# Patient Record
Sex: MALE | Race: WHITE | NOT HISPANIC OR LATINO | Employment: OTHER | URBAN - NONMETROPOLITAN AREA
[De-identification: names, ages, dates, MRNs, and addresses within clinical notes are randomized per-mention and may not be internally consistent; named-entity substitution may affect disease eponyms.]

---

## 2021-01-19 ENCOUNTER — TELEPHONE (OUTPATIENT)
Dept: CARDIOLOGY CLINIC | Facility: CLINIC | Age: 81
End: 2021-01-19

## 2021-01-20 ENCOUNTER — IMMUNIZATIONS (OUTPATIENT)
Dept: FAMILY MEDICINE CLINIC | Facility: HOSPITAL | Age: 81
End: 2021-01-20

## 2021-01-20 DIAGNOSIS — Z23 ENCOUNTER FOR IMMUNIZATION: Primary | ICD-10-CM

## 2021-01-20 DIAGNOSIS — I49.9 CARDIAC ARRHYTHMIA, UNSPECIFIED CARDIAC ARRHYTHMIA TYPE: Primary | ICD-10-CM

## 2021-01-20 PROCEDURE — 91301 SARS-COV-2 / COVID-19 MRNA VACCINE (MODERNA) 100 MCG: CPT | Performed by: EMERGENCY MEDICINE

## 2021-01-20 PROCEDURE — 0011A SARS-COV-2 / COVID-19 MRNA VACCINE (MODERNA) 100 MCG: CPT | Performed by: EMERGENCY MEDICINE

## 2021-01-20 NOTE — TELEPHONE ENCOUNTER
Scout Carlson from Dr Isabell Cheadle office calling  States that pt needs to be seen  He has an irregular heartbeat and abnormal EKG  Notes that were faxed to us are on your desk  Pt is schedule for march 24th, please advise

## 2021-01-22 ENCOUNTER — TRANSCRIBE ORDERS (OUTPATIENT)
Dept: ADMINISTRATIVE | Facility: HOSPITAL | Age: 81
End: 2021-01-22

## 2021-01-22 DIAGNOSIS — I48.91 UNSPECIFIED ATRIAL FIBRILLATION (HCC): ICD-10-CM

## 2021-01-22 DIAGNOSIS — I49.9 CARDIAC ARRHYTHMIA, UNSPECIFIED: Primary | ICD-10-CM

## 2021-01-28 ENCOUNTER — HOSPITAL ENCOUNTER (OUTPATIENT)
Dept: NON INVASIVE DIAGNOSTICS | Facility: HOSPITAL | Age: 81
Discharge: HOME | End: 2021-01-28
Payer: MEDICARE

## 2021-01-28 DIAGNOSIS — I48.91 UNSPECIFIED ATRIAL FIBRILLATION (HCC): ICD-10-CM

## 2021-01-28 DIAGNOSIS — I49.9 CARDIAC ARRHYTHMIA, UNSPECIFIED: ICD-10-CM

## 2021-01-28 PROCEDURE — 93226 XTRNL ECG REC<48 HR SCAN A/R: CPT

## 2021-01-28 PROCEDURE — 93225 XTRNL ECG REC<48 HRS REC: CPT

## 2021-02-05 PROCEDURE — 93227 XTRNL ECG REC<48 HR R&I: CPT | Performed by: INTERNAL MEDICINE

## 2021-02-08 ENCOUNTER — TELEPHONE (OUTPATIENT)
Dept: CARDIOLOGY CLINIC | Facility: CLINIC | Age: 81
End: 2021-02-08

## 2021-02-08 NOTE — TELEPHONE ENCOUNTER
As per Dr Tania French I called the pt to move his appointment up  The pt is available and the visit has been re scheduled to 02/18/21  I asked the pt about cardiac symptoms  At this tome he denies CP, palps, racing heart  He states that he is not experiencing any symptoms

## 2021-02-10 NOTE — TELEPHONE ENCOUNTER
Iggy Crocker  Is there any chance that you can call him and go over his medications  I am curious if he is on a beta blocker or calcium channel blocker as we may be able to start a low dose and repeat 24 hour Holter prior to office visit  Thank you

## 2021-02-11 RX ORDER — HYDROCHLOROTHIAZIDE 25 MG/1
25 TABLET ORAL DAILY
COMMUNITY
End: 2021-03-18 | Stop reason: CLARIF

## 2021-02-11 RX ORDER — ASPIRIN 81 MG/1
81 TABLET ORAL DAILY
COMMUNITY
End: 2021-02-25 | Stop reason: ALTCHOICE

## 2021-02-11 NOTE — TELEPHONE ENCOUNTER
I called the pt and he will call us back when he has his medications in front of him  Cellulitis of chest wall

## 2021-02-17 ENCOUNTER — IMMUNIZATIONS (OUTPATIENT)
Dept: FAMILY MEDICINE CLINIC | Facility: HOSPITAL | Age: 81
End: 2021-02-17

## 2021-02-17 ENCOUNTER — TELEPHONE (OUTPATIENT)
Dept: CARDIOLOGY CLINIC | Facility: CLINIC | Age: 81
End: 2021-02-17

## 2021-02-17 DIAGNOSIS — Z23 ENCOUNTER FOR IMMUNIZATION: Primary | ICD-10-CM

## 2021-02-17 PROCEDURE — 0012A SARS-COV-2 / COVID-19 MRNA VACCINE (MODERNA) 100 MCG: CPT

## 2021-02-17 PROCEDURE — 91301 SARS-COV-2 / COVID-19 MRNA VACCINE (MODERNA) 100 MCG: CPT

## 2021-02-17 NOTE — TELEPHONE ENCOUNTER
Spoke with pt about trying to get him into the office Friday AM  Pt states that he wont be able to come out in the weather we are going to be having  Spoke with Dr Madie Devries, she states that she will give him a call in the next couple days to discuss some suggestions on what he can do in the mean time  She also states that she will let us know when she can see him  Pt is aware

## 2021-02-19 NOTE — TELEPHONE ENCOUNTER
If NP appt opens up please add him  Also let PCP office know that he was scheduled and that he was not seen due to the snow

## 2021-02-25 ENCOUNTER — HOSPITAL ENCOUNTER (OUTPATIENT)
Dept: NON INVASIVE DIAGNOSTICS | Facility: HOSPITAL | Age: 81
Discharge: HOME | End: 2021-02-25
Payer: MEDICARE

## 2021-02-25 ENCOUNTER — CONSULT (OUTPATIENT)
Dept: CARDIOLOGY CLINIC | Facility: CLINIC | Age: 81
End: 2021-02-25
Payer: MEDICARE

## 2021-02-25 ENCOUNTER — LAB (OUTPATIENT)
Dept: LAB | Facility: HOSPITAL | Age: 81
End: 2021-02-25
Attending: INTERNAL MEDICINE
Payer: MEDICARE

## 2021-02-25 VITALS
BODY MASS INDEX: 27.93 KG/M2 | OXYGEN SATURATION: 95 % | DIASTOLIC BLOOD PRESSURE: 72 MMHG | HEIGHT: 69 IN | SYSTOLIC BLOOD PRESSURE: 138 MMHG | HEART RATE: 44 BPM | TEMPERATURE: 97.9 F | WEIGHT: 188.6 LBS

## 2021-02-25 DIAGNOSIS — I48.91 ATRIAL FIBRILLATION, UNSPECIFIED TYPE (HCC): Primary | ICD-10-CM

## 2021-02-25 DIAGNOSIS — R60.0 LOCALIZED EDEMA: ICD-10-CM

## 2021-02-25 DIAGNOSIS — I45.10 RBBB: ICD-10-CM

## 2021-02-25 DIAGNOSIS — I10 ESSENTIAL HYPERTENSION: ICD-10-CM

## 2021-02-25 DIAGNOSIS — I49.9 CARDIAC ARRHYTHMIA, UNSPECIFIED CARDIAC ARRHYTHMIA TYPE: ICD-10-CM

## 2021-02-25 DIAGNOSIS — Z78.9 MODERATE ALCOHOL CONSUMPTION: ICD-10-CM

## 2021-02-25 DIAGNOSIS — I48.91 ATRIAL FIBRILLATION, UNSPECIFIED TYPE (HCC): ICD-10-CM

## 2021-02-25 LAB — NT-PROBNP SERPL-MCNC: 1852 PG/ML

## 2021-02-25 PROCEDURE — 99205 OFFICE O/P NEW HI 60 MIN: CPT | Performed by: INTERNAL MEDICINE

## 2021-02-25 PROCEDURE — 36415 COLL VENOUS BLD VENIPUNCTURE: CPT

## 2021-02-25 PROCEDURE — 83880 ASSAY OF NATRIURETIC PEPTIDE: CPT

## 2021-02-25 PROCEDURE — 93306 TTE W/DOPPLER COMPLETE: CPT

## 2021-02-25 RX ORDER — FINASTERIDE 5 MG/1
5 TABLET, FILM COATED ORAL DAILY
COMMUNITY
Start: 2021-02-16

## 2021-02-25 RX ORDER — BISACODYL 5 MG
5 TABLET, DELAYED RELEASE (ENTERIC COATED) ORAL DAILY
COMMUNITY
Start: 2021-02-04 | End: 2021-08-09 | Stop reason: ALTCHOICE

## 2021-02-25 RX ORDER — DOCUSATE SODIUM 100 MG/1
100 CAPSULE, LIQUID FILLED ORAL
COMMUNITY
Start: 2021-02-04

## 2021-02-25 RX ORDER — MONTELUKAST SODIUM 10 MG/1
10 TABLET ORAL
COMMUNITY

## 2021-02-25 RX ORDER — IBUPROFEN 800 MG/1
TABLET ORAL
COMMUNITY
Start: 2021-02-04 | End: 2021-03-18

## 2021-02-25 RX ORDER — LORATADINE 10 MG/1
10 TABLET ORAL DAILY
COMMUNITY
End: 2021-08-09 | Stop reason: ALTCHOICE

## 2021-02-25 RX ORDER — TIOTROPIUM BROMIDE INHALATION SPRAY 3.12 UG/1
2 SPRAY, METERED RESPIRATORY (INHALATION) DAILY
COMMUNITY
Start: 2021-01-21

## 2021-02-25 NOTE — PROGRESS NOTES
Cardiology Office Visit    Misty Mcdonald  [id number]  1940    [facility]  [address]  [facility] [id number] [phone]      Dear Kong Diaz MD,    I had the pleasure of seeing your patient at our [address] [facility] [place] office today 2/25/2021  As you know he is a pleasant [de-identified]y o  year old male with a medical history as described below  Reason for office visit: Evaluation of abnormal heart rhythm  1  Atrial fibrillation, unspecified type [facility])  Assessment & Plan:  Atrial fibrillation noted on EKG at PCP office 01/19/2021  Holter monitor 01/28/2021 showed predominantly atrial fibrillation with an average heart rate of 102 beats per minute with frequent PVCs /aberrant beats representing 34 9% ectopic burden  I have asked EP to review Holter monitor  Patient denies any palpitations and is unaware of rapid heart rates  Will add magnesium 250 mg daily  Echocardiogram later today to evaluate LVEF given onset of lower extremity edema and shortness of breath / dyspnea on exertion in the setting of atrial fibrillation as well as moderate EtOH use  If ejection fraction is preserved would recommend the addition of Cardizem 120 mg daily and repeat 3 day ZIO monitor  If ejection fraction is significantly decreased we would need to try low-dose beta-blocker however this may be difficult/not well tolerated in the setting of underlying COPD/asthma  I discussed the risk of stroke with the patient given underlying atrial fibrillation  I have recommended the addition of Eliquis 5 mg twice daily  Will need eventual sleep study as well as stress test     Orders:  -     apixaban (ELIQUIS) 5 mg; Take 1 tablet (5 mg total) by mouth 2 (two) times a day  -     Echo complete with contrast if indicated; Future; Expected date: 02/25/2021    2   Essential hypertension  Assessment & Plan:  Blood pressure is well controlled on exam however  He will need rate control in the form of calcium channel blocker versus beta-blocker  Beta-blockers likely will be contraindicated in setting of asthma /COPD  See discussion above  3  RBBB  Assessment & Plan:  Chronic right bundle-branch block  4  Moderate alcohol consumption  Assessment & Plan: Moderate  EtOH use  I discussed the fact that this likely is the cause of his atrial fibrillation  I have asked him to trying cut back  5  Localized edema  Assessment & Plan:  Patient was significant bilateral lower extremity edema  Echocardiogram and NT proBNP today  May need stronger diuretic in place of HCTZ  Orders:  -     NT-BNP PRO; Future  -     Echo complete with contrast if indicated; Future; Expected date: 02/25/2021    6  Cardiac arrhythmia, unspecified cardiac arrhythmia type  -     Ambulatory referral to Cardiology  -     apixaban (ELIQUIS) 5 mg; Take 1 tablet (5 mg total) by mouth 2 (two) times a day           HPI     Patient has a history hypertension, asthma/COPD and BPH  Patient was seen by his primary physician (Dr Osei Ludwig) for routine Medicare physical exam 1/19/2021 at which time he was noted to have an abnormal heart rhythm  EKG obtained showed possible atrial fibrillation with frequent PVCs in a bigeminal pattern with underlying right bundle-branch block  24 hour Holter monitor was ordered and cardiology evaluation was recommended  Holter monitor showed predominantly atrial fibrillation with an average heart rate of 102 beats per minute and a total of 51,075 premature ventricular contractions/aberrant beats totaling of 34 9% ectopic burden  The patient denied any symptoms in attached diary  2/25/2021: Patient confirms that he had presented for routine office visit at which time he was noted to have an abnormal heart rhythm and sent for additional testing  [facility]  He adamantly denies any palpitations or chest pain    He does have shortness of breath / dyspnea on exertion which he attributes to his underlying lung disease which consists of COPD and asthma  Patient does admit to drinking 7 days a week at a minimum to alcoholic drinks a day  Alcohol intake consists of beer, wine and occasional whiskey  His wife has noted increased lower extremity edema  Patient denies any prior cardiac issues  No prior cardiac testing that he can recall           Patient Active Problem List   Diagnosis    Essential hypertension    Atrial fibrillation (HCC)    RBBB    Moderate alcohol consumption    Localized edema     Past Medical History:   Diagnosis Date    BPH (benign prostatic hyperplasia)     [facility] obstructive pulmonary disease (COPD) ([facility] [address] )     Essential hypertension     Hypertension      Social History     Socioeconomic History    Marital status: /Civil Union     Spouse name: Not on file    Number of children: Not on file    Years of education: Not on file    Highest education level: Not on file   Occupational History    Occupation: Retired   Social Needs    Financial resource strain: Not on file    Food insecurity     Worry: Not on file     Inability: Not on file   [employer] needs     Medical: Not on file     Non-medical: Not on file   Tobacco Use    Smoking status: Light Tobacco Smoker     Types: Cigars    Smokeless tobacco: Never Used   Substance and Sexual Activity    Alcohol use: Yes     Frequency: 4 or more times a week     Drinks per session: 1 or 2     Binge frequency: Never    Drug use: Not Currently    Sexual activity: Yes   Lifestyle    Physical activity     Days per week: Not on file     Minutes per session: Not on file    Stress: Not on file   Relationships    Social connections     Talks on phone: Not on file     Gets together: Not on file     Attends Rastafarian service: Not on file     Active member of club or organization: Not on file     Attends meetings of clubs or organizations: Not on file     Relationship status: Not on file    Intimate partner violence     Fear of current or ex partner: Not on file     Emotionally abused: Not on file     Physically abused: Not on file     Forced sexual activity: Not on file   Other Topics Concern    Not on file   Social History Narrative    Not on file      History reviewed  No pertinent family history  Past Surgical History:   Procedure Laterality Date    SHOULDER OPEN ROTATOR CUFF REPAIR      VEIN SURGERY         Current Outpatient Medications:     Ascorbic Acid (VITAMIN C PO), Take by mouth, Disp: , Rfl:     bisacodyl 5 MG EC tablet, Take 5 mg by mouth daily , Disp: , Rfl:     docusate sodium (COLACE) 100 mg capsule, Take 100 mg by mouth , Disp: , Rfl:     finasteride (PROSCAR) 5 mg tablet, Take 5 mg by mouth daily , Disp: , Rfl:     fluticasone-salmeterol (ADVAIR, WIXELA) 250-50 mcg/dose inhaler, Inhale 1 puff 2 (two) times a day Rinse mouth after use , Disp: , Rfl:     hydrochlorothiazide (HYDRODIURIL) 25 mg tablet, Take 25 mg by mouth daily, Disp: , Rfl:     ibuprofen (MOTRIN) 800 mg tablet, , Disp: , Rfl:     loratadine (CLARITIN) 10 mg tablet, Take 10 mg by mouth daily, Disp: , Rfl:     montelukast (SINGULAIR) 10 mg tablet, Take 10 mg by mouth daily at bedtime, Disp: , Rfl:     Spiriva Respimat 2 5 MCG/ACT AERS inhaler, , Disp: , Rfl:     apixaban (ELIQUIS) 5 mg, Take 1 tablet (5 mg total) by mouth 2 (two) times a day, Disp: 180 tablet, Rfl: 3  Allergies   Allergen Reactions    Penicillin G Other (See Comments)     PCN Shots Only!!       Cardiac Test Results:     Holter Monitor 1/28/2021:   1  The patient had predominantly atrial fibrillation  2  Heart rate varied from 78 bpm to 130 bpm     3  The patients average heart rate was 102 bpm     4  The patient had a holter monitor tracing done for 23 hours and 59 minutes    5  The patient had frequent ventricular ectopic activity (PVC's vs Aberrant beats) with a total of 51,075 ectopic beats representing a 34 9% burden  6  Episodes of ventricular bigeminy (30,849) and trigeminy (6156)  7  Ventricular runs with the longest lasting 5 beats  8  The patient had no supraventricular ectopy  9  The longest R/R interval was 1 7 seconds  10  Right bundle branch block  11  Diary attached  Patient denied any symptoms  Lipid panel 11/17/2020: C 170  T 66  H 74  L 85  ECG 1/19/2021:  Likely underlying atrial fibrillation with frequent PVCs/ aberrant beats in a bigeminal pattern  Right bundle-branch block  Review of Systems:    Review of Systems   Constitutional: Negative for activity change, appetite change and fatigue  HENT: Negative for congestion, hearing loss, tinnitus and trouble swallowing  Eyes: Negative for visual disturbance  Respiratory: Positive for shortness of breath  Negative for cough, chest tightness and wheezing  Dyspnea on exertion   Cardiovascular: Positive for leg swelling  Negative for chest pain and palpitations  Gastrointestinal: Negative for abdominal distention, abdominal pain, nausea and vomiting  Genitourinary: Negative for difficulty urinating  Musculoskeletal: Negative for arthralgias  Skin: Negative for rash  Neurological: Negative for dizziness, syncope and light-headedness  Hematological: Does not bruise/bleed easily  Psychiatric/Behavioral: Negative for confusion  The patient is not nervous/anxious  All other systems reviewed and are negative  Vitals:    02/25/21 1106   BP: 138/72   Pulse: (!) 44   Temp: 97 9 °F (36 6 °C)   SpO2: 95%   Weight: 85 5 kg (188 lb 9 6 oz)   Height: 5' 9" (1 753 m)     Vitals:    02/25/21 1106   Weight: 85 5 kg (188 lb 9 6 oz)     Height: 5' 9" (175 3 cm)     Physical Exam   Constitutional: He is oriented to person, place, and time  He appears well-developed and well-nourished  HENT:   Head: Normocephalic and atraumatic  Eyes: Pupils are equal, round, and reactive to light   Conjunctivae are normal  Neck: Normal range of motion  No JVD present  Cardiovascular: Normal heart sounds  An irregular rhythm present  Tachycardia present  Exam reveals no gallop and no friction rub  No murmur heard  Pulmonary/Chest: Effort normal  He has decreased breath sounds  Abdominal: Soft  Bowel sounds are normal    Musculoskeletal:         General: Edema present  Neurological: He is alert and oriented to person, place, and time  Skin: Skin is warm and dry  Psychiatric: He has a normal mood and affect  His behavior is normal    Vitals reviewed

## 2021-02-25 NOTE — PATIENT INSTRUCTIONS
EKG at PCP office suggested atrial fibrillation which was confirmed on Holter monitor  As discussed this does increase the risk of stroke  Would recommend Eliquis 5 mg twice daily to reduce stroke risk  This is a blood thinner so obviously there is a chance of bleeding  I would recommend echocardiogram to assess heart structure and function  Echocardiogram will be done today at 3:00 p m  Heart function will determine whether we start metoprolol or diltiazem  The use of metoprolol may be difficult in the setting of asthma/COPD  I do suspect that he may need a stronger diuretic/water pill as you have fairly significant lower extremity swelling  Add magnesium 250-500 mg daily  ZIO monitor eventually once rate controlling medication

## 2021-02-26 DIAGNOSIS — I48.91 ATRIAL FIBRILLATION, UNSPECIFIED TYPE (HCC): Primary | ICD-10-CM

## 2021-02-26 PROBLEM — R60.0 LOCALIZED EDEMA: Status: ACTIVE | Noted: 2021-02-26

## 2021-02-26 PROBLEM — I45.10 RBBB: Status: ACTIVE | Noted: 2021-02-26

## 2021-02-26 PROBLEM — Z78.9 MODERATE ALCOHOL CONSUMPTION: Status: ACTIVE | Noted: 2021-02-26

## 2021-02-26 NOTE — ASSESSMENT & PLAN NOTE
Atrial fibrillation noted on EKG at PCP office 01/19/2021  Holter monitor 01/28/2021 showed predominantly atrial fibrillation with an average heart rate of 102 beats per minute with frequent PVCs /aberrant beats representing 34 9% ectopic burden  I have asked EP to review Holter monitor  Patient denies any palpitations and is unaware of rapid heart rates  Will add magnesium 250 mg daily  Echocardiogram later today to evaluate LVEF given onset of lower extremity edema and shortness of breath / dyspnea on exertion in the setting of atrial fibrillation as well as moderate EtOH use  If ejection fraction is preserved would recommend the addition of Cardizem 120 mg daily and repeat 3 day ZIO monitor  If ejection fraction is significantly decreased we would need to try low-dose beta-blocker however this may be difficult/not well tolerated in the setting of underlying COPD/asthma  I discussed the risk of stroke with the patient given underlying atrial fibrillation  I have recommended the addition of Eliquis 5 mg twice daily    Will need eventual sleep study as well as stress test

## 2021-02-26 NOTE — ASSESSMENT & PLAN NOTE
Moderate  EtOH use  I discussed the fact that this likely is the cause of his atrial fibrillation  I have asked him to trying cut back

## 2021-02-26 NOTE — TELEPHONE ENCOUNTER
Pt is aware and agreeable to all information below  States that Dr Vidya Light agrees with the plan  Everything pended

## 2021-02-26 NOTE — ASSESSMENT & PLAN NOTE
Patient was significant bilateral lower extremity edema  Echocardiogram and NT proBNP today  May need stronger diuretic in place of HCTZ

## 2021-02-26 NOTE — ASSESSMENT & PLAN NOTE
Blood pressure is well controlled on exam however  He will need rate control in the form of calcium channel blocker versus beta-blocker  Beta-blockers likely will be contraindicated in setting of asthma /COPD  See discussion above

## 2021-02-26 NOTE — TELEPHONE ENCOUNTER
----- Message from [facility], DO sent at 2/26/2021  8:24 AM EST -----  Can you please call the patient let him know that his echocardiogram suggests very mildly decreased heart function which could be due to underlying atrial fibrillation  Ejection fraction is 45-50% with normal being in the range of 55-65%  Please let him know that there is also a trace amount of fluid around his heart  NT proBNP which looks for stretch in the heart was also elevated suggesting fluid overload  I would recommend adding Cardizem 120 mg daily  I would recommend discontinuing hydrochlorothiazide and starting furosemide 20 mg daily  Patient will need updated blood work late next week including NT proBNP/BMP /magnesium  Can you also see if Dr Godwin Rabago was on board with plan  He will need ZIO monitor (3 days) starting next week  Confirm that he got magnesium 250 mg daily

## 2021-03-01 ENCOUNTER — TELEPHONE (OUTPATIENT)
Dept: CARDIOLOGY CLINIC | Facility: CLINIC | Age: 81
End: 2021-03-01

## 2021-03-01 DIAGNOSIS — R60.0 LOCALIZED EDEMA: Primary | ICD-10-CM

## 2021-03-01 DIAGNOSIS — I48.91 ATRIAL FIBRILLATION, UNSPECIFIED TYPE (HCC): Primary | ICD-10-CM

## 2021-03-01 RX ORDER — DILTIAZEM HYDROCHLORIDE 120 MG/1
120 TABLET, FILM COATED ORAL DAILY
Qty: 90 TABLET | Refills: 3 | OUTPATIENT
Start: 2021-03-01

## 2021-03-01 RX ORDER — DILTIAZEM HYDROCHLORIDE 120 MG/1
120 CAPSULE, COATED, EXTENDED RELEASE ORAL DAILY
Qty: 30 CAPSULE | Refills: 1 | Status: SHIPPED | OUTPATIENT
Start: 2021-03-01 | End: 2021-03-18 | Stop reason: DRUGHIGH

## 2021-03-01 RX ORDER — FUROSEMIDE 20 MG/1
20 TABLET ORAL DAILY
Qty: 90 TABLET | Refills: 3 | OUTPATIENT
Start: 2021-03-01

## 2021-03-01 RX ORDER — FUROSEMIDE 20 MG/1
20 TABLET ORAL DAILY
Qty: 30 TABLET | Refills: 1 | Status: SHIPPED | OUTPATIENT
Start: 2021-03-01 | End: 2021-03-09 | Stop reason: SDUPTHER

## 2021-03-01 NOTE — TELEPHONE ENCOUNTER
----- Message from [facility], DO sent at 3/1/2021  7:34 AM EST -----  Regarding: medication  Patient called over weekend and I was unable to access Epic until this am  Please call him and let him know it was sent to [address]  He needs ZIO (3 days) which can be put on at follow up visit  Thank you

## 2021-03-04 ENCOUNTER — LAB (OUTPATIENT)
Dept: LAB | Facility: HOSPITAL | Age: 81
End: 2021-03-04
Attending: INTERNAL MEDICINE
Payer: MEDICARE

## 2021-03-04 DIAGNOSIS — I48.91 ATRIAL FIBRILLATION, UNSPECIFIED TYPE (HCC): ICD-10-CM

## 2021-03-04 DIAGNOSIS — I48.91 ATRIAL FIBRILLATION, UNSPECIFIED TYPE (HCC): Primary | ICD-10-CM

## 2021-03-04 DIAGNOSIS — I49.9 CARDIAC ARRHYTHMIA, UNSPECIFIED CARDIAC ARRHYTHMIA TYPE: ICD-10-CM

## 2021-03-04 LAB
ANION GAP SERPL CALCULATED.3IONS-SCNC: 3 MMOL/L (ref 4–13)
BUN SERPL-MCNC: 24 MG/DL (ref 5–25)
CALCIUM SERPL-MCNC: 9.1 MG/DL (ref 8.3–10.1)
CHLORIDE SERPL-SCNC: 103 MMOL/L (ref 100–108)
CO2 SERPL-SCNC: 34 MMOL/L (ref 21–32)
CREAT SERPL-MCNC: 0.94 MG/DL (ref 0.6–1.3)
GFR SERPL CREATININE-BSD FRML MDRD: 76 ML/MIN/1.73SQ M
GLUCOSE P FAST SERPL-MCNC: 115 MG/DL (ref 65–99)
MAGNESIUM SERPL-MCNC: 2 MG/DL (ref 1.6–2.6)
NT-PROBNP SERPL-MCNC: 1529 PG/ML
POTASSIUM SERPL-SCNC: 4.4 MMOL/L (ref 3.5–5.3)
SODIUM SERPL-SCNC: 140 MMOL/L (ref 136–145)

## 2021-03-04 PROCEDURE — 36415 COLL VENOUS BLD VENIPUNCTURE: CPT

## 2021-03-04 PROCEDURE — 83735 ASSAY OF MAGNESIUM: CPT

## 2021-03-04 PROCEDURE — 83880 ASSAY OF NATRIURETIC PEPTIDE: CPT

## 2021-03-04 PROCEDURE — 80048 BASIC METABOLIC PNL TOTAL CA: CPT

## 2021-03-08 ENCOUNTER — TELEPHONE (OUTPATIENT)
Dept: CARDIOLOGY CLINIC | Facility: CLINIC | Age: 81
End: 2021-03-08

## 2021-03-08 NOTE — TELEPHONE ENCOUNTER
----- Message from [facility], DO sent at 3/8/2021  6:54 AM EST -----  Please call the patient let him know that the blood test that looks for stretch in the heart is improved but remains elevated  I would recommend increasing furosemide to 40 mg daily if he is agreeable  Will need repeat labs in 2 weeks (BMP/BNP/Mg)

## 2021-03-09 DIAGNOSIS — R60.0 LOCALIZED EDEMA: Primary | ICD-10-CM

## 2021-03-09 DIAGNOSIS — I49.9 CARDIAC ARRHYTHMIA, UNSPECIFIED CARDIAC ARRHYTHMIA TYPE: ICD-10-CM

## 2021-03-09 DIAGNOSIS — R60.0 LOCALIZED EDEMA: ICD-10-CM

## 2021-03-09 RX ORDER — FUROSEMIDE 20 MG/1
40 TABLET ORAL DAILY
Qty: 90 TABLET | Refills: 3 | Status: SHIPPED | OUTPATIENT
Start: 2021-03-09 | End: 2021-06-30 | Stop reason: SDUPTHER

## 2021-03-18 ENCOUNTER — OFFICE VISIT (OUTPATIENT)
Dept: CARDIOLOGY CLINIC | Facility: CLINIC | Age: 81
End: 2021-03-18
Payer: MEDICARE

## 2021-03-18 VITALS
WEIGHT: 182.6 LBS | DIASTOLIC BLOOD PRESSURE: 78 MMHG | HEART RATE: 89 BPM | OXYGEN SATURATION: 99 % | BODY MASS INDEX: 27.05 KG/M2 | HEIGHT: 69 IN | SYSTOLIC BLOOD PRESSURE: 136 MMHG | TEMPERATURE: 97.3 F

## 2021-03-18 DIAGNOSIS — Z78.9 MODERATE ALCOHOL CONSUMPTION: ICD-10-CM

## 2021-03-18 DIAGNOSIS — I48.91 ATRIAL FIBRILLATION, UNSPECIFIED TYPE (HCC): Primary | ICD-10-CM

## 2021-03-18 DIAGNOSIS — I45.10 RBBB: ICD-10-CM

## 2021-03-18 DIAGNOSIS — I10 ESSENTIAL HYPERTENSION: ICD-10-CM

## 2021-03-18 DIAGNOSIS — R60.0 LOCALIZED EDEMA: ICD-10-CM

## 2021-03-18 PROCEDURE — 99215 OFFICE O/P EST HI 40 MIN: CPT | Performed by: INTERNAL MEDICINE

## 2021-03-18 RX ORDER — DILTIAZEM HYDROCHLORIDE 180 MG/1
180 CAPSULE, COATED, EXTENDED RELEASE ORAL DAILY
Qty: 30 CAPSULE | Refills: 11 | Status: SHIPPED | OUTPATIENT
Start: 2021-03-18 | End: 2021-05-13

## 2021-03-18 RX ORDER — FOLIC ACID 0.8 MG
500 TABLET ORAL DAILY
COMMUNITY

## 2021-03-18 NOTE — PROGRESS NOTES
Cardiology Office Visit    Cherie Rajan  [id number]  1940    [facility]  [address]  [facility] [address] [phone]      Dear Maddy Jeter MD,    I had the pleasure of seeing your patient at our [facility] Cardiology [facility] office today 3/18/2021  As you know he is a pleasant [de-identified]y o  year old male with a medical history as described below  Reason for office visit: Follow up atrial fibrillation, hypertension and RBBB  1  Atrial fibrillation, unspecified type [facility])  Assessment & Plan:  Atrial fibrillation noted on EKG at PCP office 01/19/2021  Holter monitor 01/28/2021 showed predominantly atrial fibrillation with an average heart rate of 102 beats per minute with frequent PVCs /aberrant beats representing 34 9% ectopic burden  I had asked EP to review Holter monitor who agreed with my initial plan and discussed possible ablation if rates not controlled as he has no good options for antiarrhythmics  Patient denied any palpitations and was unaware of rapid heart rates  Magnesium 250 mg daily added and will be continued  Echocardiogram 2/25/2021 showed EF 45-50%  I had recommended the addition of Cardizem 120 mg (trying to avoid beta blockers due to COPD/asthma) daily and planned repeat 3 day ZIO monitor which will be placed today  Heart rates remain elevated at 105 bpm on ECG today  Increase diltiazem to 180 mg daily  Will await the results of ZIO as he may need further increase in diltiazem dose  Eliquis 5 mg twice daily for CVA prophylaxis  Will need eventual sleep study as well as stress test once heart rates are better controlled  Orders:  -     diltiazem (CARDIZEM CD) 180 mg 24 hr capsule; Take 1 capsule (180 mg total) by mouth daily    2   Essential hypertension  Assessment & Plan:  Blood pressure is well controlled on exam   He will need up titration of diltiazem for more optimal blood pressure control  Beta-blockers likely will be contraindicated in setting of asthma /COPD  See discussion above  3  RBBB  Assessment & Plan:  Chronic right bundle-branch block  4  Moderate alcohol consumption  Assessment & Plan: Moderate  EtOH use  I discussed the fact that this is likely contributing to his atrial fibrillation  I have asked him to continue to try and cut back on intake  5  Localized edema  Assessment & Plan:  Patient had significant bilateral lower extremity edema on exam    Echocardiogram as outlined showed mildly decreased LV systolic function at 38-91%  NT proBNP elevated at 1852 initially  Patient was started on furosemide 20 mg daily  HCTZ was discontinued  NT proBNP 1529  Furosemide increased to 40 mg daily  NT proBNP ordered  Furosemide may need further up titration  HPI     Patient has a history hypertension, asthma/COPD and BPH  Patient was seen by his primary physician (Dr John Ludwig) for routine Medicare physical exam 1/19/2021 at which time he was noted to have an abnormal heart rhythm  EKG obtained showed possible atrial fibrillation with frequent PVCs in a bigeminal pattern with underlying right bundle-branch block  24 hour Holter monitor was ordered and cardiology evaluation was recommended  Holter monitor showed predominantly atrial fibrillation with an average heart rate of 102 beats per minute and a total of 51,075 premature ventricular contractions/aberrant beats totaling of 34 9% ectopic burden  The patient denied any symptoms in attached diary  2/25/2021: Patient confirms that he had presented for routine office visit at which time he was noted to have an abnormal heart rhythm and sent for additional testing  [facility]  He adamantly denies any palpitations or chest pain    He does have shortness of breath / dyspnea on exertion which he attributes to his underlying lung disease which consists of COPD and asthma  Patient does admit to drinking 7 days a week at a minimum to alcoholic drinks a day  Alcohol intake consists of beer, wine and occasional whiskey  His wife has noted increased lower extremity edema  Patient denies any prior cardiac issues  No prior cardiac testing that he can recall  3/18/2021: Patient presents to the office today for follow up  I had started diltiazem, eliquis, and furosemide at/after last office visit  Patient denies any palpitations  He has tolerated the new medications without difficulty  Furosemide was increased from 20 mg to 40 mg  The patient feels his breathing is somewhat better  Still has edema  No chest pain  No lightheadedness or dizziness  ECG today shows atrial fibrillation with RVR at 105 bpm with aberrantly conducted complexes  Plan for repeat ZIO (3 days) to start today           Patient Active Problem List   Diagnosis    Essential hypertension    Atrial fibrillation (HCC)    RBBB    Moderate alcohol consumption    Localized edema    Chronic combined systolic and diastolic CHF (congestive heart failure) ([facility])     Past Medical History:   Diagnosis Date    BPH (benign prostatic hyperplasia)     [facility]     Essential hypertension     Hypertension      Social History     Socioeconomic History    Marital status: /Civil Union     Spouse name: Not on file    Number of children: Not on file    Years of education: Not on file    Highest education level: Not on file   Occupational History    Occupation: Retired   Social Needs    Financial resource strain: Not on file    Food insecurity     Worry: Not on file     Inability: Not on file   [employer] needs     Medical: Not on file     Non-medical: Not on file   Tobacco Use    Smoking status: Light Tobacco Smoker     Types: Cigars    Smokeless tobacco: Never Used   Substance and Sexual Activity    Alcohol use: Yes     Frequency: 4 or more times a week Drinks per session: 1 or 2     Binge frequency: Never    Drug use: Not Currently    Sexual activity: Yes   Lifestyle    Physical activity     Days per week: Not on file     Minutes per session: Not on file    Stress: Not on file   Relationships    Social connections     Talks on phone: Not on file     Gets together: Not on file     Attends Oriental orthodox service: Not on file     Active member of club or organization: Not on file     Attends meetings of clubs or organizations: Not on file     Relationship status: Not on file    Intimate partner violence     Fear of current or ex partner: Not on file     Emotionally abused: Not on file     Physically abused: Not on file     Forced sexual activity: Not on file   Other Topics Concern    Not on file   Social History Narrative    Not on file      History reviewed  No pertinent family history    Past Surgical History:   Procedure Laterality Date    SHOULDER OPEN ROTATOR CUFF REPAIR      VEIN SURGERY         Current Outpatient Medications:     apixaban (ELIQUIS) 5 mg, Take 1 tablet (5 mg total) by mouth 2 (two) times a day, Disp: 180 tablet, Rfl: 3    Ascorbic Acid (VITAMIN C PO), Take by mouth, Disp: , Rfl:     bisacodyl 5 MG EC tablet, Take 5 mg by mouth daily , Disp: , Rfl:     docusate sodium (COLACE) 100 mg capsule, Take 100 mg by mouth , Disp: , Rfl:     finasteride (PROSCAR) 5 mg tablet, Take 5 mg by mouth daily , Disp: , Rfl:     fluticasone-salmeterol (ADVAIR, WIXELA) 250-50 mcg/dose inhaler, Inhale 1 puff 2 (two) times a day Rinse mouth after use , Disp: , Rfl:     furosemide (LASIX) 20 mg tablet, Take 2 tablets (40 mg total) by mouth daily, Disp: 90 tablet, Rfl: 3    loratadine (CLARITIN) 10 mg tablet, Take 10 mg by mouth daily, Disp: , Rfl:     Magnesium 500 MG CAPS, Take 500 mg by mouth daily, Disp: , Rfl:     montelukast (SINGULAIR) 10 mg tablet, Take 10 mg by mouth daily at bedtime, Disp: , Rfl:     Spiriva Respimat 2 5 MCG/ACT AERS inhaler, , Disp: , Rfl:     diltiazem (CARDIZEM CD) 180 mg 24 hr capsule, Take 1 capsule (180 mg total) by mouth daily, Disp: 30 capsule, Rfl: 11  Allergies   Allergen Reactions    Penicillin G Other (See Comments)     PCN Shots Only!!       Cardiac Test Results:     ECG 3/18/2021: Atrial fibrillation with rapid ventricular response   bpm   PVC's vs aberrantly conducted beats  RBBB  Echocardiogram 2/25/2021:   EF 45-50%  Mildly dilated left and right atrium  Mild mitral regurgitation  Trace tricuspid regurgitation  Trace pericardial effusion  Holter Monitor 1/28/2021:   1  The patient had predominantly atrial fibrillation  2  Heart rate varied from 78 bpm to 130 bpm     3  The patients average heart rate was 102 bpm     4  The patient had a holter monitor tracing done for 23 hours and 59 minutes  5  The patient had frequent ventricular ectopic activity (PVC's vs Aberrant beats) with a total of 51,075 ectopic beats representing a 34 9% burden  6  Episodes of ventricular bigeminy (30,849) and trigeminy (6156)  7  Ventricular runs with the longest lasting 5 beats  8  The patient had no supraventricular ectopy  9  The longest R/R interval was 1 7 seconds  10  Right bundle branch block  11  Diary attached  Patient denied any symptoms  Lipid panel 11/17/2020: C 170  T 66  H 74  L 85  ECG 1/19/2021:  Likely underlying atrial fibrillation with frequent PVCs/ aberrant beats in a bigeminal pattern  Right bundle-branch block  Review of Systems:    Review of Systems   Constitutional: Negative for activity change, appetite change and fatigue  HENT: Negative for congestion, hearing loss, tinnitus and trouble swallowing  Eyes: Negative for visual disturbance  Respiratory: Positive for shortness of breath  Negative for cough, chest tightness and wheezing  Dyspnea on exertion   Cardiovascular: Positive for leg swelling  Negative for chest pain and palpitations  Gastrointestinal: Negative for abdominal distention, abdominal pain, nausea and vomiting  Genitourinary: Negative for difficulty urinating  Musculoskeletal: Negative for arthralgias  Skin: Negative for rash  Neurological: Negative for dizziness, syncope and light-headedness  Hematological: Does not bruise/bleed easily  Psychiatric/Behavioral: Negative for confusion  The patient is not nervous/anxious  All other systems reviewed and are negative  Vitals:    03/18/21 1447 03/18/21 1523   BP: 128/80 136/78   Pulse: 89    Temp: (!) 97 3 °F (36 3 °C)    SpO2: 99%    Weight: 82 8 kg (182 lb 9 6 oz)    Height: 5' 9" (1 753 m)      Vitals:    03/18/21 1447   Weight: 82 8 kg (182 lb 9 6 oz)     Height: 5' 9" (175 3 cm)     Physical Exam   Constitutional: He is oriented to person, place, and time  He appears well-developed and well-nourished  HENT:   Head: Normocephalic and atraumatic  Eyes: Pupils are equal, round, and reactive to light  Conjunctivae are normal    Neck: Normal range of motion  No JVD present  Cardiovascular: Normal heart sounds  An irregular rhythm present  Tachycardia present  Exam reveals no gallop and no friction rub  No murmur heard  Pulmonary/Chest: Effort normal  He has decreased breath sounds  Abdominal: Soft  Bowel sounds are normal    Musculoskeletal:         General: Edema present  Neurological: He is alert and oriented to person, place, and time  Skin: Skin is warm and dry  Psychiatric: He has a normal mood and affect  His behavior is normal    Vitals reviewed

## 2021-03-18 NOTE — PATIENT INSTRUCTIONS
3 day ZIO in place  Increase diltiazem to 180 mg daily  Blood work next Tuesday  Pending the results of blood work we may need to make additional changes to her medication  Pending the results of the ZIO monitor we also may need to make some changes to your medications

## 2021-03-21 PROBLEM — I50.42 CHRONIC COMBINED SYSTOLIC AND DIASTOLIC CHF (CONGESTIVE HEART FAILURE) (HCC): Status: ACTIVE | Noted: 2021-03-21

## 2021-03-21 PROCEDURE — 93000 ELECTROCARDIOGRAM COMPLETE: CPT | Performed by: INTERNAL MEDICINE

## 2021-03-22 NOTE — ASSESSMENT & PLAN NOTE
Wt Readings from Last 3 Encounters:   03/18/21 82 8 kg (182 lb 9 6 oz)   02/25/21 85 5 kg (188 lb 9 6 oz)     Echocardiogram 2/25/2021 showed an EF of 45-50%  Unclear etiology of reduced EF but this could be tachycardia induced vs EtOH induced  Will need eventual stress test once heart rates are controlled to rule out ischemia as the source of his HFrEF  Continue furosemide 40 mg daily for now and uptitrate pending results of repeat labs  Ideally should be on long acting beta blocker but has COPD/Asthma  No ACE-I/ARB for now as we need room to uptitrate AV efra blockers  Weight improved from last office visit

## 2021-03-22 NOTE — ASSESSMENT & PLAN NOTE
Atrial fibrillation noted on EKG at PCP office 01/19/2021  Holter monitor 01/28/2021 showed predominantly atrial fibrillation with an average heart rate of 102 beats per minute with frequent PVCs /aberrant beats representing 34 9% ectopic burden  I had asked EP to review Holter monitor who agreed with my initial plan and discussed possible ablation if rates not controlled as he has no good options for antiarrhythmics  Patient denied any palpitations and was unaware of rapid heart rates  Magnesium 250 mg daily added and will be continued  Echocardiogram 2/25/2021 showed EF 45-50%  I had recommended the addition of Cardizem 120 mg (trying to avoid beta blockers due to COPD/asthma) daily and planned repeat 3 day ZIO monitor which will be placed today  Heart rates remain elevated at 105 bpm on ECG today  Increase diltiazem to 180 mg daily  Will await the results of ZIO as he may need further increase in diltiazem dose  Eliquis 5 mg twice daily for CVA prophylaxis  Will need eventual sleep study as well as stress test once heart rates are better controlled

## 2021-03-22 NOTE — ASSESSMENT & PLAN NOTE
Blood pressure is well controlled on exam   He will need up titration of diltiazem for more optimal blood pressure control  Beta-blockers likely will be contraindicated in setting of asthma /COPD  See discussion above

## 2021-03-22 NOTE — ASSESSMENT & PLAN NOTE
Moderate  EtOH use  I discussed the fact that this is likely contributing to his atrial fibrillation  I have asked him to continue to try and cut back on intake

## 2021-03-22 NOTE — ASSESSMENT & PLAN NOTE
Patient had significant bilateral lower extremity edema on exam    Echocardiogram as outlined showed mildly decreased LV systolic function at 70-58%  NT proBNP elevated at 1852 initially  Patient was started on furosemide 20 mg daily  HCTZ was discontinued  NT proBNP 1529  Furosemide increased to 40 mg daily  NT proBNP ordered  Furosemide may need further up titration

## 2021-03-23 ENCOUNTER — TELEPHONE (OUTPATIENT)
Dept: CARDIOLOGY CLINIC | Facility: CLINIC | Age: 81
End: 2021-03-23

## 2021-03-23 ENCOUNTER — APPOINTMENT (OUTPATIENT)
Dept: LAB | Facility: HOSPITAL | Age: 81
End: 2021-03-23
Attending: INTERNAL MEDICINE
Payer: MEDICARE

## 2021-03-23 DIAGNOSIS — I49.9 CARDIAC ARRHYTHMIA, UNSPECIFIED CARDIAC ARRHYTHMIA TYPE: ICD-10-CM

## 2021-03-23 DIAGNOSIS — I48.91 ATRIAL FIBRILLATION, UNSPECIFIED TYPE (HCC): ICD-10-CM

## 2021-03-23 DIAGNOSIS — R60.0 LOCALIZED EDEMA: ICD-10-CM

## 2021-03-23 DIAGNOSIS — I48.91 ATRIAL FIBRILLATION, UNSPECIFIED TYPE (HCC): Primary | ICD-10-CM

## 2021-03-23 LAB
ANION GAP SERPL CALCULATED.3IONS-SCNC: 1 MMOL/L (ref 4–13)
BUN SERPL-MCNC: 19 MG/DL (ref 5–25)
CALCIUM SERPL-MCNC: 9.1 MG/DL (ref 8.3–10.1)
CHLORIDE SERPL-SCNC: 103 MMOL/L (ref 100–108)
CO2 SERPL-SCNC: 35 MMOL/L (ref 21–32)
CREAT SERPL-MCNC: 1.07 MG/DL (ref 0.6–1.3)
GFR SERPL CREATININE-BSD FRML MDRD: 65 ML/MIN/1.73SQ M
GLUCOSE P FAST SERPL-MCNC: 109 MG/DL (ref 65–99)
MAGNESIUM SERPL-MCNC: 1.9 MG/DL (ref 1.6–2.6)
NT-PROBNP SERPL-MCNC: 1337 PG/ML
POTASSIUM SERPL-SCNC: 4.1 MMOL/L (ref 3.5–5.3)
SODIUM SERPL-SCNC: 139 MMOL/L (ref 136–145)

## 2021-03-23 PROCEDURE — 83880 ASSAY OF NATRIURETIC PEPTIDE: CPT

## 2021-03-23 PROCEDURE — 83735 ASSAY OF MAGNESIUM: CPT

## 2021-03-23 PROCEDURE — 36415 COLL VENOUS BLD VENIPUNCTURE: CPT

## 2021-03-23 PROCEDURE — 80048 BASIC METABOLIC PNL TOTAL CA: CPT

## 2021-03-23 NOTE — TELEPHONE ENCOUNTER
----- Message from [facility], [place], DO sent at 3/23/2021 12:20 PM EDT -----  Please call the patient let him know that his kidney function is ok  His NT proBNP remains elevated but slightly improved from his previous 2 weeks ago  I would recommend increasing his furosemide to 40 mg TWICE daily (early morning and around 12-1 pm)  Increase magnesium to 500 mg daily  Repeat labs in 1 week (BMP/NT-proBNP)  Thank you

## 2021-03-31 ENCOUNTER — APPOINTMENT (OUTPATIENT)
Dept: LAB | Facility: HOSPITAL | Age: 81
End: 2021-03-31
Attending: INTERNAL MEDICINE
Payer: MEDICARE

## 2021-03-31 DIAGNOSIS — I48.91 ATRIAL FIBRILLATION, UNSPECIFIED TYPE (HCC): ICD-10-CM

## 2021-03-31 LAB
ANION GAP SERPL CALCULATED.3IONS-SCNC: 5 MMOL/L (ref 4–13)
BUN SERPL-MCNC: 25 MG/DL (ref 5–25)
CALCIUM SERPL-MCNC: 9.4 MG/DL (ref 8.3–10.1)
CHLORIDE SERPL-SCNC: 103 MMOL/L (ref 100–108)
CO2 SERPL-SCNC: 36 MMOL/L (ref 21–32)
CREAT SERPL-MCNC: 1.22 MG/DL (ref 0.6–1.3)
GFR SERPL CREATININE-BSD FRML MDRD: 56 ML/MIN/1.73SQ M
GLUCOSE P FAST SERPL-MCNC: 112 MG/DL (ref 65–99)
NT-PROBNP SERPL-MCNC: 787 PG/ML
POTASSIUM SERPL-SCNC: 4.2 MMOL/L (ref 3.5–5.3)
SODIUM SERPL-SCNC: 144 MMOL/L (ref 136–145)

## 2021-03-31 PROCEDURE — 80048 BASIC METABOLIC PNL TOTAL CA: CPT

## 2021-03-31 PROCEDURE — 83880 ASSAY OF NATRIURETIC PEPTIDE: CPT

## 2021-03-31 PROCEDURE — 36415 COLL VENOUS BLD VENIPUNCTURE: CPT

## 2021-04-05 ENCOUNTER — CLINICAL SUPPORT (OUTPATIENT)
Dept: CARDIOLOGY CLINIC | Facility: CLINIC | Age: 81
End: 2021-04-05
Payer: MEDICARE

## 2021-04-05 DIAGNOSIS — I48.91 ATRIAL FIBRILLATION, UNSPECIFIED TYPE (HCC): ICD-10-CM

## 2021-04-05 PROCEDURE — 93244 EXT ECG>48HR<7D REV&INTERPJ: CPT | Performed by: INTERNAL MEDICINE

## 2021-04-07 DIAGNOSIS — I48.91 ATRIAL FIBRILLATION, UNSPECIFIED TYPE (HCC): Primary | ICD-10-CM

## 2021-04-07 RX ORDER — DILTIAZEM HYDROCHLORIDE 240 MG/1
240 CAPSULE, EXTENDED RELEASE ORAL DAILY
Qty: 90 CAPSULE | Refills: 3 | Status: SHIPPED | OUTPATIENT
Start: 2021-04-07 | End: 2021-06-30 | Stop reason: SDUPTHER

## 2021-04-07 NOTE — TELEPHONE ENCOUNTER
The pt called back  He is aware and is agreeable to try the med increase , he is asking that it be called into 90 days to [facility]   He is also agreeable to see EP as long as the consult can be virtual as he doesn't want to travel to [place] for the visit  He does not have a BP cuff at home to monitor his BP and states that he feels fine

## 2021-04-07 NOTE — TELEPHONE ENCOUNTER
----- Message from [facility], DO sent at 4/6/2021 10:07 PM EDT -----  Atrial Fibrillation occurred continuously (100% burden), ranging from 61-  144 bpm (avg of 94 bpm)  Bundle Branch Block/IVCD was present  Isolated  VEs were frequent (16 4%, 04591), VE Couplets were rare (<1 0%, 1253),  and VE Triplets were rare (<1 0%, 33)  Ventricular Bigeminy and Trigeminy  were present  ZIO 3/18-3/2//2021:  Strips above reviewed  Agree with findings as documented  100% atrial fibrillation burden with HR range () with an Avg HR of 94 bpm    Frequent PVC's (16 4%)  Rare ventricular couplets and triplets  Ventricular Bigeminy and Trigeminy noted  ##  Can you please call the patient and let him know that his monitor showed 100% atrial fibrillation with an average heart rate of 94 beats per minute as well as frequent PVC's  We should increase diltiazem dose if his blood pressure allows  Can you see if he is checking his blood pressure at home, and how it is running, as we likely will need to increase diltiazem to 240 mg daily for better heart rate control  Can you please see how he is feeling in general and if he would be willing to see one of our electrophysiologists /EP doctors who specialize in the electrical system of the heart for further treatment options  If he would like to talk to me please let me know  Thank you

## 2021-05-13 DIAGNOSIS — I48.91 ATRIAL FIBRILLATION, UNSPECIFIED TYPE (HCC): ICD-10-CM

## 2021-05-13 RX ORDER — DILTIAZEM HYDROCHLORIDE 180 MG/1
180 CAPSULE, COATED, EXTENDED RELEASE ORAL DAILY
Qty: 90 CAPSULE | Refills: 3 | Status: CANCELLED | OUTPATIENT
Start: 2021-05-13

## 2021-05-13 NOTE — TELEPHONE ENCOUNTER
Pt states that he was never told that he was to up his diltiazem  Agreed to go to the pharm and get the upped dose

## 2021-05-13 NOTE — TELEPHONE ENCOUNTER
Please verify Diltiazem dose   I believe he is to be taking 240mg per the most recent telephone encounter (was increased on 4/7/21)

## 2021-06-17 ENCOUNTER — OFFICE VISIT (OUTPATIENT)
Dept: CARDIOLOGY CLINIC | Facility: CLINIC | Age: 81
End: 2021-06-17
Payer: MEDICARE

## 2021-06-17 VITALS
SYSTOLIC BLOOD PRESSURE: 118 MMHG | BODY MASS INDEX: 26.81 KG/M2 | TEMPERATURE: 96.7 F | HEIGHT: 69 IN | WEIGHT: 181 LBS | DIASTOLIC BLOOD PRESSURE: 72 MMHG | HEART RATE: 53 BPM

## 2021-06-17 DIAGNOSIS — I50.42 CHRONIC COMBINED SYSTOLIC AND DIASTOLIC CHF (CONGESTIVE HEART FAILURE) (HCC): ICD-10-CM

## 2021-06-17 DIAGNOSIS — R60.0 LOCALIZED EDEMA: ICD-10-CM

## 2021-06-17 DIAGNOSIS — I10 ESSENTIAL HYPERTENSION: ICD-10-CM

## 2021-06-17 DIAGNOSIS — I45.10 RBBB: ICD-10-CM

## 2021-06-17 DIAGNOSIS — Z78.9 MODERATE ALCOHOL CONSUMPTION: ICD-10-CM

## 2021-06-17 DIAGNOSIS — I48.91 ATRIAL FIBRILLATION, UNSPECIFIED TYPE (HCC): Primary | ICD-10-CM

## 2021-06-17 PROCEDURE — 99214 OFFICE O/P EST MOD 30 MIN: CPT | Performed by: INTERNAL MEDICINE

## 2021-06-17 NOTE — PROGRESS NOTES
Cardiology Office Visit    Jaime Barnes  [id number]  1940    [facility]  [address] [facility] [address]  [address]      Dear Daysi Ortiz MD,    I had the pleasure of seeing your patient at our [address] [facility]  office today 6/17/2021  As you know he is a pleasant [de-identified]y o  year old male with a medical history as described below  Reason for office visit: Follow up atrial fibrillation, hypertension and RBBB  1  Atrial fibrillation, unspecified type [facility])  Assessment & Plan:  Atrial fibrillation noted on EKG at PCP office 01/19/2021  Holter monitor 01/28/2021 showed predominantly atrial fibrillation with an average heart rate of 102 beats per minute with frequent PVCs /aberrant beats representing 34 9% ectopic burden  I had asked EP to review Holter monitor who agreed with my initial plan and discussed possible ablation if rates not controlled as he has no good options for antiarrhythmics  Patient denied any palpitations and was unaware of rapid heart rates  Magnesium 250 mg daily added and will be continued  Echocardiogram 2/25/2021 showed EF 45-50%  I had recommended the addition of Cardizem 120 mg (trying to avoid beta blockers due to COPD/asthma) daily and planned repeat 3 day ZIO monitor which will be placed today  Heart rates remain elevated but improved with an average heart rate of 94 bpm on ZIO 3/18-3/21/2021  Eliquis 5 mg twice daily for CVA prophylaxis  Will need eventual sleep study as well as stress test once heart rates are better controlled  Repeat 24 hour Holter monitor  Orders:  -     Holter monitor - 24 hour; Future; Expected date: 06/17/2021    2  Chronic combined systolic and diastolic CHF (congestive heart failure) [facility])  Assessment & Plan:  Echocardiogram 2/25/2021 showed an EF of 45-50%     Unclear etiology of reduced EF but this could be tachycardia induced vs EtOH induced  Will need eventual stress test once heart rates are controlled to rule out ischemia as the source of his HFrEF  Continue furosemide 40 mg daily for now and uptitrate pending results of repeat labs  Ideally should be on long acting beta blocker but has COPD/Asthma  No ACE-I/ARB for now as we need room to uptitrate AV efra blockers  Weight stable  Orders:  -     Basic metabolic panel; Future  -     NT-BNP PRO; Future    3  Essential hypertension  Assessment & Plan:  Blood pressure is well controlled on exam   He will need up titration of diltiazem for more optimal blood pressure control  Beta-blockers likely will be contraindicated in setting of asthma /COPD  See discussion above  Orders:  -     Basic metabolic panel; Future  -     NT-BNP PRO; Future    4  RBBB  Assessment & Plan:  Chronic right bundle-branch block  5  Moderate alcohol consumption  Assessment & Plan: Moderate  EtOH use  I discussed the fact that this is likely contributing to his atrial fibrillation  I have asked him to continue to try and cut back on intake  6  Localized edema  Assessment & Plan:  Patient had significant bilateral lower extremity edema on exam    Echocardiogram as outlined showed mildly decreased LV systolic function at 76-29%  NT proBNP elevated at 1852 initially  Patient was started on furosemide 20 mg daily  HCTZ was discontinued  NT proBNP ordered  Furosemide may need further up titration  HPI     Patient has a history hypertension, asthma/COPD and BPH  Patient was seen by his primary physician (Dr Akshat Cintron) for routine Medicare physical exam 1/19/2021 at which time he was noted to have an abnormal heart rhythm  EKG obtained showed possible atrial fibrillation with frequent PVCs in a bigeminal pattern with underlying right bundle-branch block  24 hour Holter monitor was ordered and cardiology evaluation was recommended      Holter monitor showed predominantly atrial fibrillation with an average heart rate of 102 beats per minute and a total of 51,075 premature ventricular contractions/aberrant beats totaling of 34 9% ectopic burden  The patient denied any symptoms in attached diary  2/25/2021: Patient confirms that he had presented for routine office visit at which time he was noted to have an abnormal heart rhythm and sent for additional testing  [facility]  He adamantly denies any palpitations or chest pain  He does have shortness of breath / dyspnea on exertion which he attributes to his underlying lung disease which consists of COPD and asthma  Patient does admit to drinking 7 days a week at a minimum to alcoholic drinks a day  Alcohol intake consists of beer, wine and occasional whiskey  His wife has noted increased lower extremity edema  Patient denies any prior cardiac issues  No prior cardiac testing that he can recall  3/18/2021: Patient presents to the office today for follow up  I had started diltiazem, eliquis, and furosemide at/after last office visit  Patient denies any palpitations  He has tolerated the new medications without difficulty  Furosemide was increased from 20 mg to 40 mg  The patient feels his breathing is somewhat better  Still has edema  No chest pain  No lightheadedness or dizziness  ECG today shows atrial fibrillation with RVR at 105 bpm with aberrantly conducted complexes  Plan for repeat ZIO (3 days) to start today  6/17/2021: Patient presents to the office today for follow up  Patient tells me that he feels well overall  He does continue to have edema in his legs  Shortness of breath and dyspnea on exertion improved per his report  Decreased cough  No bleeding issues  He does see [address] next week in the [facility] [place]  He did have repeat ZIO 3/18 done which showed 100% atrial with an average heart rate of 94 bpm  Frequent PVC's (16 4%)           Patient Active Problem List Diagnosis    Essential hypertension    Atrial fibrillation (HCC)    RBBB    Moderate alcohol consumption    Localized edema    Acute on chronic combined systolic and diastolic congestive heart failure (HCC)    Chronic obstructive pulmonary disease with acute exacerbation (HCC)    Acute respiratory failure with hypoxia (HCC)    Cellulitis of left lower extremity     Past Medical History:   Diagnosis Date    BPH (benign prostatic hyperplasia)     Chronic obstructive pulmonary disease (COPD) ([facility] )     Essential hypertension     Hard of hearing     Pt does wear hearing aids    Hypertension     Shortness of breath     Pt states not changes and it occurs with moderate exertion    Sleep disturbance     Pt states he is only sleeping about 3 hours a night  Pt is seeing his PCP on 8/5/21 to discuss    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    Tinnitus      Social History     Socioeconomic History    Marital status: /Civil Union     Spouse name: Not on file    Number of children: Not on file    Years of education: Not on file    Highest education level: Not on file   Occupational History    Occupation: Retired   Tobacco Use    Smoking status: Light Tobacco Smoker     Types: Cigars    Smokeless tobacco: Never Used   Vaping Use    Vaping Use: Never used   Substance and Sexual Activity    Alcohol use: Yes     Comment: moderate consumption    Drug use: Not Currently    Sexual activity: Yes   Other Topics Concern    Not on file   Social History Narrative    Not on file     Social Determinants of Health     Financial Resource Strain:     Difficulty of Paying Living Expenses:    Food Insecurity:     Worried About Running Out of Food in the Last Year:     [address] in the Last Year:    Transportation Needs:     Lack of Transportation (Medical):      Lack of Transportation (Non-Medical):    Physical Activity:     Days of Exercise per Week:     Minutes of Exercise per Session:    Stress:     Feeling of Stress :    Social Connections:     Frequency of Communication with Friends and Family:     Frequency of Social Gatherings with Friends and Family:     Attends Zoroast[employer] Services:     Active Member of Clubs or Organizations:     Attends Club or Organization Meetings:     Marital Status:    Intimate Partner Violence:     Fear of Current or Ex-Partner:     Emotionally Abused:     Physically Abused:     Sexually Abused:       History reviewed  No pertinent family history  Past Surgical History:   Procedure Laterality Date    SHOULDER OPEN ROTATOR CUFF REPAIR      VEIN SURGERY       No current facility-administered medications for this visit  No current outpatient medications on file      Facility-Administered Medications Ordered in Other Visits:     acetaminophen (TYLENOL) tablet 650 mg, 650 mg, Oral, Q4H PRN, Niki Mckeon MD    apixaban (ELIQUIS) tablet 5 mg, 5 mg, Oral, BID, Niki Mckeon MD, 5 mg at 08/09/21 1748    [START ON 8/10/2021] diltiazem (CARDIZEM CD) 24 hr capsule 240 mg, 240 mg, Oral, Daily, Malcolm Martinez MD    docusate sodium (COLACE) capsule 100 mg, 100 mg, Oral, BID, Niki Mckeon MD, 100 mg at 08/09/21 1748    [START ON 8/10/2021] finasteride (PROSCAR) tablet 5 mg, 5 mg, Oral, Daily, Niki Mckeon MD    [START ON 8/10/2021] fluticasone-vilanterol (BREO ELLIPTA) 200-25 MCG/INH inhaler 1 puff, 1 puff, Inhalation, Daily, Niki Mckeon MD    [START ON 8/10/2021] furosemide (LASIX) injection 40 mg, 40 mg, Intravenous, BID (diuretic), Niki Mckeon MD    levalbuterol (XOPENEX) inhalation solution 1 25 mg, 1 25 mg, Nebulization, TID **AND** sodium chloride 0 9 % inhalation solution 3 mL, 3 mL, Nebulization, TID, Malcolm Martinez MD    magnesium oxide (MAG-OX) tablet 400 mg, 400 mg, Oral, BID, Malcolm Martinez MD, 400 mg at 08/09/21 1748    methylPREDNISolone sodium succinate (Solu-MEDROL) injection 40 mg, 40 mg, Intravenous, Q12H [address], Aleksandra Diamond MD    metoprolol (LOPRESSOR) injection 5 mg, 5 mg, Intravenous, Q6H PRN, Cheko Martinez MD    montelukast (SINGULAIR) tablet 10 mg, 10 mg, Oral, HS, Aleksandra Diamond MD    [START ON 8/10/2021] nicotine (NICODERM CQ) 14 mg/24hr TD 24 hr patch 1 patch, 1 patch, Transdermal, Daily, Aleksandra Diamond MD    [START ON 8/10/2021] tiotropium (SPIRIVA) capsule for inhaler 18 mcg, 18 mcg, Inhalation, Daily, Malcolm Martinez MD    vancomycin (VANCOCIN) IVPB (premix in dextrose) 750 mg 150 mL, 10 mg/kg, Intravenous, Q12H, Aleksandra Diamond MD, Last Rate: 150 mL/hr at 08/09/21 1749, 750 mg at 08/09/21 1749  Allergies   Allergen Reactions    Penicillin G Other (See Comments)     PCN Shots Only!!       Cardiac Test Results:     ZIO 3/18-3/21/2021:  Strips above reviewed  Agree with findings as documented  100% atrial fibrillation burden with HR range () with an Avg HR of 94 bpm    Frequent PVC's (16 4%)  Rare ventricular couplets and triplets  Ventricular Bigeminy and Trigeminy noted  ECG 3/18/2021: Atrial fibrillation with rapid ventricular response   bpm   PVC's vs aberrantly conducted beats  RBBB  Echocardiogram 2/25/2021:   EF 45-50%  Mildly dilated left and right atrium  Mild mitral regurgitation  Trace tricuspid regurgitation  Trace pericardial effusion  Holter Monitor 1/28/2021:   1  The patient had predominantly atrial fibrillation  2  Heart rate varied from 78 bpm to 130 bpm     3  The patients average heart rate was 102 bpm     4  The patient had a holter monitor tracing done for 23 hours and 59 minutes  5  The patient had frequent ventricular ectopic activity (PVC's vs Aberrant beats) with a total of 51,075 ectopic beats representing a 34 9% burden  6  Episodes of ventricular bigeminy (30,849) and trigeminy (6156)  7  Ventricular runs with the longest lasting 5 beats  8  The patient had no supraventricular ectopy  9  The longest R/R interval was 1 7 seconds  10  Right bundle branch block  11  Diary attached  Patient denied any symptoms  Lipid panel 11/17/2020: C 170  T 66  H 74  L 85  ECG 1/19/2021:  Likely underlying atrial fibrillation with frequent PVCs/ aberrant beats in a bigeminal pattern  Right bundle-branch block  Review of Systems:    Review of Systems   Constitutional: Negative for activity change, appetite change and fatigue  HENT: Negative for congestion, hearing loss, tinnitus and trouble swallowing  Eyes: Negative for visual disturbance  Respiratory: Positive for shortness of breath  Negative for cough, chest tightness and wheezing  Dyspnea on exertion   Cardiovascular: Positive for leg swelling  Negative for chest pain and palpitations  Gastrointestinal: Negative for abdominal distention, abdominal pain, nausea and vomiting  Genitourinary: Negative for difficulty urinating  Musculoskeletal: Negative for arthralgias  Skin: Negative for rash  Neurological: Negative for dizziness, syncope and light-headedness  Hematological: Does not bruise/bleed easily  Psychiatric/Behavioral: Negative for confusion  The patient is not nervous/anxious  All other systems reviewed and are negative  Vitals:    06/17/21 0917 06/17/21 1004   BP: 118/68 118/72   BP Location: Left arm Left arm   Patient Position: Sitting Sitting   Pulse: (!) 53    Temp: (!) 96 7 °F (35 9 °C)    Weight: 82 1 kg (181 lb)    Height: 5' 9" (1 753 m)      Vitals:    06/17/21 0917   Weight: 82 1 kg (181 lb)     Height: 5' 9" (175 3 cm)     Physical Exam  Vitals reviewed  Constitutional:       Appearance: He is well-developed  HENT:      Head: Normocephalic and atraumatic  Eyes:      Conjunctiva/sclera: Conjunctivae normal       Pupils: Pupils are equal, round, and reactive to light  Neck:      Vascular: No JVD  Cardiovascular:      Rate and Rhythm: Tachycardia present  Rhythm irregular  Heart sounds: Normal heart sounds  No murmur heard  No friction rub  No gallop  Pulmonary:      Effort: Pulmonary effort is normal       Breath sounds: Decreased breath sounds present  Abdominal:      General: Bowel sounds are normal       Palpations: Abdomen is soft  Musculoskeletal:      Cervical back: Normal range of motion  Right lower leg: Edema present  Left lower leg: Edema present  Skin:     General: Skin is warm and dry  Neurological:      Mental Status: He is alert and oriented to person, place, and time     Psychiatric:         Behavior: Behavior normal

## 2021-06-17 NOTE — PATIENT INSTRUCTIONS
Recommend continuing current medications for now  Repeat 24 hour Holter monitor  Pending results of 24 hour Holter monitor I would recommend evaluation with Dr Amina Law who is one of our electrophysiologists

## 2021-06-21 ENCOUNTER — HOSPITAL ENCOUNTER (OUTPATIENT)
Dept: NON INVASIVE DIAGNOSTICS | Facility: HOSPITAL | Age: 81
Discharge: HOME | End: 2021-06-21
Attending: INTERNAL MEDICINE
Payer: MEDICARE

## 2021-06-21 ENCOUNTER — APPOINTMENT (OUTPATIENT)
Dept: LAB | Facility: HOSPITAL | Age: 81
End: 2021-06-21
Attending: INTERNAL MEDICINE
Payer: MEDICARE

## 2021-06-21 ENCOUNTER — TELEPHONE (OUTPATIENT)
Dept: NON INVASIVE DIAGNOSTICS | Facility: HOSPITAL | Age: 81
End: 2021-06-21

## 2021-06-21 DIAGNOSIS — I48.91 ATRIAL FIBRILLATION, UNSPECIFIED TYPE (HCC): ICD-10-CM

## 2021-06-21 DIAGNOSIS — I50.42 CHRONIC COMBINED SYSTOLIC AND DIASTOLIC CHF (CONGESTIVE HEART FAILURE) (HCC): ICD-10-CM

## 2021-06-21 DIAGNOSIS — I50.42 CHRONIC COMBINED SYSTOLIC AND DIASTOLIC CHF (CONGESTIVE HEART FAILURE) (HCC): Primary | ICD-10-CM

## 2021-06-21 DIAGNOSIS — I10 ESSENTIAL HYPERTENSION: ICD-10-CM

## 2021-06-21 LAB
ANION GAP SERPL CALCULATED.3IONS-SCNC: 4 MMOL/L (ref 4–13)
BUN SERPL-MCNC: 22 MG/DL (ref 5–25)
CALCIUM SERPL-MCNC: 9.6 MG/DL (ref 8.3–10.1)
CHLORIDE SERPL-SCNC: 102 MMOL/L (ref 100–108)
CO2 SERPL-SCNC: 36 MMOL/L (ref 21–32)
CREAT SERPL-MCNC: 1.19 MG/DL (ref 0.6–1.3)
GFR SERPL CREATININE-BSD FRML MDRD: 57 ML/MIN/1.73SQ M
GLUCOSE P FAST SERPL-MCNC: 105 MG/DL (ref 65–99)
NT-PROBNP SERPL-MCNC: 1524 PG/ML
POTASSIUM SERPL-SCNC: 4.1 MMOL/L (ref 3.5–5.3)
SODIUM SERPL-SCNC: 142 MMOL/L (ref 136–145)

## 2021-06-21 PROCEDURE — 80048 BASIC METABOLIC PNL TOTAL CA: CPT

## 2021-06-21 PROCEDURE — 36415 COLL VENOUS BLD VENIPUNCTURE: CPT

## 2021-06-21 PROCEDURE — 93226 XTRNL ECG REC<48 HR SCAN A/R: CPT

## 2021-06-21 PROCEDURE — 93225 XTRNL ECG REC<48 HRS REC: CPT

## 2021-06-21 PROCEDURE — 83880 ASSAY OF NATRIURETIC PEPTIDE: CPT

## 2021-06-21 NOTE — TELEPHONE ENCOUNTER
Spoke with Ling Flores regarding his lab results  NT proBNP elevated from 787 to 1524  Kidney function and potassium are in acceptable range  He states he gets short of breath every once in a while but otherwise feeling well  Will trial increasing Furosemide to alternating dose of 60mg and 40mg every other day  Recheck BMP and NT proBNP in 1 week    He verbalized understanding

## 2021-06-30 ENCOUNTER — TELEPHONE (OUTPATIENT)
Dept: CARDIOLOGY CLINIC | Facility: CLINIC | Age: 81
End: 2021-06-30

## 2021-06-30 DIAGNOSIS — R60.0 LOCALIZED EDEMA: ICD-10-CM

## 2021-06-30 DIAGNOSIS — I49.9 CARDIAC ARRHYTHMIA, UNSPECIFIED CARDIAC ARRHYTHMIA TYPE: ICD-10-CM

## 2021-06-30 DIAGNOSIS — I48.91 ATRIAL FIBRILLATION, UNSPECIFIED TYPE (HCC): ICD-10-CM

## 2021-06-30 RX ORDER — DILTIAZEM HYDROCHLORIDE 240 MG/1
240 CAPSULE, EXTENDED RELEASE ORAL DAILY
Qty: 90 CAPSULE | Refills: 3 | Status: SHIPPED | OUTPATIENT
Start: 2021-06-30 | End: 2021-07-20 | Stop reason: SDUPTHER

## 2021-06-30 RX ORDER — FUROSEMIDE 20 MG/1
40 TABLET ORAL DAILY
Qty: 90 TABLET | Refills: 3 | Status: SHIPPED | OUTPATIENT
Start: 2021-06-30 | End: 2021-08-18 | Stop reason: HOSPADM

## 2021-06-30 NOTE — TELEPHONE ENCOUNTER
I called the pt and he requested printed scripts for eliquis, lasix, and diltiazem to be faxed to the [facility]

## 2021-07-08 ENCOUNTER — TELEPHONE (OUTPATIENT)
Dept: CARDIOLOGY CLINIC | Facility: CLINIC | Age: 81
End: 2021-07-08

## 2021-07-08 NOTE — TELEPHONE ENCOUNTER
Pt is due for updated blood work since increasing Furosemide  Can you please remind him to complete labs ordered in chart? Thank you!

## 2021-07-08 NOTE — TELEPHONE ENCOUNTER
----- Message from [facility], DO sent at 7/7/2021 11:08 PM EDT -----  Please call the patient and let him know that his monitor continues to show significant amount of PVCs/premature ventricular complexes which account for approximately 33% of his total heartbeats  This is problematic as over time this can weaken his heart muscle  We previously discussed EP/electrophysiology evaluation but he did not want to drive to [facility]  I would strongly encourage him to see electrophysiology/EP  He could likely due a virtual visit if he has a smart phone although Dr Vicente Martinez typically does prefer to meet patient is in person  I feel that this would be beneficial for the patient however I cannot make him go he does not want to  He would likely need consultation with electrophysiology at which time they could discuss options for treatment and then if invasive treatment was recommended such as ablation he would need to have that done in [facility] as well but all other follow-up would be through our office  Please let me know what he thinks  Thank you  ## I am holding off on increasing his diltiazem due to relatively low blood pressure at last office visit

## 2021-07-08 NOTE — TELEPHONE ENCOUNTER
Shanita Isaac  He kills me  I will send message to Dr Abad Perez to see if he is ok seeing him  Thank you

## 2021-07-09 NOTE — TELEPHONE ENCOUNTER
Pt is aware and will be away and unable to complete the lab until the week of the 18th   We also wanted to verify that he should be taking the 40 MG of Lasix

## 2021-07-20 ENCOUNTER — TELEPHONE (OUTPATIENT)
Dept: CARDIOLOGY CLINIC | Facility: CLINIC | Age: 81
End: 2021-07-20

## 2021-07-20 DIAGNOSIS — I48.91 ATRIAL FIBRILLATION, UNSPECIFIED TYPE (HCC): ICD-10-CM

## 2021-07-20 RX ORDER — DILTIAZEM HYDROCHLORIDE 240 MG/1
240 CAPSULE, EXTENDED RELEASE ORAL DAILY
Qty: 90 CAPSULE | Refills: 3 | Status: SHIPPED | OUTPATIENT
Start: 2021-07-20 | End: 2021-08-18 | Stop reason: HOSPADM

## 2021-07-20 NOTE — TELEPHONE ENCOUNTER
Pt RACHEAL stating he would like to talk to someone about a script that was called in then canceled  Please call [phone]

## 2021-07-22 ENCOUNTER — APPOINTMENT (OUTPATIENT)
Dept: LAB | Facility: HOSPITAL | Age: 81
End: 2021-07-22
Payer: MEDICARE

## 2021-07-22 ENCOUNTER — TELEPHONE (OUTPATIENT)
Dept: CARDIOLOGY CLINIC | Facility: CLINIC | Age: 81
End: 2021-07-22

## 2021-07-22 DIAGNOSIS — I50.42 CHRONIC COMBINED SYSTOLIC AND DIASTOLIC CHF (CONGESTIVE HEART FAILURE) (HCC): ICD-10-CM

## 2021-07-22 DIAGNOSIS — I45.10 RBBB: Primary | ICD-10-CM

## 2021-07-22 LAB
ANION GAP SERPL CALCULATED.3IONS-SCNC: 2 MMOL/L (ref 4–13)
BUN SERPL-MCNC: 19 MG/DL (ref 5–25)
CALCIUM SERPL-MCNC: 9.3 MG/DL (ref 8.3–10.1)
CHLORIDE SERPL-SCNC: 102 MMOL/L (ref 100–108)
CO2 SERPL-SCNC: 39 MMOL/L (ref 21–32)
CREAT SERPL-MCNC: 1.05 MG/DL (ref 0.6–1.3)
GFR SERPL CREATININE-BSD FRML MDRD: 67 ML/MIN/1.73SQ M
GLUCOSE P FAST SERPL-MCNC: 132 MG/DL (ref 65–99)
POTASSIUM SERPL-SCNC: 4.5 MMOL/L (ref 3.5–5.3)
SODIUM SERPL-SCNC: 143 MMOL/L (ref 136–145)

## 2021-07-22 PROCEDURE — 36415 COLL VENOUS BLD VENIPUNCTURE: CPT

## 2021-07-22 PROCEDURE — 80048 BASIC METABOLIC PNL TOTAL CA: CPT

## 2021-07-22 NOTE — TELEPHONE ENCOUNTER
Please verify the amount of Furosemide he is taking - I had instructed him to alternate 40mg and 60mg, however a message from [facility] earlier this month states he has only been taking 40mg of Furosemide daily  Also, BMP shows stable kidney function and electrolytes, unfortunately it looks like his BNP was not drawn  I would like him to increase furosemide to alternating 40mg and 60mg every other day  Check BMP and BNP in 1 week  Schedule appt with me to follow up

## 2021-07-22 NOTE — TELEPHONE ENCOUNTER
Pt states that over the last week he has been having labored breathing in the morning and at night  States that he is not having any chest pain  States that he is also still having the swelling in his legs, and they ache  He is having issues sleeping at night  States that he is very tired, and stops himself from sleeping during the day  But he still cannot get much sleep at night

## 2021-07-27 ENCOUNTER — TELEPHONE (OUTPATIENT)
Dept: CARDIOLOGY CLINIC | Facility: CLINIC | Age: 81
End: 2021-07-27

## 2021-07-27 NOTE — TELEPHONE ENCOUNTER
Pt RACHEAL stating he would like to speak to someone about his medications  He is not sleeping and is wondering if certain meds he should take at night not during the day  [phone]

## 2021-07-28 NOTE — TELEPHONE ENCOUNTER
Pt called back looking for answer regarding meds  Pt stated he was taking most of them at night and then unable to sleep  Advised pt to take meds in the morning, especially furosemide  Explained to pt that med is to help with his legs retaining water during the day  Pt will start taking all meds in the morning starting tomorrow

## 2021-07-30 ENCOUNTER — TELEPHONE (OUTPATIENT)
Dept: CARDIOLOGY CLINIC | Facility: CLINIC | Age: 81
End: 2021-07-30

## 2021-07-30 ENCOUNTER — APPOINTMENT (OUTPATIENT)
Dept: LAB | Facility: HOSPITAL | Age: 81
End: 2021-07-30
Payer: MEDICARE

## 2021-07-30 DIAGNOSIS — I50.42 CHRONIC COMBINED SYSTOLIC AND DIASTOLIC CHF (CONGESTIVE HEART FAILURE) (HCC): ICD-10-CM

## 2021-07-30 LAB
ANION GAP SERPL CALCULATED.3IONS-SCNC: 3 MMOL/L (ref 4–13)
BUN SERPL-MCNC: 21 MG/DL (ref 5–25)
CALCIUM SERPL-MCNC: 8.8 MG/DL (ref 8.3–10.1)
CHLORIDE SERPL-SCNC: 101 MMOL/L (ref 100–108)
CO2 SERPL-SCNC: 38 MMOL/L (ref 21–32)
CREAT SERPL-MCNC: 1.1 MG/DL (ref 0.6–1.3)
GFR SERPL CREATININE-BSD FRML MDRD: 63 ML/MIN/1.73SQ M
GLUCOSE SERPL-MCNC: 155 MG/DL (ref 65–140)
NT-PROBNP SERPL-MCNC: 2432 PG/ML
POTASSIUM SERPL-SCNC: 4.1 MMOL/L (ref 3.5–5.3)
SODIUM SERPL-SCNC: 142 MMOL/L (ref 136–145)

## 2021-07-30 PROCEDURE — 36415 COLL VENOUS BLD VENIPUNCTURE: CPT

## 2021-07-30 PROCEDURE — 80048 BASIC METABOLIC PNL TOTAL CA: CPT

## 2021-07-30 PROCEDURE — 83880 ASSAY OF NATRIURETIC PEPTIDE: CPT

## 2021-07-30 NOTE — TELEPHONE ENCOUNTER
----- Message from Carly Reinoso 83 sent at 7/30/2021 12:50 PM EDT -----  Please notify pt that renal function is stable  His NT proBNP is higher, which indicates he is holding onto fluid  Please have him increase Furosemide to 60mg daily  Keep appt with me on 8/10 for follow up but reach out sooner if increasing swelling/shortness of breath with higher dose of Furosemide

## 2021-08-03 ENCOUNTER — TELEPHONE (OUTPATIENT)
Dept: CARDIOLOGY CLINIC | Facility: CLINIC | Age: 81
End: 2021-08-03

## 2021-08-03 NOTE — TELEPHONE ENCOUNTER
We can discuss at his visit with me Monday, but also he can reach out to his primary care provider in the mean time for management

## 2021-08-09 ENCOUNTER — APPOINTMENT (EMERGENCY)
Dept: RADIOLOGY | Facility: HOSPITAL | Age: 81
DRG: 291 | End: 2021-08-09
Payer: MEDICARE

## 2021-08-09 ENCOUNTER — HOSPITAL ENCOUNTER (INPATIENT)
Facility: HOSPITAL | Age: 81
LOS: 9 days | Discharge: HOME HEALTH | DRG: 291 | End: 2021-08-18
Attending: EMERGENCY MEDICINE | Admitting: FAMILY MEDICINE
Payer: MEDICARE

## 2021-08-09 ENCOUNTER — OFFICE VISIT (OUTPATIENT)
Dept: CARDIOLOGY CLINIC | Facility: CLINIC | Age: 81
End: 2021-08-09
Payer: MEDICARE

## 2021-08-09 VITALS
DIASTOLIC BLOOD PRESSURE: 80 MMHG | HEART RATE: 100 BPM | OXYGEN SATURATION: 76 % | BODY MASS INDEX: 26.81 KG/M2 | HEIGHT: 69 IN | SYSTOLIC BLOOD PRESSURE: 140 MMHG | WEIGHT: 181 LBS

## 2021-08-09 DIAGNOSIS — I48.91 ATRIAL FIBRILLATION (HCC): ICD-10-CM

## 2021-08-09 DIAGNOSIS — I49.3 FREQUENT PVCS: ICD-10-CM

## 2021-08-09 DIAGNOSIS — J96.01 ACUTE RESPIRATORY FAILURE WITH HYPOXIA (HCC): ICD-10-CM

## 2021-08-09 DIAGNOSIS — I50.43 ACUTE ON CHRONIC COMBINED SYSTOLIC AND DIASTOLIC CONGESTIVE HEART FAILURE (HCC): Primary | ICD-10-CM

## 2021-08-09 DIAGNOSIS — I10 ESSENTIAL HYPERTENSION: ICD-10-CM

## 2021-08-09 DIAGNOSIS — Z78.9 MODERATE ALCOHOL CONSUMPTION: ICD-10-CM

## 2021-08-09 DIAGNOSIS — I50.42 CHRONIC COMBINED SYSTOLIC AND DIASTOLIC CHF (CONGESTIVE HEART FAILURE) (HCC): ICD-10-CM

## 2021-08-09 DIAGNOSIS — Z99.89 BIPAP (BIPHASIC POSITIVE AIRWAY PRESSURE) DEPENDENCE: ICD-10-CM

## 2021-08-09 DIAGNOSIS — I48.91 ATRIAL FIBRILLATION, UNSPECIFIED TYPE (HCC): Primary | ICD-10-CM

## 2021-08-09 DIAGNOSIS — R60.0 LOCALIZED EDEMA: ICD-10-CM

## 2021-08-09 DIAGNOSIS — I45.10 RBBB: ICD-10-CM

## 2021-08-09 DIAGNOSIS — L03.116 CELLULITIS OF LEFT LOWER EXTREMITY: ICD-10-CM

## 2021-08-09 DIAGNOSIS — I49.3 PVC (PREMATURE VENTRICULAR CONTRACTION): ICD-10-CM

## 2021-08-09 DIAGNOSIS — J96.22 ACUTE ON CHRONIC RESPIRATORY FAILURE WITH HYPERCAPNIA (HCC): ICD-10-CM

## 2021-08-09 PROBLEM — J44.1 CHRONIC OBSTRUCTIVE PULMONARY DISEASE WITH ACUTE EXACERBATION (HCC): Status: ACTIVE | Noted: 2021-08-09

## 2021-08-09 LAB
ALBUMIN SERPL BCP-MCNC: 3.7 G/DL (ref 3.5–5)
ALP SERPL-CCNC: 102 U/L (ref 46–116)
ALT SERPL W P-5'-P-CCNC: 46 U/L (ref 12–78)
ANION GAP SERPL CALCULATED.3IONS-SCNC: 4 MMOL/L (ref 4–13)
APTT PPP: 36 SECONDS (ref 23–37)
AST SERPL W P-5'-P-CCNC: 33 U/L (ref 5–45)
ATRIAL RATE: 150 BPM
BASOPHILS # BLD AUTO: 0.01 THOUSANDS/ΜL (ref 0–0.1)
BASOPHILS NFR BLD AUTO: 0 % (ref 0–1)
BILIRUB SERPL-MCNC: 0.96 MG/DL (ref 0.2–1)
BUN SERPL-MCNC: 43 MG/DL (ref 5–25)
CALCIUM SERPL-MCNC: 9.3 MG/DL (ref 8.3–10.1)
CHLORIDE SERPL-SCNC: 100 MMOL/L (ref 100–108)
CO2 SERPL-SCNC: 41 MMOL/L (ref 21–32)
CREAT SERPL-MCNC: 1.16 MG/DL (ref 0.6–1.3)
EOSINOPHIL # BLD AUTO: 0 THOUSAND/ΜL (ref 0–0.61)
EOSINOPHIL NFR BLD AUTO: 0 % (ref 0–6)
ERYTHROCYTE [DISTWIDTH] IN BLOOD BY AUTOMATED COUNT: 15.8 % (ref 11.6–15.1)
GFR SERPL CREATININE-BSD FRML MDRD: 59 ML/MIN/1.73SQ M
GLUCOSE SERPL-MCNC: 169 MG/DL (ref 65–140)
HCT VFR BLD AUTO: 48.8 % (ref 36.5–49.3)
HGB BLD-MCNC: 15.4 G/DL (ref 12–17)
IMM GRANULOCYTES # BLD AUTO: 0.03 THOUSAND/UL (ref 0–0.2)
IMM GRANULOCYTES NFR BLD AUTO: 0 % (ref 0–2)
INR PPP: 1.57 (ref 0.84–1.19)
LACTATE SERPL-SCNC: 2 MMOL/L (ref 0.5–2)
LIPASE SERPL-CCNC: 155 U/L (ref 73–393)
LYMPHOCYTES # BLD AUTO: 0.16 THOUSANDS/ΜL (ref 0.6–4.47)
LYMPHOCYTES NFR BLD AUTO: 2 % (ref 14–44)
MAGNESIUM SERPL-MCNC: 2.3 MG/DL (ref 1.6–2.6)
MCH RBC QN AUTO: 34.2 PG (ref 26.8–34.3)
MCHC RBC AUTO-ENTMCNC: 31.6 G/DL (ref 31.4–37.4)
MCV RBC AUTO: 108 FL (ref 82–98)
MONOCYTES # BLD AUTO: 0.25 THOUSAND/ΜL (ref 0.17–1.22)
MONOCYTES NFR BLD AUTO: 3 % (ref 4–12)
NEUTROPHILS # BLD AUTO: 6.82 THOUSANDS/ΜL (ref 1.85–7.62)
NEUTS SEG NFR BLD AUTO: 95 % (ref 43–75)
NRBC BLD AUTO-RTO: 0 /100 WBCS
NT-PROBNP SERPL-MCNC: 6713 PG/ML
PLATELET # BLD AUTO: 258 THOUSANDS/UL (ref 149–390)
PMV BLD AUTO: 9.3 FL (ref 8.9–12.7)
POTASSIUM SERPL-SCNC: 4.6 MMOL/L (ref 3.5–5.3)
PROT SERPL-MCNC: 7.8 G/DL (ref 6.4–8.2)
PROTHROMBIN TIME: 18.4 SECONDS (ref 11.6–14.5)
QRS AXIS: 165 DEGREES
QRSD INTERVAL: 146 MS
QT INTERVAL: 372 MS
QTC INTERVAL: 496 MS
RBC # BLD AUTO: 4.5 MILLION/UL (ref 3.88–5.62)
SODIUM SERPL-SCNC: 145 MMOL/L (ref 136–145)
T WAVE AXIS: 2 DEGREES
TROPONIN I SERPL-MCNC: 0.02 NG/ML
TSH SERPL DL<=0.05 MIU/L-ACNC: 0.77 UIU/ML (ref 0.36–3.74)
VENTRICULAR RATE: 107 BPM
WBC # BLD AUTO: 7.27 THOUSAND/UL (ref 4.31–10.16)

## 2021-08-09 PROCEDURE — 93005 ELECTROCARDIOGRAM TRACING: CPT

## 2021-08-09 PROCEDURE — 99214 OFFICE O/P EST MOD 30 MIN: CPT | Performed by: NURSE PRACTITIONER

## 2021-08-09 PROCEDURE — 83690 ASSAY OF LIPASE: CPT | Performed by: EMERGENCY MEDICINE

## 2021-08-09 PROCEDURE — 1123F ACP DISCUSS/DSCN MKR DOCD: CPT | Performed by: EMERGENCY MEDICINE

## 2021-08-09 PROCEDURE — 94760 N-INVAS EAR/PLS OXIMETRY 1: CPT

## 2021-08-09 PROCEDURE — 83605 ASSAY OF LACTIC ACID: CPT | Performed by: EMERGENCY MEDICINE

## 2021-08-09 PROCEDURE — 85610 PROTHROMBIN TIME: CPT | Performed by: EMERGENCY MEDICINE

## 2021-08-09 PROCEDURE — 80053 COMPREHEN METABOLIC PANEL: CPT | Performed by: EMERGENCY MEDICINE

## 2021-08-09 PROCEDURE — 85025 COMPLETE CBC W/AUTO DIFF WBC: CPT | Performed by: EMERGENCY MEDICINE

## 2021-08-09 PROCEDURE — 71045 X-RAY EXAM CHEST 1 VIEW: CPT

## 2021-08-09 PROCEDURE — 99223 1ST HOSP IP/OBS HIGH 75: CPT | Performed by: FAMILY MEDICINE

## 2021-08-09 PROCEDURE — 83880 ASSAY OF NATRIURETIC PEPTIDE: CPT | Performed by: EMERGENCY MEDICINE

## 2021-08-09 PROCEDURE — 87205 SMEAR GRAM STAIN: CPT | Performed by: FAMILY MEDICINE

## 2021-08-09 PROCEDURE — 94664 DEMO&/EVAL PT USE INHALER: CPT

## 2021-08-09 PROCEDURE — 99285 EMERGENCY DEPT VISIT HI MDM: CPT | Performed by: EMERGENCY MEDICINE

## 2021-08-09 PROCEDURE — 85730 THROMBOPLASTIN TIME PARTIAL: CPT | Performed by: EMERGENCY MEDICINE

## 2021-08-09 PROCEDURE — 84443 ASSAY THYROID STIM HORMONE: CPT | Performed by: FAMILY MEDICINE

## 2021-08-09 PROCEDURE — 87070 CULTURE OTHR SPECIMN AEROBIC: CPT | Performed by: FAMILY MEDICINE

## 2021-08-09 PROCEDURE — 83735 ASSAY OF MAGNESIUM: CPT | Performed by: FAMILY MEDICINE

## 2021-08-09 PROCEDURE — 84484 ASSAY OF TROPONIN QUANT: CPT | Performed by: EMERGENCY MEDICINE

## 2021-08-09 PROCEDURE — 99285 EMERGENCY DEPT VISIT HI MDM: CPT

## 2021-08-09 PROCEDURE — 87040 BLOOD CULTURE FOR BACTERIA: CPT | Performed by: FAMILY MEDICINE

## 2021-08-09 PROCEDURE — 36415 COLL VENOUS BLD VENIPUNCTURE: CPT | Performed by: EMERGENCY MEDICINE

## 2021-08-09 PROCEDURE — 94640 AIRWAY INHALATION TREATMENT: CPT

## 2021-08-09 RX ORDER — CETIRIZINE HYDROCHLORIDE 10 MG/1
10 TABLET ORAL DAILY
COMMUNITY

## 2021-08-09 RX ORDER — METOPROLOL TARTRATE 5 MG/5ML
5 INJECTION INTRAVENOUS EVERY 6 HOURS PRN
Status: DISCONTINUED | OUTPATIENT
Start: 2021-08-09 | End: 2021-08-15

## 2021-08-09 RX ORDER — METHYLPREDNISOLONE SODIUM SUCCINATE 40 MG/ML
40 INJECTION, POWDER, LYOPHILIZED, FOR SOLUTION INTRAMUSCULAR; INTRAVENOUS EVERY 12 HOURS SCHEDULED
Status: DISCONTINUED | OUTPATIENT
Start: 2021-08-09 | End: 2021-08-10

## 2021-08-09 RX ORDER — FUROSEMIDE 10 MG/ML
40 INJECTION INTRAMUSCULAR; INTRAVENOUS ONCE
Status: COMPLETED | OUTPATIENT
Start: 2021-08-09 | End: 2021-08-09

## 2021-08-09 RX ORDER — DOCUSATE SODIUM 100 MG/1
100 CAPSULE, LIQUID FILLED ORAL 2 TIMES DAILY
Status: DISCONTINUED | OUTPATIENT
Start: 2021-08-09 | End: 2021-08-18 | Stop reason: HOSPADM

## 2021-08-09 RX ORDER — NICOTINE 21 MG/24HR
1 PATCH, TRANSDERMAL 24 HOURS TRANSDERMAL DAILY
Status: DISCONTINUED | OUTPATIENT
Start: 2021-08-10 | End: 2021-08-18 | Stop reason: HOSPADM

## 2021-08-09 RX ORDER — SODIUM CHLORIDE FOR INHALATION 0.9 %
3 VIAL, NEBULIZER (ML) INHALATION
Status: DISCONTINUED | OUTPATIENT
Start: 2021-08-09 | End: 2021-08-11

## 2021-08-09 RX ORDER — ACETAMINOPHEN 325 MG/1
650 TABLET ORAL EVERY 4 HOURS PRN
Status: DISCONTINUED | OUTPATIENT
Start: 2021-08-09 | End: 2021-08-18 | Stop reason: HOSPADM

## 2021-08-09 RX ORDER — MONTELUKAST SODIUM 10 MG/1
10 TABLET ORAL
Status: DISCONTINUED | OUTPATIENT
Start: 2021-08-09 | End: 2021-08-14

## 2021-08-09 RX ORDER — FINASTERIDE 5 MG/1
5 TABLET, FILM COATED ORAL DAILY
Status: DISCONTINUED | OUTPATIENT
Start: 2021-08-10 | End: 2021-08-18 | Stop reason: HOSPADM

## 2021-08-09 RX ORDER — LEVALBUTEROL 1.25 MG/.5ML
1.25 SOLUTION, CONCENTRATE RESPIRATORY (INHALATION)
Status: DISCONTINUED | OUTPATIENT
Start: 2021-08-09 | End: 2021-08-11

## 2021-08-09 RX ORDER — DILTIAZEM HYDROCHLORIDE 240 MG/1
240 CAPSULE, COATED, EXTENDED RELEASE ORAL DAILY
Status: DISCONTINUED | OUTPATIENT
Start: 2021-08-10 | End: 2021-08-17

## 2021-08-09 RX ORDER — FUROSEMIDE 10 MG/ML
40 INJECTION INTRAMUSCULAR; INTRAVENOUS
Status: DISCONTINUED | OUTPATIENT
Start: 2021-08-10 | End: 2021-08-10

## 2021-08-09 RX ORDER — FLUTICASONE FUROATE AND VILANTEROL 200; 25 UG/1; UG/1
1 POWDER RESPIRATORY (INHALATION)
Status: DISCONTINUED | OUTPATIENT
Start: 2021-08-10 | End: 2021-08-14

## 2021-08-09 RX ADMIN — APIXABAN 5 MG: 5 TABLET, FILM COATED ORAL at 17:48

## 2021-08-09 RX ADMIN — Medication 400 MG: at 17:48

## 2021-08-09 RX ADMIN — FUROSEMIDE 40 MG: 10 INJECTION, SOLUTION INTRAMUSCULAR; INTRAVENOUS at 15:31

## 2021-08-09 RX ADMIN — LEVALBUTEROL HYDROCHLORIDE 1.25 MG: 1.25 SOLUTION, CONCENTRATE RESPIRATORY (INHALATION) at 20:39

## 2021-08-09 RX ADMIN — MONTELUKAST 10 MG: 10 TABLET, FILM COATED ORAL at 21:26

## 2021-08-09 RX ADMIN — ISODIUM CHLORIDE 3 ML: 0.03 SOLUTION RESPIRATORY (INHALATION) at 20:40

## 2021-08-09 RX ADMIN — VANCOMYCIN HYDROCHLORIDE 750 MG: 750 INJECTION, SOLUTION INTRAVENOUS at 17:49

## 2021-08-09 RX ADMIN — METHYLPREDNISOLONE SODIUM SUCCINATE 40 MG: 40 INJECTION, POWDER, FOR SOLUTION INTRAMUSCULAR; INTRAVENOUS at 21:26

## 2021-08-09 RX ADMIN — DOCUSATE SODIUM 100 MG: 100 CAPSULE ORAL at 17:48

## 2021-08-09 NOTE — ASSESSMENT & PLAN NOTE
Patient had significant bilateral lower extremity edema on exam    Echocardiogram as outlined showed mildly decreased LV systolic function at 80-35%  NT proBNP elevated at 1852 initially  Patient was started on furosemide 20 mg daily  HCTZ was discontinued  NT proBNP ordered  Furosemide may need further up titration

## 2021-08-09 NOTE — PROGRESS NOTES
Vancomycin Assessment    Natalio Alan is a [de-identified] y o  male who will begin receiving vancomycin for skin-soft tissue infection  Relevant clinical data and objective history reviewed:  Creatinine   Date Value Ref Range Status   08/09/2021 1 16 0 60 - 1 30 mg/dL Final     Comment:     Standardized to IDMS reference method   07/30/2021 1 10 0 60 - 1 30 mg/dL Final     Comment:     Standardized to IDMS reference method   07/22/2021 1 05 0 60 - 1 30 mg/dL Final     Comment:     Standardized to IDMS reference method     /68   Pulse (!) 49   Temp 97 8 °F (36 6 °C)   Resp 19   Ht 5' 9" (1 753 m)   Wt 82 1 kg (181 lb)   SpO2 98%   BMI 26 73 kg/m²   No intake/output data recorded  Lab Results   Component Value Date/Time    BUN 43 (H) 08/09/2021 02:45 PM    WBC 7 27 08/09/2021 02:45 PM    HGB 15 4 08/09/2021 02:45 PM    HCT 48 8 08/09/2021 02:45 PM     (H) 08/09/2021 02:45 PM     08/09/2021 02:45 PM     Temp Readings from Last 3 Encounters:   08/09/21 97 8 °F (36 6 °C)   06/17/21 (!) 96 7 °F (35 9 °C)   03/18/21 (!) 97 3 °F (36 3 °C)     Vancomycin Days of Therapy: 1    Assessment/Plan  The patient is currently on vancomycin utilizing scheduled dosing based on actual body weight  Baseline risks associated with therapy include: pre-existing renal impairment, concomitant nephrotoxic medications, advanced age, and dehydration  After clinical evaluation the patient will begin vancomycin 750 mg IV q 12 hrs  Pharmacy will also follow closely for s/sx of nephrotoxicity, infusion reactions, and appropriateness of therapy  BMP and CBC will be ordered per protocol  Plan for trough as patient approaches steady state, prior to the 5th  dose at approximately 1630 on 8/11/21  Due to infection severity, will target a trough of 15-20 (appropriate for most indications)   Pharmacy will continue to follow the patients culture results and clinical progress daily      Beatris Lujan, Pharmacist

## 2021-08-09 NOTE — ASSESSMENT & PLAN NOTE
Atrial fibrillation noted on EKG at PCP office 01/19/2021  Holter monitor 01/28/2021 showed predominantly atrial fibrillation with an average heart rate of 102 beats per minute with frequent PVCs /aberrant beats representing 34 9% ectopic burden  Dr Hayes Gagnon asked EP to review Holter monitor who agreed with initial plan and discussed possible ablation if rates not controlled as he has no good options for antiarrhythmics  Patient denied any palpitations and was unaware of rapid heart rates  Magnesium 250 mg daily added and will be continued  Echocardiogram 2/25/2021 showed EF 45-50%  Cardizem 120 mg daily was added (trying to avoid beta blockers due to COPD/asthma) and ultimately titrated to dose of 240mg based on results of ZIO/holter monitor in attempt to control rates, PVC burden  Persistent a fib with elevated ventricular rates and high PVC burden of 33% noted on most recent holter monitor study in June 2021  Referral placed to EP, Dr Lashanda Horn and appointment pending  Eliquis 5 mg twice daily for CVA prophylaxis  Will need eventual sleep study as well as stress test once heart rates are better controlled

## 2021-08-09 NOTE — ED PROVIDER NOTES
History  Chief Complaint   Patient presents with    Shortness of Breath     Cardiology office called stating pt has been in fluid overload with a 10lb weight gain since last visit pt also is in afib with frequent PVCs HR in the 100's  Pt also SOB with spO2 77% on room air upon arrival       Patient with history of atrial fibrillation and CHF  Had an echocardiogram in February which showed an EF of approximately 45-50%  Now complains of increasing leg swelling for the past 2-3 weeks  Has dyspnea on exertion  Has orthopnea PND  Was seen at the cardiologist office today and noted to be in AFib  Room air pulse ox was 74%  No history of home oxygen  No recent cough or cold symptoms  No fevers or chills  No nausea vomiting or diarrhea  States he has gained weight but unsure how much  Pulse ox on room air was 77% upon arrival here in the ED  History provided by:  Patient   used: No    Shortness of Breath  Severity:  Mild  Onset quality:  Gradual  Duration: 2-3 weeks  Timing:  Constant  Progression:  Worsening  Chronicity:  New  Context: not animal exposure and not URI    Relieved by:  Nothing  Worsened by:  Exertion  Ineffective treatments:  None tried  Associated symptoms: no abdominal pain, no chest pain, no claudication, no cough, no ear pain, no fever, no headaches, no neck pain, no rash, no sore throat, no sputum production, no vomiting and no wheezing        Prior to Admission Medications   Prescriptions Last Dose Informant Patient Reported? Taking?    Ascorbic Acid (VITAMIN C PO)   Yes Yes   Sig: Take 1 tablet by mouth daily    Magnesium 500 MG CAPS  Self Yes Yes   Sig: Take 500 mg by mouth daily   Spiriva Respimat 2 5 MCG/ACT AERS inhaler   Yes Yes   Sig: Inhale 2 puffs daily    apixaban (ELIQUIS) 5 mg   No Yes   Sig: Take 1 tablet (5 mg total) by mouth 2 (two) times a day   Patient taking differently: Take 5 mg by mouth 2 (two) times a day Pt was instructed not to stop by surgeon   cetirizine (ZyrTEC) 10 mg tablet   Yes Yes   Sig: Take 10 mg by mouth daily   diltiazem (DILACOR XR) 240 MG 24 hr capsule   No Yes   Sig: Take 1 capsule (240 mg total) by mouth daily   docusate sodium (COLACE) 100 mg capsule   Yes Yes   Sig: Take 100 mg by mouth    finasteride (PROSCAR) 5 mg tablet   Yes Yes   Sig: Take 5 mg by mouth daily    fluticasone-salmeterol (ADVAIR, WIXELA) 250-50 mcg/dose inhaler   Yes Yes   Sig: Inhale 1 puff 2 (two) times a day Rinse mouth after use  furosemide (LASIX) 20 mg tablet   No Yes   Sig: Take 2 tablets (40 mg total) by mouth daily   Patient taking differently: Take 60 mg by mouth daily    montelukast (SINGULAIR) 10 mg tablet   Yes Yes   Sig: Take 10 mg by mouth daily at bedtime      Facility-Administered Medications: None       Past Medical History:   Diagnosis Date    BPH (benign prostatic hyperplasia)     Chronic obstructive pulmonary disease (COPD) ([address] )     Essential hypertension     Hard of hearing     Pt does wear hearing aids    Hypertension     Shortness of breath     Pt states not changes and it occurs with moderate exertion    Sleep disturbance     Pt states he is only sleeping about 3 hours a night  Pt is seeing his PCP on 8/5/21 to discuss    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    Tinnitus        Past Surgical History:   Procedure Laterality Date    SHOULDER OPEN ROTATOR CUFF REPAIR      VEIN SURGERY         History reviewed  No pertinent family history  I have reviewed and agree with the history as documented      E-Cigarette/Vaping    E-Cigarette Use Never User      E-Cigarette/Vaping Substances     Social History     Tobacco Use    Smoking status: Light Tobacco Smoker     Types: Cigars    Smokeless tobacco: Never Used   Vaping Use    Vaping Use: Never used   Substance Use Topics    Alcohol use: Yes     Comment: moderate consumption    Drug use: Not Currently       Review of Systems   Constitutional: Negative for chills and fever  HENT: Negative for ear pain, hearing loss, sore throat, trouble swallowing and voice change  Eyes: Negative for pain and discharge  Respiratory: Positive for shortness of breath  Negative for cough, sputum production and wheezing  Cardiovascular: Positive for leg swelling  Negative for chest pain, palpitations and claudication  Gastrointestinal: Negative for abdominal pain, blood in stool, constipation, diarrhea, nausea and vomiting  Genitourinary: Negative for dysuria, flank pain, frequency and hematuria  Musculoskeletal: Negative for joint swelling, neck pain and neck stiffness  Skin: Negative for rash and wound  Neurological: Negative for dizziness, seizures, syncope, facial asymmetry and headaches  Psychiatric/Behavioral: Negative for hallucinations, self-injury and suicidal ideas  All other systems reviewed and are negative  Physical Exam  Physical Exam  Vitals and nursing note reviewed  Constitutional:       General: He is not in acute distress  Appearance: He is well-developed  HENT:      Head: Normocephalic and atraumatic  Right Ear: External ear normal       Left Ear: External ear normal    Eyes:      General: No scleral icterus  Right eye: No discharge  Left eye: No discharge  Extraocular Movements: Extraocular movements intact  Conjunctiva/sclera: Conjunctivae normal    Cardiovascular:      Rate and Rhythm: Normal rate  Rhythm irregular  Heart sounds: Normal heart sounds  No murmur heard  Pulmonary:      Effort: Respiratory distress present  Breath sounds: Wheezing present  No rales  Comments: Tachypneic  Decreased bilaterally  Wheezes noted  Abdominal:      General: Bowel sounds are normal  There is no distension  Palpations: Abdomen is soft  Tenderness: There is no abdominal tenderness  There is no guarding or rebound  Musculoskeletal:         General: No deformity   Normal range of motion  Cervical back: Normal range of motion and neck supple  Right lower leg: Edema present  Left lower leg: Edema present  Comments: 3+ bilateral lower extremity edema   Skin:     General: Skin is warm and dry  Findings: No rash  Neurological:      General: No focal deficit present  Mental Status: He is alert and oriented to person, place, and time  Cranial Nerves: No cranial nerve deficit  Psychiatric:         Mood and Affect: Mood normal          Behavior: Behavior normal          Thought Content:  Thought content normal          Judgment: Judgment normal          Vital Signs  ED Triage Vitals [08/09/21 1440]   Temperature Pulse Respirations Blood Pressure SpO2   97 7 °F (36 5 °C) 100 19 147/94 99 %      Temp Source Heart Rate Source Patient Position - Orthostatic VS BP Location FiO2 (%)   Temporal Monitor Sitting Right arm --      Pain Score       No Pain           Vitals:    08/09/21 1440   BP: 147/94   Pulse: 100   Patient Position - Orthostatic VS: Sitting         Visual Acuity      ED Medications  Medications   furosemide (LASIX) injection 40 mg (has no administration in time range)       Diagnostic Studies  Results Reviewed     Procedure Component Value Units Date/Time    CBC and differential [[id number]]  (Abnormal) Collected: 08/09/21 1445    Lab Status: Final result Specimen: Blood from Arm, Left Updated: 08/09/21 1528     WBC 7 27 Thousand/uL      RBC 4 50 Million/uL      Hemoglobin 15 4 g/dL      Hematocrit 48 8 %       fL      MCH 34 2 pg      MCHC 31 6 g/dL      RDW 15 8 %      MPV 9 3 fL      Platelets 461 Thousands/uL      nRBC 0 /100 WBCs      Neutrophils Relative 95 %      Immat GRANS % 0 %      Lymphocytes Relative 2 %      Monocytes Relative 3 %      Eosinophils Relative 0 %      Basophils Relative 0 %      Neutrophils Absolute 6 82 Thousands/µL      Immature Grans Absolute 0 03 Thousand/uL      Lymphocytes Absolute 0 16 Thousands/µL      Monocytes Absolute 0 25 Thousand/µL      Eosinophils Absolute 0 00 Thousand/µL      Basophils Absolute 0 01 Thousands/µL     Narrative: This is an appended report  These results have been appended to a previously verified report  Comprehensive metabolic panel [[id number]]  (Abnormal) Collected: 08/09/21 1445    Lab Status: Final result Specimen: Blood from Arm, Left Updated: 08/09/21 1514     Sodium 145 mmol/L      Potassium 4 6 mmol/L      Chloride 100 mmol/L      CO2 41 mmol/L      ANION GAP 4 mmol/L      BUN 43 mg/dL      Creatinine 1 16 mg/dL      Glucose 169 mg/dL      Calcium 9 3 mg/dL      AST 33 U/L      ALT 46 U/L      Alkaline Phosphatase 102 U/L      Total Protein 7 8 g/dL      Albumin 3 7 g/dL      Total Bilirubin 0 96 mg/dL      eGFR 59 ml/min/1 73sq m     Narrative:      [place] guidelines for Chronic Kidney Disease (CKD):     Stage 1 with normal or high GFR (GFR > 90 mL/min/1 73 square meters)    Stage 2 Mild CKD (GFR = 60-89 mL/min/1 73 square meters)    Stage 3A Moderate CKD (GFR = 45-59 mL/min/1 73 square meters)    Stage 3B Moderate CKD (GFR = 30-44 mL/min/1 73 square meters)    Stage 4 Severe CKD (GFR = 15-29 mL/min/1 73 square meters)    Stage 5 End Stage CKD (GFR <15 mL/min/1 73 square meters)  Note: GFR calculation is accurate only with a steady state creatinine    Lipase [[id number]]  (Normal) Collected: 08/09/21 1445    Lab Status: Final result Specimen: Blood from Arm, Left Updated: 08/09/21 1514     Lipase 155 u/L     NT-BNP PRO [[id number]]  (Abnormal) Collected: 08/09/21 1445    Lab Status: Final result Specimen: Blood from Arm, Left Updated: 08/09/21 1514     NT-proBNP 6,713 pg/mL     Lactic acid [[id number]]  (Normal) Collected: 08/09/21 1445    Lab Status: Final result Specimen: Blood from Arm, Left Updated: 08/09/21 1512     LACTIC ACID 2 0 mmol/L     Narrative:      Result may be elevated if tourniquet was used during collection      Troponin I [[id number]] (Normal) Collected: 08/09/21 1445    Lab Status: Final result Specimen: Blood from Arm, Left Updated: 08/09/21 1512     Troponin I 0 02 ng/mL     Protime-INR [[id number]] Updated: 08/09/21 1512    Lab Status: No result Specimen: Blood from Arm, Left     APTT [[id number]] Updated: 08/09/21 1512    Lab Status: No result Specimen: Blood from Arm, Left                  XR chest 1 view portable   ED Interpretation by Kath Mcdonald MD (08/09 1455)   CHF                 Procedures  ECG 12 Lead Documentation Only    Date/Time: 8/9/2021 2:39 PM  Performed by: Kath Mcdonald MD  Authorized by: Kath Mcdonald MD     ECG reviewed by me, the ED Provider: yes    Patient location:  ED  Previous ECG:     Previous ECG:  Unavailable  Rate:     ECG rate:  100  Rhythm:     Rhythm: atrial fibrillation    Ectopy:     Ectopy: PVCs               ED Course  ED Course as of Aug 09 1529   [facility] Aug 09, 2021   1454 Chest x-ray with CHF                                              MDM  Number of Diagnoses or Management Options     Amount and/or Complexity of Data Reviewed  Clinical lab tests: reviewed  Obtain history from someone other than the patient: yes  Review and summarize past medical records: yes        Disposition  Final diagnoses:   Acute on chronic combined systolic and diastolic congestive heart failure (HCC)   Atrial fibrillation ([facility] )   PVC (premature ventricular contraction)     Time reflects when diagnosis was documented in both MDM as applicable and the Disposition within this note     Time User Action Codes Description Comment    8/9/2021  2:55 PM Visperas, Skippy Lull Add [I50 43] Acute on chronic combined systolic and diastolic congestive heart failure ([facility] )     8/9/2021  2:55 PM Visperas, Skippy Lull Add [I48 91] Atrial fibrillation ([facility] )     8/9/2021  2:55 PM Visperas, Leata Chandan P Add [I49 3] PVC (premature ventricular contraction)       ED Disposition     ED Disposition Condition Date/Time Comment    Admit Stable Mon Aug 9, 2021  3:28 PM Case was discussed with Dr Loreen Seip and the patient's admission status was agreed to be to the service of Dr Loreen Seip Velta Stall    None         Patient's Medications   Discharge Prescriptions    No medications on file     No discharge procedures on file      PDMP Review     None          ED Provider  Electronically Signed by           Felisa Marquez MD  08/09/21 1506

## 2021-08-09 NOTE — RESPIRATORY THERAPY NOTE
RT Protocol Note  Jojo Amaya [de-identified] y o  male MRN: [id number]  Unit/Bed#: -01 Encounter: [id number]    Assessment    Principal Problem:    Acute respiratory failure with hypoxia ([facility] )  Active Problems:    Atrial fibrillation (HCC)    Acute on chronic combined systolic and diastolic congestive heart failure (HCC)    Chronic obstructive pulmonary disease with acute exacerbation (HCC)    Cellulitis of left lower extremity      Home Pulmonary Medications:         Past Medical History:   Diagnosis Date    BPH (benign prostatic hyperplasia)     Chronic obstructive pulmonary disease (COPD) ([facility] )     Essential hypertension     Hard of hearing     Pt does wear hearing aids    Hypertension     Shortness of breath     Pt states not changes and it occurs with moderate exertion    Sleep disturbance     Pt states he is only sleeping about 3 hours a night  Pt is seeing his PCP on 8/5/21 to discuss    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     Pt has on the scalp    Tinnitus      Social History     Socioeconomic History    Marital status: /Civil Union     Spouse name: None    Number of children: None    Years of education: None    Highest education level: None   Occupational History    Occupation: Retired   Tobacco Use    Smoking status: Light Tobacco Smoker     Types: Cigars    Smokeless tobacco: Never Used   Vaping Use    Vaping Use: Never used   Substance and Sexual Activity    Alcohol use: Yes     Comment: moderate consumption    Drug use: Not Currently    Sexual activity: Yes   Other Topics Concern    None   Social History Narrative    None     Social Determinants of Health     Financial Resource Strain:     Difficulty of Paying Living Expenses:    Food Insecurity:     Worried About Running Out of Food in the Last Year:     Ran Out of Food in the Last Year:    Transportation Needs:     Lack of Transportation (Medical):      Lack of Transportation (Non-Medical):    Physical Activity:     Days of Exercise per Week:     Minutes of Exercise per Session:    Stress:     Feeling of Stress :    Social Connections:     Frequency of Communication with Friends and Family:     Frequency of Social Gatherings with Friends and Family:     Attends [employer] Services:     Active Member of Clubs or Organizations:     Attends Club or Organization Meetings:     Marital Status:    Intimate Partner Violence:     Fear of Current or Ex-Partner:     Emotionally Abused:     Physically Abused:     Sexually Abused:        Subjective         Objective    Physical Exam:   Assessment Type: (P) Assess only  General Appearance: (P) Awake, Alert  Respiratory Pattern: (P) Dyspnea with exertion  Chest Assessment: (P) Chest expansion symmetrical  Bilateral Breath Sounds: (P) Expiratory wheezes, Diminished  O2 Device: (P) 4L    Vitals:  Blood pressure 133/68, pulse (!) 49, temperature 97 8 °F (36 6 °C), resp  rate 19, height 5' 9" (1 753 m), weight 82 1 kg (181 lb), SpO2 96 %  Imaging and other studies: I have personally reviewed pertinent reports  O2 Device: (P) 4L     Plan             Resp Comments: (P) Pt admitted w/ hypoxic resp failure  65% on room air  Is currently stable on 4l with a saturation of 96%  BS are dim  w/ exp wheezes  HO of COPD asthma and Afib Home reg is advair spriva and albuterol   Current orders breo spriva and xopenex due to Afib  Stephanie Heaton

## 2021-08-09 NOTE — H&P
[address]  H&P- Manjit Cho 1940, [de-identified] y o  male MRN: [id number]  Unit/Bed#: -[facility] Encounter: [id number]  Primary Care Provider: Isabel Savage MD   Date and time admitted to hospital: 8/9/2021  2:34 PM    Acute on chronic combined systolic and diastolic congestive heart failure ([address] )  Assessment & Plan  Wt Readings from Last 3 Encounters:   08/09/21 82 1 kg (181 lb)   08/09/21 82 1 kg (181 lb)   06/17/21 82 1 kg (181 lb)     As evidence by dyspnea on exertion, respiratory failure, weight gain, edema  As per last echocardiogram ejection fraction was 45-50%  Patient was taking 60 mg of Lasix p o  Daily outpatient basis  Evaluated by Cardiology today and send the patient to emergency room for IV diuretics  Continue IV diuretics, maintain intake and output, maintain weight  Follow of salt diet        Atrial fibrillation (HCC)  Assessment & Plan  Unspecified rhythm  Continue Cardizem, Eliquis  Use metoprolol IV based on parameter    * Acute respiratory failure with hypoxia [facility])  Assessment & Plan  Patient was saturating 74-77% on room air, requiring 4 L of oxygen  Most likely secondary to CHF as well as COPD exacerbation  Continue treatment for CHF and COPD  Follow respiratory protocol  Maintain saturation more than 90%    Chronic obstructive pulmonary disease with acute exacerbation (HCC)  Assessment & Plan  Most likely multifactorial  Continue IV steroid  Follow respiratory protocol  Continue nebulized treatment-as per respiratory protocol      Cellulitis of left lower extremities  As documented in the image section  Wound care consultation  Follow wound culture  Will start IV antibiotic with pharmacy consult      VTE Pharmacologic Prophylaxis: VTE Score: 10 High Risk (Score >/= 5) - Pharmacological DVT Prophylaxis Ordered: apixaban (Eliquis)  Sequential Compression Devices Ordered    Code Status: Level 1 - Full Code as per patient  Discussion with family: Updated contact person (wife) at bedside  Anticipated Length of Stay: Patient will be admitted on an inpatient basis with an anticipated length of stay of greater than 2 midnights secondary to To monitor above condition  Total Time for Visit, including Counseling / Coordination of Care: 45 minutes Greater than 50% of this total time spent on direct patient counseling and coordination of care  Chief Complaint:  Dyspnea on exertion    History of Present Illness:  Ailyn Villafuerte is a [de-identified] y o  male with a PMH of CHF, AFib, COPD who presents with shortness of breath  As per patient, symptoms is going on for last few days, and progressing  Patient also reports is having trouble to complete his sentence and feels very short and tired with short distance walking  Patient reports he went to see his cardiologist office today, with than send him to emergency room for further management  Denies any fever, diarrhea, headache  Review of Systems:  Review of Systems   Constitutional: Positive for activity change and unexpected weight change  Negative for appetite change, chills, diaphoresis, fatigue and fever  HENT: Negative for congestion and sore throat  Respiratory: Positive for apnea, cough and shortness of breath  Negative for wheezing  Cardiovascular: Positive for leg swelling  Negative for chest pain and palpitations  Gastrointestinal: Negative for abdominal distention, abdominal pain, constipation, diarrhea and vomiting  Genitourinary: Negative for difficulty urinating and dysuria  Musculoskeletal: Negative for arthralgias  Skin: Negative for color change, pallor and wound  Neurological: Negative for dizziness and facial asymmetry  Hematological: Negative for adenopathy  Psychiatric/Behavioral: Negative for agitation  All other systems reviewed and are negative        Past Medical and Surgical History:   Past Medical History:   Diagnosis Date    BPH (benign prostatic hyperplasia)     Chronic obstructive pulmonary disease (COPD) [facility]     Essential hypertension     Hard of hearing     Pt does wear hearing aids    Hypertension     Shortness of breath     Pt states not changes and it occurs with moderate exertion    Sleep disturbance     Pt states he is only sleeping about 3 hours a night  Pt is seeing his PCP on 8/5/21 to discuss    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 Authorizing Provider   apixaban (ELIQUIS) 5 mg Take 1 tablet (5 mg total) by mouth 2 (two) times a day  Patient taking differently: Take 5 mg by mouth 2 (two) times a day Pt was instructed not to stop by surgeon 6/30/21  Yes KAN Andrea   Ascorbic Acid (VITAMIN C PO) Take 1 tablet by mouth daily    Yes Historical Provider, MD   bisacodyl (bisacodyl) 5 mg EC tablet Take 5 mg by mouth 2 (two) times a day   Yes Historical Provider, MD   diltiazem (DILACOR XR) 240 MG 24 hr capsule Take 1 capsule (240 mg total) by mouth daily 7/20/21  Yes KAN Andrea   docusate sodium (COLACE) 100 mg capsule Take 100 mg by mouth  2/4/21  Yes Historical Provider, MD   finasteride (PROSCAR) 5 mg tablet Take 5 mg by mouth daily  2/16/21  Yes Historical Provider, MD   fluticasone-salmeterol (ADVAIR, [address]) 250-50 mcg/dose inhaler Inhale 1 puff 2 (two) times a day Rinse mouth after use     Yes Historical Provider, MD   furosemide (LASIX) 20 mg tablet Take 2 tablets (40 mg total) by mouth daily  Patient taking differently: Take 60 mg by mouth daily  6/30/21  Yes KAN Andrea   Magnesium 500 MG CAPS Take 500 mg by mouth daily   Yes Historical Provider, MD   montelukast (SINGULAIR) 10 mg tablet Take 10 mg by mouth daily at bedtime   Yes Historical Provider, MD   Spiriva Respimat 2 5 MCG/ACT AERS inhaler Inhale 2 puffs daily  1/21/21  Yes Historical Provider, MD   cetirizine (ZyrTEC) 10 mg tablet Take 10 mg by mouth daily    Historical Provider, MD   bisacodyl 5 MG EC tablet Take 5 mg by mouth daily  2/4/21 8/9/21  Historical Provider, MD   Cyanocobalamin (TH VITAMIN B-12 PO) Take 1,000 mcg by mouth daily   Patient not taking: Reported on 8/9/2021 8/9/21  Historical Provider, MD   loratadine (CLARITIN) 10 mg tablet Take 10 mg by mouth daily  Patient not taking: Reported on 8/9/2021 8/9/21  Historical Provider, MD   Magnesium 300 MG CAPS Take 1 capsule by mouth daily  Patient not taking: Reported on 8/9/2021 8/9/21  Historical Provider, MD     I have reviewed home medications with patient personally  Allergies: Allergies   Allergen Reactions    Penicillin G Other (See Comments)     PCN Shots Only!!       Social History:  Marital Status: /Civil Union   Occupation:  Retired  Patient Pre-hospital Living Situation: Home  Patient Pre-hospital Level of Mobility: walks  Patient Pre-hospital Diet Restrictions:  Cardiac diet  Substance Use History:   Social History     Substance and Sexual Activity   Alcohol Use Yes    Comment: moderate consumption     Social History     Tobacco Use   Smoking Status Light Tobacco Smoker    Types: Cigars   Smokeless Tobacco Never Used     Social History     Substance and Sexual Activity   Drug Use Not Currently       Family History:  Noncontributory    Physical Exam:     Vitals:   Blood Pressure: 133/68 (08/09/21 1551)  Pulse: (!) 49 (08/09/21 1551)  Temperature: 97 8 °F (36 6 °C) (08/09/21 1551)  Temp Source: Temporal (08/09/21 1440)  Respirations: 19 (08/09/21 1551)  Height: 5' 9" (175 3 cm) (08/09/21 1440)  Weight - Scale: 82 1 kg (181 lb) (08/09/21 1440)  SpO2: 98 % (08/09/21 1609)    Physical Exam  Vitals and nursing note reviewed  Exam conducted with a chaperone present  Constitutional:       General: He is in acute distress        Appearance: He is not diaphoretic  HENT:      Head:        Nose: No congestion  Mouth/Throat:      Mouth: Mucous membranes are moist       Pharynx: No oropharyngeal exudate  Eyes:      General: No scleral icterus  Conjunctiva/sclera: Conjunctivae normal       Pupils: Pupils are equal, round, and reactive to light  Neck:      Comments: Elevated JVD  Cardiovascular:      Rate and Rhythm: Normal rate  Heart sounds: No gallop  Pulmonary:      Effort: Respiratory distress present  Breath sounds: Wheezing and rales present  Abdominal:      General: Abdomen is flat  Bowel sounds are normal  There is no distension  Palpations: There is no mass  Tenderness: There is no abdominal tenderness  Hernia: No hernia is present  Musculoskeletal:      Cervical back: Normal range of motion  Right lower leg: Edema (Three to 4+) present  Left lower leg: Edema ( 3 to 4+) present  Skin:     General: Skin is warm  Neurological:      General: No focal deficit present  Mental Status: He is alert and oriented to person, place, and time  Cranial Nerves: No cranial nerve deficit  Sensory: No sensory deficit  Motor: No weakness        Coordination: Coordination normal    Psychiatric:         Mood and Affect: Mood normal          Additional Data:     Lab Results:  Results from last 7 days   Lab Units 08/09/21  1445   WBC Thousand/uL 7 27   HEMOGLOBIN g/dL 15 4   HEMATOCRIT % 48 8   PLATELETS Thousands/uL 258   NEUTROS PCT % 95*   LYMPHS PCT % 2*   MONOS PCT % 3*   EOS PCT % 0     Results from last 7 days   Lab Units 08/09/21  1445   SODIUM mmol/L 145   POTASSIUM mmol/L 4 6   CHLORIDE mmol/L 100   CO2 mmol/L 41*   BUN mg/dL 43*   CREATININE mg/dL 1 16   ANION GAP mmol/L 4   CALCIUM mg/dL 9 3   ALBUMIN g/dL 3 7   TOTAL BILIRUBIN mg/dL 0 96   ALK PHOS U/L 102   ALT U/L 46   AST U/L 33   GLUCOSE RANDOM mg/dL 169*     Results from last 7 days   Lab Units 08/09/21  1530   INR  1 57* Results from last 7 days   Lab Units 08/09/21  1445   LACTIC ACID mmol/L 2 0       Imaging: Reviewed radiology reports from this admission including: chest xray  XR chest 1 view portable   ED Interpretation by Jaswinder Odlel MD (08/09 4838)   CHF          EKG and Other Studies Reviewed on Admission:   · EKG: Unspecified rhythm, most likely AFib, bundle branch block  ** Please Note: This note has been constructed using a voice recognition system   **

## 2021-08-09 NOTE — ASSESSMENT & PLAN NOTE
Wt Readings from Last 3 Encounters:   08/09/21 82 1 kg (181 lb)   06/17/21 82 1 kg (181 lb)   03/18/21 82 8 kg (182 lb 9 6 oz)       Echocardiogram 2/25/2021 showed an EF of 45-50%  Unclear etiology of reduced EF but this could be tachycardia induced vs EtOH induced  Will need eventual stress test once heart rates are controlled to rule out ischemia as the source of his HFrEF  Ideally should be on long acting beta blocker but has COPD/Asthma  No ACE-I/ARB for now as we need room to uptitrate AV efra blockers  Furosemide was increased to 60mg daily on 7/30/2021 due to elevated NT proBNP of 2, 432  Today he presents with O2 on RA of 76% with evidence of volume overload on exam based on breath sounds, weight gain, and leg edema    Directed to ER for IV diuresis

## 2021-08-09 NOTE — PROGRESS NOTES
Cardiology Follow Up    Sami Cerrato  1940  [id number]  [facility]  [address]  [address] [facility] [id number] [phone]    Delroy Obrien presents today for follow up of his atrial fibrillation, chronic systolic/diastolic heart failure, RBBB, HTN  1  Atrial fibrillation, unspecified type [facility])  Assessment & Plan:  Atrial fibrillation noted on EKG at PCP office 01/19/2021  Holter monitor 01/28/2021 showed predominantly atrial fibrillation with an average heart rate of 102 beats per minute with frequent PVCs /aberrant beats representing 34 9% ectopic burden  Dr Anais Meneses asked EP to review Holter monitor who agreed with initial plan and discussed possible ablation if rates not controlled as he has no good options for antiarrhythmics  Patient denied any palpitations and was unaware of rapid heart rates  Magnesium 250 mg daily added and will be continued  Echocardiogram 2/25/2021 showed EF 45-50%  Cardizem 120 mg daily was added (trying to avoid beta blockers due to COPD/asthma) and ultimately titrated to dose of 240mg based on results of ZIO/holter monitor in attempt to control rates, PVC burden  Persistent a fib with elevated ventricular rates and high PVC burden of 33% noted on most recent holter monitor study in June 2021  Referral placed to EP, Dr Kaley Magallon and appointment pending  Eliquis 5 mg twice daily for CVA prophylaxis  Will need eventual sleep study as well as stress test once heart rates are better controlled  2  Chronic combined systolic and diastolic CHF (congestive heart failure) ([facility])  Assessment & Plan:  Wt Readings from Last 3 Encounters:   08/09/21 82 1 kg (181 lb)   06/17/21 82 1 kg (181 lb)   03/18/21 82 8 kg (182 lb 9 6 oz)       Echocardiogram 2/25/2021 showed an EF of 45-50%  Unclear etiology of reduced EF but this could be tachycardia induced vs EtOH induced    Will need eventual stress test once heart rates are controlled to rule out ischemia as the source of his HFrEF  Ideally should be on long acting beta blocker but has COPD/Asthma  No ACE-I/ARB for now as we need room to uptitrate AV efra blockers  Furosemide was increased to 60mg daily on 7/30/2021 due to elevated NT proBNP of 2, 432  Today he presents with O2 on RA of 76% with evidence of volume overload on exam based on breath sounds, weight gain, and leg edema  Directed to ER for IV diuresis                3  RBBB  Assessment & Plan:  Chronic right bundle-branch block  4  Essential hypertension  Assessment & Plan:  Blood pressure has been well controlled  Currently on Diltiazem 240mg daily  Beta-blockers likely will be contraindicated in setting of asthma /COPD  See discussion above  5  Localized edema  Assessment & Plan:  Patient has significant bilateral lower extremity edema on exam    Echocardiogram as outlined showed mildly decreased LV systolic function at 74-07%  NT proBNP elevated at 2,432 on 7/30/2021 with increase in Furosemide to 60mg daily  Directed to ER for IV diuresis today      6  Moderate alcohol consumption  Assessment & Plan: Moderate  ETOH use  I discussed the fact that this is likely contributing to his atrial fibrillation  I have asked him to continue to try and cut back on intake  HPI   From previous OV's with Dr Ish Wolfe:  Patient has a history hypertension, asthma/COPD and BPH  Patient was seen by his primary physician (Dr Thanh Kat) for routine Medicare physical exam 1/19/2021 at which time he was noted to have an abnormal heart rhythm  EKG obtained showed possible atrial fibrillation with frequent PVCs in a bigeminal pattern with underlying right bundle-branch block    24 hour Holter monitor was ordered and cardiology evaluation was recommended      Holter monitor showed predominantly atrial fibrillation with an average heart rate of 102 beats per minute and a total of 51,075 premature ventricular contractions/aberrant beats totaling of 34 9% ectopic burden  The patient denied any symptoms in attached diary       2/25/2021: Patient confirms that he had presented for routine office visit at which time he was noted to have an abnormal heart rhythm and sent for additional testing  [facility]  He adamantly denies any palpitations or chest pain  He does have shortness of breath / dyspnea on exertion which he attributes to his underlying lung disease which consists of COPD and asthma  Patient does admit to drinking 7 days a week at a minimum to alcoholic drinks a day  Alcohol intake consists of beer, wine and occasional whiskey  His wife has noted increased lower extremity edema  Patient denies any prior cardiac issues  No prior cardiac testing that he can recall       3/18/2021: Patient presents to the office today for follow up  I had started diltiazem, eliquis, and furosemide at/after last office visit  Patient denies any palpitations  He has tolerated the new medications without difficulty  Furosemide was increased from 20 mg to 40 mg  The patient feels his breathing is somewhat better  Still has edema  No chest pain  No lightheadedness or dizziness  ECG today shows atrial fibrillation with RVR at 105 bpm with aberrantly conducted complexes  Plan for repeat ZIO (3 days) to start today  8/9/2021: Naina Lopez presents today for follow up  He had completed a 3 day ZIO monitor in April 2021 which revealed 100% a fib with average HR 94 BPM and frequent PVC's at 16 4%  his Diltiazem was increased to 240mg and repeat holter monitor study ordered to assess response  The Holter monitor study revealed 33% PVC's  EP consultation was recommended and he has been referred to Dr Lashanda Horn with [facility]  Today he is accompanied by his wife  He is short of breath upon exam  He and his wife tell me he has been short of breath for quite some time   His wife notes that he seems somewhat confused recently  He has a dry frequent cough, he naps a lot during the day  He tells me he has been cold and keeping blankets on him at home  They have not noticed a fever when checking his temp  He has been taking his medications as prescribed  He went to his PCP office Thursday of last week due to trouble sleeping at night and was prescribed prednisone  He does feel that has helped his breathing slightly  He states he had a chest xray at the office but has not yet heard about the results  He is scheduled tomorrow for 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  Medical Problems     Problem List     Moderate alcohol consumption    Localized edema    Atrial fibrillation (HCC)    Chronic combined systolic and diastolic CHF (congestive heart failure) ([address] )    Wt Readings from Last 3 Encounters:   08/09/21 82 1 kg (181 lb)   06/17/21 82 1 kg (181 lb)   03/18/21 82 8 kg (182 lb 9 6 oz)                 RBBB    Essential hypertension              Past Medical History:   Diagnosis Date    BPH (benign prostatic hyperplasia)     Chronic obstructive pulmonary disease (COPD) ([address] )     Essential hypertension     Hard of hearing     Pt does wear hearing aids    Hypertension     Shortness of breath     Pt states not changes and it occurs with moderate exertion    Sleep disturbance     Pt states he is only sleeping about 3 hours a night  Pt is seeing his PCP on 8/5/21 to discuss    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[facility] [address] )     Pt has on the scalp    Tinnitus      Social History     Socioeconomic History    Marital status: /Civil Union     Spouse name: Not on file    Number of children: Not on file    Years of education: Not on file    Highest education level: Not on file   Occupational History    Occupation: Retired   Tobacco Use    Smoking status: Light Tobacco Smoker     Types: Cigars    Smokeless tobacco: Never Used   Vaping Use    Vaping Use: Never used   Substance and Sexual Activity    Alcohol use:  Yes Comment: moderate consumption    Drug use: Not Currently    Sexual activity: Yes   Other Topics Concern    Not on file   Social History Narrative    Not on file     Social Determinants of Health     Financial Resource Strain:     Difficulty of Paying Living Expenses:    Food Insecurity:     Worried About Running Out of Food in the Last Year:     [address] in the Last Year:    Transportation Needs:     Lack of Transportation (Medical):  Lack of Transportation (Non-Medical):    Physical Activity:     Days of Exercise per Week:     Minutes of Exercise per Session:    Stress:     Feeling of Stress :    Social Connections:     Frequency of Communication with Friends and Family:     Frequency of Social Gatherings with Friends and Family:     Attends [employer] Services:     Active Member of Clubs or Organizations:     Attends Club or Organization Meetings:     Marital Status:    Intimate Partner Violence:     Fear of Current or Ex-Partner:     Emotionally Abused:     Physically Abused:     Sexually Abused:       History reviewed  No pertinent family history    Past Surgical History:   Procedure Laterality Date    SHOULDER OPEN ROTATOR CUFF REPAIR      VEIN SURGERY         Current Outpatient Medications:     apixaban (ELIQUIS) 5 mg, Take 1 tablet (5 mg total) by mouth 2 (two) times a day (Patient taking differently: Take 5 mg by mouth 2 (two) times a day Pt was instructed not to stop by surgeon), Disp: 180 tablet, Rfl: 3    Ascorbic Acid (VITAMIN C PO), Take 1 tablet by mouth daily , Disp: , Rfl:     cetirizine (ZyrTEC) 10 mg tablet, Take 10 mg by mouth daily, Disp: , Rfl:     diltiazem (DILACOR XR) 240 MG 24 hr capsule, Take 1 capsule (240 mg total) by mouth daily, Disp: 90 capsule, Rfl: 3    docusate sodium (COLACE) 100 mg capsule, Take 100 mg by mouth , Disp: , Rfl:     finasteride (PROSCAR) 5 mg tablet, Take 5 mg by mouth daily , Disp: , Rfl:     fluticasone-salmeterol (ADVAIR, WIXELA) 250-50 mcg/dose inhaler, Inhale 1 puff 2 (two) times a day Rinse mouth after use , Disp: , Rfl:     furosemide (LASIX) 20 mg tablet, Take 2 tablets (40 mg total) by mouth daily (Patient taking differently: Take 60 mg by mouth daily ), Disp: 90 tablet, Rfl: 3    Magnesium 500 MG CAPS, Take 500 mg by mouth daily, Disp: , Rfl:     montelukast (SINGULAIR) 10 mg tablet, Take 10 mg by mouth daily at bedtime, Disp: , Rfl:     Spiriva Respimat 2 5 MCG/ACT AERS inhaler, Inhale 2 puffs daily , Disp: , Rfl:   Allergies   Allergen Reactions    Penicillin G Other (See Comments)     PCN Shots Only!!       Labs:     Chemistry        Component Value Date/Time    K 4 1 07/30/2021 0857     07/30/2021 0857    CO2 38 (H) 07/30/2021 0857    BUN 21 07/30/2021 0857    CREATININE 1 10 07/30/2021 0857        Component Value Date/Time    CALCIUM 8 8 07/30/2021 0857            Review of Systems   Constitutional: Positive for malaise/fatigue and weight gain  HENT: Negative  Cardiovascular: Positive for dyspnea on exertion and leg swelling  Negative for chest pain, irregular heartbeat, near-syncope, orthopnea and palpitations  Respiratory: Positive for shortness of breath and wheezing  Negative for cough and snoring  Endocrine: Positive for cold intolerance  Skin: Positive for skin cancer  Musculoskeletal: Negative  Gastrointestinal: Negative  Genitourinary: Negative  Neurological: Positive for light-headedness  Psychiatric/Behavioral: Negative  Vitals:    08/09/21 1335   BP: 140/80   Pulse: 100   SpO2: (!) 76%     Vitals:    08/09/21 1335   Weight: 82 1 kg (181 lb)     Height: 5' 9" (175 3 cm)   Body mass index is 26 73 kg/m²  Physical Exam  Vitals and nursing note reviewed  Constitutional:       General: He is not in acute distress  Appearance: He is well-developed  He is ill-appearing  He is not diaphoretic  HENT:      Head: Normocephalic and atraumatic     Neck:      Vascular: No carotid bruit or JVD  Cardiovascular:      Rate and Rhythm: Tachycardia present  Rhythm irregular  Pulses: Intact distal pulses  Heart sounds: Normal heart sounds, S1 normal and S2 normal  No murmur heard  No friction rub  No gallop  Comments: 3+ pitting edema to bilateral lower legs  Pulmonary:      Effort: Tachypnea and respiratory distress present  Breath sounds: Examination of the right-upper field reveals wheezing  Examination of the left-upper field reveals wheezing  Decreased breath sounds, wheezing and rales present  Abdominal:      General: There is distension  Palpations: Abdomen is soft  Tenderness: There is no abdominal tenderness  Skin:     General: Skin is warm and dry  Findings: No rash  Neurological:      Mental Status: He is alert and oriented to person, place, and time  Psychiatric:         Attention and Perception: Attention normal          Mood and Affect: Mood normal          Speech: Speech normal          Behavior: Behavior normal  Behavior is cooperative           Cognition and Memory: Cognition normal

## 2021-08-09 NOTE — ASSESSMENT & PLAN NOTE
· As documented in the image section  · Wound care consultation  · Wound culture pending   · Initially received IV Vancomycin;  Will transition to IV Ancef and monitor closely

## 2021-08-09 NOTE — ASSESSMENT & PLAN NOTE
Patient was saturating 74-77% on room air, requiring 4 L of oxygen  Most likely secondary to CHF as well as COPD exacerbation  Continue treatment for CHF and COPD  Follow respiratory protocol  Maintain saturation more than 90%

## 2021-08-09 NOTE — ASSESSMENT & PLAN NOTE
Patient has significant bilateral lower extremity edema on exam    Echocardiogram as outlined showed mildly decreased LV systolic function at 88-01%  NT proBNP elevated at 2,432 on 7/30/2021 with increase in Furosemide to 60mg daily    Directed to ER for IV diuresis today

## 2021-08-09 NOTE — ASSESSMENT & PLAN NOTE
Wt Readings from Last 3 Encounters:   08/09/21 82 1 kg (181 lb)   08/09/21 82 1 kg (181 lb)   06/17/21 82 1 kg (181 lb)     As evidence by dyspnea on exertion, respiratory failure, weight gain, edema  As per last echocardiogram ejection fraction was 45-50%  Patient was taking 60 mg of Lasix p o   Daily outpatient basis  Evaluated by Cardiology today and send the patient to emergency room for IV diuretics  Continue IV diuretics, maintain intake and output, maintain weight  Follow of salt diet

## 2021-08-09 NOTE — ASSESSMENT & PLAN NOTE
Echocardiogram 2/25/2021 showed an EF of 45-50%  Unclear etiology of reduced EF but this could be tachycardia induced vs EtOH induced  Will need eventual stress test once heart rates are controlled to rule out ischemia as the source of his HFrEF  Continue furosemide 40 mg daily for now and uptitrate pending results of repeat labs  Ideally should be on long acting beta blocker but has COPD/Asthma  No ACE-I/ARB for now as we need room to uptitrate AV efra blockers  Weight stable

## 2021-08-09 NOTE — ASSESSMENT & PLAN NOTE
Atrial fibrillation noted on EKG at PCP office 01/19/2021  Holter monitor 01/28/2021 showed predominantly atrial fibrillation with an average heart rate of 102 beats per minute with frequent PVCs /aberrant beats representing 34 9% ectopic burden  I had asked EP to review Holter monitor who agreed with my initial plan and discussed possible ablation if rates not controlled as he has no good options for antiarrhythmics  Patient denied any palpitations and was unaware of rapid heart rates  Magnesium 250 mg daily added and will be continued  Echocardiogram 2/25/2021 showed EF 45-50%  I had recommended the addition of Cardizem 120 mg (trying to avoid beta blockers due to COPD/asthma) daily and planned repeat 3 day ZIO monitor which will be placed today  Heart rates remain elevated but improved with an average heart rate of 94 bpm on ZIO 3/18-3/21/2021  Eliquis 5 mg twice daily for CVA prophylaxis  Will need eventual sleep study as well as stress test once heart rates are better controlled  Repeat 24 hour Holter monitor

## 2021-08-09 NOTE — PLAN OF CARE
Problem: CARDIOVASCULAR - ADULT  Goal: Maintains optimal cardiac output and hemodynamic stability  Description: INTERVENTIONS:  - Monitor I/O, vital signs and rhythm  - Monitor for S/S and trends of decreased cardiac output  - Administer and titrate ordered vasoactive medications to optimize hemodynamic stability  - Assess quality of pulses, skin color and temperature  - Assess for signs of decreased coronary artery perfusion  - Instruct patient to report change in severity of symptoms  Outcome: Progressing     Problem: CARDIOVASCULAR - ADULT  Goal: Absence of cardiac dysrhythmias or at baseline rhythm  Description: INTERVENTIONS:  - Continuous cardiac monitoring, vital signs, obtain 12 lead EKG if ordered  - Administer antiarrhythmic and heart rate control medications as ordered  - Monitor electrolytes and administer replacement therapy as ordered  Outcome: Progressing     Problem: RESPIRATORY - ADULT  Goal: Achieves optimal ventilation and oxygenation  Description: INTERVENTIONS:  - Assess for changes in respiratory status  - Assess for changes in mentation and behavior  - Position to facilitate oxygenation and minimize respiratory effort  - Oxygen administered by appropriate delivery if ordered  - Initiate smoking cessation education as indicated  - Encourage broncho-pulmonary hygiene including cough, deep breathe, Incentive Spirometry  - Assess the need for suctioning and aspirate as needed  - Assess and instruct to report SOB or any respiratory difficulty  - Respiratory Therapy support as indicated  Outcome: Progressing     Problem: METABOLIC, FLUID AND ELECTROLYTES - ADULT  Goal: Electrolytes maintained within normal limits  Description: INTERVENTIONS:  - Monitor labs and assess patient for signs and symptoms of electrolyte imbalances  - Administer electrolyte replacement as ordered  - Monitor response to electrolyte replacements, including repeat lab results as appropriate  - Instruct patient on fluid and nutrition as appropriate  Outcome: Progressing

## 2021-08-09 NOTE — PATIENT INSTRUCTIONS
Discussed with pt his need for higher level of care given current fluid volume/respiratory status  Directed to the [facility] ER  Report called to charge nurse Charlotte Reno at [facility] ER

## 2021-08-09 NOTE — ASSESSMENT & PLAN NOTE
Most likely multifactorial  Continue IV steroid  Follow respiratory protocol  Continue nebulized treatment-as per respiratory protocol

## 2021-08-09 NOTE — ASSESSMENT & PLAN NOTE
Blood pressure has been well controlled  Currently on Diltiazem 240mg daily  Beta-blockers likely will be contraindicated in setting of asthma /COPD  See discussion above

## 2021-08-10 PROBLEM — I49.3 FREQUENT PVCS: Status: ACTIVE | Noted: 2021-08-10

## 2021-08-10 LAB
ATRIAL RATE: 117 BPM
BASOPHILS # BLD AUTO: 0 THOUSANDS/ΜL (ref 0–0.1)
BASOPHILS NFR BLD AUTO: 0 % (ref 0–1)
EOSINOPHIL # BLD AUTO: 0 THOUSAND/ΜL (ref 0–0.61)
EOSINOPHIL NFR BLD AUTO: 0 % (ref 0–6)
ERYTHROCYTE [DISTWIDTH] IN BLOOD BY AUTOMATED COUNT: 15.2 % (ref 11.6–15.1)
HCT VFR BLD AUTO: 48.4 % (ref 36.5–49.3)
HGB BLD-MCNC: 14.9 G/DL (ref 12–17)
IMM GRANULOCYTES # BLD AUTO: 0.03 THOUSAND/UL (ref 0–0.2)
IMM GRANULOCYTES NFR BLD AUTO: 1 % (ref 0–2)
LYMPHOCYTES # BLD AUTO: 0.21 THOUSANDS/ΜL (ref 0.6–4.47)
LYMPHOCYTES NFR BLD AUTO: 3 % (ref 14–44)
MCH RBC QN AUTO: 33.3 PG (ref 26.8–34.3)
MCHC RBC AUTO-ENTMCNC: 30.8 G/DL (ref 31.4–37.4)
MCV RBC AUTO: 108 FL (ref 82–98)
MONOCYTES # BLD AUTO: 0.32 THOUSAND/ΜL (ref 0.17–1.22)
MONOCYTES NFR BLD AUTO: 5 % (ref 4–12)
NEUTROPHILS # BLD AUTO: 5.85 THOUSANDS/ΜL (ref 1.85–7.62)
NEUTS SEG NFR BLD AUTO: 91 % (ref 43–75)
NRBC BLD AUTO-RTO: 0 /100 WBCS
P AXIS: 5 DEGREES
PLATELET # BLD AUTO: 233 THOUSANDS/UL (ref 149–390)
PMV BLD AUTO: 9.4 FL (ref 8.9–12.7)
PR INTERVAL: 146 MS
QRS AXIS: 88 DEGREES
QRSD INTERVAL: 130 MS
QT INTERVAL: 308 MS
QTC INTERVAL: 429 MS
RBC # BLD AUTO: 4.47 MILLION/UL (ref 3.88–5.62)
T WAVE AXIS: 266 DEGREES
VENTRICULAR RATE: 117 BPM
WBC # BLD AUTO: 6.41 THOUSAND/UL (ref 4.31–10.16)

## 2021-08-10 PROCEDURE — 94640 AIRWAY INHALATION TREATMENT: CPT

## 2021-08-10 PROCEDURE — 94760 N-INVAS EAR/PLS OXIMETRY 1: CPT

## 2021-08-10 PROCEDURE — 99232 SBSQ HOSP IP/OBS MODERATE 35: CPT | Performed by: NURSE PRACTITIONER

## 2021-08-10 PROCEDURE — 85025 COMPLETE CBC W/AUTO DIFF WBC: CPT | Performed by: FAMILY MEDICINE

## 2021-08-10 PROCEDURE — 93005 ELECTROCARDIOGRAM TRACING: CPT

## 2021-08-10 RX ORDER — PREDNISONE 20 MG/1
40 TABLET ORAL DAILY
Status: DISCONTINUED | OUTPATIENT
Start: 2021-08-12 | End: 2021-08-14

## 2021-08-10 RX ORDER — PREDNISONE 20 MG/1
20 TABLET ORAL DAILY
Status: DISCONTINUED | OUTPATIENT
Start: 2021-08-18 | End: 2021-08-14

## 2021-08-10 RX ORDER — METOPROLOL SUCCINATE 50 MG/1
50 TABLET, EXTENDED RELEASE ORAL DAILY
Status: DISCONTINUED | OUTPATIENT
Start: 2021-08-10 | End: 2021-08-14

## 2021-08-10 RX ORDER — FUROSEMIDE 10 MG/ML
60 INJECTION INTRAMUSCULAR; INTRAVENOUS
Status: DISCONTINUED | OUTPATIENT
Start: 2021-08-10 | End: 2021-08-12

## 2021-08-10 RX ORDER — POTASSIUM CHLORIDE 20 MEQ/1
20 TABLET, EXTENDED RELEASE ORAL 2 TIMES DAILY
Status: DISCONTINUED | OUTPATIENT
Start: 2021-08-10 | End: 2021-08-14

## 2021-08-10 RX ORDER — PREDNISONE 10 MG/1
10 TABLET ORAL DAILY
Status: DISCONTINUED | OUTPATIENT
Start: 2021-08-21 | End: 2021-08-14

## 2021-08-10 RX ORDER — METHYLPREDNISOLONE SODIUM SUCCINATE 40 MG/ML
40 INJECTION, POWDER, LYOPHILIZED, FOR SOLUTION INTRAMUSCULAR; INTRAVENOUS EVERY 12 HOURS SCHEDULED
Status: COMPLETED | OUTPATIENT
Start: 2021-08-10 | End: 2021-08-11

## 2021-08-10 RX ADMIN — DILTIAZEM HYDROCHLORIDE 240 MG: 240 CAPSULE, COATED, EXTENDED RELEASE ORAL at 08:10

## 2021-08-10 RX ADMIN — LEVALBUTEROL HYDROCHLORIDE 1.25 MG: 1.25 SOLUTION, CONCENTRATE RESPIRATORY (INHALATION) at 07:23

## 2021-08-10 RX ADMIN — LEVALBUTEROL HYDROCHLORIDE 1.25 MG: 1.25 SOLUTION, CONCENTRATE RESPIRATORY (INHALATION) at 19:21

## 2021-08-10 RX ADMIN — DOCUSATE SODIUM 100 MG: 100 CAPSULE ORAL at 08:10

## 2021-08-10 RX ADMIN — DOCUSATE SODIUM 100 MG: 100 CAPSULE ORAL at 17:17

## 2021-08-10 RX ADMIN — Medication 400 MG: at 17:17

## 2021-08-10 RX ADMIN — LEVALBUTEROL HYDROCHLORIDE 1.25 MG: 1.25 SOLUTION, CONCENTRATE RESPIRATORY (INHALATION) at 13:48

## 2021-08-10 RX ADMIN — VANCOMYCIN HYDROCHLORIDE 750 MG: 750 INJECTION, SOLUTION INTRAVENOUS at 06:21

## 2021-08-10 RX ADMIN — ISODIUM CHLORIDE 3 ML: 0.03 SOLUTION RESPIRATORY (INHALATION) at 13:48

## 2021-08-10 RX ADMIN — APIXABAN 5 MG: 5 TABLET, FILM COATED ORAL at 17:17

## 2021-08-10 RX ADMIN — Medication 400 MG: at 08:10

## 2021-08-10 RX ADMIN — FINASTERIDE 5 MG: 5 TABLET, FILM COATED ORAL at 08:10

## 2021-08-10 RX ADMIN — FLUTICASONE FUROATE AND VILANTEROL TRIFENATATE 1 PUFF: 200; 25 POWDER RESPIRATORY (INHALATION) at 08:13

## 2021-08-10 RX ADMIN — METHYLPREDNISOLONE SODIUM SUCCINATE 40 MG: 40 INJECTION, POWDER, FOR SOLUTION INTRAMUSCULAR; INTRAVENOUS at 21:19

## 2021-08-10 RX ADMIN — FUROSEMIDE 60 MG: 10 INJECTION, SOLUTION INTRAMUSCULAR; INTRAVENOUS at 17:17

## 2021-08-10 RX ADMIN — METHYLPREDNISOLONE SODIUM SUCCINATE 40 MG: 40 INJECTION, POWDER, FOR SOLUTION INTRAMUSCULAR; INTRAVENOUS at 08:09

## 2021-08-10 RX ADMIN — ISODIUM CHLORIDE 3 ML: 0.03 SOLUTION RESPIRATORY (INHALATION) at 19:21

## 2021-08-10 RX ADMIN — POTASSIUM CHLORIDE 20 MEQ: 1500 TABLET, EXTENDED RELEASE ORAL at 12:13

## 2021-08-10 RX ADMIN — TIOTROPIUM BROMIDE 18 MCG: 18 CAPSULE ORAL; RESPIRATORY (INHALATION) at 08:13

## 2021-08-10 RX ADMIN — ISODIUM CHLORIDE 3 ML: 0.03 SOLUTION RESPIRATORY (INHALATION) at 07:23

## 2021-08-10 RX ADMIN — FUROSEMIDE 40 MG: 10 INJECTION, SOLUTION INTRAMUSCULAR; INTRAVENOUS at 08:10

## 2021-08-10 RX ADMIN — MONTELUKAST 10 MG: 10 TABLET, FILM COATED ORAL at 21:19

## 2021-08-10 RX ADMIN — METOPROLOL SUCCINATE 50 MG: 50 TABLET, EXTENDED RELEASE ORAL at 12:14

## 2021-08-10 RX ADMIN — APIXABAN 5 MG: 5 TABLET, FILM COATED ORAL at 08:10

## 2021-08-10 RX ADMIN — POTASSIUM CHLORIDE 20 MEQ: 1500 TABLET, EXTENDED RELEASE ORAL at 17:24

## 2021-08-10 RX ADMIN — VANCOMYCIN HYDROCHLORIDE 750 MG: 750 INJECTION, SOLUTION INTRAVENOUS at 17:17

## 2021-08-10 RX ADMIN — FUROSEMIDE 60 MG: 10 INJECTION, SOLUTION INTRAMUSCULAR; INTRAVENOUS at 12:14

## 2021-08-10 NOTE — ASSESSMENT & PLAN NOTE
· Most likely multifactorial  · Continue IV steroid; taper ordered on 8/10  · Respiratory protocol  · Wean oxygen as tolerated

## 2021-08-10 NOTE — PROGRESS NOTES
[address]  Progress Note - Martha Sandoval 1940, [de-identified] y o  male MRN: [id number]  Unit/Bed#: -01 Encounter: [id number]  Primary Care Provider: Anand George MD   Date and time admitted to hospital: 8/9/2021  2:34 PM    * Acute respiratory failure with hypoxia [facility])  Assessment & Plan  · Noted to be saturating 74-77% on room air, requiring 4 L of oxygen  · Most likely secondary to CHF as well as COPD exacerbation  · Continue treatment for CHF and COPD  · Respiratory protocol  · Maintain saturation more than 90%  · See further treatment as outlined below   · Pending progress may require home O2 evaluation     Acute on chronic combined systolic and diastolic congestive heart failure (HCC)  Assessment & Plan  Wt Readings from Last 3 Encounters:   08/10/21 82 1 kg (181 lb)   08/09/21 82 1 kg (181 lb)   06/17/21 82 1 kg (181 lb)     · As evidence by HOFFMANN, respiratory failure, weight gain, edema  · As per last echocardiogram EF was 45-50%  · Managed as an OP on 60 mg of Lasix PO Daily   · Follows OP with Dr Alma Koo   · IV diuresis per Cardiology team; increased on 8/10 to 60mg IV TID   · Monitor intake and output and daily weights   · Cardiac 2g na/fluid restriction   · Monitor volume status closely         Frequent PVCs  Assessment & Plan  · Reviewed telemetry and patient with significant PVC burden  · Status post Holter monitor as an OP and per review was recommended to go to [facility] for EP evaluation as an OP however patient did not want to travel all that way  · Continue telemetry  · Cardiology consulted; input as noted below  · Added toprol XL 50mg daily; ideally will attempt to titrate off of cardizem if able   · Increased IV lasix to 60mg TID   · Initiated K 20mEq PO BID  · Pending response to therapy as above may need EP evaluation IP vs  OP     Atrial fibrillation ([address] )  Assessment & Plan  · Continue Eliquis for [facility]   · Transitioning off of cardizem and onto BB per cardiology team     Chronic obstructive pulmonary disease with acute exacerbation ([facility] )  Assessment & Plan  · Most likely multifactorial  · Continue IV steroid; taper ordered on 8/10  · Respiratory protocol  · Wean oxygen as tolerated     Cellulitis of left lower extremity  Assessment & Plan  · As documented in the image section  · Wound care consultation  · Wound culture pending   · Initially received IV Vancomycin; Will transition to IV Ancef and monitor closely       VTE Pharmacologic Prophylaxis: VTE Score: 10 High Risk (Score >/= 5) - Pharmacological DVT Prophylaxis Ordered: apixaban (Eliquis)  Sequential Compression Devices Ordered  Patient Centered Rounds: I performed bedside rounds with nursing staff today  Discussions with Specialists or Other Care Team Provider: nursing staff, CM, cardiology     Education and Discussions with Family / Patient: Updated  (wife and daughter) at bedside  Time Spent for Care: 30 minutes  More than 50% of total time spent on counseling and coordination of care as described above  Current Length of Stay: 1 day(s)  Current Patient Status: Inpatient   Certification Statement: The patient will continue to require additional inpatient hospital stay due to IV diuresis, IV abx, and IV steroids  Discharge Plan: Anticipate discharge in 48-72 hrs to home  Code Status: Level 1 - Full Code    Subjective:   Patient reports that he feels good enough to go home  Denies CP and SOB    Objective:     Vitals:   Temp (24hrs), Av 7 °F (36 5 °C), Min:97 3 °F (36 3 °C), Max:98 1 °F (36 7 °C)    Temp:  [97 3 °F (36 3 °C)-98 1 °F (36 7 °C)] 97 3 °F (36 3 °C)  HR:  [] 57  Resp:  [17-27] 22  BP: (132-176)/(68-96) 132/72  SpO2:  [76 %-99 %] 92 %  Body mass index is 26 73 kg/m²  Input and Output Summary (last 24 hours):      Intake/Output Summary (Last 24 hours) at 8/10/2021 1229  Last data filed at 8/10/2021 1026  Gross per 24 hour   Intake 510 ml   Output 675 ml   Net -165 ml Physical Exam:   Physical Exam  Vitals and nursing note reviewed  Constitutional:       Appearance: He is well-developed  He is not ill-appearing or diaphoretic  HENT:      Head: Normocephalic and atraumatic  Eyes:      Conjunctiva/sclera: Conjunctivae normal    Cardiovascular:      Rate and Rhythm: Normal rate and regular rhythm  Heart sounds: No murmur heard  Pulmonary:      Effort: Pulmonary effort is normal  No respiratory distress  Breath sounds: Wheezing and rales present  Abdominal:      General: Abdomen is flat  Bowel sounds are normal  There is no distension  Palpations: Abdomen is soft  Tenderness: There is no abdominal tenderness  Musculoskeletal:         General: Swelling present  Cervical back: Neck supple  Right lower leg: Edema present  Left lower leg: Edema present  Skin:     General: Skin is warm and dry  Capillary Refill: Capillary refill takes less than 2 seconds  Neurological:      Mental Status: He is alert and oriented to person, place, and time  Mental status is at baseline     Psychiatric:         Mood and Affect: Mood normal          Behavior: Behavior normal          Judgment: Judgment normal           Additional Data:     Labs:  Results from last 7 days   Lab Units 08/10/21  0600   WBC Thousand/uL 6 41   HEMOGLOBIN g/dL 14 9   HEMATOCRIT % 48 4   PLATELETS Thousands/uL 233   NEUTROS PCT % 91*   LYMPHS PCT % 3*   MONOS PCT % 5   EOS PCT % 0     Results from last 7 days   Lab Units 08/09/21  1445   SODIUM mmol/L 145   POTASSIUM mmol/L 4 6   CHLORIDE mmol/L 100   CO2 mmol/L 41*   BUN mg/dL 43*   CREATININE mg/dL 1 16   ANION GAP mmol/L 4   CALCIUM mg/dL 9 3   ALBUMIN g/dL 3 7   TOTAL BILIRUBIN mg/dL 0 96   ALK PHOS U/L 102   ALT U/L 46   AST U/L 33   GLUCOSE RANDOM mg/dL 169*     Results from last 7 days   Lab Units 08/09/21  1530   INR  1 57*             Results from last 7 days   Lab Units 08/09/21  1445   LACTIC ACID mmol/L 2 0 Lines/Drains:  Invasive Devices     Peripheral Intravenous Line            Peripheral IV 08/09/21 Left Antecubital <1 day                Telemetry:  Telemetry Orders (From admission, onward)             48 Hour Telemetry Monitoring  Continuous x 48 hours     Question:  Reason for 48 Hour Telemetry  Answer:  Acute Decompensated CHF (continuous diuretic infusion or total diuretic dose > 200 mg daily, associated electrolyte derangement, ionotropic drip, history of ventricular arrhythmia, or new EF <35%)                 Telemetry Reviewed: PVCs  Indication for Continued Telemetry Use: Arrthymias requiring medical therapy       Imaging: Reviewed radiology reports from this admission including: chest xray    Recent Cultures (last 7 days):   Results from last 7 days   Lab Units 08/09/21  1720   BLOOD CULTURE  Received in Microbiology Lab  Culture in Progress  Received in Microbiology Lab  Culture in Progress         Last 24 Hours Medication List:   Current Facility-Administered Medications   Medication Dose Route Frequency Provider Last Rate    acetaminophen  650 mg Oral Q4H PRN Bean Ackerman MD      apixaban  5 mg Oral BID Bean Ackerman MD      diltiazem  240 mg Oral Daily Bean Ackerman MD      docusate sodium  100 mg Oral BID Bean Ackerman MD      finasteride  5 mg Oral Daily Bean Ackerman MD      fluticasone-vilanterol  1 puff Inhalation Daily Bean Ackerman MD      furosemide  60 mg Intravenous TID (diuretic) [place] JULIA Ansari      levalbuterol  1 25 mg Nebulization TID Bean Ackerman MD      And    sodium chloride  3 mL Nebulization TID Bean Ackerman MD      magnesium oxide  400 mg Oral BID Bean Ackerman MD      methylPREDNISolone sodium succinate  40 mg Intravenous Q12H [address] Bean Ackerman MD      metoprolol  5 mg Intravenous Q6H PRN Bean Ackerman MD      metoprolol succinate  50 mg Oral Daily [facility] JULIA Ansari      montelukast  10 mg Oral HS Gomez Garcia MD      nicotine  1 patch Transdermal Daily Gomez Garcia MD      potassium chloride  20 mEq Oral BID [place] JULIA Ansari      tiotropium  18 mcg Inhalation Daily Gomez Garcia MD      vancomycin  10 mg/kg Intravenous Q12H Gomez Garcia  mg (08/10/21 8478)        Today, Patient Was Seen By: KAN Neff    **Please Note: This note may have been constructed using a voice recognition system  **

## 2021-08-10 NOTE — RESPIRATORY THERAPY NOTE
RT Protocol Note  Rufus Zhao [de-identified] y o  male MRN: [id number]  Unit/Bed#: -01 Encounter: [id number]    Assessment    Principal Problem:    Acute respiratory failure with hypoxia ([facility] )  Active Problems:    Atrial fibrillation (HCC)    Acute on chronic combined systolic and diastolic congestive heart failure (HCC)    Chronic obstructive pulmonary disease with acute exacerbation (HCC)    Cellulitis of left lower extremity      Home Pulmonary Medications:  Albuterol as needed for shortness of breath       Past Medical History:   Diagnosis Date    BPH (benign prostatic hyperplasia)     Chronic obstructive pulmonary disease (COPD) ([facility] )     Essential hypertension     Hard of hearing     Pt does wear hearing aids    Hypertension     Shortness of breath     Pt states not changes and it occurs with moderate exertion    Sleep disturbance     Pt states he is only sleeping about 3 hours a night  Pt is seeing his PCP on 8/5/21 to discuss    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    Tinnitus      Social History     Socioeconomic History    Marital status: /Civil Union     Spouse name: None    Number of children: None    Years of education: None    Highest education level: None   Occupational History    Occupation: Retired   Tobacco Use    Smoking status: Light Tobacco Smoker     Types: Cigars    Smokeless tobacco: Never Used   Vaping Use    Vaping Use: Never used   Substance and Sexual Activity    Alcohol use: Yes     Comment: moderate consumption    Drug use: Not Currently    Sexual activity: Yes   Other Topics Concern    None   Social History Narrative    None     Social Determinants of Health     Financial Resource Strain:     Difficulty of Paying Living Expenses:    Food Insecurity:     Worried About Running Out of Food in the Last Year:     Ran Out of Food in the Last Year:    Transportation Needs:     Lack of Transportation (Medical):      Lack of Transportation (Non-Medical):    Physical Activity:     Days of Exercise per Week:     Minutes of Exercise per Session:    Stress:     Feeling of Stress :    Social Connections:     Frequency of Communication with Friends and Family:     Frequency of Social Gatherings with Friends and Family:     Attends Confucianism Services:     Active Member of Clubs or Organizations:     Attends Club or Organization Meetings:     Marital Status:    Intimate Partner Violence:     Fear of Current or Ex-Partner:     Emotionally Abused:     Physically Abused:     Sexually Abused:        Subjective     Patient is occasional cigar smoker  He uses albuterol MDI at home to manage shortness of breath as needed  He denies use of CPAP/BiPAP for hours of sleep or home O2 use    Objective    Physical Exam:   Assessment Type: Assess only  General Appearance: Alert, Awake  Respiratory Pattern: Dyspnea with exertion  Chest Assessment: Chest expansion symmetrical  Bilateral Breath Sounds: Diminished  O2 Device: 4L    Vitals:  Blood pressure 148/89, pulse 100, temperature (!) 97 4 °F (36 3 °C), resp  rate 20, height 5' 9" (1 753 m), weight 82 1 kg (181 lb), SpO2 90 %  Imaging and other studies: The lungs are clear  No pneumothorax or pleural effusion      O2 Device: 4L     Plan    Respiratory Plan: Mild Distress pathway        Resp Comments: no dyspnea on 4L nasal cannula

## 2021-08-10 NOTE — CONSULTS
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 (per primary cardiologist avoiding BB for COPD)  COPD  ETOH use  HTN    Plan:  1  Add toprol xl 50 mg daily  Would like to titrate off cardizem if able  2  Increase lasix to 60 mg IV TID   3  Add potassium 20 meq PO BID  4  Continue to monitor on telemetry  5  Monitor I and O, daily standing weights, monitor electrolytes  6  Will see if PVC burden improves with addition of BB  Will need eventual EP evaluation for significant PVC burden    History of Present Illness   Physician Requesting Consult: Nikki Tse MD  Reason for Consult / Principal Problem: acute on chronic HFmrEF  HPI: Kirit Abdul is a [de-identified]y o  year old male with history of HFmrEF, atrial fibrillation, frequent PVC (holter showing 33% refusing EP evaluation), ETOH use, HTN who presented to the ED after being seen in OP cardiology office  Pt went for routine visit and was found to have a pulse ox of 76% and have significant Edema/SOB concerning for acute decompensated heart failure  On admission he was found to have a bnp of 6713 and a normal troponin  He had a cxr that appears to have pulmonary vascular congestion  He was started lasix 40 IV BID for diuresis on admission  He was lasix 60 mg PO daily as an OP  He has produced minimal urine on his current dose of IV lasix  He reports otherwise no significant complaints  No abnormal bleeding  No chest pain  Inpatient consult to Cardiology  Consult performed by: Alexis Rees PA-C  Consult ordered by: KAN Carrillo          Review of Systems   Constitutional: Negative for chills, fatigue and unexpected weight change  Respiratory: Positive for cough and shortness of breath  Negative for chest tightness and wheezing      Cardiovascular: Positive for leg swelling  Negative for chest pain and palpitations  Gastrointestinal: Negative for nausea  Musculoskeletal: Negative for arthralgias  Skin: Negative for color change, pallor and rash  Neurological: Negative for dizziness, syncope and light-headedness  Psychiatric/Behavioral: Negative for agitation, behavioral problems and confusion  Historical Information   Past Medical History:   Diagnosis Date    BPH (benign prostatic hyperplasia)     [facility]     Essential hypertension     Hard of hearing     Pt does wear hearing aids    Hypertension     Shortness of breath     Pt states not changes and it occurs with moderate exertion    Sleep disturbance     Pt states he is only sleeping about 3 hours a night  Pt is seeing his PCP on 8/5/21 to discuss    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    Tinnitus      Past Surgical History:   Procedure Laterality Date    SHOULDER OPEN ROTATOR CUFF REPAIR      VEIN SURGERY       Social History     Substance and Sexual Activity   Alcohol Use Yes    Comment: moderate consumption     Social History     Substance and Sexual Activity   Drug Use Not Currently     E-Cigarette/Vaping    E-Cigarette Use Never User      E-Cigarette/Vaping Substances     Social History     Tobacco Use   Smoking Status Light Tobacco Smoker    Types: Cigars   Smokeless Tobacco Never Used     Family History: non-contributory    Meds/Allergies   all current active meds have been reviewed  Allergies   Allergen Reactions    Penicillin G Other (See Comments)     PCN Shots Only!!       Objective   Vitals: Blood pressure 135/74, pulse 58, temperature (!) 97 3 °F (36 3 °C), resp  rate 21, height 5' 9" (1 753 m), weight 82 1 kg (181 lb), SpO2 93 %    Orthostatic Blood Pressures      Most Recent Value   Blood Pressure  135/74 filed at 08/10/2021 4637   Patient Position - Orthostatic VS  Sitting filed at 08/09/2021 7976 Intake/Output Summary (Last 24 hours) at 8/10/2021 1100  Last data filed at 8/10/2021 1026  Gross per 24 hour   Intake 510 ml   Output 675 ml   Net -165 ml       Invasive Devices     Peripheral Intravenous Line            Peripheral IV 08/09/21 Left Antecubital <1 day                Physical Exam  Vitals reviewed  Constitutional:       Appearance: Normal appearance  HENT:      Head: Normocephalic and atraumatic  Neck:      Comments: - JVD  Cardiovascular:      Rate and Rhythm: Rhythm irregular  Heart sounds: No murmur heard  No gallop  Pulmonary:      Effort: Pulmonary effort is normal       Comments: + decreased breath sounds in bases  Abdominal:      Palpations: Abdomen is soft  Musculoskeletal:         General: Swelling present  Cervical back: Neck supple  Skin:     General: Skin is warm and dry  Capillary Refill: Capillary refill takes less than 2 seconds  Neurological:      General: No focal deficit present  Mental Status: He is alert and oriented to person, place, and time  Psychiatric:         Mood and Affect: Mood normal          Thought Content: Thought content normal          Lab Results:   I have personally reviewed pertinent lab results      CBC with diff:   Results from last 7 days   Lab Units 08/10/21  0600   WBC Thousand/uL 6 41   RBC Million/uL 4 47   HEMOGLOBIN g/dL 14 9   HEMATOCRIT % 48 4   MCV fL 108*   MCH pg 33 3   MCHC g/dL 30 8*   RDW % 15 2*   MPV fL 9 4   PLATELETS Thousands/uL 233     CMP:   Results from last 7 days   Lab Units 08/09/21  1445   SODIUM mmol/L 145   POTASSIUM mmol/L 4 6   CHLORIDE mmol/L 100   CO2 mmol/L 41*   BUN mg/dL 43*   CREATININE mg/dL 1 16   CALCIUM mg/dL 9 3   AST U/L 33   ALT U/L 46   ALK PHOS U/L 102   EGFR ml/min/1 73sq m 59     Troponin:   0   Lab Value Date/Time    TROPONINI 0 02 08/09/2021 1445     BNP:   Results from last 7 days   Lab Units 08/09/21  1445   POTASSIUM mmol/L 4 6   CHLORIDE mmol/L 100   CO2 mmol/L 41*   BUN mg/dL 43*   CREATININE mg/dL 1 16   CALCIUM mg/dL 9 3   EGFR ml/min/1 73sq m 59     Coags:   Results from last 7 days   Lab Units 08/09/21  1530   PTT seconds 36   INR  1 57*     TSH:   Results from last 7 days   Lab Units 08/09/21  1445   TSH 3RD GENERATON uIU/mL 0 767     Magnesium:   Results from last 7 days   Lab Units 08/09/21  1445   MAGNESIUM mg/dL 2 3     Imaging: I have personally reviewed pertinent reports  Echo 2/25/21:  LEFT VENTRICLE:  LV function is difficult to assess due to patients rhythm  Systolic function was by visual assessment  Ejection fraction was estimated in the range of 45 % to 50 %      RIGHT VENTRICLE:  The size was normal   Systolic function was normal      LEFT ATRIUM:  The atrium was mildly dilated      RIGHT ATRIUM:  The atrium was mildly dilated      MITRAL VALVE:  There was mild regurgitation      TRICUSPID VALVE:  There was trace regurgitation      PULMONIC VALVE:  There was trace regurgitation      IVC, HEPATIC VEINS:  The inferior vena cava was normal in size      PERICARDIUM:  A trace pericardial effusion was identified      LEFT VENTRICLE: LV function is difficult to assess due to patients rhythm  Size was normal  Systolic function was by visual assessment  Ejection fraction was estimated in the range of 45 % to 50 %      RIGHT VENTRICLE: The size was normal  Systolic function was normal  Wall thickness was normal      LEFT ATRIUM: The atrium was mildly dilated      RIGHT ATRIUM: The atrium was mildly dilated      MITRAL VALVE: Valve structure was normal  There was normal leaflet separation  DOPPLER: The transmitral velocity was within the normal range  There was no evidence for stenosis  There was mild regurgitation      AORTIC VALVE: The valve was trileaflet  Leaflets exhibited normal thickness and normal cuspal separation  DOPPLER: Transaortic velocity was within the normal range  There was no evidence for stenosis   There was no regurgitation      TRICUSPID VALVE: The valve structure was normal  There was normal leaflet separation  DOPPLER: The transtricuspid velocity was within the normal range  There was no evidence for stenosis  There was trace regurgitation      PULMONIC VALVE: Leaflets exhibited normal thickness, no calcification, and normal cuspal separation  DOPPLER: The transpulmonic velocity was within the normal range  There was trace regurgitation      PERICARDIUM: A trace pericardial effusion was identified      AORTA: The root exhibited normal size      SYSTEMIC VEINS: IVC: The inferior vena cava was normal in size                Holter 6/21/21:  INDICATIONS:   A Holter monitor - 24 hour revealed the predominant rhythm to be atrial fibrillation occurring 66% of monitoring period  No high-grade AV block or prolonged pauses noted         IMPRESSION:  1  The patient had predominantly atrial fibrillation  2  Heart rate varied from 63 bpm to 136 bpm     3  Longest episode of atrial fibrillation lasted for 10 hours and 27 minutes  Total time in atrial fibrillation is 15 hours and 55 minutes  4  The patient had a holter monitor tracing done for 23 hours and 59 minutes  5  The patient had 41,710 ventricular ectopic beats which accounts for 33% of total beats  Some of these beats could be aberrant conducted beats  6  The patient had 0 supraventricular ectopy  7  The longest R/R interval was 2 seconds        EKG: sinus Rhythm with  Premature ventricular complexes in a pattern of bigeminy  Right bundle branch block  When compared with ECG of 09-AUG-2021 14:34,  No significant change was found  Confirmed by Bean Sams ([id number]) on 8/10/2021 10:33:58 AM

## 2021-08-10 NOTE — PLAN OF CARE
Problem: Potential for Falls  Goal: Patient will remain free of falls  Description: INTERVENTIONS:  - Educate patient/family on patient safety including physical limitations  - Instruct patient to call for assistance with activity   - Consult OT/PT to assist with strengthening/mobility   - Keep Call bell within reach  - Keep bed low and locked with side rails adjusted as appropriate  - Keep care items and personal belongings within reach  - Initiate and maintain comfort rounds  - Make Fall Risk Sign visible to staff  - Offer Toileting every 2 Hours, in advance of need  - Initiate/Maintain bed alarm  - Obtain necessary fall risk management equipment: yellow socks/bracelet  - Apply yellow socks and bracelet for high fall risk patients  - Consider moving patient to room near nurses station  Outcome: Progressing     Problem: MOBILITY - ADULT  Goal: Maintain or return to baseline ADL function  Description: INTERVENTIONS:  -  Assess patient's ability to carry out ADLs; assess patient's baseline for ADL function and identify physical deficits which impact ability to perform ADLs (bathing, care of mouth/teeth, toileting, grooming, dressing, etc )  - Assess/evaluate cause of self-care deficits   - Assess range of motion  - Assess patient's mobility; develop plan if impaired  - Assess patient's need for assistive devices and provide as appropriate  - Encourage maximum independence but intervene and supervise when necessary  - Involve family in performance of ADLs  - Assess for home care needs following discharge   - Consider OT consult to assist with ADL evaluation and planning for discharge  - Provide patient education as appropriate  Outcome: Progressing  Goal: Maintains/Returns to pre admission functional level  Description: INTERVENTIONS:  - Perform BMAT or MOVE assessment daily    - Set and communicate daily mobility goal to care team and patient/family/caregiver     - Collaborate with rehabilitation services on mobility goals if consulted  - Perform Range of Motion 4 times a day  - Reposition patient every 2 hours    - Dangle patient 4 times a day  - Stand patient 4 times a day  - Ambulate patient 4 times a day  - Out of bed to chair 3 times a day   - Out of bed for meals 3 times a day  - Out of bed for toileting  - Record patient progress and toleration of activity level   Outcome: Progressing     Problem: CARDIOVASCULAR - ADULT  Goal: Maintains optimal cardiac output and hemodynamic stability  Description: INTERVENTIONS:  - Monitor I/O, vital signs and rhythm  - Monitor for S/S and trends of decreased cardiac output  - Administer and titrate ordered vasoactive medications to optimize hemodynamic stability  - Assess quality of pulses, skin color and temperature  - Assess for signs of decreased coronary artery perfusion  - Instruct patient to report change in severity of symptoms  Outcome: Progressing  Goal: Absence of cardiac dysrhythmias or at baseline rhythm  Description: INTERVENTIONS:  - Continuous cardiac monitoring, vital signs, obtain 12 lead EKG if ordered  - Administer antiarrhythmic and heart rate control medications as ordered  - Monitor electrolytes and administer replacement therapy as ordered  Outcome: Progressing     Problem: RESPIRATORY - ADULT  Goal: Achieves optimal ventilation and oxygenation  Description: INTERVENTIONS:  - Assess for changes in respiratory status  - Assess for changes in mentation and behavior  - Position to facilitate oxygenation and minimize respiratory effort  - Oxygen administered by appropriate delivery if ordered  - Initiate smoking cessation education as indicated  - Encourage broncho-pulmonary hygiene including cough, deep breathe, Incentive Spirometry  - Assess the need for suctioning and aspirate as needed  - Assess and instruct to report SOB or any respiratory difficulty  - Respiratory Therapy support as indicated  Outcome: Progressing     Problem: METABOLIC, FLUID AND ELECTROLYTES - ADULT  Goal: Electrolytes maintained within normal limits  Description: INTERVENTIONS:  - Monitor labs and assess patient for signs and symptoms of electrolyte imbalances  - Administer electrolyte replacement as ordered  - Monitor response to electrolyte replacements, including repeat lab results as appropriate  - Instruct patient on fluid and nutrition as appropriate  Outcome: Progressing  Goal: Fluid balance maintained  Description: INTERVENTIONS:  - Monitor labs   - Monitor I/O and WT  - Instruct patient on fluid and nutrition as appropriate  - Assess for signs & symptoms of volume excess or deficit  Outcome: Progressing  Goal: Glucose maintained within target range  Description: INTERVENTIONS:  - Monitor Blood Glucose as ordered  - Assess for signs and symptoms of hyperglycemia and hypoglycemia  - Administer ordered medications to maintain glucose within target range  - Assess nutritional intake and initiate nutrition service referral as needed  Outcome: Progressing

## 2021-08-10 NOTE — ASSESSMENT & PLAN NOTE
· Noted to be saturating 74-77% on room air, requiring 4 L of oxygen  · Most likely secondary to CHF as well as COPD exacerbation  · Continue treatment for CHF and COPD  · Respiratory protocol  · Maintain saturation more than 90%  · See further treatment as outlined below   · Pending progress may require home O2 evaluation

## 2021-08-10 NOTE — ASSESSMENT & PLAN NOTE
Wt Readings from Last 3 Encounters:   08/10/21 82 1 kg (181 lb)   08/09/21 82 1 kg (181 lb)   06/17/21 82 1 kg (181 lb)     · As evidence by HOFFMANN, respiratory failure, weight gain, edema  · As per last echocardiogram EF was 45-50%  · Managed as an OP on 60 mg of Lasix PO Daily   · Follows OP with Dr Mateusz Arnold   · IV diuresis per Cardiology team; increased on 8/10 to 60mg IV TID   · Monitor intake and output and daily weights   · Cardiac 2g na/fluid restriction   · Monitor volume status closely

## 2021-08-10 NOTE — CONSULTS
Consult received for CHF ed  Wife reports she makes all homemade foods for pt while following low sodium diet  Selects low sodium options at store  Reviewed high sodium foods with wife and pt, wife reports pt does not consume any of these high sodium foods "maybe twice per year if we're nadine"  Says has a scale at home though is inaccurate by 9-10lb, reportedly cannot be recalibrated, encouraged to purchase a new scale and complete daily weights, report significant changes to MD      Maintain cardiac diet as ordered, fluid restriction per MD     Thank you for the consult

## 2021-08-10 NOTE — CASE MANAGEMENT
Case Management Assessment & Discharge Planning Note    Patient name Marleni Rolonney  Location /-40 MRN [id number]  : 1940 Date 8/10/2021       Current Admission Date: 2021  Current Admission Diagnosis:  Acute respiratory failure with hypoxia [facility])   Patient Active Problem List   Diagnosis    Essential hypertension    Atrial fibrillation (HCC)    RBBB    Moderate alcohol consumption    Localized edema    Acute on chronic combined systolic and diastolic congestive heart failure (HCC)    Chronic obstructive pulmonary disease with acute exacerbation (HCC)    Acute respiratory failure with hypoxia (HCC)    Cellulitis of left lower extremity    Frequent PVCs    Previous Admission - Discharge Date:    LOS (days): 1  Geometric Mean LOS (GMLOS) (days): 4 00  Days to GMLOS:2 9 Previous Discharge Diagnosis:  No discharge information exists for this patient  Risk of Unplanned Readmission Score  Predictive Model Details          14 (Low)  Factor Value    Calculated 8/10/2021 16:04 28% Number of active Rx orders 34    Risk of Unplanned Readmission Model 11% ECG/EKG order present in last 6 months     10% Latest BUN high (43 mg/dL)     9% Latest INR high (1 57)     8% Imaging order present in last 6 months     8% Age [de-identified]     7% Number of ED visits in last six months 1     6% Charlson Comorbidity Index 4     5% Active anticoagulant Rx order present     5% Active corticosteroid Rx order present     2% Future appointment scheduled     1% Current length of stay 1 025 days         BUNDLE:      Reason for Referral:    OBJECTIVE:  Pt is a [de-identified]y o  year old /[employer], white or  [1], male with Sabianism preference of None admitted on 2021  2:34 PM  Pt is admitted to [facility] at [address] with complaints of Acute respiratory failure with hypoxia ([facility] )     Current admission status: Inpatient       Preferred Pharmacy:   [facility], [address] [facility] [address]  Phone: [phone] Fax: [phone] [address] [id number] [facility],[address], [address]  [address] [address]  Phone: [phone] Fax: [phone]    Primary Care Provider: Darlene Chairez MD    Primary Insurance: MEDICARE  Secondary Insurance: MEGAN SOMMERS    ASSESSMENT:  Active Health Care Agents    There are no active Health Care Agents on file         Advance Directives  Does patient have a [address]?: Yes (pts wife is POA)  Does patient have Advance Directives?: Yes  Advance Directives: Living will, Power of  for health care, Power of  for finance                   Patient Information  Mental Status: Alert  During Assessment patient was accompanied by: Spouse  Assessment information provided by[de-identified] Patient  Primary Caregiver: Self  Support Systems: Spouse/significant other  Type of Current Residence: [facility]  Living Arrangements: Lives w/ Spouse/significant other  Is patient a ?: No    Activities of Daily Living Prior to Admission  Functional Status: Independent  Completes ADLs independently?: Yes  Ambulates independently?: Yes  Does patient use assisted devices?: No  Does patient currently own DME?: No  Does patient have a history of Outpatient Therapy (PT/OT)?: No  Does the patient have a history of Short-Term Rehab?: No  Does patient currently have [address]?: No         Patient Information Continued  Income Source: Pension/longterm  Does patient have prescription coverage?: Yes  Does patient receive dialysis treatments?: No  Does patient have a history of substance abuse?: No  Does patient have a history of Mental Health Diagnosis?: No         Means of Transportation  Means of Transport to Appts[de-identified] Drives Self  In the past 12 months, has lack of transportation kept you from medical appointments or from getting medications?: No  In the past 12 months, has lack of transportation kept you from meetings, work, or from getting things needed for daily living?: No  Was application for public transport provided?: No    DISCHARGE DETAILS:    Discharge planning discussed with[de-identified] pt and pts wife  Freedom of Choice: Yes    Contacts  Patient Contacts: wife, Leslee Weeks to Patient[de-identified] Family  Contact Method: In Person  Reason/Outcome: Discharge [address]         Is the patient interested in [facility] at discharge?: No    DME Referral Provided  Referral made for DME?: No         We would like to be able to fill any required prescriptions on discharge at our [address] [place] [address] and have them delivered to you at discharge in your room    Would you like to participate in this program? : No - Declined    Discharge Destination Plan[de-identified] Home

## 2021-08-10 NOTE — PROGRESS NOTES
Vancomycin IV Pharmacy-to-Dose Consultation    Joann Singh is a [de-identified] y o  male who is currently receiving Vancomycin IV with management by the Pharmacy Consult service  Assessment/Plan:  The patient was reviewed  Renal function is stable and no signs or symptoms of nephrotoxicity and/or infusion reactions were documented in the chart  Based on todays assessment, continue current vancomycin (day # 2) dosing of 750 mg IV q 12 hrs, with a plan for trough to be drawn at 1630 on 08/11/2021  We will continue to follow the patients culture results and clinical progress daily      Rebecca Geiger, Pharmacist

## 2021-08-10 NOTE — ASSESSMENT & PLAN NOTE
· Reviewed telemetry and patient with significant PVC burden  · Status post Holter monitor as an OP and per review was recommended to go to B for EP evaluation as an OP however patient did not want to travel all that way  · Continue telemetry  · Cardiology consulted; input as noted below  · Added toprol XL 50mg daily; ideally will attempt to titrate off of cardizem if able   · Increased IV lasix to 60mg TID   · Initiated K 20mEq PO BID  · Pending response to therapy as above may need EP evaluation IP vs  OP

## 2021-08-10 NOTE — ASSESSMENT & PLAN NOTE
· Continue Eliquis for [facility]   · Transitioning off of cardizem and onto BB per cardiology team

## 2021-08-11 LAB
BACTERIA WND AEROBE CULT: ABNORMAL
BUN SERPL-MCNC: 52 MG/DL (ref 5–25)
CALCIUM SERPL-MCNC: 9.1 MG/DL (ref 8.3–10.1)
CHLORIDE SERPL-SCNC: 101 MMOL/L (ref 100–108)
CO2 SERPL-SCNC: >45 MMOL/L (ref 21–32)
CREAT SERPL-MCNC: 1.2 MG/DL (ref 0.6–1.3)
GFR SERPL CREATININE-BSD FRML MDRD: 57 ML/MIN/1.73SQ M
GLUCOSE SERPL-MCNC: 215 MG/DL (ref 65–140)
GRAM STN SPEC: ABNORMAL
NT-PROBNP SERPL-MCNC: 7384 PG/ML
POTASSIUM SERPL-SCNC: 4.9 MMOL/L (ref 3.5–5.3)
SODIUM SERPL-SCNC: 147 MMOL/L (ref 136–145)
VANCOMYCIN TROUGH SERPL-MCNC: 11.6 UG/ML (ref 10–20)

## 2021-08-11 PROCEDURE — 99232 SBSQ HOSP IP/OBS MODERATE 35: CPT | Performed by: NURSE PRACTITIONER

## 2021-08-11 PROCEDURE — 94640 AIRWAY INHALATION TREATMENT: CPT

## 2021-08-11 PROCEDURE — 94760 N-INVAS EAR/PLS OXIMETRY 1: CPT

## 2021-08-11 PROCEDURE — 83880 ASSAY OF NATRIURETIC PEPTIDE: CPT | Performed by: PHYSICIAN ASSISTANT

## 2021-08-11 PROCEDURE — 80202 ASSAY OF VANCOMYCIN: CPT | Performed by: FAMILY MEDICINE

## 2021-08-11 PROCEDURE — 80048 BASIC METABOLIC PNL TOTAL CA: CPT | Performed by: PHYSICIAN ASSISTANT

## 2021-08-11 RX ORDER — LEVALBUTEROL 1.25 MG/.5ML
1.25 SOLUTION, CONCENTRATE RESPIRATORY (INHALATION)
Status: DISCONTINUED | OUTPATIENT
Start: 2021-08-11 | End: 2021-08-12

## 2021-08-11 RX ORDER — VANCOMYCIN HYDROCHLORIDE 1 G/200ML
1000 INJECTION, SOLUTION INTRAVENOUS EVERY 12 HOURS
Status: DISCONTINUED | OUTPATIENT
Start: 2021-08-12 | End: 2021-08-14

## 2021-08-11 RX ORDER — SODIUM CHLORIDE FOR INHALATION 0.9 %
3 VIAL, NEBULIZER (ML) INHALATION
Status: DISCONTINUED | OUTPATIENT
Start: 2021-08-11 | End: 2021-08-12

## 2021-08-11 RX ADMIN — POTASSIUM CHLORIDE 20 MEQ: 1500 TABLET, EXTENDED RELEASE ORAL at 17:34

## 2021-08-11 RX ADMIN — APIXABAN 5 MG: 5 TABLET, FILM COATED ORAL at 17:34

## 2021-08-11 RX ADMIN — METHYLPREDNISOLONE SODIUM SUCCINATE 40 MG: 40 INJECTION, POWDER, FOR SOLUTION INTRAMUSCULAR; INTRAVENOUS at 08:49

## 2021-08-11 RX ADMIN — TIOTROPIUM BROMIDE 18 MCG: 18 CAPSULE ORAL; RESPIRATORY (INHALATION) at 08:56

## 2021-08-11 RX ADMIN — VANCOMYCIN HYDROCHLORIDE 750 MG: 750 INJECTION, SOLUTION INTRAVENOUS at 17:36

## 2021-08-11 RX ADMIN — ISODIUM CHLORIDE 3 ML: 0.03 SOLUTION RESPIRATORY (INHALATION) at 19:52

## 2021-08-11 RX ADMIN — VANCOMYCIN HYDROCHLORIDE 750 MG: 750 INJECTION, SOLUTION INTRAVENOUS at 04:42

## 2021-08-11 RX ADMIN — LEVALBUTEROL HYDROCHLORIDE 1.25 MG: 1.25 SOLUTION, CONCENTRATE RESPIRATORY (INHALATION) at 13:10

## 2021-08-11 RX ADMIN — MONTELUKAST 10 MG: 10 TABLET, FILM COATED ORAL at 21:15

## 2021-08-11 RX ADMIN — DOCUSATE SODIUM 100 MG: 100 CAPSULE ORAL at 08:47

## 2021-08-11 RX ADMIN — FUROSEMIDE 60 MG: 10 INJECTION, SOLUTION INTRAMUSCULAR; INTRAVENOUS at 05:21

## 2021-08-11 RX ADMIN — FLUTICASONE FUROATE AND VILANTEROL TRIFENATATE 1 PUFF: 200; 25 POWDER RESPIRATORY (INHALATION) at 08:56

## 2021-08-11 RX ADMIN — FUROSEMIDE 60 MG: 10 INJECTION, SOLUTION INTRAMUSCULAR; INTRAVENOUS at 12:50

## 2021-08-11 RX ADMIN — Medication 400 MG: at 17:34

## 2021-08-11 RX ADMIN — DOCUSATE SODIUM 100 MG: 100 CAPSULE ORAL at 17:34

## 2021-08-11 RX ADMIN — FUROSEMIDE 60 MG: 10 INJECTION, SOLUTION INTRAMUSCULAR; INTRAVENOUS at 18:20

## 2021-08-11 RX ADMIN — ISODIUM CHLORIDE 3 ML: 0.03 SOLUTION RESPIRATORY (INHALATION) at 13:10

## 2021-08-11 RX ADMIN — LEVALBUTEROL HYDROCHLORIDE 1.25 MG: 1.25 SOLUTION, CONCENTRATE RESPIRATORY (INHALATION) at 19:52

## 2021-08-11 RX ADMIN — Medication 400 MG: at 08:48

## 2021-08-11 RX ADMIN — DILTIAZEM HYDROCHLORIDE 240 MG: 240 CAPSULE, COATED, EXTENDED RELEASE ORAL at 08:48

## 2021-08-11 RX ADMIN — ISODIUM CHLORIDE 3 ML: 0.03 SOLUTION RESPIRATORY (INHALATION) at 07:20

## 2021-08-11 RX ADMIN — APIXABAN 5 MG: 5 TABLET, FILM COATED ORAL at 08:48

## 2021-08-11 RX ADMIN — FINASTERIDE 5 MG: 5 TABLET, FILM COATED ORAL at 08:48

## 2021-08-11 RX ADMIN — POTASSIUM CHLORIDE 20 MEQ: 1500 TABLET, EXTENDED RELEASE ORAL at 08:47

## 2021-08-11 RX ADMIN — LEVALBUTEROL HYDROCHLORIDE 1.25 MG: 1.25 SOLUTION, CONCENTRATE RESPIRATORY (INHALATION) at 07:20

## 2021-08-11 RX ADMIN — METOPROLOL SUCCINATE 50 MG: 50 TABLET, EXTENDED RELEASE ORAL at 08:48

## 2021-08-11 NOTE — WOUND OSTOMY CARE
Consult Note - Wound   Elliot Cross [de-identified] y o  male MRN: [id number]  Unit/Bed#: -01 Encounter: [id number]      History and Present Illness:[de-identified]year old male admitted with CHF, COPD, frequent PVC's  Seen for initial wound consult  Hx of cancer posterior left tibia and scalp  Pt seen for initial wound consult  Lying supine in bed, alert oriented x4  Continent of bowel and bladder  Family present during assessment  Assessment Findings:   1) Left posterior tibia wound bed dark brown black dry  Periwound is pink edematous macerated  Wound is draining padmini blood on dressing that was placed yesterday  No active drainage while assessed  +3 pitting calf edema  2)Bilateral heels , sacrum intact        Skin care plans:  1-Hydraguard to bilateral sacrum, buttock and heels BID and PRN  2-Elevate heels to offload pressure  3-Ehob cushion in chair when out of bed  4-Moisturize skin daily with skin nourishing cream   5-Turn/reposition q2h or when medically stable for pressure re-distribution on skin  6-Cleanse posterior left tibia with NSS  Cover wound with Maxorb AG then Allevyn foam  Change daily and prn drainage  Wound 08/09/21 Head Left;Superior (Active)   Wound Image   08/11/21 0925   Wound Description Clean;Dry; Intact; Black; Brown;Eschar;Fragile 08/11/21 0925   Hali-wound Assessment Clean;Dry; Intact 08/11/21 0846   Wound Length (cm) 1 cm 08/11/21 0925   Wound Width (cm) 1 cm 08/11/21 0925   Wound Surface Area (cm^2) 1 cm^2 08/11/21 0925   Drainage Amount None 08/11/21 0925   Drainage Description Bloody 08/11/21 0846   Dressing Open to air 08/11/21 0925   Dressing Status Old drainage 08/11/21 0846       Wound 08/09/21 Tibial Left;Posterior (Active)   Wound Image   08/11/21 0921   Wound Description Black; [facility] 08/11/21 0921   Hali-wound Assessment Dry;Edema; Erythema; Excoriated; Maceration;Pink;Swelling 08/11/21 0921   Wound Length (cm) 4 cm 08/11/21 0921   Wound Width (cm) 1 5 cm 08/11/21 0921   Wound Surface Area (cm^2) 6 cm^2 08/11/21 0921   Drainage Amount Moderate 08/11/21 0921   Drainage Description Bloody 08/11/21 0921   Non-staged Wound Description Not applicable 48/51/20 7622   Treatments Cleansed;Irrigation with NSS;Elevated 08/11/21 9357   Dressing Calcium Alginate with Silver; Foam, Silicon (eg  Allevyn, etc) 08/11/21 0921   Patient Tolerance Tolerated well 08/11/21 0921   Dressing Status Clean;Dry; Intact; Old drainage 08/11/21 6214           Call or tigertext with any questions  Wound Care will continue to follow    Papa Conner RN

## 2021-08-11 NOTE — PROGRESS NOTES
Vancomycin Assessment    Servando Nageotte is a [de-identified] y o  male who is currently receiving vancomycin 750 mg iv q 12 hrs for skin-soft tissue infection     Relevant clinical data and objective history reviewed:  Creatinine   Date Value Ref Range Status   08/11/2021 1 20 0 60 - 1 30 mg/dL Final     Comment:     Standardized to IDMS reference method   08/09/2021 1 16 0 60 - 1 30 mg/dL Final     Comment:     Standardized to IDMS reference method   07/30/2021 1 10 0 60 - 1 30 mg/dL Final     Comment:     Standardized to IDMS reference method     /71   Pulse (!) 49   Temp 97 7 °F (36 5 °C)   Resp 18   Ht 5' 9" (1 753 m)   Wt 80 6 kg (177 lb 9 6 oz)   SpO2 (!) 88%   BMI 26 23 kg/m²   I/O last 3 completed shifts: In: 900 [P O :600; IV Piggyback:300]  Out: 5364 [Urine:2475]  Lab Results   Component Value Date/Time    BUN 52 (H) 08/11/2021 01:43 PM    WBC 6 41 08/10/2021 06:00 AM    HGB 14 9 08/10/2021 06:00 AM    HCT 48 4 08/10/2021 06:00 AM     (H) 08/10/2021 06:00 AM     08/10/2021 06:00 AM     Temp Readings from Last 3 Encounters:   08/11/21 97 7 °F (36 5 °C)   06/17/21 (!) 96 7 °F (35 9 °C)   03/18/21 (!) 97 3 °F (36 3 °C)     Vancomycin Days of Therapy: 3    Assessment/Plan  The patient is currently on vancomycin utilizing scheduled dosing  Baseline risks associated with therapy include: concomitant nephrotoxic medications and advanced age  The patient is receiving 750 mg iv q 12 hrs with the most recent vancomycin level being at steady-state and sub-therapeutic based on a goal of 15-20 (appropriate for most indications) ; therefore, after clinical evaluation will be changed to 1000 mg iv q 12 hrs   Pharmacy will continue to follow closely for s/sx of nephrotoxicity, infusion reactions, and appropriateness of therapy  BMP and CBC will be ordered per protocol  Plan for trough as patient approaches steady state, prior to the 4th  dose at approximately 1430 on 8/13/21   Pharmacy will continue to follow the patients culture results and clinical progress daily      Ruth Ann Estevez, Pharmacist

## 2021-08-11 NOTE — DISCHARGE INSTR - OTHER ORDERS
Skin care plans:  1-Hydraguard to bilateral sacrum, buttock and heels BID and PRN  2-Elevate heels to offload pressure  3-Ehob cushion in chair when out of bed  4-Moisturize skin daily with skin nourishing cream   5-Turn/reposition q2h or when medically stable for pressure re-distribution on skin  6-Cleanse posterior right tibia with NSS  Cover wound with Maxorb AG then Allevyn foam  Change daily and prn drainage

## 2021-08-11 NOTE — ASSESSMENT & PLAN NOTE
Wt Readings from Last 3 Encounters:   08/11/21 80 6 kg (177 lb 9 6 oz)   08/09/21 82 1 kg (181 lb)   06/17/21 82 1 kg (181 lb)     · As evidence by HOFFMANN, respiratory failure, weight gain, edema  · As per last echocardiogram EF was 45-50%  · Managed as an OP on 60 mg of Lasix PO Daily   · Follows OP with Dr Teagan Pires   · IV diuresis per Cardiology team; increased on 8/10 to 60mg IV TID; continue  · Monitor intake and output and daily weights   · Cardiac 2g na/fluid restriction   · Monitor volume status closely

## 2021-08-11 NOTE — ASSESSMENT & PLAN NOTE
· Continue Eliquis for [facility]   · Transitioning off of cardizem and onto BB per cardiology team

## 2021-08-11 NOTE — RESPIRATORY THERAPY NOTE
RT Protocol Note   Loveless [de-identified] y o  male MRN: [id number]  Unit/Bed#: -01 Encounter: [id number]    Assessment    Principal Problem:    Acute respiratory failure with hypoxia ([facility] )  Active Problems:    Atrial fibrillation (HCC)    Acute on chronic combined systolic and diastolic congestive heart failure (HCC)    Chronic obstructive pulmonary disease with acute exacerbation (HCC)    Cellulitis of left lower extremity    Frequent PVCs      Home Pulmonary Medications:  Albuterol as needed for shortness of breath       Past Medical History:   Diagnosis Date    BPH (benign prostatic hyperplasia)     Chronic obstructive pulmonary disease (COPD) ([facility] )     Essential hypertension     Hard of hearing     Pt does wear hearing aids    Hypertension     Shortness of breath     Pt states not changes and it occurs with moderate exertion    Sleep disturbance     Pt states he is only sleeping about 3 hours a night    Pt is seeing his PCP on 8/5/21 to discuss    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    Tinnitus      Social History     Socioeconomic History    Marital status: /Civil Union     Spouse name: None    Number of children: None    Years of education: None    Highest education level: None   Occupational History    Occupation: Retired   Tobacco Use    Smoking status: Light Tobacco Smoker     Types: Cigars    Smokeless tobacco: Never Used   Vaping Use    Vaping Use: Never used   Substance and Sexual Activity    Alcohol use: Yes     Comment: moderate consumption    Drug use: Not Currently    Sexual activity: Yes   Other Topics Concern    None   Social History Narrative    None     Social Determinants of Health     Financial Resource Strain:     Difficulty of Paying Living Expenses:    Food Insecurity:     Worried About Running Out of Food in the Last Year:     Ran Out of Food in the Last Year:    Transportation Needs: No Transportation Needs    Lack of Transportation (Medical): No    Lack of Transportation (Non-Medical): No   Physical Activity:     Days of Exercise per Week:     Minutes of Exercise per Session:    Stress:     Feeling of Stress :    Social Connections:     Frequency of Communication with Friends and Family:     Frequency of Social Gatherings with Friends and Family:     Attends [employer] Services:     Active Member of Clubs or Organizations:     Attends Club or Organization Meetings:     Marital Status:    Intimate Partner Violence:     Fear of Current or Ex-Partner:     Emotionally Abused:     Physically Abused:     Sexually Abused:        Subjective         Objective    Physical Exam:   Assessment Type: During-treatment  General Appearance: Alert, Awake  Respiratory Pattern: Dyspnea with exertion  Chest Assessment: Chest expansion symmetrical  Bilateral Breath Sounds: Diminished, Expiratory wheezes, Inspiratory wheezes  Cough: Non-productive  O2 Device: 3L nasal cannula    Vitals:  Blood pressure 130/71, pulse (!) 51, temperature (!) 97 4 °F (36 3 °C), resp  rate 18, height 5' 9" (1 753 m), weight 80 6 kg (177 lb 9 6 oz), SpO2 92 %  Imaging and other studies: The lungs are clear    No pneumothorax or pleural effusion    O2 Device: 3L nasal cannula     Plan    Respiratory Plan: Mild Distress pathway        Resp Comments: no dyspnea on 3L

## 2021-08-11 NOTE — PROGRESS NOTES
[address]  Progress Note - Martha Sandoval 1940, [de-identified] y o  male MRN: [id number]  Unit/Bed#: -01 Encounter: [id number]  Primary Care Provider: Anand George MD   Date and time admitted to hospital: 8/9/2021  2:34 PM    * Acute respiratory failure with hypoxia [facility])  Assessment & Plan  · Noted to be saturating 74-77% on room air, requiring 4 L of oxygen  · Most likely secondary to CHF as well as COPD exacerbation  · Continue treatment for CHF and COPD  · Respiratory protocol  · Maintain saturation more than 90%  · See further treatment as outlined below   · Pending progress may require home O2 evaluation     Acute on chronic combined systolic and diastolic congestive heart failure (HCC)  Assessment & Plan  Wt Readings from Last 3 Encounters:   08/11/21 80 6 kg (177 lb 9 6 oz)   08/09/21 82 1 kg (181 lb)   06/17/21 82 1 kg (181 lb)     · As evidence by HOFFMANN, respiratory failure, weight gain, edema  · As per last echocardiogram EF was 45-50%  · Managed as an OP on 60 mg of Lasix PO Daily   · Follows OP with Dr Alma Koo   · IV diuresis per Cardiology team; increased on 8/10 to 60mg IV TID; continue  · Monitor intake and output and daily weights   · Cardiac 2g na/fluid restriction   · Monitor volume status closely         Frequent PVCs  Assessment & Plan  · Reviewed telemetry and patient with significant PVC burden  · Status post Holter monitor as an OP and per review was recommended to go to [facility] for EP evaluation as an OP however patient did not want to travel all that way  · Continue telemetry  · Cardiology consulted; input appreciated; continue regimen on 8/11  · Added toprol XL 50mg daily on 8/10; ideally will attempt to titrate off of cardizem if able   · Increased IV lasix to 60mg TID on 8/10  · Initiated K 20mEq PO BID 8/10  · Recommend ischemic evaluation at some point to evaluate cause for PVCs  · Pending response to therapy as above may need EP evaluation IP vs  OP Atrial fibrillation ([facility] )  Assessment & Plan  · Continue Eliquis for [facility]   · Transitioning off of cardizem and onto BB per cardiology team     Chronic obstructive pulmonary disease with acute exacerbation ([address] )  Assessment & Plan  · Most likely multifactorial  · Continue steroid; taper ordered on 8/10  · Respiratory protocol   · Wean oxygen as tolerated     Cellulitis of left lower extremity  Assessment & Plan  · As documented in the image section  · Wound care consultation  · Wound culture reviewed  · Initially received IV Vancomycin; Continue IV Ancef and monitor closely         VTE Pharmacologic Prophylaxis: VTE Score: 10 High Risk (Score >/= 5) - Pharmacological DVT Prophylaxis Ordered: apixaban (Eliquis)  Sequential Compression Devices Ordered  Patient Centered Rounds: I performed bedside rounds with nursing staff today  Discussions with Specialists or Other Care Team Provider: nursing staff and case management     Education and Discussions with Family / Patient: Updated  (wife and daughter) at bedside  Time Spent for Care: 30 minutes  More than 50% of total time spent on counseling and coordination of care as described above  Current Length of Stay: 2 day(s)  Current Patient Status: Inpatient   Certification Statement: The patient will continue to require additional inpatient hospital stay due to pending response to diuresis  Discharge Plan: Anticipate discharge in 48-72 hrs to pending response to diuresis and cards clearance     Code Status: Level 1 - Full Code    Subjective:   Patient denies any SOB, CP, arm pain, or neck pain  Objective:     Vitals:   Temp (24hrs), Av 7 °F (36 5 °C), Min:97 4 °F (36 3 °C), Max:98 2 °F (36 8 °C)    Temp:  [97 4 °F (36 3 °C)-98 2 °F (36 8 °C)] 97 5 °F (36 4 °C)  HR:  [] 110  Resp:  [17-22] 20  BP: (113-135)/(65-85) 113/85  SpO2:  [82 %-96 %] 95 %  Body mass index is 26 23 kg/m²       Input and Output Summary (last 24 hours): Intake/Output Summary (Last 24 hours) at 8/11/2021 1155  Last data filed at 8/11/2021 0959  Gross per 24 hour   Intake 930 ml   Output 2550 ml   Net -1620 ml       Physical Exam:   Physical Exam  Vitals and nursing note reviewed  Constitutional:       Appearance: He is well-developed  He is not ill-appearing or diaphoretic  HENT:      Head: Normocephalic and atraumatic  Eyes:      Conjunctiva/sclera: Conjunctivae normal    Cardiovascular:      Rate and Rhythm: Normal rate and regular rhythm  Heart sounds: No murmur heard  Pulmonary:      Effort: Pulmonary effort is normal  No respiratory distress  Breath sounds: Wheezing and rales present  Abdominal:      General: Abdomen is flat  Bowel sounds are normal  There is no distension  Palpations: Abdomen is soft  Tenderness: There is no abdominal tenderness  Musculoskeletal:         General: Swelling present  Cervical back: Neck supple  Right lower leg: Edema present  Left lower leg: Edema present  Skin:     General: Skin is warm and dry  Capillary Refill: Capillary refill takes less than 2 seconds  Neurological:      Mental Status: He is alert and oriented to person, place, and time  Mental status is at baseline     Psychiatric:         Mood and Affect: Mood normal          Behavior: Behavior normal          Judgment: Judgment normal          Additional Data:     Labs:  Results from last 7 days   Lab Units 08/10/21  0600   WBC Thousand/uL 6 41   HEMOGLOBIN g/dL 14 9   HEMATOCRIT % 48 4   PLATELETS Thousands/uL 233   NEUTROS PCT % 91*   LYMPHS PCT % 3*   MONOS PCT % 5   EOS PCT % 0     Results from last 7 days   Lab Units 08/09/21  1445   SODIUM mmol/L 145   POTASSIUM mmol/L 4 6   CHLORIDE mmol/L 100   CO2 mmol/L 41*   BUN mg/dL 43*   CREATININE mg/dL 1 16   ANION GAP mmol/L 4   CALCIUM mg/dL 9 3   ALBUMIN g/dL 3 7   TOTAL BILIRUBIN mg/dL 0 96   ALK PHOS U/L 102   ALT U/L 46   AST U/L 33   GLUCOSE RANDOM mg/dL 169*     Results from last 7 days   Lab Units 08/09/21  1530   INR  1 57*             Results from last 7 days   Lab Units 08/09/21  1445   LACTIC ACID mmol/L 2 0       Lines/Drains:  Invasive Devices     Peripheral Intravenous Line            Peripheral IV 08/10/21 Left Forearm <1 day                  Telemetry:  Telemetry Orders (From admission, onward)             48 Hour Telemetry Monitoring  Continuous x 48 hours     Expiring   Question:  Reason for 48 Hour Telemetry  Answer:  Acute Decompensated CHF (continuous diuretic infusion or total diuretic dose > 200 mg daily, associated electrolyte derangement, ionotropic drip, history of ventricular arrhythmia, or new EF <35%)                 Telemetry Reviewed: PVCs  Indication for Continued Telemetry Use: Arrthymias requiring medical therapy           Imaging: No pertinent imaging reviewed  Recent Cultures (last 7 days):   Results from last 7 days   Lab Units 08/09/21  1753 08/09/21  1720   BLOOD CULTURE   --  No Growth at 24 hrs  No Growth at 24 hrs     GRAM STAIN RESULT  Rare Polys*  4+ Gram positive rods*  1+ Gram positive cocci in pairs, chains and clusters*  --    WOUND CULTURE  4+ Growth of   --        Last 24 Hours Medication List:   Current Facility-Administered Medications   Medication Dose Route Frequency Provider Last Rate    acetaminophen  650 mg Oral Q4H PRN Arturo Cristina MD      apixaban  5 mg Oral BID Arturo Cristina MD      diltiazem  240 mg Oral Daily Malcolm Martinez MD      docusate sodium  100 mg Oral BID Arturo Cristina MD      finasteride  5 mg Oral Daily Arturo Cristina MD      fluticasone-vilanterol  1 puff Inhalation Daily Arturo Cristina MD      furosemide  60 mg Intravenous TID (diuretic) Marianne Ansari PA-C      levalbuterol  1 25 mg Nebulization TID Arturo Cristina MD      And    sodium chloride  3 mL Nebulization TID Arturo Cristina MD      magnesium oxide  400 mg Oral BID Arturo Cristina MD  metoprolol  5 mg Intravenous Q6H PRN Rbeekah Doll MD      metoprolol succinate  50 mg Oral Daily [place] JULIA Ansari      montelukast  10 mg Oral HS Rebekah Doll MD      nicotine  1 patch Transdermal Daily Rebekah Doll MD      potassium chloride  20 mEq Oral BID [place], [place]      [START ON 8/12/2021] predniSONE  40 mg Oral Daily KAN Uriostegui      Followed by   Natasha Lowe ON 8/15/2021] predniSONE  30 mg Oral Daily KAN Uriostegui      Followed by   Natasha Lowe ON 8/18/2021] predniSONE  20 mg Oral Daily KAN Uriostegui      Followed by   Natasha Lowe ON 8/21/2021] predniSONE  10 mg Oral Daily KAN Uriostegui      tiotropium  18 mcg Inhalation Daily Rebekah Doll MD      vancomycin  10 mg/kg Intravenous Q12H Rebekah Doll  mg (08/11/21 0442)        Today, Patient Was Seen By: KAN Yee    **Please Note: This note may have been constructed using a voice recognition system  **

## 2021-08-11 NOTE — PLAN OF CARE
Problem: Potential for Falls  Goal: Patient will remain free of falls  Description: INTERVENTIONS:  - Educate patient/family on patient safety including physical limitations  - Instruct patient to call for assistance with activity   - Consult OT/PT to assist with strengthening/mobility   - Keep Call bell within reach  - Keep bed low and locked with side rails adjusted as appropriate  - Keep care items and personal belongings within reach  - Initiate and maintain comfort rounds  - Make Fall Risk Sign visible to staff  - Offer Toileting every 2 Hours, in advance of need  - Initiate/Maintain bed alarm  - Obtain necessary fall risk management equipment: yellow bracelet/socks  - Apply yellow socks and bracelet for high fall risk patients  - Consider moving patient to room near nurses station  Outcome: Progressing     Problem: MOBILITY - ADULT  Goal: Maintain or return to baseline ADL function  Description: INTERVENTIONS:  -  Assess patient's ability to carry out ADLs; assess patient's baseline for ADL function and identify physical deficits which impact ability to perform ADLs (bathing, care of mouth/teeth, toileting, grooming, dressing, etc )  - Assess/evaluate cause of self-care deficits   - Assess range of motion  - Assess patient's mobility; develop plan if impaired  - Assess patient's need for assistive devices and provide as appropriate  - Encourage maximum independence but intervene and supervise when necessary  - Involve family in performance of ADLs  - Assess for home care needs following discharge   - Consider OT consult to assist with ADL evaluation and planning for discharge  - Provide patient education as appropriate  Outcome: Progressing  Goal: Maintains/Returns to pre admission functional level  Description: INTERVENTIONS:  - Perform BMAT or MOVE assessment daily    - Set and communicate daily mobility goal to care team and patient/family/caregiver     - Collaborate with rehabilitation services on mobility goals if consulted  - Perform Range of Motion 4 times a day  - Reposition patient every 2 hours    - Dangle patient 4 times a day  - Stand patient 4 times a day  - Ambulate patient 4 times a day  - Out of bed to chair 3 times a day   - Out of bed for meals 3 times a day  - Out of bed for toileting  - Record patient progress and toleration of activity level   Outcome: Progressing     Problem: CARDIOVASCULAR - ADULT  Goal: Maintains optimal cardiac output and hemodynamic stability  Description: INTERVENTIONS:  - Monitor I/O, vital signs and rhythm  - Monitor for S/S and trends of decreased cardiac output  - Administer and titrate ordered vasoactive medications to optimize hemodynamic stability  - Assess quality of pulses, skin color and temperature  - Assess for signs of decreased coronary artery perfusion  - Instruct patient to report change in severity of symptoms  Outcome: Progressing  Goal: Absence of cardiac dysrhythmias or at baseline rhythm  Description: INTERVENTIONS:  - Continuous cardiac monitoring, vital signs, obtain 12 lead EKG if ordered  - Administer antiarrhythmic and heart rate control medications as ordered  - Monitor electrolytes and administer replacement therapy as ordered  Outcome: Progressing     Problem: RESPIRATORY - ADULT  Goal: Achieves optimal ventilation and oxygenation  Description: INTERVENTIONS:  - Assess for changes in respiratory status  - Assess for changes in mentation and behavior  - Position to facilitate oxygenation and minimize respiratory effort  - Oxygen administered by appropriate delivery if ordered  - Initiate smoking cessation education as indicated  - Encourage broncho-pulmonary hygiene including cough, deep breathe, Incentive Spirometry  - Assess the need for suctioning and aspirate as needed  - Assess and instruct to report SOB or any respiratory difficulty  - Respiratory Therapy support as indicated  Outcome: Progressing     Problem: METABOLIC, FLUID AND ELECTROLYTES - ADULT  Goal: Electrolytes maintained within normal limits  Description: INTERVENTIONS:  - Monitor labs and assess patient for signs and symptoms of electrolyte imbalances  - Administer electrolyte replacement as ordered  - Monitor response to electrolyte replacements, including repeat lab results as appropriate  - Instruct patient on fluid and nutrition as appropriate  Outcome: Progressing  Goal: Fluid balance maintained  Description: INTERVENTIONS:  - Monitor labs   - Monitor I/O and WT  - Instruct patient on fluid and nutrition as appropriate  - Assess for signs & symptoms of volume excess or deficit  Outcome: Progressing  Goal: Glucose maintained within target range  Description: INTERVENTIONS:  - Monitor Blood Glucose as ordered  - Assess for signs and symptoms of hyperglycemia and hypoglycemia  - Administer ordered medications to maintain glucose within target range  - Assess nutritional intake and initiate nutrition service referral as needed  Outcome: Progressing

## 2021-08-11 NOTE — PROGRESS NOTES
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 (per primary cardiologist avoiding BB for COPD)  COPD  ETOH use  HTN  Hx of smoking    Plan:  1  Recommend no change in regimen at this time  --> monitor I and O, daily standing weights, monitor renal function and electrolytes  2  Fu with EP regarding PVC burden  3  Recommend ischemic evaluation at some point to evaluate cause for PVCs  4  Repeat BMP/BNP now to determine if able to continue diuretic     Subjective/Objective     Subjective: pt reports urinating a lot  He has no cp or sob  His edema is improved  He has no palpitations currently  He would like to leave  Objective: started IV lasix yesterday  He was negative 1 3 L yesterday  BB started for high PVC burden  On tele continues to be in bigeminy     Vitals: /85 (BP Location: Right arm)   Pulse (!) 110   Temp 97 5 °F (36 4 °C)   Resp 20   Ht 5' 9" (1 753 m)   Wt 80 6 kg (177 lb 9 6 oz)   SpO2 95%   BMI 26 23 kg/m²   Vitals:    08/10/21 0600 08/11/21 0559   Weight: 82 1 kg (181 lb) 80 6 kg (177 lb 9 6 oz)     Orthostatic Blood Pressures      Most Recent Value   Blood Pressure  113/85 filed at 08/11/2021 0835   Patient Position - Orthostatic VS  Sitting filed at 08/11/2021 0835            Intake/Output Summary (Last 24 hours) at 8/11/2021 1101  Last data filed at 8/11/2021 0959  Gross per 24 hour   Intake 930 ml   Output 2550 ml   Net -1620 ml       Invasive Devices     Peripheral Intravenous Line            Peripheral IV 08/10/21 Left Forearm <1 day                    Physical Exam  Vitals reviewed  Constitutional:       Appearance: Normal appearance  HENT:      Head: Normocephalic and atraumatic     Neck:      Comments: - JVD  Cardiovascular:      Rate and Rhythm: Rhythm irregular  Heart sounds: No murmur heard  No gallop  Pulmonary:      Effort: Pulmonary effort is normal       Comments: + wheezing  Abdominal:      Palpations: Abdomen is soft  Musculoskeletal:         General: Swelling present  Cervical back: Neck supple  Skin:     General: Skin is warm and dry  Capillary Refill: Capillary refill takes less than 2 seconds  Neurological:      General: No focal deficit present  Mental Status: He is alert and oriented to person, place, and time  Psychiatric:         Mood and Affect: Mood normal          Thought Content: Thought content normal      Lab Results:   I have personally reviewed pertinent lab results  CBC with diff:   Results from last 7 days   Lab Units 08/10/21  0600   WBC Thousand/uL 6 41   RBC Million/uL 4 47   HEMOGLOBIN g/dL 14 9   HEMATOCRIT % 48 4   MCV fL 108*   MCH pg 33 3   MCHC g/dL 30 8*   RDW % 15 2*   MPV fL 9 4   PLATELETS Thousands/uL 233     CMP:   Results from last 7 days   Lab Units 08/09/21  1445   SODIUM mmol/L 145   POTASSIUM mmol/L 4 6   CHLORIDE mmol/L 100   CO2 mmol/L 41*   BUN mg/dL 43*   CREATININE mg/dL 1 16   CALCIUM mg/dL 9 3   AST U/L 33   ALT U/L 46   ALK PHOS U/L 102   EGFR ml/min/1 73sq m 59     Troponin:   0   Lab Value Date/Time    TROPONINI 0 02 08/09/2021 1445     BNP:   Results from last 7 days   Lab Units 08/09/21  1445   POTASSIUM mmol/L 4 6   CHLORIDE mmol/L 100   CO2 mmol/L 41*   BUN mg/dL 43*   CREATININE mg/dL 1 16   CALCIUM mg/dL 9 3   EGFR ml/min/1 73sq m 59     Coags:   Results from last 7 days   Lab Units 08/09/21  1530   PTT seconds 36   INR  1 57*     TSH:   Results from last 7 days   Lab Units 08/09/21  1445   TSH 3RD GENERATON uIU/mL 0 767     Magnesium:   Results from last 7 days   Lab Units 08/09/21  1445   MAGNESIUM mg/dL 2 3     Imaging: I have personally reviewed pertinent reports      EKG:   sinus Rhythm with  Premature ventricular complexes in a pattern of bigeminy  Right bundle branch block  When compared with ECG of 09-AUG-2021 14:34,  No significant change was found  Confirmed by Bean Sams ([id number]) on 8/10/2021 10:33:58 AM    Telemetry shows sinus with bigeminy

## 2021-08-11 NOTE — ASSESSMENT & PLAN NOTE
· Reviewed telemetry and patient with significant PVC burden  · Status post Holter monitor as an OP and per review was recommended to go to [facility] for EP evaluation as an OP however patient did not want to travel all that way  · Continue telemetry  · Cardiology consulted; input appreciated; continue regimen on 8/11  · Added toprol XL 50mg daily on 8/10; ideally will attempt to titrate off of cardizem if able   · Increased IV lasix to 60mg TID on 8/10  · Initiated K 20mEq PO BID 8/10  · Recommend ischemic evaluation at some point to evaluate cause for PVCs  · Pending response to therapy as above may need EP evaluation IP vs  OP

## 2021-08-11 NOTE — ASSESSMENT & PLAN NOTE
· Most likely multifactorial  · Continue steroid; taper ordered on 8/10  · Respiratory protocol   · Wean oxygen as tolerated

## 2021-08-11 NOTE — ASSESSMENT & PLAN NOTE
· As documented in the image section  · Wound care consultation  · Wound culture reviewed  · Initially received IV Vancomycin; Continue IV Ancef and monitor closely

## 2021-08-12 PROBLEM — J96.21 ACUTE ON CHRONIC RESPIRATORY FAILURE WITH HYPOXIA AND HYPERCAPNIA (HCC): Status: ACTIVE | Noted: 2021-08-09

## 2021-08-12 PROBLEM — J96.22 ACUTE ON CHRONIC RESPIRATORY FAILURE WITH HYPOXIA AND HYPERCAPNIA (HCC): Status: ACTIVE | Noted: 2021-08-09

## 2021-08-12 LAB
ARTERIAL PATENCY WRIST A: YES
BASE EXCESS BLDA CALC-SCNC: 15.6 MMOL/L
BASOPHILS # BLD AUTO: 0 THOUSANDS/ΜL (ref 0–0.1)
BASOPHILS NFR BLD AUTO: 0 % (ref 0–1)
BUN SERPL-MCNC: 53 MG/DL (ref 5–25)
CALCIUM SERPL-MCNC: 9.5 MG/DL (ref 8.3–10.1)
CHLORIDE SERPL-SCNC: 101 MMOL/L (ref 100–108)
CO2 SERPL-SCNC: >45 MMOL/L (ref 21–32)
CREAT SERPL-MCNC: 1.2 MG/DL (ref 0.6–1.3)
EOSINOPHIL # BLD AUTO: 0 THOUSAND/ΜL (ref 0–0.61)
EOSINOPHIL NFR BLD AUTO: 0 % (ref 0–6)
ERYTHROCYTE [DISTWIDTH] IN BLOOD BY AUTOMATED COUNT: 15 % (ref 11.6–15.1)
GFR SERPL CREATININE-BSD FRML MDRD: 57 ML/MIN/1.73SQ M
GLUCOSE SERPL-MCNC: 130 MG/DL (ref 65–140)
HCO3 BLDA-SCNC: 46.8 MMOL/L (ref 22–28)
HCT VFR BLD AUTO: 52.9 % (ref 36.5–49.3)
HGB BLD-MCNC: 16.4 G/DL (ref 12–17)
IMM GRANULOCYTES # BLD AUTO: 0.04 THOUSAND/UL (ref 0–0.2)
IMM GRANULOCYTES NFR BLD AUTO: 0 % (ref 0–2)
LYMPHOCYTES # BLD AUTO: 0.71 THOUSANDS/ΜL (ref 0.6–4.47)
LYMPHOCYTES NFR BLD AUTO: 8 % (ref 14–44)
MCH RBC QN AUTO: 34.7 PG (ref 26.8–34.3)
MCHC RBC AUTO-ENTMCNC: 31 G/DL (ref 31.4–37.4)
MCV RBC AUTO: 112 FL (ref 82–98)
MONOCYTES # BLD AUTO: 1.08 THOUSAND/ΜL (ref 0.17–1.22)
MONOCYTES NFR BLD AUTO: 12 % (ref 4–12)
NASAL CANNULA: 4
NEUTROPHILS # BLD AUTO: 7.26 THOUSANDS/ΜL (ref 1.85–7.62)
NEUTS SEG NFR BLD AUTO: 80 % (ref 43–75)
NRBC BLD AUTO-RTO: 0 /100 WBCS
O2 CT BLDA-SCNC: 19.5 ML/DL (ref 16–23)
OXYHGB MFR BLDA: 92.9 % (ref 94–97)
PCO2 BLDA: 93 MM HG (ref 36–44)
PH BLDA: 7.32 [PH] (ref 7.35–7.45)
PLATELET # BLD AUTO: 224 THOUSANDS/UL (ref 149–390)
PMV BLD AUTO: 9.8 FL (ref 8.9–12.7)
PO2 BLDA: 77.6 MM HG (ref 75–129)
POTASSIUM SERPL-SCNC: 5 MMOL/L (ref 3.5–5.3)
RBC # BLD AUTO: 4.72 MILLION/UL (ref 3.88–5.62)
SODIUM SERPL-SCNC: 147 MMOL/L (ref 136–145)
SPECIMEN SOURCE: ABNORMAL
WBC # BLD AUTO: 9.09 THOUSAND/UL (ref 4.31–10.16)

## 2021-08-12 PROCEDURE — 85025 COMPLETE CBC W/AUTO DIFF WBC: CPT | Performed by: NURSE PRACTITIONER

## 2021-08-12 PROCEDURE — 80048 BASIC METABOLIC PNL TOTAL CA: CPT | Performed by: NURSE PRACTITIONER

## 2021-08-12 PROCEDURE — 94640 AIRWAY INHALATION TREATMENT: CPT

## 2021-08-12 PROCEDURE — 82805 BLOOD GASES W/O2 SATURATION: CPT | Performed by: PHYSICIAN ASSISTANT

## 2021-08-12 PROCEDURE — 36600 WITHDRAWAL OF ARTERIAL BLOOD: CPT

## 2021-08-12 PROCEDURE — 94002 VENT MGMT INPAT INIT DAY: CPT

## 2021-08-12 PROCEDURE — 97163 PT EVAL HIGH COMPLEX 45 MIN: CPT

## 2021-08-12 PROCEDURE — 94760 N-INVAS EAR/PLS OXIMETRY 1: CPT

## 2021-08-12 PROCEDURE — 99232 SBSQ HOSP IP/OBS MODERATE 35: CPT | Performed by: PHYSICIAN ASSISTANT

## 2021-08-12 RX ORDER — SODIUM CHLORIDE FOR INHALATION 0.9 %
3 VIAL, NEBULIZER (ML) INHALATION
Status: DISCONTINUED | OUTPATIENT
Start: 2021-08-12 | End: 2021-08-16

## 2021-08-12 RX ORDER — LORAZEPAM 2 MG/ML
0.5 INJECTION INTRAMUSCULAR ONCE
Status: COMPLETED | OUTPATIENT
Start: 2021-08-12 | End: 2021-08-12

## 2021-08-12 RX ORDER — LEVALBUTEROL 1.25 MG/.5ML
1.25 SOLUTION, CONCENTRATE RESPIRATORY (INHALATION)
Status: DISCONTINUED | OUTPATIENT
Start: 2021-08-12 | End: 2021-08-18 | Stop reason: HOSPADM

## 2021-08-12 RX ADMIN — APIXABAN 5 MG: 5 TABLET, FILM COATED ORAL at 17:18

## 2021-08-12 RX ADMIN — FUROSEMIDE 60 MG: 10 INJECTION, SOLUTION INTRAMUSCULAR; INTRAVENOUS at 05:53

## 2021-08-12 RX ADMIN — POTASSIUM CHLORIDE 20 MEQ: 1500 TABLET, EXTENDED RELEASE ORAL at 10:18

## 2021-08-12 RX ADMIN — DOCUSATE SODIUM 100 MG: 100 CAPSULE ORAL at 17:18

## 2021-08-12 RX ADMIN — LEVALBUTEROL HYDROCHLORIDE 1.25 MG: 1.25 SOLUTION, CONCENTRATE RESPIRATORY (INHALATION) at 20:04

## 2021-08-12 RX ADMIN — FINASTERIDE 5 MG: 5 TABLET, FILM COATED ORAL at 10:18

## 2021-08-12 RX ADMIN — APIXABAN 5 MG: 5 TABLET, FILM COATED ORAL at 10:18

## 2021-08-12 RX ADMIN — LORAZEPAM 0.5 MG: 2 INJECTION INTRAMUSCULAR; INTRAVENOUS at 19:12

## 2021-08-12 RX ADMIN — POTASSIUM CHLORIDE 20 MEQ: 1500 TABLET, EXTENDED RELEASE ORAL at 17:18

## 2021-08-12 RX ADMIN — DOCUSATE SODIUM 100 MG: 100 CAPSULE ORAL at 10:18

## 2021-08-12 RX ADMIN — PREDNISONE 40 MG: 20 TABLET ORAL at 10:18

## 2021-08-12 RX ADMIN — ISODIUM CHLORIDE 3 ML: 0.03 SOLUTION RESPIRATORY (INHALATION) at 13:22

## 2021-08-12 RX ADMIN — METOPROLOL SUCCINATE 50 MG: 50 TABLET, EXTENDED RELEASE ORAL at 10:18

## 2021-08-12 RX ADMIN — LEVALBUTEROL HYDROCHLORIDE 1.25 MG: 1.25 SOLUTION, CONCENTRATE RESPIRATORY (INHALATION) at 07:14

## 2021-08-12 RX ADMIN — Medication 400 MG: at 10:18

## 2021-08-12 RX ADMIN — ISODIUM CHLORIDE 3 ML: 0.03 SOLUTION RESPIRATORY (INHALATION) at 07:14

## 2021-08-12 RX ADMIN — FLUTICASONE FUROATE AND VILANTEROL TRIFENATATE 1 PUFF: 200; 25 POWDER RESPIRATORY (INHALATION) at 10:23

## 2021-08-12 RX ADMIN — LEVALBUTEROL HYDROCHLORIDE 1.25 MG: 1.25 SOLUTION, CONCENTRATE RESPIRATORY (INHALATION) at 13:22

## 2021-08-12 RX ADMIN — DILTIAZEM HYDROCHLORIDE 240 MG: 240 CAPSULE, COATED, EXTENDED RELEASE ORAL at 10:18

## 2021-08-12 RX ADMIN — MONTELUKAST 10 MG: 10 TABLET, FILM COATED ORAL at 22:06

## 2021-08-12 RX ADMIN — VANCOMYCIN HYDROCHLORIDE 1000 MG: 1 INJECTION, SOLUTION INTRAVENOUS at 14:47

## 2021-08-12 RX ADMIN — ISODIUM CHLORIDE 3 ML: 0.03 SOLUTION RESPIRATORY (INHALATION) at 20:04

## 2021-08-12 RX ADMIN — Medication 400 MG: at 17:18

## 2021-08-12 RX ADMIN — TIOTROPIUM BROMIDE 18 MCG: 18 CAPSULE ORAL; RESPIRATORY (INHALATION) at 10:24

## 2021-08-12 RX ADMIN — VANCOMYCIN HYDROCHLORIDE 1000 MG: 1 INJECTION, SOLUTION INTRAVENOUS at 02:21

## 2021-08-12 NOTE — PROGRESS NOTES
Vancomycin IV Pharmacy-to-Dose Consultation    David Paulson is a [de-identified] y o  male who is currently receiving Vancomycin IV with management by the Pharmacy Consult service  Assessment/Plan:  The patient was reviewed  Renal function is stable and no signs or symptoms of nephrotoxicity and/or infusion reactions were documented in the chart  Based on todays assessment, continue current vancomycin (day # 4) dosing of 1000 mg IV q 12 hrs, with a plan for trough to be drawn at 1430 on 08/13/2021  We will continue to follow the patients culture results and clinical progress daily      Adelso Fish, Pharmacist

## 2021-08-12 NOTE — RESPIRATORY THERAPY NOTE
RT Ventilator Management Note  Natalio Alan [de-identified] y o  male MRN: [id number]  Unit/Bed#: -01 Encounter: [id number]      Daily Screen    No data found in the last 10 encounters  Physical Exam:   Assessment Type: During-treatment  General Appearance: Alert, Awake  Respiratory Pattern: Dyspnea with exertion  Chest Assessment: Chest expansion symmetrical  Bilateral Breath Sounds: Diminished, Inspiratory wheezes, Expiratory wheezes  O2 Device: bipap      Resp Comments: After abg results and pts confusion pt ordered on bipap  Pt appears to be CO2 retainer at baseline per prior abgs however now CO2 93 with pH 7 32

## 2021-08-12 NOTE — PHYSICAL THERAPY NOTE
PHYSICAL THERAPY EVALUATION  NAME:  Medina Gonzales  DATE: 08/12/21    AGE:   [de-identified] y o  Mrn:   [id number]  ADMIT DX:  Atrial fibrillation (HCC) [I48 91]  PVC (premature ventricular contraction) [I49 3]  SOB (shortness of breath) [R06 02]  Acute on chronic combined systolic and diastolic congestive heart failure (HCC) [I50 43]    Past Medical History:   Diagnosis Date    BPH (benign prostatic hyperplasia)     [facility] obstructive pulmonary disease (COPD) ([facility] [address] [facility]     Essential hypertension     Hard of hearing     Pt does wear hearing aids    Hypertension     Shortness of breath     Pt states not changes and it occurs with moderate exertion    Sleep disturbance     Pt states he is only sleeping about 3 hours a night  Pt is seeing his PCP on 8/5/21 to discuss    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    Tinnitus      Length Of Stay: 3  Performed at least 2 patient identifiers during session: Name and Birthday  PHYSICAL THERAPY EVALUATION :        08/12/21 1000   Note Type   Note type Evaluation   Pain Assessment   Pain Assessment Tool FLACC   Pain Rating: FLACC (Rest) - Face 0   Pain Rating: FLACC (Rest) - Legs 0   Pain Rating: FLACC (Rest) - Activity 0   Pain Rating: FLACC (Rest) - Cry 0   Pain Rating: FLACC (Rest) - Consolability 0   Score: FLACC (Rest) 0   Pain Rating: FLACC (Activity) - Face 0   Pain Rating: FLACC (Activity) - Legs 0   Pain Rating: FLACC (Activity) - Activity 0   Pain Rating: FLACC (Activity) - Cry 0   Pain Rating: FLACC (Activity) - Consolability 0   Score: FLACC (Activity) 0   Home Living   Type of Home House  (0 DALILA)   Home Layout One level;Performs ADLs on one level; Able to live on main level with bedroom/bathroom   Bathroom Shower/Tub Tub/shower unit   Bathroom Toilet Standard   Bathroom Equipment Grab bars in [facility]   (Denies DME at baseline)   Prior Function   Level of [place] Independent with ADLs and functional mobility   Lives With Spouse Receives Help From Family   ADL Assistance Independent   IADLs Independent   Falls in the last 6 months 0   Comments History provided by pt's spouse present at bedside; reports pt had been driving up until 1 mo ago when heart issues started; states PTA pt was ambulating without AD, completing ADLs, and IADLs at I   Restrictions/Precautions   Other Precautions Cognitive; Chair Alarm; Bed Alarm;O2;Fall Risk;Hard of hearing   General   Family/Caregiver Present Yes   Cognition   Overall Cognitive Status Impaired   Arousal/Participation Arousable   Orientation Level Oriented to person;Disoriented to place; Disoriented to time;Disoriented to situation   Following Commands Follows one step commands with increased time or repetition   Comments Pt with fluctuating arousal, participation and command following during session with increased lethargy at times   RLE Assessment   RLE Assessment WFL  (4/5 strength)   LLE Assessment   LLE Assessment WFL  (4/5 strength)   Bed Mobility   Additional Comments Pt received seated in recliner; bed mobility not assessed  At end of session, pt returned to recliner with chair alarm engaged, all needs in reach and pt's spouse at bedside   Transfers   Sit to Stand   (steadying justyn)   Additional items Increased time required;Verbal cues   Stand to Sit   (steadying assist)   Additional items Increased time required;Verbal cues   Stand pivot   (steadying assist)   Additional items Increased time required;Verbal cues   Additional Comments Pt used RW for all transfers; min VC for hand placement and safety   Ambulation/Elevation   Gait pattern Improper Weight shift;Decreased foot clearance; Foward flexed; Short stride; Excessively slow   Gait Assistance   (steadying assist)   Additional items Verbal cues   Assistive Device Rolling walker   Distance 30' Pt ambulated with RW with steadying assist; pt with noted collision of RW into furniture without incident   Min VC for Leila Sitting Good   Dynamic Sitting Fair +   Static Standing Fair   Dynamic Standing Fair -   Ambulatory Fair -   Endurance Deficit   Endurance Deficit Yes   Endurance Deficit Description fatigue, cognitive deficits   Activity Tolerance   Activity Tolerance Patient limited by fatigue;Treatment limited secondary to medical complications (Comment)  (cognitive deficits)   Medical Staff Made Aware Demarco BALDWIN   Nurse Made Aware RNRadha   Assessment   Prognosis Good   Problem List Decreased strength;Decreased endurance; Impaired balance;Decreased mobility; Decreased cognition; Impaired judgement;Decreased safety awareness   Barriers to Discharge Inaccessible home environment;Decreased caregiver support   Barriers to Discharge Comments Pt requires increased assistance for mobility   Goals   Patient Goals None stated   STG Expiration Date 08/22/21   Plan   Treatment/Interventions Functional transfer training;LE strengthening/ROM; Therapeutic exercise;Elevations; Endurance training;Patient/family training;Equipment eval/education; Bed mobility;Gait training; Compensatory technique education;Spoke to nursing;OT;Spoke to case management   PT Frequency Other (Comment)  (3-5x/wk)   Recommendation   PT Discharge Recommendation Post acute rehabilitation services   Equipment Recommended   (TBD by rehab)   PT - OK to Discharge   (when medically clear to rehab)   Milo 8 in Bed Without Bedrails 4   Lying on Back to Sitting on Edge of Flat Bed 4   Moving Bed to Chair 3   Standing Up From Chair 3   Walk in Room 3   Climb 3-5 Stairs 2   Basic Mobility Inpatient Raw Score 19   Basic Mobility Standardized Score 42 48     The patient's AM-PAC Basic Mobility Inpatient Short Form Raw Score is 19  , Standardized Score is 42 48    A standardized score less than 42 9 suggests the patient may benefit from discharge to post-acute rehabilitation services   Please also refer to the recommendation of the Physical Therapist for safe discharge planning  (Please find full objective findings from PT assessment regarding body systems outlined above)  Assessment: Pt is a [de-identified] y o  male seen for PT evaluation s/p admission to [address] [place] [address] on 8/9/2021 with Acute respiratory failure with hypoxia ([facility] [address] )  Order placed for PT services  Upon evaluation: Pt is presenting with impaired functional mobility due to decreased strength, decreased endurance, impaired balance, gait deviations, impaired cognition, decreased safety awareness, impaired judgment and fall risk requiring steadying assistance assistance for transfers and ambulation with RW  Pt's clinical presentation is currently unstable/unpredictable given the functional mobility deficits above coupled with fall risks as indicated by AM-PAC 6-Clicks: 03/15 as well as impaired balance, polypharmacy, impaired judgement, decreased safety awareness and decreased cognition and combined with medical complications of hypertension , abnormal renal lab values, abnormal H&H, abnormal CO2 values and need for input for mobility technique/safety  Pt's PMHx and comorbidities that may affect physical performance and progress include: CHF, COPD, A fib, HTN and cancer history and/or treatment  Personal factors affecting pt at time of IE include: behavioral pattern, inability to perform IADLs, inability to perform ADLs, inability to navigate level surfaces without external assistance, inability to navigate community distances, limited insight into impairments and difficulty communicating  Pt will benefit from continued skilled PT services to address deficits as defined above and to maximize level of functional mobility to facilitate return toward PLOF and improved QOL   From PT/mobility standpoint, recommendation at time of d/c would be Short term rehab pending progress in order to reduce fall risk and maximize pt's functional independence and consistency with mobility in order to facilitate return to PLOF  Recommend trial with walker next 1-2 sessions and ther ex next 1-2 sessions  Goals: Pt will: Perform bed mobility tasks with modified I to reposition in bed and prepare for transfers  Pt will perform transfers with modified I to increase Indep in home environment and prepare for ambulation  Pt will ambulate with LRAD for >/= 150' with  modified I  to improve gait quality and promote proper use of assistive device and to access home environment  Increase bilateral LE strength 1/2 grade to facilitate independent mobility and Increase all balance 1/2 grade to decrease risk for falls          Chirag Bill, PT,DPT

## 2021-08-12 NOTE — ASSESSMENT & PLAN NOTE
· Noted to be saturating 74-77% on room air, requiring 4 L of oxygen, patient is not on oxygen at home   · Most likely secondary to CHF as well as COPD exacerbation  · Patient was noted to have bicarb >45, ordered ABG which demonstrates pCO2 93, will place patient on continuous BiPAP and pulmonology consult placed- appreciate further input   · Will discontinue lasix for now   · Respiratory protocol  · Maintain saturation between 88-92%  · See further treatment as outlined below   · Pending progress may require home O2 evaluation

## 2021-08-12 NOTE — ASSESSMENT & PLAN NOTE
· Reviewed telemetry and patient with significant PVC burden  · Status post Holter monitor as an OP and per review was recommended to go to [facility] for EP evaluation as an OP however patient did not want to travel all that way  · Continue telemetry  · Cardiology consulted; input appreciated; continue regimen on 8/11  · Added toprol XL 50mg daily on 8/10; ideally will attempt to titrate off of cardizem if able   · Initiated K 20mEq PO BID 8/10  · IV lasix 60mg TID was discontinued and patient was transitioned to 60mg PO daily   · Recommend ischemic evaluation at some point to evaluate cause for PVCs  · Recommended outpatient EP evaluation for PVCs and states no need for transfer to [facility]   · Hold lasix 60mg PO daily due to hypercapnia

## 2021-08-12 NOTE — PROGRESS NOTES
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 (per primary cardiologist avoiding BB for COPD)  COPD  ETOH use  HTN  Hx of smoking    Plan:  1  Recommend dc IV diuresis and resuming lasix 60 mg PO daily   2  Fu with EP regarding PVC burden  3  Recommend ischemic evaluation at some point to evaluate cause for PVCs  4  Will need fu with primary cardiologist at discharge     Subjective/Objective     Subjective: pt confused this morning per family member  Continues asking repetitive questions  No specific cardiac complaints today  Objective: Co2 >45, renal function stable, hypernatremic  Still with PVCs on telemetry  Vitals: /75   Pulse 57   Temp (!) 97 4 °F (36 3 °C)   Resp 17   Ht 5' 9" (1 753 m)   Wt 79 4 kg (175 lb)   SpO2 (!) 82%   BMI 25 84 kg/m²   Vitals:    08/11/21 0559 08/12/21 0600   Weight: 80 6 kg (177 lb 9 6 oz) 79 4 kg (175 lb)     Orthostatic Blood Pressures      Most Recent Value   Blood Pressure  123/75 filed at 08/12/2021 1109   Patient Position - Orthostatic VS  Sitting filed at 08/11/2021 0835            Intake/Output Summary (Last 24 hours) at 8/12/2021 1139  Last data filed at 8/12/2021 0841  Gross per 24 hour   Intake 1100 ml   Output 700 ml   Net 400 ml       Invasive Devices     Peripheral Intravenous Line            Peripheral IV 08/10/21 Left Forearm 1 day                    Physical Exam  Vitals reviewed  Constitutional:       Appearance: Normal appearance  HENT:      Head: Normocephalic and atraumatic  Neck:      Comments: - JVD  Cardiovascular:      Rate and Rhythm: Rhythm irregular  Heart sounds: No murmur heard  No gallop      Pulmonary:      Effort: Pulmonary effort is normal       Comments: + wheezing  Abdominal:      Palpations: Abdomen is soft  Musculoskeletal:         General: Swelling present  Cervical back: Neck supple  Skin:     General: Skin is warm and dry  Capillary Refill: Capillary refill takes less than 2 seconds  Neurological:      General: No focal deficit present  Mental Status: He is alert and oriented to person, place, and time  Psychiatric:         Mood and Affect: Mood normal          Thought Content: Thought content normal      Lab Results:   I have personally reviewed pertinent lab results  CBC with diff:   Results from last 7 days   Lab Units 08/12/21  0447   WBC Thousand/uL 9 09   RBC Million/uL 4 72   HEMOGLOBIN g/dL 16 4   HEMATOCRIT % 52 9*   MCV fL 112*   MCH pg 34 7*   MCHC g/dL 31 0*   RDW % 15 0   MPV fL 9 8   PLATELETS Thousands/uL 224     CMP:   Results from last 7 days   Lab Units 08/12/21  0447 08/09/21  1445   SODIUM mmol/L 147* 145   POTASSIUM mmol/L 5 0 4 6   CHLORIDE mmol/L 101 100   CO2 mmol/L >45* 41*   BUN mg/dL 53* 43*   CREATININE mg/dL 1 20 1 16   CALCIUM mg/dL 9 5 9 3   AST U/L  --  33   ALT U/L  --  46   ALK PHOS U/L  --  102   EGFR ml/min/1 73sq m 57 59     Troponin:   0   Lab Value Date/Time    TROPONINI 0 02 08/09/2021 1445     BNP:   Results from last 7 days   Lab Units 08/12/21  0447   POTASSIUM mmol/L 5 0   CHLORIDE mmol/L 101   CO2 mmol/L >45*   BUN mg/dL 53*   CREATININE mg/dL 1 20   CALCIUM mg/dL 9 5   EGFR ml/min/1 73sq m 57     Coags:   Results from last 7 days   Lab Units 08/09/21  1530   PTT seconds 36   INR  1 57*     TSH:   Results from last 7 days   Lab Units 08/09/21  1445   TSH 3RD GENERATON uIU/mL 0 767     Magnesium:   Results from last 7 days   Lab Units 08/09/21  1445   MAGNESIUM mg/dL 2 3     Imaging: I have personally reviewed pertinent reports      EKG:   sinus Rhythm with  Premature ventricular complexes in a pattern of bigeminy  Right bundle branch block  When compared with ECG of 09-AUG-2021 14:34,  No significant change was found  Confirmed by [address]ina [address] ([id number]) on 8/10/2021 10:33:58 AM    Telemetry shows sinus with bigeminy

## 2021-08-12 NOTE — RESPIRATORY THERAPY NOTE
RT Protocol Note  Johnson Cruz [de-identified] y o  male MRN: [id number]  Unit/Bed#: -01 Encounter: [id number]    Assessment    Principal Problem:    Acute on chronic respiratory failure with hypoxia and hypercapnia (HCC)  Active Problems:    Atrial fibrillation (HCC)    Acute on chronic combined systolic and diastolic congestive heart failure (HCC)    Chronic obstructive pulmonary disease with acute exacerbation (HCC)    Cellulitis of left lower extremity    Frequent PVCs      Home Pulmonary Medications:         Past Medical History:   Diagnosis Date    BPH (benign prostatic hyperplasia)     Chronic obstructive pulmonary disease (COPD) ([address] )     Essential hypertension     Hard of hearing     Pt does wear hearing aids    Hypertension     Shortness of breath     Pt states not changes and it occurs with moderate exertion    Sleep disturbance     Pt states he is only sleeping about 3 hours a night  Pt is seeing his PCP on 8/5/21 to discuss    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    Tinnitus      Social History     Socioeconomic History    Marital status: /Civil Union     Spouse name: None    Number of children: None    Years of education: None    Highest education level: None   Occupational History    Occupation: Retired   Tobacco Use    Smoking status: Light Tobacco Smoker     Types: Cigars    Smokeless tobacco: Never Used   Vaping Use    Vaping Use: Never used   Substance and Sexual Activity    Alcohol use: Yes     Comment: moderate consumption    Drug use: Not Currently    Sexual activity: Yes   Other Topics Concern    None   Social History Narrative    None     Social Determinants of Health     Financial Resource Strain:     Difficulty of Paying Living Expenses:    Food Insecurity:     Worried About Running Out of Food in the Last Year:     Ran Out of Food in the Last Year:    Transportation Needs: No Transportation Needs    Lack of Transportation (Medical):  No    Lack of Transportation (Non-Medical): No   Physical Activity:     Days of Exercise per Week:     Minutes of Exercise per Session:    Stress:     Feeling of Stress :    Social Connections:     Frequency of Communication with Friends and Family:     Frequency of Social Gatherings with Friends and Family:     Attends [employer] Services:     Active Member of Clubs or Organizations:     Attends Club or Organization Meetings:     Marital Status:    Intimate Partner Violence:     Fear of Current or Ex-Partner:     Emotionally Abused:     Physically Abused:     Sexually Abused:        Subjective         Objective    Physical Exam:   Assessment Type: During-treatment  General Appearance: Alert, Awake  Respiratory Pattern: Dyspnea with exertion  Chest Assessment: Chest expansion symmetrical  Bilateral Breath Sounds: Diminished, Inspiratory wheezes, Expiratory wheezes  O2 Device: 2LPM NC    Vitals:  Blood pressure 131/89, pulse 61, temperature (!) 97 4 °F (36 3 °C), resp  rate 20, height 5' 9" (1 753 m), weight 79 4 kg (175 lb), SpO2 93 %  Results from last 7 days   Lab Units 08/12/21  1334   PH ART  7 320*   PCO2 ART mm Hg 93 0*   PO2 ART mm Hg 77 6   HCO3 ART mmol/L 46 8*   BASE EXC ART mmol/L 15 6   O2 CONTENT ART mL/dL 19 5   O2 HGB, ARTERIAL % 92 9*   ABG SOURCE  Radial, Right   CHRISTIAN TEST  Yes       Imaging and other studies: I have personally reviewed pertinent reports  O2 Device: 2LPM NC     Plan    Respiratory Plan: Mild Distress pathway        Resp Comments: Called to room for pt pulling bipap off  Pt refusing to wear bipap at this time  Stating "this is all a trick " Wife at bedside and MD aware

## 2021-08-12 NOTE — PLAN OF CARE
Problem: PHYSICAL THERAPY ADULT  Goal: Performs mobility at highest level of function for planned discharge setting  See evaluation for individualized goals  Description: Treatment/Interventions: Functional transfer training, LE strengthening/ROM, Therapeutic exercise, Elevations, Endurance training, Patient/family training, Equipment eval/education, Bed mobility, Gait training, Compensatory technique education, Spoke to nursing, OT, Spoke to case management  Equipment Recommended:  (TBD by rehab)       See flowsheet documentation for full assessment, interventions and recommendations  Note: Prognosis: Good  Problem List: Decreased strength, Decreased endurance, Impaired balance, Decreased mobility, Decreased cognition, Impaired judgement, Decreased safety awareness  Assessment: Pt is a [de-identified] y o  male seen for PT evaluation s/p admission to [address] [place] [address] on 8/9/2021 with Acute respiratory failure with hypoxia ([facility] [address] )  Order placed for PT services  Upon evaluation: Pt is presenting with impaired functional mobility due to decreased strength, decreased endurance, impaired balance, gait deviations, impaired cognition, decreased safety awareness, impaired judgment and fall risk requiring steadying assistance assistance for transfers and ambulation with RW  Pt's clinical presentation is currently unstable/unpredictable given the functional mobility deficits above coupled with fall risks as indicated by AM-PAC 6-Clicks: 24/71 as well as impaired balance, polypharmacy, impaired judgement, decreased safety awareness and decreased cognition and combined with medical complications of hypertension , abnormal renal lab values, abnormal H&H, abnormal CO2 values and need for input for mobility technique/safety  Pt's PMHx and comorbidities that may affect physical performance and progress include: CHF, COPD, A fib, HTN and cancer history and/or treatment   Personal factors affecting pt at time of IE include: behavioral pattern, inability to perform IADLs, inability to perform ADLs, inability to navigate level surfaces without external assistance, inability to navigate community distances, limited insight into impairments and difficulty communicating  Pt will benefit from continued skilled PT services to address deficits as defined above and to maximize level of functional mobility to facilitate return toward PLOF and improved QOL  From PT/mobility standpoint, recommendation at time of d/c would be Short term rehab pending progress in order to reduce fall risk and maximize pt's functional independence and consistency with mobility in order to facilitate return to PLOF  Recommend trial with walker next 1-2 sessions and ther ex next 1-2 sessions  Barriers to Discharge: Inaccessible home environment, Decreased caregiver support  Barriers to Discharge Comments: Pt requires increased assistance for mobility     PT Discharge Recommendation: Post acute rehabilitation services     PT - OK to Discharge:  (when medically clear to rehab)    See flowsheet documentation for full assessment

## 2021-08-12 NOTE — QUICK NOTE
QUICK NOTE - Deterioration Index  Kirit Abdul [de-identified] y o  male MRN: [id number]  Unit/Bed#: -01 Encounter: [id number]    Date Paged: [phone]  Time Paged: [phone]  Room #: 740  [id number] Time: 1135  Deterioration index score at time of page: 63 7 upon evaluation  45 8  %  Spoke with Primary RN  Need to escalate level of care: no     PROBLEMS resulting in high DI score: Factors Contributing to Score   Factor Value   22% Age 81   21% Pulse oximetry 78 %   19% Supplemental oxygen Nasal cannula   15% Sodium 147 mmol/L   7% Cardiac rhythm Atrial fibrillation   7% Potassium 5 mmol/L   2% Pulse 48    Factor Value   2% WBC count 9 09 Thousand/uL   2% BUN 53 mg/dL   2% Systolic 957   1% Respiratory rate 17   1% Hematocrit 52 9 %   <1% Temperature 97          PLAN:     Discussed with primary RN as per chart / vitals review noted SpO2 82%   At time of evaluation, score decreased to 45 8 from 63 7   Reported Masimo not registering correctly, including SpO2 and HR   No acute changes requiring further critical care interventions    Please contact critical care via [employer] with any questions or concerns       Vitals:   Vitals:    21 0711 21 0715 21 1026 21 1109   BP: 118/77   123/75   BP Location:       Pulse:  70  57   Resp: 17 18  17   Temp: (!) 97 4 °F (36 3 °C)      TempSrc:       SpO2:  95% 92% (!) 82%   Weight:       Height:           Respiratory:  SpO2: SpO2: (!) 82 %, SpO2 Activity: SpO2 Activity: At Rest, SpO2 Device: O2 Device: Nasal cannula  Nasal Cannula O2 Flow Rate (L/min): 4 L/min    Temperature: Temp (24hrs), Av 6 °F (36 4 °C), Min:97 3 °F (36 3 °C), Max:98 2 °F (36 8 °C)  Current: Temperature: (!) 97 4 °F (36 3 °C)    Labs:   Results from last 7 days   Lab Units 21  0447 08/10/21  0600 21  1445   WBC Thousand/uL 9 09 6 41 7 27   HEMOGLOBIN g/dL 16 4 14 9 15 4   HEMATOCRIT % 52 9* 48 4 48 8   PLATELETS Thousands/uL 224 233 258   NEUTROS PCT % 80* 91* 95*   MONOS PCT % 12 5 3*     Results from last 7 days   Lab Units 08/12/21  0447 08/11/21  1343 08/09/21  1445   SODIUM mmol/L 147* 147* 145   POTASSIUM mmol/L 5 0 4 9 4 6   CHLORIDE mmol/L 101 101 100   CO2 mmol/L >45* >45* 41*   BUN mg/dL 53* 52* 43*   CREATININE mg/dL 1 20 1 20 1 16   CALCIUM mg/dL 9 5 9 1 9 3   ALK PHOS U/L  --   --  102   ALT U/L  --   --  46   AST U/L  --   --  33     Results from last 7 days   Lab Units 08/09/21  1445   MAGNESIUM mg/dL 2 3     Results from last 7 days   Lab Units 08/09/21  1445   LACTIC ACID mmol/L 2 0     Results from last 7 days   Lab Units 08/09/21  1445   TROPONIN I ng/mL 0 02           Code Status: Level 1 - Full Code

## 2021-08-12 NOTE — ASSESSMENT & PLAN NOTE
· As documented in the image section  · Blood cultures negative  · Wound care consultation  · Wound culture reviewed  · Initially received IV Vancomycin; Continue IV Vancomycin and monitor closely

## 2021-08-12 NOTE — RESPIRATORY THERAPY NOTE
RT Protocol Note  Sami Cerrato [de-identified] y o  male MRN: [id number]  Unit/Bed#: -01 Encounter: [id number]    Assessment    Principal Problem:    Acute respiratory failure with hypoxia ([facility] )  Active Problems:    Atrial fibrillation (HCC)    Acute on chronic combined systolic and diastolic congestive heart failure (HCC)    Chronic obstructive pulmonary disease with acute exacerbation (HCC)    Cellulitis of left lower extremity    Frequent PVCs      Home Pulmonary Medications:  Albuterol as needed for shortness of breath       Past Medical History:   Diagnosis Date    BPH (benign prostatic hyperplasia)     [facility] disease [facility]D) ([facility] )     Essential hypertension     Hard of hearing     Pt does wear hearing aids    Hypertension     Shortness of breath     Pt states not changes and it occurs with moderate exertion    Sleep disturbance     Pt states he is only sleeping about 3 hours a night    Pt is seeing his PCP on 8/5/21 to discuss    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    Tinnitus      Social History     Socioeconomic History    Marital status: /Civil Union     Spouse name: None    Number of children: None    Years of education: None    Highest education level: None   Occupational History    Occupation: Retired   Tobacco Use    Smoking status: Light Tobacco Smoker     Types: Cigars    Smokeless tobacco: Never Used   Vaping Use    Vaping Use: Never used   Substance and Sexual Activity    Alcohol use: Yes     Comment: moderate consumption    Drug use: Not Currently    Sexual activity: Yes   Other Topics Concern    None   Social History Narrative    None     Social Determinants of Health     Financial Resource Strain:     Difficulty of Paying Living Expenses:    Food Insecurity:     Worried About Running Out of Food in the Last Year:     Ran Out of Food in the Last Year:    Transportation Needs: No Transportation Needs    Lack of Transportation (Medical): No    Lack of Transportation (Non-Medical): No   Physical Activity:     Days of Exercise per Week:     Minutes of Exercise per Session:    Stress:     Feeling of Stress :    Social Connections:     Frequency of Communication with Friends and Family:     Frequency of Social Gatherings with Friends and Family:     Attends Taoism Services:     Active Member of Clubs or Organizations:     Attends Club or Organization Meetings:     Marital Status:    Intimate Partner Violence:     Fear of Current or Ex-Partner:     Emotionally Abused:     Physically Abused:     Sexually Abused:        Subjective         Objective    Physical Exam:   Assessment Type: During-treatment  General Appearance: Alert, Awake  Respiratory Pattern: Dyspnea with exertion  Chest Assessment: Chest expansion symmetrical  Bilateral Breath Sounds: Diminished  Cough: Strong, Non-productive  O2 Device: 4K [employer]    Vitals:  Blood pressure 133/98, pulse 65, temperature (!) 97 3 °F (36 3 °C), resp  rate 20, height 5' 9" (1 753 m), weight 80 6 kg (177 lb 9 6 oz), SpO2 95 %  Imaging and other studies: The lungs are clear  No pneumothorax or pleural effusion      O2 Device: 4K [employer]     Plan    Respiratory Plan: Mild Distress pathway        Resp Comments: no dyspnea on 4L

## 2021-08-12 NOTE — ASSESSMENT & PLAN NOTE
· Continue Eliquis for [facility]   · Transitioning off of cardizem and onto BB per cardiology team

## 2021-08-12 NOTE — ASSESSMENT & PLAN NOTE
Wt Readings from Last 3 Encounters:   08/12/21 79 4 kg (175 lb)   08/09/21 82 1 kg (181 lb)   06/17/21 82 1 kg (181 lb)     · As evidence by HOFFMANN, respiratory failure, weight gain, edema  · As per last echocardiogram EF was 45-50%  · Managed as an OP on 60 mg of Lasix PO Daily   · Follows OP with Dr Trinity Munoz   · IV diuresis per Cardiology team; transitioned to PO lasix 60mg daily 08/12- discontinued lasix for now due to hypercapnia   · Monitor intake and output and daily weights   · Cardiac 2g na/fluid restriction   · Monitor volume status closely

## 2021-08-12 NOTE — PROGRESS NOTES
[address]  Progress Note - Servando N[place]otte 1940, [de-identified] y o  male MRN: [id number]  Unit/Bed#: -01 Encounter: [id number]  Primary Care Provider: Ayesha Mac MD   Date and time admitted to hospital: 8/9/2021  2:34 PM    * Acute on chronic respiratory failure with hypoxia and hypercapnia ([facility] [address] )  Assessment & Plan  · Noted to be saturating 74-77% on room air, requiring 4 L of oxygen, patient is not on oxygen at home   · Most likely secondary to CHF as well as COPD exacerbation  · Patient was noted to have bicarb >45, ordered ABG which demonstrates pCO2 93, will place patient on continuous BiPAP and pulmonology consult placed- appreciate further input   · Will discontinue lasix for now   · Respiratory protocol  · Maintain saturation between 88-92%  · See further treatment as outlined below   · Pending progress may require home O2 evaluation     Chronic obstructive pulmonary disease with acute exacerbation ([facility] [address] [facility] )  Assessment & Plan  · Most likely multifactorial  · Continue steroid; taper ordered on 8/10  · Respiratory protocol   · Wean oxygen as tolerated     Frequent PVCs  Assessment & Plan  · Reviewed telemetry and patient with significant PVC burden  · Status post Holter monitor as an OP and per review was recommended to go to [facility] for EP evaluation as an OP however patient did not want to travel all that way  · Continue telemetry  · Cardiology consulted; input appreciated; continue regimen on 8/11  · Added toprol XL 50mg daily on 8/10; ideally will attempt to titrate off of cardizem if able   · Initiated K 20mEq PO BID 8/10  · IV lasix 60mg TID was discontinued and patient was transitioned to 60mg PO daily   · Recommend ischemic evaluation at some point to evaluate cause for PVCs  · Recommended outpatient EP evaluation for PVCs and states no need for transfer to [place]   · Hold lasix 60mg PO daily due to hypercapnia     Cellulitis of left lower extremity  Assessment & Plan  · As documented in the image section  · Blood cultures negative  · Wound care consultation  · Wound culture reviewed  · Initially received IV Vancomycin; Continue IV Vancomycin and monitor closely     Acute on chronic combined systolic and diastolic congestive heart failure (HCC)  Assessment & Plan  Wt Readings from Last 3 Encounters:   08/12/21 79 4 kg (175 lb)   08/09/21 82 1 kg (181 lb)   06/17/21 82 1 kg (181 lb)     · As evidence by HOFFMANN, respiratory failure, weight gain, edema  · As per last echocardiogram EF was 45-50%  · Managed as an OP on 60 mg of Lasix PO Daily   · Follows OP with Dr Alaina Rosales   · IV diuresis per Cardiology team; transitioned to PO lasix 60mg daily 08/12- discontinued lasix for now due to hypercapnia   · Monitor intake and output and daily weights   · Cardiac 2g na/fluid restriction   · Monitor volume status closely         Atrial fibrillation ([address] )  Assessment & Plan  · Continue Eliquis for [facility]   · Transitioning off of cardizem and onto BB per cardiology team         VTE Pharmacologic Prophylaxis: VTE Score: 10 High Risk (Score >/= 5) - Pharmacological DVT Prophylaxis Ordered: apixaban (Eliquis)  Sequential Compression Devices Ordered  Patient Centered Rounds: I performed bedside rounds with nursing staff today  Discussions with Specialists or Other Care Team Provider: cardiology, pulmonology     Education and Discussions with Family / Patient: Updated  (daughter) at bedside  Time Spent for Care: 30 minutes  More than 50% of total time spent on counseling and coordination of care as described above  Current Length of Stay: 3 day(s)  Current Patient Status: Inpatient   Certification Statement: The patient will continue to require additional inpatient hospital stay due to acute respiratory failure with hypercapnia requiring continuous bipap   Discharge Plan: Anticipate discharge in 48-72 hrs to rehab facility      Code Status: Level 1 - Full Code    Subjective:   Daughter and wife at bedside stating patient appears more tired and confused than normal  Patient denies any shortness of breath or any other complaints at this time and states that he would like to go home  Objective:     Vitals:   Temp (24hrs), Av 6 °F (36 4 °C), Min:97 3 °F (36 3 °C), Max:98 2 °F (36 8 °C)    Temp:  [97 3 °F (36 3 °C)-98 2 °F (36 8 °C)] 97 4 °F (36 3 °C)  HR:  [47-71] 57  Resp:  [16-20] 17  BP: (106-133)/(68-98) 123/75  SpO2:  [82 %-95 %] 94 %  Body mass index is 25 84 kg/m²  Input and Output Summary (last 24 hours): Intake/Output Summary (Last 24 hours) at 2021 1418  Last data filed at 2021 0841  Gross per 24 hour   Intake 860 ml   Output 700 ml   Net 160 ml       Physical Exam:   Physical Exam  Constitutional:       General: He is not in acute distress  HENT:      Mouth/Throat:      Mouth: Mucous membranes are dry  Eyes:      General: No scleral icterus  Cardiovascular:      Rate and Rhythm: Normal rate and regular rhythm  Pulses: Normal pulses  Heart sounds: Normal heart sounds  Pulmonary:      Breath sounds: Wheezing and rhonchi present  Comments: Prolonged expiration     Abdominal:      General: Abdomen is flat  There is no distension  Tenderness: There is no abdominal tenderness  Musculoskeletal:      Right lower leg: Edema present  Left lower leg: Edema present  Comments: +1 pitting edema      Skin:     General: Skin is warm  Capillary Refill: Capillary refill takes less than 2 seconds  Neurological:      General: No focal deficit present  Mental Status: He is alert and oriented to person, place, and time            Additional Data:     Labs:  Results from last 7 days   Lab Units 21   WBC Thousand/uL 9 09   HEMOGLOBIN g/dL 16 4   HEMATOCRIT % 52 9*   PLATELETS Thousands/uL 224   NEUTROS PCT % 80*   LYMPHS PCT % 8*   MONOS PCT % 12   EOS PCT % 0     Results from last 7 days   Lab Units 21 08/09/21  1445   SODIUM mmol/L 147* 145   POTASSIUM mmol/L 5 0 4 6   CHLORIDE mmol/L 101 100   CO2 mmol/L >45* 41*   BUN mg/dL 53* 43*   CREATININE mg/dL 1 20 1 16   ANION GAP mmol/L  --  4   CALCIUM mg/dL 9 5 9 3   ALBUMIN g/dL  --  3 7   TOTAL BILIRUBIN mg/dL  --  0 96   ALK PHOS U/L  --  102   ALT U/L  --  46   AST U/L  --  33   GLUCOSE RANDOM mg/dL 130 169*     Results from last 7 days   Lab Units 08/09/21  1530   INR  1 57*             Results from last 7 days   Lab Units 08/09/21  1445   LACTIC ACID mmol/L 2 0       Lines/Drains:  Invasive Devices     Peripheral Intravenous Line            Peripheral IV 08/10/21 Left Forearm 1 day                  Telemetry:  Telemetry Orders (From admission, onward)             48 Hour Telemetry Monitoring  Continuous x 48 hours     Question:  Reason for 48 Hour Telemetry  Answer:  Arrhythmias Requiring Medical Therapy (eg  SVT, Vtach/fib, Bradycardia, Uncontrolled A-fib)                 Telemetry Reviewed: sinus rhythm with bigeminy  Indication for Continued Telemetry Use: Arrthymias requiring medical therapy           Imaging: No pertinent imaging reviewed  Recent Cultures (last 7 days):   Results from last 7 days   Lab Units 08/09/21  1753 08/09/21  1720   BLOOD CULTURE   --  No Growth at 48 hrs  No Growth at 48 hrs     GRAM STAIN RESULT  Rare Polys*  4+ Gram positive rods*  1+ Gram positive cocci in pairs, chains and clusters*  --    WOUND CULTURE  4+ Growth of   --        Last 24 Hours Medication List:   Current Facility-Administered Medications   Medication Dose Route Frequency Provider Last Rate    acetaminophen  650 mg Oral Q4H PRN Shannan Borjas MD      apixaban  5 mg Oral BID Shannan Borjas MD      diltiazem  240 mg Oral Daily Shannan Borjas MD      docusate sodium  100 mg Oral BID Shannan Borjas MD      finasteride  5 mg Oral Daily Shannan Borjas MD      fluticasone-vilanterol  1 puff Inhalation Daily Shannan Borjas MD     [facility] levalbuterol  1 25 mg Nebulization Q6H Kong Li MD      And    sodium chloride  3 mL Nebulization Q6H Kong Li MD      magnesium oxide  400 mg Oral BID Shanti Gonzalez MD      metoprolol  5 mg Intravenous Q6H PRN Shanti Gonzalez MD      metoprolol succinate  50 mg Oral Daily [facility] JULIA Ansari      montelukast  10 mg Oral HS Shanti Gonzalez MD      nicotine  1 patch Transdermal Daily Shanti Gonzalez MD      potassium chloride  20 mEq Oral BID [facility] JULIA Ansari      predniSONE  40 mg Oral Daily KAN Uriostegui      Followed by   Fred Hatch ON 8/15/2021] predniSONE  30 mg Oral Daily KAN Uriostegui      Followed by   Fred Hatch ON 8/18/2021] predniSONE  20 mg Oral Daily KAN Uriostegui      Followed by   Fred Hatch ON 8/21/2021] predniSONE  10 mg Oral Daily KAN Uriostegui      tiotropium  18 mcg Inhalation Daily Shanti Gonzalez MD      vancomycin  1,000 mg Intravenous Q12H Shanti Gonzalez MD 1,000 mg (08/12/21 0221)        Today, [facility]    **Please Note: This note may have been constructed using a voice recognition system  **

## 2021-08-12 NOTE — RESPIRATORY THERAPY NOTE
RT Ventilator Management Note  Adeline Erm [de-identified] y o  male MRN: [id number]  Unit/Bed#: -01 Encounter: [id number]      Daily Screen    No data found in the last 10 encounters  Physical Exam:   Assessment Type: During-treatment  General Appearance: Alert, Awake  Respiratory Pattern: Dyspnea with exertion  Chest Assessment: Chest expansion symmetrical  Bilateral Breath Sounds: Diminished, Inspiratory wheezes, Expiratory wheezes  O2 Device: bipap      Resp Comments: Pt is willing to place bipap on with break to eat later

## 2021-08-13 ENCOUNTER — APPOINTMENT (INPATIENT)
Dept: RADIOLOGY | Facility: HOSPITAL | Age: 81
DRG: 291 | End: 2021-08-13
Payer: MEDICARE

## 2021-08-13 LAB
ARTERIAL PATENCY WRIST A: YES
BASE EXCESS BLDA CALC-SCNC: 18 MMOL/L
BASE EXCESS BLDA CALC-SCNC: 19.6 MMOL/L
BASE EXCESS BLDA CALC-SCNC: 23.7 MMOL/L
BASOPHILS # BLD AUTO: 0 THOUSANDS/ΜL (ref 0–0.1)
BASOPHILS NFR BLD AUTO: 0 % (ref 0–1)
BODY TEMPERATURE: 96.6 DEGREES FEHRENHEIT
BUN SERPL-MCNC: 44 MG/DL (ref 5–25)
CALCIUM SERPL-MCNC: 9 MG/DL (ref 8.3–10.1)
CHLORIDE SERPL-SCNC: 101 MMOL/L (ref 100–108)
CO2 SERPL-SCNC: >45 MMOL/L (ref 21–32)
CREAT SERPL-MCNC: 0.9 MG/DL (ref 0.6–1.3)
EOSINOPHIL # BLD AUTO: 0 THOUSAND/ΜL (ref 0–0.61)
EOSINOPHIL NFR BLD AUTO: 0 % (ref 0–6)
ERYTHROCYTE [DISTWIDTH] IN BLOOD BY AUTOMATED COUNT: 14.6 % (ref 11.6–15.1)
GFR SERPL CREATININE-BSD FRML MDRD: 80 ML/MIN/1.73SQ M
GLUCOSE SERPL-MCNC: 112 MG/DL (ref 65–140)
HCO3 BLDA-SCNC: 48.4 MMOL/L (ref 22–28)
HCO3 BLDA-SCNC: 50.5 MMOL/L (ref 22–28)
HCO3 BLDA-SCNC: 55.8 MMOL/L (ref 22–28)
HCT VFR BLD AUTO: 49.1 % (ref 36.5–49.3)
HGB BLD-MCNC: 14.8 G/DL (ref 12–17)
IMM GRANULOCYTES # BLD AUTO: 0.04 THOUSAND/UL (ref 0–0.2)
IMM GRANULOCYTES NFR BLD AUTO: 0 % (ref 0–2)
IPAP: 14
IPAP: 14
LYMPHOCYTES # BLD AUTO: 0.79 THOUSANDS/ΜL (ref 0.6–4.47)
LYMPHOCYTES NFR BLD AUTO: 9 % (ref 14–44)
MCH RBC QN AUTO: 34.1 PG (ref 26.8–34.3)
MCHC RBC AUTO-ENTMCNC: 30.1 G/DL (ref 31.4–37.4)
MCV RBC AUTO: 113 FL (ref 82–98)
MONOCYTES # BLD AUTO: 1.13 THOUSAND/ΜL (ref 0.17–1.22)
MONOCYTES NFR BLD AUTO: 12 % (ref 4–12)
NEUTROPHILS # BLD AUTO: 7.28 THOUSANDS/ΜL (ref 1.85–7.62)
NEUTS SEG NFR BLD AUTO: 79 % (ref 43–75)
NON VENT ROOM AIR: 30 %
NON VENT- BIPAP: ABNORMAL
NON VENT- BIPAP: ABNORMAL
NRBC BLD AUTO-RTO: 0 /100 WBCS
O2 CT BLDA-SCNC: 19.1 ML/DL (ref 16–23)
O2 CT BLDA-SCNC: 19.2 ML/DL (ref 16–23)
O2 CT BLDA-SCNC: 20.2 ML/DL (ref 16–23)
OXYHGB MFR BLDA: 91.5 % (ref 94–97)
OXYHGB MFR BLDA: 92.4 % (ref 94–97)
OXYHGB MFR BLDA: 96.5 % (ref 94–97)
PCO2 BLDA: 101.2 MM HG (ref 36–44)
PCO2 BLDA: 85 MM HG (ref 36–44)
PCO2 BLDA: 90.2 MM HG (ref 36–44)
PCO2 TEMP ADJ BLDA: 96.4 MM HG (ref 36–44)
PEEP MAX SETTING VENT: 6 CM[H2O]
PEEP MAX SETTING VENT: 6 CM[H2O]
PH BLD: 7.38 [PH] (ref 7.35–7.45)
PH BLDA: 7.36 [PH] (ref 7.35–7.45)
PH BLDA: 7.37 [PH] (ref 7.35–7.45)
PH BLDA: 7.37 [PH] (ref 7.35–7.45)
PLATELET # BLD AUTO: 184 THOUSANDS/UL (ref 149–390)
PMV BLD AUTO: 9.8 FL (ref 8.9–12.7)
PO2 BLD: 66.8 MM HG (ref 75–129)
PO2 BLDA: 107.8 MM HG (ref 75–129)
PO2 BLDA: 68 MM HG (ref 75–129)
PO2 BLDA: 71.9 MM HG (ref 75–129)
POTASSIUM SERPL-SCNC: 4.8 MMOL/L (ref 3.5–5.3)
RBC # BLD AUTO: 4.34 MILLION/UL (ref 3.88–5.62)
SODIUM SERPL-SCNC: 144 MMOL/L (ref 136–145)
SPECIMEN SOURCE: ABNORMAL
VANCOMYCIN TROUGH SERPL-MCNC: 15.9 UG/ML (ref 10–20)
VENT BIPAP FIO2: 30 %
VENT BIPAP FIO2: 40 %
WBC # BLD AUTO: 9.24 THOUSAND/UL (ref 4.31–10.16)

## 2021-08-13 PROCEDURE — 94760 N-INVAS EAR/PLS OXIMETRY 1: CPT

## 2021-08-13 PROCEDURE — 82805 BLOOD GASES W/O2 SATURATION: CPT | Performed by: NURSE PRACTITIONER

## 2021-08-13 PROCEDURE — 85025 COMPLETE CBC W/AUTO DIFF WBC: CPT | Performed by: PHYSICIAN ASSISTANT

## 2021-08-13 PROCEDURE — 99233 SBSQ HOSP IP/OBS HIGH 50: CPT | Performed by: FAMILY MEDICINE

## 2021-08-13 PROCEDURE — 94640 AIRWAY INHALATION TREATMENT: CPT

## 2021-08-13 PROCEDURE — 99222 1ST HOSP IP/OBS MODERATE 55: CPT | Performed by: INTERNAL MEDICINE

## 2021-08-13 PROCEDURE — 80048 BASIC METABOLIC PNL TOTAL CA: CPT | Performed by: PHYSICIAN ASSISTANT

## 2021-08-13 PROCEDURE — 99291 CRITICAL CARE FIRST HOUR: CPT | Performed by: NURSE PRACTITIONER

## 2021-08-13 PROCEDURE — 36600 WITHDRAWAL OF ARTERIAL BLOOD: CPT

## 2021-08-13 PROCEDURE — 94003 VENT MGMT INPAT SUBQ DAY: CPT

## 2021-08-13 PROCEDURE — 82805 BLOOD GASES W/O2 SATURATION: CPT | Performed by: PHYSICIAN ASSISTANT

## 2021-08-13 PROCEDURE — 80202 ASSAY OF VANCOMYCIN: CPT | Performed by: FAMILY MEDICINE

## 2021-08-13 PROCEDURE — 71045 X-RAY EXAM CHEST 1 VIEW: CPT

## 2021-08-13 RX ORDER — ACETAZOLAMIDE 500 MG/5ML
500 INJECTION, POWDER, LYOPHILIZED, FOR SOLUTION INTRAVENOUS ONCE
Status: COMPLETED | OUTPATIENT
Start: 2021-08-13 | End: 2021-08-13

## 2021-08-13 RX ADMIN — LEVALBUTEROL HYDROCHLORIDE 1.25 MG: 1.25 SOLUTION, CONCENTRATE RESPIRATORY (INHALATION) at 13:15

## 2021-08-13 RX ADMIN — METOPROLOL SUCCINATE 50 MG: 50 TABLET, EXTENDED RELEASE ORAL at 09:20

## 2021-08-13 RX ADMIN — DOCUSATE SODIUM 100 MG: 100 CAPSULE ORAL at 20:15

## 2021-08-13 RX ADMIN — ACETAZOLAMIDE SODIUM 500 MG: 500 INJECTION, POWDER, LYOPHILIZED, FOR SOLUTION INTRAVENOUS at 21:30

## 2021-08-13 RX ADMIN — FINASTERIDE 5 MG: 5 TABLET, FILM COATED ORAL at 09:20

## 2021-08-13 RX ADMIN — VANCOMYCIN HYDROCHLORIDE 1000 MG: 1 INJECTION, SOLUTION INTRAVENOUS at 15:42

## 2021-08-13 RX ADMIN — PREDNISONE 40 MG: 20 TABLET ORAL at 09:20

## 2021-08-13 RX ADMIN — APIXABAN 5 MG: 5 TABLET, FILM COATED ORAL at 09:21

## 2021-08-13 RX ADMIN — ISODIUM CHLORIDE 3 ML: 0.03 SOLUTION RESPIRATORY (INHALATION) at 02:20

## 2021-08-13 RX ADMIN — WATER: 1 INJECTION INTRAMUSCULAR; INTRAVENOUS; SUBCUTANEOUS at 21:49

## 2021-08-13 RX ADMIN — APIXABAN 5 MG: 5 TABLET, FILM COATED ORAL at 20:14

## 2021-08-13 RX ADMIN — NICOTINE 1 PATCH: 14 PATCH, EXTENDED RELEASE TRANSDERMAL at 09:19

## 2021-08-13 RX ADMIN — TIOTROPIUM BROMIDE 18 MCG: 18 CAPSULE ORAL; RESPIRATORY (INHALATION) at 09:22

## 2021-08-13 RX ADMIN — POTASSIUM CHLORIDE 20 MEQ: 1500 TABLET, EXTENDED RELEASE ORAL at 09:20

## 2021-08-13 RX ADMIN — ISODIUM CHLORIDE 3 ML: 0.03 SOLUTION RESPIRATORY (INHALATION) at 19:26

## 2021-08-13 RX ADMIN — Medication 400 MG: at 09:20

## 2021-08-13 RX ADMIN — ISODIUM CHLORIDE 3 ML: 0.03 SOLUTION RESPIRATORY (INHALATION) at 13:15

## 2021-08-13 RX ADMIN — MONTELUKAST 10 MG: 10 TABLET, FILM COATED ORAL at 21:41

## 2021-08-13 RX ADMIN — VANCOMYCIN HYDROCHLORIDE 1000 MG: 1 INJECTION, SOLUTION INTRAVENOUS at 02:30

## 2021-08-13 RX ADMIN — DOCUSATE SODIUM 100 MG: 100 CAPSULE ORAL at 09:21

## 2021-08-13 RX ADMIN — ISODIUM CHLORIDE 3 ML: 0.03 SOLUTION RESPIRATORY (INHALATION) at 08:34

## 2021-08-13 RX ADMIN — Medication 400 MG: at 20:14

## 2021-08-13 RX ADMIN — POTASSIUM CHLORIDE 20 MEQ: 1500 TABLET, EXTENDED RELEASE ORAL at 20:15

## 2021-08-13 RX ADMIN — LEVALBUTEROL HYDROCHLORIDE 1.25 MG: 1.25 SOLUTION, CONCENTRATE RESPIRATORY (INHALATION) at 19:26

## 2021-08-13 RX ADMIN — LEVALBUTEROL HYDROCHLORIDE 1.25 MG: 1.25 SOLUTION, CONCENTRATE RESPIRATORY (INHALATION) at 02:20

## 2021-08-13 RX ADMIN — DILTIAZEM HYDROCHLORIDE 240 MG: 240 CAPSULE, COATED, EXTENDED RELEASE ORAL at 09:20

## 2021-08-13 RX ADMIN — FLUTICASONE FUROATE AND VILANTEROL TRIFENATATE 1 PUFF: 200; 25 POWDER RESPIRATORY (INHALATION) at 09:19

## 2021-08-13 RX ADMIN — LEVALBUTEROL HYDROCHLORIDE 1.25 MG: 1.25 SOLUTION, CONCENTRATE RESPIRATORY (INHALATION) at 08:34

## 2021-08-13 NOTE — ASSESSMENT & PLAN NOTE
· Initially presented on admission to be saturating 74-77% on room air, requiring 4 L of oxygen  · Patient is on home oxygen  · Most likely secondary to CHF and COPD exacerbation  · Was noted to have a high bicarb greater than 45, ABG 8/12 with pCO2 93, patient was placed on BiPAP-patient not compliant with BiPAP  · Repeat ABG this am on bipap 14/6 FiO2 30%-7 359/101 2/71 9/55 3   · Patient ripped BIPAP off multiple times  · Trialed on 4LNC this am but had increased confusion  · Repeat CXR pending  · Repeat ABG at 1600  · Continue BIPAP 14/6 FIo2 30%  · Had goals of care discussion with patient and he adamantly refuses to be intubated or CPR-his code status was changed to level 3 DNR/DNI  · Pulmonary consulted appreciate recs  · Maintain O2 saturation between 88-92%

## 2021-08-13 NOTE — PLAN OF CARE
Problem: Potential for Falls  Goal: Patient will remain free of falls  Description: INTERVENTIONS:  - Educate patient/family on patient safety including physical limitations  - Instruct patient to call for assistance with activity   - Consult OT/PT to assist with strengthening/mobility   - Keep Call bell within reach  - Keep bed low and locked with side rails adjusted as appropriate  - Keep care items and personal belongings within reach  - Initiate and maintain comfort rounds  - Make Fall Risk Sign visible to staff  - Offer Toileting every   Hours, in advance of need  - Initiate/Maintain   alarm  - Obtain necessary fall risk management equipment:   - Apply yellow socks and bracelet for high fall risk patients  - Consider moving patient to room near nurses station  Outcome: Progressing     Problem: MOBILITY - ADULT  Goal: Maintain or return to baseline ADL function  Description: INTERVENTIONS:  -  Assess patient's ability to carry out ADLs; assess patient's baseline for ADL function and identify physical deficits which impact ability to perform ADLs (bathing, care of mouth/teeth, toileting, grooming, dressing, etc )  - Assess/evaluate cause of self-care deficits   - Assess range of motion  - Assess patient's mobility; develop plan if impaired  - Assess patient's need for assistive devices and provide as appropriate  - Encourage maximum independence but intervene and supervise when necessary  - Involve family in performance of ADLs  - Assess for home care needs following discharge   - Consider OT consult to assist with ADL evaluation and planning for discharge  - Provide patient education as appropriate  Outcome: Progressing  Goal: Maintains/Returns to pre admission functional level  Description: INTERVENTIONS:  - Perform BMAT or MOVE assessment daily    - Set and communicate daily mobility goal to care team and patient/family/caregiver     - Collaborate with rehabilitation services on mobility goals if consulted  - Perform Range of Motion   times a day  - Reposition patient every   hours  - Dangle patient   times a day  - Stand patient   times a day  - Ambulate patient   times a day  - Out of bed to chair     times a day   - Out of bed for meals    times a day  - Out of bed for toileting  - Record patient progress and toleration of activity level   Outcome: Progressing     Problem: CARDIOVASCULAR - ADULT  Goal: Maintains optimal cardiac output and hemodynamic stability  Description: INTERVENTIONS:  - Monitor I/O, vital signs and rhythm  - Monitor for S/S and trends of decreased cardiac output  - Administer and titrate ordered vasoactive medications to optimize hemodynamic stability  - Assess quality of pulses, skin color and temperature  - Assess for signs of decreased coronary artery perfusion  - Instruct patient to report change in severity of symptoms  Outcome: Progressing  Goal: Absence of cardiac dysrhythmias or at baseline rhythm  Description: INTERVENTIONS:  - Continuous cardiac monitoring, vital signs, obtain 12 lead EKG if ordered  - Administer antiarrhythmic and heart rate control medications as ordered  - Monitor electrolytes and administer replacement therapy as ordered  Outcome: Progressing     Problem: RESPIRATORY - ADULT  Goal: Achieves optimal ventilation and oxygenation  Description: INTERVENTIONS:  - Assess for changes in respiratory status  - Assess for changes in mentation and behavior  - Position to facilitate oxygenation and minimize respiratory effort  - Oxygen administered by appropriate delivery if ordered  - Initiate smoking cessation education as indicated  - Encourage broncho-pulmonary hygiene including cough, deep breathe, Incentive Spirometry  - Assess the need for suctioning and aspirate as needed  - Assess and instruct to report SOB or any respiratory difficulty  - Respiratory Therapy support as indicated  Outcome: Progressing     Problem: METABOLIC, FLUID AND ELECTROLYTES - ADULT  Goal: Electrolytes maintained within normal limits  Description: INTERVENTIONS:  - Monitor labs and assess patient for signs and symptoms of electrolyte imbalances  - Administer electrolyte replacement as ordered  - Monitor response to electrolyte replacements, including repeat lab results as appropriate  - Instruct patient on fluid and nutrition as appropriate  Outcome: Progressing  Goal: Fluid balance maintained  Description: INTERVENTIONS:  - Monitor labs   - Monitor I/O and WT  - Instruct patient on fluid and nutrition as appropriate  - Assess for signs & symptoms of volume excess or deficit  Outcome: Progressing  Goal: Glucose maintained within target range  Description: INTERVENTIONS:  - Monitor Blood Glucose as ordered  - Assess for signs and symptoms of hyperglycemia and hypoglycemia  - Administer ordered medications to maintain glucose within target range  - Assess nutritional intake and initiate nutrition service referral as needed  Outcome: Progressing

## 2021-08-13 NOTE — PROGRESS NOTES
[address]  Progress Note - Marleni Avila 1940, [de-identified] y o  male MRN: [id number]  Unit/Bed#: -01 Encounter: [id number]  Primary Care Provider: Emile Worthy MD   Date and time admitted to hospital: 8/9/2021  2:34 PM    Acute on chronic combined systolic and diastolic congestive heart failure ([address] )  Assessment & Plan  Wt Readings from Last 3 Encounters:   08/13/21 79 9 kg (176 lb 2 4 oz)   08/09/21 82 1 kg (181 lb)   06/17/21 82 1 kg (181 lb)     · As evidence by HOFFMANN, respiratory failure, weight gain, edema  · As per last echocardiogram EF was 45-50%  · Managed as an OP on 60 mg of Lasix PO Daily   · Follows OP with Dr Maryana Hubbard   · IV diuresis per Cardiology team; transitioned to PO lasix 60mg daily 08/12- discontinued lasix for now due to hypercapnia   · Patient  still remained significantly volume overloaded as clinically documented with bilateral edema-diuresing the patient is getting complicated due to CO2 retention-patient is on continue BiPAP right now  Will hold diuretics at this time, continue BiPAP, recheck ABG,-discussed with patient and his wife on the bedside in details regarding the complicated case  Consider to resume diuretics once appropriate  · Monitor intake and output and daily weights   · Cardiac 2g na/fluid restriction   · Monitor volume status closely         Atrial fibrillation (HCC)  Assessment & Plan  · Continue Eliquis for [facility]   · Transitioning off of cardizem and onto BB per cardiology team     * Acute on chronic respiratory failure with hypoxia and hypercapnia (HCC)  Assessment & Plan  · Noted to be saturating 74-77% on room air, requiring 4 L of oxygen, patient is not on oxygen at home   · Most likely secondary to CHF as well as COPD exacerbation  · Patient remained continuous BiPAP, overnight patient was unable to keep the BiPAP  Repeat ABG shows pCO2>100-Pulmonary consulted  Recommends to continue BiPAP, recheck ABG    If does not improve, consider Diamox  · Will discontinue lasix for now   · Respiratory protocol  · Maintain saturation between 88-92%  · See further treatment as outlined below   · Pending progress may require home O2 evaluation     Frequent PVCs  Assessment & Plan  · Reviewed telemetry and patient with significant PVC burden  · Status post Holter monitor as an OP and per review was recommended to go to [facility] for EP evaluation as an OP however patient did not want to travel all that way  · Continue telemetry  · Cardiology consulted; input appreciated; continue regimen on 8/11  · Added toprol XL 50mg daily on 8/10; ideally will attempt to titrate off of cardizem if able   · Initiated K 20mEq PO BID 8/10  · IV lasix 60mg TID was discontinued and patient was transitioned to 60mg PO daily   · Recommend ischemic evaluation at some point to evaluate cause for PVCs  · Recommended outpatient EP evaluation for PVCs and states no need for transfer to [place]   · Hold lasix 60mg PO daily due to hypercapnia     Cellulitis of left lower extremity  Assessment & Plan  · As documented in the image section  · Blood cultures negative  · Wound care consultation  · Wound culture reviewed  · Initially received IV Vancomycin; Continue IV Vancomycin and monitor closely     Chronic obstructive pulmonary disease with acute exacerbation ([facility] [address] )  Assessment & Plan  · Most likely multifactorial  · Continue steroid; taper ordered on 8/10  · Respiratory protocol   · Wean oxygen as tolerated   · Patient remained on BiPAP, Pulmonary consulted          VTE Pharmacologic Prophylaxis: VTE Score: 10 High Risk (Score >/= 5) - Pharmacological DVT Prophylaxis Ordered: apixaban (Eliquis)  Sequential Compression Devices Ordered  Patient Centered Rounds: I performed bedside rounds with nursing staff today    Discussions with Specialists or Other Care Team Provider:  Cardiology, pulmonology    Education and Discussions with Family / Patient: Updated  (wife) at bedside  Time Spent for Care: 30 minutes  More than 50% of total time spent on counseling and coordination of care as described above  Current Length of Stay: 4 day(s)  Current Patient Status: Inpatient   Certification Statement: The patient will continue to require additional inpatient hospital stay due to Acute respiratory failure, CHF exacerbation  Discharge Plan: Anticipate discharge in 48-72 hrs to home  Code Status: Level 3 - DNAR and DNI    Subjective:   Seen and evaluated during the round  Patient remained on BiPAP  Complaining short of breath  Denies any other complaint    Objective:     Vitals:   Temp (24hrs), Av 2 °F (36 2 °C), Min:96 7 °F (35 9 °C), Max:97 6 °F (36 4 °C)    Temp:  [96 7 °F (35 9 °C)-97 6 °F (36 4 °C)] 97 2 °F (36 2 °C)  HR:  [49-83] 49  Resp:  [17-22] 22  BP: (123-131)/(79-89) 125/86  SpO2:  [93 %-99 %] 96 %  Body mass index is 26 01 kg/m²  Input and Output Summary (last 24 hours): Intake/Output Summary (Last 24 hours) at 2021 1339  Last data filed at 2021 7145  Gross per 24 hour   Intake 580 ml   Output 200 ml   Net 380 ml       Physical Exam:   Physical Exam  Vitals and nursing note reviewed  Exam conducted with a chaperone present  Constitutional:       Appearance: He is obese  He is not diaphoretic  Eyes:      General: No scleral icterus  Conjunctiva/sclera: Conjunctivae normal       Pupils: Pupils are equal, round, and reactive to light  Cardiovascular:      Rate and Rhythm: Bradycardia present  Pulses: Normal pulses  Heart sounds: No murmur heard  No friction rub  No gallop  Pulmonary:      Effort: Pulmonary effort is normal  No respiratory distress  Breath sounds: No stridor  Wheezing and rales present  Abdominal:      General: Abdomen is flat  Bowel sounds are normal  There is no distension  Palpations: There is no mass  Tenderness: There is no abdominal tenderness  Hernia: No hernia is present  Musculoskeletal:      Cervical back: Normal range of motion  Right lower leg: Edema (3+) present  Left lower leg: Edema (3+) present  Skin:     General: Skin is warm  Capillary Refill: Capillary refill takes less than 2 seconds  Findings: No lesion  Neurological:      General: No focal deficit present  Mental Status: He is alert and oriented to person, place, and time  Cranial Nerves: No cranial nerve deficit  Sensory: No sensory deficit  Motor: No weakness  Coordination: Coordination normal          Additional Data:     Labs:  Results from last 7 days   Lab Units 08/13/21  0644   WBC Thousand/uL 9 24   HEMOGLOBIN g/dL 14 8   HEMATOCRIT % 49 1   PLATELETS Thousands/uL 184   NEUTROS PCT % 79*   LYMPHS PCT % 9*   MONOS PCT % 12   EOS PCT % 0     Results from last 7 days   Lab Units 08/13/21  0644 08/09/21  1445   SODIUM mmol/L 144 145   POTASSIUM mmol/L 4 8 4 6   CHLORIDE mmol/L 101 100   CO2 mmol/L >45* 41*   BUN mg/dL 44* 43*   CREATININE mg/dL 0 90 1 16   ANION GAP mmol/L  --  4   CALCIUM mg/dL 9 0 9 3   ALBUMIN g/dL  --  3 7   TOTAL BILIRUBIN mg/dL  --  0 96   ALK PHOS U/L  --  102   ALT U/L  --  46   AST U/L  --  33   GLUCOSE RANDOM mg/dL 112 169*     Results from last 7 days   Lab Units 08/09/21  1530   INR  1 57*             Results from last 7 days   Lab Units 08/09/21  1445   LACTIC ACID mmol/L 2 0       Lines/Drains:  Invasive Devices     Peripheral Intravenous Line            Peripheral IV 08/13/21 Dorsal (posterior); Right Forearm <1 day                  Telemetry:  Telemetry Orders (From admission, onward)             48 Hour Telemetry Monitoring  Continuous x 48 hours     Question:  Reason for 48 Hour Telemetry  Answer:  Acute Respiratory Failure on Bipap                 Telemetry Reviewed: PVCs  Indication for Continued Telemetry Use: Acute respiratory failure on Bipap           Imaging: Reviewed radiology reports from this admission including: chest xray    Recent Cultures (last 7 days):   Results from last 7 days   Lab Units 08/09/21  1753 08/09/21  1720   BLOOD CULTURE   --  No Growth at 72 hrs  No Growth at 72 hrs     GRAM STAIN RESULT  Rare Polys*  4+ Gram positive rods*  1+ Gram positive cocci in pairs, chains and clusters*  --    WOUND CULTURE  4+ Growth of   --        Last 24 Hours Medication List:   Current Facility-Administered Medications   Medication Dose Route Frequency Provider Last Rate    acetaminophen  650 mg Oral Q4H PRN Brendan Luis MD      apixaban  5 mg Oral BID Brendan Luis MD      diltiazem  240 mg Oral Daily Malcolm Martinez MD      docusate sodium  100 mg Oral BID Brendan Luis MD      finasteride  5 mg Oral Daily Brendan Luis MD      fluticasone-vilanterol  1 puff Inhalation Daily Brendan Luis MD      levalbuterol  1 25 mg Nebulization Q6H Diane Sinclair MD      And    sodium chloride  3 mL Nebulization Q6H Diane Sinclair MD      magnesium oxide  400 mg Oral BID Brendan Luis MD      metoprolol  5 mg Intravenous Q6H PRN Brendan Luis MD      metoprolol succinate  50 mg Oral Daily [place] JULIA Ansari      montelukast  10 mg Oral HS Brendan Luis MD      nicotine  1 patch Transdermal Daily Malcolm Martinez MD      potassium chloride  20 mEq Oral BID [place], PA-C      predniSONE  40 mg Oral Daily KAN Uriostegui      Followed by   Cecil Horowitz ON 8/15/2021] predniSONE  30 mg Oral Daily KAN Uriostegui      Followed by   Cecil Horowitz ON 8/18/2021] predniSONE  20 mg Oral Daily KAN Uriostegui      Followed by   Cecil Horowitz ON 8/21/2021] predniSONE  10 mg Oral Daily KAN Uriostegui      tiotropium  18 mcg Inhalation Daily Brendan Luis MD      vancomycin  1,000 mg Intravenous Q12H Brendan Luis MD 1,000 mg (08/13/21 0230)        Today, Patient Was Seen By: Brendan Luis MD    **Please Note: This note may have been constructed using a voice recognition system  **

## 2021-08-13 NOTE — ASSESSMENT & PLAN NOTE
Wt Readings from Last 3 Encounters:   08/13/21 79 9 kg (176 lb 2 4 oz)   08/09/21 82 1 kg (181 lb)   06/17/21 82 1 kg (181 lb)     · As evidence by HOFFMANN, respiratory failure, weight gain, edema  · As per last echocardiogram EF was 45-50%  · Managed as an OP on 60 mg of Lasix PO Daily   · Follows OP with Dr Ish Wolfe   · IV diuresis per Cardiology team; transitioned to PO lasix 60mg daily 08/12- discontinued lasix for now due to hypercapnia   · Patient  still remained significantly volume overloaded as clinically documented with bilateral edema-diuresing the patient is getting complicated due to CO2 retention-patient is on continue BiPAP right now  Will hold diuretics at this time, continue BiPAP, recheck ABG,-discussed with patient and his wife on the bedside in details regarding the complicated case    Consider to resume diuretics once appropriate  · Monitor intake and output and daily weights   · Cardiac 2g na/fluid restriction   · Monitor volume status closely

## 2021-08-13 NOTE — ASSESSMENT & PLAN NOTE
· Continue Eliquis for [facility]  · Transitioning off of Cardizem and onto beta-blocker per Cardiology team

## 2021-08-13 NOTE — ASSESSMENT & PLAN NOTE
· Review telemetry and significant PVC burden  · Status post Holter monitor as an outpatient was recommended to go [facility] for an EP evaluation-patient stated he did not want to travel all the way out to [facility]  · Cardiology consulted and appreciate input  · Recommend ischemic evaluation at some 0 4 primary cause of PVCs  · Recommend outpatient EP eval for PVCs and state no need to transfer the [facility]  · Continue to monitor telemetry  · Continue beta-blocker and replete electrolytes p r n   For goal K>4, Phos>3, Mg>2

## 2021-08-13 NOTE — RESPIRATORY THERAPY NOTE
RT Protocol Note  Martha Sandoval [de-identified] y o  male MRN: [id number]  Unit/Bed#: -01 Encounter: [id number]    Assessment    Principal Problem:    Acute on chronic respiratory failure with hypoxia and hypercapnia (HCC)  Active Problems:    Atrial fibrillation (HCC)    Acute on chronic combined systolic and diastolic congestive heart failure (HCC)    Chronic obstructive pulmonary disease with acute exacerbation (HCC)    Cellulitis of left lower extremity    Frequent PVCs      Home Pulmonary Medications:         Past Medical History:   Diagnosis Date    BPH (benign prostatic hyperplasia)     Chronic obstructive pulmonary disease (COPD) ([address] )     Essential hypertension     Hard of hearing     Pt does wear hearing aids    Hypertension     Shortness of breath     Pt states not changes and it occurs with moderate exertion    Sleep disturbance     Pt states he is only sleeping about 3 hours a night  Pt is seeing his PCP on 8/5/21 to discuss    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    Tinnitus      Social History     Socioeconomic History    Marital status: /Civil Union     Spouse name: None    Number of children: None    Years of education: None    Highest education level: None   Occupational History    Occupation: Retired   Tobacco Use    Smoking status: Light Tobacco Smoker     Types: Cigars    Smokeless tobacco: Never Used   Vaping Use    Vaping Use: Never used   Substance and Sexual Activity    Alcohol use: Yes     Comment: moderate consumption    Drug use: Not Currently    Sexual activity: Yes   Other Topics Concern    None   Social History Narrative    None     Social Determinants of Health     Financial Resource Strain:     Difficulty of Paying Living Expenses:    Food Insecurity:     Worried About Running Out of Food in the Last Year:     Ran Out of Food in the Last Year:    Transportation Needs: No Transportation Needs    Lack of Transportation (Medical):  No    Lack of Transportation (Non-Medical): No   Physical Activity:     Days of Exercise per Week:     Minutes of Exercise per Session:    Stress:     Feeling of Stress :    Social Connections:     Frequency of Communication with Friends and Family:     Frequency of Social Gatherings with Friends and Family:     Attends Gnosticism Services:     Active Member of Clubs or Organizations:     Attends Club or Organization Meetings:     Marital Status:    Intimate Partner Violence:     Fear of Current or Ex-Partner:     Emotionally Abused:     Physically Abused:     Sexually Abused:        Subjective         Objective    Physical Exam:   O2 Device: Bipap 30%    Vitals:  Blood pressure 125/86, pulse 57, temperature (!) 97 1 °F (36 2 °C), resp  rate 17, height 5' 9" (1 753 m), weight 79 9 kg (176 lb 2 4 oz), SpO2 97 %  Results from last 7 days   Lab Units 08/13/21  1553 08/13/21  0552   PH ART  7 373 7 359   PCO2 ART mm Hg 85 0* 101 2*   PO2 ART mm Hg 68 0* 71 9*   HCO3 ART mmol/L 48 4* 55 8*   BASE EXC ART mmol/L 18 0 23 7   O2 CONTENT ART mL/dL 19 2 19 1   O2 HGB, ARTERIAL % 91 5* 92 4*   ABG SOURCE  Radial, Left Radial, Left   CHRISTIAN TEST  Yes Yes   NON VENT ROOM AIR %  --  30       Imaging and other studies: I have personally reviewed pertinent reports  O2 Device: Bipap 30%     Plan    Respiratory Plan: Mild Distress pathway        Resp Comments: Pt back on Bipap since 0930   Appears stable repeat ABG done @1600

## 2021-08-13 NOTE — PROGRESS NOTES
Vancomycin Assessment    Lex Gibbs is a [de-identified] y o  male who is currently receiving vancomycin 1000 mg iv q 12 hrs for skin-soft tissue infection     Relevant clinical data and objective history reviewed:  Creatinine   Date Value Ref Range Status   08/13/2021 0 90 0 60 - 1 30 mg/dL Final     Comment:     Standardized to IDMS reference method   08/12/2021 1 20 0 60 - 1 30 mg/dL Final     Comment:     Standardized to IDMS reference method   08/11/2021 1 20 0 60 - 1 30 mg/dL Final     Comment:     Standardized to IDMS reference method     /86   Pulse 57   Temp (!) 97 1 °F (36 2 °C)   Resp 17   Ht 5' 9" (1 753 m)   Wt 79 9 kg (176 lb 2 4 oz)   SpO2 97%   BMI 26 01 kg/m²   I/O last 3 completed shifts: In: 900 [P O :700; IV Piggyback:200]  Out: 700 [Urine:700]  Lab Results   Component Value Date/Time    BUN 44 (H) 08/13/2021 06:44 AM    WBC 9 24 08/13/2021 06:44 AM    HGB 14 8 08/13/2021 06:44 AM    HCT 49 1 08/13/2021 06:44 AM     (H) 08/13/2021 06:44 AM     08/13/2021 06:44 AM     Temp Readings from Last 3 Encounters:   08/13/21 (!) 97 1 °F (36 2 °C)   06/17/21 (!) 96 7 °F (35 9 °C)   03/18/21 (!) 97 3 °F (36 3 °C)     Vancomycin Days of Therapy: 5    Assessment/Plan  The patient is currently on vancomycin utilizing scheduled dosing  Baseline risks associated with therapy include: concomitant nephrotoxic medications and advanced age  The patient is receiving 1000 mg iv q 12 hrs with the most recent vancomycin level being at steady-state and therapeutic based on a goal of 15-20 (appropriate for most indications) ; therefore, is clinically appropriate and dose will be continued   Pharmacy will continue to follow closely for s/sx of nephrotoxicity, infusion reactions, and appropriateness of therapy  BMP and CBC will be ordered per protocol  Plan is for a trough to be drawn on 8/19 @ 1430  Pharmacy will continue to follow the patients culture results and clinical progress daily      Sherrie Reyes Shanti Dixon, Pharmacist

## 2021-08-13 NOTE — ASSESSMENT & PLAN NOTE
· Noted to be saturating 74-77% on room air, requiring 4 L of oxygen, patient is not on oxygen at home   · Most likely secondary to CHF as well as COPD exacerbation  · Patient remained continuous BiPAP, overnight patient was unable to keep the BiPAP  Repeat ABG shows pCO2>100-Pulmonary consulted  Recommends to continue BiPAP, recheck ABG    If does not improve, consider Diamox  · Will discontinue lasix for now   · Respiratory protocol  · Maintain saturation between 88-92%  · See further treatment as outlined below   · Pending progress may require home O2 evaluation

## 2021-08-13 NOTE — ASSESSMENT & PLAN NOTE
· Most likely multifactorial  · Continue steroids, taper ordered on 08/10  · Wean oxygen wean off BiPAP as tolerated to maintain SpO2 greater than 88%

## 2021-08-13 NOTE — ASSESSMENT & PLAN NOTE
Wt Readings from Last 3 Encounters:   08/13/21 79 9 kg (176 lb 2 4 oz)   08/09/21 82 1 kg (181 lb)   06/17/21 82 1 kg (181 lb)     · BNP 6,713 on admission and increased to 6,3929  · Last echo February 25, 2021 EF is 45-50% with mild dilatation of atria  · Admitted to the hospital on 08/09 for acute on chronic combined systolic and diastolic heart failure, has been aggressively diuresed with Lasix IV  · Initial chest x-ray with no consolidation, pulmonary edema, pleural effusions  · Repeat chest x-ray pending 8/13  · Cardiology consulted and following  · Has some lower extremity edema 1+  · Hold diuresis with lasix, consider giving diamox 250 mgIV BID in the setting of contraction alkalosis if breathing does not improve with biPAP  · 2 g sodium diet with fluid restriction  · Strict I&Os

## 2021-08-13 NOTE — PLAN OF CARE
Problem: Potential for Falls  Goal: Patient will remain free of falls  Description: INTERVENTIONS:  - Educate patient/family on patient safety including physical limitations  - Instruct patient to call for assistance with activity   - Consult OT/PT to assist with strengthening/mobility   - Keep Call bell within reach  - Keep bed low and locked with side rails adjusted as appropriate  - Keep care items and personal belongings within reach  - Initiate and maintain comfort rounds  - Make Fall Risk Sign visible to staff  - Apply yellow socks and bracelet for high fall risk patients  - Consider moving patient to room near nurses station  Outcome: Progressing     Problem: MOBILITY - ADULT  Goal: Maintain or return to baseline ADL function  Description: INTERVENTIONS:  -  Assess patient's ability to carry out ADLs; assess patient's baseline for ADL function and identify physical deficits which impact ability to perform ADLs (bathing, care of mouth/teeth, toileting, grooming, dressing, etc )  - Assess/evaluate cause of self-care deficits   - Assess range of motion  - Assess patient's mobility; develop plan if impaired  - Assess patient's need for assistive devices and provide as appropriate  - Encourage maximum independence but intervene and supervise when necessary  - Involve family in performance of ADLs  - Assess for home care needs following discharge   - Consider OT consult to assist with ADL evaluation and planning for discharge  - Provide patient education as appropriate  Outcome: Progressing  Goal: Maintains/Returns to pre admission functional level  Description: INTERVENTIONS:  - Perform BMAT or MOVE assessment daily    - Set and communicate daily mobility goal to care team and patient/family/caregiver     - Collaborate with rehabilitation services on mobility goals if consulted  - Out of bed for toileting  - Record patient progress and toleration of activity level   Outcome: Progressing     Problem: CARDIOVASCULAR - ADULT  Goal: Maintains optimal cardiac output and hemodynamic stability  Description: INTERVENTIONS:  - Monitor I/O, vital signs and rhythm  - Monitor for S/S and trends of decreased cardiac output  - Administer and titrate ordered vasoactive medications to optimize hemodynamic stability  - Assess quality of pulses, skin color and temperature  - Assess for signs of decreased coronary artery perfusion  - Instruct patient to report change in severity of symptoms  Outcome: Progressing  Goal: Absence of cardiac dysrhythmias or at baseline rhythm  Description: INTERVENTIONS:  - Continuous cardiac monitoring, vital signs, obtain 12 lead EKG if ordered  - Administer antiarrhythmic and heart rate control medications as ordered  - Monitor electrolytes and administer replacement therapy as ordered  Outcome: Progressing     Problem: RESPIRATORY - ADULT  Goal: Achieves optimal ventilation and oxygenation  Description: INTERVENTIONS:  - Assess for changes in respiratory status  - Assess for changes in mentation and behavior  - Position to facilitate oxygenation and minimize respiratory effort  - Oxygen administered by appropriate delivery if ordered  - Initiate smoking cessation education as indicated  - Encourage broncho-pulmonary hygiene including cough, deep breathe, Incentive Spirometry  - Assess the need for suctioning and aspirate as needed  - Assess and instruct to report SOB or any respiratory difficulty  - Respiratory Therapy support as indicated  Outcome: Progressing     Problem: METABOLIC, FLUID AND ELECTROLYTES - ADULT  Goal: Electrolytes maintained within normal limits  Description: INTERVENTIONS:  - Monitor labs and assess patient for signs and symptoms of electrolyte imbalances  - Administer electrolyte replacement as ordered  - Monitor response to electrolyte replacements, including repeat lab results as appropriate  - Instruct patient on fluid and nutrition as appropriate  Outcome: Progressing  Goal: Fluid balance maintained  Description: INTERVENTIONS:  - Monitor labs   - Monitor I/O and WT  - Instruct patient on fluid and nutrition as appropriate  - Assess for signs & symptoms of volume excess or deficit  Outcome: Progressing  Goal: Glucose maintained within target range  Description: INTERVENTIONS:  - Monitor Blood Glucose as ordered  - Assess for signs and symptoms of hyperglycemia and hypoglycemia  - Administer ordered medications to maintain glucose within target range  - Assess nutritional intake and initiate nutrition service referral as needed  Outcome: Progressing

## 2021-08-13 NOTE — ASSESSMENT & PLAN NOTE
· Reviewed telemetry and patient with significant PVC burden  · Status post Holter monitor as an OP and per review was recommended to go to \[facility]\"" for EP evaluation as an OP however patient did not want to travel all that way  · Continue telemetry  · Cardiology consulted; input appreciated; continue regimen on 8/11  · Added toprol XL 50mg daily on 8/10; ideally will attempt to titrate off of cardizem if able   · Initiated K 20mEq PO BID 8/10  · IV lasix 60mg TID was discontinued and patient was transitioned to 60mg PO daily   · Recommend ischemic evaluation at some point to evaluate cause for PVCs  · Recommended outpatient EP evaluation for PVCs and states no need for transfer to [place]   · Hold lasix 60mg PO daily due to hypercapnia

## 2021-08-13 NOTE — CONSULTS
[address] [facility] 1940, [de-identified] y o  male MRN: [id number]  Unit/Bed#: -01 Encounter: [id number]  Primary Care Provider: Niecy Cruz MD   Date and time admitted to hospital: 8/9/2021  2:34 PM    Consults    * Acute on chronic respiratory failure with hypoxia and hypercapnia ([address] )  Assessment & Plan  · Initially presented on admission to be saturating 74-77% on room air, requiring 4 L of oxygen  · Patient is on home oxygen  · Most likely secondary to CHF and COPD exacerbation  · Was noted to have a high bicarb greater than 45, ABG 8/12 with pCO2 93, patient was placed on BiPAP-patient not compliant with BiPAP  · Repeat ABG this am on bipap 14/6 FiO2 30%-7 359/101 2/71 9/55 3   · Patient ripped BIPAP off multiple times  · Trialed on 4LNC this am but had increased confusion  · Repeat CXR pending  · Repeat ABG at 1600  · Continue BIPAP 14/6 FIo2 30%  · Had goals of care discussion with patient and he adamantly refuses to be intubated or CPR-his code status was changed to level 3 DNR/DNI  · Pulmonary consulted appreciate recs  · Maintain O2 saturation between 88-92%    Acute on chronic combined systolic and diastolic congestive heart failure (HCC)  Assessment & Plan  Wt Readings from Last 3 Encounters:   08/13/21 79 9 kg (176 lb 2 4 oz)   08/09/21 82 1 kg (181 lb)   06/17/21 82 1 kg (181 lb)     · BNP 6,713 on admission and increased to 6,5799  · Last echo February 25, 2021 EF is 45-50% with mild dilatation of atria  · Admitted to the hospital on 08/09 for acute on chronic combined systolic and diastolic heart failure, has been aggressively diuresed with Lasix IV  · Initial chest x-ray with no consolidation, pulmonary edema, pleural effusions  · Repeat chest x-ray pending 8/13  · Cardiology consulted and following  · Has some lower extremity edema 1+  · Hold diuresis with lasix, consider giving diamox 250 mgIV BID in the setting of contraction alkalosis if breathing does not improve with biPAP  · 2 g sodium diet with fluid restriction  · Strict I&Os          Frequent PVCs  Assessment & Plan  · Review telemetry and significant PVC burden  · Status post Holter monitor as an outpatient was recommended to go [facility] for an EP evaluation-patient stated he did not want to [place] [facility]  · Cardiology consulted and appreciate input  · Recommend ischemic evaluation at some 0 4 primary cause of PVCs  · Recommend outpatient EP eval for PVCs and state no need to transfer [place] [facility]  · Continue to monitor telemetry  · Continue beta-blocker and replete electrolytes p r n  For goal K>4, Phos>3, Mg>2    Chronic obstructive pulmonary disease with acute exacerbation (HCC)  Assessment & Plan  · Most likely multifactorial  · Continue steroids, taper ordered on 08/10  · Wean oxygen wean off BiPAP as tolerated to maintain SpO2 greater than 88%    Atrial fibrillation (HCC)  Assessment & Plan  · Continue Eliquis for [facility]  · Transitioning off of Cardizem and onto beta-blocker per Cardiology team    -------------------------------------------------------------------------------------------------------------  Chief Complaint: "Acute on chronic hypercapnic and acute hypoxic respiratory failure in the setting of underlying CHF and COPD    History of Present Illness   HX and PE limited by: BiPAP   Mere Campuzano is a [de-identified] y o  male past medical history of acute on chronic reduced heart failure, atrial fibrillation on Eliquis, frequent PVCs, who presented on 8/9/2021  His admitted to the hospital for acute on chronic combined diastolic heart failure, COPD exacerbation, AFib, acute hypoxic respiratory failure  Cardiology was consulted  He was getting aggressively diuresed with IV Lasix  Yesterday was noted that he had a decrease in mentation and an  ABG was drawn which showed a pCO2 of 93 and a bicarb greater than 45  He was placed on BiPAP    However overnight patient was not compliant with BiPAP  He was trialed on 4 L nasal cannula this morning for which he had increased confusion  His ABG this morning showed a pH of 7 359, pCO2 1 1, PO2 of 71 9, and bicarb of 55 8  Pulmonary was consulted  Critical Care was also consulted for recommendations on treatment of his acute on chronic hypercapnic hypoxic respiratory failure and heart failure  History obtained from chart review and the patient   -------------------------------------------------------------------------------------------------------------  Dispo: Transfer to Stepdown Level 2    Code Status: Level 3 - DNAR and DNI  --------------------------------------------------------------------------------------------------------------  Review of Systems   Constitutional: Negative for activity change and appetite change  HENT: Negative for sore throat  Respiratory: Positive for shortness of breath  Negative for cough  Cardiovascular: Positive for leg swelling  Gastrointestinal: Negative for abdominal distention, diarrhea, nausea and vomiting  Musculoskeletal: Negative for arthralgias  Neurological: Negative for dizziness, speech difficulty, weakness and light-headedness  All other systems reviewed and are negative  A 12-point, complete review of systems was reviewed and negative except as stated above     Physical Exam  Constitutional:       General: He is not in acute distress  HENT:      Nose: Nose normal       Mouth/Throat:      Mouth: Mucous membranes are dry  Eyes:      Extraocular Movements: Extraocular movements intact  Conjunctiva/sclera: Conjunctivae normal       Pupils: Pupils are equal, round, and reactive to light  Cardiovascular:      Rate and Rhythm: Rhythm irregularly irregular  Pulses: Normal pulses  Heart sounds: No murmur heard  Pulmonary:      Effort: Pulmonary effort is normal  No respiratory distress  Breath sounds: No wheezing        Comments: diminished in bilateral bases  Abdominal:      General: Abdomen is flat  Bowel sounds are normal  There is no distension  Palpations: Abdomen is soft  Tenderness: There is no abdominal tenderness  Musculoskeletal:         General: Normal range of motion  Cervical back: Normal range of motion  Right lower le+ Pitting Edema present  Left lower le+ Pitting Edema present  Skin:     General: Skin is warm and dry  Capillary Refill: Capillary refill takes 2 to 3 seconds  Neurological:      GCS: GCS eye subscore is 4  GCS verbal subscore is 4  GCS motor subscore is 6  Cranial Nerves: Cranial nerves are intact  Sensory: Sensation is intact  Comments: Lethargic at times but easily arousable, Oriented to person, place and situation but not time  CN II-XII intact  Psychiatric:         Mood and Affect: Mood normal        --------------------------------------------------------------------------------------------------------------  Vitals:   Vitals:    21 0307 21 0648 21 0742 21 0836   BP: 124/85  125/86    Pulse: (!) 49  (!) 49    Resp: 20  22    Temp: (!) 96 7 °F (35 9 °C)  (!) 97 2 °F (36 2 °C)    TempSrc:       SpO2: 98%  97% 98%   Weight:  79 9 kg (176 lb 2 4 oz)     Height:         Temp  Min: 96 7 °F (35 9 °C)  Max: 98 2 °F (36 8 °C)  IBW (Ideal Body Weight): 70 7 kg  Height: 5' 9" (175 3 cm)  Body mass index is 26 01 kg/m²      Laboratory and Diagnostics:  Results from last 7 days   Lab Units 21  0644 21  0447 08/10/21  0600 21  1445   WBC Thousand/uL 9 24 9 09 6 41 7 27   HEMOGLOBIN g/dL 14 8 16 4 14 9 15 4   HEMATOCRIT % 49 1 52 9* 48 4 48 8   PLATELETS Thousands/uL 184 224 233 258   NEUTROS PCT % 79* 80* 91* 95*   MONOS PCT % 12 12 5 3*     Results from last 7 days   Lab Units 21  0644 21  0447 21  1343 21  1445   SODIUM mmol/L 144 147* 147* 145   POTASSIUM mmol/L 4 8 5 0 4 9 4 6   CHLORIDE mmol/L 101 101 101 100   CO2 mmol/L >45* >45* >45* 41*   ANION GAP mmol/L  --   --   --  4   BUN mg/dL 44* 53* 52* 43*   CREATININE mg/dL 0 90 1 20 1 20 1 16   CALCIUM mg/dL 9 0 9 5 9 1 9 3   GLUCOSE RANDOM mg/dL 112 130 215* 169*   ALT U/L  --   --   --  46   AST U/L  --   --   --  33   ALK PHOS U/L  --   --   --  102   ALBUMIN g/dL  --   --   --  3 7   TOTAL BILIRUBIN mg/dL  --   --   --  0 96     Results from last 7 days   Lab Units 08/09/21  1445   MAGNESIUM mg/dL 2 3      Results from last 7 days   Lab Units 08/09/21  1530   INR  1 57*   PTT seconds 36      Results from last 7 days   Lab Units 08/09/21  1445   TROPONIN I ng/mL 0 02     Results from last 7 days   Lab Units 08/09/21  1445   LACTIC ACID mmol/L 2 0     ABG:  Results from last 7 days   Lab Units 08/13/21  0552   PH ART  7 359   PCO2 ART mm Hg 101 2*   PO2 ART mm Hg 71 9*   HCO3 ART mmol/L 55 8*   BASE EXC ART mmol/L 23 7   ABG SOURCE  Radial, Left     VBG:  Results from last 7 days   Lab Units 08/13/21  0552   ABG SOURCE  Radial, Left           Micro:  Results from last 7 days   Lab Units 08/09/21  1753 08/09/21  1720   BLOOD CULTURE   --  No Growth at 72 hrs  No Growth at 72 hrs  GRAM STAIN RESULT  Rare Polys*  4+ Gram positive rods*  1+ Gram positive cocci in pairs, chains and clusters*  --    WOUND CULTURE  4+ Growth of   --        EKG: Atrial fibrillation  Imaging:  XR chest 1 view portable   ED Interpretation by Isabella Martell MD (08/09 3076)   CHF      Final Result by Donovan Avila MD (08/10 4809)      No focal consolidation, pleural effusion, or pneumothorax  Workstation performed: [id number]         XR chest portable    (Results Pending)      I have personally reviewed pertinent reports     and I have personally reviewed pertinent films in PACS      Historical Information   Past Medical History:   Diagnosis Date    BPH (benign prostatic hyperplasia)     [facility] pulmonary [facility] ([facility]D) [facility] [address]     Essential hypertension  Hard of hearing     Pt does wear hearing aids    Hypertension     Shortness of breath     Pt states not changes and it occurs with moderate exertion    Sleep disturbance     Pt states he is only sleeping about 3 hours a night  Pt is seeing his PCP on 8/5/21 to discuss    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    Tinnitus      Past Surgical History:   Procedure Laterality Date    SHOULDER OPEN ROTATOR CUFF REPAIR      VEIN SURGERY       Social History   Social History     Substance and Sexual Activity   Alcohol Use Yes    Comment: moderate consumption     Social History     Substance and Sexual Activity   Drug Use Not Currently     Social History     Tobacco Use   Smoking Status Light Tobacco Smoker    Types: Cigars   Smokeless Tobacco Never Used     Exercise History: AD Christine  Family History:   History reviewed  No pertinent family history    I have reviewed this patient's family history and commented on sigificant items within the HPI      Medications:  Current Facility-Administered Medications   Medication Dose Route Frequency    acetaminophen (TYLENOL) tablet 650 mg  650 mg Oral Q4H PRN    apixaban (ELIQUIS) tablet 5 mg  5 mg Oral BID    diltiazem (CARDIZEM CD) 24 hr capsule 240 mg  240 mg Oral Daily    docusate sodium (COLACE) capsule 100 mg  100 mg Oral BID    finasteride (PROSCAR) tablet 5 mg  5 mg Oral Daily    fluticasone-vilanterol (BREO ELLIPTA) 200-25 MCG/INH inhaler 1 puff  1 puff Inhalation Daily    levalbuterol (XOPENEX) inhalation solution 1 25 mg  1 25 mg Nebulization Q6H    And    sodium chloride 0 9 % inhalation solution 3 mL  3 mL Nebulization Q6H    magnesium oxide (MAG-OX) tablet 400 mg  400 mg Oral BID    metoprolol (LOPRESSOR) injection 5 mg  5 mg Intravenous Q6H PRN    metoprolol succinate (TOPROL-XL) 24 hr tablet 50 mg  50 mg Oral Daily    montelukast (SINGULAIR) tablet 10 mg  10 mg Oral HS    nicotine (NICODERM CQ) 14 mg/24hr TD 24 hr patch 1 patch  1 patch Transdermal Daily    potassium chloride (K-DUR,KLOR-CON) CR tablet 20 mEq  20 mEq Oral BID    predniSONE tablet 40 mg  40 mg Oral Daily    Followed by   Cecil Horowitz ON 8/15/2021] predniSONE tablet 30 mg  30 mg Oral Daily    Followed by   Marzette Lion ON 8/18/2021] predniSONE tablet 20 mg  20 mg Oral Daily    Followed by   Marzette Lion ON 8/21/2021] predniSONE tablet 10 mg  10 mg Oral Daily    tiotropium (SPIRIVA) capsule for inhaler 18 mcg  18 mcg Inhalation Daily    vancomycin (VANCOCIN) IVPB (premix in dextrose) 1,000 mg 200 mL  1,000 mg Intravenous Q12H     Home medications:  Prior to Admission Medications   Prescriptions Last Dose Informant Patient Reported? Taking? Ascorbic Acid (VITAMIN C PO) 8/8/2021 at Unknown time  Yes Yes   Sig: Take 1 tablet by mouth daily    Magnesium 500 MG CAPS 8/9/2021 at Unknown time Self Yes Yes   Sig: Take 500 mg by mouth daily   Spiriva Respimat 2 5 MCG/ACT AERS inhaler 8/9/2021 at Unknown time  Yes Yes   Sig: Inhale 2 puffs daily    apixaban (ELIQUIS) 5 mg 8/9/2021 at Unknown time  No Yes   Sig: Take 1 tablet (5 mg total) by mouth 2 (two) times a day   Patient taking differently: Take 5 mg by mouth 2 (two) times a day Pt was instructed not to stop by surgeon   bisacodyl (bisacodyl) 5 mg EC tablet   Yes Yes   Sig: Take 5 mg by mouth 2 (two) times a day   cetirizine (ZyrTEC) 10 mg tablet Unknown at Unknown time  Yes No   Sig: Take 10 mg by mouth daily   diltiazem (DILACOR XR) 240 MG 24 hr capsule 8/9/2021 at Unknown time  No Yes   Sig: Take 1 capsule (240 mg total) by mouth daily   docusate sodium (COLACE) 100 mg capsule 8/8/2021 at Unknown time  Yes Yes   Sig: Take 100 mg by mouth    finasteride (PROSCAR) 5 mg tablet 8/9/2021 at Unknown time  Yes Yes   Sig: Take 5 mg by mouth daily    fluticasone-salmeterol (ADVAIR, WIXELA) 250-50 mcg/dose inhaler 8/9/2021 at Unknown time  Yes Yes   Sig: Inhale 1 puff 2 (two) times a day Rinse mouth after use     furosemide (LASIX) 20 mg tablet 8/9/2021 at Unknown time  No Yes   Sig: Take 2 tablets (40 mg total) by mouth daily   Patient taking differently: Take 60 mg by mouth daily    montelukast (SINGULAIR) 10 mg tablet 8/9/2021 at Unknown time  Yes Yes   Sig: Take 10 mg by mouth daily at bedtime      Facility-Administered Medications: None     Allergies: Allergies   Allergen Reactions    Penicillin G Other (See Comments)     PCN Shots Only! !     ------------------------------------------------------------------------------------------------------------  Advance Directive and Living Will:      Power of :    POLST:    ------------------------------------------------------------------------------------------------------------  Care Time Delivered:   Upon my evaluation, this patient had a high probability of imminent or life-threatening deterioration due to acute on chronic hypercapnic respiratory failure, which required my direct attention, intervention, and personal management  I have personally provided 45 minutes (1115 to 1200) of critical care time, exclusive of procedures, teaching, family meetings, and any prior time recorded by providers other than myself  KAN Manning        Portions of the record may have been created with voice recognition software  Occasional wrong word or "sound a like" substitutions may have occurred due to the inherent limitations of voice recognition software    Read the chart carefully and recognize, using context, where substitutions have occurred

## 2021-08-13 NOTE — CONSULTS
Consultation - Pulmonary Medicine   Laly Rosenbaum Stanley [de-identified] y o  male MRN: [id number]  Unit/Bed#: -01 Encounter: [id number]      Physician Requesting Consult: FER Flores  Reason for Consult:  Hypoxic and hypercapnic respiratory failure    Assessment/Plan:  81-year-old male with acute on chronic hypercapnic and acute hypoxic respiratory failure in the setting of underlying congestive heart failure, COPD and atrial fibrillation  Acute hypoxic respiratory failure, chronic hypercapnic respiratory failure-occurring in the setting of underlying congestive heart failure and COPD  COPD severity unknown  Chronic metabolic alkalosis suggest chronic hypercapnia with compensatory response  COPD with exacerbation    Acute on chronic systolic and diastolic congestive Heart failure      Recommendations  -repeat chest x-ray  - a continue BiPAP therapy with current settings  Would repeat ABG in approximately 2 hours to see if there is any improvement CO2  Alternatively could also obtain baseline VBG  -would continue current bronchodilators:  Xopenex nebs  Could consider addition of ipratropium and Pulmicort nebs  Would stop Breo Ellipta and Spiriva if doing so   -continue supplemental oxygen maintain saturations greater than 88%  -would continue diuresis with close monitoring of bicarbonate and renal function  -if not responding to BiPAP therapy consider giving Diamox to see if that promotes ventilation  Would give 250 mg b i d   Closely monitor renal function and blood gas  -certainly at risk for intubation if he fails the above treatments  -will need outpatient sleep study once recovered  -will need outpatient pulmonary follow-up with PFTs  -patient has been transition to level 3 code status    ____________________________________________________________    HPI:    Foster Nazario is a [de-identified] y o  male history of congestive heart failure, atrial fibrillation and COPD who presented to the hospital on August 9th due to shortness of breath  He has been treated for acute on chronic heart failure, atrial fibrillation, acute hypoxic respiratory failure and for a COPD exacerbation  Patient has had continued hypoxia with saturation 74% -75% on room air  He is requiring 4 L supplemental oxygen  ABG yesterday showed a pCO2 93 bicarb greater than 45  He was placed on BiPAP  Pulmonary service is consulted for further recommendations  Currently being treated for acute on chronic heart failure with reduced EF of 45-50%  ABG from this morning showed pH 7 359, pCO2 101 PO2 71 9, bicarb 55 8  When I assessed the patient this morning he was intermittently somnolent  His wife is at his bedside and able to give me more of his medical history  He served in [employer] and was deployed to [facility]   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  He quit smoking a few months ago  He has been short of breath  He does not have a significant cough  His lower extremity edema is improving  He does not have a outpatient pulmonologist       PFT results:  None on file    Review of Systems:    Full 12 point review of systems was performed  Aside from what was mentioned in the HPI, it is otherwise negative  Historical Information   Past Medical History:   Diagnosis Date    BPH (benign prostatic hyperplasia)     Chronic obstructive pulmonary disease (COPD) ([address] )     Essential hypertension     Hard of hearing     Pt does wear hearing aids    Hypertension     Shortness of breath     Pt states not changes and it occurs with moderate exertion    Sleep disturbance     Pt states he is only sleeping about 3 hours a night    Pt is seeing his PCP on 8/5/21 to discuss    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    Tinnitus      Past Surgical History:   Procedure Laterality Date    SHOULDER OPEN ROTATOR CUFF REPAIR      VEIN SURGERY       Social History   Social History     Substance and Sexual Activity   Alcohol Use Yes    Comment: moderate consumption     Social History     Tobacco Use   Smoking Status Light Tobacco Smoker    Types: Cigars   Smokeless Tobacco Never Used       Occupational history:  Served in the [employer] during [place] [employer]  Various jobs mostly involving office work      Family History:   History reviewed  No pertinent family history  Medications: The patient's active and prehospital medications were reviewed    Current Facility-Administered Medications   Medication Dose Route Frequency Provider Last Rate    acetaminophen  650 mg Oral Q4H PRN Aleksandra Diamond MD      apixaban  5 mg Oral BID Aleksandra Diamond MD      diltiazem  240 mg Oral Daily Aleksandra Diamond MD      docusate sodium  100 mg Oral BID Aleksandra Diamond MD      finasteride  5 mg Oral Daily Aleksandra Diamond MD      fluticasone-vilanterol  1 puff Inhalation Daily Aleksandra Diamond MD      levalbuterol  1 25 mg Nebulization Q6H Zaire Joseph MD      And    sodium chloride  3 mL Nebulization Q6H Zaire Joseph MD      magnesium oxide  400 mg Oral BID Aleksandra Diamond MD      metoprolol  5 mg Intravenous Q6H PRN Aleksandra Diamond MD      metoprolol succinate  50 mg Oral Daily Tino Ansari PA-C      montelukast  10 mg Oral HS Aleksandra Diamond MD      nicotine  1 patch Transdermal Daily Aleksandra Diamond MD      potassium chloride  20 mEq Oral BID Tino Ansari PA-C      predniSONE  40 mg Oral Daily KAN Uriostegui      Followed by   Javier Aguilar ON 8/15/2021] predniSONE  30 mg Oral Daily KAN Uriostegui      Followed by   Javier Aguilar ON 8/18/2021] predniSONE  20 mg Oral Daily KAN Uriostegui      Followed by   Javier Aguilar ON 8/21/2021] predniSONE  10 mg Oral Daily KAN Uriostegui      tiotropium  18 mcg Inhalation Daily Aleksandra Diamond MD      vancomycin  1,000 mg Intravenous Q12H Aleksandra Diamond MD 1,000 mg (08/13/21 0230) PhysicalExamination:  Vitals:   Vitals:    08/13/21 0307 08/13/21 0648 08/13/21 0742 08/13/21 0836   BP: 124/85  125/86    Pulse: (!) 49  (!) 49    Resp: 20  22    Temp: (!) 96 7 °F (35 9 °C)  (!) 97 2 °F (36 2 °C)    TempSrc:       SpO2: 98%  97% 98%   Weight:  79 9 kg (176 lb 2 4 oz)     Height:         Body mass index is 26 01 kg/m²  Temp  Min: 96 7 °F (35 9 °C)  Max: 98 2 °F (36 8 °C)  IBW (Ideal Body Weight): 70 7 kg    SpO2: 98 %,   SpO2 Activity: At Rest,   O2 Device: Nasal cannula    /86   Pulse (!) 49   Temp (!) 97 2 °F (36 2 °C)   Resp 22   Ht 5' 9" (1 753 m)   Wt 79 9 kg (176 lb 2 4 oz)   SpO2 98%   BMI 26 01 kg/m²     General Appearance:    Somnolent, inattentive at times, not able to supply medical history, confused   Head:    Normocephalic, without obvious abnormality, atraumatic   Eyes:    PERRL, conjunctiva/corneas clear, EOM's intact, fundi     benign, both eyes        Ears:    Normal TM's and external ear canals, both ears   Nose:   Nares normal, septum midline, mucosa normal, no drainage    or sinus tenderness   Throat:   Lips, mucosa, and tongue normal; teeth and gums normal   Neck:   Supple, symmetrical, trachea midline, no adenopathy;        thyroid:  No enlargement/tenderness/nodules; no carotid    bruit or JVD   Back:     Symmetric, no curvature, ROM normal, no CVA tenderness   Lungs:     Decreased BS bilaterally, no ronchi or significant wheezing        Heart:    Regular rate and rhythm, S1 and S2 normal, no murmur, rub    or gallop   Abdomen:     Soft, non-tender, bowel sounds active all four quadrants,     no masses, no organomegaly           Extremities:   Extremities normal, atraumatic, no cyanosis or edema   Pulses:   2+ and symmetric all extremities   Skin:   Skin color, texture, turgor normal, no rashes or lesions   Lymph nodes:   Cervical, supraclavicular, and axillary nodes normal   Neurologic:   CNII-XII intact   No focal deficits       Diagnostic Data:  CBC: Results from last 7 days   Lab Units 08/13/21  0644 08/12/21  0447 08/10/21  0600   WBC Thousand/uL 9 24 9 09 6 41   HEMOGLOBIN g/dL 14 8 16 4 14 9   HEMATOCRIT % 49 1 52 9* 48 4   PLATELETS Thousands/uL 184 224 233       CMP:   Results from last 7 days   Lab Units 08/13/21  0644 08/12/21  0447 08/11/21  1343 08/09/21  1445   SODIUM mmol/L 144 147* 147* 145   POTASSIUM mmol/L 4 8 5 0 4 9 4 6   CHLORIDE mmol/L 101 101 101 100   CO2 mmol/L >45* >45* >45* 41*   BUN mg/dL 44* 53* 52* 43*   CREATININE mg/dL 0 90 1 20 1 20 1 16   CALCIUM mg/dL 9 0 9 5 9 1 9 3   ALK PHOS U/L  --   --   --  102   ALT U/L  --   --   --  46   AST U/L  --   --   --  33         Microbiology:  Results from last 7 days   Lab Units 08/09/21  1753 08/09/21  1720   BLOOD CULTURE   --  No Growth at 72 hrs  No Growth at 72 hrs  GRAM STAIN RESULT  Rare Polys*  4+ Gram positive rods*  1+ Gram positive cocci in pairs, chains and clusters*  --    WOUND CULTURE  4+ Growth of   --        ABG:   Lab Results   Component Value Date    PHART 7 359 08/13/2021    MWL0UBV 101 2 (HH) 08/13/2021    PO2ART 71 9 (L) 08/13/2021    OJI4COJ 55 8 (H) 08/13/2021    BEART 23 7 08/13/2021    SOURCE Radial, Left 08/13/2021       Imaging:  Images and reports were reviewed      Chest x-ray August 9, 2021  IMPRESSION:     No focal consolidation, pleural effusion, or pneumothorax        Cardiac lab/EKG/telemetry/ECHO:   Results from last 7 days   Lab Units 08/11/21  1343 08/09/21  1445   TROPONIN I ng/mL  --  0 02   NT-PRO BNP pg/mL 7,384* 6,713*     Zenon Oseguera MD

## 2021-08-13 NOTE — ASSESSMENT & PLAN NOTE
· Continue Eliquis for [facility]   · Transitioning off of cardizem and onto BB per cardiology team

## 2021-08-13 NOTE — ASSESSMENT & PLAN NOTE
· Most likely multifactorial  · Continue steroid; taper ordered on 8/10  · Respiratory protocol   · Wean oxygen as tolerated   · Patient remained on BiPAP, Pulmonary consulted

## 2021-08-14 ENCOUNTER — APPOINTMENT (INPATIENT)
Dept: CT IMAGING | Facility: HOSPITAL | Age: 81
DRG: 291 | End: 2021-08-14
Payer: MEDICARE

## 2021-08-14 PROBLEM — G93.41 ACUTE METABOLIC ENCEPHALOPATHY: Status: ACTIVE | Noted: 2021-08-14

## 2021-08-14 LAB
ARTERIAL PATENCY WRIST A: YES
BASE EXCESS BLDA CALC-SCNC: 14.4 MMOL/L
BUN SERPL-MCNC: 39 MG/DL (ref 5–25)
CALCIUM SERPL-MCNC: 8.9 MG/DL (ref 8.3–10.1)
CHLORIDE SERPL-SCNC: 102 MMOL/L (ref 100–108)
CO2 SERPL-SCNC: >45 MMOL/L (ref 21–32)
CREAT SERPL-MCNC: 0.99 MG/DL (ref 0.6–1.3)
GFR SERPL CREATININE-BSD FRML MDRD: 72 ML/MIN/1.73SQ M
GLUCOSE SERPL-MCNC: 149 MG/DL (ref 65–140)
HCO3 BLDA-SCNC: 45.7 MMOL/L (ref 22–28)
MAGNESIUM SERPL-MCNC: 3.1 MG/DL (ref 1.6–2.6)
O2 CT BLDA-SCNC: 21.3 ML/DL (ref 16–23)
OXYHGB MFR BLDA: 97.9 % (ref 94–97)
PCO2 BLDA: 93.3 MM HG (ref 36–44)
PH BLDA: 7.31 [PH] (ref 7.35–7.45)
PO2 BLDA: 240.5 MM HG (ref 75–129)
POTASSIUM SERPL-SCNC: 4.8 MMOL/L (ref 3.5–5.3)
SODIUM SERPL-SCNC: 144 MMOL/L (ref 136–145)
SPECIMEN SOURCE: ABNORMAL

## 2021-08-14 PROCEDURE — 94760 N-INVAS EAR/PLS OXIMETRY 1: CPT

## 2021-08-14 PROCEDURE — 94640 AIRWAY INHALATION TREATMENT: CPT

## 2021-08-14 PROCEDURE — G1004 CDSM NDSC: HCPCS

## 2021-08-14 PROCEDURE — 94003 VENT MGMT INPAT SUBQ DAY: CPT

## 2021-08-14 PROCEDURE — 82805 BLOOD GASES W/O2 SATURATION: CPT | Performed by: FAMILY MEDICINE

## 2021-08-14 PROCEDURE — 70450 CT HEAD/BRAIN W/O DYE: CPT

## 2021-08-14 PROCEDURE — 93005 ELECTROCARDIOGRAM TRACING: CPT

## 2021-08-14 PROCEDURE — 80048 BASIC METABOLIC PNL TOTAL CA: CPT | Performed by: PHYSICIAN ASSISTANT

## 2021-08-14 PROCEDURE — 99233 SBSQ HOSP IP/OBS HIGH 50: CPT | Performed by: FAMILY MEDICINE

## 2021-08-14 PROCEDURE — 94660 CPAP INITIATION&MGMT: CPT

## 2021-08-14 PROCEDURE — 36600 WITHDRAWAL OF ARTERIAL BLOOD: CPT

## 2021-08-14 PROCEDURE — 71275 CT ANGIOGRAPHY CHEST: CPT

## 2021-08-14 PROCEDURE — 83735 ASSAY OF MAGNESIUM: CPT | Performed by: PHYSICIAN ASSISTANT

## 2021-08-14 RX ORDER — SODIUM CHLORIDE, SODIUM LACTATE, POTASSIUM CHLORIDE, CALCIUM CHLORIDE 600; 310; 30; 20 MG/100ML; MG/100ML; MG/100ML; MG/100ML
50 INJECTION, SOLUTION INTRAVENOUS CONTINUOUS
Status: DISCONTINUED | OUTPATIENT
Start: 2021-08-14 | End: 2021-08-15

## 2021-08-14 RX ORDER — BUDESONIDE 0.5 MG/2ML
0.5 INHALANT ORAL
Status: DISCONTINUED | OUTPATIENT
Start: 2021-08-14 | End: 2021-08-18 | Stop reason: HOSPADM

## 2021-08-14 RX ORDER — LIDOCAINE HYDROCHLORIDE 10 MG/ML
INJECTION, SOLUTION EPIDURAL; INFILTRATION; INTRACAUDAL; PERINEURAL
Status: DISCONTINUED
Start: 2021-08-14 | End: 2021-08-14 | Stop reason: WASHOUT

## 2021-08-14 RX ORDER — METHYLPREDNISOLONE SODIUM SUCCINATE 40 MG/ML
40 INJECTION, POWDER, LYOPHILIZED, FOR SOLUTION INTRAMUSCULAR; INTRAVENOUS EVERY 12 HOURS SCHEDULED
Status: DISCONTINUED | OUTPATIENT
Start: 2021-08-14 | End: 2021-08-16

## 2021-08-14 RX ORDER — LIDOCAINE HYDROCHLORIDE 10 MG/ML
INJECTION, SOLUTION EPIDURAL; INFILTRATION; INTRACAUDAL; PERINEURAL
Status: DISPENSED
Start: 2021-08-14 | End: 2021-08-15

## 2021-08-14 RX ORDER — LORAZEPAM 2 MG/ML
1 INJECTION INTRAMUSCULAR ONCE
Status: COMPLETED | OUTPATIENT
Start: 2021-08-14 | End: 2021-08-14

## 2021-08-14 RX ADMIN — IPRATROPIUM BROMIDE 0.5 MG: 0.5 SOLUTION RESPIRATORY (INHALATION) at 15:42

## 2021-08-14 RX ADMIN — VANCOMYCIN HYDROCHLORIDE 1000 MG: 1 INJECTION, SOLUTION INTRAVENOUS at 03:28

## 2021-08-14 RX ADMIN — IPRATROPIUM BROMIDE 0.5 MG: 0.5 SOLUTION RESPIRATORY (INHALATION) at 19:33

## 2021-08-14 RX ADMIN — METHYLPREDNISOLONE SODIUM SUCCINATE 40 MG: 40 INJECTION, POWDER, FOR SOLUTION INTRAMUSCULAR; INTRAVENOUS at 20:53

## 2021-08-14 RX ADMIN — LEVALBUTEROL HYDROCHLORIDE 1.25 MG: 1.25 SOLUTION, CONCENTRATE RESPIRATORY (INHALATION) at 14:44

## 2021-08-14 RX ADMIN — LEVALBUTEROL HYDROCHLORIDE 1.25 MG: 1.25 SOLUTION, CONCENTRATE RESPIRATORY (INHALATION) at 02:15

## 2021-08-14 RX ADMIN — ISODIUM CHLORIDE 3 ML: 0.03 SOLUTION RESPIRATORY (INHALATION) at 07:18

## 2021-08-14 RX ADMIN — DEXMEDETOMIDINE HYDROCHLORIDE 0.1 MCG/KG/HR: 100 INJECTION, SOLUTION INTRAVENOUS at 14:06

## 2021-08-14 RX ADMIN — IPRATROPIUM BROMIDE 0.5 MG: 0.5 SOLUTION RESPIRATORY (INHALATION) at 11:24

## 2021-08-14 RX ADMIN — LEVALBUTEROL HYDROCHLORIDE 1.25 MG: 1.25 SOLUTION, CONCENTRATE RESPIRATORY (INHALATION) at 19:33

## 2021-08-14 RX ADMIN — METHYLPREDNISOLONE SODIUM SUCCINATE 40 MG: 40 INJECTION, POWDER, FOR SOLUTION INTRAMUSCULAR; INTRAVENOUS at 11:18

## 2021-08-14 RX ADMIN — BUDESONIDE 0.5 MG: 0.5 INHALANT ORAL at 11:23

## 2021-08-14 RX ADMIN — IOHEXOL 85 ML: 350 INJECTION, SOLUTION INTRAVENOUS at 12:43

## 2021-08-14 RX ADMIN — ISODIUM CHLORIDE 3 ML: 0.03 SOLUTION RESPIRATORY (INHALATION) at 19:33

## 2021-08-14 RX ADMIN — SODIUM CHLORIDE, SODIUM LACTATE, POTASSIUM CHLORIDE, AND CALCIUM CHLORIDE 50 ML/HR: .6; .31; .03; .02 INJECTION, SOLUTION INTRAVENOUS at 20:56

## 2021-08-14 RX ADMIN — ISODIUM CHLORIDE 3 ML: 0.03 SOLUTION RESPIRATORY (INHALATION) at 02:15

## 2021-08-14 RX ADMIN — LORAZEPAM 1 MG: 2 INJECTION INTRAMUSCULAR; INTRAVENOUS at 02:00

## 2021-08-14 RX ADMIN — LEVALBUTEROL HYDROCHLORIDE 1.25 MG: 1.25 SOLUTION, CONCENTRATE RESPIRATORY (INHALATION) at 07:18

## 2021-08-14 RX ADMIN — BUDESONIDE 0.5 MG: 0.5 INHALANT ORAL at 23:20

## 2021-08-14 RX ADMIN — ISODIUM CHLORIDE 3 ML: 0.03 SOLUTION RESPIRATORY (INHALATION) at 14:44

## 2021-08-14 NOTE — ASSESSMENT & PLAN NOTE
· Reviewed telemetry and patient with significant PVC burden  · Status post Holter monitor as an OP and per review was recommended to go to [facility] for EP evaluation as an OP however patient did not want to travel all that way  · Continue telemetry  · Cardiology consulted; input appreciated; continue regimen on 8/11  · Added toprol XL 50mg daily on 8/10; ideally will attempt to titrate off of cardizem if able   · Recommend ischemic evaluation at some point to evaluate cause for PVCs  · Recommended outpatient EP evaluation for PVCs   · Will do limited 2D echo on Monday to evaluate LV function

## 2021-08-14 NOTE — ASSESSMENT & PLAN NOTE
Patient is very confused agitated and restless and somnolent at times  It is probably secondary to acute respiratory failure with hypoxia and hypercapnia  Will do CT brain to rule out any bleed as patient is also anticoagulation    Check ammonia level in the morning

## 2021-08-14 NOTE — PLAN OF CARE
Problem: Potential for Falls  Goal: Patient will remain free of falls  Description: INTERVENTIONS:  - Educate patient/family on patient safety including physical limitations  - Instruct patient to call for assistance with activity   - Consult OT/PT to assist with strengthening/mobility   - Keep Call bell within reach  - Keep bed low and locked with side rails adjusted as appropriate  - Keep care items and personal belongings within reach  - Initiate and maintain comfort rounds  - Make Fall Risk Sign visible to staff  - Apply yellow socks and bracelet for high fall risk patients  - Consider moving patient to room near nurses station  Outcome: Progressing     Problem: MOBILITY - ADULT  Goal: Maintain or return to baseline ADL function  Description: INTERVENTIONS:  -  Assess patient's ability to carry out ADLs; assess patient's baseline for ADL function and identify physical deficits which impact ability to perform ADLs (bathing, care of mouth/teeth, toileting, grooming, dressing, etc )  - Assess/evaluate cause of self-care deficits   - Assess range of motion  - Assess patient's mobility; develop plan if impaired  - Assess patient's need for assistive devices and provide as appropriate  - Encourage maximum independence but intervene and supervise when necessary  - Involve family in performance of ADLs  - Assess for home care needs following discharge   - Consider OT consult to assist with ADL evaluation and planning for discharge  - Provide patient education as appropriate  Outcome: Progressing  Goal: Maintains/Returns to pre admission functional level  Description: INTERVENTIONS:  - Perform BMAT or MOVE assessment daily    - Set and communicate daily mobility goal to care team and patient/family/caregiver     - Collaborate with rehabilitation services on mobility goals if consulted  - Out of bed for toileting  - Record patient progress and toleration of activity level   Outcome: Progressing     Problem: CARDIOVASCULAR - ADULT  Goal: Maintains optimal cardiac output and hemodynamic stability  Description: INTERVENTIONS:  - Monitor I/O, vital signs and rhythm  - Monitor for S/S and trends of decreased cardiac output  - Administer and titrate ordered vasoactive medications to optimize hemodynamic stability  - Assess quality of pulses, skin color and temperature  - Assess for signs of decreased coronary artery perfusion  - Instruct patient to report change in severity of symptoms  Outcome: Progressing  Goal: Absence of cardiac dysrhythmias or at baseline rhythm  Description: INTERVENTIONS:  - Continuous cardiac monitoring, vital signs, obtain 12 lead EKG if ordered  - Administer antiarrhythmic and heart rate control medications as ordered  - Monitor electrolytes and administer replacement therapy as ordered  Outcome: Progressing     Problem: RESPIRATORY - ADULT  Goal: Achieves optimal ventilation and oxygenation  Description: INTERVENTIONS:  - Assess for changes in respiratory status  - Assess for changes in mentation and behavior  - Position to facilitate oxygenation and minimize respiratory effort  - Oxygen administered by appropriate delivery if ordered  - Initiate smoking cessation education as indicated  - Encourage broncho-pulmonary hygiene including cough, deep breathe, Incentive Spirometry  - Assess the need for suctioning and aspirate as needed  - Assess and instruct to report SOB or any respiratory difficulty  - Respiratory Therapy support as indicated  Outcome: Progressing     Problem: METABOLIC, FLUID AND ELECTROLYTES - ADULT  Goal: Electrolytes maintained within normal limits  Description: INTERVENTIONS:  - Monitor labs and assess patient for signs and symptoms of electrolyte imbalances  - Administer electrolyte replacement as ordered  - Monitor response to electrolyte replacements, including repeat lab results as appropriate  - Instruct patient on fluid and nutrition as appropriate  Outcome: Progressing  Goal: Fluid balance maintained  Description: INTERVENTIONS:  - Monitor labs   - Monitor I/O and WT  - Instruct patient on fluid and nutrition as appropriate  - Assess for signs & symptoms of volume excess or deficit  Outcome: Progressing  Goal: Glucose maintained within target range  Description: INTERVENTIONS:  - Monitor Blood Glucose as ordered  - Assess for signs and symptoms of hyperglycemia and hypoglycemia  - Administer ordered medications to maintain glucose within target range  - Assess nutritional intake and initiate nutrition service referral as needed  Outcome: Progressing     Problem: Prexisting or High Potential for Compromised Skin Integrity  Goal: Skin integrity is maintained or improved  Description: INTERVENTIONS:  - Identify patients at risk for skin breakdown  - Assess and monitor skin integrity  - Assess and monitor nutrition and hydration status  - Monitor labs   - Assess for incontinence   - Turn and reposition patient  - Assist with mobility/ambulation  - Relieve pressure over bony prominences  - Avoid friction and shearing  - Provide appropriate hygiene as needed including keeping skin clean and dry  - Evaluate need for skin moisturizer/barrier cream  - Collaborate with interdisciplinary team   - Patient/family teaching  - Consider wound care consult   Outcome: Progressing

## 2021-08-14 NOTE — ASSESSMENT & PLAN NOTE
· Continue Eliquis for AC   If unable to take pills will convert him to Lovenox shots  · Currently on Cardizem and metoprolol however unable to take oral pills due to somnolence    Will monitor on telemetry for now and if needed will place on p r n  IV Cardizem doses for heart rate control

## 2021-08-14 NOTE — RESPIRATORY THERAPY NOTE
RT Protocol Note  Mere Campuzano [de-identified] y o  male MRN: [id number]  Unit/Bed#: -01 Encounter: [id number]    Assessment    Principal Problem:    Acute on chronic respiratory failure with hypoxia and hypercapnia (HCC)  Active Problems:    Atrial fibrillation (HCC)    Acute on chronic combined systolic and diastolic congestive heart failure (HCC)    Chronic obstructive pulmonary disease with acute exacerbation (HCC)    Cellulitis of left lower extremity    Frequent PVCs    Acute metabolic encephalopathy      Home Pulmonary Medications:         Past Medical History:   Diagnosis Date    BPH (benign prostatic hyperplasia)     Chronic obstructive pulmonary disease (COPD) ([address] )     Essential hypertension     Hard of hearing     Pt does wear hearing aids    Hypertension     Shortness of breath     Pt states not changes and it occurs with moderate exertion    Sleep disturbance     Pt states he is only sleeping about 3 hours a night    Pt is seeing his PCP on 8/5/21 to discuss    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 [address]     Pt has on the scalp    Tinnitus      Social History     Socioeconomic History    Marital status: /Civil Union     Spouse name: None    Number of children: None    Years of education: None    Highest education level: None   Occupational History    Occupation: Retired   Tobacco Use    Smoking status: Light Tobacco Smoker     Types: Cigars    Smokeless tobacco: Never Used   Vaping Use    Vaping Use: Never used   Substance and Sexual Activity    Alcohol use: Yes     Comment: moderate consumption    Drug use: Not Currently    Sexual activity: Yes   Other Topics Concern    None   Social History Narrative    None     Social Determinants of Health     Financial Resource Strain:     Difficulty of Paying Living Expenses:    Food Insecurity:     Worried About Running Out of Food in the Last Year:     Jefry of Food in the Last Year:    Transportation Needs: No Transportation Needs    Lack of Transportation (Medical): No    Lack of Transportation (Non-Medical): No   Physical Activity:     Days of Exercise per Week:     Minutes of Exercise per Session:    Stress:     Feeling of Stress :    Social Connections:     Frequency of Communication with Friends and Family:     Frequency of Social Gatherings with Friends and Family:     Attends Congregational Services:     Active Member of Clubs or Organizations:     Attends Club or Organization Meetings:     Marital Status:    Intimate Partner Violence:     Fear of Current or Ex-Partner:     Emotionally Abused:     Physically Abused:     Sexually Abused:        Subjective         Objective    Physical Exam:   O2 Device: (P) Bipap 40%    Vitals:  Blood pressure 105/57, pulse 88, temperature 97 6 °F (36 4 °C), temperature source Temporal, resp  rate 16, height 5' 9" (1 753 m), weight 81 5 kg (179 lb 10 8 oz), SpO2 97 %  Results from last 7 days   Lab Units 08/14/21  0956 08/13/21  0552   PH ART  7 308* 7 359   PCO2 ART mm Hg 93 3* 101 2*   PO2 ART mm Hg 240 5* 71 9*   HCO3 ART mmol/L 45 7* 55 8*   BASE EXC ART mmol/L 14 4 23 7   O2 CONTENT ART mL/dL 21 3 19 1   O2 HGB, ARTERIAL % 97 9* 92 4*   ABG SOURCE  Radial, Left Radial, Left   CHRISTIAN TEST  Yes Yes   NON VENT ROOM AIR %  --  30       Imaging and other studies: I have personally reviewed pertinent reports  O2 Device: (P) Bipap 40%     Plan    Respiratory Plan: Mild Distress pathway        Resp Comments: Pt placed back on Bipap at 0645 then briefly taken off for oral meds  ABG drawn at 0950 , CT order  Upon return from CT pt placed back on bipap, Setting ordered per Dr Marie Pittman ar as follows  20/8 40% and rate of 14   Pt to remain on Cont bipap   Additional nebs  ordered   Pt continues to become agitated at time

## 2021-08-14 NOTE — PROGRESS NOTES
Vancomycin IV Pharmacy-to-Dose Consultation    Mónica Harrison is a [de-identified] y o  male who is currently receiving Vancomycin IV with management by the Pharmacy Consult service  Assessment/Plan:  The patient was reviewed  Renal function is stable and no signs or symptoms of nephrotoxicity and/or infusion reactions were documented in the chart  Based on todays assessment, continue current vancomycin (day # 6) dosing of 1000 mg IV q 12 hrs, with a plan for trough to be drawn at 1430 on 08/19/2021  We will continue to follow the patients culture results and clinical progress daily      Valentina Wolf, Pharmacist

## 2021-08-14 NOTE — ASSESSMENT & PLAN NOTE
Wt Readings from Last 3 Encounters:   08/13/21 81 5 kg (179 lb 10 8 oz)   08/09/21 82 1 kg (181 lb)   06/17/21 82 1 kg (181 lb)     · As evidence by HOFFMANN, respiratory failure, weight gain, edema  · As per last echocardiogram EF was 45-50%  · Managed as an OP on 60 mg of Lasix PO Daily   · Follows OP with Dr Anais Meneses   · IV diuresis per Cardiology team; transitioned to PO lasix 60mg daily 08/12- discontinued lasix for now due to hypercapnia   · Received a dose of Diamox 8/13  · Monitor intake and output and daily weights   · Cardiac 2g na/fluid restriction   · Monitor volume status closely

## 2021-08-14 NOTE — ASSESSMENT & PLAN NOTE
· As documented in the image section  · Blood cultures negative  · Wound care consultation  · Wound culture reviewed  · Initially received IV Vancomycin; will discontinue now and observe    Does not look acutely infected at this time

## 2021-08-14 NOTE — PROGRESS NOTES
Continue on BiPAP continuously and placed on Precedex drip low-dose if needed for agitation to help compliance with BiPAP use  Will repeat ABG in the morning    Discussed with patient's wife at length that if he has improvement in his CO2 levels will discuss further otherwise he is headed towards hospice

## 2021-08-14 NOTE — ASSESSMENT & PLAN NOTE
· Most likely multifactorial  · Place on Solu-Medrol 40 mg IV b i d   And discontinue oral steroids  · Placed on levalbuterol, Pulmicort and Atrovent nebulizer treatments scheduled  · Respiratory protocol   · Wean oxygen as tolerated   · Will do CTA chest for further evaluation

## 2021-08-14 NOTE — ASSESSMENT & PLAN NOTE
· Noted to be saturating 74-77% on room air, requiring 4 L of oxygen, patient is not on oxygen at home   · Multifactorial due to hypercarbia, COPD exacerbation, CHF exacerbation and probably underlying obstructive sleep apnea  · Patient not compliant with keeping the BiPAP on  Discussed with wife that will have to be sedated to some degree if he gets agitated to keep the BiPAP on as he does not want to be intubated  If there is no improvement in the next 48 hours will have evaluation for hospice care  · Will place on Precedex drip low-dose if needed for compliance of BiPAP  · BiPAP settings adjusted and will continue to adjust further  · Placed on IV steroids and regimen of levalbuterol, Pulmicort and Atrovent    · Hold diuretics for now  · Will do CT brain and CTA chest due to worsening confusion and respiratory status  · Currently to somnolent to take his medications and may need to convert him to Lovenox shots or p r n   Cardizem to help with AFib rate control and anticoagulation

## 2021-08-15 PROBLEM — I50.33 ACUTE ON CHRONIC DIASTOLIC (CONGESTIVE) HEART FAILURE (HCC): Status: ACTIVE | Noted: 2021-03-21

## 2021-08-15 LAB
ALBUMIN SERPL BCP-MCNC: 2.5 G/DL (ref 3.5–5)
ALP SERPL-CCNC: 63 U/L (ref 46–116)
ALT SERPL W P-5'-P-CCNC: 40 U/L (ref 12–78)
AMMONIA PLAS-SCNC: <10 UMOL/L (ref 11–35)
ANION GAP SERPL CALCULATED.3IONS-SCNC: 0 MMOL/L (ref 4–13)
ARTERIAL PATENCY WRIST A: YES
AST SERPL W P-5'-P-CCNC: 20 U/L (ref 5–45)
BACTERIA BLD CULT: NORMAL
BACTERIA BLD CULT: NORMAL
BASE EXCESS BLDA CALC-SCNC: 13.1 MMOL/L
BILIRUB SERPL-MCNC: 0.72 MG/DL (ref 0.2–1)
BODY TEMPERATURE: 98 DEGREES FEHRENHEIT
BUN SERPL-MCNC: 34 MG/DL (ref 5–25)
CALCIUM ALBUM COR SERPL-MCNC: 10 MG/DL (ref 8.3–10.1)
CALCIUM SERPL-MCNC: 8.8 MG/DL (ref 8.3–10.1)
CHLORIDE SERPL-SCNC: 102 MMOL/L (ref 100–108)
CO2 SERPL-SCNC: 38 MMOL/L (ref 21–32)
CREAT SERPL-MCNC: 0.96 MG/DL (ref 0.6–1.3)
ERYTHROCYTE [DISTWIDTH] IN BLOOD BY AUTOMATED COUNT: 14.6 % (ref 11.6–15.1)
GFR SERPL CREATININE-BSD FRML MDRD: 74 ML/MIN/1.73SQ M
GLUCOSE SERPL-MCNC: 179 MG/DL (ref 65–140)
GLUCOSE SERPL-MCNC: 184 MG/DL (ref 65–140)
GLUCOSE SERPL-MCNC: 206 MG/DL (ref 65–140)
HCO3 BLDA-SCNC: 41.5 MMOL/L (ref 22–28)
HCT VFR BLD AUTO: 45.4 % (ref 36.5–49.3)
HGB BLD-MCNC: 14 G/DL (ref 12–17)
IPAP: 20
MCH RBC QN AUTO: 33.9 PG (ref 26.8–34.3)
MCHC RBC AUTO-ENTMCNC: 30.8 G/DL (ref 31.4–37.4)
MCV RBC AUTO: 110 FL (ref 82–98)
NON VENT- BIPAP: ABNORMAL
O2 CT BLDA-SCNC: 19.5 ML/DL (ref 16–23)
OXYHGB MFR BLDA: 92 % (ref 94–97)
PCO2 BLDA: 69.2 MM HG (ref 36–44)
PCO2 TEMP ADJ BLDA: 68.3 MM HG (ref 36–44)
PEEP MAX SETTING VENT: 8 CM[H2O]
PH BLD: 7.4 [PH] (ref 7.35–7.45)
PH BLDA: 7.4 [PH] (ref 7.35–7.45)
PLATELET # BLD AUTO: 144 THOUSANDS/UL (ref 149–390)
PMV BLD AUTO: 10 FL (ref 8.9–12.7)
PO2 BLD: 67.5 MM HG (ref 75–129)
PO2 BLDA: 68.9 MM HG (ref 75–129)
POTASSIUM SERPL-SCNC: 4.4 MMOL/L (ref 3.5–5.3)
PROT SERPL-MCNC: 5.9 G/DL (ref 6.4–8.2)
RBC # BLD AUTO: 4.13 MILLION/UL (ref 3.88–5.62)
SODIUM SERPL-SCNC: 140 MMOL/L (ref 136–145)
SPECIMEN SOURCE: ABNORMAL
VENT BIPAP FIO2: 30 %
WBC # BLD AUTO: 7.06 THOUSAND/UL (ref 4.31–10.16)

## 2021-08-15 PROCEDURE — 94760 N-INVAS EAR/PLS OXIMETRY 1: CPT

## 2021-08-15 PROCEDURE — 82948 REAGENT STRIP/BLOOD GLUCOSE: CPT

## 2021-08-15 PROCEDURE — 80053 COMPREHEN METABOLIC PANEL: CPT | Performed by: FAMILY MEDICINE

## 2021-08-15 PROCEDURE — 82805 BLOOD GASES W/O2 SATURATION: CPT | Performed by: FAMILY MEDICINE

## 2021-08-15 PROCEDURE — 94640 AIRWAY INHALATION TREATMENT: CPT

## 2021-08-15 PROCEDURE — 99233 SBSQ HOSP IP/OBS HIGH 50: CPT | Performed by: FAMILY MEDICINE

## 2021-08-15 PROCEDURE — 82140 ASSAY OF AMMONIA: CPT | Performed by: FAMILY MEDICINE

## 2021-08-15 PROCEDURE — 36600 WITHDRAWAL OF ARTERIAL BLOOD: CPT

## 2021-08-15 PROCEDURE — 94660 CPAP INITIATION&MGMT: CPT

## 2021-08-15 PROCEDURE — 85027 COMPLETE CBC AUTOMATED: CPT | Performed by: FAMILY MEDICINE

## 2021-08-15 RX ORDER — METOPROLOL SUCCINATE 25 MG/1
25 TABLET, EXTENDED RELEASE ORAL DAILY
Status: DISCONTINUED | OUTPATIENT
Start: 2021-08-15 | End: 2021-08-16

## 2021-08-15 RX ORDER — LANOLIN ALCOHOL/MO/W.PET/CERES
3 CREAM (GRAM) TOPICAL ONCE
Status: COMPLETED | OUTPATIENT
Start: 2021-08-15 | End: 2021-08-15

## 2021-08-15 RX ADMIN — METOPROLOL SUCCINATE 25 MG: 25 TABLET, EXTENDED RELEASE ORAL at 10:36

## 2021-08-15 RX ADMIN — DOCUSATE SODIUM 100 MG: 100 CAPSULE ORAL at 17:28

## 2021-08-15 RX ADMIN — LEVALBUTEROL HYDROCHLORIDE 1.25 MG: 1.25 SOLUTION, CONCENTRATE RESPIRATORY (INHALATION) at 02:33

## 2021-08-15 RX ADMIN — ISODIUM CHLORIDE 3 ML: 0.03 SOLUTION RESPIRATORY (INHALATION) at 13:34

## 2021-08-15 RX ADMIN — ISODIUM CHLORIDE 3 ML: 0.03 SOLUTION RESPIRATORY (INHALATION) at 19:56

## 2021-08-15 RX ADMIN — LEVALBUTEROL HYDROCHLORIDE 1.25 MG: 1.25 SOLUTION, CONCENTRATE RESPIRATORY (INHALATION) at 19:56

## 2021-08-15 RX ADMIN — IPRATROPIUM BROMIDE 0.5 MG: 0.5 SOLUTION RESPIRATORY (INHALATION) at 13:34

## 2021-08-15 RX ADMIN — MELATONIN TAB 3 MG 3 MG: 3 TAB at 23:56

## 2021-08-15 RX ADMIN — LEVALBUTEROL HYDROCHLORIDE 1.25 MG: 1.25 SOLUTION, CONCENTRATE RESPIRATORY (INHALATION) at 07:13

## 2021-08-15 RX ADMIN — APIXABAN 5 MG: 5 TABLET, FILM COATED ORAL at 09:02

## 2021-08-15 RX ADMIN — LEVALBUTEROL HYDROCHLORIDE 1.25 MG: 1.25 SOLUTION, CONCENTRATE RESPIRATORY (INHALATION) at 13:34

## 2021-08-15 RX ADMIN — Medication 400 MG: at 09:02

## 2021-08-15 RX ADMIN — METHYLPREDNISOLONE SODIUM SUCCINATE 40 MG: 40 INJECTION, POWDER, FOR SOLUTION INTRAMUSCULAR; INTRAVENOUS at 09:01

## 2021-08-15 RX ADMIN — BUDESONIDE 0.5 MG: 0.5 INHALANT ORAL at 07:13

## 2021-08-15 RX ADMIN — BUDESONIDE 0.5 MG: 0.5 INHALANT ORAL at 19:56

## 2021-08-15 RX ADMIN — INSULIN LISPRO 1 UNITS: 100 INJECTION, SOLUTION INTRAVENOUS; SUBCUTANEOUS at 16:31

## 2021-08-15 RX ADMIN — INSULIN LISPRO 1 UNITS: 100 INJECTION, SOLUTION INTRAVENOUS; SUBCUTANEOUS at 12:00

## 2021-08-15 RX ADMIN — DILTIAZEM HYDROCHLORIDE 240 MG: 240 CAPSULE, COATED, EXTENDED RELEASE ORAL at 09:02

## 2021-08-15 RX ADMIN — IPRATROPIUM BROMIDE 0.5 MG: 0.5 SOLUTION RESPIRATORY (INHALATION) at 07:13

## 2021-08-15 RX ADMIN — IPRATROPIUM BROMIDE 0.5 MG: 0.5 SOLUTION RESPIRATORY (INHALATION) at 19:56

## 2021-08-15 RX ADMIN — METHYLPREDNISOLONE SODIUM SUCCINATE 40 MG: 40 INJECTION, POWDER, FOR SOLUTION INTRAMUSCULAR; INTRAVENOUS at 20:35

## 2021-08-15 RX ADMIN — APIXABAN 5 MG: 5 TABLET, FILM COATED ORAL at 17:28

## 2021-08-15 RX ADMIN — DOCUSATE SODIUM 100 MG: 100 CAPSULE ORAL at 09:02

## 2021-08-15 RX ADMIN — FINASTERIDE 5 MG: 5 TABLET, FILM COATED ORAL at 09:02

## 2021-08-15 RX ADMIN — ISODIUM CHLORIDE 3 ML: 0.03 SOLUTION RESPIRATORY (INHALATION) at 02:33

## 2021-08-15 RX ADMIN — ISODIUM CHLORIDE 3 ML: 0.03 SOLUTION RESPIRATORY (INHALATION) at 07:13

## 2021-08-15 NOTE — RESPIRATORY THERAPY NOTE
RT Protocol Note  Jojo Amaya [de-identified] y o  male MRN: [id number]  Unit/Bed#: -01 Encounter: [id number]    Assessment    Principal Problem:    Acute on chronic respiratory failure with hypoxia and hypercapnia (HCC)  Active Problems:    Atrial fibrillation (HCC)    Acute on chronic combined systolic and diastolic congestive heart failure (HCC)    Chronic obstructive pulmonary disease with acute exacerbation (HCC)    Cellulitis of left lower extremity    Frequent PVCs    Acute metabolic encephalopathy      Home Pulmonary Medications:         Past Medical History:   Diagnosis Date    BPH (benign prostatic hyperplasia)     Chronic obstructive pulmonary disease (COPD) ([address] )     Essential hypertension     Hard of hearing     Pt does wear hearing aids    Hypertension     Shortness of breath     Pt states not changes and it occurs with moderate exertion    Sleep disturbance     Pt states he is only sleeping about 3 hours a night    Pt is seeing his PCP on 8/5/21 to discuss    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    Tinnitus      Social History     Socioeconomic History    Marital status: /Civil Union     Spouse name: None    Number of children: None    Years of education: None    Highest education level: None   Occupational History    Occupation: Retired   Tobacco Use    Smoking status: Light Tobacco Smoker     Types: Cigars    Smokeless tobacco: Never Used   Vaping Use    Vaping Use: Never used   Substance and Sexual Activity    Alcohol use: Yes     Comment: moderate consumption    Drug use: Not Currently    Sexual activity: Yes   Other Topics Concern    None   Social History Narrative    None     Social Determinants of Health     Financial Resource Strain:     Difficulty of Paying Living Expenses:    Food Insecurity:     Worried About Running Out of Food in the Last Year:     Jefry of Food in the Last Year:    Transportation Needs: No Transportation Needs    Lack of Transportation (Medical): No    Lack of Transportation (Non-Medical): No   Physical Activity:     Days of Exercise per Week:     Minutes of Exercise per Session:    Stress:     Feeling of Stress :    Social Connections:     Frequency of Communication with Friends and Family:     Frequency of Social Gatherings with Friends and Family:     Attends Taoist Services:     Active Member of Clubs or Organizations:     Attends Club or Organization Meetings:     Marital Status:    Intimate Partner Violence:     Fear of Current or Ex-Partner:     Emotionally Abused:     Physically Abused:     Sexually Abused:        Subjective         Objective    Physical Exam:   Assessment Type: During-treatment  General Appearance: Awake  Respiratory Pattern: Normal  Chest Assessment: Chest expansion symmetrical  Bilateral Breath Sounds: Diminished  O2 Device: BP  @ 40%    Vitals:  Blood pressure 113/84, pulse 97, temperature 98 °F (36 7 °C), temperature source Temporal, resp  rate 17, height 5' 9" (1 753 m), weight 81 5 kg (179 lb 10 8 oz), SpO2 94 %  Results from last 7 days   Lab Units 08/15/21  0523 08/13/21  0552   PH ART  7 396 7 359   PCO2 ART mm Hg 69 2* 101 2*   PO2 ART mm Hg 68 9* 71 9*   HCO3 ART mmol/L 41 5* 55 8*   BASE EXC ART mmol/L 13 1 23 7   O2 CONTENT ART mL/dL 19 5 19 1   O2 HGB, ARTERIAL % 92 0* 92 4*   ABG SOURCE  Artery Radial, Left   CHRISTIAN TEST  Yes Yes   NON VENT ROOM AIR %  --  30       Imaging and other studies: I have personally reviewed pertinent reports  O2 Device: BP  @ 40%     Plan    Respiratory Plan: Mild Distress pathway        Resp Comments: fio2 titrated to 30% at this time with Sp02 ~ 100%  Pt supported on BP through the night, morning  Abg results obtained on BP settings of 20/8 rate of 14 and 30%

## 2021-08-15 NOTE — PROGRESS NOTES
[address]  Progress Note - Sixto Alvarez 1940, [de-identified] y o  male MRN: [id number]  Unit/Bed#: -01 Encounter: [id number]  Primary Care Provider: Miriam Stinson MD   Date and time admitted to hospital: 8/9/2021  2:34 PM    * Acute on chronic respiratory failure with hypoxia and hypercapnia ([address] )  Assessment & Plan    · Multifactorial due to worsening hypercarbia, COPD exacerbation, CHF exacerbation with some concern for over-diuresis and probably underlying obstructive sleep apnea  Patient is not on any oxygen or BiPAP or CPAP at home  · Patient not compliant with keeping the BiPAP on  Discussed with wife that will have to be sedated to some degree if he gets agitated to keep the BiPAP on as he does not want to be intubated  If there is no improvement in the next 48 hours will have evaluation for hospice care  · Fortunately was compliant with BiPAP over the last 18 hours and his CO2 has improved from 93-69  · Placed on IV steroids and regimen of levalbuterol, Pulmicort and Atrovent    · Hold diuretics for now  CO2 and bicarb is decreasing  Appears to be euvolemic  · CT brain negative and CTA chest shows emphysema and pulmonary hypertension  · Patient is awake, alert and oriented today and is down to 1 L of oxygen this morning  Giving him a break from the BiPAP  Discussed with patient and wife at bedside need to be very compliant with BiPAP use  Will use again during the day for 4-5 hours and then again overnight for good 12 hours and recheck BMP and ABG in the morning  · Will probably go home with BiPAP use and try to qualify him for the BiPAP machine  · Keep on oxygen via nasal cannula to titrate to keep pulse ox between 88-92%  Avoid higher oxygen saturations as he is a known CO2 retainer    Acute metabolic encephalopathy  Assessment & Plan  It is probably secondary to acute respiratory failure with hypoxia and hypercapnia     CT brain negative for any acute pathology or bleed  Neg ammonia level   Patient was very somnolent confused 8/14  Placed on a prolonged period of time on BiPAP following which he is awake alert and oriented today    Acute on chronic diastolic (congestive) heart failure (HCC)  Assessment & Plan  Wt Readings from Last 3 Encounters:   08/13/21 81 5 kg (179 lb 10 8 oz)   08/09/21 82 1 kg (181 lb)   06/17/21 82 1 kg (181 lb)     · As per last echocardiogram EF was 45-50%  · Managed as an OP on 60 mg of Lasix PO Daily   · Follows OP with Dr Clarissa Lawrence   · IV diuresis per Cardiology team; transitioned to PO lasix 60mg daily 08/12- discontinued lasix for now due to hypercapnia   · Received a dose of Diamox 8/13  · Monitor intake and output and daily weights   · Cardiac 2g na/fluid restriction   · Monitor volume status closely   Currently appears to be euvolemic        Frequent PVCs  Assessment & Plan  · Reviewed telemetry and patient with significant PVC burden  · Status post Holter monitor as an OP and per review was recommended to go to [facility] for EP evaluation as an OP however patient did not want to travel all that way  · Continue telemetry  · Cardiology consulted; input appreciated; continue regimen on 8/11  · Added toprol XL 50mg daily on 8/10; ideally will attempt to titrate off of cardizem if able   · Recommend ischemic evaluation at some point to evaluate cause for PVCs  · Recommended outpatient EP evaluation for PVCs   · Will do limited 2D echo on Monday to evaluate LV function    Cellulitis of left lower extremity  Assessment & Plan  · As documented in the image section  · Blood cultures negative  · Wound care consultation  · Wound culture reviewed  · Initially received IV Vancomycin; will discontinue now and observe    Does not look acutely infected at this time    Chronic obstructive pulmonary disease with acute exacerbation (HCC)  Assessment & Plan  · Most likely multifactorial with COPD, underlying emphysema and pulmonary hypertension  · Place on Solu-Medrol 40 mg IV b i d  And discontinue oral steroids  · Placed on levalbuterol, Pulmicort and Atrovent nebulizer treatments scheduled  · Respiratory protocol   · Wean oxygen down to keep pulse ox between 88-92%  · Probably need home BiPAP machine upon discharge and outpatient sleep study to be done    Atrial fibrillation ([facility] [address] )  Assessment & Plan  · Continue Eliquis for [facility]   · Currently on Cardizem and metoprolol  Noted to have PVCs on the monitor  · Discontinue telemetry      VTE Pharmacologic Prophylaxis:   Pharmacologic: Apixaban (Eliquis)  Mechanical VTE Prophylaxis in Place: Yes    Patient Centered Rounds: I have performed bedside rounds with nursing staff today  Discussions with Specialists or Other Care Team Provider:  None    Education and Discussions with Family / Patient:  Discussed with patient and wife at bedside    Time Spent for Care: 45 minutes  More than 50% of total time spent on counseling and coordination of care as described above  Current Length of Stay: 6 day(s)    Current Patient Status: Inpatient   Certification Statement: The patient will continue to require additional inpatient hospital stay due to Acute metabolic encephalopathy    Discharge Plan:  Downgrade to med surg today  Discontinue telemetry  Will continue BiPAP for a few hours during the day and then again overnight  Use incentive spirometry during periods of time of the BiPAP  Will need studies to qualify him for home BiPAP use    Code Status: Level 3 - DNAR and DNI      Subjective:   Patient denies any chest pain  Feels better today more awake alert and oriented and having a good conversation  Denies any nausea vomiting or abdominal pain or leg pain    Denies any leg swelling    Objective:     Vitals:   Temp (24hrs), Av °F (36 7 °C), Min:97 6 °F (36 4 °C), Max:98 4 °F (36 9 °C)    Temp:  [97 6 °F (36 4 °C)-98 4 °F (36 9 °C)] 97 8 °F (36 6 °C)  HR:  [] 106  Resp:  [15-21] 21  BP: ()/(57-84) 128/64  SpO2:  [92 %-100 %] 95 %  Body mass index is 26 53 kg/m²  Input and Output Summary (last 24 hours): Intake/Output Summary (Last 24 hours) at 8/15/2021 1011  Last data filed at 8/15/2021 0953  Gross per 24 hour   Intake 719 47 ml   Output 650 ml   Net 69 47 ml       Physical Exam:     Physical Exam  Vitals and nursing note reviewed  Constitutional:       Appearance: Normal appearance  HENT:      Head: Normocephalic and atraumatic  Right Ear: External ear normal       Left Ear: External ear normal       Nose: Nose normal       Mouth/Throat:      Pharynx: Oropharynx is clear  Eyes:      Pupils: Pupils are equal, round, and reactive to light  Cardiovascular:      Rate and Rhythm: Regular rhythm  Tachycardia present  Heart sounds: Normal heart sounds  Pulmonary:      Effort: Pulmonary effort is normal       Comments: Poor air entry bilaterally with scattered expiratory wheezing noted with diminished breath sounds bilaterally  Abdominal:      General: Bowel sounds are normal       Palpations: Abdomen is soft  Tenderness: There is no abdominal tenderness  Musculoskeletal:         General: Normal range of motion  Cervical back: Normal range of motion and neck supple  Comments: Trace edema bilateral extremity with right leg worse than the left   Skin:     General: Skin is warm and dry  Capillary Refill: Capillary refill takes less than 2 seconds  Neurological:      General: No focal deficit present  Mental Status: He is alert and oriented to person, place, and time     Psychiatric:         Mood and Affect: Mood normal            Additional Data:     Labs:    Results from last 7 days   Lab Units 08/15/21  0523 08/13/21  0644   WBC Thousand/uL 7 06 9 24   HEMOGLOBIN g/dL 14 0 14 8   HEMATOCRIT % 45 4 49 1   PLATELETS Thousands/uL 144* 184   NEUTROS PCT %  --  79*   LYMPHS PCT %  --  9*   MONOS PCT %  --  12   EOS PCT %  --  0     Results from last 7 days   Lab Units 08/15/21  0523   SODIUM mmol/L 140   POTASSIUM mmol/L 4 4   CHLORIDE mmol/L 102   CO2 mmol/L 38*   BUN mg/dL 34*   CREATININE mg/dL 0 96   ANION GAP mmol/L 0*   CALCIUM mg/dL 8 8   ALBUMIN g/dL 2 5*   TOTAL BILIRUBIN mg/dL 0 72   ALK PHOS U/L 63   ALT U/L 40   AST U/L 20   GLUCOSE RANDOM mg/dL 206*     Results from last 7 days   Lab Units 08/09/21  1530   INR  1 57*             Results from last 7 days   Lab Units 08/09/21  1445   LACTIC ACID mmol/L 2 0           * I Have Reviewed All Lab Data Listed Above  * Additional Pertinent Lab Tests Reviewed: [address] Admission Reviewed    Imaging:    Imaging Reports Reviewed Today Include:  CT brain and CTA chest  Imaging Personally Reviewed by Myself Includes:  CT brain and CTA chest    Recent Cultures (last 7 days):     Results from last 7 days   Lab Units 08/09/21  1753 08/09/21  1720   BLOOD CULTURE   --  No Growth After 5 Days  No Growth After 5 Days     GRAM STAIN RESULT  Rare Polys*  4+ Gram positive rods*  1+ Gram positive cocci in pairs, chains and clusters*  --    WOUND CULTURE  4+ Growth of   --        Last 24 Hours Medication List:   Current Facility-Administered Medications   Medication Dose Route Frequency Provider Last Rate    acetaminophen  650 mg Oral Q4H PRN Nai Castaneda MD      apixaban  5 mg Oral BID Nai Castaneda MD      budesonide  0 5 mg Nebulization Q12H Michelene Kawasaki, MD      diltiazem  240 mg Oral Daily Nai Castaneda MD      docusate sodium  100 mg Oral BID Nai Castaneda MD      finasteride  5 mg Oral Daily Nai Castaneda MD      ipratropium  0 5 mg Nebulization 4x Daily Michelene Kawasaki, MD      levalbuterol  1 25 mg Nebulization Q6H Nai Castaneda MD      And    sodium chloride  3 mL Nebulization Q6H Nai Castaneda MD      methylPREDNISolone sodium succinate  40 mg Intravenous Q12H [address] Michelene Kawasaki, MD      metoprolol  5 mg Intravenous Q6H PRN Nai Castaneda MD      metoprolol succinate  25 mg Oral Daily Yahaira Beltran MD      nicotine  1 patch Transdermal Daily Joaquín Izaguirre MD          Today, Patient Was Seen By: Yahaira Beltran MD    ** Please Note: Dictation voice to text software may have been used in the creation of this document   **

## 2021-08-15 NOTE — ASSESSMENT & PLAN NOTE
Wt Readings from Last 3 Encounters:   08/13/21 81 5 kg (179 lb 10 8 oz)   08/09/21 82 1 kg (181 lb)   06/17/21 82 1 kg (181 lb)     · As per last echocardiogram EF was 45-50%  · Managed as an OP on 60 mg of Lasix PO Daily   · Follows OP with Dr Flores Johnson   · IV diuresis per Cardiology team; transitioned to PO lasix 60mg daily 08/12- discontinued lasix for now due to hypercapnia   · Received a dose of Diamox 8/13  · Monitor intake and output and daily weights   · Cardiac 2g na/fluid restriction   · Monitor volume status closely     Currently appears to be euvolemic

## 2021-08-15 NOTE — ASSESSMENT & PLAN NOTE
· Most likely multifactorial with COPD, underlying emphysema and pulmonary hypertension  · Place on Solu-Medrol 40 mg IV b i d   And discontinue oral steroids  · Placed on levalbuterol, Pulmicort and Atrovent nebulizer treatments scheduled  · Respiratory protocol   · Wean oxygen down to keep pulse ox between 88-92%  · Probably need home BiPAP machine upon discharge and outpatient sleep study to be done

## 2021-08-15 NOTE — ASSESSMENT & PLAN NOTE
· Reviewed telemetry and patient with significant PVC burden  · Status post Holter monitor as an OP and per review was recommended to go to [facility] for EP evaluation as an OP however patient did not want to travel all that way  · Continue telemetry  · Cardiology consulted; input appreciated; continue regimen on 8/11  · Added toprol XL 50mg daily on 8/10; ideally will attempt to titrate off of cardizem if able   · Recommend ischemic evaluation at some point to evaluate cause for PVCs  · Recommended outpatient EP evaluation for PVCs   · Will do limited 2D echo on Monday to evaluate LV function

## 2021-08-15 NOTE — ASSESSMENT & PLAN NOTE
· Continue Eliquis for AC   · Currently on Cardizem and metoprolol    Noted to have PVCs on the monitor  · Discontinue telemetry

## 2021-08-15 NOTE — RESPIRATORY THERAPY NOTE
RT Protocol Note  David Paulson [de-identified] y o  male MRN: [id number]  Unit/Bed#: -01 Encounter: [id number]    Assessment    Principal Problem:    Acute on chronic respiratory failure with hypoxia and hypercapnia (HCC)  Active Problems:    Atrial fibrillation (HCC)    Acute on chronic diastolic (congestive) heart failure (HCC)    Chronic obstructive pulmonary disease with acute exacerbation (HCC)    Cellulitis of left lower extremity    Frequent PVCs    Acute metabolic encephalopathy      Home Pulmonary Medications:         Past Medical History:   Diagnosis Date    BPH (benign prostatic hyperplasia)     Chronic obstructive pulmonary disease (COPD) ([address] )     Essential hypertension     Hard of hearing     Pt does wear hearing aids    Hypertension     Shortness of breath     Pt states not changes and it occurs with moderate exertion    Sleep disturbance     Pt states he is only sleeping about 3 hours a night    Pt is seeing his PCP on 8/5/21 to discuss    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    Tinnitus      Social History     Socioeconomic History    Marital status: /Civil Union     Spouse name: None    Number of children: None    Years of education: None    Highest education level: None   Occupational History    Occupation: Retired   Tobacco Use    Smoking status: Light Tobacco Smoker     Types: Cigars    Smokeless tobacco: Never Used   Vaping Use    Vaping Use: Never used   Substance and Sexual Activity    Alcohol use: Yes     Comment: moderate consumption    Drug use: Not Currently    Sexual activity: Yes   Other Topics Concern    None   Social History Narrative    None     Social Determinants of Health     Financial Resource Strain:     Difficulty of Paying Living Expenses:    Food Insecurity:     Worried About Running Out of Food in the Last Year:     Ran Out of Food in the Last Year:    Transportation Needs: No Transportation Needs    Lack of Transportation (Medical): No    Lack of Transportation (Non-Medical): No   Physical Activity:     Days of Exercise per Week:     Minutes of Exercise per Session:    Stress:     Feeling of Stress :    Social Connections:     Frequency of Communication with Friends and Family:     Frequency of Social Gatherings with Friends and Family:     Attends Buddhism Services:     Active Member of Clubs or Organizations:     Attends Club or Organization Meetings:     Marital Status:    Intimate Partner Violence:     Fear of Current or Ex-Partner:     Emotionally Abused:     Physically Abused:     Sexually Abused:        Subjective         Objective    Physical Exam:   Assessment Type: (P) During-treatment  General Appearance: (P) Awake, Drowsy  Respiratory Pattern: (P) Normal  Chest Assessment: (P) Chest expansion symmetrical  Bilateral Breath Sounds: (P) Diminished  O2 Device: (P) 6L    Vitals:  Blood pressure 128/64, pulse (!) 106, temperature 97 8 °F (36 6 °C), temperature source Temporal, resp  rate 21, height 5' 9" (1 753 m), weight 81 5 kg (179 lb 10 8 oz), SpO2 95 %  Results from last 7 days   Lab Units 08/15/21  0523 08/13/21  0552   PH ART  7 396 7 359   PCO2 ART mm Hg 69 2* 101 2*   PO2 ART mm Hg 68 9* 71 9*   HCO3 ART mmol/L 41 5* 55 8*   BASE EXC ART mmol/L 13 1 23 7   O2 CONTENT ART mL/dL 19 5 19 1   O2 HGB, ARTERIAL % 92 0* 92 4*   ABG SOURCE  Artery Radial, Left   CHRISTIAN TEST  Yes Yes   NON VENT ROOM AIR %  --  30       Imaging and other studies: I have personally reviewed pertinent reports  O2 Device: (P) 6L     Plan    Respiratory Plan: Mild Distress pathway        Resp Comments: (P) Morning abg showed some imporvement   Pt off bipap and on 6L NC    Atrovent change to Q6 to follow schedule with other treatments

## 2021-08-15 NOTE — ASSESSMENT & PLAN NOTE
· Multifactorial due to worsening hypercarbia, COPD exacerbation, CHF exacerbation with some concern for over-diuresis and probably underlying obstructive sleep apnea  Patient is not on any oxygen or BiPAP or CPAP at home  · Patient not compliant with keeping the BiPAP on  Discussed with wife that will have to be sedated to some degree if he gets agitated to keep the BiPAP on as he does not want to be intubated  If there is no improvement in the next 48 hours will have evaluation for hospice care  · Fortunately was compliant with BiPAP over the last 18 hours and his CO2 has improved from 93-69  · Placed on IV steroids and regimen of levalbuterol, Pulmicort and Atrovent    · Hold diuretics for now  CO2 and bicarb is decreasing  Appears to be euvolemic  · CT brain negative and CTA chest shows emphysema and pulmonary hypertension  · Patient is awake, alert and oriented today and is down to 1 L of oxygen this morning  Giving him a break from the BiPAP  Discussed with patient and wife at bedside need to be very compliant with BiPAP use  Will use again during the day for 4-5 hours and then again overnight for good 12 hours and recheck BMP and ABG in the morning  · Will probably go home with BiPAP use and try to qualify him for the BiPAP machine  · Keep on oxygen via nasal cannula to titrate to keep pulse ox between 88-92%    Avoid higher oxygen saturations as he is a known CO2 retainer

## 2021-08-15 NOTE — ASSESSMENT & PLAN NOTE
It is probably secondary to acute respiratory failure with hypoxia and hypercapnia  CT brain negative for any acute pathology or bleed  Neg ammonia level   Patient was very somnolent confused 8/14    Placed on a prolonged period of time on BiPAP following which he is awake alert and oriented today

## 2021-08-16 ENCOUNTER — APPOINTMENT (INPATIENT)
Dept: NON INVASIVE DIAGNOSTICS | Facility: HOSPITAL | Age: 81
DRG: 291 | End: 2021-08-16
Payer: MEDICARE

## 2021-08-16 LAB
ANION GAP SERPL CALCULATED.3IONS-SCNC: -1 MMOL/L (ref 4–13)
ARTERIAL PATENCY WRIST A: YES
BASE EXCESS BLDA CALC-SCNC: 12.9 MMOL/L
BUN SERPL-MCNC: 38 MG/DL (ref 5–25)
CALCIUM SERPL-MCNC: 8.8 MG/DL (ref 8.3–10.1)
CHLORIDE SERPL-SCNC: 101 MMOL/L (ref 100–108)
CO2 SERPL-SCNC: 42 MMOL/L (ref 21–32)
CREAT SERPL-MCNC: 1.1 MG/DL (ref 0.6–1.3)
GFR SERPL CREATININE-BSD FRML MDRD: 63 ML/MIN/1.73SQ M
GLUCOSE SERPL-MCNC: 131 MG/DL (ref 65–140)
GLUCOSE SERPL-MCNC: 142 MG/DL (ref 65–140)
HCO3 BLDA-SCNC: 41.3 MMOL/L (ref 22–28)
IPAP: 20
NON VENT- BIPAP: ABNORMAL
O2 CT BLDA-SCNC: 18.8 ML/DL (ref 16–23)
OXYHGB MFR BLDA: 94.6 % (ref 94–97)
PCO2 BLDA: 70.6 MM HG (ref 36–44)
PEEP MAX SETTING VENT: 8 CM[H2O]
PH BLDA: 7.38 [PH] (ref 7.35–7.45)
PO2 BLDA: 83.3 MM HG (ref 75–129)
POTASSIUM SERPL-SCNC: 4.6 MMOL/L (ref 3.5–5.3)
SODIUM SERPL-SCNC: 142 MMOL/L (ref 136–145)
SPECIMEN SOURCE: ABNORMAL
VENT BIPAP FIO2: 30 %

## 2021-08-16 PROCEDURE — 93308 TTE F-UP OR LMTD: CPT

## 2021-08-16 PROCEDURE — 94760 N-INVAS EAR/PLS OXIMETRY 1: CPT

## 2021-08-16 PROCEDURE — 99232 SBSQ HOSP IP/OBS MODERATE 35: CPT | Performed by: INTERNAL MEDICINE

## 2021-08-16 PROCEDURE — 93321 DOPPLER ECHO F-UP/LMTD STD: CPT | Performed by: INTERNAL MEDICINE

## 2021-08-16 PROCEDURE — 94640 AIRWAY INHALATION TREATMENT: CPT

## 2021-08-16 PROCEDURE — 93308 TTE F-UP OR LMTD: CPT | Performed by: INTERNAL MEDICINE

## 2021-08-16 PROCEDURE — 94660 CPAP INITIATION&MGMT: CPT

## 2021-08-16 PROCEDURE — 82805 BLOOD GASES W/O2 SATURATION: CPT | Performed by: FAMILY MEDICINE

## 2021-08-16 PROCEDURE — 36600 WITHDRAWAL OF ARTERIAL BLOOD: CPT

## 2021-08-16 PROCEDURE — 93325 DOPPLER ECHO COLOR FLOW MAPG: CPT | Performed by: INTERNAL MEDICINE

## 2021-08-16 PROCEDURE — 82948 REAGENT STRIP/BLOOD GLUCOSE: CPT

## 2021-08-16 PROCEDURE — 0HCLXZZ EXTIRPATION OF MATTER FROM LEFT LOWER LEG SKIN, EXTERNAL APPROACH: ICD-10-PCS | Performed by: SURGERY

## 2021-08-16 PROCEDURE — 80048 BASIC METABOLIC PNL TOTAL CA: CPT | Performed by: FAMILY MEDICINE

## 2021-08-16 PROCEDURE — 99214 OFFICE O/P EST MOD 30 MIN: CPT

## 2021-08-16 PROCEDURE — 99232 SBSQ HOSP IP/OBS MODERATE 35: CPT | Performed by: FAMILY MEDICINE

## 2021-08-16 RX ORDER — CEPHALEXIN 500 MG/1
500 CAPSULE ORAL EVERY 6 HOURS SCHEDULED
Status: DISCONTINUED | OUTPATIENT
Start: 2021-08-16 | End: 2021-08-18 | Stop reason: HOSPADM

## 2021-08-16 RX ORDER — PREDNISONE 20 MG/1
40 TABLET ORAL DAILY
Status: DISCONTINUED | OUTPATIENT
Start: 2021-08-17 | End: 2021-08-18 | Stop reason: HOSPADM

## 2021-08-16 RX ADMIN — DILTIAZEM HYDROCHLORIDE 240 MG: 240 CAPSULE, COATED, EXTENDED RELEASE ORAL at 09:54

## 2021-08-16 RX ADMIN — BUDESONIDE 0.5 MG: 0.5 INHALANT ORAL at 20:22

## 2021-08-16 RX ADMIN — DOCUSATE SODIUM 100 MG: 100 CAPSULE ORAL at 09:53

## 2021-08-16 RX ADMIN — METHYLPREDNISOLONE SODIUM SUCCINATE 40 MG: 40 INJECTION, POWDER, FOR SOLUTION INTRAMUSCULAR; INTRAVENOUS at 09:56

## 2021-08-16 RX ADMIN — FINASTERIDE 5 MG: 5 TABLET, FILM COATED ORAL at 09:54

## 2021-08-16 RX ADMIN — CEPHALEXIN 500 MG: 500 CAPSULE ORAL at 18:20

## 2021-08-16 RX ADMIN — APIXABAN 5 MG: 5 TABLET, FILM COATED ORAL at 09:53

## 2021-08-16 RX ADMIN — LEVALBUTEROL HYDROCHLORIDE 1.25 MG: 1.25 SOLUTION, CONCENTRATE RESPIRATORY (INHALATION) at 07:35

## 2021-08-16 RX ADMIN — LEVALBUTEROL HYDROCHLORIDE 1.25 MG: 1.25 SOLUTION, CONCENTRATE RESPIRATORY (INHALATION) at 20:22

## 2021-08-16 RX ADMIN — IPRATROPIUM BROMIDE 0.5 MG: 0.5 SOLUTION RESPIRATORY (INHALATION) at 07:35

## 2021-08-16 RX ADMIN — IPRATROPIUM BROMIDE 0.5 MG: 0.5 SOLUTION RESPIRATORY (INHALATION) at 14:24

## 2021-08-16 RX ADMIN — DOCUSATE SODIUM 100 MG: 100 CAPSULE ORAL at 18:20

## 2021-08-16 RX ADMIN — ISODIUM CHLORIDE 3 ML: 0.03 SOLUTION RESPIRATORY (INHALATION) at 02:26

## 2021-08-16 RX ADMIN — LEVALBUTEROL HYDROCHLORIDE 1.25 MG: 1.25 SOLUTION, CONCENTRATE RESPIRATORY (INHALATION) at 14:23

## 2021-08-16 RX ADMIN — LEVALBUTEROL HYDROCHLORIDE 1.25 MG: 1.25 SOLUTION, CONCENTRATE RESPIRATORY (INHALATION) at 02:26

## 2021-08-16 RX ADMIN — IPRATROPIUM BROMIDE 0.5 MG: 0.5 SOLUTION RESPIRATORY (INHALATION) at 20:22

## 2021-08-16 RX ADMIN — BUDESONIDE 0.5 MG: 0.5 INHALANT ORAL at 07:35

## 2021-08-16 RX ADMIN — IPRATROPIUM BROMIDE 0.5 MG: 0.5 SOLUTION RESPIRATORY (INHALATION) at 02:26

## 2021-08-16 NOTE — WOUND OSTOMY CARE
Progress Note - Wound   [place] [de-identified] y o  male MRN: [id number]  Unit/Bed#: -01 Encounter: [id number]        Assessment & Findings   Seen for wound asessment and follow up  1) Left tibia wound black, bloody drainage on maxorb and Allevyn foam, was changes twice today since Kneny Rodriguez MD, surgery is consulted  Skin care plans:  1-Hydraguard to bilateral sacrum, buttock and heels BID and PRN  2-Elevate heels to offload pressure  3-Ehob cushion in chair when out of bed  4-Moisturize skin daily with skin nourishing cream   5-Turn/reposition q2h or when medically stable for pressure re-distribution on skin  6-Cleanse posterior right tibia with NSS  Cover wound with Maxorb AG then Allevyn foam  Change daily and prn drainage  Wound 08/09/21 Head Left;Superior (Active)   Wound Image   08/16/21 1007   Wound Description Beefy red;Bleeding;Fragile 08/16/21 1007   Hali-wound Assessment Clean;Dry; Intact 08/16/21 1007   Wound Length (cm) 2 cm 08/16/21 1007   Wound Width (cm) 1 cm 08/16/21 1007   Wound Surface Area (cm^2) 2 cm^2 08/16/21 1007   Drainage Amount Small 08/16/21 1007   Drainage Description Bloody 08/13/21 1800   Dressing Dry dressing 08/16/21 1007   Dressing Status Clean;Dry; Intact 08/16/21 1007       Wound 08/09/21 Tibial Left;Posterior (Active)   Wound Image   08/16/21 1002   Wound Description Bleeding;Black; Brown;Fragile 08/16/21 1002   Hali-wound Assessment Intact 08/16/21 1002   Wound Length (cm) 5 cm 08/16/21 1002   Wound Width (cm) 3 cm 08/16/21 1002   Wound Surface Area (cm^2) 15 cm^2 08/16/21 1002   Drainage Amount Moderate 08/16/21 1002   Drainage Description Bloody 08/16/21 1002   Non-staged Wound Description Not applicable 68/93/05 7114   Treatments Site care;Elevated 08/16/21 1002   Dressing Foam, Silicon (eg  Allevyn, etc) 08/16/21 1002   Dressing Changed Changed 08/14/21 0800   Patient Tolerance Tolerated well 08/16/21 1002   Dressing Status Clean;Dry; Intact 08/16/21 1002               Call or tigertext with any questions  Wound Care will continue to follow    Glenn Lowry RN

## 2021-08-16 NOTE — ASSESSMENT & PLAN NOTE
· Multifactorial due to worsening hypercarbia, COPD exacerbation, CHF exacerbation with some concern for over-diuresis and probably underlying obstructive sleep apnea  Patient is not on any oxygen or BiPAP or CPAP at home  · Patient not compliant with keeping the BiPAP on  Discussed with wife that will have to be sedated to some degree if he gets agitated to keep the BiPAP on as he does not want to be intubated  If there is no improvement in the next 48 hours will have evaluation for hospice care  · Fortunately was compliant with BiPAP over the last 18 hours and his CO2 has improved from 93-69  · Placed on IV steroids and regimen of levalbuterol, Pulmicort and Atrovent    Now transition back to oral steroid taper  · Hold diuretics for now  CO2 and bicarb is fluctuating  Appears to be euvolemic  · CT brain negative and CTA chest shows emphysema and pulmonary hypertension  · Patient is awake, alert and oriented today and is down to 1 L of oxygen this morning at rest Discussed with patient and wife at bedside need to be very compliant with BiPAP use  Will use again during the day for 4-5 hours and then again overnight for good 12 hours and recheck BMP and ABG in the morning  · Will probably go home with BiPAP use and try to qualify him for the BiPAP machine  Also do ambulatory oxygen requirement study today  · Keep on oxygen via nasal cannula to titrate to keep pulse ox between 88-92%    Avoid higher oxygen saturations as he is a known CO2 retainer  · Agree to establish care outpatient with [facility] pulmonology

## 2021-08-16 NOTE — CONSULTS
Consultation - General Surgery   Jaime Barnes [de-identified] y o  male MRN: [id number]  Unit/Bed#: -01 Encounter: [id number]    Assessment/Plan     Assessment:  Wound hematoma left posterior calf  Patient was on Eliquis for chronic atrial fibrillation  Status post wide excision basal cell carcinoma 3 weeks ago by outpatient dermatology in [place]  Patient has a lesion left forehead, biopsy for spindle cell carcinoma  He was scheduled for outpatient wide excision next week by ENT Dr Jeanna Chatterjee:  Hold Eliquis overnight  Discussed with Dr Paloma Nicole, attending hospitalist  Left posterior calf hematoma was easily evacuated at bedside, wound bed appears clean  Wound was irrigated with 30 mL normal saline  No active bleeding  The wound undermines approximately 2 cm proximally  Wound measures 4 x 2 5 x 1 cm  Packed with half-inch iodoform, covered with sterile 4x4s ABD and six-inch Ace wrap  Will need daily packing changes, dressing change  Check CBC in a m  Apply 1 ice pack to left posterior calf  Elevate left lower extremity on 1 pillow  Hold local wound care to left lower calf, discontinue Maxorb Ag  Surgical PA will change iodoform packing tomorrow at bedside left calf  Discussed with Dr Cheko Kirk, she will evaluate the patient later, she does recommend starting the patient on Keflex q i d     Wound pictures were reviewed with her in discussion  History of Present Illness     HPI:  Jaime Barnes is a [de-identified] y o  male who presents with bleeding from the left posterior calf  Patient underwent out patient wide excision of a basal cell carcinoma 3 weeks ago by [facility] in [place]  The wound was left open to heal   Patient has been on Eliquis for chronic atrial fibrillation  He is seen in critical care unit, he was admitted with acute respiratory failure with hypoxia  His family is present with him    Initially he had acute metabolic encephalopathy believed to be secondary to hypoxia and hypercapnia PI   CT scan was negative for acute intracranial pathology or bleed  General surgical consultation has been requested at this time by the attending hospitalist physician because of suspected cellulitis of left lower extremity and bleeding  The patient did receive Eliquis earlier this morning  Also the patient has a suspicious skin lesion which was biopsied at the same time 3 weeks ago by Dermatology and 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  The patient was to have outpatient wide excision of the left forehead lesion next week with Dr Yessenia Giordano  Inpatient consult to Acute Care Surgery  Consult performed by: Maria Elena Abel PA-C  Consult ordered by: Yg Mercado MD          Review of Systems as described above  Intermittent bleeding from left posterior calf wound  Patient has been on Eliquis  Denies any chest pain or shortness of breath  No cough or recent cold symptoms  Entire review of systems pertaining to the surgical evaluation of the patient at this time is noncontributory  Patient is nonambulatory  Seen lying supine in bed  Multiple family members are present with him  Historical Information   Past Medical History:   Diagnosis Date    BPH (benign prostatic hyperplasia)     Chronic obstructive pulmonary disease (COPD) ([address] )     Essential hypertension     Hard of hearing     Pt does wear hearing aids    Hypertension     Shortness of breath     Pt states not changes and it occurs with moderate exertion    Sleep disturbance     Pt states he is only sleeping about 3 hours a night    Pt is seeing his PCP on 8/5/21 to discuss    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    Tinnitus      Past Surgical History:   Procedure Laterality Date    SHOULDER OPEN ROTATOR CUFF REPAIR      VEIN SURGERY       Social History   Social History     Substance and Sexual Activity   Alcohol Use Yes    Comment: moderate consumption     Social History Substance and Sexual Activity   Drug Use Not Currently     E-Cigarette/Vaping    E-Cigarette Use Never User      E-Cigarette/Vaping Substances     Social History     Tobacco Use   Smoking Status Light Tobacco Smoker    Types: Cigars   Smokeless Tobacco Never Used     Family History: non-contributory    Meds/Allergies   current meds:   Current Facility-Administered Medications   Medication Dose Route Frequency    acetaminophen (TYLENOL) tablet 650 mg  650 mg Oral Q4H PRN    apixaban (ELIQUIS) tablet 5 mg  5 mg Oral BID    budesonide (PULMICORT) inhalation solution 0 5 mg  0 5 mg Nebulization Q12H    diltiazem (CARDIZEM CD) 24 hr capsule 240 mg  240 mg Oral Daily    docusate sodium (COLACE) capsule 100 mg  100 mg Oral BID    finasteride (PROSCAR) tablet 5 mg  5 mg Oral Daily    ipratropium (ATROVENT) 0 02 % inhalation solution 0 5 mg  0 5 mg Nebulization Q6H    levalbuterol (XOPENEX) inhalation solution 1 25 mg  1 25 mg Nebulization Q6H    nicotine (NICODERM CQ) 14 mg/24hr TD 24 hr patch 1 patch  1 patch Transdermal Daily    [START ON 8/17/2021] predniSONE tablet 40 mg  40 mg Oral Daily     Allergies   Allergen Reactions    Penicillin G Other (See Comments)     PCN Shots Only!!       Objective   First Vitals:   Blood Pressure: 147/94 (08/09/21 1440)  Pulse: 100 (08/09/21 1440)  Temperature: 97 7 °F (36 5 °C) (08/09/21 1440)  Temp Source: Temporal (08/09/21 1440)  Respirations: 19 (08/09/21 1440)  Height: 5' 9" (175 3 cm) (08/09/21 1440)  Weight - Scale: 82 1 kg (181 lb) (08/09/21 1440)  SpO2: 99 % (08/09/21 1440)    Current Vitals:   Blood Pressure: 114/58 (08/16/21 1218)  Pulse: 98 (08/16/21 1218)  Temperature: 98 6 °F (37 °C) (08/16/21 1218)  Temp Source: Tympanic (08/16/21 1218)  Respirations: 22 (08/16/21 1218)  Height: 5' 9" (175 3 cm) (08/09/21 1440)  Weight - Scale: 81 5 kg (179 lb 10 8 oz) (08/13/21 1700)  SpO2: 93 % (08/16/21 1218)      Intake/Output Summary (Last 24 hours) at 8/16/2021 3699 [facility] filed at 8/16/2021 1130  Gross per 24 hour   Intake 920 ml   Output 850 ml   Net 70 ml       Invasive Devices     Peripheral Intravenous Line            Peripheral IV 08/13/21 Dorsal (posterior); Right Forearm 3 days    Peripheral IV 08/14/21 Left Antecubital 2 days                Physical Exam     Awaked, he is oriented  Speech is clear  Mental status seems appropriate  ENT clear  Head the patient has a 2 cm oval raised purplish- red suspicious lesion of his left forehead without active bleeding  Neck supple  No increased JVP  Chest symmetric  Heart regular rate and rhythm  No murmur or gallop is heard  Lungs diminished breath sounds  Some wheezing  Abdomen positive bowel sounds heard, soft nondistended  Nontender  Extremities the left lower leg dressing was saturated with old brown blood  The patient was rotated to his right side for wound inspection  The dressing was removed  The cavity was filled with old formed blood clot  The hematoma formation was soft and easily expressed  The patient had approximately a half cup of old blood clot which was evacuated at bedside, the wound undermined proximally approximately 2 cm  There is some surrounding wound edge erythema, not particularly tender to touch  The wound measured 4 x 2 5 x 1 cm  The area was irrigated with 30 mL of normal saline  No active bleeding was identified  The left calf muscles were soft and supple  No evidence of any wound ischemia or suggestion of any compartment syndrome  DP and PT pulses were palpable  The left posterior calf wound was packed with half-inch iodoform, covered with sterile 4x4s, ABD and 6 in Ace wrap  Lab Results:   I have personally reviewed pertinent lab results    , CBC: No results found for: WBC, HGB, HCT, MCV, PLT, ADJUSTEDWBC, MCH, MCHC, RDW, MPV, NRBC, CMP:   Lab Results   Component Value Date    SODIUM 142 08/16/2021    K 4 6 08/16/2021     08/16/2021    CO2 42 (H) 08/16/2021    BUN 38 (H) 08/16/2021    CREATININE 1 10 08/16/2021    CALCIUM 8 8 08/16/2021    EGFR 63 08/16/2021   , Coagulation: No results found for: PT, INR, APTT  Imaging: I have personally reviewed pertinent reports  EKG, Pathology, and Other Studies: I have personally reviewed pertinent reports  Counseling / Coordination of Care  Total floor / unit time spent today 30 minutes  Greater than 50% of total time was spent with the patient and / or family counseling and / or coordination of care  A description of the counseling / coordination of care:  Review of medical history, examination patient, wound care, evacuation of hematoma at bedside, placement of wound packing and dressing, discussion with the attending medical physician, also personal discussion with Dr Bernardo Berger from General surgery who also see the patient in general surgical consultation and evaluation at this time        JULIA Flanagan PA-C

## 2021-08-16 NOTE — RESPIRATORY THERAPY NOTE
RT Protocol Note  Servando Nageotte [de-identified] y o  male MRN: [id number]  Unit/Bed#: -01 Encounter: [id number]    Assessment    Principal Problem:    Acute on chronic respiratory failure with hypoxia and hypercapnia (HCC)  Active Problems:    Atrial fibrillation (HCC)    Acute on chronic diastolic (congestive) heart failure (HCC)    Chronic obstructive pulmonary disease with acute exacerbation (HCC)    Cellulitis of left lower extremity    Frequent PVCs    Acute metabolic encephalopathy      Home Pulmonary Medications:       Past Medical History:   Diagnosis Date    BPH (benign prostatic hyperplasia)     Chronic obstructive pulmonary disease (COPD) ([address] )     Essential hypertension     Hard of hearing     Pt does wear hearing aids    Hypertension     Shortness of breath     Pt states not changes and it occurs with moderate exertion    Sleep disturbance     Pt states he is only sleeping about 3 hours a night    Pt is seeing his PCP on 8/5/21 to discuss    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 [address]     Pt has on the scalp    Tinnitus      Social History     Socioeconomic History    Marital status: /Civil Union     Spouse name: None    Number of children: None    Years of education: None    Highest education level: None   Occupational History    Occupation: Retired   Tobacco Use    Smoking status: Light Tobacco Smoker     Types: Cigars    Smokeless tobacco: Never Used   Vaping Use    Vaping Use: Never used   Substance and Sexual Activity    Alcohol use: Yes     Comment: moderate consumption    Drug use: Not Currently    Sexual activity: Yes   Other Topics Concern    None   Social History Narrative    None     Social Determinants of Health     Financial Resource Strain:     Difficulty of Paying Living Expenses:    Food Insecurity:     Worried About Running Out of Food in the Last Year:     Ran Out of Food in the Last Year:    Transportation Needs: No Transportation Needs    Lack of Transportation (Medical): No    Lack of Transportation (Non-Medical): No   Physical Activity:     Days of Exercise per Week:     Minutes of Exercise per Session:    Stress:     Feeling of Stress :    Social Connections:     Frequency of Communication with Friends and Family:     Frequency of Social Gatherings with Friends and Family:     Attends [employer] Services:     Active Member of Clubs or Organizations:     Attends Club or Organization Meetings:     Marital Status:    Intimate Partner Violence:     Fear of Current or Ex-Partner:     Emotionally Abused:     Physically Abused:     Sexually Abused:        Subjective         Objective    Physical Exam:   Assessment Type: Assess only  General Appearance: Sleeping  Respiratory Pattern: Normal  Chest Assessment: Chest expansion symmetrical  Bilateral Breath Sounds: Diminished  O2 Device: Bipap 20/8 and 30%    Vitals:  Blood pressure 122/58, pulse 55, temperature 97 8 °F (36 6 °C), temperature source Temporal, resp  rate 18, height 5' 9" (1 753 m), weight 81 5 kg (179 lb 10 8 oz), SpO2 99 %  Results from last 7 days   Lab Units 08/16/21  0507 08/13/21  0552   PH ART  7 385 7 359   PCO2 ART mm Hg 70 6* 101 2*   PO2 ART mm Hg 83 3 71 9*   HCO3 ART mmol/L 41 3* 55 8*   BASE EXC ART mmol/L 12 9 23 7   O2 CONTENT ART mL/dL 18 8 19 1   O2 HGB, ARTERIAL % 94 6 92 4*   ABG SOURCE  Radial, Left Radial, Left   CHRISITAN TEST  Yes Yes   NON VENT ROOM AIR %  --  30       Imaging and other studies: I have personally reviewed pertinent reports  O2 Device: Bipap 20/8 and 30%     Plan    Respiratory Plan: Mild Distress pathway        Resp Comments: Pt stable on bipap support of 20/8 and 30% during hs, no decline in saturations, Sp02 averaging 97%, Morning abg completed on noted BP settings

## 2021-08-16 NOTE — PROGRESS NOTES
Progress Note:Cardiology  Babar Yi 1940, [de-identified] y o  male MRN: [id number]    Unit/Bed#: -01 Encounter: [id number]  Attending Physician: Mariela Flowers MD   Primary Care Provider: Fausto Interiano MD   Date admitted to hospital: 8/9/2021  Length of stay: 7         No new Assessment & Plan notes have been filed under this hospital service since the last note was generated  Service: Cardiology      Subjective:   Patient seen and examined  No significant events overnight  Patient feels well this am  Still on 2 liters of oxygen  No chest pain  He feels his breathing is significantly improved from admission  Still has leg edema but improved  Review of Systems   Constitutional: Positive for malaise/fatigue  Negative for diaphoresis and weight gain  HENT: Negative for hearing loss and tinnitus  Cardiovascular: Positive for dyspnea on exertion and leg swelling  Negative for chest pain, claudication, orthopnea and palpitations  Respiratory: Negative for cough, shortness of breath and snoring  Gastrointestinal: Negative for abdominal pain, nausea and vomiting  Neurological: Negative for dizziness and light-headedness  All other systems reviewed and are negative  Objective:     Vitals: Blood pressure 114/58, pulse 98, temperature 98 6 °F (37 °C), temperature source Tympanic, resp  rate 22, height 5' 9" (1 753 m), weight 81 5 kg (179 lb 10 8 oz), SpO2 93 %  , Body mass index is 26 53 kg/m² ,     Orthostatic Blood Pressures      Most Recent Value   Blood Pressure  114/58 filed at 08/16/2021 1218   Patient Position - Orthostatic VS  Lying filed at 08/16/2021 0654          Physical Exam  Vitals reviewed  Constitutional:       Appearance: He is well-developed  HENT:      Head: Normocephalic and atraumatic  Eyes:      Pupils: Pupils are equal, round, and reactive to light  Neck:      Vascular: No JVD  Cardiovascular:      Rate and Rhythm: Normal rate  Rhythm irregular        Heart sounds: Normal heart sounds  No murmur heard  No friction rub  No gallop  Pulmonary:      Effort: Pulmonary effort is normal       Breath sounds: Decreased breath sounds and wheezing (diffuse) present  No rales  Abdominal:      Palpations: Abdomen is soft  Musculoskeletal:      Cervical back: Normal range of motion  Right lower leg: Edema present  Left lower leg: Edema present  Skin:     General: Skin is warm and dry  Neurological:      Mental Status: He is alert and oriented to person, place, and time     Psychiatric:         Behavior: Behavior normal             Intake/Output Summary (Last 24 hours) at 8/16/2021 1353  Last data filed at 8/16/2021 1130  Gross per 24 hour   Intake 920 ml   Output 850 ml   Net 70 ml       Weight (last 2 days)     None             Medications:      Current Facility-Administered Medications:     acetaminophen (TYLENOL) tablet 650 mg, 650 mg, Oral, Q4H PRN, Jennifer Mckeon MD    apixaban (ELIQUIS) tablet 5 mg, 5 mg, Oral, BID, Jennifer Mckeon MD, 5 mg at 08/16/21 0953    budesonide (PULMICORT) inhalation solution 0 5 mg, 0 5 mg, Nebulization, Q12H, Blossom Mccullough MD, 0 5 mg at 08/16/21 0735    diltiazem (CARDIZEM CD) 24 hr capsule 240 mg, 240 mg, Oral, Daily, Jennifer Mckeon MD, 240 mg at 08/16/21 0954    docusate sodium (COLACE) capsule 100 mg, 100 mg, Oral, BID, Jennifer Mckeon MD, 100 mg at 08/16/21 4446    finasteride (PROSCAR) tablet 5 mg, 5 mg, Oral, Daily, Jennifer Mckeon MD, 5 mg at 08/16/21 0954    ipratropium (ATROVENT) 0 02 % inhalation solution 0 5 mg, 0 5 mg, Nebulization, Q6H, Blossom Mccullough MD, 0 5 mg at 08/16/21 0735    levalbuterol (XOPENEX) inhalation solution 1 25 mg, 1 25 mg, Nebulization, Q6H, 1 25 mg at 08/16/21 0735 **AND** [DISCONTINUED] sodium chloride 0 9 % inhalation solution 3 mL, 3 mL, Nebulization, Q6H, Jennifer Mckeon MD, 3 mL at 08/16/21 0226    nicotine (NICODERM CQ) 14 mg/24hr TD 24 hr patch 1 patch, 1 patch, Transdermal, Daily, Jennifer Mckeon MD, 1 patch at 08/13/21 0919    [START ON 8/17/2021] predniSONE tablet 40 mg, 40 mg, Oral, Daily, Blossom Mccullough MD     Labs & Results:    Results from last 7 days   Lab Units 08/09/21  1445   TROPONIN I ng/mL 0 02     Results from last 7 days   Lab Units 08/15/21  0523 08/13/21  0644 08/12/21  0447   WBC Thousand/uL 7 06 9 24 9 09   HEMOGLOBIN g/dL 14 0 14 8 16 4   HEMATOCRIT % 45 4 49 1 52 9*   PLATELETS Thousands/uL 144* 184 224         Results from last 7 days   Lab Units 08/16/21  0545 08/15/21  0523 08/14/21  0456 08/09/21  1445   POTASSIUM mmol/L 4 6 4 4 4 8 4 6   CHLORIDE mmol/L 101 102 102 100   CO2 mmol/L 42* 38* >45* 41*   BUN mg/dL 38* 34* 39* 43*   CREATININE mg/dL 1 10 0 96 0 99 1 16   CALCIUM mg/dL 8 8 8 8 8 9 9 3   ALK PHOS U/L  --  63  --  102   ALT U/L  --  40  --  46   AST U/L  --  20  --  33     Results from last 7 days   Lab Units 08/09/21  1530   INR  1 57*   PTT seconds 36     Results from last 7 days   Lab Units 08/14/21  0456 08/09/21  1445   MAGNESIUM mg/dL 3 1* 2 3     Results from last 7 days   Lab Units 08/11/21  1343 08/09/21  1445   NT-PRO BNP pg/mL 7,384* 6,713*                Counseling / Coordination of Care  Total floor / unit time spent today 30 minutes  Greater than 50% of total time was spent with the patient and / or family counseling and / or coordination of care  A description of the counseling / coordination of care: Discussed options moving forward with wife at bedside as well as patient  Discussed with Dr Brenda Barclay EP evaluation for definitive rhythm/ectopy management

## 2021-08-16 NOTE — WOUND OSTOMY CARE
Progress Note - Wound   Ailyn Herd [de-identified] y o  male MRN: [id number]  Unit/Bed#: -01 Encounter: [id number]        Assessment:   Pt seen for weekly wound assessment this am  Left posterior calf hematoma larger in size since 8/13 with increased bloody drainage  Pt had excision of basal cell carcinoma 3 weeks ago by dermatology as outpatient  On eliqu  Pt seen by surgery this am for hematoma left posterior tibia  Evacuated by surgery at bedside today  Plan:   Surgical PA to change packing tomorrow at bedside 8/17  Wound 08/09/21 Head Left;Superior (Active)   Wound Image   08/16/21 1007   Wound Description Beefy red;Bleeding;Fragile 08/16/21 1007   Hali-wound Assessment Clean;Dry; Intact 08/16/21 1007   Wound Length (cm) 2 cm 08/16/21 1007   Wound Width (cm) 1 cm 08/16/21 1007   Wound Surface Area (cm^2) 2 cm^2 08/16/21 1007   Drainage Amount Small 08/16/21 1007   Drainage Description Bloody 08/13/21 1800   Dressing Dry dressing 08/16/21 1007   Dressing Status Clean;Dry; Intact 08/16/21 1007       Wound 08/09/21 Tibial Left;Posterior (Active)   Wound Image   08/16/21 1002   Wound Description Bleeding;Black; Brown;Fragile 08/16/21 1002   Hali-wound Assessment Intact 08/16/21 1002   Wound Length (cm) 5 cm 08/16/21 1002   Wound Width (cm) 3 cm 08/16/21 1002   Wound Surface Area (cm^2) 15 cm^2 08/16/21 1002   Drainage Amount Moderate 08/16/21 1002   Drainage Description Bloody 08/16/21 1002   Non-staged Wound Description Not applicable 98/64/80 3323   Treatments Site care;Elevated 08/16/21 1002   Dressing Foam, Silicon (eg  Allevyn, etc) 08/16/21 1002   Dressing Changed Changed 08/14/21 0800   Patient Tolerance Tolerated well 08/16/21 1002   Dressing Status Clean;Dry; Intact 08/16/21 1002         Wound Care will sign off  Call or Tigertext with any questions    David Case RN

## 2021-08-16 NOTE — ASSESSMENT & PLAN NOTE
· Most likely multifactorial with COPD, underlying emphysema and pulmonary hypertension  · Place on Solu-Medrol 40 mg IV b i d  and now transition to steroid oral taper  · Placed on levalbuterol, Pulmicort and Atrovent nebulizer treatments scheduled  · Respiratory protocol   · Wean oxygen down to keep pulse ox between 88-92%  · Probably need home BiPAP machine upon discharge and outpatient sleep study to be done

## 2021-08-16 NOTE — ASSESSMENT & PLAN NOTE
Wt Readings from Last 3 Encounters:   08/13/21 81 5 kg (179 lb 10 8 oz)   08/09/21 82 1 kg (181 lb)   06/17/21 82 1 kg (181 lb)     · As per last echocardiogram EF was 45-50%  · Managed as an OP on 60 mg of Lasix PO Daily   · Follows OP with Dr Alma Koo   · IV diuresis per Cardiology team; transitioned to PO lasix 60mg daily 08/12- discontinued lasix for now due to hypercapnia   · Received a dose of Diamox 8/13  · Monitor intake and output and daily weights   · Cardiac 2g na/fluid restriction   · Monitor volume status closely   Currently appears to be euvolemic    Continue to hold diuretics for now and may restart in the next 1-2 days

## 2021-08-16 NOTE — ASSESSMENT & PLAN NOTE
· As documented in the image section  · Blood cultures negative  · Wound care consultation  · Wound culture reviewed  · Initially received IV Vancomycin; will discontinue now and observe  Does not look acutely infected at this time  · Noted to have some bleeding from the wound    Recommended outpatient follow-up with dermatology as he recently had surgery done for skin cancer removal and also ask surgery to evaluate due to intermittent bleeding

## 2021-08-16 NOTE — PROGRESS NOTES
[address]  Progress Note - Servando Nageotte 1940, [de-identified] y o  male MRN: [id number]  Unit/Bed#: -01 Encounter: [id number]  Primary Care Provider: Ayesha Mac MD   Date and time admitted to hospital: 8/9/2021  2:34 PM    * Acute on chronic respiratory failure with hypoxia and hypercapnia ([facility] )  Assessment & Plan    · Multifactorial due to worsening hypercarbia, COPD exacerbation, CHF exacerbation with some concern for over-diuresis and probably underlying obstructive sleep apnea  Patient is not on any oxygen or BiPAP or CPAP at home  · Patient not compliant with keeping the BiPAP on  Discussed with wife that will have to be sedated to some degree if he gets agitated to keep the BiPAP on as he does not want to be intubated  If there is no improvement in the next 48 hours will have evaluation for hospice care  · Fortunately was compliant with BiPAP over the last 18 hours and his CO2 has improved from 93-69  · Placed on IV steroids and regimen of levalbuterol, Pulmicort and Atrovent    Now transition back to oral steroid taper  · Hold diuretics for now  CO2 and bicarb is fluctuating  Appears to be euvolemic  · CT brain negative and CTA chest shows emphysema and pulmonary hypertension  · Patient is awake, alert and oriented today and is down to 1 L of oxygen this morning at rest Discussed with patient and wife at bedside need to be very compliant with BiPAP use  Will use again during the day for 4-5 hours and then again overnight for good 12 hours and recheck BMP and ABG in the morning  · Will probably go home with BiPAP use and try to qualify him for the BiPAP machine  Also do ambulatory oxygen requirement study today  · Keep on oxygen via nasal cannula to titrate to keep pulse ox between 88-92%    Avoid higher oxygen saturations as he is a known CO2 retainer  · Agree to establish care outpatient with [facility] pulmonology    Acute metabolic encephalopathy  Assessment & Plan  It is probably secondary to acute respiratory failure with hypoxia and hypercapnia  CT brain negative for any acute pathology or bleed  Neg ammonia level   Patient was very somnolent confused 8/14  Placed on a prolonged period of time on BiPAP following which he is awake alert and oriented today    Acute on chronic diastolic (congestive) heart failure (HCC)  Assessment & Plan  Wt Readings from Last 3 Encounters:   08/13/21 81 5 kg (179 lb 10 8 oz)   08/09/21 82 1 kg (181 lb)   06/17/21 82 1 kg (181 lb)     · As per last echocardiogram EF was 45-50%  · Managed as an OP on 60 mg of Lasix PO Daily   · Follows OP with Dr Antony Miguel   · IV diuresis per Cardiology team; transitioned to PO lasix 60mg daily 08/12- discontinued lasix for now due to hypercapnia   · Received a dose of Diamox 8/13  · Monitor intake and output and daily weights   · Cardiac 2g na/fluid restriction   · Monitor volume status closely   Currently appears to be euvolemic  Continue to hold diuretics for now and may restart in the next 1-2 days        Frequent PVCs  Assessment & Plan  · Reviewed telemetry and patient with significant PVC burden  · Status post Holter monitor as an OP and per review was recommended to go to [facility] for EP evaluation as an OP however patient did not want to travel all that way  · Continue telemetry  · Cardiology consulted; input appreciated; continue regimen on 8/11  · Continue Cardizem and discontinue Toprol-XL  Placed on telemetry as per cardiology today  · Recommend ischemic evaluation at some point to evaluate cause for PVCs  · Recommended outpatient EP evaluation for PVCs   · Will do limited 2D echo on Monday to evaluate LV function    Cellulitis of left lower extremity  Assessment & Plan  · As documented in the image section  · Blood cultures negative  · Wound care consultation  · Wound culture reviewed  · Initially received IV Vancomycin; will discontinue now and observe    Does not look acutely infected at this time  · Noted to have some bleeding from the wound  Recommended outpatient follow-up with dermatology as he recently had surgery done for skin cancer removal and also ask surgery to evaluate due to intermittent bleeding    Chronic obstructive pulmonary disease with acute exacerbation ([facility] )  Assessment & Plan  · Most likely multifactorial with COPD, underlying emphysema and pulmonary hypertension  · Place on Solu-Medrol 40 mg IV b i d  and now transition to steroid oral taper  · Placed on levalbuterol, Pulmicort and Atrovent nebulizer treatments scheduled  · Respiratory protocol   · Wean oxygen down to keep pulse ox between 88-92%  · Probably need home BiPAP machine upon discharge and outpatient sleep study to be done    Atrial fibrillation ([facility] )  Assessment & Plan  · Continue Eliquis for [facility]   · Currently on Cardizem and metoprolol  Noted to have PVCs on the monitor  · Discontinue telemetry      VTE Pharmacologic Prophylaxis:   Pharmacologic: Apixaban (Eliquis)  Mechanical VTE Prophylaxis in Place: Yes    Patient Centered Rounds: I have performed bedside rounds with nursing staff today  Discussions with Specialists or Other Care Team Provider:  Discussed with Cardiology and pulmonology    Education and Discussions with Family / Patient:  Discussed with patient and his wife at bedside    Time Spent for Care: 30 minutes  More than 50% of total time spent on counseling and coordination of care as described above  Current Length of Stay: 7 day(s)    Current Patient Status: Inpatient   Certification Statement: The patient will continue to require additional inpatient hospital stay due to Acute metabolic encephalopathy    Discharge Plan:  Plan to discharge patient home in the next 24-48 hours and try to qualify him for a BiPAP machine  To be seen by pulmonology tomorrow    Code Status: Level 3 - DNAR and DNI      Subjective:   Patient denies any chest pain or shortness of breath or abdominal pain    Awake alert and oriented today and states that he feels well  Used BiPAP very compliantly yesterday    Objective:     Vitals:   Temp (24hrs), Av 9 °F (36 6 °C), Min:97 8 °F (36 6 °C), Max:98 °F (36 7 °C)    Temp:  [97 8 °F (36 6 °C)-98 °F (36 7 °C)] 97 8 °F (36 6 °C)  HR:  [54-98] 98  Resp:  [18-27] 22  BP: (103-126)/(57-72) 114/58  SpO2:  [90 %-99 %] 93 %  Body mass index is 26 53 kg/m²  Input and Output Summary (last 24 hours): Intake/Output Summary (Last 24 hours) at 2021 1257  Last data filed at 2021 1130  Gross per 24 hour   Intake 920 ml   Output 850 ml   Net 70 ml       Physical Exam:     Physical Exam  Vitals and nursing note reviewed  Constitutional:       Appearance: Normal appearance  HENT:      Head: Normocephalic and atraumatic  Right Ear: External ear normal       Left Ear: External ear normal       Nose: Nose normal       Mouth/Throat:      Pharynx: Oropharynx is clear  Eyes:      Pupils: Pupils are equal, round, and reactive to light  Cardiovascular:      Rate and Rhythm: Normal rate and regular rhythm  Heart sounds: Normal heart sounds  Pulmonary:      Effort: Pulmonary effort is normal       Comments: Poor air entry bilaterally with diminished breath sounds with scattered wheezing noted  Abdominal:      General: Bowel sounds are normal       Palpations: Abdomen is soft  Tenderness: There is no abdominal tenderness  Musculoskeletal:         General: Normal range of motion  Cervical back: Normal range of motion and neck supple  Comments: Trace edema bilateral lower extremity which is near his baseline  Wound noted on the left lower extremity with some mild bleeding noted  No surrounding redness or erythema noted   Skin:     General: Skin is warm and dry  Capillary Refill: Capillary refill takes less than 2 seconds  Neurological:      General: No focal deficit present        Mental Status: He is alert and oriented to person, place, and time    Psychiatric:         Mood and Affect: Mood normal            Additional Data:     Labs:    Results from last 7 days   Lab Units 08/15/21  0523 08/13/21  0644   WBC Thousand/uL 7 06 9 24   HEMOGLOBIN g/dL 14 0 14 8   HEMATOCRIT % 45 4 49 1   PLATELETS Thousands/uL 144* 184   NEUTROS PCT %  --  79*   LYMPHS PCT %  --  9*   MONOS PCT %  --  12   EOS PCT %  --  0     Results from last 7 days   Lab Units 08/16/21  0545 08/15/21  0523   SODIUM mmol/L 142 140   POTASSIUM mmol/L 4 6 4 4   CHLORIDE mmol/L 101 102   CO2 mmol/L 42* 38*   BUN mg/dL 38* 34*   CREATININE mg/dL 1 10 0 96   ANION GAP mmol/L -1* 0*   CALCIUM mg/dL 8 8 8 8   ALBUMIN g/dL  --  2 5*   TOTAL BILIRUBIN mg/dL  --  0 72   ALK PHOS U/L  --  63   ALT U/L  --  40   AST U/L  --  20   GLUCOSE RANDOM mg/dL 142* 206*     Results from last 7 days   Lab Units 08/09/21  1530   INR  1 57*     Results from last 7 days   Lab Units 08/16/21  0652 08/15/21  1627 08/15/21  1058   POC GLUCOSE mg/dl 131 179* 184*         Results from last 7 days   Lab Units 08/09/21  1445   LACTIC ACID mmol/L 2 0           * I Have Reviewed All Lab Data Listed Above  * Additional Pertinent Lab Tests Reviewed: Tom [address] Admission Reviewed    Imaging:    Imaging Reports Reviewed Today Include:  None  Imaging Personally Reviewed by Myself Includes:  None    Recent Cultures (last 7 days):     Results from last 7 days   Lab Units 08/09/21  1753 08/09/21  1720   BLOOD CULTURE   --  No Growth After 5 Days  No Growth After 5 Days     GRAM STAIN RESULT  Rare Polys*  4+ Gram positive rods*  1+ Gram positive cocci in pairs, chains and clusters*  --    WOUND CULTURE  4+ Growth of   --        Last 24 Hours Medication List:   Current Facility-Administered Medications   Medication Dose Route Frequency Provider Last Rate    acetaminophen  650 mg Oral Q4H PRN Adryan Lopez MD      apixaban  5 mg Oral BID Adryan Lopez MD      budesonide  0 5 mg Nebulization Q12H Saira Stevenson MD      diltiazem  240 mg Oral Daily Julieth MD Dusty      docusate sodium  100 mg Oral BID Juliethjalen Montano MD      finasteride  5 mg Oral Daily Julieth Dusty, MD      ipratropium  0 5 mg Nebulization Q6H Saira Stevenson MD      levalbuterol  1 25 mg Nebulization Q6H Juliethjalen Montano MD      And    sodium chloride  3 mL Nebulization Q6H Julieth MD Dusty      nicotine  1 patch Transdermal Daily Julieth MD Dusty     [facility] Smoker ON 8/17/2021] predniSONE  40 mg Oral Daily Saira Stevenson MD          Today, Patient Was Seen By: Saira Stevenson MD    ** Please Note: Dictation voice to text software may have been used in the creation of this document   **

## 2021-08-16 NOTE — ASSESSMENT & PLAN NOTE
· Reviewed telemetry and patient with significant PVC burden  · Status post Holter monitor as an OP and per review was recommended to go to [facility] for EP evaluation as an OP however patient did not want to travel all that way  · Continue telemetry  · Cardiology consulted; input appreciated; continue regimen on 8/11  · Continue Cardizem and discontinue Toprol-XL    Placed on telemetry as per cardiology today  · Recommend ischemic evaluation at some point to evaluate cause for PVCs  · Recommended outpatient EP evaluation for PVCs   · Will do limited 2D echo on Monday to evaluate LV function

## 2021-08-17 ENCOUNTER — APPOINTMENT (INPATIENT)
Dept: RADIOLOGY | Facility: HOSPITAL | Age: 81
DRG: 291 | End: 2021-08-17
Payer: MEDICARE

## 2021-08-17 LAB
ANION GAP SERPL CALCULATED.3IONS-SCNC: -3 MMOL/L (ref 4–13)
ARTERIAL PATENCY WRIST A: YES
ATRIAL RATE: 312 BPM
ATRIAL RATE: 89 BPM
BASE EXCESS BLDA CALC-SCNC: 14.4 MMOL/L
BASOPHILS # BLD AUTO: 0 THOUSANDS/ΜL (ref 0–0.1)
BASOPHILS NFR BLD AUTO: 0 % (ref 0–1)
BUN SERPL-MCNC: 37 MG/DL (ref 5–25)
CALCIUM SERPL-MCNC: 9 MG/DL (ref 8.3–10.1)
CHLORIDE SERPL-SCNC: 101 MMOL/L (ref 100–108)
CO2 SERPL-SCNC: 44 MMOL/L (ref 21–32)
CREAT SERPL-MCNC: 1.04 MG/DL (ref 0.6–1.3)
EOSINOPHIL # BLD AUTO: 0 THOUSAND/ΜL (ref 0–0.61)
EOSINOPHIL NFR BLD AUTO: 0 % (ref 0–6)
ERYTHROCYTE [DISTWIDTH] IN BLOOD BY AUTOMATED COUNT: 14.6 % (ref 11.6–15.1)
GFR SERPL CREATININE-BSD FRML MDRD: 67 ML/MIN/1.73SQ M
GLUCOSE SERPL-MCNC: 113 MG/DL (ref 65–140)
HCO3 BLDA-SCNC: 43 MMOL/L (ref 22–28)
HCT VFR BLD AUTO: 44.5 % (ref 36.5–49.3)
HGB BLD-MCNC: 13.9 G/DL (ref 12–17)
IMM GRANULOCYTES # BLD AUTO: 0.07 THOUSAND/UL (ref 0–0.2)
IMM GRANULOCYTES NFR BLD AUTO: 1 % (ref 0–2)
LYMPHOCYTES # BLD AUTO: 0.5 THOUSANDS/ΜL (ref 0.6–4.47)
LYMPHOCYTES NFR BLD AUTO: 5 % (ref 14–44)
MAGNESIUM SERPL-MCNC: 2.2 MG/DL (ref 1.6–2.6)
MCH RBC QN AUTO: 34 PG (ref 26.8–34.3)
MCHC RBC AUTO-ENTMCNC: 31.2 G/DL (ref 31.4–37.4)
MCV RBC AUTO: 109 FL (ref 82–98)
MONOCYTES # BLD AUTO: 1.01 THOUSAND/ΜL (ref 0.17–1.22)
MONOCYTES NFR BLD AUTO: 10 % (ref 4–12)
NEUTROPHILS # BLD AUTO: 9.1 THOUSANDS/ΜL (ref 1.85–7.62)
NEUTS SEG NFR BLD AUTO: 84 % (ref 43–75)
NON VENT ROOM AIR: 21 %
NRBC BLD AUTO-RTO: 0 /100 WBCS
O2 CT BLDA-SCNC: 16.4 ML/DL (ref 16–23)
OXYHGB MFR BLDA: 80.5 % (ref 94–97)
PCO2 BLDA: 72 MM HG (ref 36–44)
PH BLDA: 7.39 [PH] (ref 7.35–7.45)
PLATELET # BLD AUTO: 147 THOUSANDS/UL (ref 149–390)
PMV BLD AUTO: 10.1 FL (ref 8.9–12.7)
PO2 BLDA: 46.1 MM HG (ref 75–129)
POTASSIUM SERPL-SCNC: 4.8 MMOL/L (ref 3.5–5.3)
PR INTERVAL: 160 MS
QRS AXIS: 197 DEGREES
QRS AXIS: 210 DEGREES
QRSD INTERVAL: 150 MS
QRSD INTERVAL: 156 MS
QT INTERVAL: 404 MS
QT INTERVAL: 418 MS
QTC INTERVAL: 438 MS
QTC INTERVAL: 491 MS
RBC # BLD AUTO: 4.09 MILLION/UL (ref 3.88–5.62)
SODIUM SERPL-SCNC: 142 MMOL/L (ref 136–145)
SPECIMEN SOURCE: ABNORMAL
T WAVE AXIS: 46 DEGREES
T WAVE AXIS: 59 DEGREES
VENTRICULAR RATE: 66 BPM
VENTRICULAR RATE: 89 BPM
WBC # BLD AUTO: 10.65 THOUSAND/UL (ref 4.31–10.16)

## 2021-08-17 PROCEDURE — 97116 GAIT TRAINING THERAPY: CPT

## 2021-08-17 PROCEDURE — 97167 OT EVAL HIGH COMPLEX 60 MIN: CPT

## 2021-08-17 PROCEDURE — 85025 COMPLETE CBC W/AUTO DIFF WBC: CPT

## 2021-08-17 PROCEDURE — 93005 ELECTROCARDIOGRAM TRACING: CPT

## 2021-08-17 PROCEDURE — 83735 ASSAY OF MAGNESIUM: CPT | Performed by: NURSE PRACTITIONER

## 2021-08-17 PROCEDURE — 71046 X-RAY EXAM CHEST 2 VIEWS: CPT

## 2021-08-17 PROCEDURE — 82805 BLOOD GASES W/O2 SATURATION: CPT | Performed by: FAMILY MEDICINE

## 2021-08-17 PROCEDURE — 99232 SBSQ HOSP IP/OBS MODERATE 35: CPT | Performed by: INTERNAL MEDICINE

## 2021-08-17 PROCEDURE — 94640 AIRWAY INHALATION TREATMENT: CPT

## 2021-08-17 PROCEDURE — 80048 BASIC METABOLIC PNL TOTAL CA: CPT | Performed by: FAMILY MEDICINE

## 2021-08-17 PROCEDURE — 99232 SBSQ HOSP IP/OBS MODERATE 35: CPT | Performed by: NURSE PRACTITIONER

## 2021-08-17 PROCEDURE — 36600 WITHDRAWAL OF ARTERIAL BLOOD: CPT

## 2021-08-17 PROCEDURE — 94760 N-INVAS EAR/PLS OXIMETRY 1: CPT

## 2021-08-17 PROCEDURE — 94761 N-INVAS EAR/PLS OXIMETRY MLT: CPT

## 2021-08-17 PROCEDURE — 93010 ELECTROCARDIOGRAM REPORT: CPT | Performed by: INTERNAL MEDICINE

## 2021-08-17 PROCEDURE — 94762 N-INVAS EAR/PLS OXIMTRY CONT: CPT

## 2021-08-17 PROCEDURE — 97110 THERAPEUTIC EXERCISES: CPT

## 2021-08-17 PROCEDURE — 94660 CPAP INITIATION&MGMT: CPT

## 2021-08-17 PROCEDURE — 99232 SBSQ HOSP IP/OBS MODERATE 35: CPT

## 2021-08-17 RX ORDER — DILTIAZEM HYDROCHLORIDE 60 MG/1
60 TABLET, FILM COATED ORAL EVERY 6 HOURS SCHEDULED
Status: COMPLETED | OUTPATIENT
Start: 2021-08-17 | End: 2021-08-17

## 2021-08-17 RX ORDER — ALBUTEROL SULFATE 2.5 MG/3ML
2.5 SOLUTION RESPIRATORY (INHALATION) ONCE
Status: COMPLETED | OUTPATIENT
Start: 2021-08-17 | End: 2021-08-17

## 2021-08-17 RX ORDER — LANOLIN ALCOHOL/MO/W.PET/CERES
3 CREAM (GRAM) TOPICAL
Status: DISCONTINUED | OUTPATIENT
Start: 2021-08-17 | End: 2021-08-18 | Stop reason: HOSPADM

## 2021-08-17 RX ADMIN — APIXABAN 5 MG: 5 TABLET, FILM COATED ORAL at 17:51

## 2021-08-17 RX ADMIN — IPRATROPIUM BROMIDE 0.5 MG: 0.5 SOLUTION RESPIRATORY (INHALATION) at 13:50

## 2021-08-17 RX ADMIN — ALBUTEROL SULFATE 2.5 MG: 2.5 SOLUTION RESPIRATORY (INHALATION) at 17:15

## 2021-08-17 RX ADMIN — APIXABAN 5 MG: 5 TABLET, FILM COATED ORAL at 08:44

## 2021-08-17 RX ADMIN — PREDNISONE 40 MG: 20 TABLET ORAL at 08:44

## 2021-08-17 RX ADMIN — CEPHALEXIN 500 MG: 500 CAPSULE ORAL at 00:09

## 2021-08-17 RX ADMIN — CEPHALEXIN 500 MG: 500 CAPSULE ORAL at 12:46

## 2021-08-17 RX ADMIN — IPRATROPIUM BROMIDE 0.5 MG: 0.5 SOLUTION RESPIRATORY (INHALATION) at 08:11

## 2021-08-17 RX ADMIN — CEPHALEXIN 500 MG: 500 CAPSULE ORAL at 06:07

## 2021-08-17 RX ADMIN — BUDESONIDE 0.5 MG: 0.5 INHALANT ORAL at 08:12

## 2021-08-17 RX ADMIN — LEVALBUTEROL HYDROCHLORIDE 1.25 MG: 1.25 SOLUTION, CONCENTRATE RESPIRATORY (INHALATION) at 19:57

## 2021-08-17 RX ADMIN — BUDESONIDE 0.5 MG: 0.5 INHALANT ORAL at 19:57

## 2021-08-17 RX ADMIN — DOCUSATE SODIUM 100 MG: 100 CAPSULE ORAL at 17:51

## 2021-08-17 RX ADMIN — DOCUSATE SODIUM 100 MG: 100 CAPSULE ORAL at 08:45

## 2021-08-17 RX ADMIN — DILTIAZEM HYDROCHLORIDE 60 MG: 60 TABLET, FILM COATED ORAL at 17:50

## 2021-08-17 RX ADMIN — DILTIAZEM HYDROCHLORIDE 60 MG: 60 TABLET, FILM COATED ORAL at 12:47

## 2021-08-17 RX ADMIN — CEPHALEXIN 500 MG: 500 CAPSULE ORAL at 17:50

## 2021-08-17 RX ADMIN — FINASTERIDE 5 MG: 5 TABLET, FILM COATED ORAL at 08:45

## 2021-08-17 RX ADMIN — DILTIAZEM HYDROCHLORIDE 240 MG: 240 CAPSULE, COATED, EXTENDED RELEASE ORAL at 08:44

## 2021-08-17 RX ADMIN — LEVALBUTEROL HYDROCHLORIDE 1.25 MG: 1.25 SOLUTION, CONCENTRATE RESPIRATORY (INHALATION) at 13:50

## 2021-08-17 RX ADMIN — LEVALBUTEROL HYDROCHLORIDE 1.25 MG: 1.25 SOLUTION, CONCENTRATE RESPIRATORY (INHALATION) at 08:11

## 2021-08-17 RX ADMIN — FUROSEMIDE 60 MG: 40 TABLET ORAL at 12:47

## 2021-08-17 RX ADMIN — IPRATROPIUM BROMIDE 0.5 MG: 0.5 SOLUTION RESPIRATORY (INHALATION) at 19:57

## 2021-08-17 RX ADMIN — ACETAMINOPHEN 650 MG: 325 TABLET ORAL at 00:09

## 2021-08-17 NOTE — CASE MANAGEMENT
Case Management Progress Note    Patient name Ailyn Villafuerte  Location /- MRN [id number]  : 1940 Date 2021       LOS (days): 8  Geometric Mean LOS (GMLOS) (days): 4 00  Days to GMLOS:-4        BUNDLE:      OBJECTIVE:  Pt is a [de-identified]y o  year old /[employer], white or  [1], male with Adventism preference of None admitted on 2021  2:34 PM  Pt is admitted to - at [address] [place] [address] with complaints of Acute on chronic respiratory failure with hypoxia and hypercapnia ([address] )   Current admission status: Inpatient  Preferred Pharmacy:   [facility], [address]  Phone: [phone] Fax: [phone] [id number] [id number] [facility], [address]   [address] [place] [id number]  Phone: [phone] Fax: [phone]    Primary Care Provider: Darlene Chairez MD    Primary Insurance: MEDICARE  Secondary Insurance:      PROGRESS NOTE:  Pt needing HHC- PT/OT and SN for "wound care left posterior calf, half-inch iodoform packing and dressing change every other day after hospital discharge "   Pt also needing Home O2  CM discussed w/ Pt/family and agreeable to referrals to facilities for [facility] availability  CM made referrals and [facility] for   Pt/family agreeable to [facility], referral made via [facility] and O2 was approved and delivered to Pt's room  Pt is needing home Bipap set-up, however additional clinical information needed for qualifying including PFTs and overnight study on Bipap and 2L O2  CM updated provider, and respiratory  CM will f/u w/ continued planning tomorrow am after study completed  CM updated family of above, as well as confirming Pt for EP appt on Thursday  CM will continue to follow for dcp

## 2021-08-17 NOTE — PLAN OF CARE
Problem: PHYSICAL THERAPY ADULT  Goal: Performs mobility at highest level of function for planned discharge setting  See evaluation for individualized goals  Description: Treatment/Interventions: Functional transfer training, LE strengthening/ROM, Therapeutic exercise, Elevations, Endurance training, Patient/family training, Equipment eval/education, Bed mobility, Gait training, Compensatory technique education, Spoke to nursing, OT, Spoke to case management  Equipment Recommended:  (TBD by rehab)       See flowsheet documentation for full assessment, interventions and recommendations  Outcome: Progressing  Note: Prognosis: Good  Problem List: Decreased strength, Decreased endurance, Impaired balance, Decreased mobility, Decreased cognition, Impaired judgement, Decreased safety awareness  Assessment: Pt seen for PT treatment session this date with interventions consisting of gait training w/ emphasis on improving pt's ability to ambulate level surfaces x 30 ft, 60 ft with SBA provided by therapist with RW and no AD, Therapeutic exercise consisting of: AROM 10-20 reps B LE in sitting and supine position and therapeutic activity consisting of training: supine<>sit transfers, sit<>stand transfers and static sitting tolerance at EOB for ~20 minutes w/ occasional UE support  Pt agreeable to PT treatment session upon arrival, pt found supine in bed w/ HOB elevated, in no apparent distress and responsive  In comparison to previous session, pt with significant improvements in tolerance to increased OOB activity and ability to mobilize s use of AD  Pt requiring occasional 2 L O2 donning following level surface mobility and during LB Jewel  Post session: pt returned BTB, all needs in reach and RN notified of session findings/recommendations  At this time, PT recommends home with home health rehabilitation at time of d/c in order to maximize pt's functional independence and safety w/ mobility   Pt continues to be functioning below baseline level, and remains limited 2* factors listed above and including decreased strength, decreased endurance, decreased dynamic balance, decreased safety awareness, and decreased overall mobility status limiting pt's return to PLOF  PT will continue to see pt during current hospitalization in order to address the deficits listed above and provide interventions consistent w/ POC in effort to achieve STGs  Barriers to Discharge: Inaccessible home environment, Decreased caregiver support  Barriers to Discharge Comments: Pt requires increased assistance for mobility     PT Discharge Recommendation: Post acute rehabilitation services     PT - OK to Discharge: Yes (when medically cleared)    See flowsheet documentation for full assessment

## 2021-08-17 NOTE — ASSESSMENT & PLAN NOTE
· Reviewed telemetry and patient with significant PVC burden  · Status post Holter monitor as an OP and per review was recommended to go to [facility] for EP evaluation as an OP however patient did not want to travel all that way  · Continue telemetry  · Cardiology consulted; input appreciated  · Continue Cardizem    · Recommend ischemic evaluation at some point to evaluate cause for PVCs  · Recommended outpatient EP evaluation for PVCs; appointment scheduled with EP on 8/19/2021 this week   · Echocardiogram reviewed

## 2021-08-17 NOTE — PROGRESS NOTES
[address]  Progress Note - Johnson Cruz 1940, [de-identified] y o  male MRN: [id number]  Unit/Bed#: -01 Encounter: [id number]  Primary Care Provider: Oral Easton MD   Date and time admitted to hospital: 8/9/2021  2:34 PM    * Acute on chronic respiratory failure with hypoxia and hypercapnia ([facility] [address] )  Assessment & Plan  · Multifactorial due to worsening hypercarbia, COPD exacerbation, CHF exacerbation with some concern for over-diuresis and probably underlying obstructive sleep apnea  Patient is not on any oxygen or BiPAP or CPAP at home  · Patient not compliant w/BiPAP  · CO2 improved from 93-69 with BiPAP compliance   · IV steroids> PO and regimen of levalbuterol, Pulmicort and Atrovent  · CT brain negative and CTA chest shows emphysema and pulmonary hypertension  · Will probably go home with BiPAP use and try to qualify him for the BiPAP machine  · Home O2 eval completed; qualifies for home O2  · Keep on oxygen via nasal cannula to titrate to keep pulse ox between 88-92%; Avoid higher oxygen saturations as he is a known CO2 retainer  · Agree to establish care outpatient with [facility] pulmonology  · Pulmonary following; input appreciated     Acute on chronic combined systolic (congestive) and diastolic (congestive) heart failure ([facility] [address] )  Assessment & Plan  Wt Readings from Last 3 Encounters:   08/17/21 82 6 kg (182 lb 3 2 oz)   08/09/21 82 1 kg (181 lb)   06/17/21 82 1 kg (181 lb)     · As per last echocardiogram EF was 45-50%  · Managed as an OP on 60 mg of Lasix PO Daily   · Follows OP with Dr Ish Wolfe   · IV diuresis per Cardiology team; transitioned to PO lasix 60mg daily 08/12- discontinued lasix for now due to hypercapnia   · Received a dose of Diamox 8/13  · Monitor intake and output and daily weights   · Cardiac 2g na/fluid restriction   · Monitor volume status closely     Currently appears to be euvolemic  · Cardiology following; to resume diuretics on 8/17  · Repeat CXR on 8/17        Frequent PVCs  Assessment & Plan  · Reviewed telemetry and patient with significant PVC burden  · Status post Holter monitor as an OP and per review was recommended to go to [facility] for EP evaluation as an OP however patient did not want to travel all that way  · Continue telemetry  · Cardiology consulted; input appreciated  · Continue Cardizem    · Recommend ischemic evaluation at some point to evaluate cause for PVCs  · Recommended outpatient EP evaluation for PVCs; appointment scheduled with EP on 8/19/2021 this week   · Echocardiogram reviewed  Atrial fibrillation (HCC)  Assessment & Plan  · Continue Eliquis for [facility]  · Continue cardizem for rate control     Chronic obstructive pulmonary disease with acute exacerbation (HCC)  Assessment & Plan  · Most likely multifactorial with COPD, underlying emphysema and pulmonary hypertension  · Placed on Solu-Medrol 40 mg IV BID and since been transitioned to steroid oral   · Continue on levalbuterol, Pulmicort and Atrovent nebulizer treatments scheduled  · Respiratory protocol   · Wean oxygen down to keep pulse ox between 88-92%  · Probably need home BiPAP machine upon discharge and outpatient sleep study to be done  · Pulmonology following; input appreciated   · Home O2 eval completed; oxygen indicated     Cellulitis of left lower extremity  Assessment & Plan  · As documented in the image section  · Blood cultures negative  · Wound care consultation  · Wound culture reviewed  · Surgery consulted; status post evacuation of hematoma  · Wound care with packing   · Resumed eliquis on 8/17; monitor Hgb  · Will need close wound care and f/u at discharge   · Keflex for 7 days (28 doses)     Hematoma of left lower extremity  Assessment & Plan  · See plan under cellulitis of LLE     Acute metabolic encephalopathy  Assessment & Plan  · Secondary to acute respiratory failure with hypoxia and hypercapnia     · CT brain negative for any acute pathology or bleed  · Neg ammonia level   · Continue treatment of underlying etiology       VTE Pharmacologic Prophylaxis: VTE Score: 10 High Risk (Score >/= 5) - Pharmacological DVT Prophylaxis Ordered: apixaban (Eliquis)  Sequential Compression Devices Ordered  Patient Centered Rounds: I performed bedside rounds with nursing staff today  Discussions with Specialists or Other Care Team Provider: nursing staff, cardiology , and case management     Education and Discussions with Family / Patient: Updated  (wife) via phone  Time Spent for Care: 30 minutes  More than 50% of total time spent on counseling and coordination of care as described above  Current Length of Stay: 8 day(s)  Current Patient Status: Inpatient   Certification Statement: The patient will continue to require additional inpatient hospital stay due to monitoring hgb and wound status post initiation of [facility]  Discharge Plan: Anticipate discharge tomorrow to home with home services  Code Status: Level 3 - DNAR and DNI    Subjective:   Patient is without any complaint  Reports readiness to leave  Objective:     Vitals:   Temp (24hrs), Av 1 °F (36 7 °C), Min:97 7 °F (36 5 °C), Max:98 6 °F (37 °C)    Temp:  [97 7 °F (36 5 °C)-98 6 °F (37 °C)] 97 7 °F (36 5 °C)  HR:  [] 97  Resp:  [17-33] 19  BP: (107-146)/(58-74) 146/66  SpO2:  [77 %-97 %] 97 %  Body mass index is 26 91 kg/m²  Input and Output Summary (last 24 hours): Intake/Output Summary (Last 24 hours) at 2021 1115  Last data filed at 2021 0901  Gross per 24 hour   Intake 440 ml   Output 800 ml   Net -360 ml       Physical Exam:   Physical Exam  Vitals and nursing note reviewed  Constitutional:       Appearance: He is well-developed  He is not ill-appearing or diaphoretic  HENT:      Head: Normocephalic and atraumatic  Eyes:      Conjunctiva/sclera: Conjunctivae normal    Cardiovascular:      Rate and Rhythm: Normal rate and regular rhythm  Heart sounds:  No murmur heard  Pulmonary:      Effort: Pulmonary effort is normal  No respiratory distress  Breath sounds: Wheezing and rales present  Abdominal:      General: Abdomen is flat  Bowel sounds are normal  There is no distension  Palpations: Abdomen is soft  Tenderness: There is no abdominal tenderness  Musculoskeletal:         General: Swelling present  Cervical back: Neck supple  Right lower leg: Edema present  Left lower leg: Edema present  Skin:     General: Skin is warm and dry  Capillary Refill: Capillary refill takes less than 2 seconds  Neurological:      Mental Status: He is alert and oriented to person, place, and time  Mental status is at baseline  Psychiatric:         Mood and Affect: Mood normal          Behavior: Behavior normal          Judgment: Judgment normal           Additional Data:     Labs:  Results from last 7 days   Lab Units 08/17/21  0449   WBC Thousand/uL 10 65*   HEMOGLOBIN g/dL 13 9   HEMATOCRIT % 44 5   PLATELETS Thousands/uL 147*   NEUTROS PCT % 84*   LYMPHS PCT % 5*   MONOS PCT % 10   EOS PCT % 0     Results from last 7 days   Lab Units 08/17/21  0449 08/15/21  0523   SODIUM mmol/L 142 140   POTASSIUM mmol/L 4 8 4 4   CHLORIDE mmol/L 101 102   CO2 mmol/L 44* 38*   BUN mg/dL 37* 34*   CREATININE mg/dL 1 04 0 96   ANION GAP mmol/L -3* 0*   CALCIUM mg/dL 9 0 8 8   ALBUMIN g/dL  --  2 5*   TOTAL BILIRUBIN mg/dL  --  0 72   ALK PHOS U/L  --  63   ALT U/L  --  40   AST U/L  --  20   GLUCOSE RANDOM mg/dL 113 206*         Results from last 7 days   Lab Units 08/16/21  0652 08/15/21  1627 08/15/21  1058   POC GLUCOSE mg/dl 131 179* 184*               Lines/Drains:  Invasive Devices     Peripheral Intravenous Line            Peripheral IV 08/13/21 Dorsal (posterior); Right Forearm 4 days    Peripheral IV 08/14/21 Left Antecubital 2 days                      Imaging: Reviewed radiology reports from this admission including: chest xray    Recent Cultures (last 7 days):         Last 24 Hours Medication List:   Current Facility-Administered Medications   Medication Dose Route Frequency Provider Last Rate    acetaminophen  650 mg Oral Q4H PRN Dandre Olivera MD      apixaban  5 mg Oral BID KAN Ch      budesonide  0 5 mg Nebulization Q12H Fransico Interiano MD      cephalexin  500 mg Oral Q6H [address] KAN Uriostegui      diltiazem  240 mg Oral Daily Dandre Olivera MD      docusate sodium  100 mg Oral BID Dandre Olivera MD      finasteride  5 mg Oral Daily Dandre Olivera MD      ipratropium  0 5 mg Nebulization Q6H Fransico Interiano MD      levalbuterol  1 25 mg Nebulization Q6H Dandre Olivera MD      melatonin  3 mg Oral HS PRN KAN Ch      nicotine  1 patch Transdermal Daily Dandre Olivera MD      predniSONE  40 mg Oral Daily Fransico Interiano MD          Today, Patient Was Seen By: KAN Montes    **Please Note: This note may have been constructed using a voice recognition system  **

## 2021-08-17 NOTE — ASSESSMENT & PLAN NOTE
· Multifactorial due to worsening hypercarbia, COPD exacerbation, CHF exacerbation with some concern for over-diuresis and probably underlying obstructive sleep apnea  Patient is not on any oxygen or BiPAP or CPAP at home  · Patient not compliant w/BiPAP  · CO2 improved from 93-69 with BiPAP compliance   · IV steroids> PO and regimen of levalbuterol, Pulmicort and Atrovent  · CT brain negative and CTA chest shows emphysema and pulmonary hypertension  · Will probably go home with BiPAP use and try to qualify him for the BiPAP machine  · Home O2 eval completed; qualifies for home O2  · Keep on oxygen via nasal cannula to titrate to keep pulse ox between 88-92%;  Avoid higher oxygen saturations as he is a known CO2 retainer  · Agree to establish care outpatient with [facility] pulmonology  · Pulmonary following; input appreciated

## 2021-08-17 NOTE — NURSING NOTE
Notified respiratory patient refusing to wear bipap explained contradictions  on sleep study refusing

## 2021-08-17 NOTE — OCCUPATIONAL THERAPY NOTE
Occupational Therapy Evaluation     Patient Name: Ronal MANN Date: 8/17/2021  Problem List  Principal Problem:    Acute on chronic respiratory failure with hypoxia and hypercapnia (HCC)  Active Problems:    Atrial fibrillation (HCC)    Acute on chronic combined systolic (congestive) and diastolic (congestive) heart failure (HCC)    Chronic obstructive pulmonary disease with acute exacerbation (HCC)    Cellulitis of left lower extremity    Frequent PVCs    Acute metabolic encephalopathy    Hematoma of left lower extremity    Past Medical History  Past Medical History:   Diagnosis Date    BPH (benign prostatic hyperplasia)     Chronic obstructive pulmonary disease (COPD) ([address] )     Essential hypertension     Hard of hearing     Pt does wear hearing aids    Hypertension     Shortness of breath     Pt states not changes and it occurs with moderate exertion    Sleep disturbance     Pt states he is only sleeping about 3 hours a night  Pt is seeing his PCP on 8/5/21 to discuss    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    Tinnitus      Past Surgical History  Past Surgical History:   Procedure Laterality Date    SHOULDER OPEN ROTATOR CUFF REPAIR      VEIN SURGERY             08/17/21 1010   Note Type   Note type Evaluation   Restrictions/Precautions   Other Precautions Chair Alarm; Bed Alarm;Multiple lines;Telemetry;O2  (2L O2 at start of session  RA at end)   Pain Assessment   Pain Assessment Tool 0-10   Pain Score No Pain   Home Living   Type of Home House  (0 DALILA)   Home Layout One level;Performs ADLs on one level; Able to live on main level with bedroom/bathroom   Bathroom Shower/Tub Tub/shower unit   Bathroom Toilet Standard   Bathroom Equipment Grab bars in [facility]   (no equiptment at home)   Additional Comments Pt reports living in a 1 story home with 0 DALILA  Pt ambulates with no AD at baseline      Prior Function   Level of [place] Independent with ADLs and functional mobility   Lives With Spouse   Receives Help From Family   ADL Assistance Independent   IADLs Independent   Falls in the last 6 months 0   Vocational Retired   Comments Pt reports completing ADLs, IADLs, functional ambulation and community mobility + driving @ I  Pt has assistance from wife and family PRN  ADL   Where Assessed Edge of bed   Eating Assistance 7  Independent   Eating Deficit Setup   Grooming Assistance 7  Independent   Grooming Deficit Setup   UB Dressing Assistance 7  Independent   UB Dressing Deficit Setup   LB Dressing Assistance   (SBA)   LB Dressing Deficit Requires assistive device for steadying;Verbal cueing;Supervision/safety; Increased time to complete; Don/doff R sock; Don/doff L sock; Thread RLE into pants; Thread LLE into pants;Pull up over hips   Additional Comments Pt completing ADL tasks while seated at EOB  UB Dressing, self-feeding and grooming tasks @ I after set-up  LB Dressing @ SBA for CM around waist while standing at RW  Pt donning B socks, increased time for LLE although able to complete with no physical assistance required  Bed Mobility   Supine to Sit 5  Supervision   Additional items Increased time required;Verbal cues   Additional Comments HOB flat, no bedrails   Transfers   Sit to Stand   (SBA)   Additional items Increased time required;Verbal cues   Stand to Sit   (SBA)   Additional items Increased time required;Verbal cues   Stand pivot   (SBA)   Additional items Increased time required;Verbal cues   Additional Comments Pt completing functional transfers with use of RW for UB support  SBA for STS and SPT with vc'ing for hand placement PRN  Pt's O2 closely monitored throughout therapy session, please refer to assessment for more information      Balance   Static Sitting Good   Dynamic Sitting Fair +   Static Standing Fair   Dynamic Standing Fair -   Activity Tolerance   Activity Tolerance Patient limited by fatigue   Medical Staff Made Aware Spoke with PT, [facility] with Kenny Valentino   RUE Assessment   RUE Assessment WFL   LUE Assessment   LUE Assessment WFL   Hand Function   Gross Motor Coordination Functional   Fine Motor Coordination Functional   Sensation   Light Touch No apparent deficits   Cognition   Overall Cognitive Status WFL   Arousal/Participation Responsive; Alert; Cooperative   Attention Attends with cues to redirect   Orientation Level Oriented X4   Memory Within functional limits   Following Commands Follows one step commands without difficulty   Assessment   Limitation Decreased ADL status; Decreased UE strength;Decreased Safe judgement during ADL;Decreased endurance;Decreased self-care trans;Decreased high-level ADLs   Prognosis Good   Assessment Pt is an [address] y/o M admitted to [facility] 8/13/2021 d/t experiencing increased SOB  Dx: acute on chronic respiratory failure with hypoxia and hypercapnia  Pt with PMHx impacting performance during functional tasks including: BPH, HTN, SOB, spindle cell carcinoma  Pt reports living at home with his wife in a 1 story home with 0 DALILA  Pt reports being completely independent with ADLs, IADLs, functional ambulation and community mobility + driving  On evaluation, Pt initially on 2L of O2, satting at 96%  Pt trailed on RA although d/t poor perfusions on fingers and ear lobe, accurate O2 saturations were not remaining consistent  Pt with no noted SOB during activity  O2 sats on RA appears to drop to 84% although recovering to low 90's with rest and pursed lip breathing  RN aware  Pt completing supine>sit @ S  UB Dressing and grooming tasks @ S after set-up  LB dressing @ SBA  Pt completing STS and SPT with use of RW for UBsupport  @ SBA  Pt's BUE ROM and MS WFL   Pt's barriers to d/c include: decrease activity tolerance, decrease standing balance, decrease performance during ADL tasks, decrease safety awareness , decrease UB MS, decrease generalized strength, decrease activity engagement and decrease performance during functional transfers  Pt would benefit from continued acute OT services to address deficits as well as HHOT upon d/c from [facility]  Plan   Treatment Interventions ADL retraining;Functional transfer training;UE strengthening/ROM; Endurance training;Patient/family training;Equipment evaluation/education; Neuromuscular reeducation; Compensatory technique education;Continued evaluation; Energy conservation; Activityengagement   Goal Expiration Date 08/27/21   OT Frequency 1-2x/wk   Recommendation   OT Discharge Recommendation Home with home health rehabilitation   AM-[facility] Daily Activity Inpatient   Lower Body Dressing 3   Bathing 3   Toileting 3   Upper Body Dressing 3   Grooming 3   Eating 4   Daily Activity Raw Score 19   Daily Activity Standardized Score (Calc for Raw Score >=11) 40 22   AM-[facility] Applied Cognition Inpatient   Following a Speech/Presentation 4   Understanding Ordinary Conversation 4   Taking Medications 4   Remembering Where Things Are Placed or Put Away 4   Remembering List of 4-5 Errands 4   Taking Care of Complicated Tasks 4   Applied Cognition Raw Score 24   Applied Cognition Standardized Score 62 21       The patient's raw score on the AM-PAC Daily Activity inpatient short form is 19, standardized score is 40 22, greater than 39 4  Patients at this level are likely to benefit from DC to home  Please refer to the recommendation of the Occupational Therapist for safe DC planning  Pt goals to be met by 8/27/2021    1  Pt will demonstrate ability to complete LB dressing @ I in order to increase safety and independence during meaningful tasks  2  Pt will demonstrate ability to do/doff socks/shoes while sitting EOB @ I in order to increase safety and independence during meaningful tasks  3  Pt will demonstrate ability to complete toileting tasks including CM and pericare @ I in order to increase safety and independence during meaningful tasks    4  Pt will demonstrate ability to complete EOB, chair, toilet/commode transfers @ I in order to increase performance and participation during functional tasks  5  Pt will demonstrate ability to stand for 10+ minutes while maintaining G balance with use of no AD for UB support PRN  6  Pt will demonstrate ability to tolerate 30-35 minute OT session with no vc'ing for deep breathing or use of energy conservation techniques in order to increase activity tolerance during functional tasks  7  Pt will demonstrate Good carryover of use of energy conservation/compensatory strategies during ADLs and functional tasks in order to increase safety and reduce risk for falls  8  Pt will demonstrate Good attention and participation in continued evaluation of functional ambulation house hold distances in order to assist with safe d/c planning  9  Pt will attend to continued cognitive assessments 100% of the time in order to provide most appropriate d/c recommendations  10  Pt will follow 100% simple 2-step commands and be A&O x4 consistently with environmental cues to increase participation in functional activities  11  Pt will identify 3 areas of interest/hobbies and 1 intervention on how to incorporate into daily life in order to increase interaction with environment and peers as well as increase participation in meaningful tasks  12  Pt will demonstrate 100% carryover of BUE HEP in order to increase BUE MS and increase performance during functional tasks upon d/c home      Carolann Joseph OTR/L

## 2021-08-17 NOTE — PLAN OF CARE
Problem: Potential for Falls  Goal: Patient will remain free of falls  Description: INTERVENTIONS:  - Educate patient/family on patient safety including physical limitations  - Instruct patient to call for assistance with activity   - Consult OT/PT to assist with strengthening/mobility   - Keep Call bell within reach  - Keep bed low and locked with side rails adjusted as appropriate  - Keep care items and personal belongings within reach  - Initiate and maintain comfort rounds  - Make Fall Risk Sign visible   - Apply yellow socks and bracelet for high fall risk patients  - Consider moving patient to room near nurses station  Outcome: Progressing     Problem: CARDIOVASCULAR - ADULT  Goal: Maintains optimal cardiac output and hemodynamic stability  Description: INTERVENTIONS:  - Monitor I/O, vital signs and rhythm  - Monitor for S/S and trends of decreased cardiac output  - Administer and titrate ordered vasoactive medications to optimize hemodynamic stability  - Assess quality of pulses, skin color and temperature  - Assess for signs of decreased coronary artery perfusion  - Instruct patient to report change in severity of symptoms  Outcome: Progressing  Goal: Absence of cardiac dysrhythmias or at baseline rhythm  Description: INTERVENTIONS:  - Continuous cardiac monitoring, vital signs, obtain 12 lead EKG if ordered  - Administer antiarrhythmic and heart rate control medications as ordered  - Monitor electrolytes and administer replacement therapy as ordered  Outcome: Progressing     Problem: RESPIRATORY - ADULT  Goal: Achieves optimal ventilation and oxygenation  Description: INTERVENTIONS:  - Assess for changes in respiratory status  - Assess for changes in mentation and behavior  - Position to facilitate oxygenation and minimize respiratory effort  - Oxygen administered by appropriate delivery if ordered  - Initiate smoking cessation education as indicated  - Encourage broncho-pulmonary hygiene including cough, deep breathe, Incentive Spirometry  - Assess the need for suctioning and aspirate as needed  - Assess and instruct to report SOB or any respiratory difficulty  - Respiratory Therapy support as indicated  Outcome: Progressing

## 2021-08-17 NOTE — PROGRESS NOTES
Progress Note:Cardiology  Kirit Abdul 1940, [de-identified] y o  male MRN: [id number]    Unit/Bed#: -01 Encounter: [id number]  Attending Physician: New Arredondo *   Primary Care Provider: Mateusz Miller MD   Date admitted to hospital: 8/9/2021  Length of stay: 8         Atrial fibrillation [facility])  Assessment & Plan  Patient is currently on Eliquis anticoagulation  Rates have been difficult to control  Telemetry resumed continues to show atrial fibrillation with frequent PVC's  Metoprolol discontinued given active wheezing  I had previously had EP review his ZIO monitor at which time the recommendation was made as follows:"Since he is symptomatic could consider ablation since not great antiarrhythmic candidate (no amio w copd, that much ectopy would make sotalol/dofetilide dangerous, and low EF can't do flecainide/propafenone)" from Dr Latisha Brenner  Discussed with Dr Latisha Brenner this am    Patient scheduled for evaluation with him 8/19/2021 to discuss options  ? PVC ablation initially as 33% PVC ablation with follow up atrial fibrillation ablation as discussed with Dr Latisha Brenner  Plan will be determined after his evaluation on Thursday  Will increase diltiazem to 300 mg daily  Will hold off on initiation of amiodarone as this could interfere with proposed PVC ablation  Acute on chronic combined systolic (congestive) and diastolic (congestive) heart failure (HCC)  Assessment & Plan  Wt Readings from Last 3 Encounters:   08/13/21 81 5 kg (179 lb 10 8 oz)   08/09/21 82 1 kg (181 lb)   06/17/21 82 1 kg (181 lb)     Patient presented to the office last week with  with O2 on RA of 76% with evidence of volume overload on exam based on breath sounds, weight gain, and leg edema and was directed to ER for IV diuresis  Echocardiogram 2/25/2021 showed an EF of 45-50%  Unclear etiology of reduced EF but this could be tachycardia induced vs EtOH induced or ischemic    Will need eventual stress test once heart rates are controlled to rule out ischemia as the source of his HFrEF  Ideally should be on long acting beta blocker but has COPD/Asthma but has active wheezing on exam today  Beta blocker was started in hospital due to decreased LVEF by [address] [facility] cardiology however cardizem was also continued ?     Metoprolol was discontinued in setting of marked wheezing and significant underlying lung disease  No ACE-I/ARB for now as we need room to uptitrate AV efra blockers  Patient was aggressively diuresed but unfortunately developed hypercapnia which is improved  Patient continues to have evidence of volume overload on exam which is markedly improved from office visit  Recommend resuming oral diuretic (furosemide 60 mg daily) with close watch of labs  Add compression stockings once ok with surgery given left leg wound  Suspect ongoing arrhythmia needs to be addressed to prevent recurrent heart failure issues  Discussed with  EP (Dr Jagjit Hubbard) who will arrange close follow up (8/19/2021 office will call with time)  Acute metabolic encephalopathy  Assessment & Plan  Resolved and likely secondary to hypercapnia  Frequent PVCs  Assessment & Plan  Frequent PVC's noted on prior ZIO/Holter and on telemetry  See discussion under atrial fibrillation  Subjective:   Patient seen and examined  No significant events overnight  Called early this am regarding VT on telemetry  Review of telemetry showed atrial fibrillation with PVC's in bigeminy without evidence of VT  Patient feels well  Delmus Bleak to go home  He did have intervention on his left leg yesterday  He denies any chest pain  He did qualify for home oxygen  Patient did not tolerate BIPAP last pm      Review of Systems   Constitutional: Positive for malaise/fatigue  Negative for diaphoresis and weight gain  HENT: Negative for hearing loss and tinnitus  Cardiovascular: Positive for dyspnea on exertion and leg swelling   Negative for chest pain, claudication, orthopnea and palpitations  Respiratory: Negative for cough, shortness of breath and snoring  Gastrointestinal: Negative for abdominal pain, nausea and vomiting  Neurological: Negative for dizziness and light-headedness  All other systems reviewed and are negative  Objective:     Vitals: Blood pressure 136/88, pulse 78, temperature 98 1 °F (36 7 °C), temperature source Oral, resp  rate (!) 27, height 5' 9" (1 753 m), weight 82 6 kg (182 lb 3 2 oz), SpO2 97 %  , Body mass index is 26 91 kg/m² ,     Orthostatic Blood Pressures      Most Recent Value   Blood Pressure  136/88 filed at 08/17/2021 1450   Patient Position - Orthostatic VS  Sitting filed at 08/17/2021 1100          Physical Exam  Vitals reviewed  Constitutional:       Appearance: He is well-developed  HENT:      Head: Normocephalic and atraumatic  Neck:      Vascular: No JVD  Cardiovascular:      Rate and Rhythm: Normal rate  Rhythm irregularly irregular  Heart sounds: No murmur heard  Pulmonary:      Effort: Pulmonary effort is normal       Breath sounds: Decreased air movement present  Examination of the right-middle field reveals rales  Examination of the left-middle field reveals rales  Examination of the right-lower field reveals rales  Examination of the left-lower field reveals rales  Wheezing (diffuse) and rales present  Musculoskeletal:      Right lower leg: Edema present  Left lower leg: Edema present  Comments: Dressing intact left lower extremity  Skin:     General: Skin is warm and dry  Neurological:      Mental Status: He is alert and oriented to person, place, and time              Intake/Output Summary (Last 24 hours) at 8/17/2021 1559  Last data filed at 8/17/2021 0901  Gross per 24 hour   Intake 240 ml   Output 800 ml   Net -560 ml       Weight (last 2 days)     Date/Time   Weight    08/17/21 0600   82 6 (182 2)                 Medications:      Current Facility-Administered Medications:     acetaminophen (TYLENOL) tablet 650 mg, 650 mg, Oral, Q4H PRN, Nai Castaneda MD, 650 mg at 08/17/21 0009    apixaban (ELIQUIS) tablet 5 mg, 5 mg, Oral, BID, Kerri Turcios, KAN, 5 mg at 08/17/21 0844    budesonide (PULMICORT) inhalation solution 0 5 mg, 0 5 mg, Nebulization, Q12H, Michelene Kawasaki, MD, 0 5 mg at 08/17/21 9812    cephalexin (KEFLEX) capsule 500 mg, 500 mg, Oral, Q6H [address], Genesis Zbarjoannak, CRNP, 500 mg at 08/17/21 1246    [START ON 8/18/2021] diltiazem (CARDIZEM CD) 24 hr capsule 300 mg, 300 mg, Oral, Daily, KAN Grace    diltiazem (CARDIZEM) tablet 60 mg, 60 mg, Oral, Q6H KM, KAN Akhtar, 60 mg at 08/17/21 1247    docusate sodium (COLACE) capsule 100 mg, 100 mg, Oral, BID, Nai Castaneda MD, 100 mg at 08/17/21 0845    finasteride (PROSCAR) tablet 5 mg, 5 mg, Oral, Daily, Nai Castaneda MD, 5 mg at 08/17/21 0845    furosemide (LASIX) tablet 60 mg, 60 mg, Oral, Daily, KAN Grace, 60 mg at 08/17/21 1247    ipratropium (ATROVENT) 0 02 % inhalation solution 0 5 mg, 0 5 mg, Nebulization, Q6H, Michelene Kawasaki, MD, 0 5 mg at 08/17/21 1350    levalbuterol (XOPENEX) inhalation solution 1 25 mg, 1 25 mg, Nebulization, Q6H, 1 25 mg at 08/17/21 1350 **AND** [DISCONTINUED] sodium chloride 0 9 % inhalation solution 3 mL, 3 mL, Nebulization, Q6H, Nai Castaneda MD, 3 mL at 08/16/21 0226    melatonin tablet 3 mg, 3 mg, Oral, HS PRN, KAN Ch    nicotine (NICODERM CQ) 14 mg/24hr TD 24 hr patch 1 patch, 1 patch, Transdermal, Daily, Nai Castaneda MD, 1 patch at 08/13/21 0919    predniSONE tablet 40 mg, 40 mg, Oral, Daily, Michelene Kawasaki, MD, 40 mg at 08/17/21 0844     Labs & Results:        Results from last 7 days   Lab Units 08/17/21  0449 08/15/21  0523 08/13/21  0644   WBC Thousand/uL 10 65* 7 06 9 24   HEMOGLOBIN g/dL 13 9 14 0 14 8   HEMATOCRIT % 44 5 45 4 49 1   PLATELETS Thousands/uL 147* 144* 184         Results from last 7 days   Lab Units 08/17/21  0449 08/16/21  0545 08/15/21  0523   POTASSIUM mmol/L 4 8 4 6 4 4   CHLORIDE mmol/L 101 101 102   CO2 mmol/L 44* 42* 38*   BUN mg/dL 37* 38* 34*   CREATININE mg/dL 1 04 1 10 0 96   CALCIUM mg/dL 9 0 8 8 8 8   ALK PHOS U/L  --   --  63   ALT U/L  --   --  40   AST U/L  --   --  20         Results from last 7 days   Lab Units 08/17/21  0449 08/14/21  0456   MAGNESIUM mg/dL 2 2 3 1*     Results from last 7 days   Lab Units 08/11/21  1343   NT-PRO BNP pg/mL 7,384*            Counseling / Coordination of Care  Total floor / unit time spent today 45 minutes  Greater than 50% of total time was spent with the patient and / or family counseling and / or coordination of care  A description of the counseling / coordination of care: Discussed with patient and family at bedside  Discussed with Dr Mele Kwon  Discussed with primary team  Plan as outlined

## 2021-08-17 NOTE — NURSING NOTE
Notified Kerri price (SLIM) a flutter ,a fib on monitor denies SOB CP, palpitations vs 97 9, 540,170,96   Will continue to monitor heart rhythm and advise

## 2021-08-17 NOTE — PLAN OF CARE
Problem: Potential for Falls  Goal: Patient will remain free of falls  Description: INTERVENTIONS:  - Educate patient/family on patient safety including physical limitations  - Instruct patient to call for assistance with activity   - Consult OT/PT to assist with strengthening/mobility   - Keep Call bell within reach  - Keep bed low and locked with side rails adjusted as appropriate  - Keep care items and personal belongings within reach  - Initiate and maintain comfort rounds  - Make Fall Risk Sign visible to staff  - Offer Toileting every 2 hours, in advance of need  - Initiate/Maintain bed   Problem: CARDIOVASCULAR - ADULT  Goal: Maintains optimal cardiac output and hemodynamic stability  Description: INTERVENTIONS:  - Monitor I/O, vital signs and rhythm  - Monitor for S/S and trends of decreased cardiac output  - Administer and titrate ordered vasoactive medications to optimize hemodynamic stability  - Assess quality of pulses, skin color and temperature  - Assess for signs of decreased coronary artery perfusion  - Instruct patient to report change in severity of symptoms  Outcome: Progressing     Problem: CARDIOVASCULAR - ADULT  Goal: Absence of cardiac dysrhythmias or at baseline rhythm  Description: INTERVENTIONS:  - Continuous cardiac monitoring, vital signs, obtain 12 lead EKG if ordered  - Administer antiarrhythmic and heart rate control medications as ordered  - Monitor electrolytes and administer replacement therapy as ordered  Outcome: Progressing   alarm  - Obtain necessary fall risk management equipment: yes  - Apply yellow socks and bracelet for high fall risk patients  - Consider moving patient to room near nurses station  Outcome: Progressing

## 2021-08-17 NOTE — RESPIRATORY THERAPY NOTE
RT Ventilator Management Note  Ailyn Villafuerte [de-identified] y o  male MRN: [id number]  Unit/Bed#: -01 Encounter: [id number]      Daily Screen    No data found in the last 10 encounters  Physical Exam:          Pt placed on bipap 20/8 @ 21% at 2255hrs with concurrent overnight O2 study   Test explained to patient prior to placing bipap on - pt verbalized understanding  At 2330hrs - pt  Removed mask and stated "I wont wear this"  RN and RT explained importance of bipap and O2 study  Pt continues to refuse  Overnight O2 study continued on room air at this time  CRNP notified  Pt with extremely poor sleep pattern during O2 study (minimal sleep noted)   Pt stated he is usually up all night  Placed on 3L O2 at conclusion of study  ABG at 0447hrs (room air)-7 39/72/46/43/81%

## 2021-08-17 NOTE — PROGRESS NOTES
Progress Note - Pulmonary   Rhesa Sensor [de-identified] y o  male MRN: [id number]  Unit/Bed#: -01 Encounter: [id number]      Assessment:  1  Acute on chronic hypoxic/hypercapnic respiratory failure-multifactorial, CHF/COPD exacerbation, deconditioning, suspect component from atelectasis/laying in bed for long time  2  COPD with exacerbation - unknown baseline lung function, no PFT in file but at least has 30-40 pack year tobacco abuse history quit in 1998  Seen by Pulmonary in 8/13, switched to nebulized treatment only  Recommend to continue BiPAP and recheck ABG/consider Diamox  Currently appears to be improving, mild wheezes on exam  3  Acute on chronic diastolic/systolic heart failure-started treatment with diuretics on admission  Recommended Diamox and BiPAP  4  Former tobacco abuse-quit in 0, about 30-40 pack year, was smoking cigars after the on and off    Plan:  · May repeat ABG in the a m  After being on nasal cannula or room air during the night to see if he can qualify for BiPAP  · Continue nebulized treatment only Pulmicort, ipratropium/Xopenex  · May switch to his home inhalers on discharge, was on Spiriva/Advair 250  · Her day 3 of continuous steroids, started 8/14, continue prednisone 40 mg up to 8/21 the start tapering down  · Continue diuresis as tolerated  · Encouraged to get out of bed/using incentive spirometer frequently  · Evaluated for home oxygen, required 2 LPM at rest and 5 with exertion    Subjective:   On 2 LPM this a m  ABG done this a m  7 4/72/PO2 reported to be 46 or likely a was a venous sample  No new respiratory symptoms, feeling better compared to the admission time  Able to get up to 750 cc on incentive spirometer  Vitals: Blood pressure 124/74, pulse 99, temperature 97 7 °F (36 5 °C), temperature source Oral, resp  rate (!) 33, height 5' 9" (1 753 m), weight 82 6 kg (182 lb 3 2 oz), SpO2 93 %  , Body mass index is 26 91 kg/m²        Intake/Output Summary (Last 24 hours) at 8/17/2021 0827  Last data filed at 8/17/2021 0201  Gross per 24 hour   Intake 200 ml   Output 1000 ml   Net -800 ml       Physical Exam  Gen: not in acute distress, flat in bed, Body mass index is 26 91 kg/m²  HEENT:supple, no accessory muscle use  Cardiac:  Irregular rhythm  Lungs: normal respiratory effort, fine bibasilar crackles up to the mid lung fields, mild wheezes at the right lower lung field  Abdomen: soft, nontender, normoactive bowel sounds  Extremities: 1+ pitting up to the knee lowe extremities edema  Neuro: AAO X3, no focal motor deficit    Labs: I have personally reviewed pertinent lab results  , ABG:   Lab Results   Component Value Date    PHART 7 394 08/17/2021    OMD8OQC 72 0 (HH) 08/17/2021    PO2ART 46 1 (LL) 08/17/2021    GFO1COB 43 0 (H) 08/17/2021    BEART 14 4 08/17/2021    SOURCE Radial, Left 08/17/2021   , BNP: No results found for: BNP, CBC:   Lab Results   Component Value Date    WBC 10 65 (H) 08/17/2021    HGB 13 9 08/17/2021    HCT 44 5 08/17/2021     (H) 08/17/2021     (L) 08/17/2021    MCH 34 0 08/17/2021    MCHC 31 2 (L) 08/17/2021    RDW 14 6 08/17/2021    MPV 10 1 08/17/2021    NRBC 0 08/17/2021   , CMP:   Lab Results   Component Value Date    SODIUM 142 08/17/2021    K 4 8 08/17/2021     08/17/2021    CO2 44 (H) 08/17/2021    BUN 37 (H) 08/17/2021    CREATININE 1 04 08/17/2021    CALCIUM 9 0 08/17/2021    EGFR 67 08/17/2021   , PT/INR: No results found for: PT, INR, Troponin: No results found for: Taniya Allen MD

## 2021-08-17 NOTE — PLAN OF CARE
Problem: OCCUPATIONAL THERAPY ADULT  Goal: Performs self-care activities at highest level of function for planned discharge setting  See evaluation for individualized goals  Description: Treatment Interventions: ADL retraining, Functional transfer training, UE strengthening/ROM, Endurance training, Patient/family training, Equipment evaluation/education, Neuromuscular reeducation, Compensatory technique education, Continued evaluation, Energy conservation, Activityengagement          See flowsheet documentation for full assessment, interventions and recommendations  Note: Limitation: Decreased ADL status, Decreased UE strength, Decreased Safe judgement during ADL, Decreased endurance, Decreased self-care trans, Decreased high-level ADLs  Prognosis: Good  Assessment: Pt is an [de-identified] y/o M admitted to [address] [place] [address] 8/13/2021 d/t experiencing increased SOB  Dx: acute on chronic respiratory failure with hypoxia and hypercapnia  Pt with PMHx impacting performance during functional tasks including: BPH, HTN, SOB, spindle cell carcinoma  Pt reports living at home with his wife in a 1 story home with 0 DALILA  Pt reports being completely independent with ADLs, IADLs, functional ambulation and community mobility + driving  On evaluation, Pt initially on 2L of O2, satting at 96%  Pt trailed on RA although d/t poor perfusions on fingers and ear lobe, accurate O2 saturations were not remaining consistent  Pt with no noted SOB during activity  O2 sats on RA appears to drop to 84% although recovering to low 90's with rest and pursed lip breathing  RN aware  Pt completing supine>sit @ S  UB Dressing and grooming tasks @ S after set-up  LB dressing @ SBA  Pt completing STS and SPT with use of RW for UBsupport  @ SBA  Pt's BUE ROM and MS WFL   Pt's barriers to d/c include: decrease activity tolerance, decrease standing balance, decrease performance during ADL tasks, decrease safety awareness , decrease UB MS, decrease generalized strength, decrease activity engagement and decrease performance during functional transfers  Pt would benefit from continued acute OT services to address deficits as well as HHOT upon d/c from [address] [place] [address]       OT Discharge Recommendation: Home with home health rehabilitation

## 2021-08-17 NOTE — ASSESSMENT & PLAN NOTE
Frequent PVC's noted on prior ZIO/Holter and on telemetry  See discussion under atrial fibrillation

## 2021-08-17 NOTE — RESPIRATORY THERAPY NOTE
Home Oxygen Qualifying Test       Patient name: Wing Mohamud        : 1940   Date of Test:  2021  Diagnosis:      Home Oxygen Test:    **Medicare Guidelines require item(s) 1-5 on all ambulatory patients or 1 and 2 on non-ambulatory patients  1   Baseline SPO2 on Room Air at rest 88 %  2   SPO2 during exercise on Room Air 84 %  During exercise monitor SpO2  If SPO2 increases >=89% with ambulation do not add supplemental             oxygen  If <= 88% on room air add O2 via NC and titrate patient  Patient must be ambulated with O2 and titrated to > 88% with exertion  3   SPO2 on Oxygen at rest 94 % 2 lpm     4   SPO2 during exercise on Oxygen  90% a liter flow of 5 lpm     5   Exercise performed:          walking, duration 6 (min)          [x]  Supplemental Home Oxygen is indicated  []  Client does not qualify for home oxygen  Respiratory Additional Notes- Pt will require 2 L NC at rest and 5 L NCto walk       Kaitlynn Garcia, RT

## 2021-08-17 NOTE — PHYSICAL THERAPY NOTE
Physical Therapy Treatment Note       08/17/21 1037   PT Last Visit   PT Visit Date 08/17/21   Note Type   Note Type Treatment   Pain Assessment   Pain Assessment Tool Pain Assessment not indicated - pt denies pain   Pain Score No Pain   Restrictions/Precautions   Weight Bearing Precautions Per Order No   Other Precautions Telemetry;O2;Fall Risk;Hard of hearing  (intermittent 2 L O2 throughout session)   General   Chart Reviewed Yes   Response to Previous Treatment Patient with no complaints from previous session  Family/Caregiver Present Yes   Cognition   Arousal/Participation Alert; Responsive; Cooperative   Attention Attends with cues to redirect   Orientation Level Oriented X4   Memory Decreased recall of precautions   Following Commands Follows one step commands without difficulty   Comments pt agreeable to PT session, motivated to participate   Subjective   Subjective "I feel pretty good today"   Bed Mobility   Supine to Sit 5  Supervision   Additional items Assist x 1;HOB elevated   Sit to Supine 5  Supervision   Additional items Assist x 1;Verbal cues   Additional Comments SpO2 at rest on RA ranging from 84-88%, during level surface mobility x 3 attempts SpO2 dropped to 85%  During recovery period, SpO2 increased to 90-91% on 2 L O2  Frequent rest periods required d/t fatigue and hypoxia  Pt educated and encouraged to complete PLB technique during recovery periods  Pt demonstrated and verbalized understanding of such  Transfers   Sit to Stand 5  Supervision  (SBA)   Additional items Assist x 1; Impulsive;Verbal cues   Stand to Sit 5  Supervision   Additional items Assist x 1; Impulsive;Verbal cues   Ambulation/Elevation   Gait pattern Improper Weight shift;Decreased foot clearance; Foward flexed; Short stride; Excessively slow   Gait Assistance 5  Supervision  (SBA)   Additional items Assist x 1;Verbal cues; Tactile cues   Assistive Device Rolling walker;None   Distance 30 ft c RW and SBA, 60 ft total without AD and SBA, 1 minor self corrected LOB   Stair Management Assistance Not tested   Balance   Static Sitting Good   Dynamic Sitting Fair +   Static Standing Fair   Dynamic Standing Fair   Ambulatory Fair   Endurance Deficit   Endurance Deficit Yes   Activity Tolerance   Activity Tolerance Patient limited by fatigue   Nurse Made Aware Yes, RN made aware of outcomes/recs   Exercises   Heelslides Sitting;10 reps;AROM; Right;Left   Glute Sets Sitting;20 reps;AROM; Bilateral   Hip Abduction Sitting;10 reps;AROM; Right;Left   Hip Adduction Sitting;10 reps;AROM; Right;Left   Knee AROM Long Arc Quad Sitting;20 reps;AROM; Bilateral   Ankle Pumps Sitting;20 reps;AROM; Bilateral   Marching Sitting;10 reps;AROM; Right;Left   Neuro re-ed pt provided with detailed HEP outlining [facility] Jewel performed at this time  Pt and pt's visitor encouraged to complete 10-20 reps to tolerance up to 3x/day  PT will continue to monitor pt progress to HEP and adjust accordingly  Assessment   Prognosis Good   Problem List Decreased strength;Decreased endurance; Impaired balance;Decreased mobility; Decreased cognition; Impaired judgement;Decreased safety awareness   Assessment Pt seen for PT treatment session this date with interventions consisting of gait training w/ emphasis on improving pt's ability to ambulate level surfaces x 30 ft, 60 ft with SBA provided by therapist with RW and no AD, Therapeutic exercise consisting of: AROM 10-20 reps B LE in sitting and supine position and therapeutic activity consisting of training: supine<>sit transfers, sit<>stand transfers and static sitting tolerance at EOB for ~20 minutes w/ occasional UE support  Pt agreeable to PT treatment session upon arrival, pt found supine in bed w/ HOB elevated, in no apparent distress and responsive  In comparison to previous session, pt with significant improvements in tolerance to increased OOB activity and ability to mobilize s use of AD   Pt requiring occasional 2 L O2 donning following level surface mobility and during LB Jewel  Post session: pt returned BTB, all needs in reach and RN notified of session findings/recommendations  At this time, PT recommends home with home health rehabilitation at time of d/c in order to maximize pt's functional independence and safety w/ mobility  Pt continues to be functioning below baseline level, and remains limited 2* factors listed above and including decreased strength, decreased endurance, decreased dynamic balance, decreased safety awareness, and decreased overall mobility status limiting pt's return to PLOF  PT will continue to see pt during current hospitalization in order to address the deficits listed above and provide interventions consistent w/ POC in effort to achieve STGs  Barriers to Discharge Inaccessible home environment;Decreased caregiver support   Goals   Patient Goals "to go home"   STG Expiration Date 08/22/21   Short Term Goal #1 goals remain appropriate   PT Treatment Day 1   Plan   Treatment/Interventions Functional transfer training;LE strengthening/ROM; Therapeutic exercise;Elevations; Endurance training;Patient/family training;Equipment eval/education; Bed mobility;Gait training; Compensatory technique education;Spoke to nursing;OT;Spoke to case management   Progress Progressing toward goals   PT Frequency Other (Comment)  (3-5x/wk)   Recommendation   PT Discharge Recommendation Post acute rehabilitation services   Equipment Recommended   (no AD recommended for home use at this time)   PT - OK to Discharge Yes  (when medically cleared)   Additional Comments Pt's raw score on the AM-[facility] Basic Mobility inpatient short form is 19, standardized score is 42 48  Patients at this level are likely to benefit from DC to home with home care, however, please refer to therapist recommendation for safe DC planning     AM-[facility] Basic Mobility Inpatient   Turning in Bed Without Bedrails 4   Lying on Back to Sitting on Edge of Flat Bed 4   Moving Bed to Chair 3   Standing Up From Chair 3   Walk in Room 3   Climb 3-5 Stairs 2   Basic Mobility Inpatient Raw Score 19   Basic Mobility Standardized Score 42 48       Sobia Whyte PT    Time of PT treatment session: 9597-7722 (54 minutes)

## 2021-08-17 NOTE — ASSESSMENT & PLAN NOTE
Patient is currently on Eliquis anticoagulation  Rates have been difficult to control  Telemetry resumed continues to show atrial fibrillation with frequent PVC's  Metoprolol discontinued given active wheezing  Dr Anais Meneses had previously had EP review his ZIO monitor at which time the recommendation was made as follows:"Since he is symptomatic could consider ablation since not great antiarrhythmic candidate (no amio w copd, that much ectopy would make sotalol/dofetilide dangerous, and low EF can't do flecainide/propafenone)" from Dr Kaley Magallon  Discussed with Dr Kaley Magallon again on 8/17/2021  Patient scheduled for evaluation with him 8/19/2021 to discuss options  ? PVC ablation initially as 33% PVC ablation with follow up atrial fibrillation ablation as discussed with Dr Kaley Magallon  Plan will be determined after his evaluation on Thursday  Increased diltiazem to 300 mg daily yesterday (8/17/2021)  Will hold off on initiation of amiodarone as this could interfere with proposed PVC ablation

## 2021-08-17 NOTE — ASSESSMENT & PLAN NOTE
· Secondary to acute respiratory failure with hypoxia and hypercapnia     · CT brain negative for any acute pathology or bleed  · Neg ammonia level   · Continue treatment of underlying etiology

## 2021-08-17 NOTE — NURSING NOTE
Patient repositioning in bed  Patient continues to decline bipap  Patient educated on the benefits of sleep study but he continues to decline  Pulse ox ranging in mid 80's  Patient assisted with repositioning and instructed to alert staff to any concerns

## 2021-08-17 NOTE — ASSESSMENT & PLAN NOTE
Wt Readings from Last 3 Encounters:   08/13/21 81 5 kg (179 lb 10 8 oz)   08/09/21 82 1 kg (181 lb)   06/17/21 82 1 kg (181 lb)     Patient presented to the office last week with  with O2 on RA of 76% with evidence of volume overload on exam based on breath sounds, weight gain, and leg edema and was directed to ER for IV diuresis  Echocardiogram 2/25/2021 showed an EF of 45-50%  Unclear etiology of reduced EF but this could be tachycardia induced vs EtOH induced or ischemic  Will need eventual stress test once heart rates are controlled to rule out ischemia as the source of his HFrEF  Ideally should be on long acting beta blocker but has COPD/Asthma but has active wheezing on exam today  Beta blocker was started in hospital due to decreased LVEF by [facility] cardiology however cardizem was also continued ?     Metoprolol was discontinued in setting of marked wheezing and significant underlying lung disease  No ACE-I/ARB for now as we need room to uptitrate AV efra blockers  Patient was aggressively diuresed but unfortunately developed hypercapnia which is improved  Patient continues to have evidence of volume overload on exam which is markedly improved from office visit  Resumed oral diuretic (furosemide 60 mg daily) with stable renal function and improving arterial and venous CO2 levels today  Add compression stockings once ok with surgery given left leg wound  Suspect ongoing arrhythmia needs to be addressed to prevent recurrent heart failure issues  Discussed with  EP (Dr Monserrat Vora) who will arrange close follow up (8/19/2021 office will call with time)

## 2021-08-17 NOTE — ASSESSMENT & PLAN NOTE
· Most likely multifactorial with COPD, underlying emphysema and pulmonary hypertension  · Placed on Solu-Medrol 40 mg IV BID and since been transitioned to steroid oral   · Continue on levalbuterol, Pulmicort and Atrovent nebulizer treatments scheduled  · Respiratory protocol   · Wean oxygen down to keep pulse ox between 88-92%  · Probably need home BiPAP machine upon discharge and outpatient sleep study to be done  · Pulmonology following; input appreciated   · Home O2 eval completed; oxygen indicated

## 2021-08-17 NOTE — NURSING NOTE
Pulse ox noted to be 82%  Patient applying nasal cannula  Patient instructed on oxygen not being on  Patient instructed on breathing techniques in order to increase oxygen level  Patient placed on oxygen for a minute due to desatting into the 70's and then taken off oxygen

## 2021-08-17 NOTE — ASSESSMENT & PLAN NOTE
Wt Readings from Last 3 Encounters:   08/17/21 82 6 kg (182 lb 3 2 oz)   08/09/21 82 1 kg (181 lb)   06/17/21 82 1 kg (181 lb)     · As per last echocardiogram EF was 45-50%  · Managed as an OP on 60 mg of Lasix PO Daily   · Follows OP with Dr Thao Oseguera   · IV diuresis per Cardiology team; transitioned to PO lasix 60mg daily 08/12- discontinued lasix for now due to hypercapnia   · Received a dose of Diamox 8/13  · Monitor intake and output and daily weights   · Cardiac 2g na/fluid restriction   · Monitor volume status closely     Currently appears to be euvolemic  · Cardiology following; to resume diuretics on 8/17  · Repeat CXR on 8/17

## 2021-08-17 NOTE — PROGRESS NOTES
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     Plan:  Discussed with Dr Everett Angulo  Will leave iodoform packing and compression dressing with Ace wrap in place undisturbed today, will remove packing, irrigate wound and repack tomorrow  Medicine may restart Eliquis today, Tuesday  Continue Keflex 500 mg 1 tablet q 6 hours for a total of 7 days  Do not apply VTE compression boot to left calf  Patient encouraged out of bed, ambulate  Medical management per the attending hospitalist providers  Wide excision lesion left forehead being managed as an outpatient by ENT, surgery has been postponed according to the family while the patient remains hospitalized  Patient will need visiting home health nursing after hospital discharge arranged through case management to repack and replaced dressing left posterior calf wound every 2 days  Follow-up with general surgery in 2 weeks in the office for wound check after hospital discharge  Subjective/Objective   Chief Complaint:  Patient offers no complaints  Ambulatory in his room  He denies any pain left posterior calf  Dressing is clean and dry  Subjective:  81-year-old white male seen in CCU  He had hematoma formation in the site of a left posterior calf wound from previous [address] wide excision done 3 weeks ago  The patient had been on Eliquis, on hold since yesterday    Patient denies any pain or numbness in the area  He has been walking in his room  Scheduled Meds:  Current Facility-Administered Medications   Medication Dose Route Frequency Provider Last Rate    acetaminophen  650 mg Oral Q4H PRN Patricia Stock MD      budesonide  0 5 mg Nebulization Q12H Concetta Cowan MD      cephalexin  500 mg Oral HOSP MARIA GUADALUPE APPLE Lara PA-C      diltiazem  240 mg Oral Daily Patricia Stock MD      docusate sodium  100 mg Oral BID Patricia Stock MD      finasteride  5 mg Oral Daily Patricia Stock MD      ipratropium  0 5 mg Nebulization Q6H Concteta Cowan MD      levalbuterol  1 25 mg Nebulization Q6H Patricia Stock MD      melatonin  3 mg Oral HS PRN KAN Ch      nicotine  1 patch Transdermal Daily Patricia Stock MD      predniSONE  40 mg Oral Daily Concetta Cowan MD       Continuous Infusions:   PRN Meds:   acetaminophen    melatonin      Objective:     Blood pressure 124/74, pulse 99, temperature 97 7 °F (36 5 °C), temperature source Oral, resp  rate (!) 33, height 5' 9" (1 753 m), weight 82 6 kg (182 lb 3 2 oz), SpO2 93 %  ,Body mass index is 26 91 kg/m²  Intake/Output Summary (Last 24 hours) at 8/17/2021 0812  Last data filed at 8/17/2021 0201  Gross per 24 hour   Intake 200 ml   Output 1000 ml   Net -800 ml       Invasive Devices     Peripheral Intravenous Line            Peripheral IV 08/13/21 Dorsal (posterior); Right Forearm 4 days    Peripheral IV 08/14/21 Left Antecubital 2 days                Physical Exam:     Awake alert  Daughter and wife present in room  Patient is walking in his room and then sitting in bedside chair  Head shows a 2 cm raised red purplish lesion left forehead without drainage or bleeding  Heart regular rate and rhythm with occasional extra systolic be  Lungs wheezing heard bilaterally  Abdomen soft, nontender  Extremities left posterior calf Ace wrap in place  Dressing was not removed, left calf muscles soft and supple    Good sensation light touch in the left foot  Good DP and PT pulses  Lab, Imaging and other studies:  CBC:   Lab Results   Component Value Date    WBC 10 65 (H) 08/17/2021    HGB 13 9 08/17/2021    HCT 44 5 08/17/2021     (H) 08/17/2021     (L) 08/17/2021    MCH 34 0 08/17/2021    MCHC 31 2 (L) 08/17/2021    RDW 14 6 08/17/2021    MPV 10 1 08/17/2021    NRBC 0 08/17/2021   , CMP:   Lab Results   Component Value Date    SODIUM 142 08/17/2021    K 4 8 08/17/2021     08/17/2021    CO2 44 (H) 08/17/2021    BUN 37 (H) 08/17/2021    CREATININE 1 04 08/17/2021    CALCIUM 9 0 08/17/2021    EGFR 67 08/17/2021     VTE Pharmacologic Prophylaxis: Reason for no pharmacologic prophylaxis Hematoma left posterior calf  VTE Mechanical Prophylaxis: reason for no mechanical VTE prophylaxis Not applied to left calf because of wound       Kumar Wan PA-C

## 2021-08-17 NOTE — ASSESSMENT & PLAN NOTE
· As documented in the image section  · Blood cultures negative  · Wound care consultation  · Wound culture reviewed  · Surgery consulted; status post evacuation of hematoma  · Wound care with packing   · Resumed eliquis on 8/17; monitor Hgb  · Will need close wound care and f/u at discharge   · Keflex for 7 days (28 doses)

## 2021-08-18 VITALS
HEIGHT: 69 IN | SYSTOLIC BLOOD PRESSURE: 116 MMHG | HEART RATE: 56 BPM | BODY MASS INDEX: 26.81 KG/M2 | RESPIRATION RATE: 17 BRPM | WEIGHT: 181 LBS | DIASTOLIC BLOOD PRESSURE: 68 MMHG | OXYGEN SATURATION: 95 % | TEMPERATURE: 97.5 F

## 2021-08-18 LAB
ANION GAP SERPL CALCULATED.3IONS-SCNC: 0 MMOL/L (ref 4–13)
ARTERIAL PATENCY WRIST A: YES
BASE EXCESS BLDA CALC-SCNC: 13.5 MMOL/L
BASOPHILS # BLD AUTO: 0 THOUSANDS/ΜL (ref 0–0.1)
BASOPHILS NFR BLD AUTO: 0 % (ref 0–1)
BUN SERPL-MCNC: 34 MG/DL (ref 5–25)
CALCIUM SERPL-MCNC: 8.7 MG/DL (ref 8.3–10.1)
CHLORIDE SERPL-SCNC: 103 MMOL/L (ref 100–108)
CO2 SERPL-SCNC: 42 MMOL/L (ref 21–32)
CREAT SERPL-MCNC: 0.92 MG/DL (ref 0.6–1.3)
DME PARACHUTE DELIVERY DATE ACTUAL: NORMAL
DME PARACHUTE DELIVERY DATE ACTUAL: NORMAL
DME PARACHUTE DELIVERY DATE EXPECTED: NORMAL
DME PARACHUTE DELIVERY DATE REQUESTED: NORMAL
DME PARACHUTE DELIVERY DATE REQUESTED: NORMAL
DME PARACHUTE ITEM DESCRIPTION: NORMAL
DME PARACHUTE ORDER STATUS: NORMAL
DME PARACHUTE ORDER STATUS: NORMAL
DME PARACHUTE SUPPLIER NAME: NORMAL
DME PARACHUTE SUPPLIER NAME: NORMAL
DME PARACHUTE SUPPLIER PHONE: NORMAL
DME PARACHUTE SUPPLIER PHONE: NORMAL
EOSINOPHIL # BLD AUTO: 0.01 THOUSAND/ΜL (ref 0–0.61)
EOSINOPHIL NFR BLD AUTO: 0 % (ref 0–6)
ERYTHROCYTE [DISTWIDTH] IN BLOOD BY AUTOMATED COUNT: 14.2 % (ref 11.6–15.1)
GFR SERPL CREATININE-BSD FRML MDRD: 78 ML/MIN/1.73SQ M
GLUCOSE SERPL-MCNC: 111 MG/DL (ref 65–140)
HCO3 BLDA-SCNC: 41.9 MMOL/L (ref 22–28)
HCT VFR BLD AUTO: 41.9 % (ref 36.5–49.3)
HGB BLD-MCNC: 13.5 G/DL (ref 12–17)
IMM GRANULOCYTES # BLD AUTO: 0.04 THOUSAND/UL (ref 0–0.2)
IMM GRANULOCYTES NFR BLD AUTO: 0 % (ref 0–2)
LYMPHOCYTES # BLD AUTO: 0.83 THOUSANDS/ΜL (ref 0.6–4.47)
LYMPHOCYTES NFR BLD AUTO: 9 % (ref 14–44)
MCH RBC QN AUTO: 33.9 PG (ref 26.8–34.3)
MCHC RBC AUTO-ENTMCNC: 32.2 G/DL (ref 31.4–37.4)
MCV RBC AUTO: 105 FL (ref 82–98)
MONOCYTES # BLD AUTO: 0.91 THOUSAND/ΜL (ref 0.17–1.22)
MONOCYTES NFR BLD AUTO: 10 % (ref 4–12)
NEUTROPHILS # BLD AUTO: 7.24 THOUSANDS/ΜL (ref 1.85–7.62)
NEUTS SEG NFR BLD AUTO: 81 % (ref 43–75)
NRBC BLD AUTO-RTO: 0 /100 WBCS
NT-PROBNP SERPL-MCNC: 2323 PG/ML
O2 CT BLDA-SCNC: 19.6 ML/DL (ref 16–23)
OXYHGB MFR BLDA: 94.3 % (ref 94–97)
PCO2 BLDA: 69.3 MM HG (ref 36–44)
PH BLDA: 7.4 [PH] (ref 7.35–7.45)
PLATELET # BLD AUTO: 135 THOUSANDS/UL (ref 149–390)
PMV BLD AUTO: 10.6 FL (ref 8.9–12.7)
PO2 BLDA: 78.7 MM HG (ref 75–129)
POTASSIUM SERPL-SCNC: 3.9 MMOL/L (ref 3.5–5.3)
RBC # BLD AUTO: 3.98 MILLION/UL (ref 3.88–5.62)
SODIUM SERPL-SCNC: 145 MMOL/L (ref 136–145)
SPECIMEN SOURCE: ABNORMAL
WBC # BLD AUTO: 9.03 THOUSAND/UL (ref 4.31–10.16)

## 2021-08-18 PROCEDURE — 85025 COMPLETE CBC W/AUTO DIFF WBC: CPT | Performed by: NURSE PRACTITIONER

## 2021-08-18 PROCEDURE — 82805 BLOOD GASES W/O2 SATURATION: CPT | Performed by: NURSE PRACTITIONER

## 2021-08-18 PROCEDURE — 80048 BASIC METABOLIC PNL TOTAL CA: CPT | Performed by: NURSE PRACTITIONER

## 2021-08-18 PROCEDURE — 99232 SBSQ HOSP IP/OBS MODERATE 35: CPT | Performed by: INTERNAL MEDICINE

## 2021-08-18 PROCEDURE — 83880 ASSAY OF NATRIURETIC PEPTIDE: CPT | Performed by: NURSE PRACTITIONER

## 2021-08-18 PROCEDURE — 94640 AIRWAY INHALATION TREATMENT: CPT

## 2021-08-18 PROCEDURE — 94760 N-INVAS EAR/PLS OXIMETRY 1: CPT

## 2021-08-18 PROCEDURE — 94660 CPAP INITIATION&MGMT: CPT

## 2021-08-18 PROCEDURE — 99239 HOSP IP/OBS DSCHRG MGMT >30: CPT | Performed by: NURSE PRACTITIONER

## 2021-08-18 RX ORDER — BUDESONIDE 0.5 MG/2ML
0.5 INHALANT ORAL
Qty: 120 ML | Refills: 0 | Status: SHIPPED | OUTPATIENT
Start: 2021-08-18 | End: 2021-11-02

## 2021-08-18 RX ORDER — CEPHALEXIN 500 MG/1
500 CAPSULE ORAL EVERY 6 HOURS SCHEDULED
Qty: 20 CAPSULE | Refills: 0 | Status: SHIPPED | OUTPATIENT
Start: 2021-08-18 | End: 2021-08-23

## 2021-08-18 RX ORDER — FUROSEMIDE 20 MG/1
60 TABLET ORAL DAILY
Qty: 90 TABLET | Refills: 0 | Status: SHIPPED | OUTPATIENT
Start: 2021-08-19 | End: 2021-09-17 | Stop reason: SDUPTHER

## 2021-08-18 RX ORDER — DILTIAZEM HYDROCHLORIDE 300 MG/1
300 CAPSULE, COATED, EXTENDED RELEASE ORAL DAILY
Qty: 30 CAPSULE | Refills: 0 | Status: SHIPPED | OUTPATIENT
Start: 2021-08-19 | End: 2021-09-03 | Stop reason: SDUPTHER

## 2021-08-18 RX ORDER — NICOTINE 21 MG/24HR
1 PATCH, TRANSDERMAL 24 HOURS TRANSDERMAL DAILY
Qty: 28 PATCH | Refills: 0 | Status: SHIPPED | OUTPATIENT
Start: 2021-08-19 | End: 2021-09-07

## 2021-08-18 RX ORDER — PREDNISONE 20 MG/1
40 TABLET ORAL DAILY
Qty: 6 TABLET | Refills: 0 | Status: SHIPPED | OUTPATIENT
Start: 2021-08-19 | End: 2021-08-22

## 2021-08-18 RX ADMIN — DOCUSATE SODIUM 100 MG: 100 CAPSULE ORAL at 09:00

## 2021-08-18 RX ADMIN — BUDESONIDE 0.5 MG: 0.5 INHALANT ORAL at 07:41

## 2021-08-18 RX ADMIN — LEVALBUTEROL HYDROCHLORIDE 1.25 MG: 1.25 SOLUTION, CONCENTRATE RESPIRATORY (INHALATION) at 14:14

## 2021-08-18 RX ADMIN — DILTIAZEM HYDROCHLORIDE 300 MG: 180 CAPSULE, COATED, EXTENDED RELEASE ORAL at 08:59

## 2021-08-18 RX ADMIN — LEVALBUTEROL HYDROCHLORIDE 1.25 MG: 1.25 SOLUTION, CONCENTRATE RESPIRATORY (INHALATION) at 07:41

## 2021-08-18 RX ADMIN — PREDNISONE 40 MG: 20 TABLET ORAL at 09:00

## 2021-08-18 RX ADMIN — IPRATROPIUM BROMIDE 0.5 MG: 0.5 SOLUTION RESPIRATORY (INHALATION) at 14:14

## 2021-08-18 RX ADMIN — FUROSEMIDE 60 MG: 40 TABLET ORAL at 08:59

## 2021-08-18 RX ADMIN — IPRATROPIUM BROMIDE 0.5 MG: 0.5 SOLUTION RESPIRATORY (INHALATION) at 07:41

## 2021-08-18 RX ADMIN — CEPHALEXIN 500 MG: 500 CAPSULE ORAL at 13:59

## 2021-08-18 RX ADMIN — CEPHALEXIN 500 MG: 500 CAPSULE ORAL at 00:50

## 2021-08-18 RX ADMIN — APIXABAN 5 MG: 5 TABLET, FILM COATED ORAL at 09:00

## 2021-08-18 RX ADMIN — IPRATROPIUM BROMIDE 0.5 MG: 0.5 SOLUTION RESPIRATORY (INHALATION) at 02:45

## 2021-08-18 RX ADMIN — FINASTERIDE 5 MG: 5 TABLET, FILM COATED ORAL at 08:59

## 2021-08-18 RX ADMIN — LEVALBUTEROL HYDROCHLORIDE 1.25 MG: 1.25 SOLUTION, CONCENTRATE RESPIRATORY (INHALATION) at 02:45

## 2021-08-18 RX ADMIN — CEPHALEXIN 500 MG: 500 CAPSULE ORAL at 05:30

## 2021-08-18 NOTE — PLAN OF CARE
Problem: Potential for Falls  Goal: Patient will remain free of falls  Description: INTERVENTIONS:  - Educate patient/family on patient safety including physical limitations  - Instruct patient to call for assistance with activity   - Consult OT/PT to assist with strengthening/mobility   - Keep Call bell within reach  - Keep bed low and locked with side rails adjusted as appropriate  - Keep care items and personal belongings within reach  - Initiate and maintain comfort rounds  - Make Fall Risk Sign visible to staff  - Offer Toileting every 2 Hours, in advance of need  - Initiate/Maintain bed alarm  - Obtain necessary fall risk management equipment: bed alarm, non skid socks    Problem: CARDIOVASCULAR - ADULT  Goal: Maintains optimal cardiac output and hemodynamic stability  Description: INTERVENTIONS:  - Monitor I/O, vital signs and rhythm afib, aflutter PVCs  - Monitor for S/S and trends of decreased cardiac output  - Administer and titrate ordered vasoactive medications to optimize hemodynamic stability  - Assess quality of pulses, skin color and temperature  - Assess for signs of decreased coronary artery perfusion  - Instruct patient to report change in severity of symptoms  8/18/2021 1016 by Mor Vallecillo RN  Outcome: Progressing  8/18/2021 1016 by Mor Vallecillo RN  Outcome: Progressing     Problem: CARDIOVASCULAR - ADULT  Goal: Absence of cardiac dysrhythmias or at baseline rhythm  Description: INTERVENTIONS:  - Continuous cardiac monitoring, vital signs, obtain 12 lead EKG if ordered  - Administer antiarrhythmic and heart rate control medications as ordered  - Monitor electrolytes and administer replacement therapy as ordered  8/18/2021 1016 by Mor Vallecillo RN  Outcome: Progressing  8/18/2021 1016 by Mor Vallecillo RN  Outcome: Progressing     Problem: RESPIRATORY - ADULT  Goal: Achieves optimal ventilation and oxygenation  Description: INTERVENTIONS:  - Assess for changes in respiratory status  - Assess for changes in mentation and behavior  - Position to facilitate oxygenation and minimize respiratory effort  - Oxygen administered by appropriate delivery if ordered  - Initiate smoking cessation education as indicated  - Encourage broncho-pulmonary hygiene including cough, deep breathe, Incentive Spirometry  - Assess the need for suctioning and aspirate as needed  - Assess and instruct to report SOB or any respiratory difficulty  - Respiratory Therapy support as indicated  8/18/2021 1016 by Ori Ibarra RN  Outcome: Progressing  8/18/2021 1016 by Ori Ibarra RN  Outcome: Progressing   - Apply yellow socks and bracelet for high fall risk patients  - Consider moving patient to room near nurses station  8/18/2021 1016 by Ori Ibarra RN  Outcome: Progressing  8/18/2021 1016 by Ori Ibarra RN  Outcome: Progressing

## 2021-08-18 NOTE — PLAN OF CARE
Problem: CARDIOVASCULAR - ADULT  Goal: Maintains optimal cardiac output and hemodynamic stability  Description: INTERVENTIONS:  - Monitor I/O, vital signs and rhythm afib, aflutter PVCs  - Monitor for S/S and trends of decreased cardiac output  - Administer and titrate ordered vasoactive medications to optimize hemodynamic stability  - Assess quality of pulses, skin color and temperature  - Assess for signs of decreased coronary artery perfusion  - Instruct patient to report change in severity of symptoms  Outcome: Progressing  Goal: Absence of cardiac dysrhythmias or at baseline rhythm  Description: INTERVENTIONS:  - Continuous cardiac monitoring, vital signs, obtain 12 lead EKG if ordered  - Administer antiarrhythmic and heart rate control medications as ordered  - Monitor electrolytes and administer replacement therapy as ordered  Outcome: Progressing     Problem: RESPIRATORY - ADULT  Goal: Achieves optimal ventilation and oxygenation  Description: INTERVENTIONS:  - Assess for changes in respiratory status  - Assess for changes in mentation and behavior  - Position to facilitate oxygenation and minimize respiratory effort  - Oxygen administered by appropriate delivery if ordered  - Initiate smoking cessation education as indicated  - Encourage broncho-pulmonary hygiene including cough, deep breathe, Incentive Spirometry  - Assess the need for suctioning and aspirate as needed  - Assess and instruct to report SOB or any respiratory difficulty  - Respiratory Therapy support as indicated  Outcome: Progressing

## 2021-08-18 NOTE — PROGRESS NOTES
Progress Note - General Surgery    Loveless [de-identified] y o  male MRN: [id number]  Unit/Bed#: -[facility] Encounter: [id number]    Assessment:  - S/p evacuation of left posterior calf hematoma, day 2  - Wound appears healthy and the mild cellulitis around wound appears to be resolving   - WBC 9 03  - Hgb 13 5  - VS WNL  - Spindle cell Ca of head, outpatient wide excision w/ skin graft postponed by ENT    Plan:  - Dressing changed today at bedside: iodoform gauze packing, covered with gauze, ABD, then ACE wrap  - Spoke with internal medicine CRNP, patient is to be discharged later today  - Wound culture showing 4+ growth of skin nayla  - Continue oral Keflex  500mg QID until completes 7 day course  - Patient will need to be seen by [address] in 2 weeks for wound care  - Home health required, patient should be seen every 2 days for wound packing changes  Daily outer dressing changes performed by visiting nurses and family   - Medical management as per primary  - Follow up with ENT for scalp mass as indicated      Subjective/Objective   Subjective: The patient is doing very well today  Denies pain of the left lower leg, swelling, numbness, decreased range of motion, bleeding, drainage, fever, or chills  Ambulatory in his room  Objective:     Blood pressure 138/61, pulse 59, temperature 97 9 °F (36 6 °C), resp  rate 20, height 5' 9" (1 753 m), weight 82 1 kg (181 lb), SpO2 99 %  ,Body mass index is 26 73 kg/m²        Intake/Output Summary (Last 24 hours) at 8/18/2021 1021  Last data filed at 8/18/2021 0126  Gross per 24 hour   Intake 340 ml   Output 400 ml   Net -60 ml       Invasive Devices     Peripheral Intravenous Line            Peripheral IV 08/14/21 Left Antecubital 3 days                Physical Exam: /61   Pulse 59   Temp 97 9 °F (36 6 °C)   Resp 20   Ht 5' 9" (1 753 m)   Wt 82 1 kg (181 lb)   SpO2 99%   BMI 26 73 kg/m²   General appearance: alert and oriented, in no acute distress  Lungs: Decreased breath sounds with wheezes bilaterally  Heart: regular rate and rhythm  Extremities: Left posterior leg positive for open wound 4 x 2 5 x 0 5cm with muscle exposed  Wound undermines superiorly approximately 2cm and about 1 cm both laterally and medially  Wound appears clean without active drainage  Mildly friable  Wound is nontender  Surrounding skin is mildly erythematous  Previously applied dressing and packing had some blood stain present  The wound was irrigated with 5 mL of normal saline and dried with gauze  Wound repacked with half-inch iodoform gauze and covered with sterile 4x4s, ABD, and Ace wrap  Lab, Imaging and other studies:  I have personally reviewed pertinent lab results      CBC:   Lab Results   Component Value Date    WBC 9 03 08/18/2021    HGB 13 5 08/18/2021    HCT 41 9 08/18/2021     (H) 08/18/2021     (L) 08/18/2021    MCH 33 9 08/18/2021    MCHC 32 2 08/18/2021    RDW 14 2 08/18/2021    MPV 10 6 08/18/2021    NRBC 0 08/18/2021   CMP:   Lab Results   Component Value Date    SODIUM 145 08/18/2021    K 3 9 08/18/2021     08/18/2021    CO2 42 (H) 08/18/2021    BUN 34 (H) 08/18/2021    CREATININE 0 92 08/18/2021    CALCIUM 8 7 08/18/2021    EGFR 78 08/18/2021     VTE Pharmacologic Prophylaxis: Eliquis  VTE Mechanical Prophylaxis: sequential compression device     Solomon Parisi PA-C

## 2021-08-18 NOTE — DISCHARGE SUMMARY
[address]  Discharge- Kathy North 1940, [de-identified] y o  male MRN: [id number]  Unit/Bed#: -01 Encounter: [id number]  Primary Care Provider: Temi Gallagher MD   Date and time admitted to hospital: 8/9/2021  2:34 PM    * Acute on chronic respiratory failure with hypoxia and hypercapnia ([address] )  Assessment & Plan  · Multifactorial due to worsening hypercarbia, COPD exacerbation, CHF exacerbation with some concern for over-diuresis and probably underlying obstructive sleep apnea  Patient is not on any oxygen or BiPAP or CPAP at home  · Patient not compliant w/BiPAP  · CO2 improved from 93-69 with BiPAP compliance   · IV steroids> PO and regimen of levalbuterol, Pulmicort and Atrovent  · CT brain negative and CTA chest shows emphysema and pulmonary hypertension  · Will probably go home with BiPAP use and try to qualify him for the BiPAP machine  · Home O2 eval completed; qualifies for home O2  · Keep on oxygen via nasal cannula to titrate to keep pulse ox between 88-92%; Avoid higher oxygen saturations as he is a known CO2 retainer  · Agree to establish care outpatient with [facility] pulmonology  · Pulmonary following; input appreciated   · BiPAP at night    Acute on chronic combined systolic (congestive) and diastolic (congestive) heart failure (HCC)  Assessment & Plan  Wt Readings from Last 3 Encounters:   08/18/21 82 1 kg (181 lb)   08/09/21 82 1 kg (181 lb)   06/17/21 82 1 kg (181 lb)     · As per last echocardiogram EF was 45-50%  · Managed as an OP on 60 mg of Lasix PO Daily   · Follows OP with Dr Thao Oseguera   · IV diuresis per Cardiology team; transitioned to PO lasix 60mg daily 08/12- discontinued lasix for now due to hypercapnia   · Received a dose of Diamox 8/13  · Monitor intake and output and daily weights   · Cardiac 2g na/fluid restriction   · Monitor volume status closely     Currently appears to be euvolemic  · Cardiology following; resumed diuretics on 8/17; continue at time of discharge         Frequent PVCs  Assessment & Plan  · Reviewed telemetry and patient with significant PVC burden  · Status post Holter monitor as an OP and per review was recommended to go to [facility] for EP evaluation as an OP however patient did not want to travel all that way  · Continue telemetry  · Cardiology consulted; input appreciated  · Continue Cardizem    · Resumed PO lasix on 8/17; continue at discharge   · Recommend ischemic evaluation at some point to evaluate cause for PVCs  · Recommended outpatient EP evaluation for PVCs; appointment scheduled with EP on 8/19/2021 this week   · No ACE/ARB for now   · Echocardiogram reviewed  Atrial fibrillation (HCC)  Assessment & Plan  · Continue Eliquis for [facility]  · Continue cardizem for rate control     Chronic obstructive pulmonary disease with acute exacerbation (HCC)  Assessment & Plan  · Most likely multifactorial with COPD, underlying emphysema and pulmonary hypertension  · Placed on Solu-Medrol 40 mg IV BID and since been transitioned to steroid oral   · Taper at discharge   · Continue on levalbuterol, Pulmicort and Atrovent nebulizer treatments scheduled  · Respiratory protocol   · Wean oxygen down to keep pulse ox between 88-92%  · Probably need home BiPAP machine upon discharge and outpatient sleep study to be done  · Pulmonology following; input appreciated   · Home O2 eval completed; oxygen indicated   · OP Pulmonary follow up   · Prior to BiPAP, sleep apnea and treatment with CPAP have been considered and ruled out   Will need BiPAP at time of discharge     Cellulitis of left lower extremity  Assessment & Plan  · As documented in the image section  · Blood cultures negative  · Wound care consultation  · Wound culture reviewed  · Surgery consulted; status post evacuation of hematoma  · Wound care with packing   · Resumed eliquis on 8/17; Hgb stable   · Will need close wound care and f/u at discharge   · Keflex for 7 days (28 doses) · Wound care arranged at home     Hematoma of left lower extremity  Assessment & Plan  · See plan under cellulitis of LLE     Acute metabolic encephalopathy  Assessment & Plan  · Secondary to acute respiratory failure with hypoxia and hypercapnia  · CT brain negative for any acute pathology or bleed  · Neg ammonia level   · Continue treatment of underlying etiology       Medical Problems     Resolved Problems  Date Reviewed: 8/18/2021    None              Discharging Physician / Practitioner: KAN Chen  PCP: Anne-Marie Mejía MD  Admission Date:   Admission Orders (From admission, onward)     Ordered        08/09/21 1528  Inpatient Admission  Once                   Discharge Date: 08/18/21    Consultations During Hospital Stay:  · Cardiology   · Pulmonology  · Physical therapy  · Occupational therapy  · General surgery  · Pharmacy  · Critical care team  · Nutrition    Procedures Performed:   XR chest portable Result Date: 8/13/2021  Impression: No evidence of acute abnormality in the chest  Workstation performed: [id number]   XR chest 1 view portable Result Date: 8/10/2021  Impression: No focal consolidation, pleural effusion, or pneumothorax  Workstation performed: [id number]   CT head wo contrast Result Date: 8/14/2021  Impression: No acute intracranial abnormality  Mild cerebral chronic microangiopathic disease  Left parietal scalp lesion, measuring 1 4 x 0 5 x 1 6 cm  Direct inspection is recommended as skin cancer is not excluded  Workstation performed: [id number]   CTA chest pe study Result Date: 8/14/2021  · Impression: 1  No pulmonary embolism  2   Nonspecific partially imaged trace perisplenic and perihepatic ascites  3   Mild-to moderate pulmonary emphysema  0 5 cm solid pulmonary nodule   Based on current Fleischner Society 2017 Guidelines on incidental pulmonary nodule, because the patient is considered high risk for lung cancer, 12 month follow-up non-contrast chest CT is recommended  4   Enlarged main pulmonary artery, a finding associated with pulmonary hypertension  5   Enlarged ascending aorta measuring 4 cm in caliber  6   Cardiomegaly  Workstation performed: [id number]   · Echocardiogram 08/16/2021  · Status post left hematoma evacuation on 8/16 with General surgery    Significant Findings / Test Results:   · As noted above    Incidental Findings:   · None    Test Results Pending at Discharge (will require follow up): · None     Outpatient Tests Requested:  · Electrophysiology evaluation to be completed on 19/11    Complications:  None    Reason for Admission:  Dyspnea on exertion    Hospital Course:   Medina Gonzales is a [de-identified] y o  male patient with past medical history of CHF, atrial fibrillation, COPD, hypertension, and frequent PVCs who originally presented to the hospital on 8/9/2021 due to dyspnea on exertion  Patient required oxygen of 4 liters on presentation to the emergency department  He was subsequently put on IV diuretics as thought to be in acute heart failure exacerbation  He was anticoagulated on Eliquis and rate controlled with Cardizem  Unfortunately we did note significant PVC burden and Cardiology was consulted to assist with management of IV diuretics as well as further assistance with beta-blocker/Cardizem therapy  Patient did require BiPAP given hypercapnia  He did require the BiPAP at time of discharge given study findings  Pulmonology was also consulted and evaluated and recommendations as above  Will require close outpatient Cardiology, pulmonology follow-up  Given his significant PVC burden it was recommended for him to follow up on 08/19 with electrophysiology for possible evaluation for ablation  Patient BiPAP arranged  Home health for wound care for his infected hematoma on calf as well as antibiotics ordered  Home oxygen required per Respiratory therapy evaluation  Case management assisted with dispo    Patient currently stable and ready for discharge  Please see above list of diagnoses and related plan for additional information  Condition at Discharge: stable    Discharge Day Visit / Exam:   Subjective:  Denies chest pain, worsening shortness of breath, chest tightness he did report however wound/calf tenderness with dressing change  Vitals: Blood Pressure: 138/61 (08/18/21 0721)  Pulse: 59 (08/18/21 0859)  Temperature: 97 9 °F (36 6 °C) (08/18/21 0721)  Temp Source: Oral (08/17/21 1450)  Respirations: 20 (08/18/21 0720)  Height: 5' 9" (175 3 cm) (08/09/21 1440)  Weight - Scale: 82 1 kg (181 lb) (08/18/21 0600)  SpO2: 99 % (08/18/21 0744)  Exam:   Physical Exam  Vitals and nursing note reviewed  Constitutional:       Appearance: He is well-developed  He is not ill-appearing or diaphoretic  HENT:      Head: Normocephalic and atraumatic  Eyes:      Conjunctiva/sclera: Conjunctivae normal    Cardiovascular:      Rate and Rhythm: Normal rate and regular rhythm  Heart sounds: No murmur heard  Pulmonary:      Effort: Pulmonary effort is normal  No respiratory distress  Breath sounds: Wheezing present  Abdominal:      General: Abdomen is flat  Bowel sounds are normal  There is no distension  Palpations: Abdomen is soft  Tenderness: There is no abdominal tenderness  Musculoskeletal:         General: Swelling present  Cervical back: Neck supple  Right lower leg: Edema present  Left lower leg: Edema present  Skin:     General: Skin is warm and dry  Capillary Refill: Capillary refill takes less than 2 seconds  Neurological:      Mental Status: He is alert and oriented to person, place, and time  Mental status is at baseline  Psychiatric:         Mood and Affect: Mood normal          Behavior: Behavior normal          Judgment: Judgment normal           Discussion with Family: Updated  (wife) at bedside      Discharge instructions/Information to patient and family:   See after visit summary for information provided to patient and family  Provisions for Follow-Up Care:  See after visit summary for information related to follow-up care and any pertinent home health orders  Disposition:   Home with [facility] (Reminder: Complete face to face encounter)     Discharge Statement:  I spent 45 minutes discharging the patient  This time was spent on the day of discharge  I had direct contact with the patient on the day of discharge  Greater than 50% of the total time was spent examining patient, answering all patient questions, arranging and discussing plan of care with patient as well as directly providing post-discharge instructions  Additional time then spent on discharge activities  Discharge Medications:  See after visit summary for reconciled discharge medications provided to patient and/or family        **Please Note: This note may have been constructed using a voice recognition system**

## 2021-08-18 NOTE — ASSESSMENT & PLAN NOTE
· Reviewed telemetry and patient with significant PVC burden  · Status post Holter monitor as an OP and per review was recommended to go to [facility] for EP evaluation as an OP however patient did not want to travel all that way  · Continue telemetry  · Cardiology consulted; input appreciated  · Continue Cardizem    · Resumed PO lasix on 8/17; continue at discharge   · Recommend ischemic evaluation at some point to evaluate cause for PVCs  · Recommended outpatient EP evaluation for PVCs; appointment scheduled with EP on 8/19/2021 this week   · No ACE/ARB for now   · Echocardiogram reviewed

## 2021-08-18 NOTE — PLAN OF CARE
Problem: Potential for Falls  Goal: Patient will remain free of falls  Description: INTERVENTIONS:  - Educate patient/family on patient safety including physical limitations  - Instruct patient to call for assistance with activity   - Consult OT/PT to assist with strengthening/mobility   - Keep Call bell within reach  - Keep bed low and locked with side rails adjusted as appropriate  - Keep care items and personal belongings within reach  - Initiate and maintain comfort rounds  - Make Fall Risk Sign visible to staff  - Offer Toileting every 2 Hours, in advance of need  - Initiate/Maintain bed alarm  - Obtain necessary fall risk management equipment: standby  - Apply yellow socks and bracelet for high fall risk patients  - Consider moving patient to room near nurses station  Outcome: Progressing     Problem: MOBILITY - ADULT  Goal: Maintain or return to baseline ADL function  Description: INTERVENTIONS:  -  Assess patient's ability to carry out ADLs; assess patient's baseline for ADL function and identify physical deficits which impact ability to perform ADLs (bathing, care of mouth/teeth, toileting, grooming, dressing, etc )  - Assess/evaluate cause of self-care deficits   - Assess range of motion  - Assess patient's mobility; develop plan if impaired  - Assess patient's need for assistive devices and provide as appropriate  - Encourage maximum independence but intervene and supervise when necessary  - Involve family in performance of ADLs  - Assess for home care needs following discharge   - Consider OT consult to assist with ADL evaluation and planning for discharge  - Provide patient education as appropriate  Outcome: Progressing  Goal: Maintains/Returns to pre admission functional level  Description: INTERVENTIONS:  - Perform BMAT or MOVE assessment daily    - Set and communicate daily mobility goal to care team and patient/family/caregiver     - Collaborate with rehabilitation services on mobility goals if consulted  - Perform Range of Motion 3 times a day  - Reposition patient every 3 hours    - Dangle patient 3 times a day  - Stand patient 3 times a day  - Ambulate patient 3 times a day  - Out of bed to chair 3 times a day   - Out of bed for meals 3 times a day  - Out of bed for toileting  - Record patient progress and toleration of activity level   Outcome: Progressing     Problem: CARDIOVASCULAR - ADULT  Goal: Maintains optimal cardiac output and hemodynamic stability  Description: INTERVENTIONS:  - Monitor I/O, vital signs and rhythm  - Monitor for S/S and trends of decreased cardiac output  - Administer and titrate ordered vasoactive medications to optimize hemodynamic stability  - Assess quality of pulses, skin color and temperature  - Assess for signs of decreased coronary artery perfusion  - Instruct patient to report change in severity of symptoms  Outcome: Progressing  Goal: Absence of cardiac dysrhythmias or at baseline rhythm  Description: INTERVENTIONS:  - Continuous cardiac monitoring, vital signs, obtain 12 lead EKG if ordered  - Administer antiarrhythmic and heart rate control medications as ordered  - Monitor electrolytes and administer replacement therapy as ordered  Outcome: Progressing     Problem: RESPIRATORY - ADULT  Goal: Achieves optimal ventilation and oxygenation  Description: INTERVENTIONS:  - Assess for changes in respiratory status  - Assess for changes in mentation and behavior  - Position to facilitate oxygenation and minimize respiratory effort  - Oxygen administered by appropriate delivery if ordered  - Initiate smoking cessation education as indicated  - Encourage broncho-pulmonary hygiene including cough, deep breathe, Incentive Spirometry  - Assess the need for suctioning and aspirate as needed  - Assess and instruct to report SOB or any respiratory difficulty  - Respiratory Therapy support as indicated  Outcome: Progressing     Problem: METABOLIC, FLUID AND ELECTROLYTES - ADULT  Goal: Electrolytes maintained within normal limits  Description: INTERVENTIONS:  - Monitor labs and assess patient for signs and symptoms of electrolyte imbalances  - Administer electrolyte replacement as ordered  - Monitor response to electrolyte replacements, including repeat lab results as appropriate  - Instruct patient on fluid and nutrition as appropriate  Outcome: Progressing  Goal: Fluid balance maintained  Description: INTERVENTIONS:  - Monitor labs   - Monitor I/O and WT  - Instruct patient on fluid and nutrition as appropriate  - Assess for signs & symptoms of volume excess or deficit  Outcome: Progressing  Goal: Glucose maintained within target range  Description: INTERVENTIONS:  - Monitor Blood Glucose as ordered  - Assess for signs and symptoms of hyperglycemia and hypoglycemia  - Administer ordered medications to maintain glucose within target range  - Assess nutritional intake and initiate nutrition service referral as needed  Outcome: Progressing     Problem: Prexisting or High Potential for Compromised Skin Integrity  Goal: Skin integrity is maintained or improved  Description: INTERVENTIONS:  - Identify patients at risk for skin breakdown  - Assess and monitor skin integrity  - Assess and monitor nutrition and hydration status  - Monitor labs   - Assess for incontinence   - Turn and reposition patient  - Assist with mobility/ambulation  - Relieve pressure over bony prominences  - Avoid friction and shearing  - Provide appropriate hygiene as needed including keeping skin clean and dry  - Evaluate need for skin moisturizer/barrier cream  - Collaborate with interdisciplinary team   - Patient/family teaching  - Consider wound care consult   Outcome: Progressing

## 2021-08-18 NOTE — ASSESSMENT & PLAN NOTE
· Multifactorial due to worsening hypercarbia, COPD exacerbation, CHF exacerbation with some concern for over-diuresis and probably underlying obstructive sleep apnea  Patient is not on any oxygen or BiPAP or CPAP at home  · Patient not compliant w/BiPAP  · CO2 improved from 93-69 with BiPAP compliance   · IV steroids> PO and regimen of levalbuterol, Pulmicort and Atrovent  · CT brain negative and CTA chest shows emphysema and pulmonary hypertension  · Will probably go home with BiPAP use and try to qualify him for the BiPAP machine  · Home O2 eval completed; qualifies for home O2  · Keep on oxygen via nasal cannula to titrate to keep pulse ox between 88-92%;  Avoid higher oxygen saturations as he is a known CO2 retainer  · Agree to establish care outpatient with [facility] pulmonology  · Pulmonary following; input appreciated   · BiPAP at night

## 2021-08-18 NOTE — PLAN OF CARE
Problem: Potential for Falls  Goal: Patient will remain free of falls  Description: INTERVENTIONS:  - Educate patient/family on patient safety including physical limitations  - Instruct patient to call for assistance with activity   - Consult OT/PT to assist with strengthening/mobility   - Keep Call bell within reach  - Keep bed low and locked with side rails adjusted as appropriate  - Keep care items and personal belongings within reach  - Initiate and maintain comfort rounds  - Make Fall Risk Sign visible to staff  - Apply yellow socks and bracelet for high fall risk patients  - Consider moving patient to room near nurses station  Outcome: Completed     Problem: MOBILITY - ADULT  Goal: Maintain or return to baseline ADL function  Description: INTERVENTIONS:  -  Assess patient's ability to carry out ADLs; assess patient's baseline for ADL function and identify physical deficits which impact ability to perform ADLs (bathing, care of mouth/teeth, toileting, grooming, dressing, etc )  - Assess/evaluate cause of self-care deficits   - Assess range of motion  - Assess patient's mobility; develop plan if impaired  - Assess patient's need for assistive devices and provide as appropriate  - Encourage maximum independence but intervene and supervise when necessary  - Involve family in performance of ADLs  - Assess for home care needs following discharge   - Consider OT consult to assist with ADL evaluation and planning for discharge  - Provide patient education as appropriate  Outcome: Completed  Goal: Maintains/Returns to pre admission functional level  Description: INTERVENTIONS:  - Perform BMAT or MOVE assessment daily    - Set and communicate daily mobility goal to care team and patient/family/caregiver     - Collaborate with rehabilitation services on mobility goals if consulted  - Out of bed for toileting  - Record patient progress and toleration of activity level   Outcome: Completed     Problem: CARDIOVASCULAR - ADULT  Goal: Maintains optimal cardiac output and hemodynamic stability  Description: INTERVENTIONS:  - Monitor I/O, vital signs and rhythm afib, aflutter PVCs  - Monitor for S/S and trends of decreased cardiac output  - Administer and titrate ordered vasoactive medications to optimize hemodynamic stability  - Assess quality of pulses, skin color and temperature  - Assess for signs of decreased coronary artery perfusion  - Instruct patient to report change in severity of symptoms  Outcome: Completed  Goal: Absence of cardiac dysrhythmias or at baseline rhythm  Description: INTERVENTIONS:  - Continuous cardiac monitoring, vital signs, obtain 12 lead EKG if ordered  - Administer antiarrhythmic and heart rate control medications as ordered  - Monitor electrolytes and administer replacement therapy as ordered  Outcome: Completed     Problem: RESPIRATORY - ADULT  Goal: Achieves optimal ventilation and oxygenation  Description: INTERVENTIONS:  - Assess for changes in respiratory status  - Assess for changes in mentation and behavior  - Position to facilitate oxygenation and minimize respiratory effort  - Oxygen administered by appropriate delivery if ordered  - Initiate smoking cessation education as indicated  - Encourage broncho-pulmonary hygiene including cough, deep breathe, Incentive Spirometry  - Assess the need for suctioning and aspirate as needed  - Assess and instruct to report SOB or any respiratory difficulty  - Respiratory Therapy support as indicated  Outcome: Completed     Problem: METABOLIC, FLUID AND ELECTROLYTES - ADULT  Goal: Electrolytes maintained within normal limits  Description: INTERVENTIONS:  - Monitor labs and assess patient for signs and symptoms of electrolyte imbalances  - Administer electrolyte replacement as ordered  - Monitor response to electrolyte replacements, including repeat lab results as appropriate  - Instruct patient on fluid and nutrition as appropriate  Outcome: Completed  Goal: Fluid balance maintained  Description: INTERVENTIONS:  - Monitor labs   - Monitor I/O and WT  - Instruct patient on fluid and nutrition as appropriate  - Assess for signs & symptoms of volume excess or deficit  Outcome: Completed  Goal: Glucose maintained within target range  Description: INTERVENTIONS:  - Monitor Blood Glucose as ordered  - Assess for signs and symptoms of hyperglycemia and hypoglycemia  - Administer ordered medications to maintain glucose within target range  - Assess nutritional intake and initiate nutrition service referral as needed  Outcome: Completed     Problem: Prexisting or High Potential for Compromised Skin Integrity  Goal: Skin integrity is maintained or improved  Description: INTERVENTIONS:  - Identify patients at risk for skin breakdown  - Assess and monitor skin integrity  - Assess and monitor nutrition and hydration status  - Monitor labs   - Assess for incontinence   - Turn and reposition patient  - Assist with mobility/ambulation  - Relieve pressure over bony prominences  - Avoid friction and shearing  - Provide appropriate hygiene as needed including keeping skin clean and dry  - Evaluate need for skin moisturizer/barrier cream  - Collaborate with interdisciplinary team   - Patient/family teaching  - Consider wound care consult   Outcome: Completed

## 2021-08-18 NOTE — NURSING NOTE
Discharge instructions and medications reviewed with patient and wife  All questions answered  IV d/c'd  No written scripts

## 2021-08-18 NOTE — PROGRESS NOTES
Progress Note:Cardiology  Manjit Chris 1940, [de-identified] y o  male MRN: [id number]    Unit/Bed#: -01 Encounter: [id number]  Attending Physician: Rohit Cleaning *   Primary Care Provider: Isabel Savage MD   Date admitted to hospital: 8/9/2021  Length of stay: 9         Acute metabolic encephalopathy  Assessment & Plan  Resolved and likely secondary to hypercapnia  Frequent PVCs  Assessment & Plan  Frequent PVC's noted on prior ZIO/Holter and on telemetry  See discussion under atrial fibrillation  Atrial fibrillation [facility])  Assessment & Plan  Patient is currently on Eliquis anticoagulation  Rates have been difficult to control  Telemetry resumed continues to show atrial fibrillation with frequent PVC's  Metoprolol discontinued given active wheezing  Dr Janett Perez had previously had EP review his ZIO monitor at which time the recommendation was made as follows:"Since he is symptomatic could consider ablation since not great antiarrhythmic candidate (no amio w copd, that much ectopy would make sotalol/dofetilide dangerous, and low EF can't do flecainide/propafenone)" from Dr Sabiha Moscoso  Discussed with Dr Sabiha Moscoso again on 8/17/2021  Patient scheduled for evaluation with him 8/19/2021 to discuss options  ? PVC ablation initially as 33% PVC ablation with follow up atrial fibrillation ablation as discussed with Dr Sabiha Moscoso  Plan will be determined after his evaluation on Thursday  Increased diltiazem to 300 mg daily yesterday (8/17/2021)  Will hold off on initiation of amiodarone as this could interfere with proposed PVC ablation         Acute on chronic combined systolic (congestive) and diastolic (congestive) heart failure (HCC)  Assessment & Plan  Wt Readings from Last 3 Encounters:   08/13/21 81 5 kg (179 lb 10 8 oz)   08/09/21 82 1 kg (181 lb)   06/17/21 82 1 kg (181 lb)     Patient presented to the office last week with  with O2 on RA of 76% with evidence of volume overload on exam based on breath sounds, weight gain, and leg edema and was directed to ER for IV diuresis  Echocardiogram 2/25/2021 showed an EF of 45-50%  Unclear etiology of reduced EF but this could be tachycardia induced vs EtOH induced or ischemic  Will need eventual stress test once heart rates are controlled to rule out ischemia as the source of his HFrEF  Ideally should be on long acting beta blocker but has COPD/Asthma but has active wheezing on exam today  Beta blocker was started in hospital due to decreased LVEF by [address] [facility] cardiology however cardizem was also continued ?     Metoprolol was discontinued in setting of marked wheezing and significant underlying lung disease  No ACE-I/ARB for now as we need room to uptitrate AV efra blockers  Patient was aggressively diuresed but unfortunately developed hypercapnia which is improved  Patient continues to have evidence of volume overload on exam which is markedly improved from office visit  Resumed oral diuretic (furosemide 60 mg daily) with stable renal function and improving arterial and venous CO2 levels today  Add compression stockings once ok with surgery given left leg wound  Suspect ongoing arrhythmia needs to be addressed to prevent recurrent heart failure issues  Discussed with  EP (Dr Liu Chamberlain) who will arrange close follow up (8/19/2021 office will call with time)  Subjective:   Patient seen and examined  No significant events overnight  He is sitting up in a chair at bedside on my exam  He is wearing supplemental O2 at 2L with sats in mid-90's  He denies chest pain, palpitations  He states his shortness of breath and swelling are improved since admission  No new complaints today  He states he wishes to go home  His wife confirms that supplemental oxygen and BiPap have been set up for home use  They have home health nursing scheduled to come to the house Saturday   They have their appointment with EP Dr Liu Chamberlain scheduled for tomorrow  Review of Systems   Constitutional: Negative  HENT: Negative  Cardiovascular: Positive for dyspnea on exertion and leg swelling  Negative for chest pain, irregular heartbeat, near-syncope, orthopnea, palpitations and syncope  Respiratory: Negative for cough and snoring  Endocrine: Negative  Skin: Negative  Musculoskeletal: Negative  Gastrointestinal: Negative  Genitourinary: Negative  Neurological: Negative  Negative for dizziness and light-headedness  Psychiatric/Behavioral: Negative  Objective:     Vitals: Blood pressure 138/61, pulse 59, temperature 97 9 °F (36 6 °C), resp  rate 20, height 5' 9" (1 753 m), weight 82 1 kg (181 lb), SpO2 99 %  , Body mass index is 26 73 kg/m² ,     Orthostatic Blood Pressures      Most Recent Value   Blood Pressure  138/61 filed at 08/18/2021 5136   Patient Position - Orthostatic VS  Sitting filed at 08/17/2021 1100          Physical Exam  Vitals and nursing note reviewed  Constitutional:       Appearance: He is well-developed  HENT:      Head: Normocephalic and atraumatic  Eyes:      Conjunctiva/sclera: Conjunctivae normal    Neck:      Vascular: No JVD  Cardiovascular:      Rate and Rhythm: Normal rate  Rhythm irregular  Frequent extrasystoles are present  Heart sounds: No murmur heard  Comments: Moderate edema to bilateral feet L > R  Pulmonary:      Effort: Pulmonary effort is normal  No respiratory distress  Breath sounds: Decreased breath sounds and wheezing present  No rales  Abdominal:      Palpations: Abdomen is soft  Tenderness: There is no abdominal tenderness  Musculoskeletal:      Cervical back: Neck supple  Skin:     General: Skin is warm and dry  Neurological:      Mental Status: He is alert     Psychiatric:         Attention and Perception: Attention normal          Mood and Affect: Mood normal          Speech: Speech normal          Behavior: Behavior normal  Behavior is cooperative           Cognition and Memory: Cognition normal             Intake/Output Summary (Last 24 hours) at 8/18/2021 1325  Last data filed at 8/18/2021 0126  Gross per 24 hour   Intake 120 ml   Output 400 ml   Net -280 ml       Weight (last 2 days)     Date/Time   Weight    08/18/21 0600   82 1 (181)    08/17/21 0600   82 6 (182 2)                 Medications:      Current Facility-Administered Medications:     acetaminophen (TYLENOL) tablet 650 mg, 650 mg, Oral, Q4H PRN, Genesis Aguilar, CRNP, 650 mg at 08/17/21 0009    apixaban (ELIQUIS) tablet 5 mg, 5 mg, Oral, BID, Genesis Aguilar, CRNP, 5 mg at 08/18/21 0900    budesonide (PULMICORT) inhalation solution 0 5 mg, 0 5 mg, Nebulization, Q12H, Genesis Aguilar, CRNP, 0 5 mg at 08/18/21 0741    cephalexin (KEFLEX) capsule 500 mg, 500 mg, Oral, Q6H [facility], Genesis Aguilar, CRNP, 500 mg at 08/18/21 0530    diltiazem (CARDIZEM CD) 24 hr capsule 300 mg, 300 mg, Oral, Daily, Genesis Aguilar, CRNP, 300 mg at 08/18/21 0859    docusate sodium (COLACE) capsule 100 mg, 100 mg, Oral, BID, Genesis Aguilar, CRNP, 100 mg at 08/18/21 0900    finasteride (PROSCAR) tablet 5 mg, 5 mg, Oral, Daily, Genesis Bradenk, CRNP, 5 mg at 08/18/21 0859    furosemide (LASIX) tablet 60 mg, 60 mg, Oral, Daily, Genesis Bradenk, CRNP, 60 mg at 08/18/21 0859    ipratropium (ATROVENT) 0 02 % inhalation solution 0 5 mg, 0 5 mg, Nebulization, Q6H, Genesis Bradenk, CRNP, 0 5 mg at 08/18/21 0741    levalbuterol (XOPENEX) inhalation solution 1 25 mg, 1 25 mg, Nebulization, Q6H, 1 25 mg at 08/18/21 0741 **AND** [DISCONTINUED] sodium chloride 0 9 % inhalation solution 3 mL, 3 mL, Nebulization, Q6H, Alpesh Menon MD, 3 mL at 08/16/21 0226    melatonin tablet 3 mg, 3 mg, Oral, HS PRN, KAN Uriostegui    nicotine (NICODERM CQ) 14 mg/24hr TD 24 hr patch 1 patch, 1 patch, Transdermal, Daily, KAN Uriostegui, 1 patch at 08/13/21 0919    predniSONE tablet 40 mg, 40 mg, Oral, Daily, KAN Uriostegui, 40 mg at 08/18/21 0900     Labs & Results:        Results from last 7 days   Lab Units 08/18/21  0536 08/17/21  0449 08/15/21  0523   WBC Thousand/uL 9 03 10 65* 7 06   HEMOGLOBIN g/dL 13 5 13 9 14 0   HEMATOCRIT % 41 9 44 5 45 4   PLATELETS Thousands/uL 135* 147* 144*         Results from last 7 days   Lab Units 08/18/21  0536 08/17/21  0449 08/16/21  0545 08/15/21  0523   POTASSIUM mmol/L 3 9 4 8 4 6 4 4   CHLORIDE mmol/L 103 101 101 102   CO2 mmol/L 42* 44* 42* 38*   BUN mg/dL 34* 37* 38* 34*   CREATININE mg/dL 0 92 1 04 1 10 0 96   CALCIUM mg/dL 8 7 9 0 8 8 8 8   ALK PHOS U/L  --   --   --  63   ALT U/L  --   --   --  40   AST U/L  --   --   --  20         Results from last 7 days   Lab Units 08/17/21  0449 08/14/21  0456   MAGNESIUM mg/dL 2 2 3 1*     Results from last 7 days   Lab Units 08/18/21  0536 08/11/21  1343   NT-PRO BNP pg/mL 2,323* 7,384*        Telemetry: Atrial fibrillation with PVC's in bigeminy, rate 90's    Counseling / Coordination of Care  Total floor / unit time spent today 30 minutes  Greater than 50% of total time was spent with the patient and / or family counseling and / or coordination of care  A description of the counseling / coordination of care: Discussed case with [facility]

## 2021-08-18 NOTE — ASSESSMENT & PLAN NOTE
· As documented in the image section  · Blood cultures negative  · Wound care consultation  · Wound culture reviewed  · Surgery consulted; status post evacuation of hematoma  · Wound care with packing   · Resumed eliquis on 8/17; Hgb stable   · Will need close wound care and f/u at discharge   · Keflex for 7 days (28 doses)   · Wound care arranged at home

## 2021-08-18 NOTE — CASE MANAGEMENT
Case Management Progress Note    Patient name Elliot Cross  Location /-35 MRN [id number]  : 1940 Date 2021       LOS (days): 9  Geometric Mean LOS (GMLOS) (days): 4 00  Days to GMLOS:-5        BUNDLE:      OBJECTIVE:  Pt is a [de-identified]y o  year old /[employer], white or  [1], male with [place] preference of None admitted on 2021  2:34 PM  Pt is admitted to [id number] at [address] with complaints of Acute on chronic respiratory failure with hypoxia and hypercapnia ([facility] [address] )   Current admission status: Inpatient  Preferred Pharmacy:   [address] [facility], [address]  Phone: [phone] Fax: [id number] [facility],[address], [address]  [address] [place] [id number]  Phone: [phone] Fax: [phone]    Primary Care Provider: Palmira Woods MD    Primary Insurance: MEDICARE  Secondary Insurance: [employer]    PROGRESS NOTE:  Pt needing home Bipap, appropriate documentation sent to [employer] through [facility] referral  Pt received approval, and CM confirmed they will deliver Bipap tonight  Pt had Portable O2 tanks previously delivered, confirmed w/ sps that concentrator was delivered  Pt also for HHC-SN for wc, [employer] accepted [facility]/ [facility]   CM reviewed w/ Pt/sps, info on AVS  Pt for d/c home today, w/ wife

## 2021-08-18 NOTE — ASSESSMENT & PLAN NOTE
Wt Readings from Last 3 Encounters:   08/18/21 82 1 kg (181 lb)   08/09/21 82 1 kg (181 lb)   06/17/21 82 1 kg (181 lb)     · As per last echocardiogram EF was 45-50%  · Managed as an OP on 60 mg of Lasix PO Daily   · Follows OP with Dr Antony Miguel   · IV diuresis per Cardiology team; transitioned to PO lasix 60mg daily 08/12- discontinued lasix for now due to hypercapnia   · Received a dose of Diamox 8/13  · Monitor intake and output and daily weights   · Cardiac 2g na/fluid restriction   · Monitor volume status closely     Currently appears to be euvolemic  · Cardiology following; resumed diuretics on 8/17; continue at time of discharge

## 2021-08-18 NOTE — ASSESSMENT & PLAN NOTE
· Most likely multifactorial with COPD, underlying emphysema and pulmonary hypertension  · Placed on Solu-Medrol 40 mg IV BID and since been transitioned to steroid oral   · Taper at discharge   · Continue on levalbuterol, Pulmicort and Atrovent nebulizer treatments scheduled  · Respiratory protocol   · Wean oxygen down to keep pulse ox between 88-92%  · Probably need home BiPAP machine upon discharge and outpatient sleep study to be done  · Pulmonology following; input appreciated   · Home O2 eval completed; oxygen indicated   · OP Pulmonary follow up   · Prior to BiPAP, sleep apnea and treatment with CPAP have been considered and ruled out   Will need BiPAP at time of discharge

## 2021-08-19 ENCOUNTER — OFFICE VISIT (OUTPATIENT)
Dept: CARDIOLOGY CLINIC | Facility: CLINIC | Age: 81
End: 2021-08-19
Payer: MEDICARE

## 2021-08-19 VITALS — DIASTOLIC BLOOD PRESSURE: 64 MMHG | SYSTOLIC BLOOD PRESSURE: 120 MMHG | HEART RATE: 87 BPM

## 2021-08-19 DIAGNOSIS — I48.91 ATRIAL FIBRILLATION, UNSPECIFIED TYPE (HCC): Primary | ICD-10-CM

## 2021-08-19 PROCEDURE — 1124F ACP DISCUSS-NO DSCNMKR DOCD: CPT | Performed by: INTERNAL MEDICINE

## 2021-08-19 PROCEDURE — 93000 ELECTROCARDIOGRAM COMPLETE: CPT | Performed by: INTERNAL MEDICINE

## 2021-08-19 PROCEDURE — 99205 OFFICE O/P NEW HI 60 MIN: CPT | Performed by: INTERNAL MEDICINE

## 2021-08-19 NOTE — PROGRESS NOTES
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  Pattern, looks to be mid septal on 12 lead EKG  Still having PVC today on 12 lead EKG about every 4th beath    3) RBBB    4) Systolic CHF EF 36%, admitted last weekend for CHF and COPD and improved  Not on betablockers due to severe COPD  Onlasix  5) 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, and recent exacerbation, still wheezing today        PLAN:  1  Discussed afib ablation and PVC ablation  Unfortunately he is very high risk from anesthesia standpoint given severity of COPD and I did explain he could end up in ICU on ventilator after general anesthesia, and length of procedure (4+ hours) would need general anesthesia  Ablation risk itself for PVC and afib ablation combined is 4%+ including risks of bleeding around the heart and damage to other organs  Would estimate 70% chance of success  We could break it up to PVC ablation and then afib ablation but this would still have risks and undergoing twice is not trivial     So recommend 1 month observation on higher dose of diltiazem and f/u in 1 month    2  Not good antiarrhythmic choices, no tikosyn given frequent PVC would worry about R on T  No class IC due to systolic chf, No sotalol/amio due to COPD    Given how high ris        Orders Placed This Encounter   Procedures    POCT ECG     There are no discontinued medications    HPI/Subjective:     [de-identified] yo male  1) Persistent afib, dx Jan 2021, last normal EKG 2019, so unclear duration  [id number], on Eliquis  Symptomtic w shortness of breath and fatigue and heart failure  Rates now controlled on higher dose of Diltiazem 300mg started 2 days ago  Feels better  2) Frequent PVC, >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, 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  Pattern, looks to be mid septal on 12 lead EKG  Still having PVC today on 12 lead EKG about every 4th beath    3) RBBB    4) Systolic CHF EF 92%, admitted last weekend for CHF and COPD and improved  Not on betablockers due to severe COPD  Onlasix  5) 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, and recent exacerbation, still wheezing today    Obi Funk is with his wife he has had bad year with shortness of breath, edema, fatigue  He is not sure when afib started but his Shortness of breath worse about Jan 2021  He was in hospital and d/c'd 2 days ago for acute CHF,COPD, and afib w RVR with very frequent PVC,and very hard to control rhythm  Dr Alma Koo increased diltiazem which has helped  He does feel better today but still tired  Please note HPI is listed by problem with with update following it, it is copied again in the assessment above and reflects medical decision making as well  Complete 12 point ROS reviewed and otherwise non pertinent or negative except as per HPI pertinent positives in Cardiovascular and Respiratory emphasized  Please see paper chart for outpatient clinic patients where the patient completed the 12 point ROS survey             Past Medical History:   Diagnosis Date    BPH (benign prostatic hyperplasia)     Chronic obstructive pulmonary disease (COPD) ([address] )     Essential hypertension     Hard of hearing     Pt does wear hearing aids    Hypertension     Shortness of breath     Pt states not changes and it occurs with moderate exertion    Sleep disturbance     Pt states he is only sleeping about 3 hours a night  Pt is seeing his PCP on 8/5/21 to discuss    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 !     I reviewed the Home Medication list and Allergies in the chart  Scheduled Meds:  Current Outpatient Medications   Medication Sig Dispense Refill    apixaban (ELIQUIS) 5 mg Take 1 tablet (5 mg total) by mouth 2 (two) times a day (Patient taking differently: Take 5 mg by mouth 2 (two) times a day Pt was instructed not to stop by surgeon) 180 tablet 3    Ascorbic Acid (VITAMIN C PO) Take 1 tablet by mouth daily       bisacodyl (bisacodyl) 5 mg EC tablet Take 5 mg by mouth 2 (two) times a day      budesonide (PULMICORT) 0 5 mg/2 mL nebulizer solution Take 2 mL (0 5 mg total) by nebulization every 12 (twelve) hours Rinse mouth after use  120 mL 0    cephalexin (KEFLEX) 500 mg capsule Take 1 capsule (500 mg total) by mouth every 6 (six) hours for 20 doses 20 capsule 0    cetirizine (ZyrTEC) 10 mg tablet Take 10 mg by mouth daily      diltiazem (CARDIZEM CD) 300 mg 24 hr capsule Take 1 capsule (300 mg total) by mouth daily 30 capsule 0    docusate sodium (COLACE) 100 mg capsule Take 100 mg by mouth       finasteride (PROSCAR) 5 mg tablet Take 5 mg by mouth daily       fluticasone-salmeterol (ADVAIR, WIXELA) 250-50 mcg/dose inhaler Inhale 1 puff 2 (two) times a day Rinse mouth after use        furosemide (LASIX) 20 mg tablet Take 3 tablets (60 mg total) by mouth daily 90 tablet 0    ipratropium (ATROVENT) 0 02 % nebulizer solution Take 2 5 mL (0 5 mg total) by nebulization every 6 (six) hours 300 mL 0    Magnesium 500 MG CAPS Take 500 mg by mouth daily      montelukast (SINGULAIR) 10 mg tablet Take 10 mg by mouth daily at bedtime      predniSONE 20 mg tablet Take 2 tablets (40 mg total) by mouth daily for 3 doses 6 tablet 0    Spiriva Respimat 2 5 MCG/ACT AERS inhaler Inhale 2 puffs daily       nicotine (NICODERM CQ) 14 mg/24hr TD 24 hr patch Place 1 patch on the skin daily (Patient not taking: Reported on 8/19/2021) 28 patch 0     No current facility-administered medications for this visit  PRN Meds:  No family history on file  Social History     Socioeconomic History    Marital status: /Civil Union     Spouse name: Not on file    Number of children: Not on file    Years of education: Not on file    Highest education level: Not on file   Occupational History    Occupation: Retired   Tobacco Use    Smoking status: Light Tobacco Smoker     Types: Cigars    Smokeless tobacco: Never Used   Vaping Use    Vaping Use: Never used   Substance and Sexual Activity    Alcohol use: Yes     Comment: moderate consumption    Drug use: Not Currently    Sexual activity: Yes   Other Topics Concern    Not on file   Social History Narrative    Not on file     Social Determinants of Health     Financial Resource Strain:     Difficulty of Paying Living Expenses:    Food Insecurity:     Worried About Running Out of Food in the Last Year:     [address] in the Last Year:    Transportation Needs: No Transportation Needs    Lack of Transportation (Medical): No    Lack of Transportation (Non-Medical):  No   Physical Activity:     Days of Exercise per Week:     Minutes of Exercise per Session:    Stress:     Feeling of Stress :    Social Connections:     Frequency of Communication with Friends and Family:     Frequency of Social Gatherings with Friends and Family:     Attends [employer]oration[employer] Services:     Active Member of Clubs or Organizations:     Attends Club or Organization Meetings:     Marital Status:    Intimate Partner Violence:     Fear of Current or Ex-Partner:     Emotionally Abused:     Physically Abused:     Sexually Abused:          OBJECTIVE:    /64 (BP Location: Left arm, Patient Position: Sitting, Cuff Size: Standard)   Pulse 87 There were no vitals filed for this visit  GEN: No acute distress, Alert and oriented, chronic ill appearing  HEENT:External ears normal, wearing a mask  EYES: Pupils equal, sclera anicteric, midline, normal conjuctiva  NECK: +JVD, supple, no obvious masses or thryomegaly or goiter  CARDIOVASCULAR: irreg irreg No murmur, rub, gallops S1,S2  LUNGS: +wheezes norma   ABDOMEN:  nondistended,  without obvious organomegaly or ascites  EXTREMITIES/VASCULAR: 1+ norma edema  warm an well perfused  PSYCH: Normal Affect,  linear speech pattern without evidence of psychosis  NEURO: Grossly intact, moving all extremiteis equal, face symmetric, alert and responsive, no obvious focal defecits   GAIT:  Ambulates normally without difficulty  HEME: No bleeding, bruising, petechia, purpura   SKIN: No significant rashes on visibile skin, warm, no diaphoresis or pallor  Lab Results:       LABS:      Chemistry        Component Value Date/Time    K 3 9 08/18/2021 0536     08/18/2021 0536    CO2 42 (H) 08/18/2021 0536    BUN 34 (H) 08/18/2021 0536    CREATININE 0 92 08/18/2021 0536        Component Value Date/Time    CALCIUM 8 7 08/18/2021 0536    ALKPHOS 63 08/15/2021 0523    AST 20 08/15/2021 0523    ALT 40 08/15/2021 0523            No results found for: CHOL  No results found for: HDL  No results found for: LDLCALC  No results found for: TRIG  No results found for: CHOLHDL    IMAGING: XR chest portable    Result Date: 8/13/2021  Narrative: CHEST INDICATION:   respiratory failure  COMPARISON:  None EXAM PERFORMED/VIEWS:  XR CHEST PORTABLE FINDINGS: Heart enlarged  Pulmonary vessels unremarkable  The lungs are clear  No pneumothorax or pleural effusion  Osseous structures appear within normal limits for patient age       Impression: No evidence of acute abnormality in the chest  Workstation performed: [id number]     XR chest 1 view portable    Result Date: 8/10/2021  Narrative: CHEST INDICATION:   Short of breath  COMPARISON:  None EXAM PERFORMED/VIEWS:  XR CHEST PORTABLE FINDINGS: Cardiomediastinal silhouette appears unremarkable  The lungs are clear  No pneumothorax or pleural effusion  Osseous structures appear within normal limits for patient age  Impression: No focal consolidation, pleural effusion, or pneumothorax  Workstation performed: [id number]     XR chest pa & lateral    Result Date: 8/17/2021  Narrative: CHEST INDICATION:   rales on auscultation, shortness of breath  COMPARISON:  Compared with 8/13/2021   EXAM PERFORMED/VIEWS:  XR CHEST PA & LATERAL FINDINGS: Cardiomediastinal silhouette appears unremarkable  The lungs are clear  No pneumothorax or pleural effusion  Osseous structures appear within normal limits for patient age  Impression: No acute cardiopulmonary disease  Workstation performed: [id number]     CT head wo contrast    Result Date: 8/14/2021  Narrative: CT BRAIN - WITHOUT CONTRAST INDICATION:   Altered mental status, acute metabolic encephalopathy  COMPARISON:  None  TECHNIQUE:  CT examination of the brain was performed  In addition to axial images, sagittal and coronal 2D reformatted images were created and submitted for interpretation  Radiation dose length product (DLP) for this visit:  9633 0859 mGy-cm   This examination, like all CT scans performed in the [facility], was performed utilizing techniques to minimize radiation dose exposure, including the use of iterative reconstruction and automated exposure control  IMAGE QUALITY:  Patient was difficult to position  Portions of the left calvaria, orbits and scalp are not imaged on this study  FINDINGS: PARENCHYMA:  No intracranial hemorrhage, edema, mass effect or midline shift  Periventricular and deep cerebral chronic microangiopathic changes  VENTRICLES AND EXTRA-AXIAL SPACES:  Normal for the patient's age  VISUALIZED ORBITS AND PARANASAL SINUSES:  Unremarkable   CALVARIUM AND EXTRACRANIAL SOFT TISSUES:  Normal      Impression: No acute intracranial abnormality  Mild cerebral chronic microangiopathic disease  Left parietal scalp lesion, measuring 1 4 x 0 5 x 1 6 cm  Direct inspection is recommended as skin cancer is not excluded  Workstation performed: [id number]     Echo follow up/limited with contrast if indicated    Result Date: 2021  Narrative: [address] [facility] [address] [facility] [address] Transthoracic Echocardiogram Limited 2D, M-mode, Doppler, and Color Doppler Study date:  16-Aug-2021 Patient: Trice Ford MR number: [id number] Account number: [de-identified] : 10-Girish-1940 Age: [de-identified] years Gender: Male Status: Inpatient Location: [address] [place] [address] Height: 69 in Weight: 179 lb BP: 122/ 58 mmHg Indications: Dyspnea Sonographer:  Gloria Calix RDCS, RCS Primary Physician:  Zack Haji MD Referring Physician:  Jens Feldman MD Group:  [facility] Gald[facility] Interpreting Physician:  DO CARLOS ALBERTO Lopez LEFT VENTRICLE: The ventricle was mildly dilated  Systolic function was mildly reduced  Ejection fraction was estimated to be 45 % but very difficult to assess in setting of frequent ectopy and atrial arrhythmia  This study was inadequate for the evaluation of regional wall motion  RIGHT VENTRICLE: The size was at the upper limits of normal  Systolic function was normal  LEFT ATRIUM: The atrium was mildly to moderately dilated  RIGHT ATRIUM: The atrium was mildly dilated  MITRAL VALVE: There was mild annular calcification  There was mild regurgitation  TRICUSPID VALVE: There was mild regurgitation  Estimated peak PA pressure was 40 mmHg assuming an estimated right atrial pressure of 10 mmHg (IVC dilated)  The findings suggest mild pulmonary hypertension  PULMONIC VALVE: There was mild regurgitation  AORTA: Aortic root dilated at 3 9 cm  IVC, HEPATIC VEINS: The inferior vena cava was dilated   Respirophasic changes were blunted (less than 50% variation)  PERICARDIUM: A small pericardial effusion was identified along the right atrial free wall  COMPARISONS: No significant change compared to prior study 2/25/2021  HISTORY: PRIOR HISTORY: Afib, COPD, CHF PROCEDURE: The study was performed in the [address] [place] [address] 18  This was a routine study  The transthoracic approach was used  The study included limited 2D imaging, M-mode, limited spectral Doppler, and color Doppler  The heart rate was 47 bpm, at the start of the study  Image quality was adequate  LEFT VENTRICLE: The ventricle was mildly dilated  Systolic function was mildly reduced  Ejection fraction was estimated to be 45 % but very difficult to assess in setting of frequent ectopy and atrial arrhythmia  This study was inadequate for the evaluation of regional wall motion  DOPPLER: Left ventricular diastolic function not assessed in presence of atrial arrhythmia  RIGHT VENTRICLE: The size was at the upper limits of normal  Systolic function was normal  LEFT ATRIUM: The atrium was mildly to moderately dilated  RIGHT ATRIUM: The atrium was mildly dilated  MITRAL VALVE: There was mild annular calcification  There was mild thickening  There was normal leaflet separation  DOPPLER: There was mild regurgitation  AORTIC VALVE: The valve was trileaflet  Leaflets exhibited normal thickness and normal cuspal separation  DOPPLER: Transaortic velocity was within the normal range  There was no evidence for stenosis  There was no regurgitation  TRICUSPID VALVE: The valve structure was normal  There was normal leaflet separation  DOPPLER: The transtricuspid velocity was within the normal range  There was no evidence for stenosis  There was mild regurgitation  Estimated peak PA pressure was 40 mmHg assuming an estimated right atrial pressure of 10 mmHg (IVC dilated)  The findings suggest mild pulmonary hypertension  PULMONIC VALVE: Not well visualized  DOPPLER: There was mild regurgitation  PERICARDIUM: A small pericardial effusion was identified along the right atrial free wall  AORTA: Aortic root dilated at 3 9 cm  SYSTEMIC VEINS: IVC: The inferior vena cava was dilated  Respirophasic changes were blunted (less than 50% variation)  SYSTEM MEASUREMENT TABLES 2D %FS: 18 98 % AV Diam: 3 9 cm EDV(Teich): 171 99 ml EF(Teich): 38 53 % ESV(Teich): 105 72 ml IVSd: 0 84 cm LA Diam: 5 15 cm LAAs A4C: 21 77 cm2 LAESV A-L A4C: 58 52 ml LAESV MOD A4C: 56 03 ml LALs A4C: 6 87 cm LVEDV MOD A4C: 110 18 ml LVEF MOD A4C: 50 53 % LVESV MOD A4C: 54 5 ml LVIDd: 5 88 cm LVIDs: 4 77 cm LVLd A4C: 8 34 cm LVLs A4C: 7 06 cm LVPWd: 0 87 cm RAAs A4C: 28 01 cm2 RAESV A-L: 94 44 ml RAESV MOD: 94 22 ml RALs: 7 05 cm RVIDd: 3 5 cm RWT: 0 3 SV MOD A4C: 55 68 ml SV(Teich): 66 27 ml CW TR Vmax: 2 79 m/s TR maxP 24 mmHg [employer] Accredited Echocardiography Laboratory Prepared and electronically signed by Caleb Burris DO Signed 16-Aug-2021 19:14:14     CTA chest pe study    Result Date: 2021  Narrative: CTA - CHEST WITH IV CONTRAST - PULMONARY ANGIOGRAM INDICATION:   Shortness of breath acute resp failure  COMPARISON: No prior chest CT available for comparison  Correlation with chest radiograph 2021 at 12:15 TECHNIQUE: CTA examination of the chest was performed using angiographic technique according to a protocol specifically tailored to evaluate for pulmonary embolism  Axial, sagittal, and coronal 2D reformatted images were created from the source data and  submitted for interpretation  In addition, coronal 3D MIP postprocessing was performed on the acquisition scanner  Radiation dose length product (DLP) for this visit:  489 mGy-cm   This examination, like all CT scans performed in the [facility], was performed utilizing techniques to minimize radiation dose exposure, including the use of iterative reconstruction and automated exposure control   IV Contrast:  85 mL of iohexol (OMNIPAQUE)  FINDINGS: PULMONARY ARTERIAL TREE:  No pulmonary embolus is seen  LUNGS:  Mild to moderate centrilobular and paraseptal emphysema  Central airways are patent  No focal consolidation  Minimal bibasilar linear atelectasis, right greater than left  0 5 cm solid left apical pulmonary nodule (series 3, image 28) PLEURA:  Trace right pleural effusion  HEART/GREAT VESSELS:  Cardiomegaly  Atherosclerotic calcifications of the aorta and coronary artery calcifications  Main pulmonary artery is enlarged measuring 4 3 cm  Ascending aorta is enlarged measuring 4 cm in caliber  MEDIASTINUM AND GABBIE:  Calcified hilar and mediastinal lymph nodes  No lymphadenopathy by size criteria  CHEST WALL AND LOWER NECK:   Unremarkable  VISUALIZED STRUCTURES IN THE UPPER ABDOMEN:  Few low attenuation hepatic lesions, probable cysts  Few calcified granulomas in the spleen  Trace perisplenic and perihepatic ascites  OSSEOUS STRUCTURES:  Spinal degenerative changes are noted  No acute fracture or destructive osseous lesion  Impression: 1  No pulmonary embolism  2   Nonspecific partially imaged trace perisplenic and perihepatic ascites  3   Mild-to moderate pulmonary emphysema  0 5 cm solid pulmonary nodule  Based on current Fleischner Society 2017 Guidelines on incidental pulmonary nodule, because the patient is considered high risk for lung cancer, 12 month follow-up non-contrast chest CT is recommended  4   Enlarged main pulmonary artery, a finding associated with pulmonary hypertension  5   Enlarged ascending aorta measuring 4 cm in caliber  6   Cardiomegaly   Workstation performed: [id number]        Cardiac testing:   Results for orders placed during the hospital encounter of 02/25/21    Echo complete with contrast if indicated    [address]    Echocardiogram    Study date:  25-Feb-2021    Patient: Jayne Lyons  MR number: [id number]  Account number: [de-identified]  : 10-Girish-1940  Age: [de-identified] years  Gender: Male  Status: Outpatient  Location:  Height:  Weight:  BP:    Indications: Atrial Fibrillation    Diagnoses: I48 0 - Atrial fibrillation    Sonographer:  TYLER Magallon  Primary Physician:  Savannah Husain MD  Referring Physician:  Demetrice Cervantes DO  Group:  [facility]  Interpreting Physician:  Tamiko Lou MD    SUMMARY    LEFT VENTRICLE:  LV function is difficult to assess due to patients rhythm  Systolic function was by visual assessment  Ejection fraction was estimated in the range of 45 % to 50 %  RIGHT VENTRICLE:  The size was normal   Systolic function was normal     LEFT ATRIUM:  The atrium was mildly dilated  RIGHT ATRIUM:  The atrium was mildly dilated  MITRAL VALVE:  There was mild regurgitation  TRICUSPID VALVE:  There was trace regurgitation  PULMONIC VALVE:  There was trace regurgitation  IVC, HEPATIC VEINS:  The inferior vena cava was normal in size  PERICARDIUM:  A trace pericardial effusion was identified  LEFT VENTRICLE: LV function is difficult to assess due to patients rhythm  Size was normal  Systolic function was by visual assessment  Ejection fraction was estimated in the range of 45 % to 50 %  RIGHT VENTRICLE: The size was normal  Systolic function was normal  Wall thickness was normal     LEFT ATRIUM: The atrium was mildly dilated  RIGHT ATRIUM: The atrium was mildly dilated  MITRAL VALVE: Valve structure was normal  There was normal leaflet separation  DOPPLER: The transmitral velocity was within the normal range  There was no evidence for stenosis  There was mild regurgitation  AORTIC VALVE: The valve was trileaflet  Leaflets exhibited normal thickness and normal cuspal separation  DOPPLER: Transaortic velocity was within the normal range  There was no evidence for stenosis  There was no regurgitation      TRICUSPID VALVE: The valve structure was normal  There was normal leaflet separation  DOPPLER: The transtricuspid velocity was within the normal range  There was no evidence for stenosis  There was trace regurgitation  PULMONIC VALVE: Leaflets exhibited normal thickness, no calcification, and normal cuspal separation  DOPPLER: The transpulmonic velocity was within the normal range  There was trace regurgitation  PERICARDIUM: A trace pericardial effusion was identified  AORTA: The root exhibited normal size  SYSTEMIC VEINS: IVC: The inferior vena cava was normal in size  SYSTEM MEASUREMENT TABLES    2D  %FS: 28 1 %  AV Diam: 3 45 cm  EDV(Teich): 189 24 ml  EF(Teich): 53 42 %  ESV(Teich): 88 15 ml  IVSd: 0 78 cm  LA Diam: 4 42 cm  LAAs A4C: 25 97 cm2  LAESV A-L A4C: 90 65 ml  LAESV MOD A4C: 86 78 ml  LALs A4C: 6 32 cm  LVEDV MOD A4C: 100 18 ml  LVEF MOD A4C: 37 52 %  LVESV MOD A4C: 62 6 ml  LVIDd: 6 13 cm  LVIDs: 4 41 cm  LVLd A4C: 8 3 cm  LVLs A4C: 8 02 cm  LVPWd: 1 cm  RAAs A4C: 27 21 cm2  RAESV A-L: 86 86 ml  RAESV MOD: 83 1 ml  RALs: 7 23 cm  RVIDd: 3 5 cm  RWT: 0 33  SV MOD A4C: 37 59 ml  SV(Teich): 101 09 ml    MM  TAPSE: 1 51 cm    PW  E' Lat: 0 14 m/s  E' Sept: 0 09 m/s    [facility]    Prepared and electronically signed by    Pancho Garza MD  Signed [id number] 16:03:43    No results found for this or any previous visit  No results found for this or any previous visit  No results found for this or any previous visit  I reviewed and interpreted the following LABS/EKG/TELE/IMAGING and below is summary of my interpretation (if data available):    LABS:bmp is normal proBNP is 2323, cbc is normal    Current EKG and Rhythm Strip: afib and freqent PVC and [id number] PVC is LB pattern trnsition V4, and pos inf leads, iso lead I         Past EKGs and RHYTHM strip: 8/15 EKG shows afib w RVR and frequetn PVC  Jan 2021 EKG afib w RVR  2019 EKG NSR and RBBB

## 2021-08-23 LAB
ATRIAL RATE: 250 BPM
QRS AXIS: 247 DEGREES
QRSD INTERVAL: 142 MS
QT INTERVAL: 354 MS
QTC INTERVAL: 451 MS
T WAVE AXIS: 51 DEGREES
VENTRICULAR RATE: 98 BPM

## 2021-08-23 PROCEDURE — 93010 ELECTROCARDIOGRAM REPORT: CPT | Performed by: INTERNAL MEDICINE

## 2021-09-07 ENCOUNTER — OFFICE VISIT (OUTPATIENT)
Dept: CARDIOLOGY CLINIC | Facility: CLINIC | Age: 81
End: 2021-09-07
Payer: MEDICARE

## 2021-09-07 VITALS
SYSTOLIC BLOOD PRESSURE: 116 MMHG | WEIGHT: 181 LBS | HEART RATE: 61 BPM | OXYGEN SATURATION: 92 % | DIASTOLIC BLOOD PRESSURE: 60 MMHG | BODY MASS INDEX: 26.81 KG/M2 | HEIGHT: 69 IN

## 2021-09-07 DIAGNOSIS — R60.0 LOCALIZED EDEMA: ICD-10-CM

## 2021-09-07 DIAGNOSIS — I49.3 FREQUENT PVCS: ICD-10-CM

## 2021-09-07 DIAGNOSIS — I10 ESSENTIAL HYPERTENSION: ICD-10-CM

## 2021-09-07 DIAGNOSIS — I48.91 ATRIAL FIBRILLATION, UNSPECIFIED TYPE (HCC): ICD-10-CM

## 2021-09-07 DIAGNOSIS — I50.42 CHRONIC COMBINED SYSTOLIC AND DIASTOLIC CHF (CONGESTIVE HEART FAILURE) (HCC): Primary | ICD-10-CM

## 2021-09-07 DIAGNOSIS — I45.10 RBBB: ICD-10-CM

## 2021-09-07 DIAGNOSIS — Z78.9 MODERATE ALCOHOL CONSUMPTION: ICD-10-CM

## 2021-09-07 DIAGNOSIS — J44.1 CHRONIC OBSTRUCTIVE PULMONARY DISEASE WITH ACUTE EXACERBATION (HCC): ICD-10-CM

## 2021-09-07 PROCEDURE — 99214 OFFICE O/P EST MOD 30 MIN: CPT | Performed by: NURSE PRACTITIONER

## 2021-09-07 PROCEDURE — 93000 ELECTROCARDIOGRAM COMPLETE: CPT | Performed by: NURSE PRACTITIONER

## 2021-09-07 NOTE — PATIENT INSTRUCTIONS
No change to medications today  Please complete blood work in the next 1-2 weeks  We may adjust the Furosemide based on results  Notify our office if increasing swelling, shortness of breath, or weight gain of 3 pounds in 1 day or 5 pounds in 1 week  Continue with your 2g sodium diet  Plan to follow up with Dr Dori Barger as scheduled to discuss ablation further

## 2021-09-07 NOTE — PROGRESS NOTES
Cardiology Follow Up    Mere Campuzano  1940  [id number]  [facility] [place]  [address]  [address] [facility] [id number] [phone]    Giuliano Sepulveda presents today for routine follow up of atrial fibrillation with high PVC burden, chronic systolic/diastolic heart failure, RBBB, HTN  1  Chronic combined systolic and diastolic CHF (congestive heart failure) ([facility]ly [facility])  Assessment & Plan:  Wt Readings from Last 3 Encounters:   09/07/21 82 1 kg (181 lb)   08/18/21 82 1 kg (181 lb)   08/09/21 82 1 kg (181 lb)     Echocardiogram 2/25/2021 showed an EF of 45-50%  Unclear etiology of reduced EF but this could be tachycardia induced vs ETOH induced  Will need eventual stress test once heart rates are controlled to rule out ischemia as the source of his HFrEF  Ideally should be on long acting beta blocker but has COPD/Asthma  No ACE-I/ARB for now as we need room to uptitrate AV efra blockers  Furosemide was increased to 60mg daily on 7/30/2021 due to elevated NT proBNP of 2, 432  He was hospitalized from 8/9 to 8/18 secondary to respiratory failure in the setting of COPD exacerbation and volume overload  He responded well to IV diuresis  He remains on Furosemide 60mg daily  He reports improved breathing  He continues with significant lower leg swelling on exam   Will obtain updated labs and adjust Furosemide dose as needed based on results  Orders:  -     Basic metabolic panel; Future  -     NT-BNP PRO; Future    2  Atrial fibrillation, unspecified type [facility])  Assessment & Plan:  Atrial fibrillation noted on EKG at PCP office 01/19/2021  Holter monitor 01/28/2021 showed predominantly atrial fibrillation with an average heart rate of 102 beats per minute with frequent PVCs /aberrant beats representing 34 9% ectopic burden    Dr Flores Johnson asked EP to review Holter monitor who agreed with initial plan and discussed possible ablation if rates not controlled as he has no good options for antiarrhythmics  Patient denied any palpitations and was unaware of rapid heart rates  Magnesium 250 mg daily added and will be continued  Echocardiogram 2/25/2021 showed EF 45-50%  Diltiazem was added (trying to avoid beta blockers due to COPD/asthma)  Persistent a fib with elevated ventricular rates and high PVC burden of 33% noted on most recent holter monitor study in June 2021  Diltiazem was increased to 300mg daily during recent hospitalization  Pt met with EP, Dr Mele Kwon on 8/19/2021 and at that visit a [address] ablation was discussed  They opted for observation of response to higher dose of Diltiazem given risks associated with prolonged anesthesia in the setting of significant lung disease  Pt will follow up with EP later this month  Continue Eliquis 5 mg twice daily for CVA prophylaxis  He is currently using BiPAP for sleep  Orders:  -     POCT ECG    3  Frequent PVCs  Assessment & Plan:  33% PVC burden was reported on 24 hour holter monitor on 6/21/2021  Reviewed again today with pt that his high PVC burden will contribute to heart failure  He met with EP, Dr Mele Kwon, on 8/19/2021 and has discussed options for treatment  Diltiazem recently increased to 300mg daily  He will follow up with EP later this month to assess response and determine if ablation is warranted  Orders:  -     POCT ECG    4  RBBB  Assessment & Plan:  Chronic right bundle-branch block  5  Essential hypertension  Assessment & Plan:  Blood pressure has been well controlled  Currently on Diltiazem 300mg daily  Beta-blockers likely will be contraindicated in setting of asthma /COPD  Avoiding ACE/ARB therapy to allow for up-titration of Diltiazem as needed for rate/rhythm control  See discussion above          6  Localized edema  Assessment & Plan:  Patient has significant bilateral lower extremity edema on exam    Echocardiogram as outlined showed mildly decreased LV systolic function at 34-19%  NT proBNP elevated at 2,432 on 7/30/2021 with increase in Furosemide to 60mg daily  He responded well to IV diuresis in the hospital   Will obtain updated BMP and NT proBNP and adjust Furosemide accordingly  Reviewed again today 2g sodium diet and daily use of compression stockings  7  Chronic obstructive pulmonary disease with acute exacerbation [facility])  Assessment & Plan:  Symptoms are improving since hospitalization  He does have supplemental O2 which he uses occasionally  Sleeping with BiPAP at night  He is currently using Budesonide BID and Albuterol/Ipratropium QID via nebulizer treatments  Remains on Advair 250/50 BID  He is not following with pulmonology - will arrange consultation today for assistance with management  Orders:  -     Ambulatory referral to Pulmonology; Future    8  Moderate alcohol consumption  Assessment & Plan: Moderate  ETOH use  I discussed the fact that this is likely contributing to his atrial fibrillation  I have asked him to continue to try and cut back on intake  HPI   From previous OV's with Dr Shakila Shell:  Patient has a history hypertension, asthma/COPD and BPH   Patient was seen by his primary physician (Dr Tray Kearney) for routine Medicare physical exam 1/19/2021 at which time he was noted to have an abnormal heart rhythm   EKG obtained showed possible atrial fibrillation with frequent PVCs in a bigeminal pattern with underlying right bundle-branch block   24 hour Holter monitor was ordered and cardiology evaluation was recommended      Holter monitor showed predominantly atrial fibrillation with an average heart rate of 102 beats per minute and a total of 51,075 premature ventricular contractions/aberrant beats totaling of 34 9% ectopic burden   The patient denied any symptoms in attached diary       2/25/2021: Patient confirms that he had presented for routine office visit at which time he was noted to have an abnormal heart rhythm and sent for additional testing   He follows with the [place] as well   He adamantly denies any palpitations or chest pain   He does have shortness of breath / dyspnea on exertion which he attributes to his underlying lung disease which consists of COPD and asthma   Patient does admit to drinking 7 days a week at a minimum to alcoholic drinks a day   Alcohol intake consists of beer, wine and occasional whiskey  His wife has noted increased lower extremity edema  Patient denies any prior cardiac issues  No prior cardiac testing that he can recall       3/18/2021: Patient presents to the office today for follow up  I had started diltiazem, eliquis, and furosemide at/after last office visit  Patient denies any palpitations  He has tolerated the new medications without difficulty  Furosemide was increased from 20 mg to 40 mg  The patient feels his breathing is somewhat better  Still has edema  No chest pain  No lightheadedness or dizziness  ECG today shows atrial fibrillation with RVR at 105 bpm with aberrantly conducted complexes  Plan for repeat ZIO (3 days) to start today       From previous OV with me:  8/9/2021: Delroy Obrien presents today for follow up  He had completed a 3 day ZIO monitor in April 2021 which revealed 100% a fib with average HR 94 BPM and frequent PVC's at 16 4%  his Diltiazem was increased to 240mg and repeat holter monitor study ordered to assess response  The Holter monitor study revealed 33% PVC's  EP consultation was recommended and he has been referred to Dr Kaley Magallon with [facility]  Today he is accompanied by his wife  He is short of breath upon exam  He and his wife tell me he has been short of breath for quite some time  His wife notes that he seems somewhat confused recently  He has a dry frequent cough, he naps a lot during the day  He tells me he has been cold and keeping blankets on him at home  They have not noticed a fever when checking his temp   He has been taking his medications as prescribed  He went to his PCP office Thursday of last week due to trouble sleeping at night and was prescribed prednisone  He does feel that has helped his breathing slightly  He states he had a chest xray at the office but has not yet heard about the results  He is scheduled tomorrow for 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  9/7/2021: Nik Gayle presents today accompanied by his spouse for follow up to his recent hospitalization for respiratory failure  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 with skin grafting this morning  He states he tolerated the surgery well  He notes that his breathing continues to be improved since hospitalization  He is using his nebulizers as directed and BiPAP to sleep at night  He was discharged with supplemental O2 but admits that he rarely uses this  His wife is monitoring his pulse ox readings during the day, which stay above 92% mostly  He will use the supplemental oxygen with long walks  He does need to follow up with a pulmonologist as he is not established with one  He met with EP, Dr Mele Kwon, on 8/19/2021 to discuss his A fib and frequent PVC's  Ablation was discussed, however, his PVC's were improving with the increase in Diltiazem to 300mg  Dr Mele Kwon opted for observation with close follow up  He is scheduled on 9/20  He denies lightheadedness/dizziness, palpitations, sensation of racing heartbeat  His legs are swollen  He is wearing compression socks at home now  He continues to take Furosemide 60mg daily and feels this is working well       Medical Problems     Problem List     Moderate alcohol consumption    Localized edema    Chronic obstructive pulmonary disease with acute exacerbation (HCC)    Acute on chronic respiratory failure with hypoxia and hypercapnia (HCC)    Cellulitis of left lower extremity    Frequent PVCs    Acute metabolic encephalopathy    Hematoma of left lower extremity    Atrial fibrillation (HCC)    Acute on chronic combined systolic (congestive) and diastolic (congestive) heart failure (HCC)    Wt Readings from Last 3 Encounters:   09/07/21 82 1 kg (181 lb)   08/18/21 82 1 kg (181 lb)   08/09/21 82 1 kg (181 lb)                 RBBB    Essential hypertension              Past Medical History:   Diagnosis Date    BPH (benign prostatic hyperplasia)     Chronic obstructive pulmonary disease (COPD) ([facility] [address] [facility]     Essential hypertension     Hard of hearing     Pt does wear hearing aids    Hypertension     Shortness of breath     Pt states not changes and it occurs with moderate exertion    Sleep disturbance     Pt states he is only sleeping about 3 hours a night  Pt is seeing his PCP on 8/5/21 to discuss    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     Pt has on the scalp    Tinnitus      Social History     Socioeconomic History    Marital status: /Civil Union     Spouse name: Not on file    Number of children: Not on file    Years of education: Not on file    Highest education level: Not on file   Occupational History    Occupation: Retired   Tobacco Use    Smoking status: Light Tobacco Smoker     Types: Cigars    Smokeless tobacco: Never Used   Vaping Use    Vaping Use: Never used   Substance and Sexual Activity    Alcohol use: Yes     Comment: moderate consumption    Drug use: Not Currently    Sexual activity: Yes   Other Topics Concern    Not on file   Social History Narrative    Not on file     Social Determinants of Health     Financial Resource Strain:     Difficulty of Paying Living Expenses:    Food Insecurity:     Worried About Running Out of Food in the Last Year:     [address] in the Last Year:    Transportation Needs: No Transportation Needs    Lack of Transportation (Medical): No    Lack of Transportation (Non-Medical):  No   Physical Activity:     Days of Exercise per Week:     Minutes of Exercise per Session:    Stress:     Feeling of Stress :    Social Connections:     Frequency of Communication with Friends and Family:     Frequency of Social Gatherings with Friends and Family:     Attends Denominational Services:     Active Member of Clubs or Organizations:     Attends Club or Organization Meetings:     Marital Status:    Intimate Partner Violence:     Fear of Current or Ex-Partner:     Emotionally Abused:     Physically Abused:     Sexually Abused:       History reviewed  No pertinent family history    Past Surgical History:   Procedure Laterality Date    SHOULDER OPEN ROTATOR CUFF REPAIR      VEIN SURGERY         Current Outpatient Medications:     apixaban (ELIQUIS) 5 mg, Take 1 tablet (5 mg total) by mouth 2 (two) times a day (Patient taking differently: Take 5 mg by mouth 2 (two) times a day Pt was instructed not to stop by surgeon), Disp: 180 tablet, Rfl: 3    Ascorbic Acid (VITAMIN C PO), Take 1 tablet by mouth daily , Disp: , Rfl:     bisacodyl (bisacodyl) 5 mg EC tablet, Take 5 mg by mouth 2 (two) times a day, Disp: , Rfl:     budesonide (PULMICORT) 0 5 mg/2 mL nebulizer solution, Take 2 mL (0 5 mg total) by nebulization every 12 (twelve) hours Rinse mouth after use , Disp: 120 mL, Rfl: 0    cetirizine (ZyrTEC) 10 mg tablet, Take 10 mg by mouth daily, Disp: , Rfl:     diltiazem (CARDIZEM CD) 300 mg 24 hr capsule, Take 1 capsule (300 mg total) by mouth daily, Disp: 90 capsule, Rfl: 3    docusate sodium (COLACE) 100 mg capsule, Take 100 mg by mouth , Disp: , Rfl:     finasteride (PROSCAR) 5 mg tablet, Take 5 mg by mouth daily , Disp: , Rfl:     fluticasone-salmeterol (ADVAIR, WIXELA) 250-50 mcg/dose inhaler, Inhale 1 puff 2 (two) times a day Rinse mouth after use , Disp: , Rfl:     furosemide (LASIX) 20 mg tablet, Take 3 tablets (60 mg total) by mouth daily, Disp: 90 tablet, Rfl: 0    ipratropium (ATROVENT) 0 02 % nebulizer solution, Take 2 5 mL (0 5 mg total) by nebulization every 6 (six) hours, Disp: 300 mL, Rfl: 0    Magnesium 500 MG CAPS, Take 500 mg by mouth daily, Disp: , Rfl:     montelukast (SINGULAIR) 10 mg tablet, Take 10 mg by mouth daily at bedtime, Disp: , Rfl:     Spiriva Respimat 2 5 MCG/ACT AERS inhaler, Inhale 2 puffs daily , Disp: , Rfl:   Allergies   Allergen Reactions    Penicillin G Other (See Comments)     PCN Shots Only!!       Labs:     Chemistry        Component Value Date/Time    K 3 9 08/18/2021 0536     08/18/2021 0536    CO2 42 (H) 08/18/2021 0536    BUN 34 (H) 08/18/2021 0536    CREATININE 0 92 08/18/2021 0536        Component Value Date/Time    CALCIUM 8 7 08/18/2021 0536    ALKPHOS 63 08/15/2021 0523    AST 20 08/15/2021 0523    ALT 40 08/15/2021 0523        Cardiac Test Results:   Echocardiogram 8/16/2021, limited:  EF 45%  LV mildly dilated  Left atrium mildly to moderately dilated, right atrium mildly dilated  Mild MR  Mild TR  PASP 40mmHg  Aortic root dilated at 3 9cm  Mild CO  IVC dilated  Small pericardial effusion  No significant change from prior study dated 2/25/2021     24 hour holter monitor 6/21/2021:  1  The patient had predominantly atrial fibrillation  2  Heart rate varied from 63 bpm to 136 bpm     3  Longest episode of atrial fibrillation lasted for 10 hours and 27 minutes  Total time in atrial fibrillation is 15 hours and 55 minutes  4  The patient had a holter monitor tracing done for 23 hours and 59 minutes  5  The patient had 41,710 ventricular ectopic beats which accounts for 33% of total beats  Some of these beats could be aberrant conducted beats  6  The patient had 0 supraventricular ectopy  7  The longest R/R interval was 2 seconds      ZIO 3/18-3/21/2021:  Strips above reviewed  Agree with findings as documented  100% atrial fibrillation burden with HR range () with an Avg HR of 94 bpm    Frequent PVC's (16 4%)  Rare ventricular couplets and triplets  Ventricular Bigeminy and Trigeminy noted       ECG 3/18/2021: Atrial fibrillation with rapid ventricular response    bpm   PVC's vs aberrantly conducted beats  RBBB       Echocardiogram 2/25/2021:   EF 45-50%  Mildly dilated left and right atrium  Mild mitral regurgitation  Trace tricuspid regurgitation  Trace pericardial effusion          Holter Monitor 1/28/2021:   1  The patient had predominantly atrial fibrillation  2  Heart rate varied from 78 bpm to 130 bpm     3  The patients average heart rate was 102 bpm     4  The patient had a holter monitor tracing done for 23 hours and 59 minutes  5  The patient had frequent ventricular ectopic activity (PVC's vs Aberrant beats) with a total of 51,075 ectopic beats representing a 34 9% burden  6  Episodes of ventricular bigeminy (30,849) and trigeminy (6156)  7  Ventricular runs with the longest lasting 5 beats      8  The patient had no supraventricular ectopy     9  The longest R/R interval was 1 7 seconds  10  Right bundle branch block  11  Diary attached  Patient denied any symptoms          Lipid panel 11/17/2020: C 170  T 66  H 74  L 85      ECG 1/19/2021:  Likely underlying atrial fibrillation with frequent PVCs/ aberrant beats in a bigeminal pattern  Right bundle-branch block  Review of Systems   Constitutional: Negative  HENT: Negative  Cardiovascular: Positive for dyspnea on exertion and leg swelling  Negative for chest pain, irregular heartbeat, near-syncope, orthopnea, palpitations and syncope  Respiratory: Positive for shortness of breath and wheezing  Negative for cough and snoring  Endocrine: Negative  Skin: Negative  Musculoskeletal: Negative  Gastrointestinal: Negative  Genitourinary: Negative  Neurological: Negative  Psychiatric/Behavioral: Negative  Vitals:    09/07/21 1343   BP: 116/60   Pulse:    SpO2:      Vitals:    09/07/21 1304   Weight: 82 1 kg (181 lb)     Height: 5' 9" (175 3 cm)   Body mass index is 26 73 kg/m²  Physical Exam  Vitals and nursing note reviewed     Constitutional:       General: He is not in acute distress  Appearance: He is well-developed  He is not diaphoretic  HENT:      Head: Normocephalic and atraumatic  Neck:      Vascular: No carotid bruit or JVD  Cardiovascular:      Rate and Rhythm: Normal rate  Rhythm irregular  Pulses: Intact distal pulses  Heart sounds: Normal heart sounds, S1 normal and S2 normal  No murmur heard  No friction rub  No gallop  Comments: Moderate pitting edema to bilateral lower legs  Pulmonary:      Effort: Pulmonary effort is normal  No respiratory distress  Breath sounds: Decreased breath sounds and wheezing present  No rhonchi or rales  Abdominal:      General: There is no distension  Palpations: Abdomen is soft  Tenderness: There is no abdominal tenderness  Skin:     General: Skin is warm and dry  Findings: No rash  Neurological:      Mental Status: He is alert and oriented to person, place, and time  Psychiatric:         Mood and Affect: Mood and affect normal          Speech: Speech normal          Behavior: Behavior normal  Behavior is cooperative           Cognition and Memory: Cognition and memory normal

## 2021-09-08 NOTE — ASSESSMENT & PLAN NOTE
33% PVC burden was reported on 24 hour holter monitor on 6/21/2021  Reviewed again today with pt that his high PVC burden will contribute to heart failure  He met with EP, Dr Monserrat Vora, on 8/19/2021 and has discussed options for treatment  Diltiazem recently increased to 300mg daily  He will follow up with EP later this month to assess response and determine if ablation is warranted

## 2021-09-08 NOTE — ASSESSMENT & PLAN NOTE
Symptoms are improving since hospitalization  He does have supplemental O2 which he uses occasionally  Sleeping with BiPAP at night  He is currently using Budesonide BID and Albuterol/Ipratropium QID via nebulizer treatments  Remains on Advair 250/50 BID  He is not following with pulmonology - will arrange consultation today for assistance with management

## 2021-09-08 NOTE — ASSESSMENT & PLAN NOTE
Atrial fibrillation noted on EKG at PCP office 01/19/2021  Holter monitor 01/28/2021 showed predominantly atrial fibrillation with an average heart rate of 102 beats per minute with frequent PVCs /aberrant beats representing 34 9% ectopic burden  Dr Thao Oseguera asked EP to review Holter monitor who agreed with initial plan and discussed possible ablation if rates not controlled as he has no good options for antiarrhythmics  Patient denied any palpitations and was unaware of rapid heart rates  Magnesium 250 mg daily added and will be continued  Echocardiogram 2/25/2021 showed EF 45-50%  Diltiazem was added (trying to avoid beta blockers due to COPD/asthma)  Persistent a fib with elevated ventricular rates and high PVC burden of 33% noted on most recent holter monitor study in June 2021  Diltiazem was increased to 300mg daily during recent hospitalization  Pt met with EP, Dr Janneth Lucero on 8/19/2021 and at that visit a [facility] [address] ablation was discussed  They opted for observation of response to higher dose of Diltiazem given risks associated with prolonged anesthesia in the setting of significant lung disease  Pt will follow up with EP later this month  Continue Eliquis 5 mg twice daily for CVA prophylaxis  He is currently using BiPAP for sleep

## 2021-09-08 NOTE — ASSESSMENT & PLAN NOTE
Blood pressure has been well controlled  Currently on Diltiazem 300mg daily  Beta-blockers likely will be contraindicated in setting of asthma /COPD  Avoiding ACE/ARB therapy to allow for up-titration of Diltiazem as needed for rate/rhythm control  See discussion above

## 2021-09-08 NOTE — ASSESSMENT & PLAN NOTE
Wt Readings from Last 3 Encounters:   09/07/21 82 1 kg (181 lb)   08/18/21 82 1 kg (181 lb)   08/09/21 82 1 kg (181 lb)     Echocardiogram 2/25/2021 showed an EF of 45-50%  Unclear etiology of reduced EF but this could be tachycardia induced vs ETOH induced  Will need eventual stress test once heart rates are controlled to rule out ischemia as the source of his HFrEF  Ideally should be on long acting beta blocker but has COPD/Asthma  No ACE-I/ARB for now as we need room to uptitrate AV efra blockers  Furosemide was increased to 60mg daily on 7/30/2021 due to elevated NT proBNP of 2, 432  He was hospitalized from 8/9 to 8/18 secondary to respiratory failure in the setting of COPD exacerbation and volume overload  He responded well to IV diuresis  He remains on Furosemide 60mg daily  He reports improved breathing  He continues with significant lower leg swelling on exam   Will obtain updated labs and adjust Furosemide dose as needed based on results

## 2021-09-08 NOTE — ASSESSMENT & PLAN NOTE
Patient has significant bilateral lower extremity edema on exam    Echocardiogram as outlined showed mildly decreased LV systolic function at 29-43%  NT proBNP elevated at 2,432 on 7/30/2021 with increase in Furosemide to 60mg daily  He responded well to IV diuresis in the hospital   Will obtain updated BMP and NT proBNP and adjust Furosemide accordingly  Reviewed again today 2g sodium diet and daily use of compression stockings

## 2021-09-16 ENCOUNTER — TELEPHONE (OUTPATIENT)
Dept: CARDIOLOGY CLINIC | Facility: CLINIC | Age: 81
End: 2021-09-16

## 2021-09-16 ENCOUNTER — TELEPHONE (OUTPATIENT)
Dept: PULMONOLOGY | Facility: CLINIC | Age: 81
End: 2021-09-16

## 2021-09-16 ENCOUNTER — APPOINTMENT (OUTPATIENT)
Dept: LAB | Facility: HOSPITAL | Age: 81
End: 2021-09-16
Payer: MEDICARE

## 2021-09-16 DIAGNOSIS — J96.22 ACUTE ON CHRONIC RESPIRATORY FAILURE WITH HYPERCAPNIA (HCC): ICD-10-CM

## 2021-09-16 DIAGNOSIS — I50.42 CHRONIC COMBINED SYSTOLIC AND DIASTOLIC CHF (CONGESTIVE HEART FAILURE) (HCC): ICD-10-CM

## 2021-09-16 DIAGNOSIS — I49.3 FREQUENT PVCS: ICD-10-CM

## 2021-09-16 DIAGNOSIS — I49.9 CARDIAC ARRHYTHMIA, UNSPECIFIED CARDIAC ARRHYTHMIA TYPE: ICD-10-CM

## 2021-09-16 DIAGNOSIS — I48.91 ATRIAL FIBRILLATION, UNSPECIFIED TYPE (HCC): ICD-10-CM

## 2021-09-16 LAB
ANION GAP SERPL CALCULATED.3IONS-SCNC: 1 MMOL/L (ref 4–13)
BUN SERPL-MCNC: 20 MG/DL (ref 5–25)
CALCIUM SERPL-MCNC: 9.1 MG/DL (ref 8.3–10.1)
CHLORIDE SERPL-SCNC: 96 MMOL/L (ref 100–108)
CO2 SERPL-SCNC: 36 MMOL/L (ref 21–32)
CREAT SERPL-MCNC: 0.94 MG/DL (ref 0.6–1.3)
GFR SERPL CREATININE-BSD FRML MDRD: 76 ML/MIN/1.73SQ M
GLUCOSE P FAST SERPL-MCNC: 106 MG/DL (ref 65–99)
NT-PROBNP SERPL-MCNC: 1463 PG/ML
POTASSIUM SERPL-SCNC: 4.2 MMOL/L (ref 3.5–5.3)
SODIUM SERPL-SCNC: 133 MMOL/L (ref 136–145)

## 2021-09-16 PROCEDURE — 83880 ASSAY OF NATRIURETIC PEPTIDE: CPT

## 2021-09-16 PROCEDURE — 36415 COLL VENOUS BLD VENIPUNCTURE: CPT

## 2021-09-16 PROCEDURE — 80048 BASIC METABOLIC PNL TOTAL CA: CPT

## 2021-09-16 NOTE — TELEPHONE ENCOUNTER
----- Message from Carly Reinoso 83 sent at 9/16/2021  2:22 PM EDT -----  Please let pt and wife know that his BNP has improved from 2,323 to 1,463 (was over 7,000 last month)  His kidney function and potassium are stable  His sodium was down just slightly at 133  If swelling is stable I would like to keep him at 60mg of Furosemide daily  If worsening swelling or shortness of breath leg me know    We should recheck sodium next week - I placed order

## 2021-09-16 NOTE — TELEPHONE ENCOUNTER
Pt called and would like ALL  his medications to be transferred to the [place] please fax the scripts to [phone]  Any questions call Dakota Aparicio [phone]

## 2021-09-17 RX ORDER — DILTIAZEM HYDROCHLORIDE 300 MG/1
300 CAPSULE, COATED, EXTENDED RELEASE ORAL DAILY
Qty: 90 CAPSULE | Refills: 3 | Status: SHIPPED | OUTPATIENT
Start: 2021-09-17

## 2021-09-17 RX ORDER — FUROSEMIDE 20 MG/1
60 TABLET ORAL DAILY
Qty: 270 TABLET | Refills: 3 | Status: SHIPPED | OUTPATIENT
Start: 2021-09-17 | End: 2021-10-04 | Stop reason: SDUPTHER

## 2021-09-20 ENCOUNTER — CONSULT (OUTPATIENT)
Dept: CARDIOLOGY CLINIC | Facility: CLINIC | Age: 81
End: 2021-09-20
Payer: MEDICARE

## 2021-09-20 VITALS
HEART RATE: 82 BPM | HEIGHT: 69 IN | WEIGHT: 179 LBS | DIASTOLIC BLOOD PRESSURE: 70 MMHG | BODY MASS INDEX: 26.51 KG/M2 | SYSTOLIC BLOOD PRESSURE: 102 MMHG

## 2021-09-20 DIAGNOSIS — I48.91 ATRIAL FIBRILLATION, UNSPECIFIED TYPE (HCC): Primary | ICD-10-CM

## 2021-09-20 PROCEDURE — 93000 ELECTROCARDIOGRAM COMPLETE: CPT | Performed by: INTERNAL MEDICINE

## 2021-09-20 PROCEDURE — 99214 OFFICE O/P EST MOD 30 MIN: CPT | Performed by: INTERNAL MEDICINE

## 2021-09-20 NOTE — LETTER
September 20, 2021     [address]  [facility], [facility] [address]    Patient: Lex Gibbs   YOB: 1940   Date of Visit: 9/20/2021       Dear Dr Shakila Shell:    Thank you for referring Lisa Fair to me for evaluation  Below are my notes for this consultation  If you have questions, please do not hesitate to call me  I look forward to following your patient along with you  Sincerely,        Thomas Zaidi MD        CC: MD Thomas Lema MD  9/20/2021 12:47 PM  Incomplete  HEART [address] [facility]    Outpatient f/u  Today's Date: 09/20/21        Patient name: Lex Gibbs  YOB: 1940  Sex: male         Chief Complaint: f/u afib, pvc    ASSESSMENT:  [de-identified] yo male  1) Persistent afib, dx Jan 2021, last normal EKG 2019, so unclear duration  [id number], on Eliquis  Symptomtic w shortness of breath and fatigue and heart failure  Rates now controlled on higher dose of Diltiazem 300mg from last month Feels better  2) Frequent PVC, >30% burden, 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  Pattern, looks to be mid septal on 12 lead EKG  Seems better on higher dose of Dilt and euvolemia, he has no PVC on ekg today, has some on rhythm strip but much less  3) RBBB    4) Chronic Systolic CHF EF 43%, admitted  Aug weekend for CHF and COPD and improved  Not on betablockers due to severe COPD  Onlasix  5) 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, and recent exacerbation, but oxygen >95% w/o 02 lately not needing  6) Chronic edema  STill present    PLAN:  1  Do not recommend ablation as he feels much better, PVC improved, rates controlled  2   Recommend take extra lasix 60mg in afternoon every 3rd day to help w edema      Orders Placed This Encounter   Procedures    POCT ECG     There are no discontinued medications    HPI/Subjective:   [de-identified] yo male  1) Persistent afib, dx Jan 2021, last normal EKG 2019, so unclear duration  [id number], on Eliquis  Symptomtic w shortness of breath and fatigue and heart failure  Rates now controlled on higher dose of Diltiazem 300mg from last month Feels better  2) Frequent PVC, >30% burden, 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  Pattern, looks to be mid septal on 12 lead EKG  Seems better on higher dose of Dilt and euvolemia, he has no PVC on ekg today, has some on rhythm strip but much less  3) RBBB    4) Chronic Systolic CHF EF 36%, admitted  Aug weekend for CHF and COPD and improved  Not on betablockers due to severe COPD  Onlasix  5) 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, and recent exacerbation, but oxygen >95% w/o 02 lately not needing  6) Chronic edema  STill present    Easter Smaller feels much better overall, less SOB, able to walk, less tired, doesn't need oxygen as >95% much of the time  Please note HPI is listed by problem with with update following it, it is copied again in the assessment above and reflects medical decision making as well  Complete 12 point ROS reviewed and otherwise non pertinent or negative except as per HPI pertinent positives in Cardiovascular and Respiratory emphasized  Please see paper chart for outpatient clinic patients where the patient completed the 12 point ROS survey  Past Medical History:   Diagnosis Date    BPH (benign prostatic hyperplasia)     Chronic obstructive pulmonary disease (COPD) ([address] )     Essential hypertension     Hard of hearing     Pt does wear hearing aids    Hypertension     Shortness of breath     Pt states not changes and it occurs with moderate exertion    Sleep disturbance     Pt states he is only sleeping about 3 hours a night    Pt is seeing his PCP on 8/5/21 to discuss    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 !     I reviewed the Home Medication list and Allergies in the chart  Scheduled Meds:  Current Outpatient Medications   Medication Sig Dispense Refill    apixaban (ELIQUIS) 5 mg Take 1 tablet (5 mg total) by mouth 2 (two) times a day 180 tablet 3    Ascorbic Acid (VITAMIN C PO) Take 1 tablet by mouth daily       bisacodyl (bisacodyl) 5 mg EC tablet Take 5 mg by mouth 2 (two) times a day      budesonide (PULMICORT) 0 5 mg/2 mL nebulizer solution Take 2 mL (0 5 mg total) by nebulization every 12 (twelve) hours Rinse mouth after use  120 mL 0    cetirizine (ZyrTEC) 10 mg tablet Take 10 mg by mouth daily      diltiazem (CARDIZEM CD) 300 mg 24 hr capsule Take 1 capsule (300 mg total) by mouth daily 90 capsule 3    docusate sodium (COLACE) 100 mg capsule Take 100 mg by mouth       finasteride (PROSCAR) 5 mg tablet Take 5 mg by mouth daily       fluticasone-salmeterol (ADVAIR, WIXELA) 250-50 mcg/dose inhaler Inhale 1 puff 2 (two) times a day Rinse mouth after use   furosemide (LASIX) 20 mg tablet Take 3 tablets (60 mg total) by mouth daily 270 tablet 3    ipratropium (ATROVENT) 0 02 % nebulizer solution Take 2 5 mL (0 5 mg total) by nebulization every 6 (six) hours 300 mL 0    Magnesium 500 MG CAPS Take 500 mg by mouth daily      montelukast (SINGULAIR) 10 mg tablet Take 10 mg by mouth daily at bedtime      Spiriva Respimat 2 5 MCG/ACT AERS inhaler Inhale 2 puffs daily        No current facility-administered medications for this visit  PRN Meds:  No family history on file      Social History     Socioeconomic History    Marital status: /Civil Union     Spouse name: Not on file    Number of children: Not on file    Years of education: Not on file    Highest education level: Not on file   Occupational History    Occupation: Retired   Tobacco Use    Smoking status: Light Tobacco Smoker     Types: Cigars    Smokeless tobacco: Never Used   Vaping Use    Vaping Use: Never used   Substance and Sexual Activity    Alcohol use: Yes     Comment: moderate consumption    Drug use: Not Currently    Sexual activity: Yes   Other Topics Concern    Not on file   Social History Narrative    Not on file     Social Determinants of Health     Financial Resource Strain:     Difficulty of Paying Living Expenses:    Food Insecurity:     Worried About Running Out of Food in the Last Year:     Ran Out of Food in the Last Year:    Transportation Needs: No Transportation Needs    Lack of Transportation (Medical): No    Lack of Transportation (Non-Medical): No   Physical Activity:     Days of Exercise per Week:     Minutes of Exercise per Session:    Stress:     Feeling of Stress :    Social Connections:     Frequency of Communication with Friends and Family:     Frequency of Social Gatherings with Friends and Family:     Attends Voodoo Services:     Active Member of Clubs or Organizations:     Attends Club or Organization Meetings:     Marital Status:    Intimate Partner Violence:     Fear of Current or Ex-Partner:     Emotionally Abused:     Physically Abused:     Sexually Abused:          OBJECTIVE:    /70 (BP Location: Left arm, Patient Position: Sitting, Cuff Size: Adult)   Pulse 82   Ht 5' 9" (1 753 m)   Wt 81 2 kg (179 lb)   BMI 26 43 kg/m²   Vitals:    09/20/21 1156   Weight: 81 2 kg (179 lb)     /70 (BP Location: Left arm, Patient Position: Sitting, Cuff Size: Adult)   Pulse 82   Ht 5' 9" (1 753 m)   Wt 81 2 kg (179 lb)   BMI 26 43 kg/m²   Vitals:    09/20/21 1156   Weight: 81 2 kg (179 lb)     GEN: No acute distress, Alert and oriented, well appearing  HEENT:External ears normal, wearing a mask     EYES: Pupils equal, sclera anicteric, midline, normal conjuctiva  NECK: No JVD, supple, no obvious masses or thryomegaly or goiter  CARDIOVASCULAR: irreg irreg, No murmur, rub, gallops S1,S2  LUNGS: Marcel scattered wheezing  ABDOMEN:  nondistended,  without obvious organomegaly or ascites  EXTREMITIES/VASCULAR: 1+ marcel edema  warm an well perfused  Has nonhealing ulcer on left calf  PSYCH: Normal Affect,  linear speech pattern without evidence of psychosis  NEURO: Grossly intact, moving all extremiteis equal, face symmetric, alert and responsive, no obvious focal defecits   GAIT:  Ambulates normally without difficulty  HEME: No bleeding, bruising, petechia, purpura   SKIN: No significant rashes on visibile skin, warm, no diaphoresis or pallor  Lab Results:       LABS:      Chemistry        Component Value Date/Time    K 4 2 09/16/2021 1003    CL 96 (L) 09/16/2021 1003    CO2 36 (H) 09/16/2021 1003    BUN 20 09/16/2021 1003    CREATININE 0 94 09/16/2021 1003        Component Value Date/Time    CALCIUM 9 1 09/16/2021 1003    ALKPHOS 63 08/15/2021 0523    AST 20 08/15/2021 0523    ALT 40 08/15/2021 0523            No results found for: CHOL  No results found for: HDL  No results found for: LDLCALC  No results found for: TRIG  No results found for: CHOLHDL    IMAGING: XR chest portable    Result Date: 8/13/2021  Narrative: CHEST INDICATION:   respiratory failure  COMPARISON:  None EXAM PERFORMED/VIEWS:  XR CHEST PORTABLE FINDINGS: Heart enlarged  Pulmonary vessels unremarkable  The lungs are clear  No pneumothorax or pleural effusion  Osseous structures appear within normal limits for patient age  Impression: No evidence of acute abnormality in the chest  Workstation performed: [id number]     XR chest 1 view portable    Result Date: 8/10/2021  Narrative: CHEST INDICATION:   Short of breath  COMPARISON:  None EXAM PERFORMED/VIEWS:  XR CHEST PORTABLE FINDINGS: Cardiomediastinal silhouette appears unremarkable  The lungs are clear  No pneumothorax or pleural effusion  Osseous structures appear within normal limits for patient age       Impression: No focal consolidation, pleural effusion, or pneumothorax  Workstation performed: [id number]     XR chest pa & lateral    Result Date: 8/17/2021  Narrative: CHEST INDICATION:   rales on auscultation, shortness of breath  COMPARISON:  Compared with 8/13/2021   EXAM PERFORMED/VIEWS:  XR CHEST PA & LATERAL FINDINGS: Cardiomediastinal silhouette appears unremarkable  The lungs are clear  No pneumothorax or pleural effusion  Osseous structures appear within normal limits for patient age  Impression: No acute cardiopulmonary disease  Workstation performed: [id number]     CT head wo contrast    Result Date: 8/14/2021  Narrative: CT BRAIN - WITHOUT CONTRAST INDICATION:   Altered mental status, acute metabolic encephalopathy  COMPARISON:  None  TECHNIQUE:  CT examination of the brain was performed  In addition to axial images, sagittal and coronal 2D reformatted images were created and submitted for interpretation  Radiation dose length product (DLP) for this visit:  9633 0859 mGy-cm   This examination, like all CT scans performed in the [facility], was performed utilizing techniques to minimize radiation dose exposure, including the use of iterative reconstruction and automated exposure control  IMAGE QUALITY:  Patient was difficult to position  Portions of the left calvaria, orbits and scalp are not imaged on this study  FINDINGS: PARENCHYMA:  No intracranial hemorrhage, edema, mass effect or midline shift  Periventricular and deep cerebral chronic microangiopathic changes  VENTRICLES AND EXTRA-AXIAL SPACES:  Normal for the patient's age  VISUALIZED ORBITS AND PARANASAL SINUSES:  Unremarkable  CALVARIUM AND EXTRACRANIAL SOFT TISSUES:  Normal      Impression: No acute intracranial abnormality  Mild cerebral chronic microangiopathic disease  Left parietal scalp lesion, measuring 1 4 x 0 5 x 1 6 cm  Direct inspection is recommended as skin cancer is not excluded   Workstation performed: [id number]     Echo follow up/limited with contrast if indicated    Result Date: 2021  Narrative: [address] [place], [address] [facility] Transthoracic Echocardiogram Limited 2D, M-mode, Doppler, and Color Doppler Study date:  16-Aug-2021 Patient: Ella Eduardo MR number: [id number] Account number: [de-identified] : 10-Girish-1940 Age: [de-identified] years Gender: Male Status: Inpatient Location: [address] [place] [address] Height: 69 in Weight: 179 lb BP: 122/ 58 mmHg Indications: Dyspnea Sonographer:  Ashley Noyola RDCS, [facility] Primary Physician:  Dennie Seip, MD Referring Physician:  Alec Rocha MD Group:  [facility] Interpreting Physician:  Chanetta Severance, DO SUMMARY LEFT VENTRICLE: The ventricle was mildly dilated  Systolic function was mildly reduced  Ejection fraction was estimated to be 45 % but very difficult to assess in setting of frequent ectopy and atrial arrhythmia  This study was inadequate for the evaluation of regional wall motion  RIGHT VENTRICLE: The size was at the upper limits of normal  Systolic function was normal  LEFT ATRIUM: The atrium was mildly to moderately dilated  RIGHT ATRIUM: The atrium was mildly dilated  MITRAL VALVE: There was mild annular calcification  There was mild regurgitation  TRICUSPID VALVE: There was mild regurgitation  Estimated peak PA pressure was 40 mmHg assuming an estimated right atrial pressure of 10 mmHg (IVC dilated)  The findings suggest mild pulmonary hypertension  PULMONIC VALVE: There was mild regurgitation  AORTA: Aortic root dilated at 3 9 cm  IVC, HEPATIC VEINS: The inferior vena cava was dilated  Respirophasic changes were blunted (less than 50% variation)  PERICARDIUM: A small pericardial effusion was identified along the right atrial free wall  COMPARISONS: No significant change compared to prior study 2021   HISTORY: PRIOR HISTORY: Afib, COPD, CHF PROCEDURE: The study was performed in the [address] [place] [address]  This was a routine study  The transthoracic approach was used  The study included limited 2D imaging, M-mode, limited spectral Doppler, and color Doppler  The heart rate was 47 bpm, at the start of the study  Image quality was adequate  LEFT VENTRICLE: The ventricle was mildly dilated  Systolic function was mildly reduced  Ejection fraction was estimated to be 45 % but very difficult to assess in setting of frequent ectopy and atrial arrhythmia  This study was inadequate for the evaluation of regional wall motion  DOPPLER: Left ventricular diastolic function not assessed in presence of atrial arrhythmia  RIGHT VENTRICLE: The size was at the upper limits of normal  Systolic function was normal  LEFT ATRIUM: The atrium was mildly to moderately dilated  RIGHT ATRIUM: The atrium was mildly dilated  MITRAL VALVE: There was mild annular calcification  There was mild thickening  There was normal leaflet separation  DOPPLER: There was mild regurgitation  AORTIC VALVE: The valve was trileaflet  Leaflets exhibited normal thickness and normal cuspal separation  DOPPLER: Transaortic velocity was within the normal range  There was no evidence for stenosis  There was no regurgitation  TRICUSPID VALVE: The valve structure was normal  There was normal leaflet separation  DOPPLER: The transtricuspid velocity was within the normal range  There was no evidence for stenosis  There was mild regurgitation  Estimated peak PA pressure was 40 mmHg assuming an estimated right atrial pressure of 10 mmHg (IVC dilated)  The findings suggest mild pulmonary hypertension  PULMONIC VALVE: Not well visualized  DOPPLER: There was mild regurgitation  PERICARDIUM: A small pericardial effusion was identified along the right atrial free wall  AORTA: Aortic root dilated at 3 9 cm  SYSTEMIC VEINS: IVC: The inferior vena cava was dilated  Respirophasic changes were blunted (less than 50% variation)   SYSTEM MEASUREMENT TABLES 2D %FS: 18 98 % AV Diam: 3 9 cm EDV(Teich): 171 99 ml EF(Teich): 38 53 % ESV(Teich): 105 72 ml IVSd: 0 84 cm LA Diam: 5 15 cm LAAs A4C: 21 77 cm2 LAESV A-L A4C: 58 52 ml LAESV MOD A4C: 56 03 ml LALs A4C: 6 87 cm LVEDV MOD A4C: 110 18 ml LVEF MOD A4C: 50 53 % LVESV MOD A4C: 54 5 ml LVIDd: 5 88 cm LVIDs: 4 77 cm LVLd A4C: 8 34 cm LVLs A4C: 7 06 cm LVPWd: 0 87 cm RAAs A4C: 28 01 cm2 RAESV A-L: 94 44 ml RAESV MOD: 94 22 ml RALs: 7 05 cm RVIDd: 3 5 cm RWT: 0 3 SV MOD A4C: 55 68 ml SV(Teich): 66 27 ml CW TR Vmax: 2 79 m/s TR maxP 24 mmHg [facility] Commission Accredited Echocardiography Laboratory Prepared and electronically signed by Marialuisa Santos DO Signed 16-Aug-2021 19:14:14     CTA chest pe study    Result Date: 2021  Narrative: CTA - CHEST WITH IV CONTRAST - PULMONARY ANGIOGRAM INDICATION:   Shortness of breath acute resp failure  COMPARISON: No prior chest CT available for comparison  Correlation with chest radiograph 2021 at 12:15 TECHNIQUE: CTA examination of the chest was performed using angiographic technique according to a protocol specifically tailored to evaluate for pulmonary embolism  Axial, sagittal, and coronal 2D reformatted images were created from the source data and  submitted for interpretation  In addition, coronal 3D MIP postprocessing was performed on the acquisition scanner  Radiation dose length product (DLP) for this visit:  489 mGy-cm   This examination, like all CT scans performed in the [facility], was performed utilizing techniques to minimize radiation dose exposure, including the use of iterative reconstruction and automated exposure control  IV Contrast:  85 mL of iohexol (OMNIPAQUE)  FINDINGS: PULMONARY ARTERIAL TREE:  No pulmonary embolus is seen  LUNGS:  Mild to moderate centrilobular and paraseptal emphysema  Central airways are patent  No focal consolidation  Minimal bibasilar linear atelectasis, right greater than left   0 5 cm solid left apical pulmonary nodule (series 3, image 28) PLEURA:  Trace right pleural effusion  HEART/GREAT VESSELS:  Cardiomegaly  Atherosclerotic calcifications of the aorta and coronary artery calcifications  Main pulmonary artery is enlarged measuring 4 3 cm  Ascending aorta is enlarged measuring 4 cm in caliber  MEDIASTINUM AND GABBIE:  Calcified hilar and mediastinal lymph nodes  No lymphadenopathy by size criteria  CHEST WALL AND LOWER NECK:   Unremarkable  VISUALIZED STRUCTURES IN THE UPPER ABDOMEN:  Few low attenuation hepatic lesions, probable cysts  Few calcified granulomas in the spleen  Trace perisplenic and perihepatic ascites  OSSEOUS STRUCTURES:  Spinal degenerative changes are noted  No acute fracture or destructive osseous lesion  Impression: 1  No pulmonary embolism  2   Nonspecific partially imaged trace perisplenic and perihepatic ascites  3   Mild-to moderate pulmonary emphysema  0 5 cm solid pulmonary nodule  Based on current Fleischner Society 2017 Guidelines on incidental pulmonary nodule, because the patient is considered high risk for lung cancer, 12 month follow-up non-contrast chest CT is recommended  4   Enlarged main pulmonary artery, a finding associated with pulmonary hypertension  5   Enlarged ascending aorta measuring 4 cm in caliber  6   Cardiomegaly   Workstation performed: [id number]        Cardiac testing:   Results for orders placed during the hospital encounter of 21    Echo complete with contrast if indicated    [address]    Echocardiogram    Study date:  2021    Patient: Maty Ward  MR number: [id number]  Account number: [de-identified]  : 10-Girish-1940  Age: [de-identified] years  Gender: Male  Status: Outpatient  Location:  Height:  Weight:  BP:    Indications: Atrial Fibrillation    Diagnoses: I48 0 - Atrial fibrillation    Sonographer:  TYLER Earl  Primary Physician:  Palmira Woods MD  Referring Physician: Zoraida Villarreal,   Group:  [facility]  Interpreting Physician:  Arnulfo Evangelista MD    SUMMARY    LEFT VENTRICLE:  LV function is difficult to assess due to patients rhythm  Systolic function was by visual assessment  Ejection fraction was estimated in the range of 45 % to 50 %  RIGHT VENTRICLE:  The size was normal   Systolic function was normal     LEFT ATRIUM:  The atrium was mildly dilated  RIGHT ATRIUM:  The atrium was mildly dilated  MITRAL VALVE:  There was mild regurgitation  TRICUSPID VALVE:  There was trace regurgitation  PULMONIC VALVE:  There was trace regurgitation  IVC, HEPATIC VEINS:  The inferior vena cava was normal in size  PERICARDIUM:  A trace pericardial effusion was identified  LEFT VENTRICLE: LV function is difficult to assess due to patients rhythm  Size was normal  Systolic function was by visual assessment  Ejection fraction was estimated in the range of 45 % to 50 %  RIGHT VENTRICLE: The size was normal  Systolic function was normal  Wall thickness was normal     LEFT ATRIUM: The atrium was mildly dilated  RIGHT ATRIUM: The atrium was mildly dilated  MITRAL VALVE: Valve structure was normal  There was normal leaflet separation  DOPPLER: The transmitral velocity was within the normal range  There was no evidence for stenosis  There was mild regurgitation  AORTIC VALVE: The valve was trileaflet  Leaflets exhibited normal thickness and normal cuspal separation  DOPPLER: Transaortic velocity was within the normal range  There was no evidence for stenosis  There was no regurgitation  TRICUSPID VALVE: The valve structure was normal  There was normal leaflet separation  DOPPLER: The transtricuspid velocity was within the normal range  There was no evidence for stenosis  There was trace regurgitation  PULMONIC VALVE: Leaflets exhibited normal thickness, no calcification, and normal cuspal separation   DOPPLER: The transpulmonic velocity was within the normal range  There was trace regurgitation  PERICARDIUM: A trace pericardial effusion was identified  AORTA: The root exhibited normal size  SYSTEMIC VEINS: IVC: The inferior vena cava was normal in size  SYSTEM MEASUREMENT TABLES    2D  %FS: 28 1 %  AV Diam: 3 45 cm  EDV(Teich): 189 24 ml  EF(Teich): 53 42 %  ESV(Teich): 88 15 ml  IVSd: 0 78 cm  LA Diam: 4 42 cm  LAAs A4C: 25 97 cm2  LAESV A-L A4C: 90 65 ml  LAESV MOD A4C: 86 78 ml  LALs A4C: 6 32 cm  LVEDV MOD A4C: 100 18 ml  LVEF MOD A4C: 37 52 %  LVESV MOD A4C: 62 6 ml  LVIDd: 6 13 cm  LVIDs: 4 41 cm  LVLd A4C: 8 3 cm  LVLs A4C: 8 02 cm  LVPWd: 1 cm  RAAs A4C: 27 21 cm2  RAESV A-L: 86 86 ml  RAESV MOD: 83 1 ml  RALs: 7 23 cm  RVIDd: 3 5 cm  RWT: 0 33  SV MOD A4C: 37 59 ml  SV(Teich): 101 09 ml    MM  TAPSE: 1 51 cm    PW  E' Lat: 0 14 m/s  E' Sept: 0 09 m/s    Inters\[facility]\"" Commission Accredited Echocardiography Laboratory    Prepared and electronically signed by    Herson Velez MD  Signed [id number] 16:03:43    No results found for this or any previous visit  No results found for this or any previous visit  No results found for this or any previous visit            I reviewed and interpreted the following LABS/EKG/TELE/IMAGING and below is summary of my interpretation (if data available):    LABS:bmp normal    Current EKG and Rhythm Strip: Rate controlled afib, rbbb, no PVC's today    Past EKGs and RHYTHM strip: 8/15 EKG shows afib w RVR and frequent PVC  Jan 2021 EKG afib w RVR  2019 EKG NSR and RBBB

## 2021-09-20 NOTE — PROGRESS NOTES
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  Pattern, looks to be mid septal on 12 lead EKG  Seems better on higher dose of Dilt and euvolemia, he has no PVC on ekg today, has some on rhythm strip but much less  3) RBBB    4) Chronic Systolic CHF EF 40%, admitted  Aug weekend for CHF and COPD and improved  Not on betablockers due to severe COPD  Onlasix  5) 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, and recent exacerbation, but oxygen >95% w/o 02 lately not needing  6) Chronic edema  STill present    PLAN:  1  Do not recommend ablation as he feels much better, PVC improved, rates controlled  2  Recommend take extra lasix 60mg in afternoon every 3rd day to help w edema      Orders Placed This Encounter   Procedures    POCT ECG     There are no discontinued medications    HPI/Subjective:   [de-identified] yo male  1) Persistent afib, dx Jan 2021, last normal EKG 2019, so unclear duration  [id number], on Eliquis  Symptomtic w shortness of breath and fatigue and heart failure  Rates now controlled on higher dose of Diltiazem 300mg from last month Feels better  2) Frequent PVC, >30% burden, 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  Pattern, looks to be mid septal on 12 lead EKG  Seems better on higher dose of Dilt and euvolemia, he has no PVC on ekg today, has some on rhythm strip but much less     3) RBBB    4) Chronic Systolic CHF EF 45%, admitted  Aug weekend for CHF and COPD and improved  Not on betablockers due to severe COPD  Onlasix  5) 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, and recent exacerbation, but oxygen >95% w/o 02 lately not needing  6) Chronic edema  STill present    Mendel Allegra feels much better overall, less SOB, able to walk, less tired, doesn't need oxygen as >95% much of the time  Please note HPI is listed by problem with with update following it, it is copied again in the assessment above and reflects medical decision making as well  Complete 12 point ROS reviewed and otherwise non pertinent or negative except as per HPI pertinent positives in Cardiovascular and Respiratory emphasized  Please see paper chart for outpatient clinic patients where the patient completed the 12 point ROS survey  Past Medical History:   Diagnosis Date    BPH (benign prostatic hyperplasia)     [facility] obstructive pulmonary disease [facility]D) [facility]     Essential hypertension     Hard of hearing     Pt does wear hearing aids    Hypertension     Shortness of breath     Pt states not changes and it occurs with moderate exertion    Sleep disturbance     Pt states he is only sleeping about 3 hours a night  Pt is seeing his PCP on 8/5/21 to discuss    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 !     I reviewed the Home Medication list and Allergies in the chart     Scheduled Meds:  Current Outpatient Medications   Medication Sig Dispense Refill    apixaban (ELIQUIS) 5 mg Take 1 tablet (5 mg total) by mouth 2 (two) times a day 180 tablet 3    Ascorbic Acid (VITAMIN C PO) Take 1 tablet by mouth daily       bisacodyl (bisacodyl) 5 mg EC tablet Take 5 mg by mouth 2 (two) times a day      budesonide (PULMICORT) 0 5 mg/2 mL nebulizer solution Take 2 mL (0 5 mg total) by nebulization every 12 (twelve) hours Rinse mouth after use  120 mL 0    cetirizine (ZyrTEC) 10 mg tablet Take 10 mg by mouth daily      diltiazem (CARDIZEM CD) 300 mg 24 hr capsule Take 1 capsule (300 mg total) by mouth daily 90 capsule 3    docusate sodium (COLACE) 100 mg capsule Take 100 mg by mouth       finasteride (PROSCAR) 5 mg tablet Take 5 mg by mouth daily       fluticasone-salmeterol (ADVAIR, WIXELA) 250-50 mcg/dose inhaler Inhale 1 puff 2 (two) times a day Rinse mouth after use   furosemide (LASIX) 20 mg tablet Take 3 tablets (60 mg total) by mouth daily 270 tablet 3    ipratropium (ATROVENT) 0 02 % nebulizer solution Take 2 5 mL (0 5 mg total) by nebulization every 6 (six) hours 300 mL 0    Magnesium 500 MG CAPS Take 500 mg by mouth daily      montelukast (SINGULAIR) 10 mg tablet Take 10 mg by mouth daily at bedtime      Spiriva Respimat 2 5 MCG/ACT AERS inhaler Inhale 2 puffs daily        No current facility-administered medications for this visit  PRN Meds:  No family history on file  Social History     Socioeconomic History    Marital status: /Civil Union     Spouse name: Not on file    Number of children: Not on file    Years of education: Not on file    Highest education level: Not on file   Occupational History    Occupation: Retired   Tobacco Use    Smoking status: Light Tobacco Smoker     Types: Cigars    Smokeless tobacco: Never Used   Vaping Use    Vaping Use: Never used   Substance and Sexual Activity    Alcohol use: Yes     Comment: moderate consumption    Drug use: Not Currently    Sexual activity: Yes   Other Topics Concern    Not on file   Social History Narrative    Not on file     Social Determinants of Health     Financial Resource Strain:     Difficulty of Paying Living Expenses:    Food Insecurity:     Worried About Running Out of Food in the Last Year:     [address] in the Last Year:    Transportation Needs: No Transportation Needs    Lack of Transportation (Medical):  No    Lack of Transportation (Non-Medical): No   Physical Activity:     Days of Exercise per Week:     Minutes of Exercise per Session:    Stress:     Feeling of Stress :    Social Connections:     Frequency of Communication with Friends and Family:     Frequency of Social Gatherings with Friends and Family:     Attends Jew Services:     Active Member of Clubs or Organizations:     Attends Club or Organization Meetings:     Marital Status:    Intimate Partner Violence:     Fear of Current or Ex-Partner:     Emotionally Abused:     Physically Abused:     Sexually Abused:          OBJECTIVE:    /70 (BP Location: Left arm, Patient Position: Sitting, Cuff Size: Adult)   Pulse 82   Ht 5' 9" (1 753 m)   Wt 81 2 kg (179 lb)   BMI 26 43 kg/m²   Vitals:    09/20/21 1156   Weight: 81 2 kg (179 lb)     /70 (BP Location: Left arm, Patient Position: Sitting, Cuff Size: Adult)   Pulse 82   Ht 5' 9" (1 753 m)   Wt 81 2 kg (179 lb)   BMI 26 43 kg/m²   Vitals:    09/20/21 1156   Weight: 81 2 kg (179 lb)     GEN: No acute distress, Alert and oriented, well appearing  HEENT:External ears normal, wearing a mask  EYES: Pupils equal, sclera anicteric, midline, normal conjuctiva  NECK: No JVD, supple, no obvious masses or thryomegaly or goiter  CARDIOVASCULAR: irreg irreg, No murmur, rub, gallops S1,S2  LUNGS: Marcel scattered wheezing  ABDOMEN:  nondistended,  without obvious organomegaly or ascites  EXTREMITIES/VASCULAR: 1+ marcel edema  warm an well perfused  Has nonhealing ulcer on left calf  PSYCH: Normal Affect,  linear speech pattern without evidence of psychosis  NEURO: Grossly intact, moving all extremiteis equal, face symmetric, alert and responsive, no obvious focal defecits   GAIT:  Ambulates normally without difficulty  HEME: No bleeding, bruising, petechia, purpura   SKIN: No significant rashes on visibile skin, warm, no diaphoresis or pallor       Lab Results:       LABS:      Chemistry Component Value Date/Time    K 4 2 09/16/2021 1003    CL 96 (L) 09/16/2021 1003    CO2 36 (H) 09/16/2021 1003    BUN 20 09/16/2021 1003    CREATININE 0 94 09/16/2021 1003        Component Value Date/Time    CALCIUM 9 1 09/16/2021 1003    ALKPHOS 63 08/15/2021 0523    AST 20 08/15/2021 0523    ALT 40 08/15/2021 0523            No results found for: CHOL  No results found for: HDL  No results found for: LDLCALC  No results found for: TRIG  No results found for: CHOLHDL    IMAGING: XR chest portable    Result Date: 8/13/2021  Narrative: CHEST INDICATION:   respiratory failure  COMPARISON:  None EXAM PERFORMED/VIEWS:  XR CHEST PORTABLE FINDINGS: Heart enlarged  Pulmonary vessels unremarkable  The lungs are clear  No pneumothorax or pleural effusion  Osseous structures appear within normal limits for patient age  Impression: No evidence of acute abnormality in the chest  Workstation performed: [id number]     XR chest 1 view portable    Result Date: 8/10/2021  Narrative: CHEST INDICATION:   Short of breath  COMPARISON:  None EXAM PERFORMED/VIEWS:  XR CHEST PORTABLE FINDINGS: Cardiomediastinal silhouette appears unremarkable  The lungs are clear  No pneumothorax or pleural effusion  Osseous structures appear within normal limits for patient age  Impression: No focal consolidation, pleural effusion, or pneumothorax  Workstation performed: [id number]     XR chest pa & lateral    Result Date: 8/17/2021  Narrative: CHEST INDICATION:   rales on auscultation, shortness of breath  COMPARISON:  Compared with 8/13/2021   EXAM PERFORMED/VIEWS:  XR CHEST PA & LATERAL FINDINGS: Cardiomediastinal silhouette appears unremarkable  The lungs are clear  No pneumothorax or pleural effusion  Osseous structures appear within normal limits for patient age  Impression: No acute cardiopulmonary disease   Workstation performed: [id number]     CT head wo contrast    Result Date: 8/14/2021  Narrative: CT BRAIN - WITHOUT CONTRAST INDICATION:   Altered mental status, acute metabolic encephalopathy  COMPARISON:  None  TECHNIQUE:  CT examination of the brain was performed  In addition to axial images, sagittal and coronal 2D reformatted images were created and submitted for interpretation  Radiation dose length product (DLP) for this visit:  9633 0859 mGy-cm   This examination, like all CT scans performed in the [facility], was performed utilizing techniques to minimize radiation dose exposure, including the use of iterative reconstruction and automated exposure control  IMAGE QUALITY:  Patient was difficult to position  Portions of the left calvaria, orbits and scalp are not imaged on this study  FINDINGS: PARENCHYMA:  No intracranial hemorrhage, edema, mass effect or midline shift  Periventricular and deep cerebral chronic microangiopathic changes  VENTRICLES AND EXTRA-AXIAL SPACES:  Normal for the patient's age  VISUALIZED ORBITS AND PARANASAL SINUSES:  Unremarkable  CALVARIUM AND EXTRACRANIAL SOFT TISSUES:  Normal      Impression: No acute intracranial abnormality  Mild cerebral chronic microangiopathic disease  Left parietal scalp lesion, measuring 1 4 x 0 5 x 1 6 cm  Direct inspection is recommended as skin cancer is not excluded   Workstation performed: [id number]     Echo follow up/limited with contrast if indicated    Result Date: 2021  Narrative: [facility] [address] [place] [address] Transthoracic Echocardiogram Limited 2D, M-mode, Doppler, and Color Doppler Study date:  16-Aug-2021 Patient: Lauren Vinson MR number: [id number] Account number: [de-identified] : 10-Girish-1940 Age: [de-identified] years Gender: Male Status: Inpatient Location: [address] [place] [address] Height: 69 in Weight: 179 lb BP: 122/ 58 mmHg Indications: Dyspnea Sonographer:  Chi Eden RDCS, RCS Primary Physician:  Savannah Husain MD Referring Physician:  Kelby Sims MD Group:  [facility] President Interpreting Physician:  DO CARLOS ALBERTO Smith LEFT VENTRICLE: The ventricle was mildly dilated  Systolic function was mildly reduced  Ejection fraction was estimated to be 45 % but very difficult to assess in setting of frequent ectopy and atrial arrhythmia  This study was inadequate for the evaluation of regional wall motion  RIGHT VENTRICLE: The size was at the upper limits of normal  Systolic function was normal  LEFT ATRIUM: The atrium was mildly to moderately dilated  RIGHT ATRIUM: The atrium was mildly dilated  MITRAL VALVE: There was mild annular calcification  There was mild regurgitation  TRICUSPID VALVE: There was mild regurgitation  Estimated peak PA pressure was 40 mmHg assuming an estimated right atrial pressure of 10 mmHg (IVC dilated)  The findings suggest mild pulmonary hypertension  PULMONIC VALVE: There was mild regurgitation  AORTA: Aortic root dilated at 3 9 cm  IVC, HEPATIC VEINS: The inferior vena cava was dilated  Respirophasic changes were blunted (less than 50% variation)  PERICARDIUM: A small pericardial effusion was identified along the right atrial free wall  COMPARISONS: No significant change compared to prior study 2/25/2021  HISTORY: PRIOR HISTORY: Afib, COPD, CHF PROCEDURE: The study was performed in the [facility]  This was a routine study  The transthoracic approach was used  The study included limited 2D imaging, M-mode, limited spectral Doppler, and color Doppler  The heart rate was 47 bpm, at the start of the study  Image quality was adequate  LEFT VENTRICLE: The ventricle was mildly dilated  Systolic function was mildly reduced  Ejection fraction was estimated to be 45 % but very difficult to assess in setting of frequent ectopy and atrial arrhythmia  This study was inadequate for the evaluation of regional wall motion  DOPPLER: Left ventricular diastolic function not assessed in presence of atrial arrhythmia   RIGHT VENTRICLE: The size was at the upper limits of normal  Systolic function was normal  LEFT ATRIUM: The atrium was mildly to moderately dilated  RIGHT ATRIUM: The atrium was mildly dilated  MITRAL VALVE: There was mild annular calcification  There was mild thickening  There was normal leaflet separation  DOPPLER: There was mild regurgitation  AORTIC VALVE: The valve was trileaflet  Leaflets exhibited normal thickness and normal cuspal separation  DOPPLER: Transaortic velocity was within the normal range  There was no evidence for stenosis  There was no regurgitation  TRICUSPID VALVE: The valve structure was normal  There was normal leaflet separation  DOPPLER: The transtricuspid velocity was within the normal range  There was no evidence for stenosis  There was mild regurgitation  Estimated peak PA pressure was 40 mmHg assuming an estimated right atrial pressure of 10 mmHg (IVC dilated)  The findings suggest mild pulmonary hypertension  PULMONIC VALVE: Not well visualized  DOPPLER: There was mild regurgitation  PERICARDIUM: A small pericardial effusion was identified along the right atrial free wall  AORTA: Aortic root dilated at 3 9 cm  SYSTEMIC VEINS: IVC: The inferior vena cava was dilated  Respirophasic changes were blunted (less than 50% variation)   SYSTEM MEASUREMENT TABLES 2D %FS: 18 98 % AV Diam: 3 9 cm EDV(Teich): 171 99 ml EF(Teich): 38 53 % ESV(Teich): 105 72 ml IVSd: 0 84 cm LA Diam: 5 15 cm LAAs A4C: 21 77 cm2 LAESV A-L A4C: 58 52 ml LAESV MOD A4C: 56 03 ml LALs A4C: 6 87 cm LVEDV MOD A4C: 110 18 ml LVEF MOD A4C: 50 53 % LVESV MOD A4C: 54 5 ml LVIDd: 5 88 cm LVIDs: 4 77 cm LVLd A4C: 8 34 cm LVLs A4C: 7 06 cm LVPWd: 0 87 cm RAAs A4C: 28 01 cm2 RAESV A-L: 94 44 ml RAESV MOD: 94 22 ml RALs: 7 05 cm RVIDd: 3 5 cm RWT: 0 3 SV MOD A4C: 55 68 ml SV(Teich): 66 27 ml CW TR Vmax: 2 79 m/s TR maxP 24 mmHg [employer] Accredited Echocardiography Laboratory Prepared and electronically signed by DO Gabriel Signed 16-Aug-2021 19:14:14     CTA chest pe study    Result Date: 8/14/2021  Narrative: CTA - CHEST WITH IV CONTRAST - PULMONARY ANGIOGRAM INDICATION:   Shortness of breath acute resp failure  COMPARISON: No prior chest CT available for comparison  Correlation with chest radiograph August 13, 2021 at 12:15 TECHNIQUE: CTA examination of the chest was performed using angiographic technique according to a protocol specifically tailored to evaluate for pulmonary embolism  Axial, sagittal, and coronal 2D reformatted images were created from the source data and  submitted for interpretation  In addition, coronal 3D MIP postprocessing was performed on the acquisition scanner  Radiation dose length product (DLP) for this visit:  489 mGy-cm   This examination, like all CT scans performed in the [facility], was performed utilizing techniques to minimize radiation dose exposure, including the use of iterative reconstruction and automated exposure control  IV Contrast:  85 mL of iohexol (OMNIPAQUE)  FINDINGS: PULMONARY ARTERIAL TREE:  No pulmonary embolus is seen  LUNGS:  Mild to moderate centrilobular and paraseptal emphysema  Central airways are patent  No focal consolidation  Minimal bibasilar linear atelectasis, right greater than left  0 5 cm solid left apical pulmonary nodule (series 3, image 28) PLEURA:  Trace right pleural effusion  HEART/GREAT VESSELS:  Cardiomegaly  Atherosclerotic calcifications of the aorta and coronary artery calcifications  Main pulmonary artery is enlarged measuring 4 3 cm  Ascending aorta is enlarged measuring 4 cm in caliber  MEDIASTINUM AND GABBIE:  Calcified hilar and mediastinal lymph nodes  No lymphadenopathy by size criteria  CHEST WALL AND LOWER NECK:   Unremarkable  VISUALIZED STRUCTURES IN THE UPPER ABDOMEN:  Few low attenuation hepatic lesions, probable cysts  Few calcified granulomas in the spleen  Trace perisplenic and perihepatic ascites   OSSEOUS STRUCTURES:  Spinal degenerative changes are noted  No acute fracture or destructive osseous lesion  Impression: 1  No pulmonary embolism  2   Nonspecific partially imaged trace perisplenic and perihepatic ascites  3   Mild-to moderate pulmonary emphysema  0 5 cm solid pulmonary nodule  Based on current Fleischner Society 2017 Guidelines on incidental pulmonary nodule, because the patient is considered high risk for lung cancer, 12 month follow-up non-contrast chest CT is recommended  4   Enlarged main pulmonary artery, a finding associated with pulmonary hypertension  5   Enlarged ascending aorta measuring 4 cm in caliber  6   Cardiomegaly  Workstation performed: [id number]        Cardiac testing:   Results for orders placed during the hospital encounter of 21    Echo complete with contrast if indicated    Narrative  [facility] [employer] [facility], [address] [place] [address]    Echocardiogram    Study date:  2021    Patient: Mode Rollins  MR number: [id number]  Account number: [de-identified]  : 10-Girish-1940  Age: [de-identified] years  Gender: Male  Status: Outpatient  Location:  Height:  Weight:  BP:    Indications: Atrial Fibrillation    Diagnoses: I48 0 - Atrial fibrillation    Sonographer:  TYLER Lorenzo  Primary Physician:  Emile Worthy MD  Referring Physician:  Natasha Manriquez DO  Group:  Eduardo Galdamez  Interpreting Physician:  Cathy Rios MD    SUMMARY    LEFT VENTRICLE:  LV function is difficult to assess due to patients rhythm  Systolic function was by visual assessment  Ejection fraction was estimated in the range of 45 % to 50 %  RIGHT VENTRICLE:  The size was normal   Systolic function was normal     LEFT ATRIUM:  The atrium was mildly dilated  RIGHT ATRIUM:  The atrium was mildly dilated  MITRAL VALVE:  There was mild regurgitation  TRICUSPID VALVE:  There was trace regurgitation  PULMONIC VALVE:  There was trace regurgitation      IVC, HEPATIC VEINS:  The inferior vena cava was normal in size  PERICARDIUM:  A trace pericardial effusion was identified  LEFT VENTRICLE: LV function is difficult to assess due to patients rhythm  Size was normal  Systolic function was by visual assessment  Ejection fraction was estimated in the range of 45 % to 50 %  RIGHT VENTRICLE: The size was normal  Systolic function was normal  Wall thickness was normal     LEFT ATRIUM: The atrium was mildly dilated  RIGHT ATRIUM: The atrium was mildly dilated  MITRAL VALVE: Valve structure was normal  There was normal leaflet separation  DOPPLER: The transmitral velocity was within the normal range  There was no evidence for stenosis  There was mild regurgitation  AORTIC VALVE: The valve was trileaflet  Leaflets exhibited normal thickness and normal cuspal separation  DOPPLER: Transaortic velocity was within the normal range  There was no evidence for stenosis  There was no regurgitation  TRICUSPID VALVE: The valve structure was normal  There was normal leaflet separation  DOPPLER: The transtricuspid velocity was within the normal range  There was no evidence for stenosis  There was trace regurgitation  PULMONIC VALVE: Leaflets exhibited normal thickness, no calcification, and normal cuspal separation  DOPPLER: The transpulmonic velocity was within the normal range  There was trace regurgitation  PERICARDIUM: A trace pericardial effusion was identified  AORTA: The root exhibited normal size  SYSTEMIC VEINS: IVC: The inferior vena cava was normal in size      SYSTEM MEASUREMENT TABLES    2D  %FS: 28 1 %  AV Diam: 3 45 cm  EDV(Teich): 189 24 ml  EF(Teich): 53 42 %  ESV(Teich): 88 15 ml  IVSd: 0 78 cm  LA Diam: 4 42 cm  LAAs A4C: 25 97 cm2  LAESV A-L A4C: 90 65 ml  LAESV MOD A4C: 86 78 ml  LALs A4C: 6 32 cm  LVEDV MOD A4C: 100 18 ml  LVEF MOD A4C: 37 52 %  LVESV MOD A4C: 62 6 ml  LVIDd: 6 13 cm  LVIDs: 4 41 cm  LVLd A4C: 8 3 cm  LVLs A4C: 8 02 cm  LVPWd: 1 cm  RAAs A4C: 27 21 cm2  RAESV A-L: 86 86 ml  RAESV MOD: 83 1 ml  RALs: 7 23 cm  RVIDd: 3 5 cm  RWT: 0 33  SV MOD A4C: 37 59 ml  SV(Teich): 101 09 ml    MM  TAPSE: 1 51 cm    PW  E' Lat: 0 14 m/s  E' Sept: 0 09 m/s    [facility] Accredited Echocardiography Laboratory    Prepared and electronically signed by    Guanako Nick MD  Signed [id number] 16:03:43    No results found for this or any previous visit  No results found for this or any previous visit  No results found for this or any previous visit            I reviewed and interpreted the following LABS/EKG/TELE/IMAGING and below is summary of my interpretation (if data available):    LABS:bmp normal    Current EKG and Rhythm Strip: Rate controlled afib, rbbb, no PVC's today    Past EKGs and RHYTHM strip: 8/15 EKG shows afib w RVR and frequent PVC  Jan 2021 EKG afib w RVR  2019 EKG NSR and RBBB

## 2021-09-23 ENCOUNTER — APPOINTMENT (OUTPATIENT)
Dept: LAB | Facility: HOSPITAL | Age: 81
End: 2021-09-23
Payer: MEDICARE

## 2021-09-23 DIAGNOSIS — I50.42 CHRONIC COMBINED SYSTOLIC AND DIASTOLIC CHF (CONGESTIVE HEART FAILURE) (HCC): ICD-10-CM

## 2021-09-23 LAB
ANION GAP SERPL CALCULATED.3IONS-SCNC: 4 MMOL/L (ref 4–13)
BUN SERPL-MCNC: 16 MG/DL (ref 5–25)
CALCIUM SERPL-MCNC: 9.3 MG/DL (ref 8.3–10.1)
CHLORIDE SERPL-SCNC: 102 MMOL/L (ref 100–108)
CO2 SERPL-SCNC: 35 MMOL/L (ref 21–32)
CREAT SERPL-MCNC: 0.96 MG/DL (ref 0.6–1.3)
GFR SERPL CREATININE-BSD FRML MDRD: 74 ML/MIN/1.73SQ M
GLUCOSE P FAST SERPL-MCNC: 104 MG/DL (ref 65–99)
POTASSIUM SERPL-SCNC: 4.3 MMOL/L (ref 3.5–5.3)
SODIUM SERPL-SCNC: 141 MMOL/L (ref 136–145)

## 2021-09-23 PROCEDURE — 36415 COLL VENOUS BLD VENIPUNCTURE: CPT

## 2021-09-23 PROCEDURE — 80048 BASIC METABOLIC PNL TOTAL CA: CPT

## 2021-09-29 ENCOUNTER — TELEPHONE (OUTPATIENT)
Dept: CARDIOLOGY CLINIC | Facility: CLINIC | Age: 81
End: 2021-09-29

## 2021-09-29 NOTE — TELEPHONE ENCOUNTER
Pt aware and stated that he did have some SOB on sat while out walking in the woods  He walked apx 1/2 of a mile   This subsided when he rested   This was the only time he has experienced SOB

## 2021-09-29 NOTE — TELEPHONE ENCOUNTER
Noted  Please have him watch his breathing and weights   If weight increasing, increased swelling in abdomen or legs, or more shortness of breath we should increase the Furosemide dose

## 2021-09-29 NOTE — TELEPHONE ENCOUNTER
----- Message from Carly Asher sent at 9/28/2021  5:37 PM EDT -----  Please notify pt and spouse that kidney function and electrolytes are stable  Please let us know if swelling/shortness of breath are increasing   I saw Dr Killian Most advised an extra 60mg every 3rd day

## 2021-10-04 ENCOUNTER — TELEPHONE (OUTPATIENT)
Dept: CARDIOLOGY CLINIC | Facility: CLINIC | Age: 81
End: 2021-10-04

## 2021-10-04 DIAGNOSIS — J96.22 ACUTE ON CHRONIC RESPIRATORY FAILURE WITH HYPERCAPNIA (HCC): ICD-10-CM

## 2021-10-04 RX ORDER — FUROSEMIDE 20 MG/1
TABLET ORAL
Qty: 540 TABLET | Refills: 3 | Status: SHIPPED | OUTPATIENT
Start: 2021-10-04 | End: 2021-12-01 | Stop reason: SDUPTHER

## 2021-11-01 RX ORDER — NITROGLYCERIN 0.4 MG/1
TABLET SUBLINGUAL
COMMUNITY
Start: 2021-09-30

## 2021-11-02 ENCOUNTER — DOCUMENTATION (OUTPATIENT)
Dept: PULMONOLOGY | Facility: CLINIC | Age: 81
End: 2021-11-02

## 2021-11-02 ENCOUNTER — OFFICE VISIT (OUTPATIENT)
Dept: PULMONOLOGY | Facility: CLINIC | Age: 81
End: 2021-11-02
Payer: MEDICARE

## 2021-11-02 VITALS
OXYGEN SATURATION: 91 % | DIASTOLIC BLOOD PRESSURE: 70 MMHG | BODY MASS INDEX: 26.22 KG/M2 | WEIGHT: 177 LBS | SYSTOLIC BLOOD PRESSURE: 138 MMHG | HEART RATE: 78 BPM | HEIGHT: 69 IN

## 2021-11-02 DIAGNOSIS — J96.12 CHRONIC RESPIRATORY FAILURE WITH HYPOXIA AND HYPERCAPNIA (HCC): Primary | ICD-10-CM

## 2021-11-02 DIAGNOSIS — J44.1 CHRONIC OBSTRUCTIVE PULMONARY DISEASE WITH ACUTE EXACERBATION (HCC): ICD-10-CM

## 2021-11-02 DIAGNOSIS — R91.1 PULMONARY NODULE: ICD-10-CM

## 2021-11-02 DIAGNOSIS — J44.9 CHRONIC OBSTRUCTIVE PULMONARY DISEASE, UNSPECIFIED COPD TYPE (HCC): Primary | ICD-10-CM

## 2021-11-02 DIAGNOSIS — J96.11 CHRONIC RESPIRATORY FAILURE WITH HYPOXIA AND HYPERCAPNIA (HCC): Primary | ICD-10-CM

## 2021-11-02 PROCEDURE — 99215 OFFICE O/P EST HI 40 MIN: CPT | Performed by: INTERNAL MEDICINE

## 2021-11-02 PROCEDURE — 94618 PULMONARY STRESS TESTING: CPT | Performed by: INTERNAL MEDICINE

## 2021-11-02 RX ORDER — IPRATROPIUM BROMIDE AND ALBUTEROL SULFATE 2.5; .5 MG/3ML; MG/3ML
SOLUTION RESPIRATORY (INHALATION)
COMMUNITY
Start: 2021-09-29 | End: 2022-02-03

## 2021-11-04 ENCOUNTER — TELEPHONE (OUTPATIENT)
Dept: CARDIOLOGY CLINIC | Facility: CLINIC | Age: 81
End: 2021-11-04

## 2021-11-10 ENCOUNTER — HOSPITAL ENCOUNTER (OUTPATIENT)
Dept: PULMONOLOGY | Facility: HOSPITAL | Age: 81
Discharge: HOME | End: 2021-11-10
Attending: INTERNAL MEDICINE
Payer: MEDICARE

## 2021-11-10 DIAGNOSIS — J44.1 CHRONIC OBSTRUCTIVE PULMONARY DISEASE WITH ACUTE EXACERBATION (HCC): ICD-10-CM

## 2021-11-10 PROCEDURE — 94729 DIFFUSING CAPACITY: CPT

## 2021-11-10 PROCEDURE — 94060 EVALUATION OF WHEEZING: CPT | Performed by: INTERNAL MEDICINE

## 2021-11-10 PROCEDURE — 94729 DIFFUSING CAPACITY: CPT | Performed by: INTERNAL MEDICINE

## 2021-11-10 PROCEDURE — 94726 PLETHYSMOGRAPHY LUNG VOLUMES: CPT | Performed by: INTERNAL MEDICINE

## 2021-11-10 PROCEDURE — 94726 PLETHYSMOGRAPHY LUNG VOLUMES: CPT

## 2021-11-10 PROCEDURE — 94060 EVALUATION OF WHEEZING: CPT

## 2021-11-10 PROCEDURE — 94760 N-INVAS EAR/PLS OXIMETRY 1: CPT

## 2021-11-10 RX ORDER — ALBUTEROL SULFATE 2.5 MG/3ML
2.5 SOLUTION RESPIRATORY (INHALATION) ONCE
Status: COMPLETED | OUTPATIENT
Start: 2021-11-10 | End: 2021-11-10

## 2021-11-10 RX ADMIN — ALBUTEROL SULFATE 2.5 MG: 2.5 SOLUTION RESPIRATORY (INHALATION) at 10:46

## 2021-12-01 ENCOUNTER — OFFICE VISIT (OUTPATIENT)
Dept: CARDIOLOGY CLINIC | Facility: CLINIC | Age: 81
End: 2021-12-01
Payer: MEDICARE

## 2021-12-01 VITALS
DIASTOLIC BLOOD PRESSURE: 60 MMHG | WEIGHT: 180 LBS | HEART RATE: 61 BPM | BODY MASS INDEX: 26.66 KG/M2 | HEIGHT: 69 IN | SYSTOLIC BLOOD PRESSURE: 112 MMHG | OXYGEN SATURATION: 94 %

## 2021-12-01 DIAGNOSIS — R60.0 LOCALIZED EDEMA: ICD-10-CM

## 2021-12-01 DIAGNOSIS — I50.42 CHRONIC COMBINED SYSTOLIC AND DIASTOLIC CHF (CONGESTIVE HEART FAILURE) (HCC): ICD-10-CM

## 2021-12-01 DIAGNOSIS — J44.1 CHRONIC OBSTRUCTIVE PULMONARY DISEASE WITH ACUTE EXACERBATION (HCC): ICD-10-CM

## 2021-12-01 DIAGNOSIS — I48.91 ATRIAL FIBRILLATION, UNSPECIFIED TYPE (HCC): Primary | ICD-10-CM

## 2021-12-01 DIAGNOSIS — Z78.9 MODERATE ALCOHOL CONSUMPTION: ICD-10-CM

## 2021-12-01 DIAGNOSIS — I45.10 RBBB: ICD-10-CM

## 2021-12-01 DIAGNOSIS — J96.11 CHRONIC RESPIRATORY FAILURE WITH HYPOXIA AND HYPERCAPNIA (HCC): ICD-10-CM

## 2021-12-01 DIAGNOSIS — I10 ESSENTIAL HYPERTENSION: ICD-10-CM

## 2021-12-01 DIAGNOSIS — J96.12 CHRONIC RESPIRATORY FAILURE WITH HYPOXIA AND HYPERCAPNIA (HCC): ICD-10-CM

## 2021-12-01 DIAGNOSIS — I49.3 FREQUENT PVCS: ICD-10-CM

## 2021-12-01 PROCEDURE — 99214 OFFICE O/P EST MOD 30 MIN: CPT | Performed by: NURSE PRACTITIONER

## 2021-12-01 RX ORDER — FUROSEMIDE 40 MG/1
40 TABLET ORAL DAILY
Qty: 120 TABLET | Refills: 3 | Status: SHIPPED | OUTPATIENT
Start: 2021-12-01 | End: 2022-06-30 | Stop reason: SDUPTHER

## 2021-12-01 RX ORDER — FUROSEMIDE 20 MG/1
TABLET ORAL
Qty: 540 TABLET | Refills: 3 | Status: SHIPPED | OUTPATIENT
Start: 2021-12-01 | End: 2022-06-30 | Stop reason: DRUGHIGH

## 2022-01-04 ENCOUNTER — TELEPHONE (OUTPATIENT)
Dept: CARDIOLOGY CLINIC | Facility: CLINIC | Age: 82
End: 2022-01-04

## 2022-01-04 DIAGNOSIS — N52.2 DRUG-INDUCED ERECTILE DYSFUNCTION: Primary | ICD-10-CM

## 2022-01-04 NOTE — TELEPHONE ENCOUNTER
Pt called and said he just  [facility]  And his [facility] dr will not renew his Viagra due to his heart issues  Now pt is calling to see if either Jaxon Kenney or [employer] can write a script out for him for Viagra   Call pt @ [phone]

## 2022-01-07 ENCOUNTER — TELEPHONE (OUTPATIENT)
Dept: CARDIOLOGY CLINIC | Facility: CLINIC | Age: 82
End: 2022-01-07

## 2022-01-07 NOTE — TELEPHONE ENCOUNTER
Pt called again and is wondering if his script for viagra was called into the [place]    Please call pt back at [phone]

## 2022-01-10 RX ORDER — SILDENAFIL 100 MG/1
100 TABLET, FILM COATED ORAL DAILY PRN
Qty: 4 TABLET | Refills: 11 | Status: SHIPPED | OUTPATIENT
Start: 2022-01-10

## 2022-01-10 NOTE — TELEPHONE ENCOUNTER
I discussed with Dr Lashonda Durant  She gave approval for his Viagra as long as he is aware that he cannot use within 24 hours of using nitroglycerin  I spoke with Aspen Bueno and he states he is aware that he cannot use Viagra and nitroglycerin within 24 hours of each other  I have printed script  Can you please fax to [address] [facility] in [address] [facility]?   Their phone number [phone]

## 2022-02-03 ENCOUNTER — OFFICE VISIT (OUTPATIENT)
Dept: PULMONOLOGY | Facility: CLINIC | Age: 82
End: 2022-02-03
Payer: MEDICARE

## 2022-02-03 VITALS
DIASTOLIC BLOOD PRESSURE: 56 MMHG | HEIGHT: 69 IN | WEIGHT: 183 LBS | BODY MASS INDEX: 27.11 KG/M2 | SYSTOLIC BLOOD PRESSURE: 114 MMHG | RESPIRATION RATE: 20 BRPM | TEMPERATURE: 96.9 F | HEART RATE: 67 BPM

## 2022-02-03 DIAGNOSIS — R09.02 HYPOXIA: Primary | ICD-10-CM

## 2022-02-03 DIAGNOSIS — J44.1 CHRONIC OBSTRUCTIVE PULMONARY DISEASE WITH ACUTE EXACERBATION (HCC): ICD-10-CM

## 2022-02-03 PROCEDURE — 94618 PULMONARY STRESS TESTING: CPT | Performed by: PHYSICIAN ASSISTANT

## 2022-02-03 PROCEDURE — 99213 OFFICE O/P EST LOW 20 MIN: CPT | Performed by: PHYSICIAN ASSISTANT

## 2022-02-03 RX ORDER — MAGNESIUM OXIDE 400 MG/1
1 TABLET ORAL DAILY
COMMUNITY
Start: 2021-12-30 | End: 2022-12-31

## 2022-02-03 RX ORDER — CAPSAICIN 0.025 %
CREAM (GRAM) TOPICAL
COMMUNITY
Start: 2021-11-22

## 2022-02-03 RX ORDER — CHOLECALCIFEROL (VITAMIN D3) 125 MCG
CAPSULE ORAL
COMMUNITY
Start: 2021-12-30

## 2022-02-03 NOTE — PROGRESS NOTES
Pulmonary Follow Up Note   Geovanna Olson 81 y o  male MRN: [id number]  2/3/2022      Assessment:    Chronic respiratory failure with hypoxia and hypercapnia (HCC)  1  6 minute walk test performed today  Patient needs oxygen at 2 liters/minute with ambulation  2  Patient also uses BiPAP for hours of sleep  He titrates 2 liters/minute nasal cannula in line  I have ordered an overnight pulse ox study to see if he needs the oxygen with the BiPAP  He is to have the overnight pulse ox performed on BiPAP without oxygen in line  3  Will call him with these results  He is interested in this because he is traveling to [place] and [facility] this year and he is unsure how to get oxygen titrated in line to his BiPAP if he is traveling and not with his concentrator  4  He does have portable oxygen at home which I continued to encourage him to use with ambulation and exercise as he does have a positive 6 minute walk requiring 2 liters/minute nasal cannula to maintain SpO2  Chronic obstructive pulmonary disease with acute exacerbation ([address] )  5  Continue Advair and Spiriva  6  I did review PFTs with him and his wife today  7  Follow-up in 6 months      Plan:    Problem List Items Addressed This Visit        Respiratory    Chronic obstructive pulmonary disease with acute exacerbation ([address] )     8  Continue Advair and Spiriva  9  I did review PFTs with him and his wife today  10  Follow-up in 6 months         Relevant Orders    Pulse oximetry overnight      Other Visit Diagnoses     Hypoxia    -  Primary    Relevant Orders    POCT 6 minute walk    Pulse oximetry overnight          No follow-ups on file  History of Present Illness   HPI:  Geovanna Olson is a 81 y o  male who presents to the office for a follow-up of his COPD  Patient reports that his breathing is doing quite well  He stopped taking his nebulizers and is currently taking Spiriva and Advair  He is not using his nebulizer    He reports that his breathing has been stable  Does have a little congested cough today  We discussed pulmonary toileting techniques  We also reviewed his PFTs which does reveal severe COPD  Patient is interested in going to [place] to visit his daughter as well as to [facility] for vacation  He was interested on if he could come off oxygen for which we did a 6 minute walk test today which did reveal that he needs 2 L of oxygen with ambulation  Patient also wears BiPAP at night for hours asleep with 2 liters/minute titrated in line  He will perform an overnight pulse oximeter on BiPAP to see if he needs oxygen in line with BiPAP  He is unsure if he can travel with his concentrator his BiPAP machine and his portable oxygen       Otherwise he is doing well  I answered all his questions  He verbalized understanding with the plan  Review of Systems   Constitutional: Negative for activity change, appetite change, chills, diaphoresis, fatigue, fever and unexpected weight change  HENT: Negative for congestion, dental problem, ear pain, hearing loss, mouth sores, postnasal drip, rhinorrhea and sinus pain  Respiratory: Negative for apnea, cough, choking, chest tightness, shortness of breath, wheezing and stridor  Cardiovascular: Negative for chest pain and palpitations  Gastrointestinal: Negative for abdominal distention, constipation, diarrhea, nausea and vomiting  Musculoskeletal: Negative for arthralgias  Skin: Negative for rash  Allergic/Immunologic: Negative for immunocompromised state  Neurological: Negative for dizziness  Hematological: Negative for adenopathy  Psychiatric/Behavioral: Negative for agitation  All other systems reviewed and are negative        Historical Information   Past Medical History:   Diagnosis Date    BPH (benign prostatic hyperplasia)     Chronic obstructive pulmonary disease ([facility]D) ([facility] )     Essential hypertension     Hard of hearing     Pt does wear hearing aids    Hypertension     Shortness of breath     Pt states not changes and it occurs with moderate exertion    Sleep disturbance     Pt states he is only sleeping about 3 hours a night  Pt is seeing his PCP on 8/5/21 to discuss    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    Tinnitus      Past Surgical History:   Procedure Laterality Date    SHOULDER OPEN ROTATOR CUFF REPAIR      SKIN GRAFT      spindle cell removal of scalp with skin graft from shoulder    VEIN SURGERY       History reviewed  No pertinent family history        Meds/Allergies     Current Outpatient Medications:     apixaban (ELIQUIS) 5 mg, Take 1 tablet (5 mg total) by mouth 2 (two) times a day, Disp: 180 tablet, Rfl: 3    Ascorbic Acid (VITAMIN C PO), Take 1 tablet by mouth daily , Disp: , Rfl:     bisacodyl (bisacodyl) 5 mg EC tablet, Take 5 mg by mouth daily  , Disp: , Rfl:     capsaicin (ZOSTRIX) 0 025 % cream, , Disp: , Rfl:     cetirizine (ZyrTEC) 10 mg tablet, Take 10 mg by mouth daily, Disp: , Rfl:     Cholecalciferol (Vitamin D3) 50 MCG (2000 UT) TABS, , Disp: , Rfl:     diltiazem (CARDIZEM CD) 300 mg 24 hr capsule, Take 1 capsule (300 mg total) by mouth daily, Disp: 90 capsule, Rfl: 3    docusate sodium (COLACE) 100 mg capsule, Take 100 mg by mouth , Disp: , Rfl:     finasteride (PROSCAR) 5 mg tablet, Take 5 mg by mouth daily , Disp: , Rfl:     fluticasone-salmeterol (ADVAIR, WIXELA) 250-50 mcg/dose inhaler, Inhale 1 puff 2 (two) times a day Rinse mouth after use , Disp: , Rfl:     furosemide (LASIX) 20 mg tablet, 60mg daily, except 120mg every 3rd day, Disp: 540 tablet, Rfl: 3    furosemide (LASIX) 40 mg tablet, Take 1 tablet (40 mg total) by mouth daily Take with 20mg for total of 60mg daily, extra 60mg dose every 3rd day, Disp: 120 tablet, Rfl: 3    Magnesium 500 MG CAPS, Take 500 mg by mouth daily, Disp: , Rfl:     magnesium oxide (MAG-OX) 400 mg tablet, Take 1 tablet by mouth daily, Disp: , Rfl:     montelukast (SINGULAIR) 10 mg tablet, Take 10 mg by mouth daily at bedtime, Disp: , Rfl:     Multiple Vitamin (MULTIVITAMINS PO), Take 1 tablet by mouth daily, Disp: , Rfl:     mupirocin (BACTROBAN) 2 % ointment, , Disp: , Rfl:     nitroglycerin (NITROSTAT) 0 4 mg SL tablet, , Disp: , Rfl:     sildenafil (VIAGRA) 100 mg tablet, Take 1 tablet (100 mg total) by mouth daily as needed for erectile dysfunction, Disp: 4 tablet, Rfl: 11    Spiriva Respimat 2 5 MCG/ACT AERS inhaler, Inhale 2 puffs daily , Disp: , Rfl:   Allergies   Allergen Reactions    Penicillin G Other (See Comments)     PCN Shots Only!!       Vitals: Blood pressure 114/56, pulse 67, temperature (!) 96 9 °F (36 1 °C), resp  rate 20, height 5' 9" (1 753 m), weight 83 kg (183 lb)  Body mass index is 27 02 kg/m²  Physical Exam  Physical Exam  Vitals and nursing note reviewed  Constitutional:       Appearance: Normal appearance  He is not toxic-appearing or diaphoretic  HENT:      Head: Normocephalic and atraumatic  Right Ear: External ear normal  There is no impacted cerumen  Left Ear: External ear normal  There is no impacted cerumen  Nose: Nose normal  No congestion or rhinorrhea  Mouth/Throat:      Mouth: Mucous membranes are moist       Pharynx: No oropharyngeal exudate  Eyes:      General: No scleral icterus  Conjunctiva/sclera: Conjunctivae normal       Pupils: Pupils are equal, round, and reactive to light  Cardiovascular:      Rate and Rhythm: Normal rate and regular rhythm  Pulses: Normal pulses  Heart sounds: Normal heart sounds  No murmur heard  No friction rub  No gallop  Pulmonary:      Effort: Pulmonary effort is normal  No respiratory distress  Breath sounds: Normal breath sounds  No stridor  No wheezing, rhonchi or rales  Chest:      Chest wall: No tenderness  Abdominal:      General: Abdomen is flat  Bowel sounds are normal       Palpations: Abdomen is soft  Tenderness:  There is no abdominal tenderness  There is no guarding or rebound  Hernia: No hernia is present  Musculoskeletal:         General: Normal range of motion  Cervical back: Normal range of motion and neck supple  No rigidity  Skin:     General: Skin is warm and dry  Capillary Refill: Capillary refill takes less than 2 seconds  Neurological:      General: No focal deficit present  Mental Status: He is alert and oriented to person, place, and time  Psychiatric:         Mood and Affect: Mood normal          Labs: I have personally reviewed pertinent lab results  , ABG: No results found for: PHART, LBJ1WOG, PO2ART, DUJ1FPF, W1SXGOVL, BEART, SOURCE, BNP: No results found for: BNP, CBC: No results found for: WBC, HGB, HCT, MCV, PLT, ADJUSTEDWBC, MCH, MCHC, RDW, MPV, NRBC, CMP: No results found for: SODIUM, K, CL, CO2, ANIONGAP, BUN, CREATININE, GLUCOSE, CALCIUM, AST, ALT, ALKPHOS, PROT, BILITOT, EGFR, PT/INR: No results found for: PT, INR, Troponin: No results found for: TROPONINI  Lab Results   Component Value Date    WBC 9 03 08/18/2021    HGB 13 5 08/18/2021    HCT 41 9 08/18/2021     (H) 08/18/2021     (L) 08/18/2021     Lab Results   Component Value Date    CALCIUM 9 3 09/23/2021    K 4 3 09/23/2021    CO2 35 (H) 09/23/2021     09/23/2021    BUN 16 09/23/2021    CREATININE 0 96 09/23/2021     No results found for: IGE  Lab Results   Component Value Date    ALT 40 08/15/2021    AST 20 08/15/2021    ALKPHOS 63 08/15/2021       Imaging and other studies: I have personally reviewed pertinent reports  FINDINGS:     Cardiomediastinal silhouette appears unremarkable      The lungs are clear  No pneumothorax or pleural effusion      Osseous structures appear within normal limits for patient age      IMPRESSION:     No acute cardiopulmonary disease        Pulmonary function testing:   Procedure: The patient was taken to pulmonary function testing laboratory    The patient demonstrated good effort and cooperation  The results of this test appear to be valid, although the ATS standard for three acceptable manuevers was not met     DLCO was corrected for patient's Hb     Results:  FVC: 2 30 L       61 % predicted  FEV1: 0 94 L     34 % predicted  FEV1/FVC Ratio: 41 %     After administration of bronchodilator   FVC: 2 40 L, 64 % predicted, +4 % change  FEV1: 0 92 L, 33 % predicted, -2 % change     Lung volumes by body plethysmography:   Total Lung Capacity 99 % predicted   Residual volume 161 % predicted     DLCO corrected for patients hemoglobin level: 30 %     Interpretation:     · Severe obstructive airflow defect on spirometry     · No significant improvement in airflow or forced vital capacity in response to the administration to bronchodilator per ATS standards       · Air trapping as indicated by the lung volumes     · Severe decrease in diffusion capacity     · Flow-volume loop consistent with obstruction     · Increased airway resistance as indicated by the specific airway conductance           Winnie Sams MD  Pulmonary & Critical Care Fellow  [facility]        EKG, Pathology, and Other Studies: I have personally reviewed pertinent reports          Catalino Gibbs PA-C

## 2022-02-03 NOTE — ASSESSMENT & PLAN NOTE
1  Continue Advair and Spiriva  2  I did review PFTs with him and his wife today  3   Follow-up in 6 months

## 2022-02-03 NOTE — ASSESSMENT & PLAN NOTE
1  6 minute walk test performed today  Patient needs oxygen at 2 liters/minute with ambulation  2  Patient also uses BiPAP for hours of sleep  He titrates 2 liters/minute nasal cannula in line  I have ordered an overnight pulse ox study to see if he needs the oxygen with the BiPAP  He is to have the overnight pulse ox performed on BiPAP without oxygen in line  3  Will call him with these results  He is interested in this because he is traveling to [place] and [facility] this year and he is unsure how to get oxygen titrated in line to his BiPAP if he is traveling and not with his concentrator  4  He does have portable oxygen at home which I continued to encourage him to use with ambulation and exercise as he does have a positive 6 minute walk requiring 2 liters/minute nasal cannula to maintain SpO2

## 2022-02-23 ENCOUNTER — TELEPHONE (OUTPATIENT)
Dept: CARDIOLOGY CLINIC | Facility: CLINIC | Age: 82
End: 2022-02-23

## 2022-02-23 NOTE — TELEPHONE ENCOUNTER
Called back, they will fax new form completed with pulmonary info/prescriber instead of cardio info

## 2022-05-27 ENCOUNTER — OFFICE VISIT (OUTPATIENT)
Dept: PULMONOLOGY | Facility: CLINIC | Age: 82
End: 2022-05-27
Payer: MEDICARE

## 2022-05-27 VITALS
DIASTOLIC BLOOD PRESSURE: 64 MMHG | HEIGHT: 69 IN | OXYGEN SATURATION: 93 % | HEART RATE: 54 BPM | SYSTOLIC BLOOD PRESSURE: 112 MMHG | BODY MASS INDEX: 26.81 KG/M2 | WEIGHT: 181 LBS

## 2022-05-27 DIAGNOSIS — J96.11 CHRONIC RESPIRATORY FAILURE WITH HYPOXIA AND HYPERCAPNIA (HCC): Primary | ICD-10-CM

## 2022-05-27 DIAGNOSIS — G47.33 OSA (OBSTRUCTIVE SLEEP APNEA): ICD-10-CM

## 2022-05-27 DIAGNOSIS — R91.1 PULMONARY NODULE: ICD-10-CM

## 2022-05-27 DIAGNOSIS — J96.12 CHRONIC RESPIRATORY FAILURE WITH HYPOXIA AND HYPERCAPNIA (HCC): Primary | ICD-10-CM

## 2022-05-27 PROCEDURE — 99215 OFFICE O/P EST HI 40 MIN: CPT | Performed by: INTERNAL MEDICINE

## 2022-05-27 NOTE — PROGRESS NOTES
Pulmonary Follow Up Note   Rupa Mathew 81 y o  male MRN: [id number]  5/27/2022    Assessment:  COPD/emphysema   · Gold stage 3D, severe emphysematous changes last FEV1 of 34%  · No history of frequent exacerbation   · Severe centrilobular emphysematous changes/40 pack year tobacco abuse history quit in 1998    Plan:   · Continue triple inhalers with Spiriva Respimat/Advair 250   · Reinforced the proper inhaler use technique   · Up-to-date on immunization  · Not interested in pulmonary rehab at this point    Chronic hypoxic/hypercapnic respiratory failure/MILADIS   · No hypoxemia at rest   · Multifactorial:  Systolic/diastolic heart failure, advanced COPD, pulmonary hypertension  · Continues to have nocturnal hypoxemia despite the BiPAP therapy, probably needs pressure adjustments  · Appliance report today showing usage between 4/28 and 05/27/2022:  27% usage >= 4 hours, pressures 20/18, residual AHI of 11   · No formal sleep study however he was qualified on last discharge from the hospital    Plan:   · Ordered a split/titration study in lab  · Counseled extensively about importance of continuing using the BiPAP    Pulmonary nodules   · 5 mm at MIKKI, on CT chest in 08/2021   · PET[facility] done at [facility] to follow-up on spindle cell malignancy of the scalp, noted minimal FDG uptake at the lung lesions suggesting inflammatory etiology    Plan:  · Given the high risk 40 pack year tobacco abuse history, ordered follow-up CT chest      Return in about 3 months (around 8/27/2022)  History of Present Illness     Follow up for:  COPD/pulmonary nodules/chronic hypoxic respiratory failure     Background:  81 y o  male with a h/o COPD, HTN, AFib, systolic/diastolic heart failure, moderate alcohol use, former tobacco abuse about 40 pack year quit in [facility] in [address]      Hospitalized in 08/2021 for acute on chronic hypoxic/hypercapnic respiratory failure, treated for CHF and COPD exacerbation    Noted hypercapnia on ABG before discharge, started on BiPAP since then  In September, he was 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, underwent excision by surgery  A PET-CT showed postsurgical changes at the scalp without signs of metastases  Reported RUL mild avidity suspecting inflammatory etiology as well as around the mediastinal lymph node  Was previously found to have 5 mm MIKKI nodule, recommended follow-up in 12 months      At baseline, has a chronic dyspnea on exertion, feels better with using supplemental oxygen  inhaler therapy with Advair 250, Spiriva 2 5 Respimat, he also uses nebulized albuterol q i d  and budesonide b i d  on regularly basis  Uses BiPAP every night, better tolerating the mask now  Only chronic symptoms of a m  chest tightness, minimally productive cough that improve spontaneously before using his inhalers  Denies any hemoptysis, weight loss, anorexia  11/02/2021 visit-adjusted inhaler therapy, continued Spiriva/Advair discontinued budesonide nebs  PFT ordered  6 minute walk test required 2 LPM with exertion  Interval History  Since last seen, traveled to [place]  States that his both children passed away there, 1 had a stroke the other 1 was critically ill for some time  No major respiratory issues, ED visits, hospitalization, or treatment with oral steroids  Still using Advair/Spiriva, no frequent use of p r n  albuterol  Active and independent, reported dyspnea on exertion such as going uphill/up stairs  Using BiPAP every night, however stopped using it for 10 days before and noted no difference  Recent overnight pulse oximeter done on BiPAP then without the BiPAP both showed significant nocturnal hypoxemia  Review of Systems  As per hpi, all other systems reviewed and were negative    Studies:  Imaging and other studies: I have personally reviewed pertinent films in PACS    CT PE 08/14/2021-moderate centrilobular/paraseptal emphysematous changes  5 millimeter left apical pulmonary nodule  Minimal bibasilar atelectasis  Enlarged pulmonary artery at 4 3  Enlarged ascending aorta at 4 centimeter  Calcified hilar/mediastinal lymphadenopathy     PET-CT at [facility] 10/15/2021-diffuse areas of multifocal pulmonary uptake suspect related to areas of parenchymal scarring  Focal area uptake at RUL suspect inflammatory etiology  Mediastinal lymph nodes measuring 1 5 centimeter  Mild accentuated uptake at the left sided prevascular lymph node lateral to the aortic arch      Pulmonary function testin minute walk test 2021-baseline SpO2 91% on room air, heart rate 50  SpO2 dropped to 85% after 1 minute of exertion, required 2 LPM for SpO2 at 92%, able to walk 200 m in 6 minutes with 2 LPM, maximal heart rate at 60     PFT 11/10/2021-ratio 41 percent, FEV1 0 94 liters/34 percent, FVC 2 3 liters/61 percent, TLC 99 percent,  percent, DLCO 30 percent  TTE 2021-EF 45 percent  Difficult access in the setting of frequent ectopies  RV was upper limit normal, normal systolic function  Mild to moderately dilated LA  Mildly dilated RA  Estimated PA SP 40 millimeter [place]     Overnight pulse oximetry 2022-total study time 5 hours 31 minutes  Time spent below 89 percent SpO2 4 hours and 42 minutes this was associated with increased heart rate    EKG, Pathology, and Other Studies: I have personally reviewed pertinent reports  Past medical, surgical, social and family histories reviewed  Medications/Allergies: Reviewed      Vitals: Blood pressure 112/64, pulse (!) 54, height 5' 9" (1 753 m), weight 82 1 kg (181 lb), SpO2 93 %  Body mass index is 26 73 kg/m²  Oxygen Therapy  SpO2: 93 %      Physical Exam  Body mass index is 26 73 kg/m²     Gen: not in acute distress, pleasant  Neck/Eyes: supple, no adenopathy, PERRL  Ear: normal appearance, no significant hearing impairment  Nose:  normal nasal mucosa, no drainage  Mouth:  unremarkable/normal appearance of lips, teeth and gums  Oropharynx: mucosa is moist, no focal lesions or erythema  Salivary glands: soft nontender  Chest: normal respiratory efforts, mild bilateral end expiratory wheezes  CV: RRR, distant heart sounds 1+ pitting above ankle edema  Abdomen: soft, non tender  Extremities:  No observed deformity   Skin: unremarkable  Neuro: AAO X3, no focal motor deficit        Labs:  Lab Results   Component Value Date    WBC 9 03 08/18/2021    HGB 13 5 08/18/2021    HCT 41 9 08/18/2021     (H) 08/18/2021     (L) 08/18/2021     Lab Results   Component Value Date    CALCIUM 9 3 09/23/2021    K 4 3 09/23/2021    CO2 35 (H) 09/23/2021     09/23/2021    BUN 16 09/23/2021    CREATININE 0 96 09/23/2021     No results found for: IGE  Lab Results   Component Value Date    ALT 40 08/15/2021    AST 20 08/15/2021    ALKPHOS 63 08/15/2021           Portions of the record may have been created with voice recognition software  Occasional wrong word or "sound a like" substitutions may have occurred due to the inherent limitations of voice recognition software  Read the chart carefully and recognize, using context, where substitutions have occurred    Charle Sandhoff, M D [facility]

## 2022-06-02 ENCOUNTER — HOSPITAL ENCOUNTER (OUTPATIENT)
Dept: CT IMAGING | Facility: HOSPITAL | Age: 82
Discharge: HOME | End: 2022-06-02
Attending: INTERNAL MEDICINE
Payer: MEDICARE

## 2022-06-02 DIAGNOSIS — R91.1 PULMONARY NODULE: ICD-10-CM

## 2022-06-02 PROCEDURE — 71250 CT THORAX DX C-: CPT

## 2022-06-02 PROCEDURE — G1004 CDSM NDSC: HCPCS

## 2022-06-03 ENCOUNTER — TELEPHONE (OUTPATIENT)
Dept: SLEEP CENTER | Facility: CLINIC | Age: 82
End: 2022-06-03

## 2022-06-03 NOTE — TELEPHONE ENCOUNTER
----- Message from Anai Dow MD sent at 6/2/2022 10:13 PM EDT -----  Approved but as it seems the patient is on bipap at home, sleep lab should be notified to use BiPAP on titration, not CPAP   ----- Message -----  From: Brittney Kaur  Sent: 9/02/7930   9:41 AM EDT  To: Sleep Medicine [facility] Provider    This SPLIT sleep study needs approval      If approved please sign and return to clerical pool  If denied please include reasons why  Also provide alternative testing if warranted  Please sign and return to clerical pool

## 2022-06-07 ENCOUNTER — TELEPHONE (OUTPATIENT)
Dept: PULMONOLOGY | Facility: CLINIC | Age: 82
End: 2022-06-07

## 2022-06-07 DIAGNOSIS — D38.1 NEOPLASM OF UNCERTAIN BEHAVIOR OF LUNG: Primary | ICD-10-CM

## 2022-06-07 NOTE — TELEPHONE ENCOUNTER
Patients wife LVM saying they are returning our call regarding Liu's test results   Please call back at [phone]

## 2022-06-07 NOTE — TELEPHONE ENCOUNTER
Called patient but no answer  Left message instructing him to call the office back so I can reviewed CT chest results with him  Please let me know if he calls back

## 2022-06-07 NOTE — TELEPHONE ENCOUNTER
Called and reviewed results of CT chest with patient today over the phone  Multiple new left-sided pulmonary nodules compared to prior studies  In a former smoker considered high risk would recommend follow-up with PET-CT  Explain the the patient he was in agreeance  Frederick Halsted, can you help schedule PET-CT for him?

## 2022-06-08 ENCOUNTER — HOSPITAL ENCOUNTER (EMERGENCY)
Facility: HOSPITAL | Age: 82
Discharge: HOME | End: 2022-06-08
Attending: EMERGENCY MEDICINE | Admitting: EMERGENCY MEDICINE
Payer: MEDICARE

## 2022-06-08 ENCOUNTER — APPOINTMENT (EMERGENCY)
Dept: CT IMAGING | Facility: HOSPITAL | Age: 82
End: 2022-06-08
Payer: MEDICARE

## 2022-06-08 VITALS
SYSTOLIC BLOOD PRESSURE: 109 MMHG | TEMPERATURE: 97.8 F | HEART RATE: 93 BPM | DIASTOLIC BLOOD PRESSURE: 55 MMHG | RESPIRATION RATE: 20 BRPM | OXYGEN SATURATION: 92 %

## 2022-06-08 DIAGNOSIS — S01.312A LACERATION OF LEFT EAR, INITIAL ENCOUNTER: ICD-10-CM
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
## 2022-06-08 DIAGNOSIS — F10.929 ALCOHOL INTOXICATION (HCC): ICD-10-CM

## 2022-06-08 LAB
ANION GAP SERPL CALCULATED.3IONS-SCNC: 10 MMOL/L (ref 4–13)
APTT PPP: 34 SECONDS (ref 23–37)
BASOPHILS # BLD AUTO: 0.06 THOUSANDS/ΜL (ref 0–0.1)
BASOPHILS NFR BLD AUTO: 1 % (ref 0–1)
BUN SERPL-MCNC: 17 MG/DL (ref 5–25)
CALCIUM SERPL-MCNC: 7.5 MG/DL (ref 8.3–10.1)
CHLORIDE SERPL-SCNC: 103 MMOL/L (ref 100–108)
CO2 SERPL-SCNC: 26 MMOL/L (ref 21–32)
CREAT SERPL-MCNC: 0.88 MG/DL (ref 0.6–1.3)
EOSINOPHIL # BLD AUTO: 0.08 THOUSAND/ΜL (ref 0–0.61)
EOSINOPHIL NFR BLD AUTO: 1 % (ref 0–6)
ERYTHROCYTE [DISTWIDTH] IN BLOOD BY AUTOMATED COUNT: 13.7 % (ref 11.6–15.1)
GFR SERPL CREATININE-BSD FRML MDRD: 80 ML/MIN/1.73SQ M
GLUCOSE SERPL-MCNC: 73 MG/DL (ref 65–140)
HCT VFR BLD AUTO: 45.6 % (ref 36.5–49.3)
HGB BLD-MCNC: 14.4 G/DL (ref 12–17)
IMM GRANULOCYTES # BLD AUTO: 0.02 THOUSAND/UL (ref 0–0.2)
IMM GRANULOCYTES NFR BLD AUTO: 0 % (ref 0–2)
INR PPP: 1.13 (ref 0.84–1.19)
LYMPHOCYTES # BLD AUTO: 1.97 THOUSANDS/ΜL (ref 0.6–4.47)
LYMPHOCYTES NFR BLD AUTO: 26 % (ref 14–44)
MCH RBC QN AUTO: 33.3 PG (ref 26.8–34.3)
MCHC RBC AUTO-ENTMCNC: 31.6 G/DL (ref 31.4–37.4)
MCV RBC AUTO: 105 FL (ref 82–98)
MONOCYTES # BLD AUTO: 0.69 THOUSAND/ΜL (ref 0.17–1.22)
MONOCYTES NFR BLD AUTO: 9 % (ref 4–12)
NEUTROPHILS # BLD AUTO: 4.81 THOUSANDS/ΜL (ref 1.85–7.62)
NEUTS SEG NFR BLD AUTO: 63 % (ref 43–75)
NRBC BLD AUTO-RTO: 0 /100 WBCS
PLATELET # BLD AUTO: 220 THOUSANDS/UL (ref 149–390)
PMV BLD AUTO: 9.3 FL (ref 8.9–12.7)
POTASSIUM SERPL-SCNC: 4 MMOL/L (ref 3.5–5.3)
POTASSIUM SERPL-SCNC: 5.8 MMOL/L (ref 3.5–5.3)
PROTHROMBIN TIME: 14.4 SECONDS (ref 11.6–14.5)
RBC # BLD AUTO: 4.33 MILLION/UL (ref 3.88–5.62)
SODIUM SERPL-SCNC: 139 MMOL/L (ref 136–145)
WBC # BLD AUTO: 7.63 THOUSAND/UL (ref 4.31–10.16)

## 2022-06-08 PROCEDURE — 85025 COMPLETE CBC W/AUTO DIFF WBC: CPT | Performed by: EMERGENCY MEDICINE

## 2022-06-08 PROCEDURE — 72125 CT NECK SPINE W/O DYE: CPT

## 2022-06-08 PROCEDURE — 99284 EMERGENCY DEPT VISIT MOD MDM: CPT

## 2022-06-08 PROCEDURE — 70450 CT HEAD/BRAIN W/O DYE: CPT

## 2022-06-08 PROCEDURE — 99284 EMERGENCY DEPT VISIT MOD MDM: CPT | Performed by: EMERGENCY MEDICINE

## 2022-06-08 PROCEDURE — 36415 COLL VENOUS BLD VENIPUNCTURE: CPT | Performed by: EMERGENCY MEDICINE

## 2022-06-08 PROCEDURE — 93005 ELECTROCARDIOGRAM TRACING: CPT

## 2022-06-08 PROCEDURE — 85610 PROTHROMBIN TIME: CPT | Performed by: EMERGENCY MEDICINE

## 2022-06-08 PROCEDURE — 84132 ASSAY OF SERUM POTASSIUM: CPT | Performed by: EMERGENCY MEDICINE

## 2022-06-08 PROCEDURE — 12013 RPR F/E/E/N/L/M 2.6-5.0 CM: CPT | Performed by: EMERGENCY MEDICINE

## 2022-06-08 PROCEDURE — 80048 BASIC METABOLIC PNL TOTAL CA: CPT | Performed by: EMERGENCY MEDICINE

## 2022-06-08 PROCEDURE — 85730 THROMBOPLASTIN TIME PARTIAL: CPT | Performed by: EMERGENCY MEDICINE

## 2022-06-09 LAB
ATRIAL RATE: 86 BPM
QRS AXIS: 252 DEGREES
QRSD INTERVAL: 154 MS
QT INTERVAL: 402 MS
QTC INTERVAL: 475 MS
T WAVE AXIS: 77 DEGREES
VENTRICULAR RATE: 84 BPM

## 2022-06-09 PROCEDURE — 93010 ELECTROCARDIOGRAM REPORT: CPT | Performed by: INTERNAL MEDICINE

## 2022-06-09 NOTE — ED PROVIDER NOTES
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, +ETOH      HPI:  Lio Hernandez is a 81 y o  male who presents with fall  Mechanism:Details of Incident: patient fell at his residence, patient had multiple alcoholic drinks prior to fall  Injury Date: 06/08/22 Injury Time: 2115 Injury Occurence Location - [address] [place] [address] Way: [place]    Patient is an 27-year-old male presents emergency department due to trip and fall tonight he was drinking alcohol and tripped and fell struck the left side of his head and left ear sustained a laceration through the skin and cartilage of his left ear  Patient denies any other pain injury or trauma has no acute complaints at this time does appear mildly intoxicated patient is on Eliquis denies any loss of consciousness  Full range of motion in all extremities with no other signs of trauma injury or pain or tenderness patient is now alert and oriented  Tetanus is up-to-date  History provided by:  Patient and EMS personnel  Fall  Mechanism of injury: fall    Associated symptoms: no abdominal pain, no chest pain, no headaches, no nausea and no vomiting      Review of Systems   Constitutional: Negative for activity change, appetite change, chills, fatigue and fever  HENT: Negative for congestion, ear pain, rhinorrhea and sore throat  Eyes: Negative for discharge, redness and visual disturbance     Respiratory: Negative for cough, chest tightness, shortness of breath and wheezing  Cardiovascular: Negative for chest pain and palpitations  Gastrointestinal: Negative for abdominal pain, constipation, diarrhea, nausea and vomiting  Endocrine: Negative for polydipsia and polyuria  Genitourinary: Negative for difficulty urinating, dysuria, frequency, hematuria and urgency  Musculoskeletal: Negative for arthralgias and myalgias  Skin: Positive for wound  Negative for color change, pallor and rash  Left ear laceration   Neurological: Negative for dizziness, weakness, light-headedness, numbness and headaches  Hematological: Negative for adenopathy  Does not bruise/bleed easily  All other systems reviewed and are negative        Historical Information     Immunizations:   Immunization History   Administered Date(s) Administered    COVID-19 MODERNA VACC 0 5 ML IM 01/20/2021, 02/17/2021, 03/01/2021, 04/01/2021, 11/26/2021    DT (pediatric) 07/29/2004    INFLUENZA 10/19/2001, 10/25/2004, 11/15/2005, 10/31/2006, 10/01/2007, 10/01/2008, 09/30/2009, 10/15/2010, 09/30/2011, 11/12/2012, 09/18/2013, 10/11/2019, 09/24/2020, 10/23/2021    Influenza Quadrivalent Recombinant Preservative Free IM 10/16/2019    Influenza Split High Dose Preservative Free IM 10/03/2014, 10/16/2015, 10/07/2016, 10/19/2017    Influenza Whole 01/01/1998, 12/01/1998, 10/20/2000, 10/28/2003    Influenza, high dose seasonal 0 7 mL 10/04/2018, 10/10/2018    Pneumococcal 12/01/1998, 10/01/2004, 11/15/2005, 10/05/2011    Pneumococcal Conjugate 13-Valent 04/05/2017    Td (adult), Unspecified 07/29/2004, 03/10/2014    Tetanus Toxoid, Unspecified 01/01/1994    Zoster Vaccine Recombinant 11/17/2020       Past Medical History:   Diagnosis Date    BPH (benign prostatic hyperplasia)     Chronic obstructive pulmonary disease (COPD) ([facility] [address] )     Essential hypertension     Hard of hearing     Pt does wear hearing aids    Hypertension     Shortness of breath     Pt states not changes and it occurs with moderate exertion    Sleep disturbance     Pt states he is only sleeping about 3 hours a night  Pt is seeing his PCP on 8/5/21 to discuss    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    Tinnitus      No family history on file  Past Surgical History:   Procedure Laterality Date    SHOULDER OPEN ROTATOR CUFF REPAIR      SKIN GRAFT      spindle cell removal of scalp with skin graft from shoulder    VEIN SURGERY       Social History     Tobacco Use    Smoking status: Former Smoker     Types: Cigars    Smokeless tobacco: Never Used   Vaping Use    Vaping Use: Never used   Substance Use Topics    Alcohol use: Yes     Alcohol/week: 14 0 standard drinks     Types: 14 Glasses of wine per week     Comment: moderate consumption    Drug use: Not Currently     E-Cigarette/Vaping    E-Cigarette Use Never User      E-Cigarette/Vaping Substances       Family History: non-contributory    Meds/Allergies   Prior to Admission Medications   Prescriptions Last Dose Informant Patient Reported? Taking?    Ascorbic Acid (VITAMIN C PO)  Self Yes No   Sig: Take 1 tablet by mouth daily    Cholecalciferol (Vitamin D3) 50 MCG (2000 UT) TABS   Yes No   Magnesium 500 MG CAPS  Self Yes No   Sig: Take 500 mg by mouth daily   Multiple Vitamin (MULTIVITAMINS PO)   Yes No   Sig: Take 1 tablet by mouth daily   Spiriva Respimat 2 5 MCG/ACT AERS inhaler  Self Yes No   Sig: Inhale 2 puffs daily    apixaban (ELIQUIS) 5 mg   No No   Sig: Take 1 tablet (5 mg total) by mouth 2 (two) times a day   bisacodyl (DULCOLAX) 5 mg EC tablet  Self Yes No   Sig: Take 5 mg by mouth daily     capsaicin (ZOSTRIX) 0 025 % cream   Yes No   cetirizine (ZyrTEC) 10 mg tablet  Self Yes No   Sig: Take 10 mg by mouth daily   diltiazem (CARDIZEM CD) 300 mg 24 hr capsule   No No   Sig: Take 1 capsule (300 mg total) by mouth daily   docusate sodium (COLACE) 100 mg capsule  Self Yes No   Sig: Take 100 mg by mouth    finasteride (PROSCAR) 5 mg tablet Self Yes No   Sig: Take 5 mg by mouth daily    fluticasone-salmeterol (ADVAIR, WIXELA) 250-50 mcg/dose inhaler  Self Yes No   Sig: Inhale 1 puff 2 (two) times a day Rinse mouth after use  furosemide (LASIX) 20 mg tablet   No No   Simg daily, except 120mg every 3rd day   furosemide (LASIX) 40 mg tablet   No No   Sig: Take 1 tablet (40 mg total) by mouth daily Take with 20mg for total of 60mg daily, extra 60mg dose every 3rd day   magnesium oxide (MAG-OX) 400 mg tablet   Yes No   Sig: Take 1 tablet by mouth daily   Patient not taking: Reported on 2022   montelukast (SINGULAIR) 10 mg tablet  Self Yes No   Sig: Take 10 mg by mouth daily at bedtime   mupirocin (BACTROBAN) 2 % ointment   Yes No   nitroglycerin (NITROSTAT) 0 4 mg SL tablet   Yes No   sildenafil (VIAGRA) 100 mg tablet   No No   Sig: Take 1 tablet (100 mg total) by mouth daily as needed for erectile dysfunction      Facility-Administered Medications: None       Allergies   Allergen Reactions    Penicillin G Other (See Comments)     PCN Shots Only!!       PHYSICAL EXAM    PE limited by: ETOH     Objective   Vitals:   First set: Temperature: 97 8 °F (36 6 °C) (22)  Pulse: 82 (22)  Respirations: 18 (22)  Blood Pressure: 118/66 (22)  SpO2: 93 % (22)    Primary Survey:   (A) Airway: intact  (B) Breathing: clear b/l bs  (C) Circulation: Pulses:   normal  (D) Disabliity:  GCS Total:  15  (E) Expose:  Completed    Secondary Survey: (Click on Physical Exam tab above)  Physical Exam  Vitals and nursing note reviewed  Constitutional:       Appearance: He is well-developed  HENT:      Head: Normocephalic and atraumatic  Right Ear: External ear normal       Left Ear: Laceration and tenderness present  Ears:        Nose: Nose normal    Eyes:      Conjunctiva/sclera: Conjunctivae normal       Pupils: Pupils are equal, round, and reactive to light     Cardiovascular:      Rate and Rhythm: Normal rate and regular rhythm  Heart sounds: Normal heart sounds  Pulmonary:      Effort: Pulmonary effort is normal  No respiratory distress  Breath sounds: Normal breath sounds  No wheezing or rales  Chest:      Chest wall: No tenderness  Abdominal:      General: Bowel sounds are normal  There is no distension  Palpations: Abdomen is soft  Tenderness: There is no abdominal tenderness  There is no guarding  Musculoskeletal:         General: Normal range of motion  Cervical back: Normal range of motion and neck supple  Skin:     General: Skin is warm and dry  Comments: Superficial skin avulsion left elbow full range of motion no tenderness  Neurological:      Mental Status: He is alert and oriented to person, place, and time  Cranial Nerves: No cranial nerve deficit  Sensory: No sensory deficit  Cervical spine cleared by clinical criteria? No (imaging required)      Invasive Devices  Report    Peripheral Intravenous Line  Duration           Peripheral IV 06/08/22 Dorsal (posterior); Right Hand <1 day                Lab Results:   Results Reviewed     Procedure Component Value Units Date/Time    Potassium [[id number]]  (Normal) Collected: 06/08/22 2259    Lab Status: Final result Specimen: Blood from Arm, Left Updated: 06/08/22 2314     Potassium 4 0 mmol/L     Basic metabolic panel [[id number]]  (Abnormal) Collected: 06/08/22 2200    Lab Status: Final result Specimen: Blood from Arm, Right Updated: 06/08/22 2223     Sodium 139 mmol/L      Potassium 5 8 mmol/L      Chloride 103 mmol/L      CO2 26 mmol/L      ANION GAP 10 mmol/L      BUN 17 mg/dL      Creatinine 0 88 mg/dL      Glucose 73 mg/dL      Calcium 7 5 mg/dL      eGFR 80 ml/min/1 73sq m     Narrative:      [place] guidelines for Chronic Kidney Disease (CKD):     Stage 1 with normal or high GFR (GFR > 90 mL/min/1 73 square meters)    Stage 2 Mild CKD (GFR = 60-89 mL/min/1 73 square meters)    Stage 3A Moderate CKD (GFR = 45-59 mL/min/1 73 square meters)    Stage 3B Moderate CKD (GFR = 30-44 mL/min/1 73 square meters)    Stage 4 Severe CKD (GFR = 15-29 mL/min/1 73 square meters)    Stage 5 End Stage CKD (GFR <15 mL/min/1 73 square meters)  Note: GFR calculation is accurate only with a steady state creatinine    APTT [[id number]]  (Normal) Collected: 06/08/22 2200    Lab Status: Final result Specimen: Blood from Arm, Right Updated: 06/08/22 2220     PTT 34 seconds     Protime-INR [[id number]]  (Normal) Collected: 06/08/22 2200    Lab Status: Final result Specimen: Blood from Arm, Right Updated: 06/08/22 2220     Protime 14 4 seconds      INR 1 13    CBC and differential [[id number]]  (Abnormal) Collected: 06/08/22 2200    Lab Status: Final result Specimen: Blood from Arm, Right Updated: 06/08/22 2208     WBC 7 63 Thousand/uL      RBC 4 33 Million/uL      Hemoglobin 14 4 g/dL      Hematocrit 45 6 %       fL      MCH 33 3 pg      MCHC 31 6 g/dL      RDW 13 7 %      MPV 9 3 fL      Platelets 892 Thousands/uL      nRBC 0 /100 WBCs      Neutrophils Relative 63 %      Immat GRANS % 0 %      Lymphocytes Relative 26 %      Monocytes Relative 9 %      Eosinophils Relative 1 %      Basophils Relative 1 %      Neutrophils Absolute 4 81 Thousands/µL      Immature Grans Absolute 0 02 Thousand/uL      Lymphocytes Absolute 1 97 Thousands/µL      Monocytes Absolute 0 69 Thousand/µL      Eosinophils Absolute 0 08 Thousand/µL      Basophils Absolute 0 06 Thousands/µL                  Imaging Studies:   Direct to CT: No  TRAUMA - CT head wo contrast   Final Result by Aimee Rocha MD (06/08 2248)      No acute intracranial abnormality  The study was marked in [facility] for immediate notification              Workstation performed: [id number]         TRAUMA - CT spine cervical wo contrast   Final Result by Aimee Rocha MD (06/08 2249)      No cervical spine fracture or traumatic malalignment  The study was marked in [facility] for immediate notification  Workstation performed: [id number]               Procedures  ECG 12 Lead Documentation Only    Date/Time: 6/8/2022 10:02 PM  Performed by: Wendi Hong DO  Authorized by: Wendi Hong DO     ECG reviewed by me, the ED Provider: yes    Patient location:  ED  Previous ECG:     Comparison to cardiac monitor: Yes    Rate:     ECG rate assessment: normal    Rhythm:     Rhythm: sinus rhythm    Ectopy:     Ectopy: PVCs      PVCs:  Frequent  QRS:     QRS intervals: Wide  Conduction:     Conduction: abnormal      Abnormal conduction: complete RBBB    ST segments:     ST segments:  Non-specific  T waves:     T waves: non-specific      Laceration repair    Date/Time: 6/8/2022 10:48 PM  Performed by: Wendi Hong DO  Authorized by: Wendi Hong DO   Consent: Verbal consent obtained  Risks and benefits: risks, benefits and alternatives were discussed  Consent given by: patient  Patient understanding: patient states understanding of the procedure being performed  Patient identity confirmed: verbally with patient  Body area: head/neck  Location details: left ear  Laceration length: 3 cm      Procedure Details:  Irrigation solution: saline  Amount of cleaning: standard  Skin closure: 5-0 nylon  Number of sutures: 10  Technique: simple  Approximation: close  Approximation difficulty: simple  Dressing: 4x4 sterile gauze  Patient tolerance: patient tolerated the procedure well with no immediate complications               ED Course  ED Course as of 06/08/22 2317 Wed Jun 08, 2022 2156 No trauma to the chest abdomen or pelvis no chest x-ray or pelvic x-ray ordered or indicated  2251 Cervical Collar Clearance: The patient had a CT scan of the cervical spine demonstrating no acute injury  On exam, the patient had no midline point tenderness or paresthesias/numbness/weakness in the extremities   The patient had full range of motion (was then able to flex, extend, and rotate head laterally) without pain  There were no distracting injuries and the patient was not intoxicated  The patient's cervical spine was cleared radiologically and clinically  Cervical collar removed at this time  Sam Singleton DO  6/8/2022 10:51 PM                MDM  Number of Diagnoses or Management Options  Alcohol intoxication ([facility] [address] ): new and requires workup  Fall, initial encounter: new and requires workup  Laceration of left ear, initial encounter: new and requires workup  Diagnosis management comments: Patient remained clinically hemodynamically neurologically stable in the emergency department workup reveals no evidence of acute intracranial injury cervical spine was cleared clinically patient was alert oriented felt well and requesting to return home on repeat evaluation the patient was no longer clinically intoxicated and cervical spine was cleared with negative CT imaging  Left ear laceration was repaired in the ED with good cosmetic result control bleeding advised local wound care for ear laceration and supportive care and prompt follow-up with primary physician for further evaluation and treatment and recommended suture removal in about 10 days return precautions and anticipatory guidance discussed           Amount and/or Complexity of Data Reviewed  Clinical lab tests: ordered and reviewed  Tests in the radiology section of CPT®: ordered and reviewed  Tests in the medicine section of CPT®: ordered and reviewed  Decide to obtain previous medical records or to obtain history from someone other than the patient: yes  Review and summarize past medical records: yes  Independent visualization of images, tracings, or specimens: yes    Risk of Complications, Morbidity, and/or Mortality  Presenting problems: moderate  Diagnostic procedures: moderate  Management options: moderate    Patient Progress  Patient progress: stable          Disposition  [facility] Transfer: No  Final diagnoses:   Fall, initial encounter   Laceration of left ear, initial encounter   Alcohol intoxication ([address] )     Time reflects when diagnosis was documented in both MDM as applicable and the Disposition within this note     Time User Action Codes Description Comment    6/8/2022 11:14 PM Clide Tad Add [W19  RVWR] Fall, initial encounter     6/8/2022 11:14 PM Clide Tad Add [L23 497J] Laceration of left ear, initial encounter     6/8/2022 11:14 PM Clide Tad Add [F10 929] Alcohol intoxication [facility])       ED Disposition     ED Disposition   Discharge    Condition   Stable    Date/Time   Wed Jun 8, 2022 11:15 PM    Comment   Walter Angel discharge to home/self care  Follow-up Information     Follow up With Specialties Details Why Contact Info    Margie Torres MD Internal Medicine Schedule an appointment as soon as possible for a visit in 10 days For wound re-check, For suture removal [address] [place] [address] 20  [phone]          Patient's Medications   Discharge Prescriptions    No medications on file     No discharge procedures on file      PDMP Review       Value Time User    PDMP Reviewed  Yes 8/9/2021  4:02 PM Julia Mehta MD          ED Provider  Electronically Signed by         Ailyn Shearer DO  06/08/22 0383

## 2022-06-21 ENCOUNTER — HOSPITAL ENCOUNTER (OUTPATIENT)
Dept: NUCLEAR MEDICINE | Facility: HOSPITAL | Age: 82
Discharge: HOME | End: 2022-06-21
Payer: MEDICARE

## 2022-06-21 ENCOUNTER — TELEPHONE (OUTPATIENT)
Dept: PULMONOLOGY | Facility: CLINIC | Age: 82
End: 2022-06-21

## 2022-06-21 DIAGNOSIS — D38.1 NEOPLASM OF UNCERTAIN BEHAVIOR OF LEFT UPPER LOBE OF LUNG: Primary | ICD-10-CM

## 2022-06-21 DIAGNOSIS — D38.1 NEOPLASM OF UNCERTAIN BEHAVIOR OF LUNG: ICD-10-CM

## 2022-06-21 LAB — GLUCOSE SERPL-MCNC: 111 MG/DL (ref 65–140)

## 2022-06-21 PROCEDURE — 82948 REAGENT STRIP/BLOOD GLUCOSE: CPT

## 2022-06-21 PROCEDURE — 78815 PET IMAGE W/CT SKULL-THIGH: CPT

## 2022-06-21 PROCEDURE — G1004 CDSM NDSC: HCPCS

## 2022-06-21 PROCEDURE — A9552 F18 FDG: HCPCS

## 2022-06-21 NOTE — TELEPHONE ENCOUNTER
Spoke with patient and now wife to review everything  At conclusion of our conversation, they are agreeable to having IR biopsy done at [address] [place] [address]  Order placed  Nhung Pabon, can you make sure IR is aware of the referral? Thanks

## 2022-06-21 NOTE — TELEPHONE ENCOUNTER
Discussed PET-CT with Dr Anne Ayers and patient  Recommend CT-guided biopsy lingular nodule with Interventional Radiology  Reviewed these recommendations with patient over the phone but he did not seem to want to travel to another [facility] to have this done  I explained that IR does not come to [address] [place] [address] so if he would want to stay within [place] [address] he would have to come to [facility]  He is agreeable to having the order placed but will be looking for other locations closer to home to have the biopsy done

## 2022-06-22 ENCOUNTER — PREP FOR PROCEDURE (OUTPATIENT)
Dept: INTERVENTIONAL RADIOLOGY/VASCULAR | Facility: CLINIC | Age: 82
End: 2022-06-22

## 2022-06-22 DIAGNOSIS — R91.1 PULMONARY NODULE: Primary | ICD-10-CM

## 2022-06-22 RX ORDER — SODIUM CHLORIDE 9 MG/ML
30 INJECTION, SOLUTION INTRAVENOUS CONTINUOUS
Status: CANCELLED | OUTPATIENT
Start: 2022-06-22

## 2022-06-29 ENCOUNTER — CLINICAL SUPPORT (OUTPATIENT)
Dept: CARDIOLOGY CLINIC | Facility: CLINIC | Age: 82
End: 2022-06-29

## 2022-06-29 ENCOUNTER — APPOINTMENT (OUTPATIENT)
Dept: LAB | Facility: HOSPITAL | Age: 82
End: 2022-06-29
Attending: INTERNAL MEDICINE
Payer: MEDICARE

## 2022-06-29 ENCOUNTER — TELEPHONE (OUTPATIENT)
Dept: CARDIOLOGY CLINIC | Facility: CLINIC | Age: 82
End: 2022-06-29

## 2022-06-29 ENCOUNTER — OFFICE VISIT (OUTPATIENT)
Dept: CARDIOLOGY CLINIC | Facility: CLINIC | Age: 82
End: 2022-06-29
Payer: MEDICARE

## 2022-06-29 VITALS
SYSTOLIC BLOOD PRESSURE: 120 MMHG | OXYGEN SATURATION: 94 % | HEIGHT: 69 IN | HEART RATE: 44 BPM | DIASTOLIC BLOOD PRESSURE: 62 MMHG | WEIGHT: 183 LBS | BODY MASS INDEX: 27.11 KG/M2

## 2022-06-29 DIAGNOSIS — R60.0 LOCALIZED EDEMA: ICD-10-CM

## 2022-06-29 DIAGNOSIS — I48.91 ATRIAL FIBRILLATION, UNSPECIFIED TYPE (HCC): Primary | ICD-10-CM

## 2022-06-29 DIAGNOSIS — I50.42 CHRONIC COMBINED SYSTOLIC AND DIASTOLIC CHF (CONGESTIVE HEART FAILURE) (HCC): ICD-10-CM

## 2022-06-29 DIAGNOSIS — I50.42 CHRONIC COMBINED SYSTOLIC AND DIASTOLIC CHF (CONGESTIVE HEART FAILURE) (HCC): Primary | ICD-10-CM

## 2022-06-29 DIAGNOSIS — I49.3 FREQUENT PVCS: ICD-10-CM

## 2022-06-29 DIAGNOSIS — J96.21 ACUTE ON CHRONIC RESPIRATORY FAILURE WITH HYPOXIA AND HYPERCAPNIA (HCC): ICD-10-CM

## 2022-06-29 DIAGNOSIS — I10 ESSENTIAL HYPERTENSION: ICD-10-CM

## 2022-06-29 DIAGNOSIS — Z78.9 MODERATE ALCOHOL CONSUMPTION: ICD-10-CM

## 2022-06-29 DIAGNOSIS — J44.9 CHRONIC OBSTRUCTIVE PULMONARY DISEASE, UNSPECIFIED COPD TYPE (HCC): ICD-10-CM

## 2022-06-29 DIAGNOSIS — I45.10 RBBB: ICD-10-CM

## 2022-06-29 DIAGNOSIS — J96.22 ACUTE ON CHRONIC RESPIRATORY FAILURE WITH HYPOXIA AND HYPERCAPNIA (HCC): ICD-10-CM

## 2022-06-29 LAB
ANION GAP SERPL CALCULATED.3IONS-SCNC: 5 MMOL/L (ref 4–13)
BUN SERPL-MCNC: 14 MG/DL (ref 5–25)
CALCIUM SERPL-MCNC: 9.5 MG/DL (ref 8.3–10.1)
CHLORIDE SERPL-SCNC: 102 MMOL/L (ref 100–108)
CO2 SERPL-SCNC: 35 MMOL/L (ref 21–32)
CREAT SERPL-MCNC: 0.92 MG/DL (ref 0.6–1.3)
GFR SERPL CREATININE-BSD FRML MDRD: 77 ML/MIN/1.73SQ M
GLUCOSE P FAST SERPL-MCNC: 96 MG/DL (ref 65–99)
NT-PROBNP SERPL-MCNC: 830 PG/ML
POTASSIUM SERPL-SCNC: 4 MMOL/L (ref 3.5–5.3)
SODIUM SERPL-SCNC: 142 MMOL/L (ref 136–145)

## 2022-06-29 PROCEDURE — 83880 ASSAY OF NATRIURETIC PEPTIDE: CPT

## 2022-06-29 PROCEDURE — 36415 COLL VENOUS BLD VENIPUNCTURE: CPT

## 2022-06-29 PROCEDURE — 80048 BASIC METABOLIC PNL TOTAL CA: CPT

## 2022-06-29 PROCEDURE — 99214 OFFICE O/P EST MOD 30 MIN: CPT | Performed by: INTERNAL MEDICINE

## 2022-06-29 PROCEDURE — 93244 EXT ECG>48HR<7D REV&INTERPJ: CPT | Performed by: INTERNAL MEDICINE

## 2022-06-29 NOTE — TELEPHONE ENCOUNTER
I attempted to reach [place]  His number is disconnected  I left a voicemail on his wife's cell  I asked her to call back  My message:  Liu's kidney function and electrolytes were stable on his blood work  His BNP (test for fluid stretch in the heart) is improved since his hospital stay, but remains elevated  Dr Nohemi Jordan would like to try going to furosemide 80 mg daily  If he is agreeable I will adjust the medication to the pharmacy  We should recheck blood work in 1 month  Thank you!

## 2022-06-29 NOTE — PROGRESS NOTES
Cardiology Office Visit    Trinity Hickman  [id number]  1940    [facility] [address]  [address]      Dear Jose C Chavez MD,    I had the pleasure of seeing your patient at our [address] [facility] [place] office today 6/29/2022  As you know he is a pleasant 81y o  year old male with a medical history as described below  Reason for office visit: Follow up atrial fibrillation, hypertension, RBBB and PVC's  1  Atrial fibrillation, unspecified type [facility])  Assessment & Plan:  Atrial fibrillation noted on EKG at PCP office 01/19/2021  Holter monitor 1/28/2021 showed predominantly atrial fibrillation with an average HR of 102 beats per minute with frequent PVCs /aberrant beats representing 34 9% ectopic burden  Echocardiogram 2/25/2021 showed EF 45-50%  Patient denied any palpitations and was unaware of rapid heart rates  Magnesium 250 mg daily added and will be continued  Diltiazem was added (trying to avoid beta blockers due to COPD/asthma)  Persistent a fib with elevated ventricular rates and high PVC burden of 33% noted on most recent holter monitor 6/2021  Continue diltiazem 300mg daily  EP consultation with  Dr Julia Mejia on 8/19/2021 with follow up 9/20/2021  Atrial fibrillation and PVC ablation were discussed  They opted given risks associated with prolonged anesthesia in the setting of significant lung disease  Continue Eliquis 5 mg twice daily for CVA prophylaxis  He is currently using BiPAP for sleep  ZIO ordered 8/17/2021 but I do not see report (unclear if he ever did this) and will have staff check to see if this was completed         2  Chronic combined systolic and diastolic CHF (congestive heart failure) (HCC)  Assessment & Plan:  Wt Readings from Last 3 Encounters:   06/29/22 83 kg (183 lb)   05/27/22 82 1 kg (181 lb)   02/03/22 83 kg (183 lb)     Echocardiogram 2/25/2021 showed an EF of 45-50%  Limited echocardiogram 8/16/21 with EF 45%  Unclear etiology of reduced EF but this could be tachycardia induced vs ETOH induced  He should have eventual stress test to rule out ischemia as the source of his HFrEF  Ideally should be on long acting beta blocker but has COPD/Asthma  No ACE-I/ARB for now as we need room to uptitrate AV efra blockers  He was hospitalized from 8/9 to 8/18/2021 secondary to respiratory failure in the setting of COPD exacerbation and volume overload  He responded well to IV diuresis  Currently on furosemide 60 mg daily, with extra 60 mg dose every 3rd day  He reports improved breathing but appears visibly dyspneic  Significant edema bilateral lower extremities  Urged use of compression socks and asked him to elevate legs when at rest at home  Reviewed importance of daily weights  I suspect he would benefit from increased dose of furosemide  Will check labs and if stable renal function would recommend increasing furosemide to 80 mg daily  Orders:  -     NT-BNP PRO; Future  -     Basic metabolic panel; Future    3  Essential hypertension  Assessment & Plan:  Blood pressure is well controlled on exam (actually on the low side)  Beta-blockers contraindicated in setting of asthma /COPD  Continue current medications  See discussion above  4  RBBB  Assessment & Plan:  Chronic right bundle-branch block  5  Moderate alcohol consumption  Assessment & Plan: Moderate  EtOH use  He is aware that this is not helping his atrial fibrillation and/or ectopy  I have asked him to continue to try and cut back on intake  6  Localized edema  Assessment & Plan:  Patient has significant bilateral lower extremity edema on exam        Diltiazem is likely contributing to edema as well as underlying heart failure  Reviewed 2g sodium diet and daily use of compression stockings  See full discussion under CHF      Orders:  -     NT-BNP PRO; Future  -     Basic metabolic panel; Future    7  Frequent PVCs  Assessment & Plan:  33% PVC burden was reported on 24 hour holter monitor on 6/21/2021  High PVC burden may be source of cardiomyopathy  EP evaluation with Dr Marlon Reyes  8/19/2021 with follow up 9/20/2021  Atrial fibrillation/PVC ablation were discussed but patient felt to be high risk for intervention due to his underling COPD  Should have updated assessment of PVC burden but will check status of 8/2021 monitor as it is unclear if this was completed  8  Chronic obstructive pulmonary disease, unspecified COPD type ([facility] [address] )  Assessment & Plan:  Breathing reported as stable  Visually appears dyspneic  He does have supplemental O2 which he uses with significant exertion only but suspect he would benefit from more consistent use even with mild exertion  Sleeping with BiPAP at night  Currently on Advair 250/50 BID and Spiriva as well as nebulizer for PRN use only  Following with [facility] [address]'s pulmonology  9  Acute on chronic respiratory failure with hypoxia and hypercapnia (HCC)           HPI     Patient has a history hypertension, asthma/COPD and BPH  Patient was seen by his primary physician (Dr Jacquie Degroot) for routine Medicare physical exam 1/19/2021 at which time he was noted to have an abnormal heart rhythm  EKG obtained showed possible atrial fibrillation with frequent PVCs in a bigeminal pattern with underlying right bundle-branch block  24 hour Holter monitor was ordered and cardiology evaluation was recommended  Holter monitor showed predominantly atrial fibrillation with an average heart rate of 102 beats per minute and a total of 51,075 premature ventricular contractions/aberrant beats totaling of 34 9% ectopic burden  The patient denied any symptoms in attached diary       2/25/2021: Patient confirms that he had presented for routine office visit at which time he was noted to have an abnormal heart rhythm and sent for additional testing  [facility]  He adamantly denies any palpitations or chest pain  He does have shortness of breath / dyspnea on exertion which he attributes to his underlying lung disease which consists of COPD and asthma  Patient does admit to drinking 7 days a week at a minimum to alcoholic drinks a day  Alcohol intake consists of beer, wine and occasional whiskey  His wife has noted increased lower extremity edema  Patient denies any prior cardiac issues  No prior cardiac testing that he can recall  3/18/2021: Patient presents to the office today for follow up  I had started diltiazem, eliquis, and furosemide at/after last office visit  Patient denies any palpitations  He has tolerated the new medications without difficulty  Furosemide was increased from 20 mg to 40 mg  The patient feels his breathing is somewhat better  Still has edema  No chest pain  No lightheadedness or dizziness  ECG today shows atrial fibrillation with RVR at 105 bpm with aberrantly conducted complexes  Plan for repeat ZIO (3 days) to start today  6/17/2021: Patient presents to the office today for follow up  Patient tells me that he feels well overall  He does continue to have edema in his legs  Shortness of breath and dyspnea on exertion improved per his report  Decreased cough  No bleeding issues  He does see [place] doctor next week in the [facility] [place]  He did have repeat ZIO 3/18 done which showed 100% atrial with an average heart rate of 94 bpm  Frequent PVC's (16 4%)  * Patient was seen by Nessa Landa 8/9/2021 and 9/7/2021  * Patient was last seen in the office 12/1/2021 by Nessa Landa at which time he reported feeling very well  He felt his breathing had improved and was using supplemental O2 when hiking up hills  He did report ongoing edema  6/29/2022: Patient presents to the office today for follow up  He tells me he feels ok for the most part   He does have edema which is worse near the end of the day  He is taking 60 mg daily and two days a week an additional 60 mg  He was seen in the ER 6/8/2022 after a fall  He hit the [employer]  He had partial ear amputation  His wife states he didn't have a lot of EtOH but maybe a little more than usual  No palpitations  Occasionally feels lightheadedness or dizzy which seems to occur when he has coughing fits  He does have oxygen at home  He [place]  He denies any chest pain  ZIO ordered 8/17/2021 but does not appear to have been done (will have staff check on this)  Patient Active Problem List   Diagnosis    Essential hypertension    Atrial fibrillation (HCC)    RBBB    Moderate alcohol consumption    Localized edema    Chronic combined systolic and diastolic CHF (congestive heart failure) (HCC)    COPD (chronic obstructive pulmonary disease) (HCC)    Acute on chronic respiratory failure with hypoxia and hypercapnia (HCC)    Cellulitis of left lower extremity    Frequent PVCs    Hematoma of left lower extremity    Chronic respiratory failure with hypoxia and hypercapnia (HCC)    Pulmonary nodule     Past Medical History:   Diagnosis Date    BPH (benign prostatic hyperplasia)     Chronic obstructive pulmonary disease (COPD) ([address] )     Essential hypertension     Hard of hearing     Pt does wear hearing aids    Hypertension     Shortness of breath     Pt states not changes and it occurs with moderate exertion    Sleep disturbance     Pt states he is only sleeping about 3 hours a night    Pt is seeing his PCP on 8/5/21 to discuss    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    Tinnitus      Social History     Socioeconomic History    Marital status: /Civil Union     Spouse name: Not on file    Number of children: Not on file    Years of education: Not on file    Highest education level: Not on file   Occupational History    Occupation: Retired   Tobacco Use    Smoking status: Former Smoker     Types: Cigars    Smokeless tobacco: Never Used   Vaping Use    Vaping Use: Never used   Substance and Sexual Activity    Alcohol use: Yes     Alcohol/week: 14 0 standard drinks     Types: 14 Glasses of wine per week     Comment: moderate consumption    Drug use: Not Currently    Sexual activity: Yes   Other Topics Concern    Not on file   Social History Narrative    Not on file     Social Determinants of Health     Financial Resource Strain: Not on file   Food Insecurity: Not on file   Transportation Needs: No Transportation Needs    Lack of Transportation (Medical): No    Lack of Transportation (Non-Medical): No   Physical Activity: Not on file   Stress: Not on file   Social Connections: Not on file   Intimate Partner Violence: Not on file   Housing Stability: Not on file      History reviewed  No pertinent family history         Past Surgical History:   Procedure Laterality Date    SHOULDER OPEN ROTATOR CUFF REPAIR      SKIN GRAFT      spindle cell removal of scalp with skin graft from shoulder    VEIN SURGERY         Current Outpatient Medications:     apixaban (ELIQUIS) 5 mg, Take 1 tablet (5 mg total) by mouth 2 (two) times a day, Disp: 180 tablet, Rfl: 3    Ascorbic Acid (VITAMIN C PO), Take 1 tablet by mouth daily , Disp: , Rfl:     bisacodyl (DULCOLAX) 5 mg EC tablet, Take 5 mg by mouth daily  , Disp: , Rfl:     capsaicin (ZOSTRIX) 0 025 % cream, , Disp: , Rfl:     cetirizine (ZyrTEC) 10 mg tablet, Take 10 mg by mouth daily, Disp: , Rfl:     Cholecalciferol (Vitamin D3) 50 MCG (2000 UT) TABS, , Disp: , Rfl:     diltiazem (CARDIZEM CD) 300 mg 24 hr capsule, Take 1 capsule (300 mg total) by mouth daily, Disp: 90 capsule, Rfl: 3    docusate sodium (COLACE) 100 mg capsule, Take 100 mg by mouth , Disp: , Rfl:     finasteride (PROSCAR) 5 mg tablet, Take 5 mg by mouth daily , Disp: , Rfl:     fluticasone-salmeterol (ADVAIR, WIXELA) 250-50 mcg/dose inhaler, Inhale 1 puff 2 (two) times a day Rinse mouth after use , Disp: , Rfl:     magnesium oxide (MAG-OX) 400 mg tablet, Take 1 tablet by mouth daily, Disp: , Rfl:     montelukast (SINGULAIR) 10 mg tablet, Take 10 mg by mouth daily at bedtime, Disp: , Rfl:     Multiple Vitamin (MULTIVITAMINS PO), Take 1 tablet by mouth daily, Disp: , Rfl:     mupirocin (BACTROBAN) 2 % ointment, , Disp: , Rfl:     nitroglycerin (NITROSTAT) 0 4 mg SL tablet, , Disp: , Rfl:     sildenafil (VIAGRA) 100 mg tablet, Take 1 tablet (100 mg total) by mouth daily as needed for erectile dysfunction, Disp: 4 tablet, Rfl: 11    Spiriva Respimat 2 5 MCG/ACT AERS inhaler, Inhale 2 puffs daily , Disp: , Rfl:     furosemide (LASIX) 80 mg tablet, Take 1 tablet (80 mg total) by mouth daily, Disp: 90 tablet, Rfl: 3    Magnesium 500 MG CAPS, Take 500 mg by mouth daily (Patient not taking: Reported on 6/29/2022), Disp: , Rfl:      Allergies   Allergen Reactions    Penicillin G Other (See Comments)     PCN Shots Only!!       Cardiac Test Results:     Echocardiogram 8/16/2021:   EF 45% (difficult to assess in setting of frequent ectopy and atrial arrhythmia)  Mild to moderate LAE  Mildly dilated RA  MAC  Mild MR  Mild TR  Estimated PASP 40 mmHg consistent with mild pHTN  ZIO 3/18-3/21/2021:  Strips above reviewed  Agree with findings as documented  100% atrial fibrillation burden with HR range () with an Avg HR of 94 bpm    Frequent PVC's (16 4%)  Rare ventricular couplets and triplets  Ventricular Bigeminy and Trigeminy noted  ECG 3/18/2021: Atrial fibrillation with rapid ventricular response   bpm   PVC's vs aberrantly conducted beats  RBBB  Echocardiogram 2/25/2021:   EF 45-50%  Mildly dilated left and right atrium  Mild mitral regurgitation  Trace tricuspid regurgitation  Trace pericardial effusion  Holter Monitor 1/28/2021:   1  The patient had predominantly atrial fibrillation     2  Heart rate varied from 78 bpm to 130 bpm     3  The patients average heart rate was 102 bpm     4  The patient had a holter monitor tracing done for 23 hours and 59 minutes  5  The patient had frequent ventricular ectopic activity (PVC's vs Aberrant beats) with a total of 51,075 ectopic beats representing a 34 9% burden  6  Episodes of ventricular bigeminy (30,849) and trigeminy (6156)  7  Ventricular runs with the longest lasting 5 beats  8  The patient had no supraventricular ectopy  9  The longest R/R interval was 1 7 seconds  10  Right bundle branch block  11  Diary attached  Patient denied any symptoms  Lipid panel 11/17/2020: C 170  T 66  H 74  L 85  ECG 1/19/2021:  Likely underlying atrial fibrillation with frequent PVCs/ aberrant beats in a bigeminal pattern  Right bundle-branch block  Review of Systems:    Review of Systems   Constitutional: Negative for activity change, appetite change and fatigue  HENT: Negative for congestion, hearing loss, tinnitus and trouble swallowing  Eyes: Negative for visual disturbance  Respiratory: Positive for shortness of breath  Negative for cough, chest tightness and wheezing  Dyspnea on exertion   Cardiovascular: Positive for leg swelling  Negative for chest pain and palpitations  Gastrointestinal: Negative for abdominal distention, abdominal pain, nausea and vomiting  Genitourinary: Negative for difficulty urinating  Musculoskeletal: Positive for arthralgias  Skin: Negative for rash  Neurological: Positive for light-headedness  Negative for dizziness and syncope  Hematological: Does not bruise/bleed easily  Psychiatric/Behavioral: Negative for confusion  The patient is not nervous/anxious  All other systems reviewed and are negative          Vitals:    06/29/22 1005 06/29/22 1055   BP: 110/62 120/62   BP Location:  Left arm   Patient Position:  Sitting   Pulse: (!) 44    SpO2: 94%    Weight: 83 kg (183 lb)    Height: 5' 9" (1 753 m)      Vitals: 06/29/22 1005   Weight: 83 kg (183 lb)     Height: 5' 9" (175 3 cm)     Physical Exam  Vitals reviewed  Constitutional:       Appearance: He is well-developed  HENT:      Head: Normocephalic and atraumatic  Eyes:      Conjunctiva/sclera: Conjunctivae normal       Pupils: Pupils are equal, round, and reactive to light  Neck:      Vascular: No JVD  Cardiovascular:      Rate and Rhythm: Bradycardia present  Rhythm irregular  Heart sounds: Normal heart sounds  No murmur heard  No friction rub  No gallop  Pulmonary:      Effort: Pulmonary effort is normal       Breath sounds: Decreased breath sounds present  Abdominal:      General: Bowel sounds are normal       Palpations: Abdomen is soft  Musculoskeletal:      Cervical back: Normal range of motion  Right lower leg: Edema present  Left lower leg: Edema present  Skin:     General: Skin is warm and dry  Neurological:      Mental Status: He is alert and oriented to person, place, and time     Psychiatric:         Behavior: Behavior normal

## 2022-06-29 NOTE — PATIENT INSTRUCTIONS
I would like to get updated blood work today to see if we need to adjust furosemide dose  I do think you should be using your oxygen when you are exerting yourself

## 2022-06-30 RX ORDER — FUROSEMIDE 80 MG
80 TABLET ORAL DAILY
Qty: 90 TABLET | Refills: 3 | Status: SHIPPED | OUTPATIENT
Start: 2022-06-30

## 2022-06-30 NOTE — TELEPHONE ENCOUNTER
Please let them know I sent 80 mg furosemide tablets to [facility]  He would take 1 daily    I ordered a BMP to complete in 1 month

## 2022-07-01 ENCOUNTER — TELEPHONE (OUTPATIENT)
Dept: CARDIOLOGY CLINIC | Facility: CLINIC | Age: 82
End: 2022-07-01

## 2022-07-01 PROBLEM — J44.9 COPD (CHRONIC OBSTRUCTIVE PULMONARY DISEASE) (HCC): Status: ACTIVE | Noted: 2021-08-09

## 2022-07-01 PROBLEM — G93.41 ACUTE METABOLIC ENCEPHALOPATHY: Status: RESOLVED | Noted: 2021-08-14 | Resolved: 2022-07-01

## 2022-07-01 NOTE — ASSESSMENT & PLAN NOTE
Breathing reported as stable  Visually appears dyspneic  He does have supplemental O2 which he uses with significant exertion only but suspect he would benefit from more consistent use even with mild exertion  Sleeping with BiPAP at night  Currently on Advair 250/50 BID and Spiriva as well as nebulizer for PRN use only  Following with [facility] pulmonology

## 2022-07-01 NOTE — ASSESSMENT & PLAN NOTE
Wt Readings from Last 3 Encounters:   06/29/22 83 kg (183 lb)   05/27/22 82 1 kg (181 lb)   02/03/22 83 kg (183 lb)     Echocardiogram 2/25/2021 showed an EF of 45-50%  Limited echocardiogram 8/16/21 with EF 45%  Unclear etiology of reduced EF but this could be tachycardia induced vs ETOH induced  He should have eventual stress test to rule out ischemia as the source of his HFrEF  Ideally should be on long acting beta blocker but has COPD/Asthma  No ACE-I/ARB for now as we need room to uptitrate AV efra blockers  He was hospitalized from 8/9 to 8/18/2021 secondary to respiratory failure in the setting of COPD exacerbation and volume overload  He responded well to IV diuresis  Currently on furosemide 60 mg daily, with extra 60 mg dose every 3rd day  He reports improved breathing but appears visibly dyspneic  Significant edema bilateral lower extremities  Urged use of compression socks and asked him to elevate legs when at rest at home  Reviewed importance of daily weights  I suspect he would benefit from increased dose of furosemide  Will check labs and if stable renal function would recommend increasing furosemide to 80 mg daily

## 2022-07-01 NOTE — TELEPHONE ENCOUNTER
Were you able to find out what happened with the ZIO that was ordered 8/2021 (was it ever completed?)  Thank you for checking into it

## 2022-07-01 NOTE — ASSESSMENT & PLAN NOTE
33% PVC burden was reported on 24 hour holter monitor on 6/21/2021  High PVC burden may be source of cardiomyopathy  EP evaluation with Dr Ju Swift  8/19/2021 with follow up 9/20/2021  Atrial fibrillation/PVC ablation were discussed but patient felt to be high risk for intervention due to his underling COPD  Should have updated assessment of PVC burden but will check status of 8/2021 monitor as it is unclear if this was completed

## 2022-07-01 NOTE — TELEPHONE ENCOUNTER
Should be in now, looks like it didn't attach to the last order when I tried to upload it before  I had to put in a new order

## 2022-07-01 NOTE — ASSESSMENT & PLAN NOTE
Blood pressure is well controlled on exam (actually on the low side)  Beta-blockers contraindicated in setting of asthma /COPD  Continue current medications  See discussion above

## 2022-07-01 NOTE — ASSESSMENT & PLAN NOTE
Moderate  EtOH use  He is aware that this is not helping his atrial fibrillation and/or ectopy  I have asked him to continue to try and cut back on intake

## 2022-07-01 NOTE — ASSESSMENT & PLAN NOTE
Atrial fibrillation noted on EKG at PCP office 01/19/2021  Holter monitor 1/28/2021 showed predominantly atrial fibrillation with an average HR of 102 beats per minute with frequent PVCs /aberrant beats representing 34 9% ectopic burden  Echocardiogram 2/25/2021 showed EF 45-50%  Patient denied any palpitations and was unaware of rapid heart rates  Magnesium 250 mg daily added and will be continued  Diltiazem was added (trying to avoid beta blockers due to COPD/asthma)  Persistent a fib with elevated ventricular rates and high PVC burden of 33% noted on most recent holter monitor 6/2021  Continue diltiazem 300mg daily  EP consultation with  Dr Augustine Dhillon on 8/19/2021 with follow up 9/20/2021  Atrial fibrillation and PVC ablation were discussed  They opted given risks associated with prolonged anesthesia in the setting of significant lung disease  Continue Eliquis 5 mg twice daily for CVA prophylaxis  He is currently using BiPAP for sleep  ZIO ordered 8/17/2021 but I do not see report (unclear if he ever did this) and will have staff check to see if this was completed

## 2022-07-01 NOTE — ASSESSMENT & PLAN NOTE
Patient has significant bilateral lower extremity edema on exam        Diltiazem is likely contributing to edema as well as underlying heart failure  Reviewed 2g sodium diet and daily use of compression stockings  See full discussion under CHF

## 2022-07-05 ENCOUNTER — DOCUMENTATION (OUTPATIENT)
Dept: CARDIOLOGY CLINIC | Facility: CLINIC | Age: 82
End: 2022-07-05

## 2022-07-05 NOTE — PROGRESS NOTES
Called [facility], they have no record of any other ZIO patch other then the one in April   No record of the august one that was ordered by the hospital

## 2022-07-06 NOTE — TELEPHONE ENCOUNTER
Per MA a call was placed to ZIO and there was no ZIO obtained 7/2021 (was ordered by hospital AP and likely never acted upon)

## 2022-07-12 NOTE — PROGRESS NOTES
Left message confirming arrival time of 0930 at [address], ride to and from, hold eliquis starting 7/14 and last dose is 7/13  Consult complete

## 2022-07-19 ENCOUNTER — HOSPITAL ENCOUNTER (OUTPATIENT)
Dept: CT IMAGING | Facility: HOSPITAL | Age: 82
Discharge: HOME | End: 2022-07-19
Attending: RADIOLOGY
Payer: MEDICARE

## 2022-07-19 VITALS
TEMPERATURE: 97.2 F | SYSTOLIC BLOOD PRESSURE: 122 MMHG | DIASTOLIC BLOOD PRESSURE: 60 MMHG | OXYGEN SATURATION: 92 % | RESPIRATION RATE: 18 BRPM | HEART RATE: 70 BPM

## 2022-07-19 DIAGNOSIS — R91.1 PULMONARY NODULE: ICD-10-CM

## 2022-07-19 LAB
ANION GAP SERPL CALCULATED.3IONS-SCNC: 4 MMOL/L (ref 4–13)
BASOPHILS # BLD AUTO: 0.05 THOUSANDS/ΜL (ref 0–0.1)
BASOPHILS NFR BLD AUTO: 1 % (ref 0–1)
BUN SERPL-MCNC: 18 MG/DL (ref 5–25)
CALCIUM SERPL-MCNC: 9.2 MG/DL (ref 8.3–10.1)
CHLORIDE SERPL-SCNC: 99 MMOL/L (ref 96–108)
CO2 SERPL-SCNC: 37 MMOL/L (ref 21–32)
CREAT SERPL-MCNC: 1.14 MG/DL (ref 0.6–1.3)
EOSINOPHIL # BLD AUTO: 0.31 THOUSAND/ΜL (ref 0–0.61)
EOSINOPHIL NFR BLD AUTO: 4 % (ref 0–6)
ERYTHROCYTE [DISTWIDTH] IN BLOOD BY AUTOMATED COUNT: 13.3 % (ref 11.6–15.1)
GFR SERPL CREATININE-BSD FRML MDRD: 59 ML/MIN/1.73SQ M
GLUCOSE P FAST SERPL-MCNC: 118 MG/DL (ref 65–99)
GLUCOSE SERPL-MCNC: 118 MG/DL (ref 65–140)
HCT VFR BLD AUTO: 44.6 % (ref 36.5–49.3)
HGB BLD-MCNC: 14.5 G/DL (ref 12–17)
IMM GRANULOCYTES # BLD AUTO: 0.03 THOUSAND/UL (ref 0–0.2)
IMM GRANULOCYTES NFR BLD AUTO: 0 % (ref 0–2)
INR PPP: 0.98 (ref 0.84–1.19)
LYMPHOCYTES # BLD AUTO: 1.49 THOUSANDS/ΜL (ref 0.6–4.47)
LYMPHOCYTES NFR BLD AUTO: 18 % (ref 14–44)
MCH RBC QN AUTO: 32.4 PG (ref 26.8–34.3)
MCHC RBC AUTO-ENTMCNC: 32.5 G/DL (ref 31.4–37.4)
MCV RBC AUTO: 100 FL (ref 82–98)
MONOCYTES # BLD AUTO: 0.7 THOUSAND/ΜL (ref 0.17–1.22)
MONOCYTES NFR BLD AUTO: 9 % (ref 4–12)
NEUTROPHILS # BLD AUTO: 5.6 THOUSANDS/ΜL (ref 1.85–7.62)
NEUTS SEG NFR BLD AUTO: 68 % (ref 43–75)
NRBC BLD AUTO-RTO: 0 /100 WBCS
PLATELET # BLD AUTO: 241 THOUSANDS/UL (ref 149–390)
PMV BLD AUTO: 9.2 FL (ref 8.9–12.7)
POTASSIUM SERPL-SCNC: 4.2 MMOL/L (ref 3.5–5.3)
PROTHROMBIN TIME: 13.2 SECONDS (ref 11.6–14.5)
RBC # BLD AUTO: 4.48 MILLION/UL (ref 3.88–5.62)
SODIUM SERPL-SCNC: 140 MMOL/L (ref 135–147)
WBC # BLD AUTO: 8.18 THOUSAND/UL (ref 4.31–10.16)

## 2022-07-19 PROCEDURE — 32408 CORE NDL BX LNG/MED PERQ: CPT | Performed by: RADIOLOGY

## 2022-07-19 PROCEDURE — 85025 COMPLETE CBC W/AUTO DIFF WBC: CPT | Performed by: RADIOLOGY

## 2022-07-19 PROCEDURE — 88341 IMHCHEM/IMCYTCHM EA ADD ANTB: CPT | Performed by: PATHOLOGY

## 2022-07-19 PROCEDURE — 85610 PROTHROMBIN TIME: CPT | Performed by: RADIOLOGY

## 2022-07-19 PROCEDURE — 99152 MOD SED SAME PHYS/QHP 5/>YRS: CPT

## 2022-07-19 PROCEDURE — 88363 XM ARCHIVE TISSUE MOLEC ANAL: CPT | Performed by: PATHOLOGY

## 2022-07-19 PROCEDURE — 88305 TISSUE EXAM BY PATHOLOGIST: CPT | Performed by: PATHOLOGY

## 2022-07-19 PROCEDURE — 88342 IMHCHEM/IMCYTCHM 1ST ANTB: CPT | Performed by: PATHOLOGY

## 2022-07-19 PROCEDURE — 99152 MOD SED SAME PHYS/QHP 5/>YRS: CPT | Performed by: RADIOLOGY

## 2022-07-19 PROCEDURE — 32408 CORE NDL BX LNG/MED PERQ: CPT

## 2022-07-19 PROCEDURE — 80048 BASIC METABOLIC PNL TOTAL CA: CPT | Performed by: RADIOLOGY

## 2022-07-19 PROCEDURE — 77012 CT SCAN FOR NEEDLE BIOPSY: CPT

## 2022-07-19 RX ORDER — SODIUM CHLORIDE 9 MG/ML
30 INJECTION, SOLUTION INTRAVENOUS CONTINUOUS
Status: DISCONTINUED | OUTPATIENT
Start: 2022-07-19 | End: 2022-07-20 | Stop reason: HOSPADM

## 2022-07-19 RX ORDER — LIDOCAINE WITH 8.4% SOD BICARB 0.9%(10ML)
SYRINGE (ML) INJECTION CODE/TRAUMA/SEDATION MEDICATION
Status: COMPLETED | OUTPATIENT
Start: 2022-07-19 | End: 2022-07-19

## 2022-07-19 RX ORDER — MIDAZOLAM HYDROCHLORIDE 2 MG/2ML
INJECTION, SOLUTION INTRAMUSCULAR; INTRAVENOUS CODE/TRAUMA/SEDATION MEDICATION
Status: COMPLETED | OUTPATIENT
Start: 2022-07-19 | End: 2022-07-19

## 2022-07-19 RX ORDER — FENTANYL CITRATE 50 UG/ML
INJECTION, SOLUTION INTRAMUSCULAR; INTRAVENOUS CODE/TRAUMA/SEDATION MEDICATION
Status: COMPLETED | OUTPATIENT
Start: 2022-07-19 | End: 2022-07-19

## 2022-07-19 RX ADMIN — SODIUM CHLORIDE 30 ML/HR: 9 INJECTION, SOLUTION INTRAVENOUS at 09:36

## 2022-07-19 RX ADMIN — FENTANYL CITRATE 50 MCG: 50 INJECTION, SOLUTION INTRAMUSCULAR; INTRAVENOUS at 11:32

## 2022-07-19 RX ADMIN — MIDAZOLAM HYDROCHLORIDE 0.5 MG: 1 INJECTION, SOLUTION INTRAMUSCULAR; INTRAVENOUS at 11:12

## 2022-07-19 RX ADMIN — Medication 10 ML: at 11:20

## 2022-07-19 NOTE — H&P
Interventional Radiology  History and Physical 7/19/2022     Mel Bey   1940   [id number]    Assessment/Plan:  Assessment is PET avid lung lesion in a smoker    Plan is core biopsy  CT guidance, percutaneous approach  Probably come tangential to the pericardium    Problem List Items Addressed This Visit        Other    Pulmonary nodule    Relevant Orders    IR biopsy lung             Subjective:     Patient ID: Mel Bey is a 81 y o  male  History of Present Illness  CT scan shows left lung nodule  PET scan shows PET avid    Review of Systems      Past Medical History:   Diagnosis Date    BPH (benign prostatic hyperplasia)     Chronic obstructive pulmonary disease (COPD) ([facility] [address] [facility]     Essential hypertension     Hard of hearing     Pt does wear hearing aids    Hypertension     Shortness of breath     Pt states not changes and it occurs with moderate exertion    Sleep disturbance     Pt states he is only sleeping about 3 hours a night    Pt is seeing his PCP on 8/5/21 to discuss    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     Pt has on the scalp    Tinnitus         Past Surgical History:   Procedure Laterality Date    SHOULDER OPEN ROTATOR CUFF REPAIR      SKIN GRAFT      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       Current Outpatient Medications   Medication Sig Dispense Refill    Ascorbic Acid (VITAMIN C PO) Take 1 tablet by mouth daily       cetirizine (ZyrTEC) 10 mg tablet Take 10 mg by mouth daily      Cholecalciferol (Vitamin D3) 50 MCG (2000 UT) TABS       diltiazem (CARDIZEM CD) 300 mg 24 hr capsule Take 1 capsule (300 mg total) by mouth daily 90 capsule 3    docusate sodium (COLACE) 100 mg capsule Take 100 mg by mouth       finasteride (PROSCAR) 5 mg tablet Take 5 mg by mouth daily       fluticasone-salmeterol (ADVAIR, WIXELA) 250-50 mcg/dose inhaler Inhale 1 puff 2 (two) times a day Rinse mouth after use   furosemide (LASIX) 80 mg tablet Take 1 tablet (80 mg total) by mouth daily 90 tablet 3    magnesium oxide (MAG-OX) 400 mg tablet Take 1 tablet by mouth daily      montelukast (SINGULAIR) 10 mg tablet Take 10 mg by mouth daily at bedtime      Multiple Vitamin (MULTIVITAMINS PO) Take 1 tablet by mouth daily      Spiriva Respimat 2 5 MCG/ACT AERS inhaler Inhale 2 puffs daily       apixaban (ELIQUIS) 5 mg Take 1 tablet (5 mg total) by mouth 2 (two) times a day 180 tablet 3    bisacodyl (DULCOLAX) 5 mg EC tablet Take 5 mg by mouth daily        capsaicin (ZOSTRIX) 0 025 % cream       Magnesium 500 MG CAPS Take 500 mg by mouth daily (Patient not taking: Reported on 6/29/2022)      mupirocin (BACTROBAN) 2 % ointment       nitroglycerin (NITROSTAT) 0 4 mg SL tablet       sildenafil (VIAGRA) 100 mg tablet Take 1 tablet (100 mg total) by mouth daily as needed for erectile dysfunction 4 tablet 11     Current Facility-Administered Medications   Medication Dose Route Frequency Provider Last Rate Last Admin    sodium chloride 0 9 % infusion  30 mL/hr Intravenous Continuous Fany Barger MD 30 mL/hr at 07/19/22 0936 30 mL/hr at 07/19/22 0936          Objective:    Vitals:    07/19/22 0921   BP: 139/70   Pulse: (!) 54   Resp: 20   Temp: (!) 97 2 °F (36 2 °C)   TempSrc: Tympanic   SpO2: 94%        Physical Exam      Coughing during preprocedure discussion  He is concerned about pneumothorax  I explained him that pneumothorax rate in all, wrist was 15%    Explained to him that this number would be higher in a smoker, and a lower lobe lesion  Informed him that pneumothorax rates of up to 40% are still considered acceptable by Society of interventional radiology  I informed him that mean admission after chest tube placement is 3 3 days  I informed him that 50% of people would stay in the hospital longer than 3 3 days  Regular rate and rhythm  Normal respiratory excursion    No results found for: BNP   Lab Results   Component Value Date    WBC 8 18 07/19/2022    HGB 14 5 07/19/2022    HCT 44 6 07/19/2022     (H) 07/19/2022     07/19/2022     Lab Results   Component Value Date    INR 0 98 07/19/2022    INR 1 13 06/08/2022    INR 1 57 (H) 08/09/2021    PROTIME 13 2 07/19/2022    PROTIME 14 4 06/08/2022    PROTIME 18 4 (H) 08/09/2021     Lab Results   Component Value Date    PTT 34 06/08/2022         I have personally reviewed pertinent imaging and laboratory results  Code Status: Prior  Advance Directive and Living Will:      Power of :    POLST:      This text is generated with voice recognition software  There may be translation, syntax,  or grammatical errors  If you have any questions, please contact the dictating provider

## 2022-07-19 NOTE — PROCEDURES
Interventional Radiology Procedure Note    PATIENT NAME: Abida Rahman  : 1940  MRN: [id number]     Pre-op Diagnosis:   1  Pulmonary nodule      2  PET avid pulmonary nodule    Post-op Diagnosis:   1  Pulmonary nodule      2     Same    Procedure: CT Guided Biopsy of the lingula  Surgeon: Bonnie Araiza MD  Assistants: No qualified resident was available, Resident is only observing  Estimated Blood Loss: 10 cc  Findings: Please see full report in PACS  Specimens: Please see full report in PACS  Complications: None  Anesthesia: conscious sedation and local  Prep: 2% Chlorhexidine and alcohol, allowed to dry prior to sterile draping  Timeout: Performed  Fluoro time: Please see full report in PACS  Radiation dose: Please see full report in PACS  Contrast dose: Please see full report in PACS  Contrast type: Please see full report in PACS  Contrast strength: Please see full report in PACS  Contrast route of administration: Please see full report in PACS  Antibiotics: None        Bonnie Araiza MD     Date: 2022  Time: 1:20 PM

## 2022-07-19 NOTE — DISCHARGE INSTRUCTIONS
[facility]  Interventional Radiology  Dr Lisseth Light  [phone]                 Needle Biopsy of the Lung    WHAT YOU NEED TO KNOW:  A needle biopsy of the lung is a procedure to remove cells or tissue from your lung  You may have a fine needle aspiration biopsy (FNAB), or a core needle biopsy (CNB)  A FNAB is used to remove cells through a thin needle  CNB uses a thicker needle to remove lung tissue  The samples are collected and tested for inflammation, infection, or cancer  DISCHARGE INSTRUCTIONS:   Resume your normal diet  Small sips of flat soda will help with nausea  Limit your activity for 24 hours  Wound Care:      - Remove band aid in 24 hours      Contact Interventional Radiology at [phone] Armen PATIENTS: Contact Interventional Radiology at [phone]) Kimmy Risesylvester PATIENTS: Contact Interventional Radiology at [phone]) if any of the following occur:    - You have a fever greater than 101*    - You cough up large amounts of bright red blood     - You have chest pain with breathing    - You have shortness of breath    -You have persistent nausea and vomiting    - You have pus, redness or swelling around your biopsy site    - You have questions or concerns about your condition or care

## 2022-07-29 ENCOUNTER — TELEPHONE (OUTPATIENT)
Dept: CARDIOLOGY CLINIC | Facility: CLINIC | Age: 82
End: 2022-07-29

## 2022-07-29 ENCOUNTER — TELEPHONE (OUTPATIENT)
Dept: PULMONOLOGY | Facility: HOSPITAL | Age: 82
End: 2022-07-29

## 2022-07-29 NOTE — TELEPHONE ENCOUNTER
Can you please let patient know biopsy had to be sent out for expert consultation/diagnosis will call with results

## 2022-08-02 ENCOUNTER — TELEPHONE (OUTPATIENT)
Dept: CARDIOLOGY CLINIC | Facility: CLINIC | Age: 82
End: 2022-08-02

## 2022-08-02 NOTE — TELEPHONE ENCOUNTER
Pt RACHEAL stating that he would like to know if he needs to fast for the BW that he is having this week  Please call pt and let him knw

## 2022-08-04 DIAGNOSIS — C80.1 MALIGNANT SPINDLE CELL NEOPLASM (HCC): Primary | ICD-10-CM

## 2022-08-04 NOTE — TELEPHONE ENCOUNTER
Called patient reviewed results of IR biopsy  Findings consistent with spindle cell neoplasm but final diagnosis pending outside expert consultation  Recommended referral to Medical Oncology and he was agreeable to this  Order placed  Answered all questions

## 2022-08-05 ENCOUNTER — TELEPHONE (OUTPATIENT)
Dept: CARDIOLOGY CLINIC | Facility: CLINIC | Age: 82
End: 2022-08-05

## 2022-08-05 ENCOUNTER — APPOINTMENT (OUTPATIENT)
Dept: LAB | Facility: HOSPITAL | Age: 82
End: 2022-08-05
Payer: MEDICARE

## 2022-08-05 DIAGNOSIS — I50.42 CHRONIC COMBINED SYSTOLIC AND DIASTOLIC CHF (CONGESTIVE HEART FAILURE) (HCC): ICD-10-CM

## 2022-08-05 LAB
ANION GAP SERPL CALCULATED.3IONS-SCNC: 4 MMOL/L (ref 4–13)
BUN SERPL-MCNC: 16 MG/DL (ref 5–25)
CALCIUM SERPL-MCNC: 9.2 MG/DL (ref 8.3–10.1)
CHLORIDE SERPL-SCNC: 99 MMOL/L (ref 96–108)
CO2 SERPL-SCNC: 36 MMOL/L (ref 21–32)
CREAT SERPL-MCNC: 1.08 MG/DL (ref 0.6–1.3)
GFR SERPL CREATININE-BSD FRML MDRD: 64 ML/MIN/1.73SQ M
GLUCOSE P FAST SERPL-MCNC: 106 MG/DL (ref 65–99)
POTASSIUM SERPL-SCNC: 4.1 MMOL/L (ref 3.5–5.3)
SODIUM SERPL-SCNC: 139 MMOL/L (ref 135–147)

## 2022-08-05 PROCEDURE — 80048 BASIC METABOLIC PNL TOTAL CA: CPT

## 2022-08-05 PROCEDURE — 36415 COLL VENOUS BLD VENIPUNCTURE: CPT

## 2022-08-05 NOTE — TELEPHONE ENCOUNTER
----- Message from Carly Asher sent at 8/5/2022 12:08 PM EDT -----  Please let patient know that his kidney function and electrolytes are stable  Thank you!

## 2022-08-09 ENCOUNTER — TELEPHONE (OUTPATIENT)
Dept: HEMATOLOGY ONCOLOGY | Facility: CLINIC | Age: 82
End: 2022-08-09

## 2022-08-11 ENCOUNTER — PATIENT OUTREACH (OUTPATIENT)
Dept: HEMATOLOGY ONCOLOGY | Facility: CLINIC | Age: 82
End: 2022-08-11

## 2022-08-11 NOTE — PROGRESS NOTES
Intake received, chart reviewed  8/11/2022  Pathology completed: 7/19/22 [facility]  Imaging completed: PET/CT 6/21/22, Head CT 6/8/22, PFT 11/2/21 [facility]      Outside imaging: 10/15/21 [facility],       -Impression available in care everywhere  Requesting imaging via [facility] into PACs Via fax [phone]

## 2022-08-15 ENCOUNTER — DOCUMENTATION (OUTPATIENT)
Dept: HEMATOLOGY ONCOLOGY | Facility: CLINIC | Age: 82
End: 2022-08-15

## 2022-08-15 NOTE — PROGRESS NOTES
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, underwent excision by surgery in September 2021 6/2/2022 CT chest showed multiple new left-sided pulmonary nodules compared to prior studies  6/21/2022 PET/CT showed a FDG avid lingular nodule suspicious for malignancy  He underwent IR biopsy on 7/19/2022  PHYSICIAN RECOMMENDED PLAN:  Reschedule consult with Dr El Hickman if final pathology is not complete prior to appointment  Imaging reviewed:   6/21/2022 PET/CT-FDG avid lingular nodule suspicious for malignancy  Variable mild FDG uptake in additional left lung pulmonary nodules may represent metastasis  Mild FDG uptake in the left perihilar region for which efra metastasis is not excluded  6/2/2022 CT chest    Pathology reviewed:   Preliminary Diagnosis  7/19/2022 Left lung mass-Malignant appearing spindle cell neoplasm  PFT's reviewed:  No    Future imaging:  None at this time  Referrals:  None at this time  Procedures:  TBD    Team agreed to plan  NCCN guidelines were readily available for review at this discussion    The final treatment plan will be left to the discretion of the patient and the treating physician  DISCLAIMERS:  TO THE TREATING PHYSICIAN:  This conference is a meeting of clinicians from various specialty areas who evaluate and discuss patients for whom a multidisciplinary treatment approach is being considered  Please note that the above opinion was a consensus of the conference attendees and is intended only to assist in quality care of the discussed patient    The responsibility for follow up on the input given during the conference, along with any final decisions regarding plan of care, is that of the patient and the patient's provider  Accordingly, appointments have only been recommended based on this information and have NOT been scheduled unless otherwise noted  TO THE PATIENT:  This summary is a brief record of major aspects of your cancer treatment  You may choose to share a copy with any of your doctors or nurses  However, this is not a detailed or comprehensive record of your care

## 2022-08-17 ENCOUNTER — TELEPHONE (OUTPATIENT)
Dept: CARDIAC SURGERY | Facility: CLINIC | Age: 82
End: 2022-08-17

## 2022-08-17 NOTE — TELEPHONE ENCOUNTER
LVM for patient letting him know I would be canceling his appt with Dr Kathlean Holstein on 8/18 and we would like to reschedule to 8/29 at 1030  Asked him to call back to confirm

## 2022-08-18 ENCOUNTER — PATIENT OUTREACH (OUTPATIENT)
Dept: HEMATOLOGY ONCOLOGY | Facility: CLINIC | Age: 82
End: 2022-08-18

## 2022-08-18 NOTE — PROGRESS NOTES
Images records received from: 8/18/22 [facility] loaded into PAC's    Note routed to team

## 2022-08-29 ENCOUNTER — TELEPHONE (OUTPATIENT)
Dept: HEMATOLOGY ONCOLOGY | Facility: CLINIC | Age: 82
End: 2022-08-29

## 2022-08-29 NOTE — TELEPHONE ENCOUNTER
Appointment Cancellation Or Reschedule     Person calling in Patient    Provider Dr Abimbola Anderson   Office Visit Date and Time  8/31/22 11:20am   Office Visit Location [facility]   Did patient want to reschedule their office appointment? If so, when was it scheduled to? Yes, 9/19/22 11:20am   Did you have STAR scheduled for this appointment? no   Do you need STAR set up for your new appointment? If yes, please send to "PATIENT RIDESHARE" pool for STAR rescheduling no   If you are cancelling appointment, can we notify STAR to cancel ride? If yes, please send to "PATIENT RIDESHARE" pool for STAR to cancel service no   Is this patient calling to reschedule an infusion appointment? no   When is their next infusion appointment? no   Is this patient a Chemo patient? no   Reason for Cancellation or Reschedule He lives far and unable to make Wednesday appt  If the patient is a treatment patient, please route this to the office nurse  If the patient is not on treatment, please route to the office MA  If the patient is a surgical oncology patient, please route to surg/onc clinical pool

## 2022-09-07 ENCOUNTER — TELEPHONE (OUTPATIENT)
Dept: PULMONOLOGY | Facility: CLINIC | Age: 82
End: 2022-09-07

## 2022-09-07 NOTE — TELEPHONE ENCOUNTER
Patient called stating he needs to get a new cpap through the [facility] as medicare is refusing to pay for his current machine  He requested I sent over everything pertaining to his cpap to the [facility] at [phone]  He stated his [facility] doctor will place the orders he just needed notes stating he needs a cpap   I have faxed over his overnight pulse ox, split study order, and his last two office notes

## 2022-09-08 NOTE — TELEPHONE ENCOUNTER
Pt called back in stating the [facility] is unable to find the documents that were faxed over  I have re-faxed to a fax number the pt provided of [phone]  Sent over the Split study order, along with the last two office notes and his overnight pulse ox

## 2022-09-14 ENCOUNTER — OFFICE VISIT (OUTPATIENT)
Dept: CARDIAC SURGERY | Facility: CLINIC | Age: 82
End: 2022-09-14
Payer: MEDICARE

## 2022-09-14 VITALS
WEIGHT: 178.57 LBS | DIASTOLIC BLOOD PRESSURE: 75 MMHG | SYSTOLIC BLOOD PRESSURE: 107 MMHG | RESPIRATION RATE: 17 BRPM | TEMPERATURE: 98.4 F | OXYGEN SATURATION: 93 % | BODY MASS INDEX: 26.45 KG/M2 | HEIGHT: 69 IN | HEART RATE: 48 BPM

## 2022-09-14 DIAGNOSIS — R91.1 PULMONARY NODULE: Primary | ICD-10-CM

## 2022-09-14 PROCEDURE — 99205 OFFICE O/P NEW HI 60 MIN: CPT | Performed by: THORACIC SURGERY (CARDIOTHORACIC VASCULAR SURGERY)

## 2022-09-14 NOTE — LETTER
September 14, 2022     Noah [facility]attchadwick [facility]  [address] [address] [place]    Patient: Zhen Marina   YOB: 1940   Date of Visit: 9/14/2022       Dear Dr Nnamdi Hodgson:    Thank you for referring Evelyn Parkinson to me for evaluation  Below are my notes for this consultation  If you have questions, please do not hesitate to call me  I look forward to following your patient along with you  Sincerely,        Guillaume Renner MD        CC: Anya Delaney DO  [facility] JULIA Cherry MD Lucillie Hidalgo, [place]  9/14/2022 11:41 AM  Attested  Thoracic Consult  Assessment/Plan:    Pulmonary nodule  Mr Boy Brito has a lingula nodule that was biopsied and consistent with a malignant spindle cell neoplasm  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 for the third time  He has a few other nodules in the left upper lobe that may be consistent with metastatic disease  His PFTs from last year are significantly reduced and he does require oxygen with exertion, up to 5L  Typically lobectomy would be recommended; however given his poor pulmonary function, this would be debilitating  Rather, Dr Opal Huddleston is recommending a robotic left upper lobe wedge resection, possible segmentectomy  This was fully discussed  He would likely need adjuvant chemotherapy  We discussed possibility of treating this with chemotherapy or radiation  Patient would like to talk with med onc at this time  He has an appointment with Dr Abimbola Anderson on Monday  We will make a decision about surgery after that appointment  Diagnoses and all orders for this visit:    Pulmonary nodule          Thoracic History   Diagnosis: Spindle cell neoplasm of lingula    Procedures/Surgeries:    Pathology:    Adjuvant Therapy:       Subjective:    Patient ID: Zhen Marina is a 81 y o  male  HPI   Mr Boy Brito is an 81year old man referred to our office by Jodie Lu for evaluation of a lingula mass  Chest CT 6/2/22 demonstrated a 1 8x2 2cm lingula nodule and a few new subcentimeter nodules in the left upper lobe  Pet-CT 6/21/22 shows uptake in the lingula nodule with SUV 14 0  There was variable mild FDG uptake in the other nodules  There is mild uptake in the left perihilar region  IR biopsy 7/19/22 and pathology was consistent with a malignant spindle cell neoplasm  He is smoking a cigar now and then  Never a cigarette smoker  He is on Eliquis for Afib  On discussion, he reports shortness of breath on exertion over the last year  He uses oxygen on occasion with exertion 5L  He cannot climb half a mile and he does not climb stairs  He denies fevers, chills, weight loss, chest pain, new bone pains, headaches or vision changes  He has a cough occasionally with some phlegm, no hemoptysis  PFTs 11/10/2021 show an FEV1 33% and DLCO 30%  The following portions of the patient's history were reviewed and updated as appropriate: allergies, current medications, past family history, past medical history, past social history, past surgical history and problem list     Past Medical History:   Diagnosis Date    BPH (benign prostatic hyperplasia)     Chronic obstructive pulmonary disease (COPD) ([address] )     Essential hypertension     Hard of hearing     Pt does wear hearing aids    Hypertension     Shortness of breath     Pt states not changes and it occurs with moderate exertion    Sleep disturbance     Pt states he is only sleeping about 3 hours a night  Pt is seeing his PCP on 8/5/21 to discuss    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    Tinnitus       Past Surgical History:   Procedure Laterality Date    IR BIOPSY LUNG  7/19/2022    SHOULDER OPEN ROTATOR CUFF REPAIR      SKIN GRAFT      spindle cell removal of scalp with skin graft from shoulder    VEIN SURGERY        History reviewed  No pertinent family history  Social History     Socioeconomic History    Marital status: /Civil Union     Spouse name: Not on file    Number of children: Not on file    Years of education: Not on file    Highest education level: Not on file   Occupational History    Occupation: Retired   Tobacco Use    Smoking status: Former Smoker     Types: Cigars    Smokeless tobacco: Never Used   Vaping Use    Vaping Use: Never used   Substance and Sexual Activity    Alcohol use: Yes     Alcohol/week: 14 0 standard drinks     Types: 14 Glasses of wine per week     Comment: moderate consumption    Drug use: Not Currently    Sexual activity: Yes   Other Topics Concern    Not on file   Social History Narrative    Not on file     Social Determinants of Health     Financial Resource Strain: Not on file   Food Insecurity: Not on file   Transportation Needs: Not on file   Physical Activity: Not on file   Stress: Not on file   Social Connections: Not on file   Intimate Partner Violence: Not on file   Housing Stability: Not on file      Allergies   Allergen Reactions    Penicillin G Other (See Comments)     PCN Shots Only!!     Current Outpatient Medications   Medication Instructions    apixaban (ELIQUIS) 5 mg, Oral, 2 times daily    Ascorbic Acid (VITAMIN C PO) 1 tablet, Oral, Daily    bisacodyl (DULCOLAX) 5 mg, Oral, Daily    capsaicin (ZOSTRIX) 0 025 % cream No dose, route, or frequency recorded   cetirizine (ZYRTEC) 10 mg, Oral, Daily    Cholecalciferol (Vitamin D3) 50 MCG (2000 UT) TABS No dose, route, or frequency recorded   diltiazem (CARDIZEM CD) 300 mg, Oral, Daily    docusate sodium (COLACE) 100 mg, Oral    finasteride (PROSCAR) 5 mg, Oral, Daily    fluticasone-salmeterol (ADVAIR, WIXELA) 250-50 mcg/dose inhaler 1 puff, Inhalation, 2 times daily, Rinse mouth after use       furosemide (LASIX) 80 mg, Oral, Daily    magnesium oxide (MAG-OX) 400 mg tablet 1 tablet, Oral, Daily    Magnesium 500 mg, Daily    montelukast (SINGULAIR) 10 mg, Oral, Daily at bedtime    Multiple Vitamin (MULTIVITAMINS PO) 1 tablet, Oral, Daily    mupirocin (BACTROBAN) 2 % ointment No dose, route, or frequency recorded   nitroglycerin (NITROSTAT) 0 4 mg SL tablet No dose, route, or frequency recorded   sildenafil (VIAGRA) 100 mg, Oral, Daily PRN    Spiriva Respimat 2 5 MCG/ACT AERS inhaler 2 puffs, Inhalation, Daily       Review of Systems   Constitutional: Negative  Eyes: Negative  Respiratory: Positive for cough, shortness of breath and wheezing  Cardiovascular: Negative  Gastrointestinal: Negative  Musculoskeletal: Positive for arthralgias  Skin: Negative  Neurological: Negative  Hematological: Bruises/bleeds easily  Psychiatric/Behavioral: Negative  Objective:   Physical Exam  Vitals reviewed  Constitutional:       General: He is not in acute distress  Appearance: Normal appearance  He is well-developed  He is not diaphoretic  HENT:      Head: Normocephalic and atraumatic  Mouth/Throat:      Comments: Masked   Eyes:      General: No scleral icterus  Extraocular Movements: Extraocular movements intact  Neck:      Trachea: No tracheal deviation  Cardiovascular:      Rate and Rhythm: Normal rate and regular rhythm  Pulses: Normal pulses  Heart sounds: Normal heart sounds  No murmur heard  Pulmonary:      Effort: Pulmonary effort is normal  No respiratory distress  Breath sounds: Wheezing present  Abdominal:      General: Bowel sounds are normal  There is no distension  Palpations: Abdomen is soft  Musculoskeletal:         General: Normal range of motion  Cervical back: Normal range of motion and neck supple  Right lower leg: No edema  Left lower leg: No edema  Lymphadenopathy:      Cervical: No cervical adenopathy  Skin:     General: Skin is warm and dry  Findings: No erythema     Neurological:      Mental Status: He is alert and oriented to person, place, and time  Cranial Nerves: No cranial nerve deficit  Psychiatric:         Mood and Affect: Mood normal          Behavior: Behavior normal          Thought Content: Thought content normal      /75   Pulse (!) 48   Temp 98 4 °F (36 9 °C)   Resp 17   Ht 5' 9" (1 753 m)   Wt 81 kg (178 lb 9 2 oz)   SpO2 93%   BMI 26 37 kg/m²       CT chest wo contrast    Result Date: 6/6/2022  Narrative CT CHEST WITHOUT IV CONTRAST INDICATION:   R91 1: Solitary pulmonary nodule  COMPARISON:  8/14/2021 TECHNIQUE: CT examination of the chest was performed without intravenous contrast  This examination was performed without intravenous contrast in the context of the critical nationwide Omnipaque shortage  Axial, sagittal, and coronal 2D reformatted images were created from the source data and submitted for interpretation  Radiation dose length product (DLP) for this visit:  272 66 mGy-cm   This examination, like all CT scans performed in the [facility], was performed utilizing techniques to minimize radiation dose exposure, including the use of iterative  reconstruction and automated exposure control  FINDINGS: LUNGS:  Multiple lung nodules are seen, measured on series 3:  5 mm nodule in the left upper lobe on image 24, new 5 mm nodule in the left upper lobe on image 28, stable 6 mm nodule in the left upper lobe on image 40, new 8 mm nodule in the left upper lobe on image 50, new 1 8 x 2 2 cm nodule in the lingula on image 93, new There is right greater than left basilar tree-in-bud opacity with additional patchy foci and subpleural cystic change on the right, similar to the prior study  There is new tree-in-bud opacity in the right middle and upper lobe, mildly increased though the previous study was degraded by respiratory motion  There is moderate centrilobular emphysema  There is no tracheal or endobronchial lesion  PLEURA:  There is a trace right pleural effusion   HEART/GREAT VESSELS: Heart is unremarkable for patient's age  There is fusiform ectasia of the ascending thoracic aorta measuring up to 40 mm  Recommendation is for follow-up low radiation dose chest CT in one year  There is stable pulmonary artery enlargement MEDIASTINUM AND GABBIE:  There are calcified mediastinal and hilar lymph nodes  CHEST WALL AND LOWER NECK:  Unremarkable  VISUALIZED STRUCTURES IN THE UPPER ABDOMEN:  There is a simple hepatic cyst  OSSEOUS STRUCTURES:  No acute fracture or destructive osseous lesion  Impression 1  Multiple new pulmonary nodules on the left though the previously seen nodule is stable  The largest measures 2 2 cm in the lingula  The appearance is suspicious for metastasis and correlation with primary malignancy is recommended  Further evaluation with biopsy, PET/CT, or short interval follow-up CT is recommended  2   Tree-in-bud nodularity in the lower lobes and right upper/middle lobe, stable to mildly increased from the prior study  There is associated bibasilar patchy opacity with subpleural cystic change on the right  This appearance is likely secondary to a chronic inflammatory process such as RISHABH  3   Multiple calcified mediastinal and hilar lymph nodes consistent with old granulomatous disease  4   Stable pulmonary artery enlargement likely associated with pulmonary hypertension  5   Stable ectatic ascending aorta measuring 4 cm  The study was marked in EPIC for significant notification  Workstation performed: [id number]     No CT Chest,Abdomen,Pelvis results available for this patient  NM PET CT skull base to mid thigh    Result Date: 8/12/2022  Narrative 7 61 [id number] 9 [id number]    NM PET CT skull base to mid thigh    Result Date: 6/21/2022  Narrative PET/CT SCAN INDICATION: Multiple new pulmonary nodules    D38 1: Neoplasm of uncertain behavior of trachea, bronchus and lung MODIFIER: PI COMPARISON: CT chest 6/2/2022 CELL TYPE: NA TECHNIQUE:   8 7 mCi F-18-FDG administered IV  Multiplanar attenuation corrected and non attenuation corrected PET images were acquired 60 minutes post injection  Contiguous, low dose, axial CT sections were obtained from the skull base through the femurs   Intravenous contrast material was not utilized  This examination, like all CT scans performed in the [facility], was performed utilizing techniques to minimize radiation dose exposure, including the use of iterative reconstruction and automated exposure control  Fasting serum glucose: 111 mg/dl FINDINGS: VISUALIZED BRAIN:   No acute abnormalities are seen  HEAD/NECK:   Somewhat asymmetric linear radiotracer uptake in the bilateral neck musculature is likely physiologic  No FDG avid cervical adenopathy is seen  CT images: Unremarkable  CHEST:   FDG avid lingular nodule, SUV max of 14 0  Nodule now measures 2 4 x 2 0 cm image 90 series 4 may be slightly larger previously 2 2 x 1 8 cm  Additional smaller nodules in the left lung with variable FDG uptake  A left upper lobe nodule centrally demonstrates SUV max of 2 4  A 7 x 4 mm nodule was noted in this region on prior chest CT  Mild patchy FDG uptake in the left upper lobe anteromedially where there is increased groundglass density on CT, SUV max of 3 1 likely inflammatory  Asymmetric left perihilar activity superiorly, SUV max of 2 9  CT images: Peripheral tree-in-bud opacities bilaterally  Underlying emphysematous changes of the lung fields  Borderline ectatic ascending thoracic aorta  Dilated main pulmonary artery  Calcified lymph nodes in the mediastinum and bilateral perihilar  regions  Moderate coronary artery calcifications  ABDOMEN:   No FDG avid soft tissue lesions are seen  CT images: Stable hepatic cyst in the right lobe posteriorly  Mildly ectatic abdominal aorta  Colonic diverticulosis   PELVIS: Small focus of FDG uptake at the anterior-inferior prostate gland, SUV max of 3 4   No obvious findings on limited CT  See image 141 series 603  No FDG avid lymph nodes  CT images: Mildly prominent prostate  OSSEOUS STRUCTURES: No FDG avid lesions are seen  CT images: Multilevel degenerative spurring of the spine  Impression 1  FDG avid lingular nodule suspicious for malignancy  Correlate with tissue sampling  2   Variable mild FDG uptake in additional left lung pulmonary nodules may represent metastasis  3   Mild FDG uptake in the left perihilar region for which efra metastasis is not excluded  4  Small focus of mild FDG uptake at the anterior inferior prostate gland  Correlate with PSA levels and urologic evaluation to exclude possible prostate malignancy  The study was marked in EPIC for significant notification  Workstation performed: [id number]     Attestation signed by Tatyana Cardoso MD at 9/14/2022 12:58 PM:  Thoracic surgery attending note    Patient seen with PA this morning  He comes accompanied by his wife  Bridget Chen is a 27-year-old male with history of COPD on intermittent oxygen, atrial fibrillation on Eliquis, combined systolic and diastolic heart failure, and a previous scalp spindle cell neoplasm we are seeing in consultation for a biopsy-proven left upper lobe lingular spindle cell neoplasm  He had an abnormal scalp lesion that was surgically excised  This required 3 separate excisions most recently in April of 2022  This was a spindle cell neoplasm  He had subsequent imaging for this atypical cancer  Currently he denies any major complaints  He has chronic shortness of breath if he walks a distance  He uses oxygen with 5 L  He does not do stairs  He cannot walk more than a few minutes without getting short of breath  I reviewed pathology reports with him from previous scalp biopsies/excision  This is consistent with a pleomorphic dermal sarcoma/spindle cell neoplasm  I reviewed the left upper lobe IR biopsy of the lung lesion    This was also consistent with a malignant appearing spindle cell neoplasm    I reviewed imaging including a CT of the chest from June of 2022 and PET scan from 06/21/2022  There are multiple lung nodule seen on the scans that are new from the previous year  The largest is 1 8 x 2 2 cm in the lingula  There are an 8 mm left upper lobe nodule, 6 mm left upper lobe nodule, and 5 mm left upper lobe nodule that are all new  The lingular dominant lesion has an SUV of 14  There is a 7 mm left upper lobe nodule with an SUV of 2 4  No other definite distant areas of disease    We reviewed his PFTs  from November 2021  FEV1 is 0 94 L or 34% predicted  DLCO 30% predicted  Assessment - 27-year-old male with 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 in the setting of severe COPD on intermittent oxygen    Plan  I had a long discussion with Lamar Nassar and his wife today about his lung lesions  He has a dominant peripheral 2 4 cm left upper lobe lingular lesion  This was biopsied and is consistent with a spindle cell neoplasm  I suspect this is metastatic from his scalp  He has numerous other left upper lobe lesions  These are too small to biopsy at this time  I also has a suspect these are consistent with metastatic disease  Unfortunately his pulmonary function testing is prohibitive to allow a left upper lobectomy  I think that would render him a pulmonary cripple and increases risk of complications, inability to extubate, need for tracheostomy, and long-term oxygen requirements for even minimal activity  I think we could consider multiple wedge resections in the left upper lobe as well as mediastinal lymph node dissection  I asked we would potentially remove up to 10% of his overall lung volume  I suspect he could tolerate this but certainly is respiratory function would decline  He is following up with Medical Oncology Dr Chel Martínez next week    Will hold off on definitive plan until he speaks with Dr Maddox Quincy  I am unclear if only removing a portion of these lung nodules, likely metastatic disease, would be beneficial to his long-term survival   We will make final plans after medical oncology consultation      Maryan Lisa MD

## 2022-09-14 NOTE — ASSESSMENT & PLAN NOTE
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 for the third time  He has a few other nodules in the left upper lobe that may be consistent with metastatic disease  His PFTs from last year are significantly reduced and he does require oxygen with exertion, up to 5L  Typically lobectomy would be recommended; however given his poor pulmonary function, this would be debilitating  Rather, Dr Kristi Merida is recommending a robotic left upper lobe wedge resection, possible segmentectomy  This was fully discussed  He would likely need adjuvant chemotherapy  We discussed possibility of treating this with chemotherapy or radiation  Patient would like to talk with med onc at this time  He has an appointment with Dr Cathy Scott on Monday  We will make a decision about surgery after that appointment

## 2022-09-14 NOTE — PROGRESS NOTES
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  He has a few other nodules in the left upper lobe that may be consistent with metastatic disease  His PFTs from last year are significantly reduced and he does require oxygen with exertion, up to 5L  Typically lobectomy would be recommended; however given his poor pulmonary function, this would be debilitating  Rather, Dr Hal Ku is recommending a robotic left upper lobe wedge resection, possible segmentectomy  This was fully discussed  He would likely need adjuvant chemotherapy  We discussed possibility of treating this with chemotherapy or radiation  Patient would like to talk with med onc at this time  He has an appointment with Dr Mary Dodson on Monday  We will make a decision about surgery after that appointment  Diagnoses and all orders for this visit:    Pulmonary nodule          Thoracic History   Diagnosis: Spindle cell neoplasm of lingula    Procedures/Surgeries:    Pathology:    Adjuvant Therapy:       Subjective:    Patient ID: Trinity Hickman is a 81 y o  male  HPI   Mr Saturnino Reed is an 81year old man referred to our office by Mckinley Carrizales for evaluation of a lingula mass  Chest CT 6/2/22 demonstrated a 1 8x2 2cm lingula nodule and a few new subcentimeter nodules in the left upper lobe  Pet-CT 6/21/22 shows uptake in the lingula nodule with SUV 14 0  There was variable mild FDG uptake in the other nodules  There is mild uptake in the left perihilar region  IR biopsy 7/19/22 and pathology was consistent with a malignant spindle cell neoplasm  He is smoking a cigar now and then  Never a cigarette smoker  He is on Eliquis for Afib  On discussion, he reports shortness of breath on exertion over the last year   He uses oxygen on occasion with exertion 5L  He cannot climb half a mile and he does not climb stairs  He denies fevers, chills, weight loss, chest pain, new bone pains, headaches or vision changes  He has a cough occasionally with some phlegm, no hemoptysis  PFTs 11/10/2021 show an FEV1 33% and DLCO 30%  The following portions of the patient's history were reviewed and updated as appropriate: allergies, current medications, past family history, past medical history, past social history, past surgical history and problem list     Past Medical History:   Diagnosis Date    BPH (benign prostatic hyperplasia)     [facility] obstructive pulmonary disease [facility]D) ([facility] [address] [facility]     Essential hypertension     Hard of hearing     Pt does wear hearing aids    Hypertension     Shortness of breath     Pt states not changes and it occurs with moderate exertion    Sleep disturbance     Pt states he is only sleeping about 3 hours a night  Pt is seeing his PCP on 8/5/21 to discuss    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[facility] [address] )     Pt has on the scalp    Tinnitus       Past Surgical History:   Procedure Laterality Date    IR BIOPSY LUNG  7/19/2022    SHOULDER OPEN ROTATOR CUFF REPAIR      SKIN GRAFT      spindle cell removal of scalp with skin graft from shoulder    VEIN SURGERY        History reviewed  No pertinent family history  Social History     Socioeconomic History    Marital status: /Civil Union     Spouse name: Not on file    Number of children: Not on file    Years of education: Not on file    Highest education level: Not on file   Occupational History    Occupation: Retired   Tobacco Use    Smoking status: Former Smoker     Types: Cigars    Smokeless tobacco: Never Used   Vaping Use    Vaping Use: Never used   Substance and Sexual Activity    Alcohol use:  Yes     Alcohol/week: 14 0 standard drinks     Types: 14 Glasses of wine per week     Comment: moderate consumption    Drug use: Not Currently    Sexual activity: Yes   Other Topics Concern    Not on file   Social History Narrative    Not on file     Social Determinants of Health     Financial Resource Strain: Not on file   Food Insecurity: Not on file   Transportation Needs: Not on file   Physical Activity: Not on file   Stress: Not on file   Social Connections: Not on file   Intimate Partner Violence: Not on file   Housing Stability: Not on file      Allergies   Allergen Reactions    Penicillin G Other (See Comments)     PCN Shots Only!!     Current Outpatient Medications   Medication Instructions    apixaban (ELIQUIS) 5 mg, Oral, 2 times daily    Ascorbic Acid (VITAMIN C PO) 1 tablet, Oral, Daily    bisacodyl (DULCOLAX) 5 mg, Oral, Daily    capsaicin (ZOSTRIX) 0 025 % cream No dose, route, or frequency recorded   cetirizine (ZYRTEC) 10 mg, Oral, Daily    Cholecalciferol (Vitamin D3) 50 MCG (2000 UT) TABS No dose, route, or frequency recorded   diltiazem (CARDIZEM CD) 300 mg, Oral, Daily    docusate sodium (COLACE) 100 mg, Oral    finasteride (PROSCAR) 5 mg, Oral, Daily    fluticasone-salmeterol (ADVAIR, WIXELA) 250-50 mcg/dose inhaler 1 puff, Inhalation, 2 times daily, Rinse mouth after use   furosemide (LASIX) 80 mg, Oral, Daily    magnesium oxide (MAG-OX) 400 mg tablet 1 tablet, Oral, Daily    Magnesium 500 mg, Daily    montelukast (SINGULAIR) 10 mg, Oral, Daily at bedtime    Multiple Vitamin (MULTIVITAMINS PO) 1 tablet, Oral, Daily    mupirocin (BACTROBAN) 2 % ointment No dose, route, or frequency recorded   nitroglycerin (NITROSTAT) 0 4 mg SL tablet No dose, route, or frequency recorded   sildenafil (VIAGRA) 100 mg, Oral, Daily PRN    Spiriva Respimat 2 5 MCG/ACT AERS inhaler 2 puffs, Inhalation, Daily       Review of Systems   Constitutional: Negative  Eyes: Negative  Respiratory: Positive for cough, shortness of breath and wheezing  Cardiovascular: Negative  Gastrointestinal: Negative      Musculoskeletal: Positive for arthralgias  Skin: Negative  Neurological: Negative  Hematological: Bruises/bleeds easily  Psychiatric/Behavioral: Negative  Objective:   Physical Exam  Vitals reviewed  Constitutional:       General: He is not in acute distress  Appearance: Normal appearance  He is well-developed  He is not diaphoretic  HENT:      Head: Normocephalic and atraumatic  Mouth/Throat:      Comments: Masked   Eyes:      General: No scleral icterus  Extraocular Movements: Extraocular movements intact  Neck:      Trachea: No tracheal deviation  Cardiovascular:      Rate and Rhythm: Normal rate and regular rhythm  Pulses: Normal pulses  Heart sounds: Normal heart sounds  No murmur heard  Pulmonary:      Effort: Pulmonary effort is normal  No respiratory distress  Breath sounds: Wheezing present  Abdominal:      General: Bowel sounds are normal  There is no distension  Palpations: Abdomen is soft  Musculoskeletal:         General: Normal range of motion  Cervical back: Normal range of motion and neck supple  Right lower leg: No edema  Left lower leg: No edema  Lymphadenopathy:      Cervical: No cervical adenopathy  Skin:     General: Skin is warm and dry  Findings: No erythema  Neurological:      Mental Status: He is alert and oriented to person, place, and time  Cranial Nerves: No cranial nerve deficit  Psychiatric:         Mood and Affect: Mood normal          Behavior: Behavior normal          Thought Content: Thought content normal      /75   Pulse (!) 48   Temp 98 4 °F (36 9 °C)   Resp 17   Ht 5' 9" (1 753 m)   Wt 81 kg (178 lb 9 2 oz)   SpO2 93%   BMI 26 37 kg/m²       CT chest wo contrast    Result Date: 6/6/2022  Narrative CT CHEST WITHOUT IV CONTRAST INDICATION:   R91 1: Solitary pulmonary nodule   COMPARISON:  8/14/2021 TECHNIQUE: CT examination of the chest was performed without intravenous contrast  This examination was performed without intravenous contrast in the context of the critical nationwide Omnipaque shortage  Axial, sagittal, and coronal 2D reformatted images were created from the source data and submitted for interpretation  Radiation dose length product (DLP) for this visit:  272 66 mGy-cm   This examination, like all CT scans performed in the [facility], was performed utilizing techniques to minimize radiation dose exposure, including the use of iterative  reconstruction and automated exposure control  FINDINGS: LUNGS:  Multiple lung nodules are seen, measured on series 3:  5 mm nodule in the left upper lobe on image 24, new 5 mm nodule in the left upper lobe on image 28, stable 6 mm nodule in the left upper lobe on image 40, new 8 mm nodule in the left upper lobe on image 50, new 1 8 x 2 2 cm nodule in the lingula on image 93, new There is right greater than left basilar tree-in-bud opacity with additional patchy foci and subpleural cystic change on the right, similar to the prior study  There is new tree-in-bud opacity in the right middle and upper lobe, mildly increased though the previous study was degraded by respiratory motion  There is moderate centrilobular emphysema  There is no tracheal or endobronchial lesion  PLEURA:  There is a trace right pleural effusion  HEART/GREAT VESSELS: Heart is unremarkable for patient's age  There is fusiform ectasia of the ascending thoracic aorta measuring up to 40 mm  Recommendation is for follow-up low radiation dose chest CT in one year  There is stable pulmonary artery enlargement MEDIASTINUM AND GABBIE:  There are calcified mediastinal and hilar lymph nodes  CHEST WALL AND LOWER NECK:  Unremarkable  VISUALIZED STRUCTURES IN THE UPPER ABDOMEN:  There is a simple hepatic cyst  OSSEOUS STRUCTURES:  No acute fracture or destructive osseous lesion  Impression 1  Multiple new pulmonary nodules on the left though the previously seen nodule is stable    The largest measures 2 2 cm in the lingula  The appearance is suspicious for metastasis and correlation with primary malignancy is recommended  Further evaluation with biopsy, PET/CT, or short interval follow-up CT is recommended  2   Tree-in-bud nodularity in the lower lobes and right upper/middle lobe, stable to mildly increased from the prior study  There is associated bibasilar patchy opacity with subpleural cystic change on the right  This appearance is likely secondary to a chronic inflammatory process such as RISHABH  3   Multiple calcified mediastinal and hilar lymph nodes consistent with old granulomatous disease  4   Stable pulmonary artery enlargement likely associated with pulmonary hypertension  5   Stable ectatic ascending aorta measuring 4 cm  The study was marked in EPIC for significant notification  Workstation performed: [id number]     No CT Chest,Abdomen,Pelvis results available for this patient  NM PET CT skull base to mid thigh    Result Date: 8/12/2022  Narrative 5 36 267 2 [id number]    NM PET CT skull base to mid thigh    Result Date: 6/21/2022  Narrative PET/CT SCAN INDICATION: Multiple new pulmonary nodules  D38 1: Neoplasm of uncertain behavior of trachea, bronchus and lung MODIFIER: PI COMPARISON: CT chest 6/2/2022 CELL TYPE:  NA TECHNIQUE:   8 7 mCi F-18-FDG administered IV  Multiplanar attenuation corrected and non attenuation corrected PET images were acquired 60 minutes post injection  Contiguous, low dose, axial CT sections were obtained from the skull base through the femurs   Intravenous contrast material was not utilized  This examination, like all CT scans performed in the [facility], was performed utilizing techniques to minimize radiation dose exposure, including the use of iterative reconstruction and automated exposure control   Fasting serum glucose: 111 mg/dl FINDINGS: VISUALIZED BRAIN:   No acute abnormalities are seen  HEAD/NECK:   Somewhat asymmetric linear radiotracer uptake in the bilateral neck musculature is likely physiologic  No FDG avid cervical adenopathy is seen  CT images: Unremarkable  CHEST:   FDG avid lingular nodule, SUV max of 14 0  Nodule now measures 2 4 x 2 0 cm image 90 series 4 may be slightly larger previously 2 2 x 1 8 cm  Additional smaller nodules in the left lung with variable FDG uptake  A left upper lobe nodule centrally demonstrates SUV max of 2 4  A 7 x 4 mm nodule was noted in this region on prior chest CT  Mild patchy FDG uptake in the left upper lobe anteromedially where there is increased groundglass density on CT, SUV max of 3 1 likely inflammatory  Asymmetric left perihilar activity superiorly, SUV max of 2 9  CT images: Peripheral tree-in-bud opacities bilaterally  Underlying emphysematous changes of the lung fields  Borderline ectatic ascending thoracic aorta  Dilated main pulmonary artery  Calcified lymph nodes in the mediastinum and bilateral perihilar  regions  Moderate coronary artery calcifications  ABDOMEN:   No FDG avid soft tissue lesions are seen  CT images: Stable hepatic cyst in the right lobe posteriorly  Mildly ectatic abdominal aorta  Colonic diverticulosis  PELVIS: Small focus of FDG uptake at the anterior-inferior prostate gland, SUV max of 3 4  No obvious findings on limited CT  See image 141 series 603  No FDG avid lymph nodes  CT images: Mildly prominent prostate  OSSEOUS STRUCTURES: No FDG avid lesions are seen  CT images: Multilevel degenerative spurring of the spine  Impression 1  FDG avid lingular nodule suspicious for malignancy  Correlate with tissue sampling  2   Variable mild FDG uptake in additional left lung pulmonary nodules may represent metastasis  3   Mild FDG uptake in the left perihilar region for which efra metastasis is not excluded  4  Small focus of mild FDG uptake at the anterior inferior prostate gland    Correlate with PSA levels and urologic evaluation to exclude possible prostate malignancy  The study was marked in EPIC for significant notification   Workstation performed: [id number]

## 2022-09-21 ENCOUNTER — OFFICE VISIT (OUTPATIENT)
Dept: PULMONOLOGY | Facility: CLINIC | Age: 82
End: 2022-09-21

## 2022-09-21 VITALS
WEIGHT: 180.6 LBS | SYSTOLIC BLOOD PRESSURE: 110 MMHG | DIASTOLIC BLOOD PRESSURE: 72 MMHG | BODY MASS INDEX: 26.75 KG/M2 | HEIGHT: 69 IN | OXYGEN SATURATION: 94 % | HEART RATE: 59 BPM | TEMPERATURE: 97.6 F

## 2022-09-21 DIAGNOSIS — J96.11 CHRONIC RESPIRATORY FAILURE WITH HYPOXIA AND HYPERCAPNIA (HCC): ICD-10-CM

## 2022-09-21 DIAGNOSIS — J44.1 CHRONIC OBSTRUCTIVE PULMONARY DISEASE WITH ACUTE EXACERBATION (HCC): Primary | ICD-10-CM

## 2022-09-21 DIAGNOSIS — J96.12 CHRONIC RESPIRATORY FAILURE WITH HYPOXIA AND HYPERCAPNIA (HCC): ICD-10-CM

## 2022-09-21 DIAGNOSIS — R91.1 PULMONARY NODULE: ICD-10-CM

## 2022-09-21 RX ORDER — IPRATROPIUM BROMIDE AND ALBUTEROL SULFATE 2.5; .5 MG/3ML; MG/3ML
3 SOLUTION RESPIRATORY (INHALATION) EVERY 6 HOURS PRN
Qty: 180 ML | Refills: 0 | Status: SHIPPED | OUTPATIENT
Start: 2022-09-21 | End: 2022-10-21

## 2022-09-21 NOTE — PROGRESS NOTES
Pulmonary Follow Up Note   Abida Rahman 81 y o  male MRN: [id number]  9/21/2022    Assessment:  COPD  · GOLD stage 3D, last FEV1 of 34% in 2021 with severe emphysematous changes on imaging  · Stable, no history of frequent exacerbation  · On triple inhaler therapy, no frequent use of p r n  Bronchodilators  · Noted mild wheezing on exam    Plan:  · Continue Advair 250/Spiriva Respimat, this regimen is covered by the [place] insurance  · Reinforced/educated about the proper inhaler use technique  · Advised to use supplemental oxygen with exertion all the time  · Will check 6 minute walk test at next visit  · Up-to-date on immunization    Chronic hypoxic/hypercapnic respiratory failure  Obstructive sleep apnea  · Multifactorial, congestive heart failure, advanced COPD, pulmonary hypertension  · Last compliance report from the BiPAP showed residual AHI of 11, reported some leak with the mask and feeling uncomfortable with the  · Continue supplemental oxygen with exertion at 2 LPM, no hypoxemia at rest  · Completed split[facility]titration [facility] at the 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, largest lesion at the lingula 1 8 x 2 2 cm  · Likely metastasis from the scalp lesion  · Already evaluated by thoracic surgery, not a good candidate given the poor PFT  · Will be evaluated by Medical Oncology tomorrow      Return in about 3 months (around 12/21/2022)      History of Present Illness       Follow up for:  COPD/pulmonary nodules/chronic hypoxic respiratory failure      Background:  81 y o  male with a h/o COPD, HTN, AFib, systolic/diastolic heart failure, moderate alcohol use, former tobacco abuse about 40 pack year quit in [address] [place] [address] Avenue      Hospitalized in 08/2021 for acute on chronic hypoxic/hypercapnic respiratory failure, treated for CHF and COPD exacerbation   Noted hypercapnia on ABG before discharge, started on BiPAP since then   In September, he was 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, underwent excision by surgery   A PET-CT showed postsurgical changes at the scalp without signs of metastases   Reported RUL mild avidity suspecting inflammatory etiology as well as around the mediastinal lymph node   Was previously found to have 5 mm MIKKI nodule, recommended follow-up in 12 months      At baseline, has a chronic dyspnea on exertion, feels better with using supplemental oxygen   inhaler therapy with Advair 250, Spiriva 2 5 Respimat, he also uses nebulized albuterol q i d  and budesonide b i d  on regularly basis  Uses BiPAP every night, better tolerating the mask now   Only chronic symptoms of a m  chest tightness, minimally productive cough that improve spontaneously before using his inhalers   Denies any hemoptysis, weight loss, anorexia      11/02/2021 visit-adjusted inhaler therapy, continued Spiriva/Advair discontinued budesonide nebs  PFT ordered  6 minute walk test required 2 LPM with exertion  05/2022 visit-repeat CT chest showing pulmonary lesion/biopsies 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 new plasma  Ordered split/titration CPAP study  Thoracic surgery evaluated, not a candidate for lobectomy, possible candidate for wedge resection, pending further evaluation by Medical Oncology        Interval History  Since last seen, no major events or illness  Admits to not using supplemental oxygen with exertion all the time  Only using it when expected to walk long distances  Reports intermittent wheezing, using p r n  Ipratropium nebulizer almost once a day  Underwent split/titration study at the [facility], did not receive the new settings/BiPAP machine yet        Review of Systems  As per hpi, all other systems reviewed and were negative    Studies:  Imaging and other studies: I have personally reviewed pertinent films in PACS     CT PE 08/14/2021-moderate centrilobular/paraseptal emphysematous changes   5 millimeter left apical pulmonary nodule   Minimal bibasilar atelectasis   Enlarged pulmonary artery at 4 3   Enlarged ascending aorta at 4 centimeter   Calcified hilar/mediastinal lymphadenopathy     [facility] 10/15/2021-diffuse areas of multifocal pulmonary uptake suspect related to areas of parenchymal scarring   Focal area uptake at RUL suspect inflammatory etiology   Mediastinal lymph nodes measuring 1 5 centimeter   Mild accentuated uptake at the left sided prevascular lymph node lateral to the aortic arch      Pulmonary function testin minute walk test 2021-baseline SpO2 91% on room air, heart rate 50   SpO2 dropped to 85% after 1 minute of exertion, required 2 LPM for SpO2 at 92%, able to walk 200 m in 6 minutes with 2 LPM, maximal heart rate at 60     PFT 11/10/2021-ratio 41 percent, FEV1 0 94 liters/34 percent, FVC 2 3 liters/61 percent, TLC 99 percent,  percent, DLCO 30 percent         TTE 2021-EF 45 percent   Difficult access in the setting of frequent ectopies   RV was upper limit normal, normal systolic function   Mild to moderately dilated LA   Mildly dilated RA   Estimated PA SP 40 millimeter [place]     Overnight pulse oximetry 2022-total study time 5 hours 31 minutes  Time spent below 89 percent SpO2 4 hours and 42 minutes this was associated with increased heart rate  Past medical, surgical, social and family histories reviewed  Medications/Allergies: Reviewed      Vitals: Blood pressure 110/72, pulse 59, temperature 97 6 °F (36 4 °C), height 5' 9" (1 753 m), weight 81 9 kg (180 lb 9 6 oz), SpO2 94 %  Body mass index is 26 67 kg/m²  Oxygen Therapy  SpO2: 94 %      Physical Exam  Body mass index is 26 67 kg/m²     Gen: not in acute distress,   Neck/Eyes: supple, no adenopathy, PERRL  Ear: normal appearance, no significant hearing impairment  Nose:  normal nasal mucosa, no drainage  Mouth:  unremarkable/normal appearance of lips, teeth and gums  Oropharynx: mucosa is moist, no focal lesions or erythema  Salivary glands: soft nontender  Chest: normal respiratory efforts, mild/bilateral end expiratory wheeze  CV:  Distant heart sounds no murmurs appreciated, mild +1 pitting above ankle edema  Abdomen: soft, non tender  Extremities:  No observed deformity   Skin: unremarkable  Neuro: AAO X3, no focal motor deficit        Labs:  Lab Results   Component Value Date    WBC 8 18 07/19/2022    HGB 14 5 07/19/2022    HCT 44 6 07/19/2022     (H) 07/19/2022     07/19/2022     Lab Results   Component Value Date    CALCIUM 9 2 08/05/2022    K 4 1 08/05/2022    CO2 36 (H) 08/05/2022    CL 99 08/05/2022    BUN 16 08/05/2022    CREATININE 1 08 08/05/2022     No results found for: IGE  Lab Results   Component Value Date    ALT 40 08/15/2021    AST 20 08/15/2021    ALKPHOS 63 08/15/2021           Portions of the record may have been created with voice recognition software  Occasional wrong word or "sound a like" substitutions may have occurred due to the inherent limitations of voice recognition software  Read the chart carefully and recognize, using context, where substitutions have occurred    JOSELITO Cuevas [facility]

## 2022-09-22 ENCOUNTER — CONSULT (OUTPATIENT)
Dept: HEMATOLOGY ONCOLOGY | Facility: CLINIC | Age: 82
End: 2022-09-22
Payer: MEDICARE

## 2022-09-22 VITALS
OXYGEN SATURATION: 97 % | HEIGHT: 69 IN | SYSTOLIC BLOOD PRESSURE: 120 MMHG | BODY MASS INDEX: 26.66 KG/M2 | DIASTOLIC BLOOD PRESSURE: 72 MMHG | WEIGHT: 180 LBS | TEMPERATURE: 97.6 F | HEART RATE: 60 BPM | RESPIRATION RATE: 16 BRPM

## 2022-09-22 DIAGNOSIS — C80.1 MALIGNANT SPINDLE CELL NEOPLASM (HCC): ICD-10-CM

## 2022-09-22 PROCEDURE — 99205 OFFICE O/P NEW HI 60 MIN: CPT | Performed by: INTERNAL MEDICINE

## 2022-09-22 NOTE — PROGRESS NOTES
Darby Mesa Stanley  1940  [facility]  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  Negative margins  Vascular invasion noted  , T3  High mitotic rate  Tumor was negative for desmin, P 40, S100, Cherry  June 2, 2022 patient was found to have pulmonary nodule on chest CT  These measured up to 2 2 cm in the lingula  June 21, 2022 PET-CT showed lingular nodule 2 4 cm, SUV 14 0  Other nodules are up to 7 mm without significant hypermetabolism  July 19, 2022 biopsy of left lung mass shows malignant appearing spindle cell neoplasm  Review of Systems   Constitutional: Negative for chills and fever  HENT: Negative for nosebleeds  Eyes: Negative for discharge  Respiratory: Negative for cough and shortness of breath  Cardiovascular: Negative for chest pain  Gastrointestinal: Negative for abdominal pain, constipation and diarrhea  Endocrine: Negative for polydipsia  Genitourinary: Negative for hematuria  Musculoskeletal: Negative for arthralgias  Skin: Negative for color change  Allergic/Immunologic: Negative for immunocompromised state  Neurological: Negative for dizziness and headaches  Hematological: Negative for adenopathy  Psychiatric/Behavioral: Negative for agitation         Patient Active Problem List   Diagnosis    Atrial fibrillation ([address] )    RBBB    Moderate alcohol consumption    Localized edema    Chronic combined systolic and diastolic CHF (congestive heart failure) ([address] )    COPD (chronic obstructive pulmonary disease) ([address] )    Acute on chronic respiratory failure with hypoxia and hypercapnia (HCC)    Cellulitis of left lower extremity    Frequent PVCs    Hematoma of left lower extremity    Chronic respiratory failure with hypoxia and hypercapnia (HCC)    Pulmonary nodule     Past Medical History:   Diagnosis Date    BPH (benign prostatic hyperplasia)     [facility]D) [facility]     Essential hypertension     Hard of hearing     Pt does wear hearing aids    Hypertension     Lung cancer ([facility]     Shortness of breath     Pt states not changes and it occurs with moderate exertion    Sleep disturbance     Pt states he is only sleeping about 3 hours a night  Pt is seeing his PCP on 8/5/21 to discuss    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[facility] )     Pt has on the scalp    Tinnitus      Past Surgical History:   Procedure Laterality Date    IR BIOPSY LUNG  7/19/2022    SHOULDER OPEN ROTATOR CUFF REPAIR      SKIN GRAFT      spindle cell removal of scalp with skin graft from shoulder    VEIN SURGERY       No family history on file  Social History     Socioeconomic History    Marital status: /Civil Union     Spouse name: Not on file    Number of children: Not on file    Years of education: Not on file    Highest education level: Not on file   Occupational History    Occupation: Retired   Tobacco Use    Smoking status: Former Smoker     Types: Cigars    Smokeless tobacco: Never Used   Vaping Use    Vaping Use: Never used   Substance and Sexual Activity    Alcohol use:  Yes     Alcohol/week: 14 0 standard drinks     Types: 14 Glasses of wine per week     Comment: moderate consumption    Drug use: Not Currently    Sexual activity: Yes   Other Topics Concern    Not on file   Social History Narrative    Not on file     Social Determinants of Health     Financial Resource Strain: Not on file   Food Insecurity: Not on file   Transportation Needs: Not on file   Physical Activity: Not on file   Stress: Not on file   Social Connections: Not on file   Intimate Partner Violence: Not on file   Housing Stability: Not on file       Current Outpatient Medications:     apixaban (ELIQUIS) 5 mg, Take 1 tablet (5 mg total) by mouth 2 (two) times a day, Disp: 180 tablet, Rfl: 3    Ascorbic Acid (VITAMIN C PO), Take 1 tablet by mouth daily , Disp: , Rfl:     bisacodyl (DULCOLAX) 5 mg EC tablet, Take 5 mg by mouth daily  , Disp: , Rfl:     capsaicin (ZOSTRIX) 0 025 % cream, , Disp: , Rfl:     cetirizine (ZyrTEC) 10 mg tablet, Take 10 mg by mouth daily, Disp: , Rfl:     Cholecalciferol (Vitamin D3) 50 MCG (2000 UT) TABS, , Disp: , Rfl:     diltiazem (CARDIZEM CD) 300 mg 24 hr capsule, Take 1 capsule (300 mg total) by mouth daily, Disp: 90 capsule, Rfl: 3    docusate sodium (COLACE) 100 mg capsule, Take 100 mg by mouth , Disp: , Rfl:     finasteride (PROSCAR) 5 mg tablet, Take 5 mg by mouth daily , Disp: , Rfl:     fluticasone-salmeterol (ADVAIR, WIXELA) 250-50 mcg/dose inhaler, Inhale 1 puff 2 (two) times a day Rinse mouth after use , Disp: , Rfl:     furosemide (LASIX) 80 mg tablet, Take 1 tablet (80 mg total) by mouth daily, Disp: 90 tablet, Rfl: 3    ipratropium (ATROVENT) 0 02 % nebulizer solution, Take 2 5 mL (0 5 mg total) by nebulization in the morning, Disp: , Rfl:     ipratropium-albuterol (DUO-NEB) 0 5-2 5 mg/3 mL nebulizer solution, Take 3 mL by nebulization every 6 (six) hours as needed for wheezing or shortness of breath, Disp: 180 mL, Rfl: 0    Magnesium 500 MG CAPS, Take 500 mg by mouth daily, Disp: , Rfl:     magnesium oxide (MAG-OX) 400 mg tablet, Take 1 tablet by mouth daily, Disp: , Rfl:     montelukast (SINGULAIR) 10 mg tablet, Take 10 mg by mouth daily at bedtime, Disp: , Rfl:     Multiple Vitamin (MULTIVITAMINS PO), Take 1 tablet by mouth daily, Disp: , Rfl:     nitroglycerin (NITROSTAT) 0 4 mg SL tablet, , Disp: , Rfl:     sildenafil (VIAGRA) 100 mg tablet, Take 1 tablet (100 mg total) by mouth daily as needed for erectile dysfunction, Disp: 4 tablet, Rfl: 11    Spiriva Respimat 2 5 MCG/ACT AERS inhaler, Inhale 2 puffs daily , Disp: , Rfl:     mupirocin (BACTROBAN) 2 % ointment, , Disp: , Rfl:   Allergies   Allergen Reactions    Penicillin G Other (See Comments)     PCN Shots Only!!     Vitals:    09/22/22 1043   BP: 120/72   Pulse: 60   Resp: 16   Temp: 97 6 °F (36 4 °C)   SpO2: 97%       Physical Exam  Constitutional:       Appearance: He is well-developed  HENT:      Head: Normocephalic and atraumatic  Eyes:      Pupils: Pupils are equal, round, and reactive to light  Cardiovascular:      Rate and Rhythm: Normal rate and regular rhythm  Heart sounds: No murmur heard  Pulmonary:      Breath sounds: Normal breath sounds  No wheezing or rales  Abdominal:      Palpations: Abdomen is soft  Tenderness: There is no abdominal tenderness  Musculoskeletal:         General: No tenderness  Normal range of motion  Cervical back: Neck supple  Lymphadenopathy:      Cervical: No cervical adenopathy  Skin:     Findings: No erythema or rash  Neurological:      Mental Status: He is alert and oriented to person, place, and time  Cranial Nerves: No cranial nerve deficit  Deep Tendon Reflexes: Reflexes are normal and symmetric  Psychiatric:         Behavior: Behavior normal            Labs:  CBC, Coags, BMP, Mg, Phos     Imaging  No results found  I reviewed the above laboratory and imaging data  Discussion/Summary:  In summary, this is an 81-year-old male with a 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  5 mm left apical pulmonary nodule had been noted on chest CT in August 2021, no others, however  As such I would say that the remainder are new and certainly related to his recurrent malignancy  He is asymptomatic  We reviewed that this malignancy is rare and randomized trials are not available to indicate best treatment options      Next generation sequencing is requested to investigate for actionable mutations  Tissue of origin testing is requested to rule out the possibility of melanoma which can present with spindle-cell appearance  Available immunohistochemical studies argue against this but are not conclusive  Reviewed the above considerations at some length today  The patient and his wife will be going on vacation in the immediate future  We will see them back on their return and develop a treatment plan accordingly

## 2022-09-23 ENCOUNTER — TELEPHONE (OUTPATIENT)
Dept: HEMATOLOGY ONCOLOGY | Facility: CLINIC | Age: 82
End: 2022-09-23

## 2022-09-26 PROCEDURE — 88341 IMHCHEM/IMCYTCHM EA ADD ANTB: CPT | Performed by: PATHOLOGY

## 2022-09-26 PROCEDURE — 88342 IMHCHEM/IMCYTCHM 1ST ANTB: CPT | Performed by: PATHOLOGY

## 2022-09-26 PROCEDURE — 88305 TISSUE EXAM BY PATHOLOGIST: CPT | Performed by: PATHOLOGY

## 2022-09-29 ENCOUNTER — PATIENT OUTREACH (OUTPATIENT)
Dept: HEMATOLOGY ONCOLOGY | Facility: CLINIC | Age: 82
End: 2022-09-29

## 2022-09-29 ENCOUNTER — TELEPHONE (OUTPATIENT)
Dept: HEMATOLOGY ONCOLOGY | Facility: CLINIC | Age: 82
End: 2022-09-29

## 2022-09-29 NOTE — PROGRESS NOTES
Attempted to reach patient today to assess for any barriers to care and offer any supportive services needed  Per pts consult he maybe on vacation  Left  in detail to give me a callback at my direct number

## 2022-09-29 NOTE — TELEPHONE ENCOUNTER
Rec'd Vm: "Afternoon  I'm calling from "[employer]" regarding an order we received from [employer]  It is an order for cancer type ID on patient very date of birth is [id number] and pathology has notified us there is only one block and it is depleted  If someone could please return my call, my direct phone number is [phone]  Thank you "    Returned call and left  for her to call me back

## 2022-09-30 ENCOUNTER — TELEPHONE (OUTPATIENT)
Dept: HEMATOLOGY ONCOLOGY | Facility: CLINIC | Age: 82
End: 2022-09-30

## 2022-09-30 NOTE — TELEPHONE ENCOUNTER
CALL RETURN FORM   Reason for patient call? [facility] from [address] calling regarding an order that was received for this patient for cancer type ID  She states that the sample that was received is depleted  She would like to know if Dr Araceli Austin has an additional case to submit or if the case is being dismissed    Patient's primary oncologist? Dr Araceli Austin   Name of person the patient was calling for? Dr Araceli Austin   Any additional information to add, if applicable? N/A   Informed patient that the message will be forwarded to the team and someone will get back to them as soon as possible    Did you relay this information to the patient?  Yes

## 2022-10-07 ENCOUNTER — PATIENT OUTREACH (OUTPATIENT)
Dept: HEMATOLOGY ONCOLOGY | Facility: CLINIC | Age: 82
End: 2022-10-07

## 2022-10-07 NOTE — PROGRESS NOTES
Initial outreach  Called and spoke to patient  Introduced myself and explained the role of a Care Coordinator  Reviewed upcoming appointments including date, time and location  Assessed for barriers of care  Patient resides with his wife and is a good source of support  Patient denies pain or any symptoms at this time  States he is eating well, although his appetite is not the same but is eating  His generation sequencing was requested to investigate for actionable mutations and does not have a treatment plan at this time  His scheduled to f/u with Oncology on 10/14 to possibly develop a treatment plan than accordingly and I will follow up with him post  Provided my direct phone number for questions or concerns moving forward  Patient was appreciative

## 2022-10-10 PROCEDURE — 88363 XM ARCHIVE TISSUE MOLEC ANAL: CPT | Performed by: PATHOLOGY

## 2022-10-14 ENCOUNTER — OFFICE VISIT (OUTPATIENT)
Dept: HEMATOLOGY ONCOLOGY | Facility: CLINIC | Age: 82
End: 2022-10-14
Payer: MEDICARE

## 2022-10-14 VITALS
HEIGHT: 69 IN | WEIGHT: 181.2 LBS | DIASTOLIC BLOOD PRESSURE: 74 MMHG | BODY MASS INDEX: 26.84 KG/M2 | SYSTOLIC BLOOD PRESSURE: 116 MMHG | TEMPERATURE: 97.6 F | HEART RATE: 56 BPM

## 2022-10-14 DIAGNOSIS — C80.1 MALIGNANT SPINDLE CELL NEOPLASM (HCC): Primary | ICD-10-CM

## 2022-10-14 PROCEDURE — 99215 OFFICE O/P EST HI 40 MIN: CPT | Performed by: INTERNAL MEDICINE

## 2022-10-14 NOTE — PROGRESS NOTES
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  These results are difficult to reconcile given the rarity but coincident existence of these 2 entities  My gut feeling is that this represents a metastatic process  Additionally, we received NextGen sequencing  This indicates PDL1 expression 90%  Other mutations negative  As such, immunotherapy is reasonable to consider  We reviewed some issues related to immunotherapy administration, toxicities, etcetera today  Lastly, I note that it has been several months since he had any imaging  A simple step would be to repeat imaging at this time  If disease is more rapidly progressive, immunotherapy initiation could be considered  On the other hand, if disease has changed minimally, observation might be reasonable to pursue  I discussed the above with the patient  The patient and his wife voiced understanding and agreement   ______________________________________________________________________    No chief complaint on file  HPI:  Oncology History    No history exists  Interval History:  Clinically stable  ECOG-  1 - Symptomatic but completely ambulatory    Review of Systems   Constitutional: Negative for chills and fever  HENT: Negative for nosebleeds  Eyes: Negative for discharge  Respiratory: Positive for shortness of breath  Negative for cough  Cardiovascular: Negative for chest pain     Gastrointestinal: Negative for abdominal pain, constipation and diarrhea  Endocrine: Negative for polydipsia  Genitourinary: Negative for hematuria  Musculoskeletal: Positive for back pain  Negative for arthralgias  Skin: Negative for color change  Allergic/Immunologic: Negative for immunocompromised state  Neurological: Negative for dizziness and headaches  Hematological: Negative for adenopathy  Psychiatric/Behavioral: Negative for agitation  Past Medical History:   Diagnosis Date   • BPH (benign prostatic hyperplasia)    • Chronic obstructive pulmonary disease (COPD) ([facility] )    • Essential hypertension    • Hard of hearing     Pt does wear hearing aids   • Hypertension    • Lung cancer ([facility] )    • Shortness of breath     Pt states not changes and it occurs with moderate exertion   • Sleep disturbance     Pt states he is only sleeping about 3 hours a night    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   • Tinnitus      Patient Active Problem List   Diagnosis   • Atrial fibrillation (HCC)   • RBBB   • Moderate alcohol consumption   • Localized edema   • Chronic combined systolic and diastolic CHF (congestive heart failure) (HCC)   • COPD (chronic obstructive pulmonary disease) (HCC)   • Acute on chronic respiratory failure with hypoxia and hypercapnia (HCC)   • Cellulitis of left lower extremity   • Frequent PVCs   • Hematoma of left lower extremity   • Chronic respiratory failure with hypoxia and hypercapnia (HCC)   • Pulmonary nodule       Current Outpatient Medications:   •  apixaban (ELIQUIS) 5 mg, Take 1 tablet (5 mg total) by mouth 2 (two) times a day, Disp: 180 tablet, Rfl: 3  •  Ascorbic Acid (VITAMIN C PO), Take 1 tablet by mouth daily , Disp: , Rfl:   •  bisacodyl (DULCOLAX) 5 mg EC tablet, Take 5 mg by mouth daily  , Disp: , Rfl:   •  capsaicin (ZOSTRIX) 0 025 % cream, , Disp: , Rfl:   •  cetirizine (ZyrTEC) 10 mg tablet, Take 10 mg by mouth daily, Disp: , Rfl: •  Cholecalciferol (Vitamin D3) 50 MCG (2000 UT) TABS, , Disp: , Rfl:   •  diltiazem (CARDIZEM CD) 300 mg 24 hr capsule, Take 1 capsule (300 mg total) by mouth daily, Disp: 90 capsule, Rfl: 3  •  docusate sodium (COLACE) 100 mg capsule, Take 100 mg by mouth , Disp: , Rfl:   •  finasteride (PROSCAR) 5 mg tablet, Take 5 mg by mouth daily , Disp: , Rfl:   •  fluticasone-salmeterol (ADVAIR, WIXELA) 250-50 mcg/dose inhaler, Inhale 1 puff 2 (two) times a day Rinse mouth after use , Disp: , Rfl:   •  furosemide (LASIX) 80 mg tablet, Take 1 tablet (80 mg total) by mouth daily, Disp: 90 tablet, Rfl: 3  •  ipratropium (ATROVENT) 0 02 % nebulizer solution, Take 2 5 mL (0 5 mg total) by nebulization in the morning, Disp: , Rfl:   •  ipratropium-albuterol (DUO-NEB) 0 5-2 5 mg/3 mL nebulizer solution, Take 3 mL by nebulization every 6 (six) hours as needed for wheezing or shortness of breath, Disp: 180 mL, Rfl: 0  •  Magnesium 500 MG CAPS, Take 500 mg by mouth daily, Disp: , Rfl:   •  magnesium oxide (MAG-OX) 400 mg tablet, Take 1 tablet by mouth daily, Disp: , Rfl:   •  montelukast (SINGULAIR) 10 mg tablet, Take 10 mg by mouth daily at bedtime, Disp: , Rfl:   •  Multiple Vitamin (MULTIVITAMINS PO), Take 1 tablet by mouth daily, Disp: , Rfl:   •  nitroglycerin (NITROSTAT) 0 4 mg SL tablet, , Disp: , Rfl:   •  sildenafil (VIAGRA) 100 mg tablet, Take 1 tablet (100 mg total) by mouth daily as needed for erectile dysfunction, Disp: 4 tablet, Rfl: 11  •  Spiriva Respimat 2 5 MCG/ACT AERS inhaler, Inhale 2 puffs daily , Disp: , Rfl:   •  mupirocin (BACTROBAN) 2 % ointment, , Disp: , Rfl:   Allergies   Allergen Reactions   • Penicillin G Other (See Comments)     PCN Shots Only!!     Past Surgical History:   Procedure Laterality Date   • IR BIOPSY LUNG  7/19/2022   • SHOULDER OPEN ROTATOR CUFF REPAIR     • SKIN GRAFT      spindle cell removal of scalp with skin graft from shoulder   • VEIN SURGERY       Social History Objective:  Vitals:    10/14/22 1015   BP: 116/74   Pulse: 56   Temp: 97 6 °F (36 4 °C)   Weight: 82 2 kg (181 lb 3 2 oz)   Height: 5' 9" (1 753 m)     Physical Exam  Constitutional:       Appearance: He is well-developed  HENT:      Head: Normocephalic and atraumatic  Eyes:      Pupils: Pupils are equal, round, and reactive to light  Cardiovascular:      Rate and Rhythm: Normal rate and regular rhythm  Heart sounds: No murmur heard  Pulmonary:      Breath sounds: Normal breath sounds  No wheezing or rales  Abdominal:      Palpations: Abdomen is soft  Tenderness: There is no abdominal tenderness  Musculoskeletal:         General: No tenderness  Normal range of motion  Cervical back: Neck supple  Lymphadenopathy:      Cervical: No cervical adenopathy  Skin:     Findings: No erythema or rash  Neurological:      Mental Status: He is alert and oriented to person, place, and time  Cranial Nerves: No cranial nerve deficit  Deep Tendon Reflexes: Reflexes are normal and symmetric  Psychiatric:         Behavior: Behavior normal            Labs: I personally reviewed the labs and imaging pertinent to this patient care

## 2022-10-17 ENCOUNTER — PATIENT OUTREACH (OUTPATIENT)
Dept: HEMATOLOGY ONCOLOGY | Facility: CLINIC | Age: 82
End: 2022-10-17

## 2022-10-17 NOTE — PROGRESS NOTES
Subsequent outreach Attempt: Attempted to reach patient today to re-assess for any barriers to care and offer any supportive services needed  Left VM in detail to give me a callback at my direct number

## 2022-10-18 ENCOUNTER — PATIENT OUTREACH (OUTPATIENT)
Dept: HEMATOLOGY ONCOLOGY | Facility: CLINIC | Age: 82
End: 2022-10-18

## 2022-10-18 NOTE — PROGRESS NOTES
Subsequent Outreach, Second Attempt: Attempted to reach patient today to re-assess for any barriers to care and offer any supportive services needed  Left VM in detail to give me a callback at my direct number

## 2022-10-19 ENCOUNTER — PATIENT OUTREACH (OUTPATIENT)
Dept: HEMATOLOGY ONCOLOGY | Facility: CLINIC | Age: 82
End: 2022-10-19

## 2022-10-19 NOTE — PROGRESS NOTES
Subsequent Outreach, third and last Attempt: Attempted to reach patient today to re-assess for any barriers to care and offer any supportive services needed  Left VM in detail to give me a callback at my direct number  Will forward to Faviola Marroquin, as this was my third attempt

## 2022-10-20 ENCOUNTER — PATIENT OUTREACH (OUTPATIENT)
Dept: HEMATOLOGY ONCOLOGY | Facility: CLINIC | Age: 82
End: 2022-10-20

## 2022-10-20 ENCOUNTER — HOSPITAL ENCOUNTER (OUTPATIENT)
Dept: CT IMAGING | Facility: HOSPITAL | Age: 82
End: 2022-10-20
Attending: INTERNAL MEDICINE
Payer: MEDICARE

## 2022-10-20 DIAGNOSIS — C80.1 MALIGNANT SPINDLE CELL NEOPLASM (HCC): ICD-10-CM

## 2022-10-20 PROCEDURE — G1004 CDSM NDSC: HCPCS

## 2022-10-20 PROCEDURE — 71250 CT THORAX DX C-: CPT

## 2022-10-20 NOTE — PROGRESS NOTES
Subsequent Outreach: Patient returned my call and was able to speak with pt  Reviewed upcoming appointments including date, time and location  Assessed for barriers of care  Patient denies any symptoms or pain at this time  His appetite remains well  He just got done with a CT chest this morning  Depending on the results, If disease is more rapidly progressive, immunotherapy initiation could be considered  If disease has changed minimally, observation might be reasonable to pursue  Patient has a follow up with Dr Fabricio Mathews on 10/28/22  We mutually agreed I can follow up with him after his f/u  Pt acknowledged he has my direct phone number for questions or concerns moving forward  Patient was appreciative

## 2022-10-26 PROBLEM — L03.116 CELLULITIS OF LEFT LOWER EXTREMITY: Status: RESOLVED | Noted: 2021-08-09 | Resolved: 2022-10-26

## 2022-10-26 PROBLEM — J96.21 ACUTE ON CHRONIC RESPIRATORY FAILURE WITH HYPOXIA AND HYPERCAPNIA (HCC): Status: RESOLVED | Noted: 2021-08-09 | Resolved: 2022-10-26

## 2022-10-26 PROBLEM — J96.22 ACUTE ON CHRONIC RESPIRATORY FAILURE WITH HYPOXIA AND HYPERCAPNIA (HCC): Status: RESOLVED | Noted: 2021-08-09 | Resolved: 2022-10-26

## 2022-10-27 ENCOUNTER — OFFICE VISIT (OUTPATIENT)
Dept: CARDIOLOGY CLINIC | Facility: CLINIC | Age: 82
End: 2022-10-27

## 2022-10-27 VITALS
HEART RATE: 62 BPM | TEMPERATURE: 97.5 F | WEIGHT: 179.4 LBS | OXYGEN SATURATION: 96 % | SYSTOLIC BLOOD PRESSURE: 116 MMHG | BODY MASS INDEX: 26.49 KG/M2 | DIASTOLIC BLOOD PRESSURE: 68 MMHG

## 2022-10-27 DIAGNOSIS — J44.9 CHRONIC OBSTRUCTIVE PULMONARY DISEASE, UNSPECIFIED COPD TYPE (HCC): ICD-10-CM

## 2022-10-27 DIAGNOSIS — I49.3 FREQUENT PVCS: ICD-10-CM

## 2022-10-27 DIAGNOSIS — I50.42 CHRONIC COMBINED SYSTOLIC AND DIASTOLIC CHF (CONGESTIVE HEART FAILURE) (HCC): ICD-10-CM

## 2022-10-27 DIAGNOSIS — J96.11 CHRONIC RESPIRATORY FAILURE WITH HYPOXIA AND HYPERCAPNIA (HCC): ICD-10-CM

## 2022-10-27 DIAGNOSIS — R91.1 PULMONARY NODULE: ICD-10-CM

## 2022-10-27 DIAGNOSIS — J96.12 CHRONIC RESPIRATORY FAILURE WITH HYPOXIA AND HYPERCAPNIA (HCC): ICD-10-CM

## 2022-10-27 DIAGNOSIS — I48.91 ATRIAL FIBRILLATION, UNSPECIFIED TYPE (HCC): Primary | ICD-10-CM

## 2022-10-27 DIAGNOSIS — R60.0 LOCALIZED EDEMA: ICD-10-CM

## 2022-10-27 DIAGNOSIS — I45.10 RBBB: ICD-10-CM

## 2022-10-27 DIAGNOSIS — Z78.9 MODERATE ALCOHOL CONSUMPTION: ICD-10-CM

## 2022-10-27 RX ORDER — FUROSEMIDE 80 MG
120 TABLET ORAL DAILY
Qty: 90 TABLET | Refills: 3 | Status: SHIPPED | OUTPATIENT
Start: 2022-10-27

## 2022-10-27 NOTE — LETTER
October 27, 2022     Patient: Brenden Webb  YOB: 1940  Date of Visit: 10/27/2022      To Whom it May Concern:    Rea Jennifer is under my professional care  Hazel Zapien does carry a diagnosis of hypertension  If you have any questions or concerns, please don't hesitate to call           Sincerely,          KAN Nicholson        CC: No Recipients

## 2022-10-27 NOTE — PATIENT INSTRUCTIONS
You are retaining fluid today  You had trace effusion (fluid) around your lungs on your recent CT scan  I am increasing your furosemide to 120 mg daily for 7 days  Please continue to monitor your daily weights and symptoms  Report back in 1 week and if no change I would recommend switching from furosemide to torsemide  Blood work in 2 weeks to monitor kidney function, electrolytes, and fluid stretch in the heart  Contact us with any concerns  24 hour Holter to assess PVC's

## 2022-10-28 ENCOUNTER — OFFICE VISIT (OUTPATIENT)
Dept: HEMATOLOGY ONCOLOGY | Facility: CLINIC | Age: 82
End: 2022-10-28

## 2022-10-28 VITALS
DIASTOLIC BLOOD PRESSURE: 71 MMHG | BODY MASS INDEX: 26.51 KG/M2 | SYSTOLIC BLOOD PRESSURE: 110 MMHG | WEIGHT: 179 LBS | HEIGHT: 69 IN | HEART RATE: 82 BPM | TEMPERATURE: 97.4 F

## 2022-10-28 DIAGNOSIS — D49.59 NEOPLASM OF UNSPECIFIED BEHAVIOR OF OTHER GENITOURINARY ORGAN: ICD-10-CM

## 2022-10-28 DIAGNOSIS — C80.1 MALIGNANT SPINDLE CELL NEOPLASM (HCC): Primary | ICD-10-CM

## 2022-10-28 NOTE — ASSESSMENT & PLAN NOTE
Wt Readings from Last 3 Encounters:   10/27/22 81 4 kg (179 lb 6 4 oz)   10/14/22 82 2 kg (181 lb 3 2 oz)   09/22/22 81 6 kg (180 lb)     Echocardiogram 2/25/2021 showed an EF of 45-50%  Limited echocardiogram 8/16/21 with EF 45%  Unclear etiology of reduced EF but this could be tachycardia induced vs ETOH induced  He should have eventual stress test to rule out ischemia as the source of his HFrEF  Ideally should be on long acting beta blocker but has COPD/Asthma  No ACE-I/ARB for now as we need room to uptitrate AV efra blockers  He was hospitalized from 8/9 to 8/18/2021 secondary to respiratory failure in the setting of COPD exacerbation and volume overload  He responded well to IV diuresis  Currently on furosemide 80 mg daily  He reports improved breathing but appears visibly dyspneic  Significant edema bilateral lower extremities  Urged use of compression socks and asked him to elevate legs when at rest at home  Reviewed importance of daily weights  I suspect he would benefit from increased dose of furosemide  Will increase furosemide to 120 mg daily x 1 week and assess response  Will monitor closely  Lab work within 2 weeks

## 2022-10-28 NOTE — ASSESSMENT & PLAN NOTE
Lingula nodule biopsied and is consistent with malignant spindle cell neoplasm  He has met with CT surgeon Dr Tigist Phelps and discussed robotic MIKKI wedge resection  Currently working with Dr Violeta Cortes with [facility] hem/onc for further recommendations  Next appt tomorrow

## 2022-10-28 NOTE — ASSESSMENT & PLAN NOTE
Breathing reported as stable  Visually appears dyspneic  He does have supplemental O2 which he uses with significant exertion only but suspect he would benefit from more consistent use even with mild exertion  Sleeping with BiPAP at night  Currently on Advair 250/50 BID and Spiriva as well as nebulizer for PRN use only  Following with [facility] pulmonology

## 2022-10-28 NOTE — ASSESSMENT & PLAN NOTE
33% PVC burden was reported on 24 hour holter monitor on 6/21/2021  High PVC burden may be source of cardiomyopathy  EP evaluation with Dr Armando Knowles  8/19/2021 with follow up 9/20/2021  Atrial fibrillation/PVC ablation were discussed but patient felt to be high risk for intervention due to his underling COPD  Should have updated assessment of PVC burden  Will obtain 24 hour Holter monitor

## 2022-10-28 NOTE — PROGRESS NOTES
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  PDL1 90%  June 2022 PET-CT showed hypermetabolic lingular lesion  Other subcentimeter lesions without clear hypermetabolism  Small focus of hypermetabolism in the prostate  Recent CT chest showed decrease in the size of lingular lesion by some 50%  Other subcentimeter pulmonary nodules are stable/slightly improved  There are a few new lesions, subcentimeter  Altogether, I would say his disease is stable/slightly improved  Based on this, observation seems reasonable  We elected a two-month follow-up  Further decisions to follow accordingly  He recently returned from vacation  He is carrying out normal activities limited by COPD  I discussed the above with the patient  The patient and his wife voiced understanding and agreement   ______________________________________________________________________    No chief complaint on file  HPI:  Oncology History    No history exists  Interval History:  Clinically stable  ECOG-  1 - Symptomatic but completely ambulatory    Review of Systems   Constitutional: Negative for chills and fever  HENT: Negative for nosebleeds  Eyes: Negative for discharge  Respiratory: Positive for shortness of breath  Negative for cough  Cardiovascular: Negative for chest pain  Gastrointestinal: Negative for abdominal pain, constipation and diarrhea  Endocrine: Negative for polydipsia  Genitourinary: Negative for hematuria  Musculoskeletal: Negative for arthralgias  Skin: Negative for color change  Allergic/Immunologic: Negative for immunocompromised state  Neurological: Negative for dizziness and headaches  Hematological: Negative for adenopathy  Psychiatric/Behavioral: Negative for agitation  Past Medical History:   Diagnosis Date   • BPH (benign prostatic hyperplasia)    • Chronic [facility] pulmonary disease [facility]D) ([facility] )    • Essential hypertension    • Hard of hearing     Pt does wear hearing aids   • Hypertension    • Lung cancer ([facility] )    • Shortness of breath     Pt states not changes and it occurs with moderate exertion   • Sleep disturbance     Pt states he is only sleeping about 3 hours a night    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   • Tinnitus      Patient Active Problem List   Diagnosis   • Atrial fibrillation ([facility] )   • RBBB   • Moderate alcohol consumption   • Localized edema   • Chronic combined systolic and diastolic CHF (congestive heart failure) (HCC)   • COPD (chronic obstructive pulmonary disease) (HCC)   • Frequent PVCs   • Chronic respiratory failure with hypoxia and hypercapnia (HCC)   • Pulmonary nodule       Current Outpatient Medications:   •  apixaban (ELIQUIS) 5 mg, Take 1 tablet (5 mg total) by mouth 2 (two) times a day, Disp: 180 tablet, Rfl: 3  •  Ascorbic Acid (VITAMIN C PO), Take 1 tablet by mouth daily , Disp: , Rfl:   •  bisacodyl (DULCOLAX) 5 mg EC tablet, Take 5 mg by mouth daily  , Disp: , Rfl:   •  capsaicin (ZOSTRIX) 0 025 % cream, , Disp: , Rfl:   •  cetirizine (ZyrTEC) 10 mg tablet, Take 10 mg by mouth daily, Disp: , Rfl:   •  Cholecalciferol (Vitamin D3) 50 MCG (2000 UT) TABS, , Disp: , Rfl:   •  diltiazem (CARDIZEM CD) 300 mg 24 hr capsule, Take 1 capsule (300 mg total) by mouth daily, Disp: 90 capsule, Rfl: 3  •  docusate sodium (COLACE) 100 mg capsule, Take 100 mg by mouth , Disp: , Rfl:   •  finasteride (PROSCAR) 5 mg tablet, Take 5 mg by mouth daily , Disp: , Rfl:   •  fluticasone-salmeterol (ADVAIR, WIXELA) 250-50 mcg/dose inhaler, Inhale 1 puff 2 (two) times a day Rinse mouth after use , Disp: , Rfl:   •  furosemide (LASIX) 80 mg tablet, Take 1 5 tablets (120 mg total) by mouth daily 120 mg daily for 7 days, call with update, Disp: 90 tablet, Rfl: 3  •  ipratropium (ATROVENT) 0 02 % nebulizer solution, Take 2 5 mL (0 5 mg total) by nebulization in the morning, Disp: , Rfl:   •  Magnesium 500 MG CAPS, Take 500 mg by mouth daily, Disp: , Rfl:   •  magnesium oxide (MAG-OX) 400 mg tablet, Take 1 tablet by mouth daily, Disp: , Rfl:   •  montelukast (SINGULAIR) 10 mg tablet, Take 10 mg by mouth daily at bedtime, Disp: , Rfl:   •  Multiple Vitamin (MULTIVITAMINS PO), Take 1 tablet by mouth daily, Disp: , Rfl:   •  nitroglycerin (NITROSTAT) 0 4 mg SL tablet, , Disp: , Rfl:   •  sildenafil (VIAGRA) 100 mg tablet, Take 1 tablet (100 mg total) by mouth daily as needed for erectile dysfunction, Disp: 4 tablet, Rfl: 11  •  Spiriva Respimat 2 5 MCG/ACT AERS inhaler, Inhale 2 puffs daily , Disp: , Rfl:   •  mupirocin (BACTROBAN) 2 % ointment, , Disp: , Rfl:   Allergies   Allergen Reactions   • Penicillin G Other (See Comments)     PCN Shots Only!!     Past Surgical History:   Procedure Laterality Date   • IR BIOPSY LUNG  7/19/2022   • SHOULDER OPEN ROTATOR CUFF REPAIR     • SKIN GRAFT      spindle cell removal of scalp with skin graft from shoulder   • VEIN SURGERY       Social History     Objective:  Vitals:    10/28/22 1118   BP: 110/71   Pulse: 82   Temp: (!) 97 4 °F (36 3 °C)   Weight: 81 2 kg (179 lb)   Height: 5' 9" (1 753 m)     Physical Exam  Constitutional:       Appearance: He is well-developed  HENT:      Head: Normocephalic and atraumatic  Eyes:      Pupils: Pupils are equal, round, and reactive to light  Cardiovascular:      Rate and Rhythm: Normal rate and regular rhythm  Heart sounds: No murmur heard  Pulmonary:      Breath sounds: Normal breath sounds  No wheezing or rales  Abdominal:      Palpations: Abdomen is soft  Tenderness: There is no abdominal tenderness     Musculoskeletal: General: No tenderness  Normal range of motion  Cervical back: Neck supple  Lymphadenopathy:      Cervical: No cervical adenopathy  Skin:     Findings: No erythema or rash  Neurological:      Mental Status: He is alert and oriented to person, place, and time  Cranial Nerves: No cranial nerve deficit  Deep Tendon Reflexes: Reflexes are normal and symmetric  Psychiatric:         Behavior: Behavior normal            Labs: I personally reviewed the labs and imaging pertinent to this patient care

## 2022-10-28 NOTE — ASSESSMENT & PLAN NOTE
Atrial fibrillation noted on EKG at PCP office 01/19/2021  Holter monitor 1/28/2021 showed predominantly atrial fibrillation with an average HR of 102 beats per minute with frequent PVCs /aberrant beats representing 34 9% ectopic burden  Echocardiogram 2/25/2021 showed EF 45-50%  Patient denied any palpitations and was unaware of rapid heart rates  Magnesium 250 mg daily added and will be continued  Diltiazem was added (trying to avoid beta blockers due to COPD/asthma)  Persistent a fib with elevated ventricular rates and high PVC burden of 33% noted on most recent holter monitor 6/2021  Continue diltiazem 300mg daily  EP consultation with  Dr Bhumi Marcelino on 8/19/2021 with follow up 9/20/2021  Atrial fibrillation and PVC ablation were discussed  They opted given risks associated with prolonged anesthesia in the setting of significant lung disease  Continue Eliquis 5 mg twice daily for CVA prophylaxis  He is currently using BiPAP for sleep  Will obtain updated 24 hour Holter monitor

## 2022-11-02 ENCOUNTER — PATIENT OUTREACH (OUTPATIENT)
Dept: HEMATOLOGY ONCOLOGY | Facility: CLINIC | Age: 82
End: 2022-11-02

## 2022-11-02 NOTE — PROGRESS NOTES
Subsequent Outreach: Called and spoke to patient  Reviewed upcoming appointments including date, time and location  Assessed for barriers of care  Denies symptoms of pain, chest pain, shortness of breath, or cough, appetite is good, denies nausea or vomiting  Denies constipation or diarrhea  His disease is stable/slightly improved per note by Dr Lindsay Man  Patient will be on observation and is scheduled to follow up in two months  I will follow up with patient in one month  Unless he needs me  He has my direct phone number for questions or concerns moving forward  Patient was appreciative

## 2022-11-07 ENCOUNTER — APPOINTMENT (OUTPATIENT)
Dept: LAB | Facility: HOSPITAL | Age: 82
End: 2022-11-07

## 2022-11-07 DIAGNOSIS — I50.42 CHRONIC COMBINED SYSTOLIC AND DIASTOLIC CHF (CONGESTIVE HEART FAILURE) (HCC): ICD-10-CM

## 2022-11-07 LAB
ANION GAP SERPL CALCULATED.3IONS-SCNC: 2 MMOL/L (ref 4–13)
BUN SERPL-MCNC: 17 MG/DL (ref 5–25)
CALCIUM SERPL-MCNC: 9 MG/DL (ref 8.3–10.1)
CHLORIDE SERPL-SCNC: 99 MMOL/L (ref 96–108)
CO2 SERPL-SCNC: 40 MMOL/L (ref 21–32)
CREAT SERPL-MCNC: 1.04 MG/DL (ref 0.6–1.3)
GFR SERPL CREATININE-BSD FRML MDRD: 67 ML/MIN/1.73SQ M
GLUCOSE P FAST SERPL-MCNC: 113 MG/DL (ref 65–99)
NT-PROBNP SERPL-MCNC: 1236 PG/ML
POTASSIUM SERPL-SCNC: 4.1 MMOL/L (ref 3.5–5.3)
SODIUM SERPL-SCNC: 141 MMOL/L (ref 135–147)

## 2022-11-08 ENCOUNTER — TELEPHONE (OUTPATIENT)
Dept: PULMONOLOGY | Facility: CLINIC | Age: 82
End: 2022-11-08

## 2022-11-08 NOTE — TELEPHONE ENCOUNTER
Pt LM re: he is going on a trip to [place] for 3 5 weeks and he is only allowed to bring his POC as far as equipment goes along with him  He would like to know if this is ok and if he should still go or cancel his trip?   Please advise: [phone]

## 2022-11-10 DIAGNOSIS — R60.0 LOCALIZED EDEMA: ICD-10-CM

## 2022-11-10 DIAGNOSIS — I48.91 ATRIAL FIBRILLATION, UNSPECIFIED TYPE (HCC): Primary | ICD-10-CM

## 2022-11-10 NOTE — TELEPHONE ENCOUNTER
----- Message from Carly Asher sent at 11/10/2022  7:02 AM EST -----  Please notify patient and his spouse that his lab work remains unchanged  How did he respond to the week of 120 mg of furosemide?

## 2022-11-10 NOTE — TELEPHONE ENCOUNTER
Lost three pounds, swelling didn't go down  Breathing is the same  States that if something needs to be sent to the pharm [facility] [place] [facility]

## 2022-11-11 RX ORDER — TORSEMIDE 20 MG/1
60 TABLET ORAL DAILY
Qty: 270 TABLET | Refills: 3 | Status: SHIPPED | OUTPATIENT
Start: 2022-11-11 | End: 2022-11-14 | Stop reason: SDUPTHER

## 2022-11-11 NOTE — TELEPHONE ENCOUNTER
I pended a torsemide script if you can print it to send to the [facility]? Please let him know I would like him to take 60 mg of torsemide daily to replace the 120 mg of furosemide  The torsemide comes in 20 mg pills so he will take all 3 at the same time each day  Recheck blood work within 2 weeks of starting the new medication and give us an update on response  Lab orders placed in chart also    Thank you!!

## 2022-11-11 NOTE — TELEPHONE ENCOUNTER
Pt was thankful for the return call however [facility]

## 2022-11-14 RX ORDER — TORSEMIDE 20 MG/1
60 TABLET ORAL DAILY
Qty: 270 TABLET | Refills: 3 | Status: SHIPPED | OUTPATIENT
Start: 2022-11-14 | End: 2022-11-16

## 2022-11-16 RX ORDER — TORSEMIDE 20 MG/1
60 TABLET ORAL DAILY
Qty: 270 TABLET | Refills: 3 | Status: SHIPPED | OUTPATIENT
Start: 2022-11-16

## 2022-12-02 ENCOUNTER — PATIENT OUTREACH (OUTPATIENT)
Dept: HEMATOLOGY ONCOLOGY | Facility: CLINIC | Age: 82
End: 2022-12-02

## 2022-12-02 NOTE — PROGRESS NOTES
Subsequent Outreach: Called and spoke to patient  Reviewed upcoming appointments including date, time and location  Assessed for barriers of care  Denies symptoms of pain, chest pain, shortness of breath, or cough  His appetite is good  Denies nausea or vomiting  Denies constipation or diarrhea  Patient sounds to be stable at this time  Patient is on observation and is scheduled to follow up in one months with a f/u CT prior, which is scheduled  Mutally agreed for me to follow up with him in one month  Otherwise, he has my direct phone number for questions or concerns  Patient was appreciative

## 2022-12-07 ENCOUNTER — APPOINTMENT (OUTPATIENT)
Dept: LAB | Facility: HOSPITAL | Age: 82
End: 2022-12-07

## 2022-12-07 ENCOUNTER — TELEPHONE (OUTPATIENT)
Dept: CARDIOLOGY CLINIC | Facility: CLINIC | Age: 82
End: 2022-12-07

## 2022-12-07 DIAGNOSIS — I48.91 ATRIAL FIBRILLATION, UNSPECIFIED TYPE (HCC): ICD-10-CM

## 2022-12-07 DIAGNOSIS — D49.59 NEOPLASM OF UNSPECIFIED BEHAVIOR OF OTHER GENITOURINARY ORGAN: ICD-10-CM

## 2022-12-07 DIAGNOSIS — C80.1 MALIGNANT SPINDLE CELL NEOPLASM (HCC): ICD-10-CM

## 2022-12-07 DIAGNOSIS — I50.42 CHRONIC COMBINED SYSTOLIC AND DIASTOLIC CHF (CONGESTIVE HEART FAILURE) (HCC): Primary | ICD-10-CM

## 2022-12-07 LAB
ALBUMIN SERPL BCP-MCNC: 3.4 G/DL (ref 3.5–5)
ALP SERPL-CCNC: 117 U/L (ref 46–116)
ALT SERPL W P-5'-P-CCNC: 18 U/L (ref 12–78)
ANION GAP SERPL CALCULATED.3IONS-SCNC: 6 MMOL/L (ref 4–13)
AST SERPL W P-5'-P-CCNC: 21 U/L (ref 5–45)
BASOPHILS # BLD AUTO: 0.06 THOUSANDS/ÂΜL (ref 0–0.1)
BASOPHILS NFR BLD AUTO: 1 % (ref 0–1)
BILIRUB SERPL-MCNC: 0.59 MG/DL (ref 0.2–1)
BUN SERPL-MCNC: 17 MG/DL (ref 5–25)
CALCIUM ALBUM COR SERPL-MCNC: 10.1 MG/DL (ref 8.3–10.1)
CALCIUM SERPL-MCNC: 9.6 MG/DL (ref 8.3–10.1)
CHLORIDE SERPL-SCNC: 97 MMOL/L (ref 96–108)
CO2 SERPL-SCNC: 38 MMOL/L (ref 21–32)
CREAT SERPL-MCNC: 1.11 MG/DL (ref 0.6–1.3)
EOSINOPHIL # BLD AUTO: 0.22 THOUSAND/ÂΜL (ref 0–0.61)
EOSINOPHIL NFR BLD AUTO: 2 % (ref 0–6)
ERYTHROCYTE [DISTWIDTH] IN BLOOD BY AUTOMATED COUNT: 14 % (ref 11.6–15.1)
GFR SERPL CREATININE-BSD FRML MDRD: 61 ML/MIN/1.73SQ M
GLUCOSE SERPL-MCNC: 142 MG/DL (ref 65–140)
HCT VFR BLD AUTO: 47.9 % (ref 36.5–49.3)
HGB BLD-MCNC: 15.3 G/DL (ref 12–17)
IMM GRANULOCYTES # BLD AUTO: 0.05 THOUSAND/UL (ref 0–0.2)
IMM GRANULOCYTES NFR BLD AUTO: 1 % (ref 0–2)
LYMPHOCYTES # BLD AUTO: 2.3 THOUSANDS/ÂΜL (ref 0.6–4.47)
LYMPHOCYTES NFR BLD AUTO: 25 % (ref 14–44)
MCH RBC QN AUTO: 31.7 PG (ref 26.8–34.3)
MCHC RBC AUTO-ENTMCNC: 31.9 G/DL (ref 31.4–37.4)
MCV RBC AUTO: 99 FL (ref 82–98)
MONOCYTES # BLD AUTO: 0.73 THOUSAND/ÂΜL (ref 0.17–1.22)
MONOCYTES NFR BLD AUTO: 8 % (ref 4–12)
NEUTROPHILS # BLD AUTO: 5.81 THOUSANDS/ÂΜL (ref 1.85–7.62)
NEUTS SEG NFR BLD AUTO: 63 % (ref 43–75)
NRBC BLD AUTO-RTO: 0 /100 WBCS
NT-PROBNP SERPL-MCNC: 834 PG/ML
PLATELET # BLD AUTO: 246 THOUSANDS/UL (ref 149–390)
PMV BLD AUTO: 9.5 FL (ref 8.9–12.7)
POTASSIUM SERPL-SCNC: 3.8 MMOL/L (ref 3.5–5.3)
PROT SERPL-MCNC: 7.8 G/DL (ref 6.4–8.4)
PSA SERPL-MCNC: 4.8 NG/ML (ref 0–4)
RBC # BLD AUTO: 4.82 MILLION/UL (ref 3.88–5.62)
SODIUM SERPL-SCNC: 141 MMOL/L (ref 135–147)
WBC # BLD AUTO: 9.17 THOUSAND/UL (ref 4.31–10.16)

## 2022-12-07 NOTE — TELEPHONE ENCOUNTER
Please have him try compression socks for the swelling  I am glad the breathing is better  We will continue with the torsemide and recheck BMP in 1 month  Order in chart, no fasting required  Thank you!

## 2022-12-07 NOTE — TELEPHONE ENCOUNTER
Pt is aware  States that he lost 4 lbs but swelling isn't going down  States that his breathing is doing very well

## 2022-12-07 NOTE — TELEPHONE ENCOUNTER
----- Message from Carly Asher sent at 12/7/2022  1:42 PM EST -----  Please let Corey Dora know his renal function is holding stable and BNP (test for fluid stretch in the heart) is slightly improved with the change to torsemide  How is his swelling/breathing? Thank you!

## 2022-12-09 ENCOUNTER — TELEPHONE (OUTPATIENT)
Dept: HEMATOLOGY ONCOLOGY | Facility: CLINIC | Age: 82
End: 2022-12-09

## 2022-12-09 NOTE — TELEPHONE ENCOUNTER
Pt called to review recent labwork  Reviewed pt's PSA 4 8  Reviewed pt's BNP  834  Pt will f/u with Dr Tiffanie Carbajal @ next appt to discuss

## 2022-12-21 ENCOUNTER — HOSPITAL ENCOUNTER (OUTPATIENT)
Dept: CT IMAGING | Facility: HOSPITAL | Age: 82
Discharge: HOME | End: 2022-12-21

## 2022-12-21 DIAGNOSIS — C80.1 MALIGNANT SPINDLE CELL NEOPLASM (HCC): ICD-10-CM

## 2022-12-21 RX ADMIN — IOHEXOL 100 ML: 350 INJECTION, SOLUTION INTRAVENOUS at 10:56

## 2023-01-06 ENCOUNTER — OFFICE VISIT (OUTPATIENT)
Dept: HEMATOLOGY ONCOLOGY | Facility: CLINIC | Age: 83
End: 2023-01-06

## 2023-01-06 VITALS
BODY MASS INDEX: 25.68 KG/M2 | RESPIRATION RATE: 16 BRPM | SYSTOLIC BLOOD PRESSURE: 132 MMHG | HEART RATE: 88 BPM | HEIGHT: 69 IN | DIASTOLIC BLOOD PRESSURE: 72 MMHG | TEMPERATURE: 97.1 F | OXYGEN SATURATION: 86 % | WEIGHT: 173.4 LBS

## 2023-01-06 DIAGNOSIS — R97.20 ABNORMAL PSA: ICD-10-CM

## 2023-01-06 DIAGNOSIS — C80.1 MALIGNANT SPINDLE CELL NEOPLASM (HCC): Primary | ICD-10-CM

## 2023-01-06 RX ORDER — CLOTRIMAZOLE 10 MG/1
LOZENGE ORAL; TOPICAL
COMMUNITY
Start: 2022-10-01

## 2023-01-06 RX ORDER — MAGNESIUM OXIDE 400 MG/1
1 TABLET ORAL DAILY
COMMUNITY
Start: 2022-12-27

## 2023-01-06 NOTE — PROGRESS NOTES
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  PD-L1 90%  He has been under observation, immunotherapy deferred  CT chest ab pelvis 2 weeks ago showed largely stable or slightly improved bilateral pulmonary nodules, mediastinal lymphadenopathy  Slight area of consolidation in the right middle lobe suspicious for inflammatory process  CBC was normal, CMP showed glucose 142, alk phos 117, otherwise normal   PSA 4 8  Etiology of PSA elevation unclear  PET/CT did show faint hypermetabolism, SUV 3 5 in the prostate in summer 2022  Continued PSA monitoring is suggested  The patient is not in favor of biopsy at this time  I discussed the above with the patient  The patient and his wife voiced understanding and agreement   ______________________________________________________________________    Chief Complaint   Patient presents with   • Follow-up       HPI:  Oncology History    No history exists  Interval History: Clinically stable  ECOG-  0 - Asymptomatic    Review of Systems   Constitutional: Negative for chills and fever  HENT: Negative for nosebleeds  Eyes: Negative for discharge  Respiratory: Negative for cough and shortness of breath  Cardiovascular: Negative for chest pain  Gastrointestinal: Negative for abdominal pain, constipation and diarrhea  Endocrine: Negative for polydipsia  Genitourinary: Negative for hematuria  Musculoskeletal: Negative for arthralgias  Skin: Negative for color change  Allergic/Immunologic: Negative for immunocompromised state  Neurological: Negative for dizziness and headaches  Hematological: Negative for adenopathy     Psychiatric/Behavioral: Negative for agitation  Past Medical History:   Diagnosis Date   • BPH (benign prostatic hyperplasia)    • Chronic obstructive pulmonary disease (COPD) ([facility] )    • Essential hypertension    • Hard of hearing     Pt does wear hearing aids   • Hypertension    • Lung cancer ([facility] )    • Shortness of breath     Pt states not changes and it occurs with moderate exertion   • Sleep disturbance     Pt states he is only sleeping about 3 hours a night    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   • Tinnitus      Patient Active Problem List   Diagnosis   • Atrial fibrillation ([facility] )   • RBBB   • Moderate alcohol consumption   • Localized edema   • Chronic combined systolic and diastolic CHF (congestive heart failure) ([facility])   • COPD (chronic obstructive pulmonary disease) ([facility] )   • Frequent PVCs   • Chronic respiratory failure with hypoxia and hypercapnia (HCC)   • Pulmonary nodule       Current Outpatient Medications:   •  apixaban (ELIQUIS) 5 mg, Take 1 tablet (5 mg total) by mouth 2 (two) times a day, Disp: 180 tablet, Rfl: 3  •  Ascorbic Acid (VITAMIN C PO), Take 1 tablet by mouth daily , Disp: , Rfl:   •  bisacodyl (DULCOLAX) 5 mg EC tablet, Take 5 mg by mouth daily  , Disp: , Rfl:   •  capsaicin (ZOSTRIX) 0 025 % cream, , Disp: , Rfl:   •  cetirizine (ZyrTEC) 10 mg tablet, Take 10 mg by mouth daily, Disp: , Rfl:   •  Cholecalciferol (Vitamin D3) 50 MCG (2000 UT) TABS, , Disp: , Rfl:   •  clotrimazole (MYCELEX) 10 mg alejandra, , Disp: , Rfl:   •  Diclofenac Sodium (VOLTAREN) 1 %, APPLY 4 GM ON AFFECTED AREA TWICE A DAY AS NEEDED FOR PAIN IN JOINTS, Disp: , Rfl:   •  diltiazem (CARDIZEM CD) 300 mg 24 hr capsule, Take 1 capsule (300 mg total) by mouth daily, Disp: 90 capsule, Rfl: 3  •  docusate sodium (COLACE) 100 mg capsule, Take 100 mg by mouth , Disp: , Rfl:   •  finasteride (PROSCAR) 5 mg tablet, Take 5 mg by mouth daily , Disp: , Rfl:   •  fluticasone-salmeterol (ADVAIR, WIXELA) 250-50 mcg/dose inhaler, Inhale 1 puff 2 (two) times a day Rinse mouth after use , Disp: , Rfl:   •  ipratropium (ATROVENT) 0 02 % nebulizer solution, Take 2 5 mL (0 5 mg total) by nebulization in the morning, Disp: , Rfl:   •  Magnesium 500 MG CAPS, Take 500 mg by mouth daily, Disp: , Rfl:   •  magnesium oxide (MAG-OX) 400 mg tablet, Take 1 tablet by mouth daily, Disp: , Rfl:   •  montelukast (SINGULAIR) 10 mg tablet, Take 10 mg by mouth daily at bedtime, Disp: , Rfl:   •  Multiple Vitamin (MULTIVITAMINS PO), Take 1 tablet by mouth daily, Disp: , Rfl:   •  mupirocin (BACTROBAN) 2 % ointment, , Disp: , Rfl:   •  nitroglycerin (NITROSTAT) 0 4 mg SL tablet, , Disp: , Rfl:   •  sildenafil (VIAGRA) 100 mg tablet, Take 1 tablet (100 mg total) by mouth daily as needed for erectile dysfunction, Disp: 4 tablet, Rfl: 11  •  Spiriva Respimat 2 5 MCG/ACT AERS inhaler, Inhale 2 puffs daily , Disp: , Rfl:   •  Multiple Vitamin (MULTIVITAMINS PO), Take 1 tablet by mouth daily (Patient not taking: Reported on 1/6/2023), Disp: , Rfl:   •  torsemide (DEMADEX) 20 mg tablet, Take 3 tablets (60 mg total) by mouth daily, Disp: 270 tablet, Rfl: 3  Allergies   Allergen Reactions   • Penicillin G Other (See Comments)     PCN Shots Only!!     Past Surgical History:   Procedure Laterality Date   • IR BIOPSY LUNG  7/19/2022   • SHOULDER OPEN ROTATOR CUFF REPAIR     • SKIN GRAFT      spindle cell removal of scalp with skin graft from shoulder   • VEIN SURGERY       Social History     Objective:  Vitals:    01/06/23 1044 01/06/23 1052   BP: 142/72 132/72   BP Location: Left arm Left arm   Patient Position: Sitting Sitting   Cuff Size: Extra-Large Extra-Large   Pulse: 88    Resp: 16    Temp: (!) 97 1 °F (36 2 °C)    TempSrc: Tympanic    SpO2: (!) 86%    Weight: 78 7 kg (173 lb 6 4 oz)    Height: 5' 9" (1 753 m)      Physical Exam  Constitutional:       Appearance: He is well-developed  HENT:      Head: Normocephalic and atraumatic  Eyes:      Pupils: Pupils are equal, round, and reactive to light  Cardiovascular:      Rate and Rhythm: Normal rate and regular rhythm  Heart sounds: No murmur heard  Pulmonary:      Breath sounds: Normal breath sounds  No wheezing or rales  Abdominal:      Palpations: Abdomen is soft  Tenderness: There is no abdominal tenderness  Musculoskeletal:         General: No tenderness  Normal range of motion  Cervical back: Neck supple  Lymphadenopathy:      Cervical: No cervical adenopathy  Skin:     Findings: No erythema or rash  Neurological:      Mental Status: He is alert and oriented to person, place, and time  Cranial Nerves: No cranial nerve deficit  Deep Tendon Reflexes: Reflexes are normal and symmetric  Psychiatric:         Behavior: Behavior normal            Labs: I personally reviewed the labs and imaging pertinent to this patient care

## 2023-01-09 ENCOUNTER — TELEPHONE (OUTPATIENT)
Dept: CARDIOLOGY CLINIC | Facility: CLINIC | Age: 83
End: 2023-01-09

## 2023-01-09 NOTE — TELEPHONE ENCOUNTER
Julio C Gonzalez from the [facility] in [address] stating that she needs pts' clinical notes starting back from Aug 2022 till recent   Fax [phone]  [phone]

## 2023-01-10 DIAGNOSIS — R60.0 LOCALIZED EDEMA: ICD-10-CM

## 2023-01-10 RX ORDER — TORSEMIDE 20 MG/1
60 TABLET ORAL DAILY
Qty: 270 TABLET | Refills: 3 | Status: SHIPPED | OUTPATIENT
Start: 2023-01-10

## 2023-01-10 NOTE — TELEPHONE ENCOUNTER
Can you please print this, and place on my desk so I can fax this tomorrow?   Thank you!!    [phone]- [facility]

## 2023-01-13 ENCOUNTER — APPOINTMENT (OUTPATIENT)
Dept: LAB | Facility: HOSPITAL | Age: 83
End: 2023-01-13

## 2023-01-13 DIAGNOSIS — I50.42 CHRONIC COMBINED SYSTOLIC AND DIASTOLIC CHF (CONGESTIVE HEART FAILURE) (HCC): ICD-10-CM

## 2023-01-13 LAB
ANION GAP SERPL CALCULATED.3IONS-SCNC: 2 MMOL/L (ref 4–13)
BUN SERPL-MCNC: 16 MG/DL (ref 5–25)
CALCIUM SERPL-MCNC: 9.4 MG/DL (ref 8.3–10.1)
CHLORIDE SERPL-SCNC: 97 MMOL/L (ref 96–108)
CO2 SERPL-SCNC: 42 MMOL/L (ref 21–32)
CREAT SERPL-MCNC: 1 MG/DL (ref 0.6–1.3)
GFR SERPL CREATININE-BSD FRML MDRD: 69 ML/MIN/1.73SQ M
GLUCOSE P FAST SERPL-MCNC: 114 MG/DL (ref 65–99)
POTASSIUM SERPL-SCNC: 4.3 MMOL/L (ref 3.5–5.3)
SODIUM SERPL-SCNC: 141 MMOL/L (ref 135–147)

## 2023-01-16 ENCOUNTER — PATIENT OUTREACH (OUTPATIENT)
Dept: HEMATOLOGY ONCOLOGY | Facility: CLINIC | Age: 83
End: 2023-01-16

## 2023-01-16 NOTE — PROGRESS NOTES
Subsequent Outreach Attempt x1: Attempted to reach patient today to re-assess for any barriers to care and offer any supportive services needed  Left VM in detail to give me a callback at my direct number [phone]  [facility]

## 2023-01-17 ENCOUNTER — TELEPHONE (OUTPATIENT)
Dept: CARDIOLOGY CLINIC | Facility: CLINIC | Age: 83
End: 2023-01-17

## 2023-01-17 NOTE — TELEPHONE ENCOUNTER
----- Message from Carly Asher sent at 1/16/2023  5:32 PM EST -----  Please let patient and spouse know that his kidney function and electrolytes are stable  Thank you!

## 2023-02-15 ENCOUNTER — PATIENT OUTREACH (OUTPATIENT)
Dept: HEMATOLOGY ONCOLOGY | Facility: CLINIC | Age: 83
End: 2023-02-15

## 2023-02-15 NOTE — PROGRESS NOTES
Subsequent Outreach Attempt x2: Attempted to reach patient today to re-assess for any barriers to care and offer any supportive services needed  Left VM in detail to give me a callback at my direct number [phone]  Otherwise, I will re-attempt to outreach at a later time/ date

## 2023-02-22 ENCOUNTER — TELEPHONE (OUTPATIENT)
Dept: CARDIOLOGY CLINIC | Facility: CLINIC | Age: 83
End: 2023-02-22

## 2023-02-22 DIAGNOSIS — I50.42 CHRONIC COMBINED SYSTOLIC AND DIASTOLIC CHF (CONGESTIVE HEART FAILURE) (HCC): Primary | ICD-10-CM

## 2023-02-22 NOTE — TELEPHONE ENCOUNTER
Pt lm stating that he would like to speak to Singh Blancas about his water pill that is not working    [phone] n/a

## 2023-02-23 NOTE — TELEPHONE ENCOUNTER
I ordered a BNP and BMP  Please find out if weight is stable  If any significant shortness of breath I recommend closer evaluation  Thank you!

## 2023-02-23 NOTE — TELEPHONE ENCOUNTER
Pt states that it does not seem to be helping with the SOB or swelling  He is not going to the bathroom much either

## 2023-02-24 ENCOUNTER — APPOINTMENT (OUTPATIENT)
Dept: LAB | Facility: HOSPITAL | Age: 83
End: 2023-02-24

## 2023-02-24 DIAGNOSIS — I50.42 CHRONIC COMBINED SYSTOLIC AND DIASTOLIC CHF (CONGESTIVE HEART FAILURE) (HCC): ICD-10-CM

## 2023-02-24 LAB
ANION GAP SERPL CALCULATED.3IONS-SCNC: 3 MMOL/L (ref 4–13)
BUN SERPL-MCNC: 21 MG/DL (ref 5–25)
CALCIUM SERPL-MCNC: 9.9 MG/DL (ref 8.4–10.2)
CHLORIDE SERPL-SCNC: 93 MMOL/L (ref 96–108)
CO2 SERPL-SCNC: 45 MMOL/L (ref 21–32)
CREAT SERPL-MCNC: 1.07 MG/DL (ref 0.6–1.3)
GFR SERPL CREATININE-BSD FRML MDRD: 64 ML/MIN/1.73SQ M
GLUCOSE P FAST SERPL-MCNC: 125 MG/DL (ref 65–99)
NT-PROBNP SERPL-MCNC: 1014 PG/ML
POTASSIUM SERPL-SCNC: 4.4 MMOL/L (ref 3.5–5.3)
SODIUM SERPL-SCNC: 141 MMOL/L (ref 135–147)

## 2023-02-24 NOTE — TELEPHONE ENCOUNTER
Pt states that it really hasnt changed from 166-167  hasnt gone up or down  Also states that the SOB is not bad, just the same as before

## 2023-02-24 NOTE — TELEPHONE ENCOUNTER
Thank you  Let's keep the med the same for now  Check the labs at his convenience  Notify us if any worsening

## 2023-03-01 ENCOUNTER — TELEPHONE (OUTPATIENT)
Dept: CARDIOLOGY CLINIC | Facility: CLINIC | Age: 83
End: 2023-03-01

## 2023-03-01 NOTE — TELEPHONE ENCOUNTER
----- Message from Carly Asher sent at 2/28/2023  5:42 PM EST -----  Please let patient know that his lab work shows stable kidney function and BNP (test for fluid stretch in the heart) is relatively unchanged

## 2023-03-06 ENCOUNTER — OFFICE VISIT (OUTPATIENT)
Dept: CARDIOLOGY CLINIC | Facility: CLINIC | Age: 83
End: 2023-03-06

## 2023-03-06 VITALS
DIASTOLIC BLOOD PRESSURE: 60 MMHG | HEIGHT: 69 IN | OXYGEN SATURATION: 94 % | SYSTOLIC BLOOD PRESSURE: 100 MMHG | WEIGHT: 172 LBS | HEART RATE: 65 BPM | BODY MASS INDEX: 25.48 KG/M2

## 2023-03-06 DIAGNOSIS — I10 ESSENTIAL HYPERTENSION: ICD-10-CM

## 2023-03-06 DIAGNOSIS — I48.0 PAROXYSMAL ATRIAL FIBRILLATION (HCC): Primary | ICD-10-CM

## 2023-03-06 DIAGNOSIS — I49.3 FREQUENT PVCS: ICD-10-CM

## 2023-03-06 DIAGNOSIS — I45.10 RBBB: ICD-10-CM

## 2023-03-06 DIAGNOSIS — Z78.9 MODERATE ALCOHOL CONSUMPTION: ICD-10-CM

## 2023-03-06 DIAGNOSIS — J44.9 CHRONIC OBSTRUCTIVE PULMONARY DISEASE, UNSPECIFIED COPD TYPE (HCC): ICD-10-CM

## 2023-03-06 DIAGNOSIS — R60.0 LOCALIZED EDEMA: ICD-10-CM

## 2023-03-06 DIAGNOSIS — I50.42 CHRONIC COMBINED SYSTOLIC AND DIASTOLIC CHF (CONGESTIVE HEART FAILURE) (HCC): ICD-10-CM

## 2023-03-06 RX ORDER — TORSEMIDE 20 MG/1
80 TABLET ORAL DAILY
Qty: 360 TABLET | Refills: 3 | Status: SHIPPED | OUTPATIENT
Start: 2023-03-06 | End: 2023-03-13 | Stop reason: SDUPTHER

## 2023-03-06 NOTE — PATIENT INSTRUCTIONS
Increase torsemide to 80 mg daily  Monitor weight and call me on Monday with a report of weights and how your leg swelling looks  Blood work in 2 weeks to recheck kidney function

## 2023-03-06 NOTE — PROGRESS NOTES
Cardiology Office Visit    Yasmine Linton  [id number]  1940    [facility]  [address] [facility] [address]  [address]      Dear Cleve Kocher, MD,    I had the pleasure of seeing your patient at our [address] [facility] [place] office today 3/6/2023  As you know he is a pleasant 81y o  year old male with a medical history as described below  Reason for office visit: Follow up atrial fibrillation, hypertension, RBBB and PVC's  1  Paroxysmal atrial fibrillation ([facility] )    2  Chronic combined systolic and diastolic CHF (congestive heart failure) ([facility])  -     Basic metabolic panel; Future; Expected date: 03/21/2023    3  Essential hypertension    4  RBBB    5  Moderate alcohol consumption    6  Localized edema  -     torsemide (DEMADEX) 20 mg tablet; Take 4 tablets (80 mg total) by mouth daily    7  Frequent PVCs    8  Chronic obstructive pulmonary disease, unspecified COPD type ([facility] [address] )       Plan:  Aurelia Bill feels like he is doing well overall  I have recommended increasing his torsemide to 80 mg daily  I have asked him to monitor his weight and call me on Monday with a report of weights and an update on his lower extremity edema  I have also asked him to get repeat blood work done in 2 weeks to monitor renal function with the increase in torsemide  HPI     Patient has a history hypertension, asthma/COPD and BPH  Patient was seen by his primary physician (Dr Osvaldo Yanes) for routine Medicare physical exam 1/19/2021 at which time he was noted to have an abnormal heart rhythm  EKG obtained showed possible atrial fibrillation with frequent PVCs in a bigeminal pattern with underlying right bundle-branch block  24 hour Holter monitor was ordered and cardiology evaluation was recommended      Holter monitor showed predominantly atrial fibrillation with an average heart rate of 102 beats per minute and a total of 51,075 premature ventricular contractions/aberrant beats totaling of 34 9% ectopic burden  The patient denied any symptoms in attached diary  2/25/2021: Patient confirms that he had presented for routine office visit at which time he was noted to have an abnormal heart rhythm and sent for additional testing  [facility]  He adamantly denies any palpitations or chest pain  He does have shortness of breath / dyspnea on exertion which he attributes to his underlying lung disease which consists of COPD and asthma  Patient does admit to drinking 7 days a week at a minimum to alcoholic drinks a day  Alcohol intake consists of beer, wine and occasional whiskey  His wife has noted increased lower extremity edema  Patient denies any prior cardiac issues  No prior cardiac testing that he can recall  3/18/2021: Patient presents to the office today for follow up  I had started diltiazem, eliquis, and furosemide at/after last office visit  Patient denies any palpitations  He has tolerated the new medications without difficulty  Furosemide was increased from 20 mg to 40 mg  The patient feels his breathing is somewhat better  Still has edema  No chest pain  No lightheadedness or dizziness  ECG today shows atrial fibrillation with RVR at 105 bpm with aberrantly conducted complexes  Plan for repeat ZIO (3 days) to start today  6/17/2021: Patient presents to the office today for follow up  Patient tells me that he feels well overall  He does continue to have edema in his legs  Shortness of breath and dyspnea on exertion improved per his report  Decreased cough  No bleeding issues  He does see [place] doctor next week in the [facility]  He did have repeat ZIO 3/18 done which showed 100% atrial with an average heart rate of 94 bpm  Frequent PVC's (16 4%)  * Patient was seen by Nessa Landa 8/9/2021 and 9/7/2021  * Patient was last seen in the office 12/1/2021 by 10 Michael Landa at which time he reported feeling very well   He felt his breathing had improved and was using supplemental O2 when hiking [place]  He did report ongoing edema  6/29/2022: Patient presents to the office today for follow up  He tells me he feels ok for the most part  He does have edema which is worse near the end of the day  He is taking 60 mg daily and two days a week an additional 60 mg  He was seen in the ER 6/8/2022 after a fall  He hit the [employer]  He had partial ear amputation  His wife states he didn't have a lot of EtOH but maybe a little more than usual  No palpitations  Occasionally feels lightheadedness or dizzy which seems to occur when he has coughing fits  He does have oxygen at home  He [place]  He denies any chest pain  ZIO ordered 8/17/2021 but does not appear to have been done (will have staff check on this)  * Patient was last seen in the office by Nessa Landa 10/27/2022      3/6/2023: Shelley Lori presents to the office today for follow up  He tells me that he is doing well overall  He denies any chest pain  He notes that he has stable shortness of breath/dyspnea on exertion  Denies any lightheadedness or dizziness  He is using his oxygen 24/7  He continues to have lower extremity edema  Tells me that he does not think he is peeing as much as he was previously despite taking torsemide 60 mg daily          Patient Active Problem List   Diagnosis   • Essential hypertension   • Atrial fibrillation (HCC)   • RBBB   • Moderate alcohol consumption   • Localized edema   • Chronic combined systolic and diastolic CHF (congestive heart failure) ([facility])   • COPD (chronic obstructive pulmonary disease) ([facility])   • Frequent PVCs   • Chronic respiratory failure with hypoxia and hypercapnia ([facility])   • Pulmonary nodule     Past Medical History:   Diagnosis Date   • BPH (benign prostatic hyperplasia)    • Chronic obstructive pulmonary disease (COPD) ([facility] )    • Essential hypertension    • Hard of hearing     Pt does wear hearing aids   • Hypertension    • Lung cancer ([facility] [address] )    • Shortness of breath     Pt states not changes and it occurs with moderate exertion   • Sleep disturbance     Pt states he is only sleeping about 3 hours a night  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   • Tinnitus      Social History     Socioeconomic History   • Marital status: /Civil Union     Spouse name: Not on file   • Number of children: Not on file   • Years of education: Not on file   • Highest education level: Not on file   Occupational History   • Occupation: Retired   Tobacco Use   • Smoking status: Former     Types: Cigars   • Smokeless tobacco: Never   Vaping Use   • Vaping Use: Never used   Substance and Sexual Activity   • Alcohol use: Yes     Alcohol/week: 14 0 standard drinks     Types: 14 Glasses of wine per week     Comment: moderate consumption   • Drug use: Not Currently   • Sexual activity: Yes   Other Topics Concern   • Not on file   Social History Narrative   • Not on file     Social Determinants of Health     Financial Resource Strain: Not on file   Food Insecurity: Not on file   Transportation Needs: Not on file   Physical Activity: Not on file   Stress: Not on file   Social Connections: Not on file   Intimate Partner Violence: Not on file   Housing Stability: Not on file      History reviewed  No pertinent family history         Past Surgical History:   Procedure Laterality Date   • IR BIOPSY LUNG  7/19/2022   • SHOULDER OPEN ROTATOR CUFF REPAIR     • SKIN GRAFT      spindle cell removal of scalp with skin graft from shoulder   • VEIN SURGERY         Current Outpatient Medications:   •  apixaban (ELIQUIS) 5 mg, Take 1 tablet (5 mg total) by mouth 2 (two) times a day, Disp: 180 tablet, Rfl: 3  •  Ascorbic Acid (VITAMIN C PO), Take 1 tablet by mouth daily , Disp: , Rfl:   •  bisacodyl (DULCOLAX) 5 mg EC tablet, Take 5 mg by mouth daily  , Disp: , Rfl:   •  capsaicin (ZOSTRIX) 0 025 % cream, , Disp: , Rfl:   •  cetirizine (ZyrTEC) 10 mg tablet, Take 10 mg by mouth daily, Disp: , Rfl:   •  Cholecalciferol (Vitamin D3) 50 MCG (2000 UT) TABS, , Disp: , Rfl:   •  clotrimazole (MYCELEX) 10 mg alejandra, , Disp: , Rfl:   •  Diclofenac Sodium (VOLTAREN) 1 %, APPLY 4 GM ON AFFECTED AREA TWICE A DAY AS NEEDED FOR PAIN IN JOINTS, Disp: , Rfl:   •  diltiazem (CARDIZEM CD) 300 mg 24 hr capsule, Take 1 capsule (300 mg total) by mouth daily, Disp: 90 capsule, Rfl: 3  •  docusate sodium (COLACE) 100 mg capsule, Take 100 mg by mouth , Disp: , Rfl:   •  finasteride (PROSCAR) 5 mg tablet, Take 5 mg by mouth daily , Disp: , Rfl:   •  fluticasone-salmeterol (ADVAIR, WIXELA) 250-50 mcg/dose inhaler, Inhale 1 puff 2 (two) times a day Rinse mouth after use , Disp: , Rfl:   •  ipratropium (ATROVENT) 0 02 % nebulizer solution, Take 2 5 mL (0 5 mg total) by nebulization in the morning, Disp: , Rfl:   •  magnesium oxide (MAG-OX) 400 mg tablet, Take 1 tablet by mouth daily, Disp: , Rfl:   •  montelukast (SINGULAIR) 10 mg tablet, Take 10 mg by mouth daily at bedtime, Disp: , Rfl:   •  Multiple Vitamin (MULTIVITAMINS PO), Take 1 tablet by mouth daily, Disp: , Rfl:   •  mupirocin (BACTROBAN) 2 % ointment, , Disp: , Rfl:   •  nitroglycerin (NITROSTAT) 0 4 mg SL tablet, , Disp: , Rfl:   •  sildenafil (VIAGRA) 100 mg tablet, Take 1 tablet (100 mg total) by mouth daily as needed for erectile dysfunction, Disp: 4 tablet, Rfl: 11  •  Spiriva Respimat 2 5 MCG/ACT AERS inhaler, Inhale 2 puffs daily , Disp: , Rfl:   •  torsemide (DEMADEX) 20 mg tablet, Take 4 tablets (80 mg total) by mouth daily, Disp: 360 tablet, Rfl: 3  •  Magnesium 500 MG CAPS, Take 500 mg by mouth daily (Patient not taking: Reported on 3/6/2023), Disp: , Rfl:   •  Multiple Vitamin (MULTIVITAMINS PO), Take 1 tablet by mouth daily (Patient not taking: Reported on 1/6/2023), Disp: , Rfl:      Allergies   Allergen Reactions   • Penicillin G Other (See Comments)     PCN Shots Only!!       Cardiac Test Results:     Echocardiogram 8/16/2021:   EF 45% (difficult to assess in setting of frequent ectopy and atrial arrhythmia)  Mild to moderate LAE  Mildly dilated RA  MAC  Mild MR  Mild TR  Estimated PASP 40 mmHg consistent with mild pHTN  ZIO 3/18-3/21/2021:  Strips above reviewed  Agree with findings as documented  100% atrial fibrillation burden with HR range () with an Avg HR of 94 bpm    Frequent PVC's (16 4%)  Rare ventricular couplets and triplets  Ventricular Bigeminy and Trigeminy noted  ECG 3/18/2021: Atrial fibrillation with rapid ventricular response   bpm   PVC's vs aberrantly conducted beats  RBBB  Echocardiogram 2/25/2021:   EF 45-50%  Mildly dilated left and right atrium  Mild mitral regurgitation  Trace tricuspid regurgitation  Trace pericardial effusion  Holter Monitor 1/28/2021:   1  The patient had predominantly atrial fibrillation  2  Heart rate varied from 78 bpm to 130 bpm     3  The patient’s average heart rate was 102 bpm     4  The patient had a holter monitor tracing done for 23 hours and 59 minutes  5  The patient had frequent ventricular ectopic activity (PVC's vs Aberrant beats) with a total of 51,075 ectopic beats representing a 34 9% burden  6  Episodes of ventricular bigeminy (30,849) and trigeminy (6156)  7  Ventricular runs with the longest lasting 5 beats  8  The patient had no supraventricular ectopy  9  The longest R/R interval was 1 7 seconds  10  Right bundle branch block  11  Diary attached  Patient denied any symptoms  Lipid panel 11/17/2020: C 170  T 66  H 74  L 85  ECG 1/19/2021:  Likely underlying atrial fibrillation with frequent PVCs/ aberrant beats in a bigeminal pattern  Right bundle-branch block  Review of Systems:    Review of Systems   Constitutional: Negative for activity change, appetite change and fatigue     HENT: Negative for congestion, hearing loss, tinnitus and trouble swallowing  Eyes: Negative for visual disturbance  Respiratory: Positive for shortness of breath  Negative for cough, chest tightness and wheezing  Dyspnea on exertion   Cardiovascular: Positive for leg swelling  Negative for chest pain and palpitations  Gastrointestinal: Negative for abdominal distention, abdominal pain, nausea and vomiting  Genitourinary: Negative for difficulty urinating  Musculoskeletal: Positive for arthralgias  Skin: Negative for rash  Neurological: Positive for light-headedness  Negative for dizziness and syncope  Hematological: Does not bruise/bleed easily  Psychiatric/Behavioral: Negative for confusion  The patient is not nervous/anxious  All other systems reviewed and are negative  Vitals:    03/06/23 1030 03/06/23 1119   BP: 118/68 100/60   Pulse: 65    SpO2: 94%    Weight: 78 kg (172 lb)    Height: 5' 9" (1 753 m)      Vitals:    03/06/23 1030   Weight: 78 kg (172 lb)     Height: 5' 9" (175 3 cm)     Physical Exam  Vitals reviewed  Constitutional:       Appearance: He is well-developed  HENT:      Head: Normocephalic and atraumatic  Eyes:      Conjunctiva/sclera: Conjunctivae normal       Pupils: Pupils are equal, round, and reactive to light  Neck:      Vascular: No JVD  Cardiovascular:      Rate and Rhythm: Bradycardia present  Rhythm irregular  Heart sounds: Normal heart sounds  No murmur heard  No friction rub  No gallop  Pulmonary:      Effort: Pulmonary effort is normal       Breath sounds: Decreased breath sounds present  Abdominal:      General: Bowel sounds are normal       Palpations: Abdomen is soft  Musculoskeletal:      Cervical back: Normal range of motion  Right lower leg: Edema present  Left lower leg: Edema present  Skin:     General: Skin is warm and dry  Neurological:      Mental Status: He is alert and oriented to person, place, and time     Psychiatric: Behavior: Behavior normal

## 2023-03-13 ENCOUNTER — TELEPHONE (OUTPATIENT)
Dept: CARDIOLOGY CLINIC | Facility: CLINIC | Age: 83
End: 2023-03-13

## 2023-03-13 DIAGNOSIS — R60.0 LOCALIZED EDEMA: ICD-10-CM

## 2023-03-13 RX ORDER — TORSEMIDE 100 MG/1
100 TABLET ORAL DAILY
Qty: 30 TABLET | Refills: 11 | Status: SHIPPED | OUTPATIENT
Start: 2023-03-13

## 2023-03-13 NOTE — TELEPHONE ENCOUNTER
Dr Stephanie Limon would like him to increase torsemide to 100 mg and get BMP done next week  Order is in chart for the BMP and I ordered the torsemide 100 mg tab to [employer]  Thank you!

## 2023-03-13 NOTE — TELEPHONE ENCOUNTER
Pt was told to call today and let Crystal know how he is doing on the Lasix 80mg  Per pt no change    Please call pt [phone]

## 2023-03-21 ENCOUNTER — TELEPHONE (OUTPATIENT)
Dept: PULMONOLOGY | Facility: CLINIC | Age: 83
End: 2023-03-21

## 2023-03-21 ENCOUNTER — APPOINTMENT (OUTPATIENT)
Dept: LAB | Facility: HOSPITAL | Age: 83
End: 2023-03-21

## 2023-03-21 DIAGNOSIS — C80.1 MALIGNANT SPINDLE CELL NEOPLASM (HCC): ICD-10-CM

## 2023-03-21 DIAGNOSIS — I50.42 CHRONIC COMBINED SYSTOLIC AND DIASTOLIC CHF (CONGESTIVE HEART FAILURE) (HCC): ICD-10-CM

## 2023-03-21 DIAGNOSIS — R97.20 ABNORMAL PSA: ICD-10-CM

## 2023-03-21 DIAGNOSIS — J96.11 CHRONIC RESPIRATORY FAILURE WITH HYPOXIA AND HYPERCAPNIA (HCC): Primary | ICD-10-CM

## 2023-03-21 DIAGNOSIS — J96.12 CHRONIC RESPIRATORY FAILURE WITH HYPOXIA AND HYPERCAPNIA (HCC): Primary | ICD-10-CM

## 2023-03-21 LAB
ALBUMIN SERPL BCP-MCNC: 3.8 G/DL (ref 3.5–5)
ALP SERPL-CCNC: 90 U/L (ref 34–104)
ALT SERPL W P-5'-P-CCNC: 10 U/L (ref 7–52)
ANION GAP SERPL CALCULATED.3IONS-SCNC: 5 MMOL/L (ref 4–13)
AST SERPL W P-5'-P-CCNC: 16 U/L (ref 13–39)
BASOPHILS # BLD AUTO: 0.07 THOUSANDS/ÂΜL (ref 0–0.1)
BASOPHILS NFR BLD AUTO: 1 % (ref 0–1)
BILIRUB SERPL-MCNC: 0.59 MG/DL (ref 0.2–1)
BUN SERPL-MCNC: 17 MG/DL (ref 5–25)
CALCIUM SERPL-MCNC: 9.5 MG/DL (ref 8.4–10.2)
CHLORIDE SERPL-SCNC: 95 MMOL/L (ref 96–108)
CO2 SERPL-SCNC: 41 MMOL/L (ref 21–32)
CREAT SERPL-MCNC: 0.92 MG/DL (ref 0.6–1.3)
EOSINOPHIL # BLD AUTO: 0.48 THOUSAND/ÂΜL (ref 0–0.61)
EOSINOPHIL NFR BLD AUTO: 7 % (ref 0–6)
ERYTHROCYTE [DISTWIDTH] IN BLOOD BY AUTOMATED COUNT: 13.2 % (ref 11.6–15.1)
GFR SERPL CREATININE-BSD FRML MDRD: 77 ML/MIN/1.73SQ M
GLUCOSE P FAST SERPL-MCNC: 100 MG/DL (ref 65–99)
HCT VFR BLD AUTO: 44.8 % (ref 36.5–49.3)
HGB BLD-MCNC: 14.1 G/DL (ref 12–17)
IMM GRANULOCYTES # BLD AUTO: 0.02 THOUSAND/UL (ref 0–0.2)
IMM GRANULOCYTES NFR BLD AUTO: 0 % (ref 0–2)
LYMPHOCYTES # BLD AUTO: 1.75 THOUSANDS/ÂΜL (ref 0.6–4.47)
LYMPHOCYTES NFR BLD AUTO: 24 % (ref 14–44)
MCH RBC QN AUTO: 31.8 PG (ref 26.8–34.3)
MCHC RBC AUTO-ENTMCNC: 31.5 G/DL (ref 31.4–37.4)
MCV RBC AUTO: 101 FL (ref 82–98)
MONOCYTES # BLD AUTO: 0.91 THOUSAND/ÂΜL (ref 0.17–1.22)
MONOCYTES NFR BLD AUTO: 12 % (ref 4–12)
NEUTROPHILS # BLD AUTO: 4.09 THOUSANDS/ÂΜL (ref 1.85–7.62)
NEUTS SEG NFR BLD AUTO: 56 % (ref 43–75)
NRBC BLD AUTO-RTO: 0 /100 WBCS
PLATELET # BLD AUTO: 263 THOUSANDS/UL (ref 149–390)
PMV BLD AUTO: 9.6 FL (ref 8.9–12.7)
POTASSIUM SERPL-SCNC: 5.1 MMOL/L (ref 3.5–5.3)
PROT SERPL-MCNC: 7.1 G/DL (ref 6.4–8.4)
PSA SERPL-MCNC: 4.5 NG/ML (ref 0–4)
RBC # BLD AUTO: 4.44 MILLION/UL (ref 3.88–5.62)
SODIUM SERPL-SCNC: 141 MMOL/L (ref 135–147)
WBC # BLD AUTO: 7.32 THOUSAND/UL (ref 4.31–10.16)

## 2023-03-21 NOTE — TELEPHONE ENCOUNTER
Pt is requesting an order portable concentrator  The one he has is not charging any more  Please send to [facility]

## 2023-03-21 NOTE — TELEPHONE ENCOUNTER
Davontem for pt to call back, need more info on his DME company   I am unable to find "[facility]"

## 2023-03-22 ENCOUNTER — TELEPHONE (OUTPATIENT)
Dept: CARDIOLOGY CLINIC | Facility: CLINIC | Age: 83
End: 2023-03-22

## 2023-03-22 NOTE — TELEPHONE ENCOUNTER
Patient returned call with information on the DME company  He says it is called [employer] on [address] [facility] [address] [facility], [address]  Phone number is [phone]  I asked for a fax number but he did not have one

## 2023-03-22 NOTE — TELEPHONE ENCOUNTER
----- Message from Carly Asher sent at 3/21/2023  5:09 PM EDT -----  Patient's kidney function and electrolytes are stable  How is he tolerating the torsemide 80 mg? Breathing improved?

## 2023-03-22 NOTE — TELEPHONE ENCOUNTER
Tried calling the number pt provided for the DME company and it is not an accurate number  Tried reaching the pt again to discuss this more and no answer  The only information I could find on the DME company is -  [employer]     Provider Organization "[employer]  Address [address], [address], [address]   [address] [place] [address], [address]   Phone Number [phone]   Fax Number [phone]     I have faxed the oxygen order to the above fax number today

## 2023-03-28 ENCOUNTER — TELEPHONE (OUTPATIENT)
Dept: CARDIOLOGY CLINIC | Facility: CLINIC | Age: 83
End: 2023-03-28

## 2023-03-30 ENCOUNTER — HOSPITAL ENCOUNTER (OUTPATIENT)
Dept: CT IMAGING | Facility: HOSPITAL | Age: 83
End: 2023-03-30

## 2023-03-30 DIAGNOSIS — C80.1 MALIGNANT SPINDLE CELL NEOPLASM (HCC): ICD-10-CM

## 2023-03-30 RX ADMIN — IOHEXOL 100 ML: 350 INJECTION, SOLUTION INTRAVENOUS at 09:19

## 2023-03-30 NOTE — TELEPHONE ENCOUNTER
Pt rachel stating that he is returning your call    Please call pt he would like to speak to someone about his BP

## 2023-03-30 NOTE — TELEPHONE ENCOUNTER
Pt wanted to know his BP prior to being on any med's  We do not have record of that  He will call PCP to see if they have something that far back

## 2023-04-06 ENCOUNTER — TELEPHONE (OUTPATIENT)
Dept: HEMATOLOGY ONCOLOGY | Facility: CLINIC | Age: 83
End: 2023-04-06

## 2023-04-07 ENCOUNTER — TELEMEDICINE (OUTPATIENT)
Dept: HEMATOLOGY ONCOLOGY | Facility: CLINIC | Age: 83
End: 2023-04-07

## 2023-04-07 ENCOUNTER — TELEPHONE (OUTPATIENT)
Dept: GYNECOLOGIC ONCOLOGY | Facility: CLINIC | Age: 83
End: 2023-04-07

## 2023-04-07 DIAGNOSIS — C80.1 MALIGNANT SPINDLE CELL NEOPLASM (HCC): Primary | ICD-10-CM

## 2023-04-07 DIAGNOSIS — R97.20 ABNORMAL PSA: ICD-10-CM

## 2023-04-07 NOTE — PROGRESS NOTES
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, PD-L1 90%  He has been under observation, immunotherapy deferred  Recent CBC is normal   CMP likewise  PSA 4 5  Recent CT chest ab pelvis showed fluctuating pulmonary nodules, mediastinal lymph node up to 1 1 cm  No gross progression noted  PSA is stable/decreased  Given excellent performance status and negative review of systems as well as radiographic and lab parameters as outlined, observation is recommended at this time  Reviewed his medical conditions, medications, laboratory studies  Problem List Items Addressed This Visit    None  Visit Diagnoses     Malignant spindle cell neoplasm ([facility] [address] )    -  Primary    Relevant Orders    CT chest abdomen pelvis w contrast    CBC and differential    Comprehensive metabolic panel    Abnormal PSA        Relevant Orders    PSA Total, Diagnostic          Recent Visits  No visits were found meeting these conditions  Showing recent visits within past 7 days and meeting all other requirements  Today's Visits  Date Type Provider Dept   04/07/23 Telephone Guillermo Sunshine DO [address] [place] [address]   04/07/23 Telemedicine Guillermo Sunshine DO [facility] [place]   Showing today's visits and meeting all other requirements  Future Appointments  No visits were found meeting these conditions    Showing future appointments within next 150 days and meeting all other requirements         Visit Time    Visit Start Time: 5352  Visit Stop Time: 8941  Total Visit Duration: 15 minutes

## 2023-04-07 NOTE — TELEPHONE ENCOUNTER
Patient called into the [facility] and said no one called him for his virtual appointment today with Dr Lisandra Calderon  I reached out to the office and they said Dr Lisandra Calderon will call him before the end of day  I let the patient know

## 2023-04-21 ENCOUNTER — APPOINTMENT (EMERGENCY)
Dept: RADIOLOGY | Facility: HOSPITAL | Age: 83
End: 2023-04-21

## 2023-04-21 ENCOUNTER — HOSPITAL ENCOUNTER (INPATIENT)
Facility: HOSPITAL | Age: 83
LOS: 4 days | Discharge: HOME HEALTH | End: 2023-04-25
Attending: INTERNAL MEDICINE | Admitting: INTERNAL MEDICINE

## 2023-04-21 DIAGNOSIS — R06.02 SOB (SHORTNESS OF BREATH): ICD-10-CM

## 2023-04-21 DIAGNOSIS — J96.21 ACUTE ON CHRONIC RESPIRATORY FAILURE WITH HYPOXIA (HCC): ICD-10-CM

## 2023-04-21 DIAGNOSIS — I50.43 ACUTE ON CHRONIC COMBINED SYSTOLIC (CONGESTIVE) AND DIASTOLIC (CONGESTIVE) HEART FAILURE (HCC): ICD-10-CM

## 2023-04-21 DIAGNOSIS — J44.1 COPD EXACERBATION (HCC): Primary | ICD-10-CM

## 2023-04-21 PROBLEM — N40.0 BPH (BENIGN PROSTATIC HYPERPLASIA): Status: ACTIVE | Noted: 2023-04-21

## 2023-04-21 LAB
2HR DELTA HS TROPONIN: -1 NG/L
4HR DELTA HS TROPONIN: 0 NG/L
ALBUMIN SERPL BCP-MCNC: 4.2 G/DL (ref 3.5–5)
ALP SERPL-CCNC: 99 U/L (ref 34–104)
ALT SERPL W P-5'-P-CCNC: 13 U/L (ref 7–52)
ANION GAP SERPL CALCULATED.3IONS-SCNC: 10 MMOL/L (ref 4–13)
APTT PPP: 52 SECONDS (ref 23–37)
ARTERIAL PATENCY WRIST A: YES
AST SERPL W P-5'-P-CCNC: 20 U/L (ref 13–39)
ATRIAL RATE: 89 BPM
BASE EXCESS BLDA CALC-SCNC: 14.9 MMOL/L
BASOPHILS # BLD AUTO: 0.04 THOUSANDS/ΜL (ref 0–0.1)
BASOPHILS NFR BLD AUTO: 0 % (ref 0–1)
BILIRUB SERPL-MCNC: 0.91 MG/DL (ref 0.2–1)
BILIRUB UR QL STRIP: NEGATIVE
BNP SERPL-MCNC: 291 PG/ML (ref 0–100)
BUN SERPL-MCNC: 21 MG/DL (ref 5–25)
CALCIUM SERPL-MCNC: 10.5 MG/DL (ref 8.4–10.2)
CARDIAC TROPONIN I PNL SERPL HS: 8 NG/L
CARDIAC TROPONIN I PNL SERPL HS: 9 NG/L
CARDIAC TROPONIN I PNL SERPL HS: 9 NG/L
CHLORIDE SERPL-SCNC: 90 MMOL/L (ref 96–108)
CLARITY UR: CLEAR
CO2 SERPL-SCNC: 41 MMOL/L (ref 21–32)
COLOR UR: YELLOW
CREAT SERPL-MCNC: 0.95 MG/DL (ref 0.6–1.3)
EOSINOPHIL # BLD AUTO: 0.02 THOUSAND/ΜL (ref 0–0.61)
EOSINOPHIL NFR BLD AUTO: 0 % (ref 0–6)
ERYTHROCYTE [DISTWIDTH] IN BLOOD BY AUTOMATED COUNT: 13.6 % (ref 11.6–15.1)
FLUAV RNA RESP QL NAA+PROBE: NEGATIVE
FLUBV RNA RESP QL NAA+PROBE: NEGATIVE
GFR SERPL CREATININE-BSD FRML MDRD: 74 ML/MIN/1.73SQ M
GLUCOSE SERPL-MCNC: 134 MG/DL (ref 65–140)
GLUCOSE UR STRIP-MCNC: NEGATIVE MG/DL
HCO3 BLDA-SCNC: 43 MMOL/L (ref 22–28)
HCT VFR BLD AUTO: 46 % (ref 36.5–49.3)
HGB BLD-MCNC: 14.2 G/DL (ref 12–17)
HGB UR QL STRIP.AUTO: NEGATIVE
IMM GRANULOCYTES # BLD AUTO: 0.04 THOUSAND/UL (ref 0–0.2)
IMM GRANULOCYTES NFR BLD AUTO: 0 % (ref 0–2)
INR PPP: 1.92 (ref 0.84–1.19)
KETONES UR STRIP-MCNC: NEGATIVE MG/DL
LACTATE SERPL-SCNC: 2 MMOL/L (ref 0.5–2)
LACTATE SERPL-SCNC: 2.1 MMOL/L (ref 0.5–2)
LEUKOCYTE ESTERASE UR QL STRIP: NEGATIVE
LYMPHOCYTES # BLD AUTO: 1 THOUSANDS/ΜL (ref 0.6–4.47)
LYMPHOCYTES NFR BLD AUTO: 11 % (ref 14–44)
MAGNESIUM SERPL-MCNC: 1.7 MG/DL (ref 1.9–2.7)
MAGNESIUM SERPL-MCNC: 1.9 MG/DL (ref 1.9–2.7)
MCH RBC QN AUTO: 31.5 PG (ref 26.8–34.3)
MCHC RBC AUTO-ENTMCNC: 30.9 G/DL (ref 31.4–37.4)
MCV RBC AUTO: 102 FL (ref 82–98)
MONOCYTES # BLD AUTO: 1.41 THOUSAND/ΜL (ref 0.17–1.22)
MONOCYTES NFR BLD AUTO: 16 % (ref 4–12)
NASAL CANNULA: 5
NEUTROPHILS # BLD AUTO: 6.52 THOUSANDS/ΜL (ref 1.85–7.62)
NEUTS SEG NFR BLD AUTO: 73 % (ref 43–75)
NITRITE UR QL STRIP: NEGATIVE
NRBC BLD AUTO-RTO: 0 /100 WBCS
O2 CT BLDA-SCNC: 18.2 ML/DL (ref 16–23)
OXYHGB MFR BLDA: 94.8 % (ref 94–97)
PCO2 BLDA: 70.1 MM HG (ref 36–44)
PH BLDA: 7.41 [PH] (ref 7.35–7.45)
PH UR STRIP.AUTO: 5.5 [PH]
PLATELET # BLD AUTO: 290 THOUSANDS/UL (ref 149–390)
PMV BLD AUTO: 9.6 FL (ref 8.9–12.7)
PO2 BLDA: 85.7 MM HG (ref 75–129)
POTASSIUM SERPL-SCNC: 3.8 MMOL/L (ref 3.5–5.3)
POTASSIUM SERPL-SCNC: 4 MMOL/L (ref 3.5–5.3)
PROCALCITONIN SERPL-MCNC: 0.1 NG/ML
PROCALCITONIN SERPL-MCNC: 0.12 NG/ML
PROT SERPL-MCNC: 8.5 G/DL (ref 6.4–8.4)
PROT UR STRIP-MCNC: NEGATIVE MG/DL
PROTHROMBIN TIME: 22 SECONDS (ref 11.6–14.5)
QRS AXIS: 177 DEGREES
QRS AXIS: 235 DEGREES
QRSD INTERVAL: 152 MS
QRSD INTERVAL: 154 MS
QT INTERVAL: 384 MS
QT INTERVAL: 390 MS
QTC INTERVAL: 467 MS
QTC INTERVAL: 487 MS
RBC # BLD AUTO: 4.51 MILLION/UL (ref 3.88–5.62)
RSV RNA RESP QL NAA+PROBE: NEGATIVE
SARS-COV-2 RNA RESP QL NAA+PROBE: NEGATIVE
SODIUM SERPL-SCNC: 141 MMOL/L (ref 135–147)
SP GR UR STRIP.AUTO: 1.01 (ref 1–1.03)
SPECIMEN SOURCE: ABNORMAL
T WAVE AXIS: -2 DEGREES
T WAVE AXIS: 15 DEGREES
UROBILINOGEN UR QL STRIP.AUTO: 0.2 E.U./DL
VENTRICULAR RATE: 89 BPM
VENTRICULAR RATE: 94 BPM
WBC # BLD AUTO: 9.03 THOUSAND/UL (ref 4.31–10.16)

## 2023-04-21 RX ORDER — LANOLIN ALCOHOL/MO/W.PET/CERES
400 CREAM (GRAM) TOPICAL DAILY
Status: DISCONTINUED | OUTPATIENT
Start: 2023-04-21 | End: 2023-04-25 | Stop reason: HOSPADM

## 2023-04-21 RX ORDER — FUROSEMIDE 10 MG/ML
20 INJECTION INTRAMUSCULAR; INTRAVENOUS ONCE
Status: COMPLETED | OUTPATIENT
Start: 2023-04-21 | End: 2023-04-21

## 2023-04-21 RX ORDER — METHYLPREDNISOLONE SODIUM SUCCINATE 125 MG/2ML
125 INJECTION, POWDER, LYOPHILIZED, FOR SOLUTION INTRAMUSCULAR; INTRAVENOUS ONCE
Status: COMPLETED | OUTPATIENT
Start: 2023-04-21 | End: 2023-04-21

## 2023-04-21 RX ORDER — IPRATROPIUM BROMIDE AND ALBUTEROL SULFATE 2.5; .5 MG/3ML; MG/3ML
3 SOLUTION RESPIRATORY (INHALATION)
Status: DISCONTINUED | OUTPATIENT
Start: 2023-04-21 | End: 2023-04-21

## 2023-04-21 RX ORDER — IPRATROPIUM BROMIDE AND ALBUTEROL SULFATE 2.5; .5 MG/3ML; MG/3ML
3 SOLUTION RESPIRATORY (INHALATION)
Status: DISCONTINUED | OUTPATIENT
Start: 2023-04-22 | End: 2023-04-25 | Stop reason: HOSPADM

## 2023-04-21 RX ORDER — GUAIFENESIN/DEXTROMETHORPHAN 100-10MG/5
10 SYRUP ORAL EVERY 4 HOURS PRN
Status: DISCONTINUED | OUTPATIENT
Start: 2023-04-21 | End: 2023-04-25 | Stop reason: HOSPADM

## 2023-04-21 RX ORDER — LORATADINE 10 MG/1
10 TABLET ORAL DAILY
Status: DISCONTINUED | OUTPATIENT
Start: 2023-04-22 | End: 2023-04-25 | Stop reason: HOSPADM

## 2023-04-21 RX ORDER — ONDANSETRON 2 MG/ML
4 INJECTION INTRAMUSCULAR; INTRAVENOUS EVERY 4 HOURS PRN
Status: DISCONTINUED | OUTPATIENT
Start: 2023-04-21 | End: 2023-04-25 | Stop reason: HOSPADM

## 2023-04-21 RX ORDER — MAGNESIUM SULFATE HEPTAHYDRATE 40 MG/ML
2 INJECTION, SOLUTION INTRAVENOUS ONCE
Status: COMPLETED | OUTPATIENT
Start: 2023-04-21 | End: 2023-04-21

## 2023-04-21 RX ORDER — FINASTERIDE 5 MG/1
5 TABLET, FILM COATED ORAL DAILY
Status: DISCONTINUED | OUTPATIENT
Start: 2023-04-22 | End: 2023-04-25 | Stop reason: HOSPADM

## 2023-04-21 RX ORDER — DOCUSATE SODIUM 100 MG/1
100 CAPSULE, LIQUID FILLED ORAL DAILY
Status: DISCONTINUED | OUTPATIENT
Start: 2023-04-22 | End: 2023-04-25 | Stop reason: HOSPADM

## 2023-04-21 RX ORDER — FLUTICASONE FUROATE AND VILANTEROL 200; 25 UG/1; UG/1
1 POWDER RESPIRATORY (INHALATION) DAILY
Status: DISCONTINUED | OUTPATIENT
Start: 2023-04-21 | End: 2023-04-23

## 2023-04-21 RX ORDER — METHYLPREDNISOLONE SODIUM SUCCINATE 40 MG/ML
40 INJECTION, POWDER, LYOPHILIZED, FOR SOLUTION INTRAMUSCULAR; INTRAVENOUS EVERY 8 HOURS SCHEDULED
Status: DISCONTINUED | OUTPATIENT
Start: 2023-04-21 | End: 2023-04-23

## 2023-04-21 RX ORDER — NITROGLYCERIN 0.4 MG/1
0.4 TABLET SUBLINGUAL
Status: DISCONTINUED | OUTPATIENT
Start: 2023-04-21 | End: 2023-04-25 | Stop reason: HOSPADM

## 2023-04-21 RX ORDER — TORSEMIDE 100 MG/1
100 TABLET ORAL DAILY
Status: DISCONTINUED | OUTPATIENT
Start: 2023-04-22 | End: 2023-04-21

## 2023-04-21 RX ORDER — MELATONIN
2000 DAILY
Status: DISCONTINUED | OUTPATIENT
Start: 2023-04-22 | End: 2023-04-25 | Stop reason: HOSPADM

## 2023-04-21 RX ORDER — CEFTRIAXONE 1 G/50ML
1000 INJECTION, SOLUTION INTRAVENOUS ONCE
Status: COMPLETED | OUTPATIENT
Start: 2023-04-21 | End: 2023-04-21

## 2023-04-21 RX ORDER — SODIUM CHLORIDE FOR INHALATION 0.9 %
3 VIAL, NEBULIZER (ML) INHALATION ONCE
Status: COMPLETED | OUTPATIENT
Start: 2023-04-21 | End: 2023-04-21

## 2023-04-21 RX ORDER — MONTELUKAST SODIUM 10 MG/1
10 TABLET ORAL
Status: DISCONTINUED | OUTPATIENT
Start: 2023-04-21 | End: 2023-04-25 | Stop reason: HOSPADM

## 2023-04-21 RX ORDER — ASCORBIC ACID 500 MG
250 TABLET ORAL DAILY
Status: DISCONTINUED | OUTPATIENT
Start: 2023-04-22 | End: 2023-04-25 | Stop reason: HOSPADM

## 2023-04-21 RX ORDER — FUROSEMIDE 10 MG/ML
40 INJECTION INTRAMUSCULAR; INTRAVENOUS
Status: DISCONTINUED | OUTPATIENT
Start: 2023-04-22 | End: 2023-04-22

## 2023-04-21 RX ORDER — BISACODYL 5 MG/1
5 TABLET, DELAYED RELEASE ORAL DAILY
Status: DISCONTINUED | OUTPATIENT
Start: 2023-04-22 | End: 2023-04-25 | Stop reason: HOSPADM

## 2023-04-21 RX ORDER — ACETAMINOPHEN 325 MG/1
650 TABLET ORAL EVERY 6 HOURS PRN
Status: DISCONTINUED | OUTPATIENT
Start: 2023-04-21 | End: 2023-04-25 | Stop reason: HOSPADM

## 2023-04-21 RX ADMIN — AZITHROMYCIN MONOHYDRATE 500 MG: 500 INJECTION, POWDER, LYOPHILIZED, FOR SOLUTION INTRAVENOUS at 22:23

## 2023-04-21 RX ADMIN — CEFTRIAXONE 1000 MG: 1 INJECTION, SOLUTION INTRAVENOUS at 12:10

## 2023-04-21 RX ADMIN — IPRATROPIUM BROMIDE 1 MG: 0.5 SOLUTION RESPIRATORY (INHALATION) at 12:13

## 2023-04-21 RX ADMIN — FUROSEMIDE 20 MG: 10 INJECTION, SOLUTION INTRAVENOUS at 14:32

## 2023-04-21 RX ADMIN — APIXABAN 5 MG: 5 TABLET, FILM COATED ORAL at 16:51

## 2023-04-21 RX ADMIN — ALBUTEROL SULFATE 10 MG: 2.5 SOLUTION RESPIRATORY (INHALATION) at 12:13

## 2023-04-21 RX ADMIN — FUROSEMIDE 20 MG: 10 INJECTION, SOLUTION INTRAVENOUS at 16:09

## 2023-04-21 RX ADMIN — METHYLPREDNISOLONE SODIUM SUCCINATE 40 MG: 40 INJECTION, POWDER, FOR SOLUTION INTRAMUSCULAR; INTRAVENOUS at 19:55

## 2023-04-21 RX ADMIN — METHYLPREDNISOLONE SODIUM SUCCINATE 125 MG: 125 INJECTION, POWDER, FOR SOLUTION INTRAMUSCULAR; INTRAVENOUS at 12:06

## 2023-04-21 RX ADMIN — IPRATROPIUM BROMIDE AND ALBUTEROL SULFATE 3 ML: 2.5; .5 SOLUTION RESPIRATORY (INHALATION) at 20:17

## 2023-04-21 RX ADMIN — MAGNESIUM SULFATE HEPTAHYDRATE 2 G: 40 INJECTION, SOLUTION INTRAVENOUS at 19:54

## 2023-04-21 RX ADMIN — ISODIUM CHLORIDE 3 ML: 0.03 SOLUTION RESPIRATORY (INHALATION) at 12:13

## 2023-04-21 RX ADMIN — FLUTICASONE FUROATE AND VILANTEROL TRIFENATATE 1 PUFF: 200; 25 POWDER RESPIRATORY (INHALATION) at 18:19

## 2023-04-21 RX ADMIN — MONTELUKAST 10 MG: 10 TABLET, FILM COATED ORAL at 21:28

## 2023-04-21 NOTE — ASSESSMENT & PLAN NOTE
Baseline oxygen requirement of 4 L via nasal cannula -> currently requiring 5 to 6 L with symptomatic shortness of breath at rest and with exertion  Influenza/RSV/COVID testing negative  CXR notable for some pulmonary vascular congestion along with some wheezing on exam -> possibly combination of CHF and COPD exacerbations (see assessments below)  Continue to wean down oxygen requirements to baseline as tolerated

## 2023-04-21 NOTE — ASSESSMENT & PLAN NOTE
Last EF of 45% in August 2021 -> check updated echocardiogram -> if significant decrease in EF noted, should pursue cardiology consultation  Hold home Demadex -> continue Lasix IV 40 mg BID and monitor I/Os and daily weights  Low-sodium diet with fluid restriction enforced  Monitor and replete serum potassium/magnesium as necessary  Telemetry monitoring while decompensated  Check thyroid panel  Possibly triggered by COPD exacerbation (see above)

## 2023-04-21 NOTE — PLAN OF CARE
Problem: Potential for Falls  Goal: Patient will remain free of falls  Description: INTERVENTIONS:  - Educate patient/family on patient safety including physical limitations  - Instruct patient to call for assistance with activity   - Consult OT/PT to assist with strengthening/mobility   - Keep Call bell within reach  - Keep bed low and locked with side rails adjusted as appropriate  - Keep care items and personal belongings within reach  - Initiate and maintain comfort rounds  - Make Fall Risk Sign visible to staff  - Apply yellow socks and bracelet for high fall risk patients  - Consider moving patient to room near nurses station  Outcome: Progressing     Problem: Nutrition/Hydration-ADULT  Goal: Nutrient/Hydration intake appropriate for improving, restoring or maintaining nutritional needs  Description: Monitor and assess patient's nutrition/hydration status for malnutrition  Collaborate with interdisciplinary team and initiate plan and interventions as ordered  Monitor patient's weight and dietary intake as ordered or per policy  Utilize nutrition screening tool and intervene as necessary  Determine patient's food preferences and provide high-protein, high-caloric foods as appropriate       INTERVENTIONS:  - Monitor oral intake, urinary output, labs, and treatment plans  - Assess nutrition and hydration status and recommend course of action  - Evaluate amount of meals eaten  - Assist patient with eating if necessary   - Allow adequate time for meals  - Recommend/ encourage appropriate diets, oral nutritional supplements, and vitamin/mineral supplements  - Order, calculate, and assess calorie counts as needed  - Recommend, monitor, and adjust tube feedings and TPN/PPN based on assessed needs  - Assess need for intravenous fluids  - Provide specific nutrition/hydration education as appropriate  - Include patient/family/caregiver in decisions related to nutrition  Outcome: Progressing     Problem: SAFETY ADULT  Goal: Patient will remain free of falls  Description: INTERVENTIONS:  - Educate patient/family on patient safety including physical limitations  - Instruct patient to call for assistance with activity   - Consult OT/PT to assist with strengthening/mobility   - Keep Call bell within reach  - Keep bed low and locked with side rails adjusted as appropriate  - Keep care items and personal belongings within reach  - Initiate and maintain comfort rounds  - Make Fall Risk Sign visible to staff  - Offer Toileting every 2 Hours, in advance of need  - Initiate/Maintain bed balarm  - Obtain necessary fall risk management equipment  - Apply yellow socks and bracelet for high fall risk patients  - Consider moving patient to room near nurses station  Outcome: Progressing  Goal: Maintain or return to baseline ADL function  Description: INTERVENTIONS:  -  Assess patient's ability to carry out ADLs; assess patient's baseline for ADL function and identify physical deficits which impact ability to perform ADLs (bathing, care of mouth/teeth, toileting, grooming, dressing, etc )  - Assess/evaluate cause of self-care deficits   - Assess range of motion  - Assess patient's mobility; develop plan if impaired  - Assess patient's need for assistive devices and provide as appropriate  - Encourage maximum independence but intervene and supervise when necessary  - Involve family in performance of ADLs  - Assess for home care needs following discharge   - Consider OT consult to assist with ADL evaluation and planning for discharge  - Provide patient education as appropriate  Outcome: Progressing  Goal: Maintains/Returns to pre admission functional level  Description: INTERVENTIONS:  - Perform BMAT or MOVE assessment daily    - Set and communicate daily mobility goal to care team and patient/family/caregiver     - Collaborate with rehabilitation services on mobility goals if consulted  - Out of bed to chair 3 times a day   - Out of bed for meals 3 times a day  - Out of bed for toileting  - Record patient progress and toleration of activity level   Outcome: Progressing     Problem: DISCHARGE PLANNING  Goal: Discharge to home or other facility with appropriate resources  Description: INTERVENTIONS:  - Identify barriers to discharge w/patient and caregiver  - Arrange for needed discharge resources and transportation as appropriate  - Identify discharge learning needs (meds, wound care, etc )  - Arrange for interpretive services to assist at discharge as needed  - Refer to Case Management Department for coordinating discharge planning if the patient needs post-hospital services based on physician/advanced practitioner order or complex needs related to functional status, cognitive ability, or social support system  Outcome: Progressing     Problem: Knowledge Deficit  Goal: Patient/family/caregiver demonstrates understanding of disease process, treatment plan, medications, and discharge instructions  Description: Complete learning assessment and assess knowledge base    Interventions:  - Provide teaching at level of understanding  - Provide teaching via preferred learning methods  Outcome: Progressing     Problem: CARDIOVASCULAR - ADULT  Goal: Maintains optimal cardiac output and hemodynamic stability  Description: INTERVENTIONS:  - Monitor I/O, vital signs and rhythm  - Monitor for S/S and trends of decreased cardiac output  - Administer and titrate ordered vasoactive medications to optimize hemodynamic stability  - Assess quality of pulses, skin color and temperature  - Assess for signs of decreased coronary artery perfusion  - Instruct patient to report change in severity of symptoms  Outcome: Progressing  Goal: Absence of cardiac dysrhythmias or at baseline rhythm  Description: INTERVENTIONS:  - Continuous cardiac monitoring, vital signs, obtain 12 lead EKG if ordered  - Administer antiarrhythmic and heart rate control medications as ordered  - Monitor electrolytes and administer replacement therapy as ordered  Outcome: Progressing     Problem: RESPIRATORY - ADULT  Goal: Achieves optimal ventilation and oxygenation  Description: INTERVENTIONS:  - Assess for changes in respiratory status  - Assess for changes in mentation and behavior  - Position to facilitate oxygenation and minimize respiratory effort  - Oxygen administered by appropriate delivery if ordered  - Initiate smoking cessation education as indicated  - Encourage broncho-pulmonary hygiene including cough, deep breathe, Incentive Spirometry  - Assess the need for suctioning and aspirate as needed  - Assess and instruct to report SOB or any respiratory difficulty  - Respiratory Therapy support as indicated  Outcome: Progressing     Problem: METABOLIC, FLUID AND ELECTROLYTES - ADULT  Goal: Electrolytes maintained within normal limits  Description: INTERVENTIONS:  - Monitor labs and assess patient for signs and symptoms of electrolyte imbalances  - Administer electrolyte replacement as ordered  - Monitor response to electrolyte replacements, including repeat lab results as appropriate  - Instruct patient on fluid and nutrition as appropriate  Outcome: Progressing  Goal: Fluid balance maintained  Description: INTERVENTIONS:  - Monitor labs   - Monitor I/O and WT  - Instruct patient on fluid and nutrition as appropriate  - Assess for signs & symptoms of volume excess or deficit  Outcome: Progressing     Problem: SKIN/TISSUE INTEGRITY - ADULT  Goal: Incision(s), wounds(s) or drain site(s) healing without S/S of infection  Description: INTERVENTIONS  - Assess and document dressing, incision, wound bed, drain sites and surrounding tissue  - Provide patient and family education  - Perform skin care/dressing changes every day  Outcome: Progressing

## 2023-04-21 NOTE — H&P
[address]  H&P  Name: Arian Jesus 81 y o  male I MRN: [id number]  Unit/Bed#: [facility] I Date of Admission: 4/21/2023   Date of Service: 4/21/2023 I Hospital Day: 0        * Acute on chronic respiratory failure with hypoxia   Assessment & Plan  Baseline oxygen requirement of 4 L via nasal cannula -> currently requiring 5 to 6 L with symptomatic shortness of breath at rest and with exertion  Influenza/RSV/COVID testing negative  CXR notable for some pulmonary vascular congestion along with some wheezing on exam -> possibly combination of CHF and COPD exacerbations (see assessments below)  Continue to wean down oxygen requirements to baseline as tolerated    COPD exacerbation  Assessment & Plan  Loaded with IV Solu-Medrol in the ED -> continue a 40 mg IV TID regimen   C/w nebulizer therapy - continue inhaled corticosteroid/LABA regimen  See plan for CHF exacerbation below (possible triggered sequela)  Maintain oxygenation as necessary    Acute on chronic combined systolic and diastolic CHF  Assessment & Plan  Last EF of 45% in August 2021 -> check updated echocardiogram -> if significant decrease in EF noted, should pursue cardiology consultation  Hold home Demadex -> continue Lasix IV 40 mg BID and monitor I/Os and daily weights  Low-sodium diet with fluid restriction enforced  Monitor and replete serum potassium/magnesium as necessary  Telemetry monitoring while decompensated  Check thyroid panel  Possibly triggered by COPD exacerbation (see above)    Atrial fibrillation   Assessment & Plan  Rate controlled on Cardizem  On Eliquis for anticoagulation    Essential hypertension  Assessment & Plan  Low-sodium diet  Continue Cardizem    BPH (benign prostatic hyperplasia)  Assessment & Plan  Continue Proscar       DVT Prophylaxis:  Eliquis     Code Status:  DNR/DNI    Discussion with:  Patient at bedside    Anticipated Length of Stay:  Patient will be admitted on an Inpatient basis with an anticipated length of stay of greater than 2 midnights  Justification for Hospital Stay:  Acute on chronic hypoxic respiratory failure secondary to exacerbation of CHF and COPD requiring oxygen maintenance/monitoring and intravenous steroids/diuretics  Total Time for Visit, including Counseling / Coordination of Care: 75 minutes  Greater than 50% of this total time spent on direct patient counseling and coordination of care  Chief Complaint:  Shortness of breath      History of Present Illness:    Sahara Patterson is a 81 y o  male who presents with plaints of progressively worsening shortness of breath over the last several days, with occasional productive sputum  He has underlying history of congestive heart failure and COPD, and is chronically on approximately 4 L of oxygen, primarily at night  In the ED he was found to be hypoxic and is required between 5 to 6 L of oxygen via nasal cannula  He was given a dose of IV steroids and IV Lasix for diuresis  CXR revealed evidence of some pulmonary vascular congestion  At the time of my encounter after arrival to the medical floor, he is resting in bed fairly comfortably stating that his shortness of breath has improved after the Lasix and steroids given in the ED  He is currently saturating back at his 4 L via nasal cannula  Denies any chest pain or other constitutional symptoms at this time  Review of Systems:    Review of Systems - A thorough 12 point review systems was conducted  Pertinent positives and negatives are mentioned in the history of present illness        Past Medical and Surgical History:     Past Medical History:   Diagnosis Date   • BPH (benign prostatic hyperplasia)    • Chronic obstructive pulmonary disease (COPD) ([facility] [address] )    • Essential hypertension    • Hard of hearing     Pt does wear hearing aids   • Hypertension    • Lung cancer ([facility] [address] )    • Shortness of breath     Pt states not changes and it occurs with moderate exertion   • Sleep disturbance     Pt states he is only sleeping about 3 hours a night  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 Authorizing Provider   apixaban (ELIQUIS) 5 mg Take 1 tablet (5 mg total) by mouth 2 (two) times a day 9/17/21   KAN Schwab   Ascorbic Acid (VITAMIN C PO) Take 1 tablet by mouth daily     Historical Provider, MD   bisacodyl (DULCOLAX) 5 mg EC tablet Take 5 mg by mouth daily      Historical Provider, MD   capsaicin (ZOSTRIX) 0 025 % cream  11/22/21   Historical Provider, MD   cetirizine (ZyrTEC) 10 mg tablet Take 10 mg by mouth daily    Historical Provider, MD   Cholecalciferol (Vitamin D3) 50 MCG (2000 UT) TABS  12/30/21   Historical Provider, MD   clotrimazole (MYCELEX) 10 mg alejandra  10/1/22   Historical Provider, MD   Diclofenac Sodium (VOLTAREN) 1 % APPLY 4 GM ON AFFECTED AREA TWICE A DAY AS NEEDED FOR PAIN IN JOINTS 12/27/22   Historical Provider, MD   diltiazem (CARDIZEM CD) 300 mg 24 hr capsule Take 1 capsule (300 mg total) by mouth daily 9/17/21   KAN Schwab   docusate sodium (COLACE) 100 mg capsule Take 100 mg by mouth  2/4/21   Historical Provider, MD   finasteride (PROSCAR) 5 mg tablet Take 5 mg by mouth daily  2/16/21   Historical Provider, MD   fluticasone-salmeterol (ADVAIR, WIXELA) 250-50 mcg/dose inhaler Inhale 1 puff 2 (two) times a day Rinse mouth after use      Historical Provider, MD   ipratropium (ATROVENT) 0 02 % nebulizer solution Take 2 5 mL (0 5 mg total) by nebulization in the morning 9/21/22   Rainer Grayson MD   magnesium oxide (MAG-OX) 400 "mg tablet Take 1 tablet by mouth daily 12/27/22   Historical Provider, MD   montelukast (SINGULAIR) 10 mg tablet Take 10 mg by mouth daily at bedtime    Historical Provider, MD   Multiple Vitamin (MULTIVITAMINS PO) Take 1 tablet by mouth daily 12/27/22   Historical Provider, MD   mupirocin Derrel Phy) 2 % ointment  10/20/21   Historical Provider, MD   nitroglycerin (NITROSTAT) 0 4 mg SL tablet  9/30/21   Historical Provider, MD   sildenafil (VIAGRA) 100 mg tablet Take 1 tablet (100 mg total) by mouth daily as needed for erectile dysfunction 1/10/22   KAN Mae   Spiriva Respimat 2 5 MCG/ACT AERS inhaler Inhale 2 puffs daily  1/21/21   Historical Provider, MD   torsemide (DEMADEX) 100 mg tablet Take 1 tablet (100 mg total) by mouth daily 3/13/23   KAN Mae   Magnesium 500 MG CAPS Take 500 mg by mouth daily  Patient not taking: Reported on 3/6/2023  4/21/23  Historical Provider, MD   Multiple Vitamin (MULTIVITAMINS PO) Take 1 tablet by mouth daily  Patient not taking: Reported on 1/6/2023 11/19/21 4/21/23  Historical Provider, MD         Allergies:    Allergies   Allergen Reactions   • Penicillin G Other (See Comments)     PCN Shots Only!!         Social History:    Substance Use History:   Social History     Substance and Sexual Activity   Alcohol Use Yes   • Alcohol/week: 14 0 standard drinks   • Types: 14 Glasses of wine per week    Comment: moderate consumption     Social History     Tobacco Use   Smoking Status Former   • Types: Cigars, Cigarettes   Smokeless Tobacco Never     Social History     Substance and Sexual Activity   Drug Use Not Currently         Family History:    Patient cannot recall pertinent family history currently      Physical Exam:     Vitals:   Blood Pressure: 118/62 (04/21/23 1529)  Pulse: 64 (04/21/23 1529)  Temperature: 98 6 °F (37 °C) (04/21/23 1529)  Temp Source: Temporal (04/21/23 1147)  Respirations: 17 (04/21/23 1529)  Height: 5' 9\" (175 3 cm) (04/21/23 " 1154)  Weight - Scale: 73 9 kg (162 lb 14 7 oz) (04/21/23 1154)  SpO2: 94 % (04/21/23 1529)      GENERAL  weak/fatigued - no current conversational dyspnea   HEAD   Normocephalic - atraumatic   EYES   PERRL - EOMI    MOUTH   Mucosa moist   NECK   Supple - full range of motion   CARDIAC  rate controlled - S1/S2 positive   PULMONARY    diminished bilaterally with expiratory wheezes and bibasilar rales   ABDOMEN   Soft - nontender/nondistended - active bowel sounds   MUSCULOSKELETAL   Motor strength/range of motion mildly deconditioned - bilateral distal LE edema present   NEUROLOGIC  at baseline   PSYCHIATRIC   Mood/affect stable         Additional Data:     Labs & Recent Cultures:    Results from last 7 days   Lab Units 04/21/23  1206   WBC Thousand/uL 9 03   HEMOGLOBIN g/dL 14 2   HEMATOCRIT % 46 0   PLATELETS Thousands/uL 290   NEUTROS PCT % 73   LYMPHS PCT % 11*   MONOS PCT % 16*   EOS PCT % 0     Results from last 7 days   Lab Units 04/21/23  1206   SODIUM mmol/L 141   POTASSIUM mmol/L 3 8   CHLORIDE mmol/L 90*   CO2 mmol/L 41*   BUN mg/dL 21   CREATININE mg/dL 0 95   ANION GAP mmol/L 10   CALCIUM mg/dL 10 5*   ALBUMIN g/dL 4 2   TOTAL BILIRUBIN mg/dL 0 91   ALK PHOS U/L 99   ALT U/L 13   AST U/L 20   GLUCOSE RANDOM mg/dL 134     Results from last 7 days   Lab Units 04/21/23  1206   INR  1 92*             Results from last 7 days   Lab Units 04/21/23  1433 04/21/23  1206   LACTIC ACID mmol/L 2 0 2 1*   PROCALCITONIN ng/ml  --  0 10                 Lines/Drains:  Invasive Devices     Peripheral Intravenous Line  Duration           Peripheral IV 04/21/23 Left Antecubital <1 day                  Imaging:     XR chest portable    Result Date: 4/21/2023  Narrative: CHEST INDICATION:   sob  COMPARISON:  Chest radiograph August 17, 2021  Correlation CT chest March 30, 2023 EXAM PERFORMED/VIEWS:  XR CHEST PORTABLE FINDINGS: Cardiomediastinal silhouette appears unremarkable  Bilateral reticular opacities    No pleural effusion or pneumothorax  Pulmonary nodules are better evident on prior CT  Osseous structures appear within normal limits for patient age  Impression: Mild pulmonary vascular congestion  Workstation performed: [id number]     CT chest abdomen pelvis w contrast    Result Date: 4/7/2023  Narrative: CT CHEST, ABDOMEN AND PELVIS WITH IV CONTRAST INDICATION:   C80 1: Malignant (primary) neoplasm, unspecified  Follow-up spindle cell tumor COMPARISON:  12/21/2022; PET study from 6/21/2022; 6/2/2022; 8/14/2021 TECHNIQUE: CT examination of the chest, abdomen and pelvis was performed  Multiplanar 2D reformatted images were created from the source data  Radiation dose length product (DLP) for this visit:  809 mGy-cm   This examination, like all CT scans performed in the [facility], was performed utilizing techniques to minimize radiation dose exposure, including the use of iterative reconstruction and automated exposure control  IV Contrast:  100 mL of iohexol (OMNIPAQUE) Enteric Contrast: Enteric contrast was administered  FINDINGS: CHEST LUNGS: Emphysema is noted  Scattered lung nodules: Left upper lobe 4 mm nodule, image 62, series 3, previously 5 mm  Right upper lobe 4 mm nodule, image 65, previously 3 mm  Left upper lobe 6 mm nodule, image 63, previously 5 mm  Left upper lobe 9 x 8 mm nodule, image 90, previously 5 mm, extending to the pleura  Right middle lobe 2 2 x 1 2 cm nodule, image 205 right lung base, previously 1 8 x 1 5 cm  Small satellite lesions appear improved  Lingular 1 4 x 0 9 cm image 191, previously 1 3 x 0 8 cm  This was larger and PET avid on the June PET study where it measured approximately 2 4 x 2 2 cm  Left lower lobe 1 2 x 0 6 cm nodule, image 115, new  Pleural-based nodular consolidation superior segment left lower lobe appears somewhat smaller although now cavitated on image 105  Multiple areas of bronchiectasis are seen  Some with adjacent tree-in-bud opacities   PLEURA: Unremarkable  HEART/GREAT VESSELS: Heart is unremarkable for patient's age  No thoracic aortic aneurysm  MEDIASTINUM AND GABBIE: Mild mediastinal adenopathy has increased since the prior study  1 1 cm paratracheal node, image 51, previously 0 8 cm  Left paratracheal node, 7 mm, image 45, previously 6 mm  Some nodes are partially calcified  CHEST WALL AND LOWER NECK:  Unremarkable  ABDOMEN LIVER/BILIARY TREE:  Small hepatic cyst is seen  GALLBLADDER:  No calcified gallstones  No pericholecystic inflammatory change  SPLEEN:  Splenic granulomas are noted  PANCREAS:  Unremarkable  ADRENAL GLANDS:  Unremarkable  KIDNEYS/URETERS:  No hydronephrosis or urinary tract calculus  One or more sharply circumscribed subcentimeter renal hypodensities are present, too small to accurately characterize, and statistically most likely benign findings  According to recent literature (Radiology 2019) no further workup of these findings is recommended  STOMACH AND BOWEL:  Left colon diverticulosis  APPENDIX:  No findings to suggest appendicitis  ABDOMINOPELVIC CAVITY:  No ascites  No pneumoperitoneum  No lymphadenopathy  VESSELS:  Unremarkable for patient's age  PELVIS REPRODUCTIVE ORGANS:  The prostate is mildly enlarged  URINARY BLADDER:  Unremarkable  ABDOMINAL WALL/INGUINAL REGIONS:  Small periumbilical hernia of fat is identified  OSSEOUS STRUCTURES:  No acute fracture or destructive osseous lesion  Degenerative changes of the lumbar spine are identified  Impression: Waxing and waning nodularity in the lungs suggest the possibility of intermittent inflammatory change  2 lesions are new or have progressed 1 has cavitated as described above  This could be related to worsening inflammatory change although neoplastic change is still difficult to exclude given multiple nodules  There there are multiple areas of bronchiectasis as well as tree-in-bud opacities also present   A more suspicious lingular nodule noted on the prior PET study appears stable since the study of December  There is somewhat worse mediastinal adenopathy  The abdomen demonstrates no mass or or adenopathy  Short-term follow-up in 3 months time is advised given the interval change with possible pulmonary evaluation  The study was marked in EPIC for significant notification  Workstation performed: [id number]                 ** Please Note: This note is constructed using a voice recognition dictation system  An occasional wrong word/phrase or “sound-a-like” substitution may have been picked up by dictation device due to the inherent limitations of voice recognition software  Read the chart carefully and recognize, using reasonable context, where substitutions may have occurred  **

## 2023-04-21 NOTE — ED PROVIDER NOTES
History  Chief Complaint   Patient presents with   • Shortness of Breath     Pt w/COPD sent from primary care c/o increasing SOB for the past few days - occasional cough productive for green/yel sputum- states fever a few days ago     The patient is an 71-year-old male PMH copd on 4 LNC nightly, CHF, A-fib on Eliquis, who presents emerged department today with a chief complaint of increased shortness of breath x1 week  The patient is escorted by spouse  States that he has not been feeling well for the last week  Having increased shortness of breath and cough  States that at times he has yellow sputum production  Did have some subjective fevers and chills earlier in the week  Patient has shortness of breath worse with exertion and laying flat  Patient denies any chest pain, abdominal pain nausea vomiting headache dizziness falls or traumas  The patient was previously today at his PCP office however was recommended to come here for urgent evaluation due to his significant shortness of breath  Shortness of Breath  Severity:  Moderate  Onset quality:  Unable to specify  Duration:  1 week  Timing:  Constant  Progression:  Worsening  Chronicity:  New  Context: weather changes    Relieved by:  Rest  Worsened by: Movement  Ineffective treatments:  Inhaler and oxygen  Associated symptoms: cough, sputum production and wheezing    Associated symptoms: no abdominal pain, no chest pain, no ear pain, no fever, no hemoptysis, no PND, no rash and no vomiting    Cough:     Cough characteristics:  Productive    Sputum characteristics:  Yellow    Severity:  Moderate    Duration:  1 week    Timing:  Constant    Progression:  Worsening  Wheezing:     Severity:  Moderate    Onset quality:  Gradual    Duration:  1 week    Timing:  Constant    Progression:  Worsening    Chronicity:  New      Prior to Admission Medications   Prescriptions Last Dose Informant Patient Reported? Taking?    Ascorbic Acid (VITAMIN C PO)   Yes No Sig: Take 1 tablet by mouth daily    Cholecalciferol (Vitamin D3) 50 MCG (2000 UT) TABS   Yes No   Diclofenac Sodium (VOLTAREN) 1 %   Yes No   Sig: APPLY 4 GM ON AFFECTED AREA TWICE A DAY AS NEEDED FOR PAIN IN JOINTS   Multiple Vitamin (MULTIVITAMINS PO)   Yes No   Sig: Take 1 tablet by mouth daily   Spiriva Respimat 2 5 MCG/ACT AERS inhaler   Yes No   Sig: Inhale 2 puffs daily    apixaban (ELIQUIS) 5 mg   No No   Sig: Take 1 tablet (5 mg total) by mouth 2 (two) times a day   bisacodyl (DULCOLAX) 5 mg EC tablet   Yes No   Sig: Take 5 mg by mouth daily     capsaicin (ZOSTRIX) 0 025 % cream   Yes No   cetirizine (ZyrTEC) 10 mg tablet   Yes No   Sig: Take 10 mg by mouth daily   clotrimazole (MYCELEX) 10 mg alejandra   Yes No   diltiazem (CARDIZEM CD) 300 mg 24 hr capsule   No No   Sig: Take 1 capsule (300 mg total) by mouth daily   docusate sodium (COLACE) 100 mg capsule   Yes No   Sig: Take 100 mg by mouth    finasteride (PROSCAR) 5 mg tablet   Yes No   Sig: Take 5 mg by mouth daily    fluticasone-salmeterol (ADVAIR, WIXELA) 250-50 mcg/dose inhaler   Yes No   Sig: Inhale 1 puff 2 (two) times a day Rinse mouth after use     ipratropium (ATROVENT) 0 02 % nebulizer solution   Yes No   Sig: Take 2 5 mL (0 5 mg total) by nebulization in the morning   magnesium oxide (MAG-OX) 400 mg tablet   Yes No   Sig: Take 1 tablet by mouth daily   montelukast (SINGULAIR) 10 mg tablet   Yes No   Sig: Take 10 mg by mouth daily at bedtime   mupirocin (BACTROBAN) 2 % ointment   Yes No   nitroglycerin (NITROSTAT) 0 4 mg SL tablet   Yes No   sildenafil (VIAGRA) 100 mg tablet   No No   Sig: Take 1 tablet (100 mg total) by mouth daily as needed for erectile dysfunction   torsemide (DEMADEX) 100 mg tablet   No No   Sig: Take 1 tablet (100 mg total) by mouth daily      Facility-Administered Medications: None       Past Medical History:   Diagnosis Date   • BPH (benign prostatic hyperplasia)    • Chronic obstructive pulmonary disease (COPD) ([facility] ) • Essential hypertension    • Hard of hearing     Pt does wear hearing aids   • Hypertension    • Lung cancer ([address] )    • Shortness of breath     Pt states not changes and it occurs with moderate exertion   • Sleep disturbance     Pt states he is only sleeping about 3 hours a night  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   • Tinnitus        Past Surgical History:   Procedure Laterality Date   • IR BIOPSY LUNG  7/19/2022   • SHOULDER OPEN ROTATOR CUFF REPAIR     • SKIN GRAFT      spindle cell removal of scalp with skin graft from shoulder   • VEIN SURGERY         No family history on file  I have reviewed and agree with the history as documented  E-Cigarette/Vaping   • E-Cigarette Use Never User      E-Cigarette/Vaping Substances     Social History     Tobacco Use   • Smoking status: Former     Types: Cigars, Cigarettes   • Smokeless tobacco: Never   Vaping Use   • Vaping Use: Never used   Substance Use Topics   • Alcohol use: Yes     Alcohol/week: 14 0 standard drinks     Types: 14 Glasses of wine per week     Comment: moderate consumption   • Drug use: Not Currently       Review of Systems   Constitutional: Negative for chills and fever  HENT: Negative for ear pain  Eyes: Negative for pain and visual disturbance  Respiratory: Positive for cough, sputum production, shortness of breath and wheezing  Negative for hemoptysis and stridor  Cardiovascular: Negative for chest pain, palpitations and PND  Gastrointestinal: Negative for abdominal pain, nausea and vomiting  Genitourinary: Negative for dysuria and hematuria  Musculoskeletal: Negative for arthralgias and back pain  Skin: Negative for color change and rash  Neurological: Negative for seizures and speech difficulty  All other systems reviewed and are negative  Physical Exam  Physical Exam  Vitals and nursing note reviewed     Constitutional:       General: He is not in acute distress  Appearance: He is well-developed  HENT:      Head: Normocephalic and atraumatic  Mouth/Throat:      Mouth: Mucous membranes are dry  Eyes:      Extraocular Movements: Extraocular movements intact  Conjunctiva/sclera: Conjunctivae normal       Pupils: Pupils are equal, round, and reactive to light  Cardiovascular:      Rate and Rhythm: Normal rate and regular rhythm  Pulses: Normal pulses  Pulmonary:      Effort: Pulmonary effort is normal  Tachypnea present  No respiratory distress  Breath sounds: Decreased breath sounds and wheezing present  Chest:      Chest wall: No tenderness  Abdominal:      Palpations: Abdomen is soft  Tenderness: There is no abdominal tenderness  Musculoskeletal:         General: No swelling  Cervical back: Neck supple  Right lower leg: Edema present  Left lower leg: Edema present  Skin:     General: Skin is warm and dry  Capillary Refill: Capillary refill takes less than 2 seconds  Neurological:      General: No focal deficit present  Mental Status: He is alert and oriented to person, place, and time     Psychiatric:         Mood and Affect: Mood normal          Vital Signs  ED Triage Vitals   Temperature Pulse Respirations Blood Pressure SpO2   04/21/23 1147 04/21/23 1147 04/21/23 1147 04/21/23 1154 04/21/23 1147   (!) 97 2 °F (36 2 °C) 64 (!) 26 118/67 (!) 64 %      Temp Source Heart Rate Source Patient Position - Orthostatic VS BP Location FiO2 (%)   04/21/23 1147 04/21/23 1147 04/21/23 1147 04/21/23 1147 --   Temporal Monitor Lying Right arm       Pain Score       04/21/23 1147       No Pain           Vitals:    04/21/23 1147 04/21/23 1154 04/21/23 1300 04/21/23 1529   BP:  118/67 108/61 118/62   Pulse: 64 63 59 64   Patient Position - Orthostatic VS: Lying Lying           Visual Acuity      ED Medications  Medications   acetaminophen (TYLENOL) tablet 650 mg (has no administration in time range) ondansetron (ZOFRAN) injection 4 mg (has no administration in time range)   apixaban (ELIQUIS) tablet 5 mg (has no administration in time range)   ascorbic acid (VITAMIN C) tablet 250 mg (has no administration in time range)   bisacodyl (DULCOLAX) EC tablet 5 mg (has no administration in time range)   loratadine (CLARITIN) tablet 10 mg (has no administration in time range)   cholecalciferol (VITAMIN D3) tablet 2,000 Units (has no administration in time range)   Diclofenac Sodium (VOLTAREN) 1 % topical gel 2 g (has no administration in time range)   diltiazem (CARDIZEM CD) 24 hr capsule 300 mg (has no administration in time range)   docusate sodium (COLACE) capsule 100 mg (has no administration in time range)   fluticasone-vilanterol 200-25 mcg/actuation 1 puff (has no administration in time range)   ipratropium-albuterol (DUO-NEB) 0 5-2 5 mg/3 mL inhalation solution 3 mL ( Nebulization Canceled Entry 4/21/23 1529)   magnesium Oxide (MAG-OX) tablet 400 mg (has no administration in time range)   montelukast (SINGULAIR) tablet 10 mg (has no administration in time range)   multivitamin-minerals (CENTRUM) tablet 1 tablet (has no administration in time range)   nitroglycerin (NITROSTAT) SL tablet 0 4 mg (has no administration in time range)   methylPREDNISolone sodium succinate (Solu-MEDROL) injection 40 mg (has no administration in time range)   furosemide (LASIX) injection 40 mg (has no administration in time range)   furosemide (LASIX) injection 20 mg (has no administration in time range)   finasteride (PROSCAR) tablet 5 mg (has no administration in time range)   albuterol inhalation solution 10 mg (10 mg Nebulization Given 4/21/23 1213)     And   ipratropium (ATROVENT) 0 02 % inhalation solution 1 mg (1 mg Nebulization Given 4/21/23 1213)     And   sodium chloride 0 9 % inhalation solution 3 mL (3 mL Nebulization Given 4/21/23 1213)   methylPREDNISolone sodium succinate (Solu-MEDROL) injection 125 mg (125 mg Intravenous Given 4/21/23 1206)   cefTRIAXone (ROCEPHIN) IVPB (premix in dextrose) 1,000 mg 50 mL (0 mg Intravenous Stopped 4/21/23 1241)   furosemide (LASIX) injection 20 mg (20 mg Intravenous Given 4/21/23 1432)       Diagnostic Studies  Results Reviewed     Procedure Component Value Units Date/Time    HS Troponin I 2hr [[id number]]  (Normal) Collected: 04/21/23 1433    Lab Status: Final result Specimen: Blood from Arm, Left Updated: 04/21/23 1500     hs TnI 2hr 8 ng/L      Delta 2hr hsTnI -1 ng/L     Lactic acid 2 Hours [[id number]]  (Normal) Collected: 04/21/23 1433    Lab Status: Final result Specimen: Blood from Arm, Left Updated: 04/21/23 1455     LACTIC ACID 2 0 mmol/L     Narrative:      Result may be elevated if tourniquet was used during collection  FLU/RSV/COVID - if FLU/RSV clinically relevant [[id number]]  (Normal) Collected: 04/21/23 1206    Lab Status: Final result Specimen: Nares from Nose Updated: 04/21/23 1431     SARS-CoV-2 Negative     INFLUENZA A PCR Negative     INFLUENZA B PCR Negative     RSV PCR Negative    Narrative:      FOR PEDIATRIC PATIENTS - copy/paste COVID Guidelines URL to browser: [email or web address]  [employer]    SARS-CoV-2 assay is a Nucleic Acid Amplification assay intended for the  qualitative detection of nucleic acid from SARS-CoV-2 in nasopharyngeal  swabs  Results are for the presumptive identification of SARS-CoV-2 RNA  Positive results are indicative of infection with SARS-CoV-2, the virus  causing COVID-19, but do not rule out bacterial infection or co-infection  with other viruses  Laboratories within the [place] and its  territories are required to report all positive results to the appropriate  public health authorities  Negative results do not preclude SARS-CoV-2  infection and should not be used as the sole basis for treatment or other  patient management decisions   Negative results must be combined with  clinical observations, patient history, and epidemiological information  This test has not been FDA cleared or approved  This test has been authorized by FDA under an Emergency Use Authorization  (EUA)  This test is only authorized for the duration of time the  declaration that circumstances exist justifying the authorization of the  emergency use of an in vitro diagnostic tests for detection of SARS-CoV-2  virus and/or diagnosis of COVID-19 infection under section 564(b)(1) of  the Act, 21 U  S C  496FRD-1(D)(3), unless the authorization is terminated  or revoked sooner  The test has been validated but independent review by FDA  and CLIA is pending  Test performed using [employer]: This RT-PCR assay targets N2,  a region unique to SARS-CoV-2  A conserved region in the E-gene was chosen  for pan-Sarbecovirus detection which includes SARS-CoV-2  According to CMS-2020-01-R, this platform meets the definition of high-throughput technology  HS Troponin I 4hr [[id number]]     Lab Status: No result Specimen: Blood     Lactic acid [[id number]]  (Abnormal) Collected: 04/21/23 1206    Lab Status: Final result Specimen: Blood from Arm, Left Updated: 04/21/23 1339     LACTIC ACID 2 1 mmol/L     Narrative:      Result may be elevated if tourniquet was used during collection      B-Type Natriuretic Peptide(BNP) [[id number]]  (Abnormal) Collected: 04/21/23 1206    Lab Status: Final result Specimen: Blood from Arm, Left Updated: 04/21/23 1248      pg/mL     Procalcitonin [[id number]]  (Normal) Collected: 04/21/23 1206    Lab Status: Final result Specimen: Blood from Arm, Left Updated: 04/21/23 1246     Procalcitonin 0 10 ng/ml     HS Troponin 0hr (reflex protocol) [[id number]]  (Normal) Collected: 04/21/23 1206    Lab Status: Final result Specimen: Blood from Arm, Left Updated: 04/21/23 1243     hs TnI 0hr 9 ng/L     Protime-INR [[id number]]  (Abnormal) Collected: 04/21/23 1206    Lab Status: Final result Specimen: Blood from Arm, Left Updated: 04/21/23 1242     Protime 22 0 seconds      INR 1 92    APTT [[id number]]  (Abnormal) Collected: 04/21/23 1206    Lab Status: Final result Specimen: Blood from Arm, Left Updated: 04/21/23 1242     PTT 52 seconds     Blood gas, Arterial [[id number]]  (Abnormal) Collected: 04/21/23 1225    Lab Status: Final result Specimen: Blood, Arterial from Radial, Left Updated: 04/21/23 1238     pH, Arterial 7 406     pCO2, Arterial 70 1 mm Hg      pO2, Arterial 85 7 mm Hg      HCO3, Arterial 43 0 mmol/L      Base Excess, Arterial 14 9 mmol/L      O2 Content, Arterial 18 2 mL/dL      O2 HGB,Arterial  94 8 %      SOURCE Radial, Left     CHRISTIAN TEST Yes     Nasal Cannula 5    Comprehensive metabolic panel [[id number]]  (Abnormal) Collected: 04/21/23 1206    Lab Status: Final result Specimen: Blood from Arm, Left Updated: 04/21/23 1235     Sodium 141 mmol/L      Potassium 3 8 mmol/L      Chloride 90 mmol/L      CO2 41 mmol/L      ANION GAP 10 mmol/L      BUN 21 mg/dL      Creatinine 0 95 mg/dL      Glucose 134 mg/dL      Calcium 10 5 mg/dL      AST 20 U/L      ALT 13 U/L      Alkaline Phosphatase 99 U/L      Total Protein 8 5 g/dL      Albumin 4 2 g/dL      Total Bilirubin 0 91 mg/dL      eGFR 74 ml/min/1 73sq m     Narrative:      [place]llie guidelines for Chronic Kidney Disease (CKD):   •  Stage 1 with normal or high GFR (GFR > 90 mL/min/1 73 square meters)  •  Stage 2 Mild CKD (GFR = 60-89 mL/min/1 73 square meters)  •  Stage 3A Moderate CKD (GFR = 45-59 mL/min/1 73 square meters)  •  Stage 3B Moderate CKD (GFR = 30-44 mL/min/1 73 square meters)  •  Stage 4 Severe CKD (GFR = 15-29 mL/min/1 73 square meters)  •  Stage 5 End Stage CKD (GFR <15 mL/min/1 73 square meters)  Note: GFR calculation is accurate only with a steady state creatinine    Magnesium [[id number]]  (Normal) Collected: 04/21/23 1206    Lab Status: Final result Specimen: Blood from Arm, Left Updated: 04/21/23 1235     Magnesium 1 9 mg/dL     CBC and differential [[id number]]  (Abnormal) Collected: 04/21/23 1206    Lab Status: Final result Specimen: Blood from Arm, Left Updated: 04/21/23 1218     WBC 9 03 Thousand/uL      RBC 4 51 Million/uL      Hemoglobin 14 2 g/dL      Hematocrit 46 0 %       fL      MCH 31 5 pg      MCHC 30 9 g/dL      RDW 13 6 %      MPV 9 6 fL      Platelets 660 Thousands/uL      nRBC 0 /100 WBCs      Neutrophils Relative 73 %      Immat GRANS % 0 %      Lymphocytes Relative 11 %      Monocytes Relative 16 %      Eosinophils Relative 0 %      Basophils Relative 0 %      Neutrophils Absolute 6 52 Thousands/µL      Immature Grans Absolute 0 04 Thousand/uL      Lymphocytes Absolute 1 00 Thousands/µL      Monocytes Absolute 1 41 Thousand/µL      Eosinophils Absolute 0 02 Thousand/µL      Basophils Absolute 0 04 Thousands/µL     Blood culture #2 [[id number]] Collected: 04/21/23 1208    Lab Status: In process Specimen: Blood from Arm, Right Updated: 04/21/23 1215    Blood culture #1 [[id number]] Collected: 04/21/23 1206    Lab Status: In process Specimen: Blood from Arm, Left Updated: 04/21/23 1215    UA w Reflex to Microscopic w Reflex to Culture [[id number]]     Lab Status: No result Specimen: Urine                  XR chest portable   Final Result by Donald Fournier MD (04/21 1356)      Mild pulmonary vascular congestion                    Workstation performed: [id number]                    Procedures  ECG 12 Lead Documentation Only    Date/Time: 4/21/2023 3:43 PM  Performed by: Alea Gunter PA-C  Authorized by: Alea Gunter PA-C     Indications / Diagnosis:  Sob  ECG reviewed by me, the ED Provider: yes    Patient location:  ED  Previous ECG:     Previous ECG:  Unavailable  Interpretation:     Interpretation: abnormal    Rate:     ECG rate:  94    ECG rate assessment: tachycardic    Rhythm:     Rhythm: atrial fibrillation    Ectopy:     Ectopy: PVCs    Conduction: Conduction: abnormal      Abnormal conduction: complete RBBB               ED Course  ED Course as of 04/21/23 1545   Fri Apr 21, 2023   1329 pCO2, Arterial(!!): 70 1   1346 BNP(!): 291  1014 PRO BNP 2/24    1346 LACTIC ACID(!!): 2 1   1351 PATIENT AMBULATED  82% on 4 L NC  Dyspneic and 82%  HR went from 47 - 100    36 Contacting Dr Juanito Will                               SBIRT 20yo+    Flowsheet Row Most Recent Value   Initial Alcohol Screen: US AUDIT-C     1  How often do you have a drink containing alcohol? 0 Filed at: 04/21/2023 1246   2  How many drinks containing alcohol do you have on a typical day you are drinking? 0 Filed at: 04/21/2023 1246   3b  FEMALE Any Age, or MALE 65+: How often do you have 4 or more drinks on one occassion? 0 Filed at: 04/21/2023 1246   Audit-C Score 0 Filed at: 04/21/2023 1246   SENAIT: How many times in the past year have you    Used an illegal drug or used a prescription medication for non-medical reasons? Never Filed at: 04/21/2023 1246                    Medical Decision Making  The patient is an 60-year-old male PMH copd on 4 LNC nightly, CHF, A-fib on Eliquis, who presents emerged department today with a chief complaint of increased shortness of breath x1 week  The patient is escorted by spouse  States that he has not been feeling well for the last week  Having increased shortness of breath and cough  States that at times he has yellow sputum production  Did have some subjective fevers and chills earlier in the week  Patient has shortness of breath worse with exertion and laying flat  Patient denies any chest pain, abdominal pain nausea vomiting headache dizziness falls or traumas  The patient was previously today at his PCP office however was recommended to come here for urgent evaluation due to his significant shortness of breath  The patient's lab work illustrated a compensated CO2 retention    Patient on 4 L nasal cannula oxygen did seem to hover around 88% at rest   The patient was ambulated in the emergency department with his 4 L nasal cannula oxygen and desatted to 82%  Patient became very dyspneic  Patient was given COPD treatment with heart neb, Solu-Medrol, Rocephin  Patient had minimal improvement with his lung sounds  Patient's x-ray was unremarkable for any infiltrate  Patient's NP level was slightly elevated  Patient did have some edema in the lower legs therefore gave 1 dose of Lasix 20 mg  To the patient's shortness of breath patient was discussed with the hospitalist service for admission  Amount and/or Complexity of Data Reviewed  Labs: ordered  Decision-making details documented in ED Course  Radiology: ordered and independent interpretation performed  Decision-making details documented in ED Course  ECG/medicine tests: ordered  Decision-making details documented in ED Course  Risk  Prescription drug management  Decision regarding hospitalization  Disposition  Final diagnoses:   COPD exacerbation ([facility] )   SOB (shortness of breath)     Time reflects when diagnosis was documented in both MDM as applicable and the Disposition within this note     Time User Action Codes Description Comment    4/21/2023  1:55 PM Ellene Flattery Add [J44 1] COPD exacerbation ([facility] )     4/21/2023  1:56 PM Ellene Flattery Add [R06 09] Dyspnea on exertion     4/21/2023  2:06 PM Ellene Flattery Remove [R06 09] Dyspnea on exertion     4/21/2023  2:06 PM Ellene Flattery Add [R06 02] SOB (shortness of breath)       ED Disposition     ED Disposition   Admit    Condition   Stable    Date/Time   Fri Apr 21, 2023  2:35 PM    Comment   Case was discussed with cl  and the patient's admission status was agreed to be Admission Status: inpatient status to the service of Dr Vinicio Khan              Follow-up Information    None         Current Discharge Medication List      CONTINUE these medications which have NOT CHANGED    Details   apixaban (ELIQUIS) 5 mg Take 1 tablet (5 mg total) by mouth 2 (two) times a day  Qty: 180 tablet, Refills: 3    Associated Diagnoses: Cardiac arrhythmia, unspecified cardiac arrhythmia type; Atrial fibrillation, unspecified type (HCC)      Ascorbic Acid (VITAMIN C PO) Take 1 tablet by mouth daily       bisacodyl (DULCOLAX) 5 mg EC tablet Take 5 mg by mouth daily        capsaicin (ZOSTRIX) 0 025 % cream       cetirizine (ZyrTEC) 10 mg tablet Take 10 mg by mouth daily      Cholecalciferol (Vitamin D3) 50 MCG (2000 UT) TABS       clotrimazole (MYCELEX) 10 mg alejandra       Diclofenac Sodium (VOLTAREN) 1 % APPLY 4 GM ON AFFECTED AREA TWICE A DAY AS NEEDED FOR PAIN IN JOINTS      diltiazem (CARDIZEM CD) 300 mg 24 hr capsule Take 1 capsule (300 mg total) by mouth daily  Qty: 90 capsule, Refills: 3    Associated Diagnoses: Frequent PVCs      docusate sodium (COLACE) 100 mg capsule Take 100 mg by mouth       finasteride (PROSCAR) 5 mg tablet Take 5 mg by mouth daily       fluticasone-salmeterol (ADVAIR, WIXELA) 250-50 mcg/dose inhaler Inhale 1 puff 2 (two) times a day Rinse mouth after use  Comments: Substitution to a formulary equivalent within the same pharmaceutical class is authorized        ipratropium (ATROVENT) 0 02 % nebulizer solution Take 2 5 mL (0 5 mg total) by nebulization in the morning      magnesium oxide (MAG-OX) 400 mg tablet Take 1 tablet by mouth daily      montelukast (SINGULAIR) 10 mg tablet Take 10 mg by mouth daily at bedtime      Multiple Vitamin (MULTIVITAMINS PO) Take 1 tablet by mouth daily      mupirocin (BACTROBAN) 2 % ointment       nitroglycerin (NITROSTAT) 0 4 mg SL tablet       sildenafil (VIAGRA) 100 mg tablet Take 1 tablet (100 mg total) by mouth daily as needed for erectile dysfunction  Qty: 4 tablet, Refills: 11    Associated Diagnoses: Drug-induced erectile dysfunction      Spiriva Respimat 2 5 MCG/ACT AERS inhaler Inhale 2 puffs daily       torsemide (DEMADEX) 100 mg tablet Take 1 tablet (100 mg total) by mouth daily  Qty: 30 tablet, Refills: 11    Associated Diagnoses: Localized edema             No discharge procedures on file      PDMP Review       Value Time User    PDMP Reviewed  Yes 8/9/2021  4:02 PM Caitlyn Jean MD          ED Provider  Electronically Signed by           Cindy Cid PA-C  04/21/23 [id number]

## 2023-04-21 NOTE — ED NOTES
Pt walked strong and steady down hallway and back on 3L  Once back in the room and on monitor, O2 reading 84% and   Provider aware       Kishan López  04/21/23 [id number] [facility]  04/21/23 5178

## 2023-04-21 NOTE — ASSESSMENT & PLAN NOTE
Loaded with IV Solu-Medrol in the ED -> continue a 40 mg IV TID regimen   C/w nebulizer therapy - continue inhaled corticosteroid/LABA regimen  See plan for CHF exacerbation below (possible triggered sequela)  Maintain oxygenation as necessary

## 2023-04-22 LAB
ANION GAP SERPL CALCULATED.3IONS-SCNC: 6 MMOL/L (ref 4–13)
ANION GAP SERPL CALCULATED.3IONS-SCNC: 6 MMOL/L (ref 4–13)
BASOPHILS # BLD AUTO: 0.02 THOUSANDS/ΜL (ref 0–0.1)
BASOPHILS NFR BLD AUTO: 0 % (ref 0–1)
BUN SERPL-MCNC: 26 MG/DL (ref 5–25)
BUN SERPL-MCNC: 31 MG/DL (ref 5–25)
CALCIUM SERPL-MCNC: 9.5 MG/DL (ref 8.4–10.2)
CALCIUM SERPL-MCNC: 9.9 MG/DL (ref 8.4–10.2)
CHLORIDE SERPL-SCNC: 94 MMOL/L (ref 96–108)
CHLORIDE SERPL-SCNC: 94 MMOL/L (ref 96–108)
CO2 SERPL-SCNC: 39 MMOL/L (ref 21–32)
CO2 SERPL-SCNC: 40 MMOL/L (ref 21–32)
CREAT SERPL-MCNC: 0.82 MG/DL (ref 0.6–1.3)
CREAT SERPL-MCNC: 0.85 MG/DL (ref 0.6–1.3)
EOSINOPHIL # BLD AUTO: 0 THOUSAND/ΜL (ref 0–0.61)
EOSINOPHIL NFR BLD AUTO: 0 % (ref 0–6)
ERYTHROCYTE [DISTWIDTH] IN BLOOD BY AUTOMATED COUNT: 13.4 % (ref 11.6–15.1)
GFR SERPL CREATININE-BSD FRML MDRD: 81 ML/MIN/1.73SQ M
GFR SERPL CREATININE-BSD FRML MDRD: 82 ML/MIN/1.73SQ M
GLUCOSE SERPL-MCNC: 140 MG/DL (ref 65–140)
GLUCOSE SERPL-MCNC: 169 MG/DL (ref 65–140)
HCT VFR BLD AUTO: 39.7 % (ref 36.5–49.3)
HGB BLD-MCNC: 12.5 G/DL (ref 12–17)
IMM GRANULOCYTES # BLD AUTO: 0.03 THOUSAND/UL (ref 0–0.2)
IMM GRANULOCYTES NFR BLD AUTO: 1 % (ref 0–2)
LYMPHOCYTES # BLD AUTO: 0.41 THOUSANDS/ΜL (ref 0.6–4.47)
LYMPHOCYTES NFR BLD AUTO: 7 % (ref 14–44)
MAGNESIUM SERPL-MCNC: 2.1 MG/DL (ref 1.9–2.7)
MAGNESIUM SERPL-MCNC: 2.2 MG/DL (ref 1.9–2.7)
MCH RBC QN AUTO: 31.8 PG (ref 26.8–34.3)
MCHC RBC AUTO-ENTMCNC: 31.5 G/DL (ref 31.4–37.4)
MCV RBC AUTO: 101 FL (ref 82–98)
MONOCYTES # BLD AUTO: 0.2 THOUSAND/ΜL (ref 0.17–1.22)
MONOCYTES NFR BLD AUTO: 4 % (ref 4–12)
NEUTROPHILS # BLD AUTO: 5.07 THOUSANDS/ΜL (ref 1.85–7.62)
NEUTS SEG NFR BLD AUTO: 88 % (ref 43–75)
NRBC BLD AUTO-RTO: 0 /100 WBCS
PLATELET # BLD AUTO: 234 THOUSANDS/UL (ref 149–390)
PMV BLD AUTO: 9.3 FL (ref 8.9–12.7)
POTASSIUM SERPL-SCNC: 4.1 MMOL/L (ref 3.5–5.3)
POTASSIUM SERPL-SCNC: 4.1 MMOL/L (ref 3.5–5.3)
RBC # BLD AUTO: 3.93 MILLION/UL (ref 3.88–5.62)
SODIUM SERPL-SCNC: 139 MMOL/L (ref 135–147)
SODIUM SERPL-SCNC: 140 MMOL/L (ref 135–147)
T4 FREE SERPL-MCNC: 1.46 NG/DL (ref 0.76–1.46)
TSH SERPL DL<=0.05 MIU/L-ACNC: 0.2 UIU/ML (ref 0.45–4.5)
WBC # BLD AUTO: 5.73 THOUSAND/UL (ref 4.31–10.16)

## 2023-04-22 RX ORDER — FUROSEMIDE 10 MG/ML
60 INJECTION INTRAMUSCULAR; INTRAVENOUS EVERY 12 HOURS
Status: DISCONTINUED | OUTPATIENT
Start: 2023-04-22 | End: 2023-04-25 | Stop reason: HOSPADM

## 2023-04-22 RX ADMIN — APIXABAN 5 MG: 5 TABLET, FILM COATED ORAL at 18:10

## 2023-04-22 RX ADMIN — IPRATROPIUM BROMIDE AND ALBUTEROL SULFATE 3 ML: 2.5; .5 SOLUTION RESPIRATORY (INHALATION) at 19:57

## 2023-04-22 RX ADMIN — MONTELUKAST 10 MG: 10 TABLET, FILM COATED ORAL at 21:30

## 2023-04-22 RX ADMIN — IPRATROPIUM BROMIDE AND ALBUTEROL SULFATE 3 ML: 2.5; .5 SOLUTION RESPIRATORY (INHALATION) at 09:00

## 2023-04-22 RX ADMIN — Medication 2000 UNITS: at 08:56

## 2023-04-22 RX ADMIN — FUROSEMIDE 60 MG: 10 INJECTION, SOLUTION INTRAVENOUS at 21:30

## 2023-04-22 RX ADMIN — FINASTERIDE 5 MG: 5 TABLET, FILM COATED ORAL at 08:56

## 2023-04-22 RX ADMIN — FLUTICASONE FUROATE AND VILANTEROL TRIFENATATE 1 PUFF: 200; 25 POWDER RESPIRATORY (INHALATION) at 09:02

## 2023-04-22 RX ADMIN — FUROSEMIDE 60 MG: 10 INJECTION, SOLUTION INTRAVENOUS at 09:44

## 2023-04-22 RX ADMIN — DOCUSATE SODIUM 100 MG: 100 CAPSULE ORAL at 08:55

## 2023-04-22 RX ADMIN — DILTIAZEM HYDROCHLORIDE 300 MG: 180 CAPSULE, COATED, EXTENDED RELEASE ORAL at 08:55

## 2023-04-22 RX ADMIN — BISACODYL 5 MG: 5 TABLET, COATED ORAL at 08:56

## 2023-04-22 RX ADMIN — METHYLPREDNISOLONE SODIUM SUCCINATE 40 MG: 40 INJECTION, POWDER, FOR SOLUTION INTRAMUSCULAR; INTRAVENOUS at 21:30

## 2023-04-22 RX ADMIN — Medication 400 MG: at 08:56

## 2023-04-22 RX ADMIN — AZITHROMYCIN MONOHYDRATE 500 MG: 500 INJECTION, POWDER, LYOPHILIZED, FOR SOLUTION INTRAVENOUS at 20:07

## 2023-04-22 RX ADMIN — METHYLPREDNISOLONE SODIUM SUCCINATE 40 MG: 40 INJECTION, POWDER, FOR SOLUTION INTRAMUSCULAR; INTRAVENOUS at 05:23

## 2023-04-22 RX ADMIN — IPRATROPIUM BROMIDE AND ALBUTEROL SULFATE 3 ML: 2.5; .5 SOLUTION RESPIRATORY (INHALATION) at 14:13

## 2023-04-22 RX ADMIN — APIXABAN 5 MG: 5 TABLET, FILM COATED ORAL at 08:56

## 2023-04-22 RX ADMIN — LORATADINE 10 MG: 10 TABLET ORAL at 08:55

## 2023-04-22 RX ADMIN — METHYLPREDNISOLONE SODIUM SUCCINATE 40 MG: 40 INJECTION, POWDER, FOR SOLUTION INTRAMUSCULAR; INTRAVENOUS at 13:45

## 2023-04-22 RX ADMIN — OXYCODONE HYDROCHLORIDE AND ACETAMINOPHEN 250 MG: 500 TABLET ORAL at 08:55

## 2023-04-22 RX ADMIN — Medication 1 TABLET: at 08:56

## 2023-04-22 NOTE — ASSESSMENT & PLAN NOTE
Baseline oxygen requirement of 4 L via nasal cannula -> currently requiring 5 to 6 L with symptomatic shortness of breath at rest and with exertion- now back to 4 but needs to ambulate   Influenza/RSV/COVID testing negative  CXR notable for some pulmonary vascular congestion along with some wheezing on exam -> possibly combination of CHF and COPD exacerbations (see assessments below)  Continue to wean down oxygen requirements to baseline as tolerated

## 2023-04-22 NOTE — PLAN OF CARE
Problem: Potential for Falls  Goal: Patient will remain free of falls  Description: INTERVENTIONS:  - Educate patient/family on patient safety including physical limitations  - Instruct patient to call for assistance with activity   - Consult OT/PT to assist with strengthening/mobility   - Keep Call bell within reach  - Keep bed low and locked with side rails adjusted as appropriate  - Keep care items and personal belongings within reach  - Initiate and maintain comfort rounds  - Make Fall Risk Sign visible to staff  - Apply yellow socks and bracelet for high fall risk patients  - Consider moving patient to room near nurses station  Outcome: Progressing     Problem: Nutrition/Hydration-ADULT  Goal: Nutrient/Hydration intake appropriate for improving, restoring or maintaining nutritional needs  Description: Monitor and assess patient's nutrition/hydration status for malnutrition  Collaborate with interdisciplinary team and initiate plan and interventions as ordered  Monitor patient's weight and dietary intake as ordered or per policy  Utilize nutrition screening tool and intervene as necessary  Determine patient's food preferences and provide high-protein, high-caloric foods as appropriate       INTERVENTIONS:  - Monitor oral intake, urinary output, labs, and treatment plans  - Assess nutrition and hydration status and recommend course of action  - Evaluate amount of meals eaten  - Assist patient with eating if necessary   - Allow adequate time for meals  - Recommend/ encourage appropriate diets, oral nutritional supplements, and vitamin/mineral supplements  - Order, calculate, and assess calorie counts as needed  - Recommend, monitor, and adjust tube feedings and TPN/PPN based on assessed needs  - Assess need for intravenous fluids  - Provide specific nutrition/hydration education as appropriate  - Include patient/family/caregiver in decisions related to nutrition  Outcome: Progressing     Problem: SAFETY ADULT  Goal: Patient will remain free of falls  Description: INTERVENTIONS:  - Educate patient/family on patient safety including physical limitations  - Instruct patient to call for assistance with activity   - Consult OT/PT to assist with strengthening/mobility   - Keep Call bell within reach  - Keep bed low and locked with side rails adjusted as appropriate  - Keep care items and personal belongings within reach  - Initiate and maintain comfort rounds  - Make Fall Risk Sign visible to staff  - Apply yellow socks and bracelet for high fall risk patients  - Consider moving patient to room near nurses station  Outcome: Progressing  Goal: Maintain or return to baseline ADL function  Description: INTERVENTIONS:  -  Assess patient's ability to carry out ADLs; assess patient's baseline for ADL function and identify physical deficits which impact ability to perform ADLs (bathing, care of mouth/teeth, toileting, grooming, dressing, etc )  - Assess/evaluate cause of self-care deficits   - Assess range of motion  - Assess patient's mobility; develop plan if impaired  - Assess patient's need for assistive devices and provide as appropriate  - Encourage maximum independence but intervene and supervise when necessary  - Involve family in performance of ADLs  - Assess for home care needs following discharge   - Consider OT consult to assist with ADL evaluation and planning for discharge  - Provide patient education as appropriate  Outcome: Progressing  Goal: Maintains/Returns to pre admission functional level  Description: INTERVENTIONS:  - Perform BMAT or MOVE assessment daily    - Set and communicate daily mobility goal to care team and patient/family/caregiver     - Collaborate with rehabilitation services on mobility goals if consulted  - Out of bed for toileting  - Record patient progress and toleration of activity level   Outcome: Progressing     Problem: DISCHARGE PLANNING  Goal: Discharge to home or other facility with appropriate resources  Description: INTERVENTIONS:  - Identify barriers to discharge w/patient and caregiver  - Arrange for needed discharge resources and transportation as appropriate  - Identify discharge learning needs (meds, wound care, etc )  - Arrange for interpretive services to assist at discharge as needed  - Refer to Case Management Department for coordinating discharge planning if the patient needs post-hospital services based on physician/advanced practitioner order or complex needs related to functional status, cognitive ability, or social support system  Outcome: Progressing     Problem: Knowledge Deficit  Goal: Patient/family/caregiver demonstrates understanding of disease process, treatment plan, medications, and discharge instructions  Description: Complete learning assessment and assess knowledge base    Interventions:  - Provide teaching at level of understanding  - Provide teaching via preferred learning methods  Outcome: Progressing     Problem: CARDIOVASCULAR - ADULT  Goal: Maintains optimal cardiac output and hemodynamic stability  Description: INTERVENTIONS:  - Monitor I/O, vital signs and rhythm  - Monitor for S/S and trends of decreased cardiac output  - Administer and titrate ordered vasoactive medications to optimize hemodynamic stability  - Assess quality of pulses, skin color and temperature  - Assess for signs of decreased coronary artery perfusion  - Instruct patient to report change in severity of symptoms  Outcome: Progressing  Goal: Absence of cardiac dysrhythmias or at baseline rhythm  Description: INTERVENTIONS:  - Continuous cardiac monitoring, vital signs, obtain 12 lead EKG if ordered  - Administer antiarrhythmic and heart rate control medications as ordered  - Monitor electrolytes and administer replacement therapy as ordered  Outcome: Progressing     Problem: RESPIRATORY - ADULT  Goal: Achieves optimal ventilation and oxygenation  Description: INTERVENTIONS:  - Assess for changes in respiratory status  - Assess for changes in mentation and behavior  - Position to facilitate oxygenation and minimize respiratory effort  - Oxygen administered by appropriate delivery if ordered  - Initiate smoking cessation education as indicated  - Encourage broncho-pulmonary hygiene including cough, deep breathe, Incentive Spirometry  - Assess the need for suctioning and aspirate as needed  - Assess and instruct to report SOB or any respiratory difficulty  - Respiratory Therapy support as indicated  Outcome: Progressing     Problem: METABOLIC, FLUID AND ELECTROLYTES - ADULT  Goal: Electrolytes maintained within normal limits  Description: INTERVENTIONS:  - Monitor labs and assess patient for signs and symptoms of electrolyte imbalances  - Administer electrolyte replacement as ordered  - Monitor response to electrolyte replacements, including repeat lab results as appropriate  - Instruct patient on fluid and nutrition as appropriate  Outcome: Progressing  Goal: Fluid balance maintained  Description: INTERVENTIONS:  - Monitor labs   - Monitor I/O and WT  - Instruct patient on fluid and nutrition as appropriate  - Assess for signs & symptoms of volume excess or deficit  Outcome: Progressing     Problem: SKIN/TISSUE INTEGRITY - ADULT  Goal: Incision(s), wounds(s) or drain site(s) healing without S/S of infection  Description: INTERVENTIONS  - Assess and document dressing, incision, wound bed, drain sites and surrounding tissue  - Provide patient and family education  - Perform skin care/dressing changes every    Outcome: Progressing

## 2023-04-22 NOTE — RESPIRATORY THERAPY NOTE
RT Protocol Note  Ridgely Seferino 81 y o  male MRN: [id number]  Unit/Bed#: -01 Encounter: [id number]    Assessment    Principal Problem:    Acute on chronic respiratory failure with hypoxia   Active Problems:    Essential hypertension    Atrial fibrillation     Acute on chronic combined systolic and diastolic CHF    COPD exacerbation    BPH (benign prostatic hyperplasia)      Home Pulmonary Medications:       Past Medical History:   Diagnosis Date    BPH (benign prostatic hyperplasia)     Chronic obstructive pulmonary disease (COPD) ([facility] [address] )     Essential hypertension     Hard of hearing     Pt does wear hearing aids    Hypertension     Lung cancer ([facility] [address] )     Shortness of breath     Pt states not changes and it occurs with moderate exertion    Sleep disturbance     Pt states he is only sleeping about 3 hours a night  Pt is seeing his PCP on 8/5/21 to discuss    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     Pt has on the scalp    Tinnitus      Social History     Socioeconomic History    Marital status: /Civil Union     Spouse name: Not on file    Number of children: Not on file    Years of education: Not on file    Highest education level: Not on file   Occupational History    Occupation: Retired   Tobacco Use    Smoking status: Former     Types: Cigars, Cigarettes    Smokeless tobacco: Never   Vaping Use    Vaping Use: Never used   Substance and Sexual Activity    Alcohol use:  Yes     Alcohol/week: 14 0 standard drinks     Types: 14 Glasses of wine per week     Comment: moderate consumption    Drug use: Not Currently    Sexual activity: Yes   Other Topics Concern    Not on file   Social History Narrative    Not on file     Social Determinants of Health     Financial Resource Strain: Not on file   Food Insecurity: Not on file   Transportation Needs: Not on file   Physical Activity: Not on file   Stress: Not on file   Social Connections: Not on file   Intimate Partner Violence: Not on file   Housing Stability: Not on file "      Subjective         Objective    Physical Exam:   Assessment Type: Pre-treatment  General Appearance: Alert, Awake  Respiratory Pattern: Dyspnea with exertion, Dyspnea at rest  Chest Assessment: Chest expansion symmetrical  Bilateral Breath Sounds: Diminished, Expiratory wheezes  Cough: Non-productive  O2 Device: 4    Vitals:  Blood pressure 122/66, pulse 56, temperature 97 9 °F (36 6 °C), resp  rate 18, height 5' 9\" (1 753 m), weight 73 3 kg (161 lb 9 6 oz), SpO2 95 %  Results from last 7 days   Lab Units 04/21/23  1225   PH ART  7 406   PCO2 ART mm Hg 70 1*   PO2 ART mm Hg 85 7   HCO3 ART mmol/L 43 0*   BASE EXC ART mmol/L 14 9   O2 CONTENT ART mL/dL 18 2   O2 HGB, ARTERIAL % 94 8   ABG SOURCE  Radial, Left   CHRISTIAN TEST  Yes       Imaging and other studies: I have personally reviewed pertinent reports  O2 Device: 4     Plan    Respiratory Plan: Mild Distress pathway        Resp Comments: (P) pt admitted with COPD exac, currently on baseline O2 4 L   appears dysnpeic but states he feel alright  states he uses his nebulizer once in the morning  pt does not want to be woken at night for any treatements  will change nebs to Q6 WA and prn     "

## 2023-04-22 NOTE — PROGRESS NOTES
[address]  Progress Note  Name: Palma Warren  MRN: [id number]  Unit/Bed#: -19 I Date of Admission: 4/21/2023   Date of Service: 4/22/2023 I Hospital Day: 1    Assessment/Plan   BPH (benign prostatic hyperplasia)  Assessment & Plan  Continue Proscar    COPD exacerbation  Assessment & Plan  Loaded with IV Solu-Medrol in the ED -> continue a 40 mg IV TID regimen   C/w nebulizer therapy - continue inhaled corticosteroid/LABA regimen  See plan for CHF exacerbation below (possible triggered sequela)  Maintain oxygenation as necessary    Acute on chronic combined systolic and diastolic CHF  Assessment & Plan  Last EF of 45% in August 2021 -> check updated echocardiogram -> if significant decrease in EF noted, should pursue cardiology consultation  Hold home Demadex -> weight up and poor urine output - increase lasix to 60 mg iv bid   Low-sodium diet with fluid restriction enforced  Monitor and replete serum potassium/magnesium as necessary  Telemetry monitoring while decompensated  tsh suppressed free t4 pending       Atrial fibrillation   Assessment & Plan  Rate controlled on Cardizem  On Eliquis for anticoagulation    Essential hypertension  Assessment & Plan  Low-sodium diet  Continue Cardizem    * Acute on chronic respiratory failure with hypoxia   Assessment & Plan  Baseline oxygen requirement of 4 L via nasal cannula -> currently requiring 5 to 6 L with symptomatic shortness of breath at rest and with exertion- now back to 4 but needs to ambulate   Influenza/RSV/COVID testing negative  CXR notable for some pulmonary vascular congestion along with some wheezing on exam -> possibly combination of CHF and COPD exacerbations (see assessments below)  Continue to wean down oxygen requirements to baseline as tolerated               VTE Pharmacologic Prophylaxis:   Moderate Risk (Score 3-4) - Pharmacological DVT Prophylaxis Ordered: apixaban (Eliquis)  Patient Centered Rounds:  I performed bedside rounds with nursing staff today  Discussions with Specialists or Other Care Team Provider: [email or web address]    Education and Discussions with Family / Patient: pt will update family   Total Time Spent on Date of Encounter in care of patient: 35 minutes This time was spent on one or more of the following: performing physical exam; counseling and coordination of care; obtaining or reviewing history; documenting in the medical record; reviewing/ordering tests, medications or procedures; communicating with other healthcare professionals and discussing with patient's family/caregivers  Current Length of Stay: 1 day(s)  Current Patient Status: Inpatient   Certification Statement: The patient will continue to require additional inpatient hospital stay due to 2/2 chf and copd   Discharge Plan: Anticipate discharge in 24-48 hrs to discharge location to be determined pending rehab evaluations  Code Status: Level 3 - DNAR and DNI    Subjective:   Seen and examined feels better wants to go home    Objective:     Vitals:   Temp (24hrs), Av °F (36 7 °C), Min:97 7 °F (36 5 °C), Max:98 6 °F (37 °C)    Temp:  [97 7 °F (36 5 °C)-98 6 °F (37 °C)] 97 9 °F (36 6 °C)  HR:  [56-68] 64  Resp:  [17-22] 17  BP: (104-127)/(53-83) 111/61  SpO2:  [88 %-97 %] 97 %  Body mass index is 24 29 kg/m²  Input and Output Summary (last 24 hours): Intake/Output Summary (Last 24 hours) at 2023 1148  Last data filed at 2023 1100  Gross per 24 hour   Intake 410 ml   Output 675 ml   Net -265 ml       Physical Exam:   Physical Exam  Vitals and nursing note reviewed  Constitutional:       General: He is not in acute distress  Appearance: He is well-developed  HENT:      Head: Normocephalic and atraumatic  Eyes:      Conjunctiva/sclera: Conjunctivae normal    Cardiovascular:      Rate and Rhythm: Normal rate and regular rhythm  Heart sounds: No murmur heard    Pulmonary:      Effort: Pulmonary effort is normal  No respiratory distress  Breath sounds: Wheezing and rales present  Abdominal:      Palpations: Abdomen is soft  Tenderness: There is no abdominal tenderness  Musculoskeletal:         General: Swelling present  Cervical back: Neck supple  Skin:     General: Skin is warm and dry  Capillary Refill: Capillary refill takes less than 2 seconds  Neurological:      Mental Status: He is alert and oriented to person, place, and time  Psychiatric:         Mood and Affect: Mood normal          Additional Data:     Labs:  Results from last 7 days   Lab Units 04/22/23  0535   WBC Thousand/uL 5 73   HEMOGLOBIN g/dL 12 5   HEMATOCRIT % 39 7   PLATELETS Thousands/uL 234   NEUTROS PCT % 88*   LYMPHS PCT % 7*   MONOS PCT % 4   EOS PCT % 0     Results from last 7 days   Lab Units 04/22/23  0535 04/21/23  1843 04/21/23  1206   SODIUM mmol/L 139  --  141   POTASSIUM mmol/L 4 1   < > 3 8   CHLORIDE mmol/L 94*  --  90*   CO2 mmol/L 39*  --  41*   BUN mg/dL 26*  --  21   CREATININE mg/dL 0 82  --  0 95   ANION GAP mmol/L 6  --  10   CALCIUM mg/dL 9 5  --  10 5*   ALBUMIN g/dL  --   --  4 2   TOTAL BILIRUBIN mg/dL  --   --  0 91   ALK PHOS U/L  --   --  99   ALT U/L  --   --  13   AST U/L  --   --  20   GLUCOSE RANDOM mg/dL 169*  --  134    < > = values in this interval not displayed  Results from last 7 days   Lab Units 04/21/23  1206   INR  1 92*             Results from last 7 days   Lab Units 04/21/23  1433 04/21/23  1206   LACTIC ACID mmol/L 2 0 2 1*   PROCALCITONIN ng/ml  --  0 12  0 10       Lines/Drains:  Invasive Devices     Peripheral Intravenous Line  Duration           Peripheral IV 04/21/23 Left Antecubital 1 day                      Imaging: Reviewed radiology reports from this admission including: chest xray    Recent Cultures (last 7 days):   Results from last 7 days   Lab Units 04/21/23  1208 04/21/23  1206   BLOOD CULTURE  Received in Microbiology Lab  Culture in Progress   Received in Microbiology Lab  Culture in Progress  Last 24 Hours Medication List:   Current Facility-Administered Medications   Medication Dose Route Frequency Provider Last Rate   • acetaminophen  650 mg Oral Q6H PRN Zoe Bergeron MD     • apixaban  5 mg Oral BID Zoe Bergeron MD     • ascorbic acid  250 mg Oral Daily Zoe Bergeron MD     • azithromycin  500 mg Intravenous Q24H Zoe Bergeron  mg (04/21/23 2223)   • bisacodyl  5 mg Oral Daily Zoe Bergeron MD     • cholecalciferol  2,000 Units Oral Daily Zoe Bergeron MD     • dextromethorphan-guaiFENesin  10 mL Oral Q4H PRN Zoe Bergeron MD     • Diclofenac Sodium  2 g Topical 4x Daily Zoe Bergeron MD     • diltiazem  300 mg Oral Daily Zoe Bergeron MD     • docusate sodium  100 mg Oral Daily Zoe Bergeron MD     • finasteride  5 mg Oral Daily Zoe Bergeron MD     • fluticasone-vilanterol  1 puff Inhalation Daily Zoe Bergeron MD     • furosemide  60 mg Intravenous Q12H Bridget Munoz MD     • ipratropium-albuterol  3 mL Nebulization Q6H While awake Bridget Munoz MD     • loratadine  10 mg Oral Daily Zoe Bergeron MD     • magnesium Oxide  400 mg Oral Daily Zoe Bergeron MD     • methylPREDNISolone sodium succinate  40 mg Intravenous Q8H Yuval Valle MD     • montelukast  10 mg Oral HS Zoe Bergeron MD     • multivitamin-minerals  1 tablet Oral Daily Zoe Bergeron MD     • nitroglycerin  0 4 mg Sublingual Q5 Min PRN Zoe Bergeron MD     • ondansetron  4 mg Intravenous Q4H PRN Zoe Bergeron MD          Today, Patient Was Seen By: Bridget Munoz MD    **Please Note: This note may have been constructed using a voice recognition system  **

## 2023-04-22 NOTE — ASSESSMENT & PLAN NOTE
Last EF of 45% in August 2021 -> check updated echocardiogram -> if significant decrease in EF noted, should pursue cardiology consultation  Hold home Demadex -> weight up and poor urine output - increase lasix to 60 mg iv bid   Low-sodium diet with fluid restriction enforced  Monitor and replete serum potassium/magnesium as necessary  Telemetry monitoring while decompensated  tsh suppressed free t4 pending

## 2023-04-23 LAB
ANION GAP SERPL CALCULATED.3IONS-SCNC: 5 MMOL/L (ref 4–13)
BASOPHILS # BLD AUTO: 0.02 THOUSANDS/ΜL (ref 0–0.1)
BASOPHILS NFR BLD AUTO: 0 % (ref 0–1)
BUN SERPL-MCNC: 35 MG/DL (ref 5–25)
CALCIUM SERPL-MCNC: 9.8 MG/DL (ref 8.4–10.2)
CHLORIDE SERPL-SCNC: 95 MMOL/L (ref 96–108)
CO2 SERPL-SCNC: 42 MMOL/L (ref 21–32)
CREAT SERPL-MCNC: 0.81 MG/DL (ref 0.6–1.3)
EOSINOPHIL # BLD AUTO: 0 THOUSAND/ΜL (ref 0–0.61)
EOSINOPHIL NFR BLD AUTO: 0 % (ref 0–6)
ERYTHROCYTE [DISTWIDTH] IN BLOOD BY AUTOMATED COUNT: 13.4 % (ref 11.6–15.1)
GFR SERPL CREATININE-BSD FRML MDRD: 82 ML/MIN/1.73SQ M
GLUCOSE SERPL-MCNC: 157 MG/DL (ref 65–140)
HCT VFR BLD AUTO: 39.5 % (ref 36.5–49.3)
HGB BLD-MCNC: 12.2 G/DL (ref 12–17)
IMM GRANULOCYTES # BLD AUTO: 0.11 THOUSAND/UL (ref 0–0.2)
IMM GRANULOCYTES NFR BLD AUTO: 1 % (ref 0–2)
LYMPHOCYTES # BLD AUTO: 0.63 THOUSANDS/ΜL (ref 0.6–4.47)
LYMPHOCYTES NFR BLD AUTO: 5 % (ref 14–44)
MAGNESIUM SERPL-MCNC: 2 MG/DL (ref 1.9–2.7)
MCH RBC QN AUTO: 31.3 PG (ref 26.8–34.3)
MCHC RBC AUTO-ENTMCNC: 30.9 G/DL (ref 31.4–37.4)
MCV RBC AUTO: 101 FL (ref 82–98)
MONOCYTES # BLD AUTO: 0.56 THOUSAND/ΜL (ref 0.17–1.22)
MONOCYTES NFR BLD AUTO: 5 % (ref 4–12)
NEUTROPHILS # BLD AUTO: 10.47 THOUSANDS/ΜL (ref 1.85–7.62)
NEUTS SEG NFR BLD AUTO: 89 % (ref 43–75)
NRBC BLD AUTO-RTO: 0 /100 WBCS
PLATELET # BLD AUTO: 287 THOUSANDS/UL (ref 149–390)
PMV BLD AUTO: 9.4 FL (ref 8.9–12.7)
POTASSIUM SERPL-SCNC: 4 MMOL/L (ref 3.5–5.3)
RBC # BLD AUTO: 3.9 MILLION/UL (ref 3.88–5.62)
SODIUM SERPL-SCNC: 142 MMOL/L (ref 135–147)
WBC # BLD AUTO: 11.79 THOUSAND/UL (ref 4.31–10.16)

## 2023-04-23 RX ORDER — BUDESONIDE 0.5 MG/2ML
0.5 INHALANT ORAL
Status: DISCONTINUED | OUTPATIENT
Start: 2023-04-23 | End: 2023-04-25 | Stop reason: HOSPADM

## 2023-04-23 RX ORDER — METHYLPREDNISOLONE SODIUM SUCCINATE 40 MG/ML
40 INJECTION, POWDER, LYOPHILIZED, FOR SOLUTION INTRAMUSCULAR; INTRAVENOUS EVERY 12 HOURS SCHEDULED
Status: DISCONTINUED | OUTPATIENT
Start: 2023-04-23 | End: 2023-04-25 | Stop reason: HOSPADM

## 2023-04-23 RX ORDER — ACETAZOLAMIDE 500 MG/5ML
250 INJECTION, POWDER, LYOPHILIZED, FOR SOLUTION INTRAVENOUS ONCE
Status: COMPLETED | OUTPATIENT
Start: 2023-04-23 | End: 2023-04-23

## 2023-04-23 RX ORDER — FORMOTEROL FUMARATE 20 UG/2ML
20 SOLUTION RESPIRATORY (INHALATION)
Status: DISCONTINUED | OUTPATIENT
Start: 2023-04-23 | End: 2023-04-25 | Stop reason: HOSPADM

## 2023-04-23 RX ADMIN — BUDESONIDE 0.5 MG: 0.5 INHALANT RESPIRATORY (INHALATION) at 19:22

## 2023-04-23 RX ADMIN — ACETAZOLAMIDE 250 MG: 500 INJECTION, POWDER, LYOPHILIZED, FOR SOLUTION INTRAVENOUS at 11:43

## 2023-04-23 RX ADMIN — IPRATROPIUM BROMIDE AND ALBUTEROL SULFATE 3 ML: 2.5; .5 SOLUTION RESPIRATORY (INHALATION) at 13:40

## 2023-04-23 RX ADMIN — FINASTERIDE 5 MG: 5 TABLET, FILM COATED ORAL at 09:00

## 2023-04-23 RX ADMIN — FORMOTEROL FUMARATE DIHYDRATE 20 MCG: 20 SOLUTION RESPIRATORY (INHALATION) at 19:22

## 2023-04-23 RX ADMIN — APIXABAN 5 MG: 5 TABLET, FILM COATED ORAL at 17:21

## 2023-04-23 RX ADMIN — Medication 1 TABLET: at 09:00

## 2023-04-23 RX ADMIN — DILTIAZEM HYDROCHLORIDE 300 MG: 180 CAPSULE, COATED, EXTENDED RELEASE ORAL at 08:58

## 2023-04-23 RX ADMIN — BISACODYL 5 MG: 5 TABLET, COATED ORAL at 09:00

## 2023-04-23 RX ADMIN — Medication 2000 UNITS: at 09:00

## 2023-04-23 RX ADMIN — METHYLPREDNISOLONE SODIUM SUCCINATE 40 MG: 40 INJECTION, POWDER, FOR SOLUTION INTRAMUSCULAR; INTRAVENOUS at 14:53

## 2023-04-23 RX ADMIN — IPRATROPIUM BROMIDE AND ALBUTEROL SULFATE 3 ML: 2.5; .5 SOLUTION RESPIRATORY (INHALATION) at 09:26

## 2023-04-23 RX ADMIN — IPRATROPIUM BROMIDE AND ALBUTEROL SULFATE 3 ML: 2.5; .5 SOLUTION RESPIRATORY (INHALATION) at 19:22

## 2023-04-23 RX ADMIN — METHYLPREDNISOLONE SODIUM SUCCINATE 40 MG: 40 INJECTION, POWDER, FOR SOLUTION INTRAMUSCULAR; INTRAVENOUS at 21:19

## 2023-04-23 RX ADMIN — MONTELUKAST 10 MG: 10 TABLET, FILM COATED ORAL at 21:19

## 2023-04-23 RX ADMIN — FUROSEMIDE 60 MG: 10 INJECTION, SOLUTION INTRAVENOUS at 21:19

## 2023-04-23 RX ADMIN — OXYCODONE HYDROCHLORIDE AND ACETAMINOPHEN 250 MG: 500 TABLET ORAL at 08:59

## 2023-04-23 RX ADMIN — DOCUSATE SODIUM 100 MG: 100 CAPSULE ORAL at 08:58

## 2023-04-23 RX ADMIN — FLUTICASONE FUROATE AND VILANTEROL TRIFENATATE 1 PUFF: 200; 25 POWDER RESPIRATORY (INHALATION) at 09:08

## 2023-04-23 RX ADMIN — LORATADINE 10 MG: 10 TABLET ORAL at 08:58

## 2023-04-23 RX ADMIN — METHYLPREDNISOLONE SODIUM SUCCINATE 40 MG: 40 INJECTION, POWDER, FOR SOLUTION INTRAMUSCULAR; INTRAVENOUS at 05:25

## 2023-04-23 RX ADMIN — APIXABAN 5 MG: 5 TABLET, FILM COATED ORAL at 08:58

## 2023-04-23 RX ADMIN — FUROSEMIDE 60 MG: 10 INJECTION, SOLUTION INTRAVENOUS at 09:01

## 2023-04-23 RX ADMIN — Medication 400 MG: at 08:58

## 2023-04-23 NOTE — PLAN OF CARE
Problem: PHYSICAL THERAPY ADULT  Goal: Performs mobility at highest level of function for planned discharge setting  See evaluation for individualized goals  Description: Treatment/Interventions: Elevations, LE strengthening/ROM, Therapeutic exercise, Endurance training, Gait training          See flowsheet documentation for full assessment, interventions and recommendations  Note: Prognosis: Good  Problem List: Impaired sensation, Decreased endurance  Assessment: Pt is a 81 y o  male seen for PT evaluation s/p admission to [address] [place] [address] on 4/21/2023 with Acute on chronic respiratory failure with hypoxia ([facility] [address] )  Order placed for PT services  Upon evaluation: Pt is presenting with impaired functional mobility due to decreased endurance and altered sensation  Pt's clinical presentation is currently evolving given the functional mobility deficits above, especially decreased endurance, decreased activity tolerance and altered sensation as well as polypharmacy and limited sensation/neuropathy and combined with medical complications of bradycardia, abnormal WBCs and abnormal CO2 values  Pt's PMHx and comorbidities that may affect physical performance and progress include: A fib, CHF, COPD and HTN  Pt will benefit from continued skilled PT services to address deficits as defined above and to maximize level of functional mobility to facilitate return toward PLOF and improved QOL  From PT/mobility standpoint, recommendation at time of d/c would be anticipate no needs  pending progress in order to reduce fall risk and maximize pt's functional independence and consistency with mobility in order to facilitate return to PLOF  Recommend ther ex next 1-2 sessions and endurance training  Barriers to Discharge: Other (Comment) (No significant barriers to D/C  Pt on 4L O2 at baseline at home prior  Currently, presenting with mild SOB with activity and SpO2 dec to 85% with exertion  )     PT Discharge Recommendation: No rehabilitation needs (Pt states that he does not want any therapy services )    See flowsheet documentation for full assessment

## 2023-04-23 NOTE — ASSESSMENT & PLAN NOTE
· Last EF of 45% in August 2021 -> check updated echocardiogram -> takes torsemide 100 mg daily at home  · Patient has been placed on Lasix 60 mg IV twice daily on 4/22-since then he has dropped 3 pounds leg edema is better but it still edematous and is still wheezing but that is a combination with benefit for continued diuresis we will give him 1 dose of Diamox CO2 is greater than 40 also will place him back on his BiPAP as found out he is using a BiPAP at home    · Monitor electrolytes and renal function  · Consult cardiology  · Daily weights  · I and O he put out 1 7 L in 24 hours

## 2023-04-23 NOTE — ASSESSMENT & PLAN NOTE
· Baseline oxygen requirement of 4 L via nasal cannula ->  Admitted on  5 to 6 L with symptomatic shortness of breath at rest and with exertion-he is back to his 4 L he also uses an auto BiPAP  · Emanation secondary to acute COPD exacerbation with acute on chronic combined systolic and diastolic heart failure exacerbation    See above treatment

## 2023-04-23 NOTE — ASSESSMENT & PLAN NOTE
· Loaded with IV Solu-Medrol in the ED -> continue a 40 mg IV TID regimen still significantly wheezing we will ask pulmonary to see  · C/w nebulizer therapy - continue inhaled corticosteroid/LABA regimen switch to Pulmicort  · Zithromax 3 days

## 2023-04-23 NOTE — PROGRESS NOTES
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  See above treatment    BPH (benign prostatic hyperplasia)  Assessment & Plan  Continue Proscar    COPD exacerbation  Assessment & Plan  · Loaded with IV Solu-Medrol in the ED -> continue a 40 mg IV TID regimen still significantly wheezing we will ask pulmonary to see  · C/w nebulizer therapy - continue inhaled corticosteroid/LABA regimen switch to Pulmicort  · Zithromax 3 days    Acute on chronic combined systolic and diastolic CHF  Assessment & Plan  · Last EF of 45% in August 2021 -> check updated echocardiogram -> takes torsemide 100 mg daily at home  · Patient has been placed on Lasix 60 mg IV twice daily on 4/22-since then he has dropped 3 pounds leg edema is better but it still edematous and is still wheezing but that is a combination with benefit for continued diuresis we will give him 1 dose of Diamox CO2 is greater than 40 also will place him back on his BiPAP as found out he is using a BiPAP at home    · Monitor electrolytes and renal function  · Consult cardiology  · Daily weights  · I and O he put out 1 7 L in 24 hours    Atrial fibrillation   Assessment & Plan  Rate controlled on Cardizem  On Eliquis for anticoagulation    Essential hypertension  Assessment & Plan  Low-sodium diet  Continue Cardizem               VTE Pharmacologic Prophylaxis:   Moderate Risk (Score 3-4) - Pharmacological DVT Prophylaxis Ordered: apixaban (Eliquis)  Patient Centered Rounds: I performed bedside rounds with nursing staff today  Discussions with Specialists or Other Care Team Provider: nursing    Education and Discussions with Family / Patient: Updated  (wife) via phone  Total Time Spent on Date of Encounter in care of patient: 35 minutes This time was spent on one or more of the following: performing physical exam; counseling and coordination of care; obtaining or reviewing history; documenting in the medical record; reviewing/ordering tests, medications or procedures; communicating with other healthcare professionals and discussing with patient's family/caregivers  Current Length of Stay: 2 day(s)  Current Patient Status: Inpatient   Certification Statement: The patient will continue to require additional inpatient hospital stay due to CHF COPD exacerbation  Discharge Plan: Anticipate discharge in 48-72 hrs to discharge location to be determined pending rehab evaluations  Code Status: Level 3 - DNAR and DNI    Subjective:   Patient seen and examined states his shortness of breath is improved and leg edema is down    Objective:     Vitals:   Temp (24hrs), Av 3 °F (36 8 °C), Min:97 9 °F (36 6 °C), Max:98 8 °F (37 1 °C)    Temp:  [97 9 °F (36 6 °C)-98 8 °F (37 1 °C)] 97 9 °F (36 6 °C)  HR:  [54-64] 60  Resp:  [17-24] 19  BP: (106-127)/(56-67) 127/56  SpO2:  [87 %-97 %] 92 %  Body mass index is 23 86 kg/m²  Input and Output Summary (last 24 hours): Intake/Output Summary (Last 24 hours) at 2023 1131  Last data filed at 2023 0911  Gross per 24 hour   Intake 900 ml   Output 1300 ml   Net -400 ml       Physical Exam:   Physical Exam  Vitals and nursing note reviewed  Constitutional:       General: He is not in acute distress  Appearance: He is well-developed  HENT:      Head: Normocephalic and atraumatic     Eyes:      Conjunctiva/sclera: Conjunctivae normal  Cardiovascular:      Rate and Rhythm: Normal rate  Rhythm irregular  Heart sounds: No murmur heard  Pulmonary:      Effort: Pulmonary effort is normal  No respiratory distress  Breath sounds: Wheezing present  Abdominal:      General: There is no distension  Palpations: Abdomen is soft  Tenderness: There is no abdominal tenderness  Musculoskeletal:         General: Swelling present  Cervical back: Neck supple  Skin:     General: Skin is warm and dry  Capillary Refill: Capillary refill takes less than 2 seconds  Neurological:      Mental Status: He is alert and oriented to person, place, and time  Psychiatric:         Mood and Affect: Mood normal          Additional Data:     Labs:  Results from last 7 days   Lab Units 04/23/23  0528   WBC Thousand/uL 11 79*   HEMOGLOBIN g/dL 12 2   HEMATOCRIT % 39 5   PLATELETS Thousands/uL 287   NEUTROS PCT % 89*   LYMPHS PCT % 5*   MONOS PCT % 5   EOS PCT % 0     Results from last 7 days   Lab Units 04/23/23  0528 04/21/23  1843 04/21/23  1206   SODIUM mmol/L 142   < > 141   POTASSIUM mmol/L 4 0   < > 3 8   CHLORIDE mmol/L 95*   < > 90*   CO2 mmol/L 42*   < > 41*   BUN mg/dL 35*   < > 21   CREATININE mg/dL 0 81   < > 0 95   ANION GAP mmol/L 5   < > 10   CALCIUM mg/dL 9 8   < > 10 5*   ALBUMIN g/dL  --   --  4 2   TOTAL BILIRUBIN mg/dL  --   --  0 91   ALK PHOS U/L  --   --  99   ALT U/L  --   --  13   AST U/L  --   --  20   GLUCOSE RANDOM mg/dL 157*   < > 134    < > = values in this interval not displayed       Results from last 7 days   Lab Units 04/21/23  1206   INR  1 92*             Results from last 7 days   Lab Units 04/21/23  1433 04/21/23  1206   LACTIC ACID mmol/L 2 0 2 1*   PROCALCITONIN ng/ml  --  0 12  0 10       Lines/Drains:  Invasive Devices     Peripheral Intravenous Line  Duration           Peripheral IV 04/21/23 Left Antecubital 1 day                      Imaging: Reviewed radiology reports from this admission including: chest xray    Recent Cultures (last 7 days):   Results from last 7 days   Lab Units 04/21/23  1208 04/21/23  1206   BLOOD CULTURE  No Growth at 24 hrs  No Growth at 24 hrs  Last 24 Hours Medication List:   Current Facility-Administered Medications   Medication Dose Route Frequency Provider Last Rate   • acetaminophen  650 mg Oral Q6H PRN Herlinda Garcia MD     • acetaZOLAMIDE  250 mg Intravenous Once Joseph Smith MD     • apixaban  5 mg Oral BID Herlinda Garcia MD     • ascorbic acid  250 mg Oral Daily Herlinda Garcia MD     • azithromycin  500 mg Intravenous Q24H Herlinda Garcia  mg (04/22/23 2007)   • bisacodyl  5 mg Oral Daily Herlinda Garcia MD     • budesonide  0 5 mg Nebulization Q12H Joseph Smith MD     • cholecalciferol  2,000 Units Oral Daily Herlinda Garcia MD     • dextromethorphan-guaiFENesin  10 mL Oral Q4H PRN Herlinda Garcia MD     • Diclofenac Sodium  2 g Topical 4x Daily Herlinda Garcia MD     • diltiazem  300 mg Oral Daily Joseph Smith MD     • docusate sodium  100 mg Oral Daily Herlinda Garcia MD     • finasteride  5 mg Oral Daily Herlinda Garcia MD     • furosemide  60 mg Intravenous Q12H Joseph Smith MD     • ipratropium-albuterol  3 mL Nebulization Q6H While awake Joseph Smith MD     • loratadine  10 mg Oral Daily Herlinda Garcia MD     • magnesium Oxide  400 mg Oral Daily Herlinda Garcia MD     • methylPREDNISolone sodium succinate  40 mg Intravenous Q8H Ilir Wells MD     • montelukast  10 mg Oral HS Herlinda Garcia MD     • multivitamin-minerals  1 tablet Oral Daily Herlinda Garcia MD     • nitroglycerin  0 4 mg Sublingual Q5 Min PRN Herlinda Garcia MD     • ondansetron  4 mg Intravenous Q4H PRN Herlinda Garcia MD          Today, Patient Was Seen By: Joseph Smith MD    **Please Note: This note may have been constructed using a voice recognition system  **

## 2023-04-23 NOTE — PLAN OF CARE
Problem: Potential for Falls  Goal: Patient will remain free of falls  Description: INTERVENTIONS:  - Educate patient/family on patient safety including physical limitations  - Instruct patient to call for assistance with activity   - Consult OT/PT to assist with strengthening/mobility   - Keep Call bell within reach  - Keep bed low and locked with side rails adjusted as appropriate  - Keep care items and personal belongings within reach  - Initiate and maintain comfort rounds  - Make Fall Risk Sign visible to staff  - Apply yellow socks and bracelet for high fall risk patients  - Consider moving patient to room near nurses station  Outcome: Progressing     Problem: Nutrition/Hydration-ADULT  Goal: Nutrient/Hydration intake appropriate for improving, restoring or maintaining nutritional needs  Description: Monitor and assess patient's nutrition/hydration status for malnutrition  Collaborate with interdisciplinary team and initiate plan and interventions as ordered  Monitor patient's weight and dietary intake as ordered or per policy  Utilize nutrition screening tool and intervene as necessary  Determine patient's food preferences and provide high-protein, high-caloric foods as appropriate       INTERVENTIONS:  - Monitor oral intake, urinary output, labs, and treatment plans  - Assess nutrition and hydration status and recommend course of action  - Evaluate amount of meals eaten  - Assist patient with eating if necessary   - Allow adequate time for meals  - Recommend/ encourage appropriate diets, oral nutritional supplements, and vitamin/mineral supplements  - Order, calculate, and assess calorie counts as needed  - Recommend, monitor, and adjust tube feedings and TPN/PPN based on assessed needs  - Assess need for intravenous fluids  - Provide specific nutrition/hydration education as appropriate  - Include patient/family/caregiver in decisions related to nutrition  Outcome: Progressing     Problem: SAFETY ADULT  Goal: Patient will remain free of falls  Description: INTERVENTIONS:  - Educate patient/family on patient safety including physical limitations  - Instruct patient to call for assistance with activity   - Consult OT/PT to assist with strengthening/mobility   - Keep Call bell within reach  - Keep bed low and locked with side rails adjusted as appropriate  - Keep care items and personal belongings within reach  - Initiate and maintain comfort rounds  - Make Fall Risk Sign visible to staff  - Offer Toileting every 2 Hours, in advance of need  - Initiate/Maintain bed alarm  - Obtain necessary fall risk management equipment  - Apply yellow socks and bracelet for high fall risk patients  - Consider moving patient to room near nurses station  Outcome: Progressing  Goal: Maintain or return to baseline ADL function  Description: INTERVENTIONS:  -  Assess patient's ability to carry out ADLs; assess patient's baseline for ADL function and identify physical deficits which impact ability to perform ADLs (bathing, care of mouth/teeth, toileting, grooming, dressing, etc )  - Assess/evaluate cause of self-care deficits   - Assess range of motion  - Assess patient's mobility; develop plan if impaired  - Assess patient's need for assistive devices and provide as appropriate  - Encourage maximum independence but intervene and supervise when necessary  - Involve family in performance of ADLs  - Assess for home care needs following discharge   - Consider OT consult to assist with ADL evaluation and planning for discharge  - Provide patient education as appropriate  Outcome: Progressing  Goal: Maintains/Returns to pre admission functional level  Description: INTERVENTIONS:  - Perform BMAT or MOVE assessment daily    - Set and communicate daily mobility goal to care team and patient/family/caregiver     - Collaborate with rehabilitation services on mobility goals if consulted  - Dangle patient 3 times a day  - Stand patient 3 times a day  - Ambulate patient 3 times a day  - Out of bed to chair 3 times a day   - Out of bed for meals 3 times a day  - Out of bed for toileting  - Record patient progress and toleration of activity level   Outcome: Progressing     Problem: DISCHARGE PLANNING  Goal: Discharge to home or other facility with appropriate resources  Description: INTERVENTIONS:  - Identify barriers to discharge w/patient and caregiver  - Arrange for needed discharge resources and transportation as appropriate  - Identify discharge learning needs (meds, wound care, etc )  - Arrange for interpretive services to assist at discharge as needed  - Refer to Case Management Department for coordinating discharge planning if the patient needs post-hospital services based on physician/advanced practitioner order or complex needs related to functional status, cognitive ability, or social support system  Outcome: Progressing     Problem: Knowledge Deficit  Goal: Patient/family/caregiver demonstrates understanding of disease process, treatment plan, medications, and discharge instructions  Description: Complete learning assessment and assess knowledge base    Interventions:  - Provide teaching at level of understanding  - Provide teaching via preferred learning methods  Outcome: Progressing     Problem: CARDIOVASCULAR - ADULT  Goal: Maintains optimal cardiac output and hemodynamic stability  Description: INTERVENTIONS:  - Monitor I/O, vital signs and rhythm  - Monitor for S/S and trends of decreased cardiac output  - Administer and titrate ordered vasoactive medications to optimize hemodynamic stability  - Assess quality of pulses, skin color and temperature  - Assess for signs of decreased coronary artery perfusion  - Instruct patient to report change in severity of symptoms  Outcome: Progressing  Goal: Absence of cardiac dysrhythmias or at baseline rhythm  Description: INTERVENTIONS:  - Continuous cardiac monitoring, vital signs, obtain 12 lead EKG if ordered  - Administer antiarrhythmic and heart rate control medications as ordered  - Monitor electrolytes and administer replacement therapy as ordered  Outcome: Progressing     Problem: RESPIRATORY - ADULT  Goal: Achieves optimal ventilation and oxygenation  Description: INTERVENTIONS:  - Assess for changes in respiratory status  - Assess for changes in mentation and behavior  - Position to facilitate oxygenation and minimize respiratory effort  - Oxygen administered by appropriate delivery if ordered  - Initiate smoking cessation education as indicated  - Encourage broncho-pulmonary hygiene including cough, deep breathe, Incentive Spirometry  - Assess the need for suctioning and aspirate as needed  - Assess and instruct to report SOB or any respiratory difficulty  - Respiratory Therapy support as indicated  Outcome: Progressing     Problem: METABOLIC, FLUID AND ELECTROLYTES - ADULT  Goal: Electrolytes maintained within normal limits  Description: INTERVENTIONS:  - Monitor labs and assess patient for signs and symptoms of electrolyte imbalances  - Administer electrolyte replacement as ordered  - Monitor response to electrolyte replacements, including repeat lab results as appropriate  - Instruct patient on fluid and nutrition as appropriate  Outcome: Progressing  Goal: Fluid balance maintained  Description: INTERVENTIONS:  - Monitor labs   - Monitor I/O and WT  - Instruct patient on fluid and nutrition as appropriate  - Assess for signs & symptoms of volume excess or deficit  Outcome: Progressing     Problem: SKIN/TISSUE INTEGRITY - ADULT  Goal: Incision(s), wounds(s) or drain site(s) healing without S/S of infection  Description: INTERVENTIONS  - Assess and document dressing, incision, wound bed, drain sites and surrounding tissue  - Provide patient and family education  - Perform skin care/dressing changes  Outcome: Progressing

## 2023-04-23 NOTE — PHYSICAL THERAPY NOTE
PHYSICAL THERAPY EVALUATION  NAME:  Sarah Burkett  DATE: 04/23/23    AGE:   81 y o  Mrn:   [id number]  ADMIT DX:  SOB (shortness of breath) [R06 02]  COPD exacerbation (HCC) [J44 1]    Past Medical History:   Diagnosis Date   • BPH (benign prostatic hyperplasia)    • [facility]    • Essential hypertension    • Hard of hearing     Pt does wear hearing aids   • Hypertension    • Lung cancer ([facility] )    • Shortness of breath     Pt states not changes and it occurs with moderate exertion   • Sleep disturbance     Pt states he is only sleeping about 3 hours a night  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   • Tinnitus      Length Of Stay: 2  Performed at least 2 patient identifiers during session: Name and Birthday  PHYSICAL THERAPY EVALUATION :        04/23/23 0738   PT Last Visit   PT Visit Date 04/23/23   Note Type   Note type Evaluation   Pain Assessment   Pain Assessment Tool 0-10   Pain Score No Pain   Restrictions/Precautions   Weight Bearing Precautions Per Order No   Other Precautions O2   Home Living   Type of [address] One level;Performs ADLs on one level   Bathroom Shower/Tub Walk-in shower   Bathroom Toilet Raised   Bathroom Equipment Grab bars in shower;Built-in shower seat   Bathroom Accessibility Accessible   Home Equipment Grab bars   Additional Comments 1 DALILA   Prior Function   Level of [place] Independent with ADLs; Independent with functional mobility; Needs assistance with IADLS   Lives With Spouse   Receives Help From Family   IADLs Independent with driving;Family/Friend/Other provides medication management   Falls in the last 6 months 0   Vocational Retired   Comments (+)  however pt states that his wife does most of the driving  (4L O2 @ baseline at home)   General   Family/Caregiver Present No   Cognition   Overall Cognitive Status [facility]   Arousal/Participation Alert "  Orientation Level Oriented X4   Memory Within functional limits   Subjective   Subjective \"I feel like I am back to my normal self\"   RLE Assessment   RLE Assessment WFL   LLE Assessment   LLE Assessment WFL   Light Touch   RLE Light Touch Impaired   RLE Light Touch Comments Foot   LLE Light Touch Impaired   LLE Light Touch Comments Foot   Proprioception   RLE Proprioception Grossly intact   LLE Proprioception Grossly Intact   Bed Mobility   Supine to Sit 6  Modified independent   Sit to Supine 6  Modified independent   Transfers   Sit to Stand 6  Modified independent   Stand to Sit 6  Modified independent   Stand pivot 6  Modified independent   Additional Comments Pt performs transfers mod (I) level without use of AD  Ambulation/Elevation   Gait Assistance 6  Modified independent   Assistive Device None   Distance 45 ft   Ambulation/Elevation Additional Comments Pt ambulates around room without AD, mod (I) level  Pt steady on feet and aware of O2 line  Currently, on 4L O2 which is his baseline status at home  SpO2 decreased from 93% at rest down to 85% with ambulation  HR 53-56 bpm at rest    Balance   Static Sitting Normal   Dynamic Sitting Good   Static Standing Good   Dynamic Standing Fair +   Ambulatory Fair +   Endurance Deficit   Endurance Deficit Yes   Endurance Deficit Description 4L O2 with SpO2 dec to 85% with activity  Activity Tolerance   Activity Tolerance Patient limited by fatigue   Medical Staff Made Aware Nurse states that pt has been performing well functionally & does de-sat on ocassion  Nurse Made Aware Yes   Assessment   Prognosis Good   Problem List Impaired sensation;Decreased endurance   Barriers to Discharge Other (Comment)  (No significant barriers to D/C  Pt on 4L O2 at baseline at home prior    Currently, presenting with mild SOB with activity and SpO2 dec to 85% with exertion )   Goals   Patient Goals \"To get out of here\"   STG Expiration Date 05/07/23   Plan " Treatment/Interventions Elevations;LE strengthening/ROM; Therapeutic exercise; Endurance training;Gait training   PT Frequency 1-2x/wk   Recommendation   PT Discharge Recommendation No rehabilitation needs  (Pt states that he does not want any therapy services )   Additional Comments No equipment needs   AM-PAC Basic Mobility Inpatient   Turning in Flat Bed Without Bedrails 4   Lying on Back to Sitting on Edge of Flat Bed Without Bedrails 4   Moving Bed to Chair 4   Standing Up From Chair Using Arms 4   Walk in Room 4   Climb 3-5 Stairs With Railing 3   Basic Mobility Inpatient Raw Score 23   Basic Mobility Standardized Score 50 88   Highest Level Of Mobility   JH-HLM Goal 7: Walk 25 feet or more   JH-HLM Achieved 7: Walk 25 feet or more   End of Consult   Patient Position at End of Consult All needs within reach; Supine   End of Consult Comments PT updated white communication board that pt is mod (I), demonstrating the ability to ambulate to/from bathroom safely while on 4L O2      (Please find full objective findings from PT assessment regarding body systems outlined above)  Assessment: Pt is a 81 y o  male seen for PT evaluation s/p admission to [address] [place] [address] on 4/21/2023 with Acute on chronic respiratory failure with hypoxia ([facility] )  Order placed for PT services  Upon evaluation: Pt is presenting with impaired functional mobility due to decreased endurance and altered sensation  Pt's clinical presentation is currently evolving given the functional mobility deficits above, especially decreased endurance, decreased activity tolerance and altered sensation as well as polypharmacy and limited sensation/neuropathy and combined with medical complications of bradycardia, abnormal WBCs and abnormal CO2 values  Pt's PMHx and comorbidities that may affect physical performance and progress include: A fib, CHF, COPD and HTN   Pt will benefit from continued skilled PT services to address deficits as defined above and to maximize level of functional mobility to facilitate return toward PLOF and improved QOL  From PT/mobility standpoint, recommendation at time of d/c would be anticipate no needs  pending progress in order to reduce fall risk and maximize pt's functional independence and consistency with mobility in order to facilitate return to PLOF  Recommend ther ex next 1-2 sessions and endurance training  The patient's AM-PAC Basic Mobility Inpatient Short Form Raw Score is 23  A Raw score of greater than 16 suggests the patient may benefit from discharge to home  Please also refer to the recommendation of the Physical Therapist for safe discharge planning  Goals: Pt will: Pt will ambulate with no AD for >/= 150 ft with  modified I  to improve activity tolerance and to access home environment  Pt will complete >/= 4 steps with with unilateral handrail with modified I to improve activity tolerance and increase community mobility  Pt will participate in objective balance assessment to determine baseline fall risk         Chano Lara, PT,DPT

## 2023-04-23 NOTE — PLAN OF CARE
Problem: Potential for Falls  Goal: Patient will remain free of falls  Description: INTERVENTIONS:  - Educate patient/family on patient safety including physical limitations  - Instruct patient to call for assistance with activity   - Consult OT/PT to assist with strengthening/mobility   - Keep Call bell within reach  - Keep bed low and locked with side rails adjusted as appropriate  - Keep care items and personal belongings within reach  - Initiate and maintain comfort rounds  - Make Fall Risk Sign visible to staff  - Apply yellow socks and bracelet for high fall risk patients  - Consider moving patient to room near nurses station  Outcome: Progressing     Problem: Nutrition/Hydration-ADULT  Goal: Nutrient/Hydration intake appropriate for improving, restoring or maintaining nutritional needs  Description: Monitor and assess patient's nutrition/hydration status for malnutrition  Collaborate with interdisciplinary team and initiate plan and interventions as ordered  Monitor patient's weight and dietary intake as ordered or per policy  Utilize nutrition screening tool and intervene as necessary  Determine patient's food preferences and provide high-protein, high-caloric foods as appropriate       INTERVENTIONS:  - Monitor oral intake, urinary output, labs, and treatment plans  - Assess nutrition and hydration status and recommend course of action  - Evaluate amount of meals eaten  - Assist patient with eating if necessary   - Allow adequate time for meals  - Recommend/ encourage appropriate diets, oral nutritional supplements, and vitamin/mineral supplements  - Order, calculate, and assess calorie counts as needed  - Recommend, monitor, and adjust tube feedings and TPN/PPN based on assessed needs  - Assess need for intravenous fluids  - Provide specific nutrition/hydration education as appropriate  - Include patient/family/caregiver in decisions related to nutrition  Outcome: Progressing     Problem: SAFETY ADULT  Goal: Patient will remain free of falls  Description: INTERVENTIONS:  - Educate patient/family on patient safety including physical limitations  - Instruct patient to call for assistance with activity   - Consult OT/PT to assist with strengthening/mobility   - Keep Call bell within reach  - Keep bed low and locked with side rails adjusted as appropriate  - Keep care items and personal belongings within reach  - Initiate and maintain comfort rounds  - Make Fall Risk Sign visible to staff  - Apply yellow socks and bracelet for high fall risk patients  - Consider moving patient to room near nurses station  Outcome: Progressing  Goal: Maintain or return to baseline ADL function  Description: INTERVENTIONS:  -  Assess patient's ability to carry out ADLs; assess patient's baseline for ADL function and identify physical deficits which impact ability to perform ADLs (bathing, care of mouth/teeth, toileting, grooming, dressing, etc )  - Assess/evaluate cause of self-care deficits   - Assess range of motion  - Assess patient's mobility; develop plan if impaired  - Assess patient's need for assistive devices and provide as appropriate  - Encourage maximum independence but intervene and supervise when necessary  - Involve family in performance of ADLs  - Assess for home care needs following discharge   - Consider OT consult to assist with ADL evaluation and planning for discharge  - Provide patient education as appropriate  Outcome: Progressing  Goal: Maintains/Returns to pre admission functional level  Description: INTERVENTIONS:  - Perform BMAT or MOVE assessment daily    - Set and communicate daily mobility goal to care team and patient/family/caregiver     - Collaborate with rehabilitation services on mobility goals if consulted  - Out of bed for toileting  - Record patient progress and toleration of activity level   Outcome: Progressing     Problem: DISCHARGE PLANNING  Goal: Discharge to home or other facility with appropriate resources  Description: INTERVENTIONS:  - Identify barriers to discharge w/patient and caregiver  - Arrange for needed discharge resources and transportation as appropriate  - Identify discharge learning needs (meds, wound care, etc )  - Arrange for interpretive services to assist at discharge as needed  - Refer to Case Management Department for coordinating discharge planning if the patient needs post-hospital services based on physician/advanced practitioner order or complex needs related to functional status, cognitive ability, or social support system  Outcome: Progressing     Problem: Knowledge Deficit  Goal: Patient/family/caregiver demonstrates understanding of disease process, treatment plan, medications, and discharge instructions  Description: Complete learning assessment and assess knowledge base    Interventions:  - Provide teaching at level of understanding  - Provide teaching via preferred learning methods  Outcome: Progressing     Problem: CARDIOVASCULAR - ADULT  Goal: Maintains optimal cardiac output and hemodynamic stability  Description: INTERVENTIONS:  - Monitor I/O, vital signs and rhythm  - Monitor for S/S and trends of decreased cardiac output  - Administer and titrate ordered vasoactive medications to optimize hemodynamic stability  - Assess quality of pulses, skin color and temperature  - Assess for signs of decreased coronary artery perfusion  - Instruct patient to report change in severity of symptoms  Outcome: Progressing  Goal: Absence of cardiac dysrhythmias or at baseline rhythm  Description: INTERVENTIONS:  - Continuous cardiac monitoring, vital signs, obtain 12 lead EKG if ordered  - Administer antiarrhythmic and heart rate control medications as ordered  - Monitor electrolytes and administer replacement therapy as ordered  Outcome: Progressing     Problem: RESPIRATORY - ADULT  Goal: Achieves optimal ventilation and oxygenation  Description: INTERVENTIONS:  - Assess for changes in respiratory status  - Assess for changes in mentation and behavior  - Position to facilitate oxygenation and minimize respiratory effort  - Oxygen administered by appropriate delivery if ordered  - Initiate smoking cessation education as indicated  - Encourage broncho-pulmonary hygiene including cough, deep breathe, Incentive Spirometry  - Assess the need for suctioning and aspirate as needed  - Assess and instruct to report SOB or any respiratory difficulty  - Respiratory Therapy support as indicated  Outcome: Progressing     Problem: METABOLIC, FLUID AND ELECTROLYTES - ADULT  Goal: Electrolytes maintained within normal limits  Description: INTERVENTIONS:  - Monitor labs and assess patient for signs and symptoms of electrolyte imbalances  - Administer electrolyte replacement as ordered  - Monitor response to electrolyte replacements, including repeat lab results as appropriate  - Instruct patient on fluid and nutrition as appropriate  Outcome: Progressing  Goal: Fluid balance maintained  Description: INTERVENTIONS:  - Monitor labs   - Monitor I/O and WT  - Instruct patient on fluid and nutrition as appropriate  - Assess for signs & symptoms of volume excess or deficit  Outcome: Progressing     Problem: SKIN/TISSUE INTEGRITY - ADULT  Goal: Incision(s), wounds(s) or drain site(s) healing without S/S of infection  Description: INTERVENTIONS  - Assess and document dressing, incision, wound bed, drain sites and surrounding tissue  - Provide patient and family education  Outcome: Progressing

## 2023-04-23 NOTE — CONSULTS
Consult Note - Pulmonary   Dalila Olivas 81 y o  male MRN: [id number]  Unit/Bed#: -01 Encounter: [id number]      Reason for consultation: COPD exacerbation, acute/chronic hypoxic respiratory failure    Requesting physician: Dr Melina Kumar & Recommendations:   1  Acute on chronic hypoxic with chronic hypercarbic respiratory failure  · Now back to baseline O2 needs  · Titrate O2 to keep SpO2 88-94%, IS, OOB-chair, ambulation as able  · Resume home BiPAP tonight, will need to check compliance report  · May consider evaluation for Trilogy ventilation given persistent hypercarbia    2  Severe COPD with acute exacerbation  · Wean solumedrol 40mg q12hrs  · Trial of nebulized only regimen - Duoneb TID, pulmicort BID and add perforomist BID  · Can stop Azithro now    3  Acute/chronic CHF  · Diuresis per primary service  · Repeat TTE pending    4  Abnormal CT Chest  · Suspect likely 2 processes including pulmonary metastasis and likely chronic infection/inflammation such as NTM  · Hold on further evaluation at this time given waxing/waning nature    5  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      History of Present Illness   HPI:  Dalila Olivas is a 81 y o  male who has COPD, HTN, Afib on eliquis, combined systolic/diastolic CHF, chronic hypoxic/hypercarbic respiratory failure, former smoker, 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  He follows with Dr Octavio Fry and is maintained on Advair 250 and spiriva respimat  He presented for few days of increasing dyspnea  He had been noting increased LE edema and some abdominal girth  He denied increased wheeze, no sputum production, no pleurisy, no fevers or chills  He reports no hemoptysis and no pleurisy  He uses 4L/min NC typically during day and has home BiPAP at night - he reports this [facility]  He admits to nebulizer once a day and his inhaler regimen as above   He denied SSCP, no sick contacts    Review of systems:  12 point review of systems was completed and was otherwise negative except as listed in HPI  Historical Information   Past Medical History:   Diagnosis Date   • BPH (benign prostatic hyperplasia)    • Chronic obstructive pulmonary disease (COPD) ([facility] [address] )    • Essential hypertension    • Hard of hearing     Pt does wear hearing aids   • Hypertension    • Lung cancer ([facility] [address] )    • Shortness of breath     Pt states not changes and it occurs with moderate exertion   • Sleep disturbance     Pt states he is only sleeping about 3 hours a night  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   • Tinnitus      Past Surgical History:   Procedure Laterality Date   • IR BIOPSY LUNG  7/19/2022   • SHOULDER OPEN ROTATOR CUFF REPAIR     • SKIN GRAFT      spindle cell removal of scalp with skin graft from shoulder   • VEIN SURGERY       No family history on file      Occupational History: 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 [place] [employer] History: former smoker quit 1998, occasion cigars now    Meds/Allergies   Current Facility-Administered Medications   Medication Dose Route Frequency   • acetaminophen (TYLENOL) tablet 650 mg  650 mg Oral Q6H PRN   • apixaban (ELIQUIS) tablet 5 mg  5 mg Oral BID   • ascorbic acid (VITAMIN C) tablet 250 mg  250 mg Oral Daily   • azithromycin (ZITHROMAX) 500 mg in sodium chloride 0 9 % 250 mL IVPB  500 mg Intravenous Q24H   • bisacodyl (DULCOLAX) EC tablet 5 mg  5 mg Oral Daily   • budesonide (PULMICORT) inhalation solution 0 5 mg  0 5 mg Nebulization Q12H   • cholecalciferol (VITAMIN D3) tablet 2,000 Units  2,000 Units Oral Daily   • dextromethorphan-guaiFENesin (ROBITUSSIN DM) oral syrup 10 mL  10 mL Oral Q4H PRN   • Diclofenac Sodium (VOLTAREN) 1 % topical gel 2 g  2 g Topical 4x Daily   • diltiazem (CARDIZEM CD) 24 hr capsule 300 mg  300 mg Oral Daily   • docusate sodium (COLACE) capsule 100 "mg  100 mg Oral Daily   • finasteride (PROSCAR) tablet 5 mg  5 mg Oral Daily   • furosemide (LASIX) injection 60 mg  60 mg Intravenous Q12H   • ipratropium-albuterol (DUO-NEB) 0 5-2 5 mg/3 mL inhalation solution 3 mL  3 mL Nebulization Q6H While awake   • loratadine (CLARITIN) tablet 10 mg  10 mg Oral Daily   • magnesium Oxide (MAG-OX) tablet 400 mg  400 mg Oral Daily   • methylPREDNISolone sodium succinate (Solu-MEDROL) injection 40 mg  40 mg Intravenous Q8H [address]   • montelukast (SINGULAIR) tablet 10 mg  10 mg Oral HS   • multivitamin-minerals (CENTRUM) tablet 1 tablet  1 tablet Oral Daily   • nitroglycerin (NITROSTAT) SL tablet 0 4 mg  0 4 mg Sublingual Q5 Min PRN   • ondansetron (ZOFRAN) injection 4 mg  4 mg Intravenous Q4H PRN     Medications Prior to Admission   Medication   • apixaban (ELIQUIS) 5 mg   • bisacodyl (DULCOLAX) 5 mg EC tablet   • capsaicin (ZOSTRIX) 0 025 % cream   • cetirizine (ZyrTEC) 10 mg tablet   • diltiazem (CARDIZEM CD) 300 mg 24 hr capsule   • docusate sodium (COLACE) 100 mg capsule   • finasteride (PROSCAR) 5 mg tablet   • fluticasone-salmeterol (ADVAIR, WIXELA) 250-50 mcg/dose inhaler   • ipratropium (ATROVENT) 0 02 % nebulizer solution   • magnesium oxide (MAG-OX) 400 mg tablet   • montelukast (SINGULAIR) 10 mg tablet   • Multiple Vitamin (MULTIVITAMINS PO)   • Spiriva Respimat 2 5 MCG/ACT AERS inhaler   • torsemide (DEMADEX) 100 mg tablet   • Ascorbic Acid (VITAMIN C PO)   • Cholecalciferol (Vitamin D3) 50 MCG (2000 UT) TABS   • clotrimazole (MYCELEX) 10 mg alejandra   • Diclofenac Sodium (VOLTAREN) 1 %   • mupirocin (BACTROBAN) 2 % ointment   • nitroglycerin (NITROSTAT) 0 4 mg SL tablet   • sildenafil (VIAGRA) 100 mg tablet     Allergies   Allergen Reactions   • Penicillin G Other (See Comments)     PCN Shots Only!!       Vitals: Blood pressure 117/77, pulse (!) 53, temperature 98 4 °F (36 9 °C), resp  rate 18, height 5' 9\" (1 753 m), weight 73 3 kg (161 lb 9 6 oz), SpO2 94 %  , 4LNC, Body " mass index is 23 86 kg/m²  Intake/Output Summary (Last 24 hours) at 4/23/2023 1543  Last data filed at 4/23/2023 1300  Gross per 24 hour   Intake 940 ml   Output 800 ml   Net 140 ml       Physical Exam  General: Older man, awake alert and oriented x 3, conversant without conversational dyspnea, NAD, normal affect  HEENT:  PERRL, Sclera noninjected, nonicteric OU, Nares patent, no nasal flaring, no nasal drainage, Mucous membranes moist, no oral lesions  NECK: Trachea midline, no accessory muscle use, no stridor, no cervical or supraclavicular adenopathy, JVP not elevated  CARDIAC: Reg, single s1/S2, no m/r/g  PULM: diminished BS diffusely, no wheeze or rales, no rhonchi appreciated, prolonged expiratory phase  CHEST: No gross deformities, equal chest expansion on inspiration bilaterally  ABD: Normoactive bowel sounds, soft nontender, nondistended, no rebound, no rigidity, no guarding  EXT: No cyanosis, no clubbing, trace LE edema, normal capillary refill  SKIN:  No rashes, no lesions  NEURO: no focal neurologic deficits, AAOx3, moving all extremities appropriately    Labs: I have personally reviewed pertinent lab results    Laboratory and Diagnostics  Results from last 7 days   Lab Units 04/23/23  0528 04/22/23  0535 04/21/23  1206   WBC Thousand/uL 11 79* 5 73 9 03   HEMOGLOBIN g/dL 12 2 12 5 14 2   HEMATOCRIT % 39 5 39 7 46 0   PLATELETS Thousands/uL 287 234 290   NEUTROS PCT % 89* 88* 73   MONOS PCT % 5 4 16*     Results from last 7 days   Lab Units 04/23/23  0528 04/22/23  1717 04/22/23  0535 04/21/23  1843 04/21/23  1206   SODIUM mmol/L 142 140 139  --  141   POTASSIUM mmol/L 4 0 4 1 4 1 4 0 3 8   CHLORIDE mmol/L 95* 94* 94*  --  90*   CO2 mmol/L 42* 40* 39*  --  41*   ANION GAP mmol/L 5 6 6  --  10   BUN mg/dL 35* 31* 26*  --  21   CREATININE mg/dL 0 81 0 85 0 82  --  0 95   CALCIUM mg/dL 9 8 9 9 9 5  --  10 5*   GLUCOSE RANDOM mg/dL 157* 140 169*  --  134   ALT U/L  --   --   --   --  13   AST U/L  -- --   --   --  20   ALK PHOS U/L  --   --   --   --  99   ALBUMIN g/dL  --   --   --   --  4 2   TOTAL BILIRUBIN mg/dL  --   --   --   --  0 91     Results from last 7 days   Lab Units 23  0528 23  1717 23  0535 23  1843 23  1206   MAGNESIUM mg/dL 2 0 2 1 2 2 1 7* 1 9      Results from last 7 days   Lab Units 23  1206   INR  1 92*   PTT seconds 52*          Results from last 7 days   Lab Units 23  1433 23  1206   LACTIC ACID mmol/L 2 0 2 1*                     Results from last 7 days   Lab Units 23  1206   PROCALCITONIN ng/ml 0 12  0 10       ABG:   Results from last 7 days   Lab Units 23  1225   PH ART  7 406   PCO2 ART mm Hg 70 1*   PO2 ART mm Hg 85 7   HCO3 ART mmol/L 43 0*   BASE EXC ART mmol/L 14 9   ABG SOURCE  Radial, Left       MICRO  Bld Cx NGTD  FLU/RSV/COVID neg  Imaging and other studies: I have personally reviewed pertinent reports  and I have personally reviewed pertinent films in PACS  CXR 2023 - mild vascular congestion/prominence, no dense consolidations, no PTX, no sig effusions    CT chest 3/30/2023 - scattered pulmonary nodules noted larges RML 2 2x1 2cm with other smaller 4-6mm nodules with some waxing/waning regions, overall stable lingula nodule, some with cavitary features, noted bronchiectasis and some TIBs  Pulmonary function testin/10/2021 - Ratio 41%, FVC 61%, FEV1 34%, TLC 99%, %, DLCO 30% - severe obstructive airflow defect, normal TLC with inc RV c/w air trapping, severely reduced diffusion, no BD response    EKG, Pathology, and Other Studies: I have personally reviewed pertinent reports  TTE 2021 EF 45%, RV ULD, PASP 40mmHg    Code Status: Level 3 - DNAR and DNI      Kan Teran DO, [facility]

## 2023-04-24 ENCOUNTER — APPOINTMENT (INPATIENT)
Dept: NON INVASIVE DIAGNOSTICS | Facility: HOSPITAL | Age: 83
End: 2023-04-24

## 2023-04-24 PROBLEM — E43 SEVERE PROTEIN-CALORIE MALNUTRITION (HCC): Status: ACTIVE | Noted: 2023-04-24

## 2023-04-24 LAB
ANION GAP SERPL CALCULATED.3IONS-SCNC: 5 MMOL/L (ref 4–13)
AORTIC ROOT: 4.1 CM
AORTIC VALVE MEAN VELOCITY: 6.4 M/S
APICAL FOUR CHAMBER EJECTION FRACTION: 38 %
ASCENDING AORTA: 2.6 CM
AV AREA BY CONTINUOUS VTI: 2.4 CM2
AV AREA PEAK VELOCITY: 2.6 CM2
AV LVOT MEAN GRADIENT: 1 MMHG
AV LVOT PEAK GRADIENT: 1 MMHG
AV MEAN GRADIENT: 2 MMHG
AV PEAK GRADIENT: 4 MMHG
AV VALVE AREA: 2.38 CM2
AV VELOCITY RATIO: 0.57
BUN SERPL-MCNC: 37 MG/DL (ref 5–25)
CALCIUM SERPL-MCNC: 9.6 MG/DL (ref 8.4–10.2)
CHLORIDE SERPL-SCNC: 95 MMOL/L (ref 96–108)
CO2 SERPL-SCNC: 41 MMOL/L (ref 21–32)
CREAT SERPL-MCNC: 0.87 MG/DL (ref 0.6–1.3)
DOP CALC AO PEAK VEL: 0.99 M/S
DOP CALC AO VTI: 21.17 CM
DOP CALC LVOT AREA: 4.52 CM2
DOP CALC LVOT DIAMETER: 2.4 CM
DOP CALC LVOT PEAK VEL VTI: 11.13 CM
DOP CALC LVOT PEAK VEL: 0.56 M/S
DOP CALC LVOT STROKE INDEX: 27.7 ML/M2
DOP CALC LVOT STROKE VOLUME: 50.33
FRACTIONAL SHORTENING: 19 (ref 28–44)
GFR SERPL CREATININE-BSD FRML MDRD: 80 ML/MIN/1.73SQ M
GLUCOSE SERPL-MCNC: 178 MG/DL (ref 65–140)
INTERVENTRICULAR SEPTUM IN DIASTOLE (PARASTERNAL SHORT AXIS VIEW): 1 CM
INTERVENTRICULAR SEPTUM: 1 CM (ref 0.6–1.1)
LAAS-AP2: 20.6 CM2
LAAS-AP4: 26.9 CM2
LEFT ATRIUM SIZE: 4.3 CM
LEFT INTERNAL DIMENSION IN SYSTOLE: 4.3 CM (ref 2.1–4)
LEFT VENTRICLE DIASTOLIC VOLUME (MOD BIPLANE): 110 ML
LEFT VENTRICLE SYSTOLIC VOLUME (MOD BIPLANE): 66 ML
LEFT VENTRICULAR INTERNAL DIMENSION IN DIASTOLE: 5.3 CM (ref 3.5–6)
LEFT VENTRICULAR POSTERIOR WALL IN END DIASTOLE: 1.4 CM
LEFT VENTRICULAR STROKE VOLUME: 54 ML
LV EF: 40 %
LVSV (TEICH): 54 ML
MAGNESIUM SERPL-MCNC: 1.9 MG/DL (ref 1.9–2.7)
MV E'TISSUE VEL-LAT: 11 CM/S
MV E'TISSUE VEL-SEP: 7 CM/S
MV STENOSIS PRESSURE HALF TIME: 36 MS
MV VALVE AREA P 1/2 METHOD: 6.11
POTASSIUM SERPL-SCNC: 3.6 MMOL/L (ref 3.5–5.3)
PV PEAK GRADIENT: 3 MMHG
RA PRESSURE ESTIMATED: 3 MMHG
RIGHT ATRIUM AREA SYSTOLE A4C: 28.5 CM2
RIGHT VENTRICLE ID DIMENSION: 3.9 CM
RV PSP: 34 MMHG
SL CV LEFT ATRIUM LENGTH A2C: 5.3 CM
SL CV LV EF: 45
SL CV PED ECHO LEFT VENTRICLE DIASTOLIC VOLUME (MOD BIPLANE) 2D: 138 ML
SL CV PED ECHO LEFT VENTRICLE SYSTOLIC VOLUME (MOD BIPLANE) 2D: 84 ML
SODIUM SERPL-SCNC: 141 MMOL/L (ref 135–147)
T3FREE SERPL-MCNC: 1.65 PG/ML (ref 2.3–4.2)
TR MAX PG: 31 MMHG
TR PEAK VELOCITY: 2.8 M/S
TRICUSPID ANNULAR PLANE SYSTOLIC EXCURSION: 1.3 CM
TRICUSPID VALVE PEAK REGURGITATION VELOCITY: 2.79 M/S

## 2023-04-24 RX ADMIN — MONTELUKAST 10 MG: 10 TABLET, FILM COATED ORAL at 21:25

## 2023-04-24 RX ADMIN — BISACODYL 5 MG: 5 TABLET, COATED ORAL at 09:31

## 2023-04-24 RX ADMIN — METHYLPREDNISOLONE SODIUM SUCCINATE 40 MG: 40 INJECTION, POWDER, FOR SOLUTION INTRAMUSCULAR; INTRAVENOUS at 09:30

## 2023-04-24 RX ADMIN — BUDESONIDE 0.5 MG: 0.5 INHALANT RESPIRATORY (INHALATION) at 08:53

## 2023-04-24 RX ADMIN — BUDESONIDE 0.5 MG: 0.5 INHALANT RESPIRATORY (INHALATION) at 19:20

## 2023-04-24 RX ADMIN — Medication 400 MG: at 09:31

## 2023-04-24 RX ADMIN — IPRATROPIUM BROMIDE AND ALBUTEROL SULFATE 3 ML: 2.5; .5 SOLUTION RESPIRATORY (INHALATION) at 08:53

## 2023-04-24 RX ADMIN — APIXABAN 5 MG: 5 TABLET, FILM COATED ORAL at 09:31

## 2023-04-24 RX ADMIN — METHYLPREDNISOLONE SODIUM SUCCINATE 40 MG: 40 INJECTION, POWDER, FOR SOLUTION INTRAMUSCULAR; INTRAVENOUS at 21:25

## 2023-04-24 RX ADMIN — FUROSEMIDE 60 MG: 10 INJECTION, SOLUTION INTRAVENOUS at 09:30

## 2023-04-24 RX ADMIN — LORATADINE 10 MG: 10 TABLET ORAL at 09:31

## 2023-04-24 RX ADMIN — OXYCODONE HYDROCHLORIDE AND ACETAMINOPHEN 250 MG: 500 TABLET ORAL at 09:30

## 2023-04-24 RX ADMIN — DOCUSATE SODIUM 100 MG: 100 CAPSULE ORAL at 09:30

## 2023-04-24 RX ADMIN — DILTIAZEM HYDROCHLORIDE 300 MG: 180 CAPSULE, COATED, EXTENDED RELEASE ORAL at 09:30

## 2023-04-24 RX ADMIN — FUROSEMIDE 60 MG: 10 INJECTION, SOLUTION INTRAVENOUS at 21:25

## 2023-04-24 RX ADMIN — IPRATROPIUM BROMIDE AND ALBUTEROL SULFATE 3 ML: 2.5; .5 SOLUTION RESPIRATORY (INHALATION) at 14:24

## 2023-04-24 RX ADMIN — Medication 1 TABLET: at 09:31

## 2023-04-24 RX ADMIN — APIXABAN 5 MG: 5 TABLET, FILM COATED ORAL at 18:33

## 2023-04-24 RX ADMIN — Medication 2000 UNITS: at 09:31

## 2023-04-24 RX ADMIN — FINASTERIDE 5 MG: 5 TABLET, FILM COATED ORAL at 09:31

## 2023-04-24 RX ADMIN — FORMOTEROL FUMARATE DIHYDRATE 20 MCG: 20 SOLUTION RESPIRATORY (INHALATION) at 19:20

## 2023-04-24 RX ADMIN — FORMOTEROL FUMARATE DIHYDRATE 20 MCG: 20 SOLUTION RESPIRATORY (INHALATION) at 08:53

## 2023-04-24 RX ADMIN — IPRATROPIUM BROMIDE AND ALBUTEROL SULFATE 3 ML: 2.5; .5 SOLUTION RESPIRATORY (INHALATION) at 19:20

## 2023-04-24 NOTE — PROGRESS NOTES
Pt reports poor appetite for > 1 mo  Would eat 2-3 meals per day though only have small poritons at these meals  Toast or bagel for breakfast, leftovers at lunch time, meal at restaurant for dinner  Says he and his wife would split the dinner and still have food to take home  Chart review of weight hx: 2/3/22 183lb, 10/14/22 181lb, 3/6/23 172lb, 4/21/23 161lb, 4/24/23 160lb  7% weight loss x 1 5 mo significant  Pt meets criteria for chronic severe malnutrition  Continue regular diet at this time, fluid restriction per MD    Will order half portions as pt requested, says he is unable to finish his meals and does not like to waste the food  Pt agreeable to trial ensure compact BID  Requested RD protocol to order supplements  Continue with daily weights  Discouraged use of salt shaker, will provide low Na diet ed as appropriate  Pt reports being unable to have a BM for 3 days at a time, would take laxative, have a BM within 2 days and go back to not having a BM for another 3 days  Relayed to RN   Bowel protocol per MD

## 2023-04-24 NOTE — ASSESSMENT & PLAN NOTE
· Last EF of 45% in August 2021 -> check updated echocardiogram -> takes torsemide 100 mg daily at home  · Patient has been placed on Lasix 60 mg IV twice daily on 4/22-since then he has dropped 4 pounds leg edema is better but it still edematous and lungs decreased but still some rales   · S/p diamox 4/23  · Monitor electrolytes and renal function  · Consult cardiology  · Daily weights  · I and O he put out 2 1 L in 24 hours  · Needs another day of diurese  · Cards to see

## 2023-04-24 NOTE — MALNUTRITION/BMI
This medical record reflects one or more clinical indicators suggestive of malnutrition  Malnutrition Findings:   Adult Malnutrition type: Chronic illness  Adult Degree of Malnutrition: Other severe protein calorie malnutrition  Malnutrition Characteristics: Inadequate energy, Weight loss                  360 Statement: Chronic severe malnutrition related to decreased appetite, COPD, constipation as evidenced by < 75% estimated energy intake > 1 mo, 7% weight loss x 1 5 mo (3/6/23 172lb, 4/24/23 160lb)  Treated with: Continue regular diet at this time, fluid restriction per MD  Will order half portions as pt requested, says he is unable to finish his meals and does not like to waste the food  Pt agreeable to trial ensure compact BID  Requested RD protocol to order supplements  Continue with daily weights  Discouraged use of salt shaker, will provide low Na diet ed as appropriate  Pt reports being unable to have a BM for 3 days at a time, would take laxative, have a BM within 2 days and go back to not having a BM for another 3 days  Relayed to RN  Bowel protocol per MD     BMI Findings: Body mass index is 23 77 kg/m²  See Nutrition note dated 4/24/23 for additional details  Completed nutrition assessment is viewable in the nutrition documentation

## 2023-04-24 NOTE — ASSESSMENT & PLAN NOTE
MD noted by RN and shared with Pt via portal.   Rate controlled on Cardizem  On Eliquis for anticoagulation

## 2023-04-24 NOTE — CASE MANAGEMENT
Case Management Assessment & Discharge Planning Note    Patient name Otis Wynne  Location /-60 MRN [id number]  : 1940 Date 2023       Current Admission Date: 2023  Current Admission Diagnosis:Acute on chronic respiratory failure with hypoxia    Patient Active Problem List    Diagnosis Date Noted   • BPH (benign prostatic hyperplasia) 2023   • Acute on chronic respiratory failure with hypoxia  2021   • Pulmonary nodule 2021   • Frequent PVCs 08/10/2021   • COPD exacerbation 2021   • Acute on chronic combined systolic and diastolic CHF    • RBBB 2021   • Moderate alcohol consumption 2021   • Localized edema 2021   • Atrial fibrillation  2021   • Essential hypertension       LOS (days): 3  Geometric Mean LOS (GMLOS) (days): 3 90  Days to GMLOS:1 1     OBJECTIVE:    Risk of Unplanned Readmission Score: 16 18         Current admission status: Inpatient  Referral Reason:  (dc planning)    Preferred Pharmacy:   [address] [place] [address]   Phone: [phone] Fax: [phone] [facility] [place] [address] [address]  Phone: [phone] Fax: [address] [place] [address]  Phone: [phone] Fax: [phone]    Primary Care Provider: Tesha Fall MD    Primary Insurance: MEDICARE  Secondary Insurance: MEGAN SOMMERS     SOB with Acute on Chronic resp failure and hypoxia  - IV solumedrol q12, lasix 60 mg q12 , Pulm and Cardiology consultes  On fluid restriction  CM met with patient at the bedside,baseline information  was obtained   CM discussed the role of CM in helping the patient develop a discharge plan and assist the patient in carry out their plan       ASSESSMENT:  Active Health Care Proxies    There are no active Health Care Proxies on file  Advance Directives  Does patient have a [address]?: Yes  Does patient have Advance Directives?: Yes  Advance Directives: Living will  Primary Contact: Shira Noyola, spouse         Readmission Root Cause  30 Day Readmission: No    Patient Information  Admitted from[de-identified] Home  Mental Status: Alert  During Assessment patient was accompanied by: Not accompanied during assessment  Assessment information provided by[de-identified] Patient  Support Systems: Spouse/significant other, Kalie [address] Dr of Residence: [facility]  do you live in?: [address] entry access options   Select all that apply : Stairs  Number of steps to enter home : 1  Type of Current Residence: [place]  In the last 12 months, was there a time when you were not able to pay the mortgage or rent on time?: No  In the last 12 months, how many places have you lived?: 1  In the last 12 months, was there a time when you did not have a steady place to sleep or slept in a shelter (including now)?: No  Homeless/housing insecurity resource given?: No  Living Arrangements: Lives w/ Spouse/significant other  Is patient a ?: Yes  Is patient active with [facility])?: Yes  Is patient service connected?: Yes (90 %)    Activities of Daily Living Prior to Admission  Functional Status: Independent  Completes ADLs independently?: Yes  Ambulates independently?: Yes  Does patient use assisted devices?: Yes  Assisted Devices (DME) used: Portable Oxygen tanks, Home Oxygen concentrator, BiPAP, Nebulizer  DME Company Name (respiratory supplies): [employer]  O2 Rate(s): 4 liters continuous  Does patient currently own DME?: Yes  What DME does the patient currently own?: BiPAP, Nebulizer  Does patient have a history of Outpatient Therapy (PT/OT)?: No  Does the patient have a history of Short-Term Rehab?: No  Does patient have a history of HHC?: Yes ([facility])  Does patient currently have [address]?: No         Patient Information Continued  Income Source: Pension/half-way  Does patient have prescription coverage?: Yes ([facility])  Within the past 12 months, you worried that your food would run out before you got the money to buy more : Never true  Within the past 12 months, the food you bought just didn't last and you didn't have money to get more : Never true  Food insecurity resource given?: No  Does patient receive dialysis treatments?: No  Does patient have a history of substance abuse?: No  Does patient have a history of Mental Health Diagnosis?: No         Means of Transportation  Means of Transport to Appts[de-identified] Drives Self  In the past 12 months, has lack of transportation kept you from medical appointments or from getting medications?: No  In the past 12 months, has lack of transportation kept you from meetings, work, or from getting things needed for daily living?: No  Was application for public transport provided?: No        DISCHARGE DETAILS:    All DME came from the [place] [id number] is the supplier      Discharge planning discussed with[de-identified] patient and spouse  Freedom of Choice: Yes  Comments - Freedom of Choice: Discussed HHC, patient and spouse in agreement for Pulm Assessment/ med management  CM contacted family/caregiver?: Yes     Did patient/caregiver verbalize understanding of patient care needs?: Yes       Contacts  Patient Contacts: wife, Rashmi Salcedo  Relationship to Patient[de-identified] Family  Contact Method: Phone  Phone Number: see face sheet  Reason/Outcome: Discharge [address]         Is the patient interested in [address] at discharge?: Yes  [address] requested[de-identified] Nursing, Physical Therapy ([facility])  [address] Provider[de-identified] PCP  Home Health Services Needed[de-identified] COPD Management, Evaluate Functional Status and Safety  Oxygen LPM Ordered (if applicable based on home health services needed):: 4 LPM  Homebound Criteria Met[de-identified] Requires the Assistance of Another Person for Safe Ambulation or to Leave the Home  Supporting Clincal Findings[de-identified] Requires Oxygen, Dyspnea with Exertion, Limited Endurance    DME Referral Provided  Referral made for DME?: No    Other Referral/Resources/Interventions Provided:  Interventions: [facility]  Referral Comments: Aidin referral made to [address] [place] [address] Road Currently pending            Discharge Destination Plan[de-identified] Home with [place] at Discharge : Family

## 2023-04-24 NOTE — PLAN OF CARE
Problem: Potential for Falls  Goal: Patient will remain free of falls  Description: INTERVENTIONS:  - Educate patient/family on patient safety including physical limitations  - Instruct patient to call for assistance with activity   - Consult OT/PT to assist with strengthening/mobility   - Keep Call bell within reach  - Keep bed low and locked with side rails adjusted as appropriate  - Keep care items and personal belongings within reach  - Initiate and maintain comfort rounds  - Make Fall Risk Sign visible to staff  - Apply yellow socks and bracelet for high fall risk patients  - Consider moving patient to room near nurses station  Outcome: Progressing     Problem: Nutrition/Hydration-ADULT  Goal: Nutrient/Hydration intake appropriate for improving, restoring or maintaining nutritional needs  Description: Monitor and assess patient's nutrition/hydration status for malnutrition  Collaborate with interdisciplinary team and initiate plan and interventions as ordered  Monitor patient's weight and dietary intake as ordered or per policy  Utilize nutrition screening tool and intervene as necessary  Determine patient's food preferences and provide high-protein, high-caloric foods as appropriate       INTERVENTIONS:  - Monitor oral intake, urinary output, labs, and treatment plans  - Assess nutrition and hydration status and recommend course of action  - Evaluate amount of meals eaten  - Assist patient with eating if necessary   - Allow adequate time for meals  - Recommend/ encourage appropriate diets, oral nutritional supplements, and vitamin/mineral supplements  - Order, calculate, and assess calorie counts as needed  - Recommend, monitor, and adjust tube feedings and TPN/PPN based on assessed needs  - Assess need for intravenous fluids  - Provide specific nutrition/hydration education as appropriate  - Include patient/family/caregiver in decisions related to nutrition  Outcome: Progressing     Problem: SAFETY ADULT  Goal: Patient will remain free of falls  Description: INTERVENTIONS:  - Educate patient/family on patient safety including physical limitations  - Instruct patient to call for assistance with activity   - Consult OT/PT to assist with strengthening/mobility   - Keep Call bell within reach  - Keep bed low and locked with side rails adjusted as appropriate  - Keep care items and personal belongings within reach  - Initiate and maintain comfort rounds  - Make Fall Risk Sign visible to staff  - Apply yellow socks and bracelet for high fall risk patients  - Consider moving patient to room near nurses station  Outcome: Progressing     Problem: RESPIRATORY - ADULT  Goal: Achieves optimal ventilation and oxygenation  Description: INTERVENTIONS:  - Assess for changes in respiratory status  - Assess for changes in mentation and behavior  - Position to facilitate oxygenation and minimize respiratory effort  - Oxygen administered by appropriate delivery if ordered  - Initiate smoking cessation education as indicated  - Encourage broncho-pulmonary hygiene including cough, deep breathe, Incentive Spirometry  - Assess the need for suctioning and aspirate as needed  - Assess and instruct to report SOB or any respiratory difficulty  - Respiratory Therapy support as indicated  Outcome: Progressing     Problem: METABOLIC, FLUID AND ELECTROLYTES - ADULT  Goal: Fluid balance maintained  Description: INTERVENTIONS:  - Monitor labs   - Monitor I/O and WT  - Instruct patient on fluid and nutrition as appropriate  - Assess for signs & symptoms of volume excess or deficit  Outcome: Progressing     Problem: SKIN/TISSUE INTEGRITY - ADULT  Goal: Incision(s), wounds(s) or drain site(s) healing without S/S of infection  Description: INTERVENTIONS  - Assess and document dressing, incision, wound bed, drain sites and surrounding tissue  - Provide patient and family education  - Perform skin care/dressing changes   Outcome: Progressing

## 2023-04-24 NOTE — PROGRESS NOTES
[address]  Progress Note  Name: Emily Muro I  MRN: [id number]  Unit/Bed#: -01 I Date of Admission: 4/21/2023   Date of Service: 4/24/2023 I Hospital Day: 3    Assessment/Plan   * Acute on chronic respiratory failure with hypoxia   Assessment & Plan  · Baseline oxygen requirement of 4 L via nasal cannula ->  Admitted on  5 to 6 L with symptomatic shortness of breath at rest and with exertion-he is back to his 4 L he also uses an auto BiPAP  · Emanation secondary to acute COPD exacerbation with acute on chronic combined systolic and diastolic heart failure exacerbation  See above treatment    Severe protein-calorie malnutrition ([address] )  Assessment & Plan  Malnutrition Findings:   Adult Malnutrition type: Chronic illness  Adult Degree of Malnutrition: Other severe protein calorie malnutrition  Malnutrition Characteristics: Inadequate energy, Weight loss                  360 Statement: Chronic severe malnutrition related to decreased appetite, COPD, constipation as evidenced by < 75% estimated energy intake > 1 mo, 7% weight loss x 1 5 mo (3/6/23 172lb, 4/24/23 160lb)  Treated with: Continue regular diet at this time, fluid restriction per MD  Will order half portions as pt requested, says he is unable to finish his meals and does not like to waste the food  Pt agreeable to trial ensure compact BID  Requested RD protocol to order supplements  Continue with daily weights  Discouraged use of salt shaker, will provide low Na diet ed as appropriate  Pt reports being unable to have a BM for 3 days at a time, would take laxative, have a BM within 2 days and go back to not having a BM for another 3 days  Relayed to RN  Bowel protocol per MD     BMI Findings: Body mass index is 23 77 kg/m²         BPH (benign prostatic hyperplasia)  Assessment & Plan  Continue Proscar    COPD exacerbation  Assessment & Plan  · Improved no wheezing today-Solu-Medrol taper down to 40 mg IV every 12 hours by pulmonary on 4/23  · Continue Pulmicort and other nebs added  · Anticipate switch to po in 24 hrs with taper     Acute on chronic combined systolic and diastolic CHF  Assessment & Plan  · Last EF of 45% in August 2021 -> check updated echocardiogram -> takes torsemide 100 mg daily at home  · Patient has been placed on Lasix 60 mg IV twice daily on 4/22-since then he has dropped 4 pounds leg edema is better but it still edematous and lungs decreased but still some rales   · S/p diamox 4/23  · Monitor electrolytes and renal function  · Consult cardiology  · Daily weights  · I and O he put out 2 1 L in 24 hours  · Needs another day of diurese  · Cards to see     Atrial fibrillation   Assessment & Plan  Rate controlled on Cardizem  On Eliquis for anticoagulation    Essential hypertension  Assessment & Plan  Low-sodium diet  Continue Cardizem               VTE Pharmacologic Prophylaxis:   Moderate Risk (Score 3-4) - Pharmacological DVT Prophylaxis Ordered: apixaban (Eliquis)  Patient Centered Rounds: I performed bedside rounds with nursing staff today  Discussions with Specialists or Other Care Team Provider: will discuss with cardio    Education and Discussions with Family / Patient: pt will update family   Total Time Spent on Date of Encounter in care of patient: 35 minutes This time was spent on one or more of the following: performing physical exam; counseling and coordination of care; obtaining or reviewing history; documenting in the medical record; reviewing/ordering tests, medications or procedures; communicating with other healthcare professionals and discussing with patient's family/caregivers  Current Length of Stay: 3 day(s)  Current Patient Status: Inpatient   Certification Statement: The patient will continue to require additional inpatient hospital stay due to CHF COPD  Discharge Plan: Anticipate discharge tomorrow to home      Code Status: Level 3 - DNAR and DNI    Subjective:   Patient seen and examined wants to go home feeling better    Objective:     Vitals:   Temp (24hrs), Av 1 °F (36 7 °C), Min:97 7 °F (36 5 °C), Max:98 4 °F (36 9 °C)    Temp:  [97 7 °F (36 5 °C)-98 4 °F (36 9 °C)] 97 7 °F (36 5 °C)  HR:  [50-66] 50  Resp:  [16-18] 16  BP: (111-131)/(59-77) 116/59  SpO2:  [88 %-100 %] 93 %  Body mass index is 23 77 kg/m²  Input and Output Summary (last 24 hours): Intake/Output Summary (Last 24 hours) at 2023 111  Last data filed at 2023 0813  Gross per 24 hour   Intake 455 ml   Output 2025 ml   Net -1570 ml       Physical Exam:   Physical Exam  Vitals and nursing note reviewed  Constitutional:       General: He is not in acute distress  Appearance: He is well-developed  HENT:      Head: Normocephalic and atraumatic  Eyes:      Conjunctiva/sclera: Conjunctivae normal    Cardiovascular:      Rate and Rhythm: Normal rate and regular rhythm  Heart sounds: No murmur heard  Pulmonary:      Effort: Pulmonary effort is normal  No respiratory distress  Breath sounds: Rales (Bibasilar) present  No wheezing  Abdominal:      Palpations: Abdomen is soft  Tenderness: There is no abdominal tenderness  Musculoskeletal:         General: Swelling present  Cervical back: Neck supple  Skin:     General: Skin is warm and dry  Capillary Refill: Capillary refill takes less than 2 seconds  Neurological:      Mental Status: He is alert and oriented to person, place, and time     Psychiatric:         Mood and Affect: Mood normal           Additional Data:     Labs:  Results from last 7 days   Lab Units 23  0528   WBC Thousand/uL 11 79*   HEMOGLOBIN g/dL 12 2   HEMATOCRIT % 39 5   PLATELETS Thousands/uL 287   NEUTROS PCT % 89*   LYMPHS PCT % 5*   MONOS PCT % 5   EOS PCT % 0     Results from last 7 days   Lab Units 23  0459 23  1843 23  1206   SODIUM mmol/L 141   < > 141   POTASSIUM mmol/L 3 6   < > 3 8   CHLORIDE mmol/L 95*   < > 90*   CO2 mmol/L 41*   < > 41*   BUN mg/dL 37*   < > 21   CREATININE mg/dL 0 87   < > 0 95   ANION GAP mmol/L 5   < > 10   CALCIUM mg/dL 9 6   < > 10 5*   ALBUMIN g/dL  --   --  4 2   TOTAL BILIRUBIN mg/dL  --   --  0 91   ALK PHOS U/L  --   --  99   ALT U/L  --   --  13   AST U/L  --   --  20   GLUCOSE RANDOM mg/dL 178*   < > 134    < > = values in this interval not displayed  Results from last 7 days   Lab Units 04/21/23  1206   INR  1 92*             Results from last 7 days   Lab Units 04/21/23  1433 04/21/23  1206   LACTIC ACID mmol/L 2 0 2 1*   PROCALCITONIN ng/ml  --  0 12  0 10       Lines/Drains:  Invasive Devices     Peripheral Intravenous Line  Duration           Peripheral IV 04/21/23 Left Antecubital 2 days                      Imaging: Reviewed radiology reports from this admission including: chest xray    Recent Cultures (last 7 days):   Results from last 7 days   Lab Units 04/21/23  1208 04/21/23  1206   BLOOD CULTURE  No Growth at 48 hrs  No Growth at 48 hrs         Last 24 Hours Medication List:   Current Facility-Administered Medications   Medication Dose Route Frequency Provider Last Rate   • acetaminophen  650 mg Oral Q6H PRN Xavier Bear MD     • apixaban  5 mg Oral BID Xavier Bear MD     • ascorbic acid  250 mg Oral Daily Xavier Bear MD     • bisacodyl  5 mg Oral Daily Xavier Bear MD     • budesonide  0 5 mg Nebulization Q12H Blake Murillo MD     • cholecalciferol  2,000 Units Oral Daily Xavier Bear MD     • dextromethorphan-guaiFENesin  10 mL Oral Q4H PRN Xavier Bear MD     • Diclofenac Sodium  2 g Topical 4x Daily Xavier Bear MD     • diltiazem  300 mg Oral Daily Blake Murillo MD     • docusate sodium  100 mg Oral Daily Xavier Bear MD     • finasteride  5 mg Oral Daily Xavier Bear MD     • formoterol  20 mcg Nebulization Q12H Devin Oconnor DO     • furosemide  60 mg Intravenous Q12H Blake Murillo MD     • ipratropium-albuterol  3 mL Nebulization Q6H While awake Moises Baum MD     • loratadine  10 mg Oral Daily Luis Alicia MD     • magnesium Oxide  400 mg Oral Daily Luis Alicia MD     • methylPREDNISolone sodium succinate  40 mg Intravenous Q12H [facility] Gilmar Teran DO     • montelukast  10 mg Oral HS Luis Alicia MD     • multivitamin-minerals  1 tablet Oral Daily Luis Alicia MD     • nitroglycerin  0 4 mg Sublingual Q5 Min PRN Luis Alicia MD     • ondansetron  4 mg Intravenous Q4H PRN Luis Alicia MD          Today, Patient Was Seen By: Moises Baum MD    **Please Note: This note may have been constructed using a voice recognition system  **

## 2023-04-24 NOTE — OCCUPATIONAL THERAPY NOTE
Occupational Therapy Screen    Patient Name: James Lopez  [id number] Date: 4/24/2023 04/24/23 1135   Note Type   Note type Screen     OT orders received  Chart review completed  Patient admitted to [facility] on 4/21/2023 with Dx: acute on chronic respiratory failure with hypoxia  Spoke with pt's nursing staff who reported patient was I in room  Spoke with pt who reported no concerns regarding ADLs, IADLs or functional mobility upon return to home  Pt observed donning socks while seated at EOB and completing functional mobility around room with no AD  Pt appears to be at prior level of functioning at this time and does not require acute OT services  D/C OT effective 4/24/2023  If new concerns arise, please re-consult      TARA Hassan/ABELINO

## 2023-04-24 NOTE — ASSESSMENT & PLAN NOTE
· Improved no wheezing today-Solu-Medrol taper down to 40 mg IV every 12 hours by pulmonary on 4/23  · Continue Pulmicort and other nebs added  · Anticipate switch to po in 24 hrs with taper

## 2023-04-24 NOTE — ASSESSMENT & PLAN NOTE
Malnutrition Findings:   Adult Malnutrition type: Chronic illness  Adult Degree of Malnutrition: Other severe protein calorie malnutrition  Malnutrition Characteristics: Inadequate energy, Weight loss                  360 Statement: Chronic severe malnutrition related to decreased appetite, COPD, constipation as evidenced by < 75% estimated energy intake > 1 mo, 7% weight loss x 1 5 mo (3/6/23 172lb, 4/24/23 160lb)  Treated with: Continue regular diet at this time, fluid restriction per MD  Will order half portions as pt requested, says he is unable to finish his meals and does not like to waste the food  Pt agreeable to trial ensure compact BID  Requested RD protocol to order supplements  Continue with daily weights  Discouraged use of salt shaker, will provide low Na diet ed as appropriate  Pt reports being unable to have a BM for 3 days at a time, would take laxative, have a BM within 2 days and go back to not having a BM for another 3 days  Relayed to RN  Bowel protocol per MD     BMI Findings: Body mass index is 23 77 kg/m²

## 2023-04-25 VITALS
HEIGHT: 69 IN | SYSTOLIC BLOOD PRESSURE: 130 MMHG | HEART RATE: 54 BPM | OXYGEN SATURATION: 93 % | DIASTOLIC BLOOD PRESSURE: 66 MMHG | WEIGHT: 162.48 LBS | BODY MASS INDEX: 24.07 KG/M2 | RESPIRATION RATE: 18 BRPM | TEMPERATURE: 98.6 F

## 2023-04-25 LAB
ANION GAP SERPL CALCULATED.3IONS-SCNC: 5 MMOL/L (ref 4–13)
BUN SERPL-MCNC: 37 MG/DL (ref 5–25)
CALCIUM SERPL-MCNC: 9.7 MG/DL (ref 8.4–10.2)
CHLORIDE SERPL-SCNC: 94 MMOL/L (ref 96–108)
CO2 SERPL-SCNC: 42 MMOL/L (ref 21–32)
CREAT SERPL-MCNC: 0.89 MG/DL (ref 0.6–1.3)
GFR SERPL CREATININE-BSD FRML MDRD: 79 ML/MIN/1.73SQ M
GLUCOSE SERPL-MCNC: 148 MG/DL (ref 65–140)
POTASSIUM SERPL-SCNC: 4.2 MMOL/L (ref 3.5–5.3)
SODIUM SERPL-SCNC: 141 MMOL/L (ref 135–147)

## 2023-04-25 RX ORDER — PREDNISONE 10 MG/1
TABLET ORAL
Qty: 23 TABLET | Refills: 0 | Status: SHIPPED | OUTPATIENT
Start: 2023-04-25 | End: 2023-05-07

## 2023-04-25 RX ORDER — METOLAZONE 2.5 MG/1
2.5 TABLET ORAL
Qty: 24 TABLET | Refills: 0 | Status: SHIPPED | OUTPATIENT
Start: 2023-04-27 | End: 2023-07-26

## 2023-04-25 RX ADMIN — FORMOTEROL FUMARATE DIHYDRATE 20 MCG: 20 SOLUTION RESPIRATORY (INHALATION) at 08:51

## 2023-04-25 RX ADMIN — Medication 2000 UNITS: at 09:43

## 2023-04-25 RX ADMIN — APIXABAN 5 MG: 5 TABLET, FILM COATED ORAL at 09:42

## 2023-04-25 RX ADMIN — DOCUSATE SODIUM 100 MG: 100 CAPSULE ORAL at 09:42

## 2023-04-25 RX ADMIN — BISACODYL 5 MG: 5 TABLET, COATED ORAL at 09:43

## 2023-04-25 RX ADMIN — Medication 400 MG: at 09:42

## 2023-04-25 RX ADMIN — LORATADINE 10 MG: 10 TABLET ORAL at 09:43

## 2023-04-25 RX ADMIN — FINASTERIDE 5 MG: 5 TABLET, FILM COATED ORAL at 09:42

## 2023-04-25 RX ADMIN — FUROSEMIDE 60 MG: 10 INJECTION, SOLUTION INTRAVENOUS at 09:41

## 2023-04-25 RX ADMIN — DILTIAZEM HYDROCHLORIDE 300 MG: 180 CAPSULE, COATED, EXTENDED RELEASE ORAL at 09:43

## 2023-04-25 RX ADMIN — OXYCODONE HYDROCHLORIDE AND ACETAMINOPHEN 250 MG: 500 TABLET ORAL at 09:43

## 2023-04-25 RX ADMIN — IPRATROPIUM BROMIDE AND ALBUTEROL SULFATE 3 ML: 2.5; .5 SOLUTION RESPIRATORY (INHALATION) at 08:51

## 2023-04-25 RX ADMIN — BUDESONIDE 0.5 MG: 0.5 INHALANT RESPIRATORY (INHALATION) at 08:51

## 2023-04-25 RX ADMIN — METHYLPREDNISOLONE SODIUM SUCCINATE 40 MG: 40 INJECTION, POWDER, FOR SOLUTION INTRAMUSCULAR; INTRAVENOUS at 09:41

## 2023-04-25 RX ADMIN — Medication 1 TABLET: at 09:42

## 2023-04-25 NOTE — ASSESSMENT & PLAN NOTE
· Improved   · Solu-Medrol taper - discharge on prednisone taper  · Discussed with Pulmonology  · Continue inhalers

## 2023-04-25 NOTE — CASE MANAGEMENT
Case Management Discharge Planning Note    Patient name Dalila Olivas  Location /-42 MRN [id number]  : 1940 Date 2023       Current Admission Date: 2023  Current Admission Diagnosis:Acute on chronic respiratory failure with hypoxia    Patient Active Problem List    Diagnosis Date Noted   • Severe protein-calorie malnutrition ([address] ) 2023   • BPH (benign prostatic hyperplasia) 2023   • Acute on chronic respiratory failure with hypoxia  2021   • Pulmonary nodule 2021   • Frequent PVCs 08/10/2021   • COPD exacerbation 2021   • Acute on chronic combined systolic and diastolic CHF    • RBBB 2021   • Moderate alcohol consumption 2021   • Localized edema 2021   • Atrial fibrillation  2021   • Essential hypertension       LOS (days): 4  Geometric Mean LOS (GMLOS) (days): 3 90  Days to GMLOS:0     OBJECTIVE:  Risk of Unplanned Readmission Score: 15 04         Current admission status: Inpatient   Preferred Pharmacy:   [facility], [address] [place] [address]  Phone: [phone] Fax: [phone] [facility], [address]  [address] [place] [address]  Phone: [phone] Fax: [place], [address] [place] [address]  Phone: [phone] Fax: [phone]    Primary Care Provider: Sundar Castellon MD    Primary Insurance: MEDICARE  Secondary Insurance: [employer] SOMMERS    DISCHARGE DETAILS:      CM was unaware of discharge today  CM spoke to patient at the bedside, reviewed DC IMM with patient and informed that patient can stay an additional 4 hours for reconsidering appealing the discharge as the medicare rights were review on the day of discharge   Pt verbalized understanding and feels ready to go home and does not intend to stay 4 hours to reconsider         AVS sent to [address]                                                                             IMM Given (Date):: 04/25/23  IMM Given to[de-identified] Patient

## 2023-04-25 NOTE — CONSULTS
Consult Cardiology    Sahara Patterson 1940, 81 y o  male MRN: [id number]    Unit/Bed#: -01 Encounter: [id number]    Attending Provider: Aubrie Holland *   Primary Care Provider: Betty Hartmann MD   Date admitted to hospital: 4/21/2023       Inpatient consult to Cardiology  Consult performed by: KAN Archuleta  Consult ordered by: Pawel Ferrer MD          Atrial fibrillation   Assessment & Plan  Atrial fibrillation noted on EKG at PCP office 01/19/2021  Holter monitor 1/28/2021 showed predominantly atrial fibrillation with an average HR of 102 beats per minute with frequent PVCs /aberrant beats representing 34 9% ectopic burden  Echocardiogram 2/25/2021 showed EF 45-50%  Patient denied any palpitations and was unaware of rapid heart rates  Magnesium 250 mg daily added and will be continued  Diltiazem was added (trying to avoid beta blockers due to COPD/asthma)  Persistent a fib with elevated ventricular rates and high PVC burden of 33% noted on most recent holter monitor 6/2021  Continue diltiazem 300mg daily  EP consultation with  Dr Hema Nunez on 8/19/2021 with follow up 9/20/2021  Atrial fibrillation and PVC ablation were discussed  They opted given risks associated with prolonged anesthesia in the setting of significant lung disease  Continue Eliquis 5 mg twice daily for CVA prophylaxis  He is currently using BiPAP for sleep  Acute on chronic combined systolic and diastolic CHF  Assessment & Plan  Wt Readings from Last 3 Encounters:   04/25/23 73 7 kg (162 lb 7 7 oz)   03/06/23 78 kg (172 lb)   01/06/23 78 7 kg (173 lb 6 4 oz)     Echocardiogram 2/25/2021 showed an EF of 45-50%  Echocardiogram 4/24/23 with EF 45% and mildly reduced RV function  Unclear etiology of reduced EF but this could be tachycardia induced vs ETOH induced  He should have eventual stress test to rule out ischemia as the source of his HFrEF     Ideally should be on long acting beta blocker but has COPD/Asthma  No ACE-I/ARB for now as we need room to uptitrate AV efra blockers  Presented with acute on chronic heart failure  Currently responding very well to IV furosemide 60 mg BID  Plan torsemide 100 mg daily with metolazone 2 5 mg twice weekly at discharge  Needs BMP within 4 days of discharge  Essential hypertension  Assessment & Plan  BP remains acceptable  Continue diltiazem 300 mg daily  Will monitor with diuresis  COPD exacerbation  Assessment & Plan  Management as per pulmonary    * Acute on chronic respiratory failure with hypoxia   Assessment & Plan  Secondary to COPD exacerbation and volume overload  Back to baseline today        Physician Requesting Consult: Viola Gama *    Reason for Consult / Principal Problem: Acute on chronic systolic/diastolic heart failure      HPI: Otis Wynne is a 81y o  year old male who has a history of PAF and frequent PVC's, cardiomyopathy, chronic systolic/diastolic heart failure, COPD/asthma with chronic respiratory failure on supplemental O2, BPH, hypertension who follows with our office outpatient  Patient presented to [address] [place] [address] ER on 4/21/2023 with increased shortness of breath and edema  He was at the PCP office and instructed to come to the ER  Upon arrival ECG showed atrial fibrillation with PVC's, RBBB    HS trop neg x3  Chest xray showed pulmonary vascular congestion  He was diagnosed with COPD exacerbation and acute on chronic heart failure  Started on systemic steroids and IV furosemide 60 mg BID  At the time of my assessment he is sitting up in bed  He states he feels his breathing is at baseline  He tells me that he wishes to go home today  He is urinating well with the IV furosemide  Review of Systems   Constitutional: Negative for chills and fever  HENT: Negative for ear pain and sore throat  Eyes: Negative for pain and visual disturbance  Respiratory: Positive for shortness of breath  Negative for cough  Cardiovascular: Positive for leg swelling  Negative for chest pain and palpitations  Gastrointestinal: Negative for abdominal pain and vomiting  Genitourinary: Negative for dysuria and hematuria  Musculoskeletal: Negative for arthralgias and back pain  Skin: Negative for color change and rash  Neurological: Negative for seizures and syncope  All other systems reviewed and are negative  Historical Information     Past Medical History:   Diagnosis Date   • BPH (benign prostatic hyperplasia)    • Chronic obstructive pulmonary disease (COPD) ([facility] )    • Essential hypertension    • Hard of hearing     Pt does wear hearing aids   • Hypertension    • Lung cancer ([facility] )    • Shortness of breath     Pt states not changes and it occurs with moderate exertion   • Sleep disturbance     Pt states he is only sleeping about 3 hours a night    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   • Tinnitus      Past Surgical History:   Procedure Laterality Date   • IR BIOPSY LUNG  7/19/2022   • SHOULDER OPEN ROTATOR CUFF REPAIR     • SKIN GRAFT      spindle cell removal of scalp with skin graft from shoulder   • VEIN SURGERY       Social History     Substance and Sexual Activity   Alcohol Use Yes   • Alcohol/week: 14 0 standard drinks   • Types: 14 Glasses of wine per week    Comment: moderate consumption     Social History     Substance and Sexual Activity   Drug Use Not Currently     Social History     Tobacco Use   Smoking Status Former   • Types: Cigars, Cigarettes   Smokeless Tobacco Never       Family History: non-contributory    Meds/Allergies     current meds:   Current Facility-Administered Medications   Medication Dose Route Frequency   • acetaminophen (TYLENOL) tablet 650 mg  650 mg Oral Q6H PRN   • apixaban (ELIQUIS) tablet 5 mg  5 mg Oral BID   • ascorbic acid (VITAMIN C) tablet 250 mg  250 mg Oral Daily   • bisacodyl (DULCOLAX) EC "tablet 5 mg  5 mg Oral Daily   • budesonide (PULMICORT) inhalation solution 0 5 mg  0 5 mg Nebulization Q12H   • cholecalciferol (VITAMIN D3) tablet 2,000 Units  2,000 Units Oral Daily   • dextromethorphan-guaiFENesin (ROBITUSSIN DM) oral syrup 10 mL  10 mL Oral Q4H PRN   • Diclofenac Sodium (VOLTAREN) 1 % topical gel 2 g  2 g Topical 4x Daily   • diltiazem (CARDIZEM CD) 24 hr capsule 300 mg  300 mg Oral Daily   • docusate sodium (COLACE) capsule 100 mg  100 mg Oral Daily   • finasteride (PROSCAR) tablet 5 mg  5 mg Oral Daily   • formoterol (PERFOROMIST) nebulizer solution 20 mcg  20 mcg Nebulization Q12H   • furosemide (LASIX) injection 60 mg  60 mg Intravenous Q12H   • ipratropium-albuterol (DUO-NEB) 0 5-2 5 mg/3 mL inhalation solution 3 mL  3 mL Nebulization Q6H While awake   • loratadine (CLARITIN) tablet 10 mg  10 mg Oral Daily   • magnesium Oxide (MAG-OX) tablet 400 mg  400 mg Oral Daily   • methylPREDNISolone sodium succinate (Solu-MEDROL) injection 40 mg  40 mg Intravenous Q12H KM   • montelukast (SINGULAIR) tablet 10 mg  10 mg Oral HS   • multivitamin-minerals (CENTRUM) tablet 1 tablet  1 tablet Oral Daily   • nitroglycerin (NITROSTAT) SL tablet 0 4 mg  0 4 mg Sublingual Q5 Min PRN   • ondansetron (ZOFRAN) injection 4 mg  4 mg Intravenous Q4H PRN          Allergies   Allergen Reactions   • Penicillin G Other (See Comments)     PCN Shots Only!!       Objective     Vitals: Blood pressure 130/66, pulse (!) 54, temperature 98 6 °F (37 °C), resp  rate 18, height 5' 9\" (1 753 m), weight 73 7 kg (162 lb 7 7 oz), SpO2 93 %  , Body mass index is 23 99 kg/m²      Orthostatic Blood Pressures    Flowsheet Row Most Recent Value   Blood Pressure 130/66 filed at 04/25/2023 1137   Patient Position - Orthostatic VS Lying filed at 04/22/2023 9740          Systolic (84QIQ), IFL:799 , Min:112 , WXX:751     Diastolic (20DWL), ZCF:05, Min:59, Max:76        Intake/Output Summary (Last 24 hours) at 4/25/2023 1220  Last data filed " at 4/25/2023 0959  Gross per 24 hour   Intake 240 ml   Output 2240 ml   Net -2000 ml       Weight (last 2 days)     Date/Time Weight    04/25/23 0600 73 7 (162 48)    04/24/23 07:16:18 73 (160 94)    04/24/23 0705 73 (161)    04/24/23 0600 73 (160 94)    04/24/23 0434 76 7 (169 09)    04/23/23 0600 73 3 (161 6)          Invasive Devices     Peripheral Intravenous Line  Duration           Peripheral IV 04/21/23 Left Antecubital 4 days                Physical Exam  Vitals and nursing note reviewed  Constitutional:       General: He is not in acute distress  Appearance: He is well-developed  HENT:      Head: Normocephalic and atraumatic  Eyes:      Conjunctiva/sclera: Conjunctivae normal    Cardiovascular:      Rate and Rhythm: Normal rate  Rhythm irregular  Heart sounds: No murmur heard  Pulmonary:      Effort: Pulmonary effort is normal  No respiratory distress  Breath sounds: Decreased breath sounds and wheezing present  Comments: On 4 L NC  Abdominal:      Palpations: Abdomen is soft  Tenderness: There is no abdominal tenderness  Musculoskeletal:         General: No swelling  Cervical back: Neck supple  Right lower leg: Edema present  Left lower leg: Edema present  Skin:     General: Skin is warm and dry  Capillary Refill: Capillary refill takes less than 2 seconds  Neurological:      Mental Status: He is alert  Psychiatric:         Mood and Affect: Mood normal          Speech: Speech normal          Behavior: Behavior normal  Behavior is cooperative           Cognition and Memory: Cognition normal               Laboratory Results:          CBC with diff:   Results from last 7 days   Lab Units 04/23/23  0528 04/22/23  0535 04/21/23  1206   WBC Thousand/uL 11 79* 5 73 9 03   HEMOGLOBIN g/dL 12 2 12 5 14 2   HEMATOCRIT % 39 5 39 7 46 0   MCV fL 101* 101* 102*   PLATELETS Thousands/uL 287 234 290   MCH pg 31 3 31 8 31 5   MCHC g/dL 30 9* 31 5 30 9*   RDW % 13 4 13 4 13 6   MPV fL 9 4 9 3 9 6   NRBC AUTO /100 WBCs 0 0 0         CMP:  Results from last 7 days   Lab Units 23  0451 23  0459 23  0528 23  1843 23  1206   POTASSIUM mmol/L 4 2 3 6 4 0   < > 3 8   CHLORIDE mmol/L 94* 95* 95*   < > 90*   CO2 mmol/L 42* 41* 42*   < > 41*   BUN mg/dL 37* 37* 35*   < > 21   CREATININE mg/dL 0 89 0 87 0 81   < > 0 95   CALCIUM mg/dL 9 7 9 6 9 8   < > 10 5*   AST U/L  --   --   --   --  20   ALT U/L  --   --   --   --  13   ALK PHOS U/L  --   --   --   --  99   EGFR ml/min/1 73sq m 79 80 82   < > 74    < > = values in this interval not displayed         BMP:  Results from last 7 days   Lab Units 23  04523  0459 23  0528   POTASSIUM mmol/L 4 2 3 6 4 0   CHLORIDE mmol/L 94* 95* 95*   CO2 mmol/L 42* 41* 42*   BUN mg/dL 37* 37* 35*   CREATININE mg/dL 0 89 0 87 0 81   CALCIUM mg/dL 9 7 9 6 9 8       BNP:  291    HS troponin 9->8->9    Magnesium:   Results from last 7 days   Lab Units 23  0443 23  0528 23  1717   MAGNESIUM mg/dL 1 9 2 0 2 1       Coags:   Results from last 7 days   Lab Units 23  1206   PTT seconds 52*   INR  1 92*       TSH:    0 201,  Free T4 1 46      Cardiac testing:     Results for orders placed during the hospital encounter of 21    Echo complete with contrast if indicated    Narrative  [address] [place] [address]    Echocardiogram    Study date:  2021    Patient: Иван Mar  MR number: [id number]  Account number: [de-identified]  : 10-Girish-1940  Age: [de-identified] years  Gender: Male  Status: Outpatient  Location:  Height:  Weight:  BP:    Indications: Atrial Fibrillation    Diagnoses: I48 0 - Atrial fibrillation    Sonographer:  TYLER Ozuna  Primary Physician:  Silas Samuels MD  Referring Physician:  Michael Alcantar DO  Group:  Danya Galdamez  Interpreting Physician:  Dorcas Castle MD    SUMMARY    LEFT VENTRICLE:  LV function is difficult to assess due to patients rhythm  Systolic function was by visual assessment  Ejection fraction was estimated in the range of 45 % to 50 %  RIGHT VENTRICLE:  The size was normal   Systolic function was normal     LEFT ATRIUM:  The atrium was mildly dilated  RIGHT ATRIUM:  The atrium was mildly dilated  MITRAL VALVE:  There was mild regurgitation  TRICUSPID VALVE:  There was trace regurgitation  PULMONIC VALVE:  There was trace regurgitation  IVC, HEPATIC VEINS:  The inferior vena cava was normal in size  PERICARDIUM:  A trace pericardial effusion was identified  LEFT VENTRICLE: LV function is difficult to assess due to patients rhythm  Size was normal  Systolic function was by visual assessment  Ejection fraction was estimated in the range of 45 % to 50 %  RIGHT VENTRICLE: The size was normal  Systolic function was normal  Wall thickness was normal     LEFT ATRIUM: The atrium was mildly dilated  RIGHT ATRIUM: The atrium was mildly dilated  MITRAL VALVE: Valve structure was normal  There was normal leaflet separation  DOPPLER: The transmitral velocity was within the normal range  There was no evidence for stenosis  There was mild regurgitation  AORTIC VALVE: The valve was trileaflet  Leaflets exhibited normal thickness and normal cuspal separation  DOPPLER: Transaortic velocity was within the normal range  There was no evidence for stenosis  There was no regurgitation  TRICUSPID VALVE: The valve structure was normal  There was normal leaflet separation  DOPPLER: The transtricuspid velocity was within the normal range  There was no evidence for stenosis  There was trace regurgitation  PULMONIC VALVE: Leaflets exhibited normal thickness, no calcification, and normal cuspal separation  DOPPLER: The transpulmonic velocity was within the normal range  There was trace regurgitation  PERICARDIUM: A trace pericardial effusion was identified      AORTA: The root exhibited normal size  SYSTEMIC VEINS: IVC: The inferior vena cava was normal in size  SYSTEM MEASUREMENT TABLES    2D  %FS: 28 1 %  AV Diam: 3 45 cm  EDV(Teich): 189 24 ml  EF(Teich): 53 42 %  ESV(Teich): 88 15 ml  IVSd: 0 78 cm  LA Diam: 4 42 cm  LAAs A4C: 25 97 cm2  LAESV A-L A4C: 90 65 ml  LAESV MOD A4C: 86 78 ml  LALs A4C: 6 32 cm  LVEDV MOD A4C: 100 18 ml  LVEF MOD A4C: 37 52 %  LVESV MOD A4C: 62 6 ml  LVIDd: 6 13 cm  LVIDs: 4 41 cm  LVLd A4C: 8 3 cm  LVLs A4C: 8 02 cm  LVPWd: 1 cm  RAAs A4C: 27 21 cm2  RAESV A-L: 86 86 ml  RAESV MOD: 83 1 ml  RALs: 7 23 cm  RVIDd: 3 5 cm  RWT: 0 33  SV MOD A4C: 37 59 ml  SV(Teich): 101 09 ml    MM  TAPSE: 1 51 cm    PW  E' Lat: 0 14 m/s  E' Sept: 0 09 m/s    [facility] Accredited Echocardiography Laboratory    Prepared and electronically signed by    Aby Talamantes MD  Signed [id number] 16:03:43      Imaging: I have personally reviewed pertinent reports  XR chest portable    Result Date: 4/21/2023  Narrative: CHEST INDICATION:   sob  COMPARISON:  Chest radiograph August 17, 2021  Correlation CT chest March 30, 2023 EXAM PERFORMED/VIEWS:  XR CHEST PORTABLE FINDINGS: Cardiomediastinal silhouette appears unremarkable  Bilateral reticular opacities  No pleural effusion or pneumothorax  Pulmonary nodules are better evident on prior CT  Osseous structures appear within normal limits for patient age  Impression: Mild pulmonary vascular congestion  Workstation performed: [id number]     CT chest abdomen pelvis w contrast    Result Date: 4/7/2023  Narrative: CT CHEST, ABDOMEN AND PELVIS WITH IV CONTRAST INDICATION:   C80 1: Malignant (primary) neoplasm, unspecified  Follow-up spindle cell tumor COMPARISON:  12/21/2022; PET study from 6/21/2022; 6/2/2022; 8/14/2021 TECHNIQUE: CT examination of the chest, abdomen and pelvis was performed  Multiplanar 2D reformatted images were created from the source data   Radiation dose length product (DLP) for this visit:  809 mGy-cm   This examination, like all CT scans performed in the [facility], was performed utilizing techniques to minimize radiation dose exposure, including the use of iterative reconstruction and automated exposure control  IV Contrast:  100 mL of iohexol (OMNIPAQUE) Enteric Contrast: Enteric contrast was administered  FINDINGS: CHEST LUNGS: Emphysema is noted  Scattered lung nodules: Left upper lobe 4 mm nodule, image 62, series 3, previously 5 mm  Right upper lobe 4 mm nodule, image 65, previously 3 mm  Left upper lobe 6 mm nodule, image 63, previously 5 mm  Left upper lobe 9 x 8 mm nodule, image 90, previously 5 mm, extending to the pleura  Right middle lobe 2 2 x 1 2 cm nodule, image 205 right lung base, previously 1 8 x 1 5 cm  Small satellite lesions appear improved  Lingular 1 4 x 0 9 cm image 191, previously 1 3 x 0 8 cm  This was larger and PET avid on the June PET study where it measured approximately 2 4 x 2 2 cm  Left lower lobe 1 2 x 0 6 cm nodule, image 115, new  Pleural-based nodular consolidation superior segment left lower lobe appears somewhat smaller although now cavitated on image 105  Multiple areas of bronchiectasis are seen  Some with adjacent tree-in-bud opacities  PLEURA:  Unremarkable  HEART/GREAT VESSELS: Heart is unremarkable for patient's age  No thoracic aortic aneurysm  MEDIASTINUM AND GABBIE: Mild mediastinal adenopathy has increased since the prior study  1 1 cm paratracheal node, image 51, previously 0 8 cm  Left paratracheal node, 7 mm, image 45, previously 6 mm  Some nodes are partially calcified  CHEST WALL AND LOWER NECK:  Unremarkable  ABDOMEN LIVER/BILIARY TREE:  Small hepatic cyst is seen  GALLBLADDER:  No calcified gallstones  No pericholecystic inflammatory change  SPLEEN:  Splenic granulomas are noted  PANCREAS:  Unremarkable  ADRENAL GLANDS:  Unremarkable  KIDNEYS/URETERS:  No hydronephrosis or urinary tract calculus    One or more sharply circumscribed subcentimeter renal hypodensities are present, too small to accurately characterize, and statistically most likely benign findings  According to recent literature (Radiology 2019) no further workup of these findings is recommended  STOMACH AND BOWEL:  Left colon diverticulosis  APPENDIX:  No findings to suggest appendicitis  ABDOMINOPELVIC CAVITY:  No ascites  No pneumoperitoneum  No lymphadenopathy  VESSELS:  Unremarkable for patient's age  PELVIS REPRODUCTIVE ORGANS:  The prostate is mildly enlarged  URINARY BLADDER:  Unremarkable  ABDOMINAL WALL/INGUINAL REGIONS:  Small periumbilical hernia of fat is identified  OSSEOUS STRUCTURES:  No acute fracture or destructive osseous lesion  Degenerative changes of the lumbar spine are identified  Impression: Waxing and waning nodularity in the lungs suggest the possibility of intermittent inflammatory change  2 lesions are new or have progressed 1 has cavitated as described above  This could be related to worsening inflammatory change although neoplastic change is still difficult to exclude given multiple nodules  There there are multiple areas of bronchiectasis as well as tree-in-bud opacities also present  A more suspicious lingular nodule noted on the prior PET study appears stable since the study of December  There is somewhat worse mediastinal adenopathy  The abdomen demonstrates no mass or or adenopathy  Short-term follow-up in 3 months time is advised given the interval change with possible pulmonary evaluation  The study was marked in EPIC for significant notification  Workstation performed: [id number]     Echo complete w/ contrast if indicated    Result Date: 4/24/2023  Narrative: •  Left Ventricle: Left ventricular cavity size is upper normal  Wall thickness is normal  The left ventricular ejection fraction is 45% by visual estimation  Systolic function is mildly reduced visually but difficult to assess in the setting of frequent ectopy  Wall motion cannot be accurately assessed in the setting of frequent ectopy  Unable to assess diastolic function due to frequent ectopy  •  Right Ventricle: Right ventricular cavity size is upper normal  Abnormal tricuspid annular plane systolic excursion (TAPSE) < 1 7 cm  Reduced RV function  •  Left Atrium: The atrium is dilated  •  Right Atrium: The atrium is dilated  •  Aortic Valve: The aortic valve is trileaflet  The leaflets exhibit normal mobility  There is trace regurgitation  The aortic valve has no significant stenosis  •  Mitral Valve: There is mild thickening  There is mild calcification  The leaflets exhibit normal mobility  There is annular calcification  There is mild regurgitation  There is no evidence of stenosis  •  Tricuspid Valve: Tricuspid valve structure is normal  There is mild regurgitation  There is no evidence of stenosis  The right ventricular systolic pressure is mildly elevated  The estimated right ventricular systolic pressure is 16 01 mmHg  •  Pulmonic Valve: The pulmonic valve was not well visualized  There is mild regurgitation  There is no evidence of stenosis  •  Aorta: The ascending aorta is normal in size  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  The ascending aorta is 2 6 cm  •  Prior TTE study available for comparison  Prior study date: 8/16/2021  Changes noted when compared to prior study  Changes include: RV function previously reported as normal and now appears reduced  Estimated RVSP is lower on the current study  EKG reviewed personally: EKG: Atrial fibrillation with PVC's, RBBB  Counseling / Coordination of Care  Total floor / unit time spent today 45 minutes  Greater than 50% of total time was spent with the patient and / or family counseling and / or coordination of care    A description of the counseling / coordination of care: Discussed case with Dr Miya Cano       Code Status: Level 3 - DNAR and DNI

## 2023-04-25 NOTE — PROGRESS NOTES
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  1  Acute process resolved  Seen on 4 L nasal cannula which is his baseline  2  Continue BiPAP nightly  3  Would recommend checking compliance data as outpatient  4  Pulmonary toilet:  Increase activity as tolerated, out of bed as tolerated, cough and deep breathing exercises encourage, incentive spirometry q 1 hour  2  Severe COPD with acute exacerbation  1  Received Solu-Medrol 40 mg IV once this morning  Start prednisone 40 mg p o  daily tomorrow and taper by 10 mg every 3 days until completion  2  Continue all nebulized regimen currently with DuoNeb 3 times daily, Pulmicort twice daily and Perforomist twice daily while inpatient  3  At discharge continue Advair, Spiriva,/albuterol nebs as needed  3  Acute on chronic CHF  1  Diuresis per primary/cardiology team  4  Abnormal CT chest  1  Suspect likely 2 processes including pulmonary metastasis and likely chronic infection/inflammation such as NTM/MAC  2  Hold on further evaluation at this time given waxing and waning nature  5  Spindle cell tumor with pulmonary metastasis  1  Per oncology    Patient stable from a pulmonary standpoint  We will sign off  Call with questions  Chief Complaint:    \"I am going home  \"    Subjective:    Patient was seen and examined today  No overnight events reported  Patient states that he is going home  He feels like he is back to his baseline  Denies worsening shortness of breath, cough, sputum production, mopped assist or wheeze  No chest pain or palpitations  No fevers or chills  Objective:    Vitals: Blood pressure 130/66, pulse (!) 54, temperature 98 6 °F (37 °C), resp  rate 18, height 5' 9\" (1 753 m), weight 73 7 kg (162 lb 7 7 oz), SpO2 93 %  4 LNC,Body mass index is 23 99 kg/m²        Intake/Output " Summary (Last 24 hours) at 4/25/2023 1431  Last data filed at 4/25/2023 0959  Gross per 24 hour   Intake 240 ml   Output 1640 ml   Net -1400 ml       Invasive Devices     None                 Physical Exam:     Physical Exam  Vitals and nursing note reviewed  Constitutional:       General: He is not in acute distress  Appearance: Normal appearance  HENT:      Head: Normocephalic and atraumatic  Nose: Nose normal       Mouth/Throat:      Mouth: Mucous membranes are moist       Pharynx: Oropharynx is clear  Eyes:      Extraocular Movements: Extraocular movements intact  Conjunctiva/sclera: Conjunctivae normal    Cardiovascular:      Rate and Rhythm: Normal rate and regular rhythm  Heart sounds: No murmur heard  Pulmonary:      Effort: Pulmonary effort is normal       Breath sounds: Wheezing (faint expiratory) present  Musculoskeletal:      Cervical back: No muscular tenderness  Right lower leg: Edema present  Left lower leg: Edema present  Skin:     General: Skin is warm and dry  Neurological:      General: No focal deficit present  Mental Status: He is alert  Mental status is at baseline  Psychiatric:         Mood and Affect: Mood normal          Behavior: Behavior normal          Labs: I have personally reviewed pertinent lab results  , ABG: No results found for: PHART, AAE2NHY, PO2ART, JHV8OGK, Z6SREAEQ, BEART, SOURCE, BNP: No results found for: BNP, CBC: No results found for: WBC, HGB, HCT, MCV, PLT, ADJUSTEDWBC, MCH, MCHC, RDW, MPV, NRBC, CMP:   Lab Results   Component Value Date    SODIUM 141 04/25/2023    K 4 2 04/25/2023    CL 94 (L) 04/25/2023    CO2 42 (H) 04/25/2023    BUN 37 (H) 04/25/2023    CREATININE 0 89 04/25/2023    CALCIUM 9 7 04/25/2023    EGFR 79 04/25/2023       Imaging and other studies: I have personally reviewed pertinent reports     and I have personally reviewed pertinent films in PACS

## 2023-04-25 NOTE — ASSESSMENT & PLAN NOTE
· Patient requiring 5 to 6 L of oxygen on admission and is currently down to his baseline oxygen requirement of 4 L via nasal cannula   · Likely secondary to acute COPD exacerbation with acute on chronic combined systolic and diastolic heart failure exacerbation    See above treatment

## 2023-04-25 NOTE — ASSESSMENT & PLAN NOTE
Atrial fibrillation noted on EKG at PCP office 01/19/2021  Holter monitor 1/28/2021 showed predominantly atrial fibrillation with an average HR of 102 beats per minute with frequent PVCs /aberrant beats representing 34 9% ectopic burden  Echocardiogram 2/25/2021 showed EF 45-50%  Patient denied any palpitations and was unaware of rapid heart rates  Magnesium 250 mg daily added and will be continued  Diltiazem was added (trying to avoid beta blockers due to COPD/asthma)  Persistent a fib with elevated ventricular rates and high PVC burden of 33% noted on most recent holter monitor 6/2021  Continue diltiazem 300mg daily  EP consultation with  Dr Nery Morales on 8/19/2021 with follow up 9/20/2021  Atrial fibrillation and PVC ablation were discussed  They opted given risks associated with prolonged anesthesia in the setting of significant lung disease  Continue Eliquis 5 mg twice daily for CVA prophylaxis  He is currently using BiPAP for sleep

## 2023-04-25 NOTE — ASSESSMENT & PLAN NOTE
· Last EF of 45% in August 2021 on torsemide 100 mg daily at home  · Diuresed well on Lasix 60 mg IV BID since 4/22 - back to baseline  · S/p diamox 4/23  · Discussed with Cardiology: patient may be discharged home on torsemide 100 mg daily PLUS metolazone 2 5 mg twice a week  · Continue outpatient follow-up with Cardiology Maximilian Bansal to set up an appointment)  · Daily weights  · I and O

## 2023-04-25 NOTE — ASSESSMENT & PLAN NOTE
Wt Readings from Last 3 Encounters:   04/25/23 73 7 kg (162 lb 7 7 oz)   03/06/23 78 kg (172 lb)   01/06/23 78 7 kg (173 lb 6 4 oz)     Echocardiogram 2/25/2021 showed an EF of 45-50%  Echocardiogram 4/24/23 with EF 45% and mildly reduced RV function  Unclear etiology of reduced EF but this could be tachycardia induced vs ETOH induced  He should have eventual stress test to rule out ischemia as the source of his HFrEF  Ideally should be on long acting beta blocker but has COPD/Asthma  No ACE-I/ARB for now as we need room to uptitrate AV efra blockers  Presented with acute on chronic heart failure  Currently responding very well to IV furosemide 60 mg BID  Plan torsemide 100 mg daily with metolazone 2 5 mg twice weekly at discharge  Needs BMP within 4 days of discharge

## 2023-04-25 NOTE — DISCHARGE INSTR - AVS FIRST PAGE
Please take your medications as prescribed  You are to follow-up closely with your Cardiologist on discharge  Latesha Disla will arrange for you to see her in clinic  Metolazone is another diuretic (water pill) that was added on to your torsemide  You are to take this medication only 2 days in a week  You are to have a repeat Basic metabolic panel (blood work) done on 4/28

## 2023-04-25 NOTE — DISCHARGE SUMMARY
[address]  Discharge- James Lopez 1940, 81 y o  male MRN: [id number]  Unit/Bed#: MS [facility] Encounter: [id number]  Primary Care Provider: Dhruv Waldrop MD   Date and time admitted to hospital: 4/21/2023 11:39 AM    Severe protein-calorie malnutrition ([address] )  Assessment & Plan  Malnutrition Findings:   Adult Malnutrition type: Chronic illness  Adult Degree of Malnutrition: Other severe protein calorie malnutrition  Malnutrition Characteristics: Inadequate energy, Weight loss                  360 Statement: Chronic severe malnutrition related to decreased appetite, COPD, constipation as evidenced by < 75% estimated energy intake > 1 mo, 7% weight loss x 1 5 mo (3/6/23 172lb, 4/24/23 160lb)  Treated with: Continue regular diet at this time, fluid restriction per MD  Will order half portions as pt requested, says he is unable to finish his meals and does not like to waste the food  Pt agreeable to trial ensure compact BID  Requested RD protocol to order supplements  Continue with daily weights  Discouraged use of salt shaker, will provide low Na diet ed as appropriate  Pt reports being unable to have a BM for 3 days at a time, would take laxative, have a BM within 2 days and go back to not having a BM for another 3 days  Relayed to RN  Bowel protocol per MD     BMI Findings: Body mass index is 23 99 kg/m²         BPH (benign prostatic hyperplasia)  Assessment & Plan  Continue Proscar    COPD exacerbation  Assessment & Plan  · Improved   · Solu-Medrol taper - discharge on prednisone taper  · Discussed with Pulmonology  · Continue inhalers    Acute on chronic combined systolic and diastolic CHF  Assessment & Plan  · Last EF of 45% in August 2021 on torsemide 100 mg daily at home  · Diuresed well on Lasix 60 mg IV BID since 4/22 - back to baseline  · S/p diamox 4/23  · Discussed with Cardiology: patient may be discharged home on torsemide 100 mg daily PLUS metolazone 2 5 mg "twice a week  · Continue outpatient follow-up with Cardiology Mirlande Sung to set up an appointment)  · Daily weights  · I and O     Atrial fibrillation   Assessment & Plan  Rate controlled on Cardizem  On Eliquis for anticoagulation    Essential hypertension  Assessment & Plan  Low-sodium diet  Continue Cardizem    * Acute on chronic respiratory failure with hypoxia   Assessment & Plan  · Patient requiring 5 to 6 L of oxygen on admission and is currently down to his baseline oxygen requirement of 4 L via nasal cannula   · Likely secondary to acute COPD exacerbation with acute on chronic combined systolic and diastolic heart failure exacerbation  See above treatment      Medical Problems     Resolved Problems  Date Reviewed: 4/24/2023   None       Discharging Physician / Practitioner: Velasquez Yip MD  PCP: Arnoldo Garsia MD  Admission Date:   Admission Orders (From admission, onward)     Ordered        04/21/23 [address] [place] [address]  Once                      Discharge Date: 04/25/23    Consultations During Hospital Stay:  · Pulmonology  · Cardiology    Procedures Performed:   · None    Significant Findings / Test Results:   · TTE:  \"Left Ventricle: Left ventricular cavity size is upper normal  Wall thickness is normal  The left ventricular ejection fraction is 45% by visual estimation  Systolic function is mildly reduced visually but difficult to assess in the setting of frequent ectopy  Wall motion cannot be accurately assessed in the setting of frequent ectopy  Unable to assess diastolic function due to frequent ectopy  •  Right Ventricle: Right ventricular cavity size is upper normal  Abnormal tricuspid annular plane systolic excursion (TAPSE) < 1 7 cm  Reduced RV function  •  Left Atrium: The atrium is dilated  •  Right Atrium: The atrium is dilated  •  Aortic Valve: The aortic valve is trileaflet  The leaflets exhibit normal mobility  There is trace regurgitation   The aortic " valve has no significant stenosis  •  Mitral Valve: There is mild thickening  There is mild calcification  The leaflets exhibit normal mobility  There is annular calcification  There is mild regurgitation  There is no evidence of stenosis  •  Tricuspid Valve: Tricuspid valve structure is normal  There is mild regurgitation  There is no evidence of stenosis  The right ventricular systolic pressure is mildly elevated  The estimated right ventricular systolic pressure is 86 04 mmHg  •  Pulmonic Valve: The pulmonic valve was not well visualized  There is mild regurgitation  There is no evidence of stenosis  •  Aorta: The ascending aorta is normal in size  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  The ascending aorta is 2 6 cm   •  Prior TTE study available for comparison  Prior study date: 8/16/2021  Changes noted when compared to prior study  Changes include: RV function previously reported as normal and now appears reduced  Estimated RVSP is lower on the current study  CXR:  IMPRESSION:     Mild pulmonary vascular congestion    Incidental Findings:   · None    Test Results Pending at Discharge (will require follow up):   · none     Outpatient Tests Requested:  · Repeat BMP in 4 days    Complications: None    Reason for Admission: Acute on chronic hypoxic operatory failure secondary to COPD exacerbation and acute on chronic congestive heart failure    Hospital Course:   Pilar Castro is a 81 y o  male patient who originally presented to the hospital on 4/21/2023 due to worsening shortness of breath  Patient was found to be hypoxic to the 80s requiring 5 to 6 L of oxygen which was above his baseline oxygen requirement of 4 L  Patient was found to be in COPD exacerbation as well as found to be in CHF exacerbation  Patient was started on inhalers and steroid regimen for COPD  Patient was also started on IV diuresis for CHF  Cardiology and pulmonology consulted on the case    Patient symptoms improved and patient "is now back to baseline oxygen requirement of 4 L  Patient will be discharged home on a steroid taper for COPD  Patient will also be discharged home on his torsemide 100 mg daily as well as metolazone 2 5 mg 2 days a week  Patient will have to follow-up with cardiology in the outpatient setting  Lit Bolanos to arrange for an appointment  Please see above list of diagnoses and related plan for additional information  Condition at Discharge: stable    Discharge Day Visit / Exam:   Subjective: Patient seen and examined at bedside  Denies any shortness of breath at this time  States that he is currently back to his baseline  Feels ready to go home and requesting to be discharged  Vitals: Blood Pressure: 130/66 (04/25/23 1137)  Pulse: (!) 54 (04/25/23 1137)  Temperature: 98 6 °F (37 °C) (04/25/23 1137)  Temp Source: Temporal (04/22/23 0300)  Respirations: 18 (04/25/23 1137)  Height: 5' 9\" (175 3 cm) (04/24/23 1044)  Weight - Scale: 73 7 kg (162 lb 7 7 oz) (04/25/23 0600)  SpO2: 93 % (04/25/23 1137)  Exam:   Physical Exam  Vitals and nursing note reviewed  Constitutional:       General: He is not in acute distress  Appearance: He is well-developed  HENT:      Head: Normocephalic and atraumatic  Eyes:      Conjunctiva/sclera: Conjunctivae normal    Cardiovascular:      Rate and Rhythm: Normal rate and regular rhythm  Heart sounds: No murmur heard  Pulmonary:      Effort: Pulmonary effort is normal  No respiratory distress  Breath sounds: Normal breath sounds  No wheezing or rales  Abdominal:      Palpations: Abdomen is soft  Tenderness: There is no abdominal tenderness  Musculoskeletal:         General: No swelling  Cervical back: Neck supple  Skin:     General: Skin is warm and dry  Capillary Refill: Capillary refill takes less than 2 seconds  Neurological:      Mental Status: He is alert     Psychiatric:         Mood and Affect: Mood normal       " Discussion with Family: Speak to family later  Discharge instructions/Information to patient and family:   See after visit summary for information provided to patient and family  Provisions for Follow-Up Care:  See after visit summary for information related to follow-up care and any pertinent home health orders  Disposition:   Home with [facility] Services (Reminder: Complete face to face encounter)    Planned Readmission: none     Discharge Statement:  I spent 40 minutes discharging the patient  This time was spent on the day of discharge  I had direct contact with the patient on the day of discharge  Greater than 50% of the total time was spent examining patient, answering all patient questions, arranging and discussing plan of care with patient as well as directly providing post-discharge instructions  Additional time then spent on discharge activities  Discharge Medications:  See after visit summary for reconciled discharge medications provided to patient and/or family        **Please Note: This note may have been constructed using a voice recognition system**

## 2023-04-25 NOTE — ASSESSMENT & PLAN NOTE
Malnutrition Findings:   Adult Malnutrition type: Chronic illness  Adult Degree of Malnutrition: Other severe protein calorie malnutrition  Malnutrition Characteristics: Inadequate energy, Weight loss                  360 Statement: Chronic severe malnutrition related to decreased appetite, COPD, constipation as evidenced by < 75% estimated energy intake > 1 mo, 7% weight loss x 1 5 mo (3/6/23 172lb, 4/24/23 160lb)  Treated with: Continue regular diet at this time, fluid restriction per MD  Will order half portions as pt requested, says he is unable to finish his meals and does not like to waste the food  Pt agreeable to trial ensure compact BID  Requested RD protocol to order supplements  Continue with daily weights  Discouraged use of salt shaker, will provide low Na diet ed as appropriate  Pt reports being unable to have a BM for 3 days at a time, would take laxative, have a BM within 2 days and go back to not having a BM for another 3 days  Relayed to RN  Bowel protocol per MD     BMI Findings: Body mass index is 23 99 kg/m²

## 2023-04-25 NOTE — WOUND OSTOMY CARE
Consult Note - Wound   Arian Jesus 81 y o  male MRN: [id number]  Unit/Bed#: -01 Encounter: [id number]        History and Present Illness:  81year old male admitted with acute respiratory failure with hypoxia,PMHSevere protein calorie malnutrition,COPD  Assessment Findings:   Alert and oriented, continent , min assist to reposition  Has Fluid Restriction Diet and Nutritional supplements Ensure bid  1)Bilateral buttocks sacrum and heels intact  2)type 3 skin tear left anterior tibia, wound bed 100% pink      No induration, fluctuance, odor, warmth/temperature differences, redness, or purulence noted to the above noted wounds and skin areas assessed  New dressings applied per orders listed below  Patient tolerated well- no s/s of non-verbal pain or discomfort observed during the encounter  Bedside nurse aware of plan of care  See flow sheets for more detailed assessment findings  Wound care will continue to follow  Skin care plans:  1-Hydraguard to bilateral sacrum, buttock and heels BID and PRN  2-Elevate heels to offload pressure  3-Ehob cushion in chair when out of bed  4-Moisturize skin daily with skin nourishing cream   5-Turn/reposition q2h or when medically stable for pressure re-distribution on skin  6-Cleanse left anterior tibia with normal saline, pat dry apply Dermagran gauze then Allevyn bordered foam change every other day and prn soil or dislodgement    Wounds:  Wound 04/21/23 Skin Tear Pretibial Left (Active)   Wound Image   04/25/23 0954   Wound Description Beefy red 04/25/23 0954   Hali-wound Assessment Intact;Dry 04/25/23 0954   Wound Length (cm) 2 5 cm 04/25/23 0954   Wound Width (cm) 2 8 cm 04/25/23 0954   Wound Surface Area (cm^2) 7 cm^2 04/25/23 0954   Drainage Amount None 04/25/23 0954   Non-staged Wound Description Partial thickness 04/25/23 0954   Treatments Cleansed;Site care 04/25/23 0954   Dressing Dermagran gauze; Foam, Silicon (eg   Allevyn, etc) 04/25/23 7332 Dressing Changed Changed 04/25/23 0954   Patient Tolerance Tolerated well 04/25/23 0954   Dressing Status Clean;Dry; Intact 04/25/23 0954     Call or tigertext with any questions  Wound Care will continue to follow    Nghia Ni BSN RN

## 2023-04-25 NOTE — DISCHARGE INSTR - OTHER ORDERS
Skin care plans:  1-Hydraguard to bilateral sacrum, buttock and heels BID and PRN  2-Elevate heels to offload pressure  3-Ehob cushion in chair when out of bed  4-Moisturize skin daily with skin nourishing cream   5-Turn/reposition q2h or when medically stable for pressure re-distribution on skin     6-Cleanse left anterior tibia with normal saline, pat dry apply Dermagran gauze then Allevyn bordered foam change every other day and prn soil or dislodgement

## 2023-04-25 NOTE — PLAN OF CARE
Problem: Potential for Falls  Goal: Patient will remain free of falls  Description: INTERVENTIONS:  - Educate patient/family on patient safety including physical limitations  - Instruct patient to call for assistance with activity   - Consult OT/PT to assist with strengthening/mobility   - Keep Call bell within reach  - Keep bed low and locked with side rails adjusted as appropriate  - Keep care items and personal belongings within reach  - Initiate and maintain comfort rounds  - Make Fall Risk Sign visible to staff  - Apply yellow socks and bracelet for high fall risk patients  - Consider moving patient to room near nurses station  Outcome: Progressing     Problem: Nutrition/Hydration-ADULT  Goal: Nutrient/Hydration intake appropriate for improving, restoring or maintaining nutritional needs  Description: Monitor and assess patient's nutrition/hydration status for malnutrition  Collaborate with interdisciplinary team and initiate plan and interventions as ordered  Monitor patient's weight and dietary intake as ordered or per policy  Utilize nutrition screening tool and intervene as necessary  Determine patient's food preferences and provide high-protein, high-caloric foods as appropriate       INTERVENTIONS:  - Monitor oral intake, urinary output, labs, and treatment plans  - Assess nutrition and hydration status and recommend course of action  - Evaluate amount of meals eaten  - Assist patient with eating if necessary   - Allow adequate time for meals  - Recommend/ encourage appropriate diets, oral nutritional supplements, and vitamin/mineral supplements  - Order, calculate, and assess calorie counts as needed  - Recommend, monitor, and adjust tube feedings and TPN/PPN based on assessed needs  - Assess need for intravenous fluids  - Provide specific nutrition/hydration education as appropriate  - Include patient/family/caregiver in decisions related to nutrition  Outcome: Progressing     Problem: SAFETY ADULT  Goal: Patient will remain free of falls  Description: INTERVENTIONS:  - Educate patient/family on patient safety including physical limitations  - Instruct patient to call for assistance with activity   - Consult OT/PT to assist with strengthening/mobility   - Keep Call bell within reach  - Keep bed low and locked with side rails adjusted as appropriate  - Keep care items and personal belongings within reach  - Initiate and maintain comfort rounds  - Make Fall Risk Sign visible to staff  - Apply yellow socks and bracelet for high fall risk patients  - Consider moving patient to room near nurses station  Outcome: Progressing  Goal: Maintain or return to baseline ADL function  Description: INTERVENTIONS:  -  Assess patient's ability to carry out ADLs; assess patient's baseline for ADL function and identify physical deficits which impact ability to perform ADLs (bathing, care of mouth/teeth, toileting, grooming, dressing, etc )  - Assess/evaluate cause of self-care deficits   - Assess range of motion  - Assess patient's mobility; develop plan if impaired  - Assess patient's need for assistive devices and provide as appropriate  - Encourage maximum independence but intervene and supervise when necessary  - Involve family in performance of ADLs  - Assess for home care needs following discharge   - Consider OT consult to assist with ADL evaluation and planning for discharge  - Provide patient education as appropriate  Outcome: Progressing  Goal: Maintains/Returns to pre admission functional level  Description: INTERVENTIONS:  - Perform BMAT or MOVE assessment daily    - Set and communicate daily mobility goal to care team and patient/family/caregiver  - Collaborate with rehabilitation services on mobility goals if consulted  - Perform Range of Motion 3 times a day  - Reposition patient every 3 hours    - Dangle patient 3 times a day  - Stand patient 3 times a day  - Ambulate patient 3 times a day  - Out of bed to chair 3 times a day   - Out of bed for meals 3 times a day  - Out of bed for toileting  - Record patient progress and toleration of activity level   Outcome: Progressing     Problem: DISCHARGE PLANNING  Goal: Discharge to home or other facility with appropriate resources  Description: INTERVENTIONS:  - Identify barriers to discharge w/patient and caregiver  - Arrange for needed discharge resources and transportation as appropriate  - Identify discharge learning needs (meds, wound care, etc )  - Arrange for interpretive services to assist at discharge as needed  - Refer to Case Management Department for coordinating discharge planning if the patient needs post-hospital services based on physician/advanced practitioner order or complex needs related to functional status, cognitive ability, or social support system  Outcome: Progressing     Problem: Knowledge Deficit  Goal: Patient/family/caregiver demonstrates understanding of disease process, treatment plan, medications, and discharge instructions  Description: Complete learning assessment and assess knowledge base    Interventions:  - Provide teaching at level of understanding  - Provide teaching via preferred learning methods  Outcome: Progressing     Problem: CARDIOVASCULAR - ADULT  Goal: Maintains optimal cardiac output and hemodynamic stability  Description: INTERVENTIONS:  - Monitor I/O, vital signs and rhythm  - Monitor for S/S and trends of decreased cardiac output  - Administer and titrate ordered vasoactive medications to optimize hemodynamic stability  - Assess quality of pulses, skin color and temperature  - Assess for signs of decreased coronary artery perfusion  - Instruct patient to report change in severity of symptoms  Outcome: Progressing  Goal: Absence of cardiac dysrhythmias or at baseline rhythm  Description: INTERVENTIONS:  - Continuous cardiac monitoring, vital signs, obtain 12 lead EKG if ordered  - Administer antiarrhythmic and heart rate control medications as ordered  - Monitor electrolytes and administer replacement therapy as ordered  Outcome: Progressing     Problem: RESPIRATORY - ADULT  Goal: Achieves optimal ventilation and oxygenation  Description: INTERVENTIONS:  - Assess for changes in respiratory status  - Assess for changes in mentation and behavior  - Position to facilitate oxygenation and minimize respiratory effort  - Oxygen administered by appropriate delivery if ordered  - Initiate smoking cessation education as indicated  - Encourage broncho-pulmonary hygiene including cough, deep breathe, Incentive Spirometry  - Assess the need for suctioning and aspirate as needed  - Assess and instruct to report SOB or any respiratory difficulty  - Respiratory Therapy support as indicated  Outcome: Progressing     Problem: METABOLIC, FLUID AND ELECTROLYTES - ADULT  Goal: Electrolytes maintained within normal limits  Description: INTERVENTIONS:  - Monitor labs and assess patient for signs and symptoms of electrolyte imbalances  - Administer electrolyte replacement as ordered  - Monitor response to electrolyte replacements, including repeat lab results as appropriate  - Instruct patient on fluid and nutrition as appropriate  Outcome: Progressing  Goal: Fluid balance maintained  Description: INTERVENTIONS:  - Monitor labs   - Monitor I/O and WT  - Instruct patient on fluid and nutrition as appropriate  - Assess for signs & symptoms of volume excess or deficit  Outcome: Progressing     Problem: SKIN/TISSUE INTEGRITY - ADULT  Goal: Incision(s), wounds(s) or drain site(s) healing without S/S of infection  Description: INTERVENTIONS  - Assess and document dressing, incision, wound bed, drain sites and surrounding tissue  - Provide patient and family education  - Perform skin care/dressing changes QOD  Outcome: Progressing

## 2023-04-26 LAB
BACTERIA BLD CULT: NORMAL
BACTERIA BLD CULT: NORMAL

## 2023-04-28 ENCOUNTER — APPOINTMENT (OUTPATIENT)
Dept: LAB | Facility: HOSPITAL | Age: 83
End: 2023-04-28

## 2023-04-28 DIAGNOSIS — I50.43 ACUTE ON CHRONIC COMBINED SYSTOLIC (CONGESTIVE) AND DIASTOLIC (CONGESTIVE) HEART FAILURE (HCC): ICD-10-CM

## 2023-04-28 LAB
ANION GAP SERPL CALCULATED.3IONS-SCNC: 10 MMOL/L (ref 4–13)
BUN SERPL-MCNC: 34 MG/DL (ref 5–25)
CALCIUM SERPL-MCNC: 10 MG/DL (ref 8.4–10.2)
CHLORIDE SERPL-SCNC: 87 MMOL/L (ref 96–108)
CO2 SERPL-SCNC: 43 MMOL/L (ref 21–32)
CREAT SERPL-MCNC: 1.09 MG/DL (ref 0.6–1.3)
GFR SERPL CREATININE-BSD FRML MDRD: 62 ML/MIN/1.73SQ M
GLUCOSE P FAST SERPL-MCNC: 123 MG/DL (ref 65–99)
POTASSIUM SERPL-SCNC: 4.2 MMOL/L (ref 3.5–5.3)
SODIUM SERPL-SCNC: 140 MMOL/L (ref 135–147)

## 2023-05-02 ENCOUNTER — APPOINTMENT (OUTPATIENT)
Dept: LAB | Facility: HOSPITAL | Age: 83
End: 2023-05-02

## 2023-05-02 DIAGNOSIS — I48.20 CHRONIC ATRIAL FIBRILLATION (HCC): ICD-10-CM

## 2023-05-02 DIAGNOSIS — E83.42 HYPOMAGNESEMIA: ICD-10-CM

## 2023-05-02 DIAGNOSIS — I50.43 ACUTE ON CHRONIC COMBINED SYSTOLIC AND DIASTOLIC HEART FAILURE (HCC): ICD-10-CM

## 2023-05-02 LAB
ANION GAP SERPL CALCULATED.3IONS-SCNC: 9 MMOL/L (ref 4–13)
BUN SERPL-MCNC: 39 MG/DL (ref 5–25)
CALCIUM SERPL-MCNC: 10 MG/DL (ref 8.4–10.2)
CHLORIDE SERPL-SCNC: 86 MMOL/L (ref 96–108)
CO2 SERPL-SCNC: 43 MMOL/L (ref 21–32)
CREAT SERPL-MCNC: 1.27 MG/DL (ref 0.6–1.3)
GFR SERPL CREATININE-BSD FRML MDRD: 52 ML/MIN/1.73SQ M
GLUCOSE SERPL-MCNC: 149 MG/DL (ref 65–140)
MAGNESIUM SERPL-MCNC: 2 MG/DL (ref 1.9–2.7)
POTASSIUM SERPL-SCNC: 4.1 MMOL/L (ref 3.5–5.3)
SODIUM SERPL-SCNC: 138 MMOL/L (ref 135–147)

## 2023-05-03 RX ORDER — ASCORBIC ACID 500 MG
TABLET ORAL
COMMUNITY
Start: 2023-02-06

## 2023-05-04 ENCOUNTER — OFFICE VISIT (OUTPATIENT)
Dept: CARDIOLOGY CLINIC | Facility: CLINIC | Age: 83
End: 2023-05-04
Payer: MEDICARE

## 2023-05-04 VITALS
TEMPERATURE: 96.8 F | HEIGHT: 69 IN | RESPIRATION RATE: 12 BRPM | OXYGEN SATURATION: 93 % | WEIGHT: 158 LBS | BODY MASS INDEX: 23.4 KG/M2 | SYSTOLIC BLOOD PRESSURE: 112 MMHG | DIASTOLIC BLOOD PRESSURE: 68 MMHG

## 2023-05-04 DIAGNOSIS — Z78.9 MODERATE ALCOHOL CONSUMPTION: ICD-10-CM

## 2023-05-04 DIAGNOSIS — I10 ESSENTIAL HYPERTENSION: ICD-10-CM

## 2023-05-04 DIAGNOSIS — E43 SEVERE PROTEIN-CALORIE MALNUTRITION (HCC): ICD-10-CM

## 2023-05-04 DIAGNOSIS — I48.0 PAROXYSMAL ATRIAL FIBRILLATION (HCC): ICD-10-CM

## 2023-05-04 DIAGNOSIS — I45.10 RBBB: ICD-10-CM

## 2023-05-04 DIAGNOSIS — I50.43 ACUTE ON CHRONIC COMBINED SYSTOLIC (CONGESTIVE) AND DIASTOLIC (CONGESTIVE) HEART FAILURE (HCC): Primary | ICD-10-CM

## 2023-05-04 DIAGNOSIS — J96.21 ACUTE ON CHRONIC RESPIRATORY FAILURE WITH HYPOXIA (HCC): ICD-10-CM

## 2023-05-04 DIAGNOSIS — R60.0 LOCALIZED EDEMA: ICD-10-CM

## 2023-05-04 DIAGNOSIS — I49.3 FREQUENT PVCS: ICD-10-CM

## 2023-05-04 DIAGNOSIS — N18.31 STAGE 3A CHRONIC KIDNEY DISEASE (HCC): ICD-10-CM

## 2023-05-04 PROCEDURE — 99214 OFFICE O/P EST MOD 30 MIN: CPT | Performed by: NURSE PRACTITIONER

## 2023-05-04 NOTE — PATIENT INSTRUCTIONS
Continue the metolazone 2.5 mg twice weekly for now. Blood work next week to monitor your kidney function and electrolytes. Report dizziness/lightheadedness, weakness, excessively dry mouth. Continue to limit sodium to 2000 mg daily. Fluid intake at 1.5-2 L daily. Contact us with any concerns.

## 2023-05-09 ENCOUNTER — OFFICE VISIT (OUTPATIENT)
Dept: PULMONOLOGY | Facility: CLINIC | Age: 83
End: 2023-05-09

## 2023-05-09 VITALS
TEMPERATURE: 97.7 F | BODY MASS INDEX: 22.99 KG/M2 | WEIGHT: 155.2 LBS | HEIGHT: 69 IN | OXYGEN SATURATION: 92 % | SYSTOLIC BLOOD PRESSURE: 116 MMHG | DIASTOLIC BLOOD PRESSURE: 62 MMHG | HEART RATE: 58 BPM

## 2023-05-09 DIAGNOSIS — J96.11 CHRONIC RESPIRATORY FAILURE WITH HYPOXIA (HCC): ICD-10-CM

## 2023-05-09 DIAGNOSIS — C34.92 MALIGNANT NEOPLASM OF UNSPECIFIED PART OF LEFT BRONCHUS OR LUNG (HCC): ICD-10-CM

## 2023-05-09 DIAGNOSIS — J44.1 CHRONIC OBSTRUCTIVE PULMONARY DISEASE WITH ACUTE EXACERBATION (HCC): Primary | ICD-10-CM

## 2023-05-09 NOTE — PROGRESS NOTES
Pulmonary Follow Up Note   Peggy Morrison 81 y o  male MRN: [id number]  5/9/2023    Assessment:  COPD  · Severe, Gold stage IIId last FEV1 of 34% in 2021  · Recent exacerbation, seems more of CHF than COPD  · On triple inhaler therapy  · Mild wheezing on exam  · No frequent use of PRN    Plan:  · Continue Advair/Spiriva, reinforced the proper inhaler use technique  · Up-to-date on immunization  · Offered pulmonary rehab, however not interested given the long distance drive    Chronic hypoxic respiratory failure with hypercapnia  MILADIS  • Multifactorial, congestive heart failure, advanced COPD, pulmonary hypertension  • Noted hypoxemia at rest today, SPO2 dropped to 87%  • Needs 1-2 LPM at rest, 3-4 with exertion  • [facility]  • Reports well compliance with the BiPAP therapy  • Diuretics as per cardiology    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  · On last surveillance, seems stable/or slightly showing  · Likely metastasis from the scalp lesion  · Follows with medical oncology      Return in about 6 months (around 11/9/2023)  History of Present Illness     Follow up for:  HFU  COPD/pulmonary nodules/chronic hypoxic respiratory failure      Background:  81 y  o  male with a h/o COPD, HTN, AFib, systolic/diastolic heart failure, moderate alcohol use, former tobacco abuse about 40 pack year quit in 1998      Hospitalized in 08/2021 for acute on chronic hypoxic/hypercapnic respiratory failure, treated for CHF and COPD exacerbation   Noted hypercapnia on ABG before discharge, started on BiPAP since then      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, underwent excision by surgery   A PET-CT showed postsurgical changes at the scalp without signs of metastases   Reported RUL mild avidity suspecting inflammatory etiology as well as around the mediastinal lymph node   Was previously found to have 5 mm MIKKI nodule, recommended follow-up in 12 months       Further work-up to the pulmonary nodule noted to be 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, seen by medical oncology and no treatment started, only on surveillance follow-up  2021 visit-adjusted inhaler therapy, continued Spiriva/Advair discontinued budesonide nebs   PFT ordered   6 minute walk test required 2 LPM with exertion      2022 visit-repeat CT chest showing pulmonary lesion/biopsies 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 new plasma  Ordered split/titration CPAP study  Thoracic surgery evaluated, not a candidate for lobectomy, possible candidate for wedge resection, pending further evaluation by Medical Oncology    2022-continued Advair/Spiriva     2023-hospitalized for acute hypoxic respiratory failure treated for CHF exacerbation and COPD    Interval History  Since discharge, feeling close to baseline  Able to be active/independent in ADLs  Not able to do much at home at baseline, watching fluid/salt intake  No significant cough, or wheezing  Completed steroid taper  Using supplemental oxygen 2 at rest/up to 4 with exertion  [facility]        Review of Systems  As per hpi, all other systems reviewed and were negative    Studies:  Imaging and other studies: I have personally reviewed pertinent films in PACS     CT PE 2021-moderate centrilobular/paraseptal emphysematous changes   5 millimeter left apical pulmonary nodule   Minimal bibasilar atelectasis   Enlarged pulmonary artery at 4 3   Enlarged ascending aorta at 4 centimeter   Calcified hilar/mediastinal lymphadenopathy     PET[facility]CT at [facility] 10/15/2021-diffuse areas of multifocal pulmonary uptake suspect related to areas of parenchymal scarring   Focal area uptake at RUL suspect inflammatory etiology   Mediastinal lymph nodes measuring 1 5 centimeter   Mild accentuated uptake at the left sided prevascular lymph node lateral to the aortic arch      Pulmonary function testin minute walk "test 11/02/2021-baseline SpO2 91% on room air, heart rate 50   SpO2 dropped to 85% after 1 minute of exertion, required 2 LPM for SpO2 at 92%, able to walk 200 m in 6 minutes with 2 LPM, maximal heart rate at 60     PFT 11/10/2021-ratio 41 percent, FEV1 0 94 liters/34 percent, FVC 2 3 liters/61 percent, TLC 99 percent,  percent, DLCO 30 percent         TTE 08/16/2021-EF 45 percent   Difficult access in the setting of frequent ectopies   RV was upper limit normal, normal systolic function   Mild to moderately dilated LA   Mildly dilated RA   Estimated PA SP 40 millimeter [place]     Overnight pulse oximetry 04/28/2022-total study time 5 hours 31 minutes   Time spent below 89 percent SpO2 4 hours and 42 minutes this was associated with increased heart rate    Past medical, surgical, social and family histories reviewed  Medications/Allergies: Reviewed      Vitals: Blood pressure 116/62, pulse 58, temperature 97 7 °F (36 5 °C), height 5' 9\" (1 753 m), weight 70 4 kg (155 lb 3 2 oz), SpO2 92 %  Body mass index is 22 92 kg/m²  Oxygen Therapy  SpO2: 92 % (O2 4L)      Physical Exam  Body mass index is 22 92 kg/m²     Gen: not in acute distress, thin  Neck/Eyes: supple, PERRL  Ear: normal appearance, no significant hearing impairment  Nose:  normal nasal mucosa, no drainage  Mouth:  unremarkable/normal appearance of lips, teeth and gums  Oropharynx: mucosa is moist, no focal lesions or erythema  Salivary glands: soft nontender  Chest: normal respiratory efforts, mild expiratory wheeze peripherally, otherwise diminished/clear sounds  CV: no murmurs appreciated, no edema  Abdomen: soft, non tender  Extremities:  No observed deformity   Skin: unremarkable  Neuro: AAO X3, no focal motor deficit        Labs:  Lab Results   Component Value Date    WBC 11 79 (H) 04/23/2023    HGB 12 2 04/23/2023    HCT 39 5 04/23/2023     (H) 04/23/2023     04/23/2023     Lab Results   Component Value Date    CALCIUM 10 0 " "05/02/2023    K 4 1 05/02/2023    CO2 43 (H) 05/02/2023    CL 86 (L) 05/02/2023    BUN 39 (H) 05/02/2023    CREATININE 1 27 05/02/2023     No results found for: IGE  Lab Results   Component Value Date    ALT 13 04/21/2023    AST 20 04/21/2023    ALKPHOS 99 04/21/2023           Portions of the record may have been created with voice recognition software  Occasional wrong word or \"sound a like\" substitutions may have occurred due to the inherent limitations of voice recognition software  Read the chart carefully and recognize, using context, where substitutions have occurred    JOSELITO Sung    [facility]  "

## 2023-05-12 ENCOUNTER — TELEPHONE (OUTPATIENT)
Dept: NON INVASIVE DIAGNOSTICS | Facility: HOSPITAL | Age: 83
End: 2023-05-12

## 2023-05-12 ENCOUNTER — APPOINTMENT (OUTPATIENT)
Dept: LAB | Facility: HOSPITAL | Age: 83
End: 2023-05-12

## 2023-05-12 DIAGNOSIS — I50.43 ACUTE ON CHRONIC COMBINED SYSTOLIC (CONGESTIVE) AND DIASTOLIC (CONGESTIVE) HEART FAILURE (HCC): Primary | ICD-10-CM

## 2023-05-12 DIAGNOSIS — I50.43 ACUTE ON CHRONIC COMBINED SYSTOLIC (CONGESTIVE) AND DIASTOLIC (CONGESTIVE) HEART FAILURE (HCC): ICD-10-CM

## 2023-05-12 LAB
BUN SERPL-MCNC: 35 MG/DL (ref 5–25)
CALCIUM SERPL-MCNC: 9.6 MG/DL (ref 8.4–10.2)
CHLORIDE SERPL-SCNC: 85 MMOL/L (ref 96–108)
CO2 SERPL-SCNC: >45 MMOL/L (ref 21–32)
CREAT SERPL-MCNC: 1.27 MG/DL (ref 0.6–1.3)
GFR SERPL CREATININE-BSD FRML MDRD: 52 ML/MIN/1.73SQ M
GLUCOSE P FAST SERPL-MCNC: 164 MG/DL (ref 65–99)
POTASSIUM SERPL-SCNC: 2.8 MMOL/L (ref 3.5–5.3)
SODIUM SERPL-SCNC: 139 MMOL/L (ref 135–147)

## 2023-05-12 RX ORDER — POTASSIUM CHLORIDE 20 MEQ/1
20 TABLET, EXTENDED RELEASE ORAL 2 TIMES DAILY
Qty: 60 TABLET | Refills: 0 | Status: SHIPPED | OUTPATIENT
Start: 2023-05-12

## 2023-05-12 RX ORDER — METOLAZONE 2.5 MG/1
2.5 TABLET ORAL WEEKLY
Qty: 24 TABLET | Refills: 0
Start: 2023-05-12

## 2023-05-12 NOTE — TELEPHONE ENCOUNTER
I called patient regarding BMP  Potassium is low  CO2 is high  I spoke with his spouse  She states his swelling is gone, weight down to 151 pounds  He is now getting dizzy/lightheaded at times  Will reduce metolazone to once weekly - move to next Thursday  Add potassium 20 mEq twice daily  BMP Wednesday  Based on results I will let them know if ok to take the metolazone next Thursday  Ashwin Perdomo verbalized understanding and said she would go to [facility] to  the potassium today

## 2023-05-16 ENCOUNTER — APPOINTMENT (OUTPATIENT)
Dept: LAB | Facility: HOSPITAL | Age: 83
End: 2023-05-16

## 2023-05-16 DIAGNOSIS — I50.43 ACUTE ON CHRONIC COMBINED SYSTOLIC (CONGESTIVE) AND DIASTOLIC (CONGESTIVE) HEART FAILURE (HCC): ICD-10-CM

## 2023-05-16 LAB
ANION GAP SERPL CALCULATED.3IONS-SCNC: 8 MMOL/L (ref 4–13)
BUN SERPL-MCNC: 34 MG/DL (ref 5–25)
CALCIUM SERPL-MCNC: 9.4 MG/DL (ref 8.4–10.2)
CHLORIDE SERPL-SCNC: 88 MMOL/L (ref 96–108)
CO2 SERPL-SCNC: 43 MMOL/L (ref 21–32)
CREAT SERPL-MCNC: 1.2 MG/DL (ref 0.6–1.3)
GFR SERPL CREATININE-BSD FRML MDRD: 55 ML/MIN/1.73SQ M
GLUCOSE P FAST SERPL-MCNC: 98 MG/DL (ref 65–99)
POTASSIUM SERPL-SCNC: 3.3 MMOL/L (ref 3.5–5.3)
SODIUM SERPL-SCNC: 139 MMOL/L (ref 135–147)

## 2023-05-16 NOTE — TELEPHONE ENCOUNTER
Spoke with Constance  Repeat potassium remains mildly low  Stay on potassium twice daily  No metolazone this week  CO2 is better  Will recheck BMP in 1 week and will call her to let her know about giving metolazone next week

## 2023-05-23 ENCOUNTER — APPOINTMENT (OUTPATIENT)
Dept: LAB | Facility: HOSPITAL | Age: 83
End: 2023-05-23

## 2023-05-23 DIAGNOSIS — I50.43 ACUTE ON CHRONIC COMBINED SYSTOLIC (CONGESTIVE) AND DIASTOLIC (CONGESTIVE) HEART FAILURE (HCC): ICD-10-CM

## 2023-05-23 LAB
ANION GAP SERPL CALCULATED.3IONS-SCNC: 6 MMOL/L (ref 4–13)
BUN SERPL-MCNC: 19 MG/DL (ref 5–25)
CALCIUM SERPL-MCNC: 9.5 MG/DL (ref 8.4–10.2)
CHLORIDE SERPL-SCNC: 96 MMOL/L (ref 96–108)
CO2 SERPL-SCNC: 40 MMOL/L (ref 21–32)
CREAT SERPL-MCNC: 1.01 MG/DL (ref 0.6–1.3)
GFR SERPL CREATININE-BSD FRML MDRD: 68 ML/MIN/1.73SQ M
GLUCOSE SERPL-MCNC: 127 MG/DL (ref 65–140)
POTASSIUM SERPL-SCNC: 4.3 MMOL/L (ref 3.5–5.3)
SODIUM SERPL-SCNC: 142 MMOL/L (ref 135–147)

## 2023-06-02 ENCOUNTER — TELEPHONE (OUTPATIENT)
Dept: CARDIOLOGY CLINIC | Facility: CLINIC | Age: 83
End: 2023-06-02

## 2023-06-02 ENCOUNTER — APPOINTMENT (OUTPATIENT)
Dept: LAB | Facility: HOSPITAL | Age: 83
End: 2023-06-02
Payer: MEDICARE

## 2023-06-02 DIAGNOSIS — I50.43 ACUTE ON CHRONIC COMBINED SYSTOLIC (CONGESTIVE) AND DIASTOLIC (CONGESTIVE) HEART FAILURE (HCC): ICD-10-CM

## 2023-06-02 LAB
ANION GAP SERPL CALCULATED.3IONS-SCNC: 8 MMOL/L (ref 4–13)
BUN SERPL-MCNC: 25 MG/DL (ref 5–25)
CALCIUM SERPL-MCNC: 9.8 MG/DL (ref 8.4–10.2)
CHLORIDE SERPL-SCNC: 89 MMOL/L (ref 96–108)
CO2 SERPL-SCNC: 43 MMOL/L (ref 21–32)
CREAT SERPL-MCNC: 1.25 MG/DL (ref 0.6–1.3)
GFR SERPL CREATININE-BSD FRML MDRD: 53 ML/MIN/1.73SQ M
GLUCOSE P FAST SERPL-MCNC: 133 MG/DL (ref 65–99)
POTASSIUM SERPL-SCNC: 3.5 MMOL/L (ref 3.5–5.3)
SODIUM SERPL-SCNC: 140 MMOL/L (ref 135–147)

## 2023-06-02 PROCEDURE — 36415 COLL VENOUS BLD VENIPUNCTURE: CPT

## 2023-06-02 PROCEDURE — 80048 BASIC METABOLIC PNL TOTAL CA: CPT

## 2023-06-02 NOTE — TELEPHONE ENCOUNTER
----- Message from [facility], DO sent at 6/2/2023  2:22 PM EDT -----  Please call patient/wife and let them know that is kidney function is stable but up a bit from prior test  I am ok with continuing the metolazone one day a week but would recommend that he take an extra dose of potassium on the days he is taking metolazone (can be with am dose or pm dose)  This would be 40 mg for one dose that day and 20 mg for the other dose for that day  Again the extra dose would only be on days that he takes the metolazone  They will need to call when his script for potassium needs to be filled as he will run out a few days early and the script will need to be adjusted  Thank you

## 2023-06-12 ENCOUNTER — TELEPHONE (OUTPATIENT)
Dept: CARDIOLOGY CLINIC | Facility: CLINIC | Age: 83
End: 2023-06-12

## 2023-06-12 DIAGNOSIS — I50.43 ACUTE ON CHRONIC COMBINED SYSTOLIC (CONGESTIVE) AND DIASTOLIC (CONGESTIVE) HEART FAILURE (HCC): ICD-10-CM

## 2023-06-12 RX ORDER — POTASSIUM CHLORIDE 20 MEQ/1
20 TABLET, EXTENDED RELEASE ORAL 2 TIMES DAILY
Qty: 192 TABLET | Refills: 3 | Status: SHIPPED | OUTPATIENT
Start: 2023-06-12

## 2023-06-12 NOTE — TELEPHONE ENCOUNTER
Pt states that he takes potassium daily, but an extra on Thursdays  States that he just needs it sent to the [place] if youd still like him to take it

## 2023-06-12 NOTE — TELEPHONE ENCOUNTER
Pt lm stating that he would like to speak to someone about his Potassium pills  If the dr wants him to continue them he needs them called into the [place]  But pt would like to speak to you before you call it in

## 2023-06-12 NOTE — TELEPHONE ENCOUNTER
Please let him know that I ordered the potassium with the instructions to include the extra pill on the day he takes his metolazone to the [facility]  Please let us know if any issues getting the prescription  Thank you!

## 2023-07-28 ENCOUNTER — APPOINTMENT (OUTPATIENT)
Dept: LAB | Facility: HOSPITAL | Age: 83
End: 2023-07-28
Payer: MEDICARE

## 2023-07-28 DIAGNOSIS — C80.1 MALIGNANT SPINDLE CELL NEOPLASM (HCC): ICD-10-CM

## 2023-07-28 DIAGNOSIS — R97.20 ABNORMAL PSA: ICD-10-CM

## 2023-07-28 LAB
ALBUMIN SERPL BCP-MCNC: 4.1 G/DL (ref 3.5–5)
ALP SERPL-CCNC: 94 U/L (ref 34–104)
ALT SERPL W P-5'-P-CCNC: 10 U/L (ref 7–52)
ANION GAP SERPL CALCULATED.3IONS-SCNC: 8 MMOL/L
AST SERPL W P-5'-P-CCNC: 17 U/L (ref 13–39)
BASOPHILS # BLD AUTO: 0.06 THOUSANDS/ÂΜL (ref 0–0.1)
BASOPHILS NFR BLD AUTO: 1 % (ref 0–1)
BILIRUB SERPL-MCNC: 0.45 MG/DL (ref 0.2–1)
BUN SERPL-MCNC: 22 MG/DL (ref 5–25)
CALCIUM SERPL-MCNC: 9.6 MG/DL (ref 8.4–10.2)
CHLORIDE SERPL-SCNC: 89 MMOL/L (ref 96–108)
CO2 SERPL-SCNC: 43 MMOL/L (ref 21–32)
CREAT SERPL-MCNC: 1.18 MG/DL (ref 0.6–1.3)
EOSINOPHIL # BLD AUTO: 0.16 THOUSAND/ÂΜL (ref 0–0.61)
EOSINOPHIL NFR BLD AUTO: 2 % (ref 0–6)
ERYTHROCYTE [DISTWIDTH] IN BLOOD BY AUTOMATED COUNT: 13.4 % (ref 11.6–15.1)
GFR SERPL CREATININE-BSD FRML MDRD: 57 ML/MIN/1.73SQ M
GLUCOSE P FAST SERPL-MCNC: 139 MG/DL (ref 65–99)
HCT VFR BLD AUTO: 45.3 % (ref 36.5–49.3)
HGB BLD-MCNC: 14.4 G/DL (ref 12–17)
IMM GRANULOCYTES # BLD AUTO: 0.03 THOUSAND/UL (ref 0–0.2)
IMM GRANULOCYTES NFR BLD AUTO: 0 % (ref 0–2)
LYMPHOCYTES # BLD AUTO: 1.77 THOUSANDS/ÂΜL (ref 0.6–4.47)
LYMPHOCYTES NFR BLD AUTO: 22 % (ref 14–44)
MCH RBC QN AUTO: 31.5 PG (ref 26.8–34.3)
MCHC RBC AUTO-ENTMCNC: 31.8 G/DL (ref 31.4–37.4)
MCV RBC AUTO: 99 FL (ref 82–98)
MONOCYTES # BLD AUTO: 0.8 THOUSAND/ÂΜL (ref 0.17–1.22)
MONOCYTES NFR BLD AUTO: 10 % (ref 4–12)
NEUTROPHILS # BLD AUTO: 5.15 THOUSANDS/ÂΜL (ref 1.85–7.62)
NEUTS SEG NFR BLD AUTO: 65 % (ref 43–75)
NRBC BLD AUTO-RTO: 0 /100 WBCS
PLATELET # BLD AUTO: 254 THOUSANDS/UL (ref 149–390)
PMV BLD AUTO: 9.8 FL (ref 8.9–12.7)
POTASSIUM SERPL-SCNC: 3.4 MMOL/L (ref 3.5–5.3)
PROT SERPL-MCNC: 7.4 G/DL (ref 6.4–8.4)
PSA SERPL-MCNC: 4.57 NG/ML (ref 0–4)
RBC # BLD AUTO: 4.57 MILLION/UL (ref 3.88–5.62)
SODIUM SERPL-SCNC: 140 MMOL/L (ref 135–147)
WBC # BLD AUTO: 7.97 THOUSAND/UL (ref 4.31–10.16)

## 2023-07-28 PROCEDURE — 85025 COMPLETE CBC W/AUTO DIFF WBC: CPT

## 2023-07-28 PROCEDURE — 80053 COMPREHEN METABOLIC PANEL: CPT

## 2023-07-28 PROCEDURE — 36415 COLL VENOUS BLD VENIPUNCTURE: CPT

## 2023-07-28 PROCEDURE — 84153 ASSAY OF PSA TOTAL: CPT

## 2023-07-31 ENCOUNTER — HOSPITAL ENCOUNTER (OUTPATIENT)
Dept: CT IMAGING | Facility: HOSPITAL | Age: 83
Discharge: HOME | End: 2023-07-31
Attending: INTERNAL MEDICINE
Payer: MEDICARE

## 2023-07-31 DIAGNOSIS — C80.1 MALIGNANT SPINDLE CELL NEOPLASM (HCC): ICD-10-CM

## 2023-07-31 PROCEDURE — 71260 CT THORAX DX C+: CPT

## 2023-07-31 PROCEDURE — 74177 CT ABD & PELVIS W/CONTRAST: CPT

## 2023-07-31 PROCEDURE — G1004 CDSM NDSC: HCPCS

## 2023-07-31 RX ADMIN — IOHEXOL 100 ML: 350 INJECTION, SOLUTION INTRAVENOUS at 10:24

## 2023-08-01 ENCOUNTER — TELEPHONE (OUTPATIENT)
Dept: HEMATOLOGY ONCOLOGY | Facility: CLINIC | Age: 83
End: 2023-08-01

## 2023-08-07 ENCOUNTER — TELEPHONE (OUTPATIENT)
Dept: CARDIOLOGY CLINIC | Facility: CLINIC | Age: 83
End: 2023-08-07

## 2023-08-07 DIAGNOSIS — I50.43 ACUTE ON CHRONIC COMBINED SYSTOLIC (CONGESTIVE) AND DIASTOLIC (CONGESTIVE) HEART FAILURE (HCC): ICD-10-CM

## 2023-08-07 RX ORDER — POTASSIUM CHLORIDE 20 MEQ/1
TABLET, EXTENDED RELEASE ORAL
Qty: 270 TABLET | Refills: 3 | Status: CANCELLED | OUTPATIENT
Start: 2023-08-07

## 2023-08-07 NOTE — ASSESSMENT & PLAN NOTE
Atrial fibrillation noted on EKG at PCP office 01/19/2021. Holter monitor 1/28/2021 showed predominantly atrial fibrillation with an average HR of 102 beats per minute with frequent PVCs /aberrant beats representing 34.9% ectopic burden. Echocardiogram 2/25/2021 showed EF 45-50%. Patient denied any palpitations and was unaware of rapid heart rates. Magnesium 250 mg daily added and will be continued. Diltiazem was added (trying to avoid beta blockers due to COPD/asthma). Persistent a fib with elevated ventricular rates and high PVC burden of 33% noted on most recent holter monitor 6/2021. Continue diltiazem 300mg daily. EP consultation with  Dr. Blank Rosen on 8/19/2021 with follow up 9/20/2021. Atrial fibrillation and PVC ablation were discussed. They opted given risks associated with prolonged anesthesia in the setting of significant lung disease. Continue Eliquis 5 mg twice daily for CVA prophylaxis. He is currently using BiPAP for sleep. Previously ordered a 24 hour Holter monitor which was not completed. HR's acceptable during recent admission.

## 2023-08-07 NOTE — ASSESSMENT & PLAN NOTE
Secondary to COPD and CHF. Improving since hospitalization. Continues with use of supplemental O2 managed by [facility]. [place]'s pulmonology.

## 2023-08-07 NOTE — ASSESSMENT & PLAN NOTE
Likely multi-factoral related to calcium channel blocker use, malnutrition. Reviewed 2g sodium diet and daily use of compression stockings. See full discussion under CHF.

## 2023-08-07 NOTE — ASSESSMENT & PLAN NOTE
Wt Readings from Last 3 Encounters:   05/09/23 70.4 kg (155 lb 3.2 oz)   05/04/23 71.7 kg (158 lb)   04/25/23 73.7 kg (162 lb 7.7 oz)     Echocardiogram 4/24/2023 with EF 45%, mildly reduced RV function. Unclear etiology of reduced EF but this could be tachycardia induced vs ETOH induced. He should have eventual stress test to rule out ischemia as the source of his HFrEF. Ideally should be on long acting beta blocker but has COPD/Asthma. No ACE-I/ARB for now as we need room to uptitrate AV efra blockers. Appears fairly euvolemic on torsemide 100 mg daily and metolazone 2.5 mg twice weekly. Urged use of compression socks and asked him to elevate legs when at rest at home. Reviewed importance of 2 g sodium diet, fluid restriction, alcohol avoidance, and daily weights.

## 2023-08-07 NOTE — ASSESSMENT & PLAN NOTE
Lab Results   Component Value Date    EGFR 57 07/28/2023    EGFR 53 06/02/2023    EGFR 68 05/23/2023    CREATININE 1.18 07/28/2023    CREATININE 1.25 06/02/2023    CREATININE 1.01 05/23/2023     Will monitor closely with diuretic use. Avoiding nephrotoxic agents.

## 2023-08-07 NOTE — ASSESSMENT & PLAN NOTE
33% PVC burden was reported on 24 hour holter monitor on 6/21/2021. High PVC burden may be source of cardiomyopathy. EP evaluation with Dr. Ezra Petersonh  8/19/2021 with follow up 9/20/2021. Atrial fibrillation/PVC ablation were discussed but patient felt to be high risk for intervention due to his underling COPD. Should have updated assessment of PVC burden. I previously ordered a 24 hour Holter monitor which was not completed. Will discuss again at follow up.

## 2023-08-07 NOTE — PROGRESS NOTES
Cardiology Follow Up    Alessandro Alfred  1940  [id number]  [facility] [place] [address] [facility] [address]  [address] [address] [phone]    Omid Dangelo presents for close follow up from hospitalization for acute on chronic systolic heart failure. 1. Acute on chronic combined systolic and diastolic CHF  Assessment & Plan:  Wt Readings from Last 3 Encounters:   05/09/23 70.4 kg (155 lb 3.2 oz)   05/04/23 71.7 kg (158 lb)   04/25/23 73.7 kg (162 lb 7.7 oz)     Echocardiogram 4/24/2023 with EF 45%, mildly reduced RV function. Unclear etiology of reduced EF but this could be tachycardia induced vs ETOH induced. He should have eventual stress test to rule out ischemia as the source of his HFrEF. Ideally should be on long acting beta blocker but has COPD/Asthma. No ACE-I/ARB for now as we need room to uptitrate AV efra blockers. Appears fairly euvolemic on torsemide 100 mg daily and metolazone 2.5 mg twice weekly. Urged use of compression socks and asked him to elevate legs when at rest at home. Reviewed importance of 2 g sodium diet, fluid restriction, alcohol avoidance, and daily weights. Orders:  -     Basic metabolic panel; Future; Expected date: 05/11/2023    2. Paroxysmal atrial fibrillation [facility])  Assessment & Plan:  Atrial fibrillation noted on EKG at PCP office 01/19/2021. Holter monitor 1/28/2021 showed predominantly atrial fibrillation with an average HR of 102 beats per minute with frequent PVCs /aberrant beats representing 34.9% ectopic burden. Echocardiogram 2/25/2021 showed EF 45-50%. Patient denied any palpitations and was unaware of rapid heart rates. Magnesium 250 mg daily added and will be continued. Diltiazem was added (trying to avoid beta blockers due to COPD/asthma). Persistent a fib with elevated ventricular rates and high PVC burden of 33% noted on most recent holter monitor 6/2021. Continue diltiazem 300mg daily.   EP consultation with  Dr. Alex Dempsey on 8/19/2021 with follow up 9/20/2021. Atrial fibrillation and PVC ablation were discussed. They opted given risks associated with prolonged anesthesia in the setting of significant lung disease. Continue Eliquis 5 mg twice daily for CVA prophylaxis. He is currently using BiPAP for sleep. Previously ordered a 24 hour Holter monitor which was not completed. HR's acceptable during recent admission. 3. Frequent PVCs  Assessment & Plan:  33% PVC burden was reported on 24 hour holter monitor on 6/21/2021. High PVC burden may be source of cardiomyopathy. EP evaluation with Dr. Alex Dempsey  8/19/2021 with follow up 9/20/2021. Atrial fibrillation/PVC ablation were discussed but patient felt to be high risk for intervention due to his underling COPD. Should have updated assessment of PVC burden. I previously ordered a 24 hour Holter monitor which was not completed. Will discuss again at follow up. 4. RBBB  Assessment & Plan:  Chronic right bundle-branch block. 5. Essential hypertension  Assessment & Plan:  Blood pressure is well controlled on exam (actually on the low side). Beta-blockers contraindicated in setting of asthma /COPD. Remains on diltiazem  mg daily. Also taking torsemide 100 mg daily and metolazone 2.5 mg twice weekly. Will continue to monitor renal function with use of diuretics. Educated on 2 g sodium diet, limiting alcohol. 6. Acute on chronic respiratory failure with hypoxia   Assessment & Plan:  Secondary to COPD and CHF. Improving since hospitalization. Continues with use of supplemental O2 managed by [facility]. [facility]. 7. Localized edema  Assessment & Plan:  Likely multi-factoral related to calcium channel blocker use, malnutrition. Reviewed 2g sodium diet and daily use of compression stockings. See full discussion under CHF.       8. Stage 3a chronic kidney disease ([address])  Assessment & Plan:  Lab Results   Component Value Date    EGFR 57 07/28/2023    EGFR 53 06/02/2023    EGFR 68 05/23/2023    CREATININE 1.18 07/28/2023    CREATININE 1.25 06/02/2023    CREATININE 1.01 05/23/2023     Will monitor closely with diuretic use. Avoiding nephrotoxic agents. 9. Moderate alcohol consumption  Assessment & Plan: Moderate  EtOH use. He is aware that this is not helping his atrial fibrillation and/or ectopy. I have asked him to continue to try and cut back on intake. 10. Severe protein-calorie malnutrition ([address])  Assessment & Plan:  Malnutrition Findings:    Muscle wasting in upper torso         BMI Findings: Body mass index is 23.33 kg/m². Contributing to lower leg edema. Reviewed the importance of maintaining nutrition. Will monitor. PAM Mojica has a past medical history of chronic heart failure, HTN, asthma/COPD, BPH, atrial fibrillation, RBBB, frequent PVC's, spindle cell carcinoma, and lung cancer.     He was initially referred to cardiology when found to have an irregular heart rhythm at his PCP office and EKG revealed possible atrial fibrillation with frequent PVC's in bigeminy and RBBB. 24 hour Holter monitor showed predominantly atrial fibrillation with avg  bpm. 51,075 PVC's (34.9% burden).     He met with Dr. Shree Harp on 2/25/2021 at which time he denied palpitations or chest pain. Reported shortness of breath and dyspnea on exertion attributed to his underlying lung disease. He was drinking 7 days per week. Some LE edema noted. He was initiated on diltiazem, Eliquis, and furosemide. 3 day ZIO was ordered.     3 day ZIO monitor in April 2021 which revealed 100% a fib with average HR 94 BPM and frequent PVC's at 16.4%. Diltiazem was increased to 240mg and repeat holter monitor study ordered to assess response. The Holter monitor study revealed 33% PVC's.  EP consultation was recommended and he has been referred to Dr. Bobbi Krishnan with [facility].     He was admitted to [facility] 8/9-8/19/2021 for acute on chronic heart failure. He was diuresed with IV furosemide. Frequent PVC's were again noted during admission. He required Bipap given hypercapnia. Home BiPAP was arranged.      Outpatient EP consult scheduled for 8/19/2021. He met with Dr. Porsha Cabrera and ablation was discussed, however, given respiratory status and improvement in PVC's with increase in diltiazem to 300 mg daily they opted for close observation. He was scheduled for follow up with EP 9/20/21.      He followed up with me on 9/7/2022. He had undergone surgical excision of left scalp spindle cell lesion with skin graft that same morning. He reported improved respiratory status since admission. Taking furosemide 60 mg daily.     At follow up with Dr. Porsha Cabrera on 9/20/2022 PVC's were improved and they opted against ablation. He was instructed to take extra 60 mg of furosemide every 3rd day due to continued leg swelling.      He followed up most recently with Dr. Kristian Thurston 3/6/2023. He had been transitioned to torsemide 60 mg daily. He felt he was not urinating as much as previously. Continued with lower leg edema. Was using supplemental O2 24/7. His torsemide was increased to 80 mg daily. He reached out to the office to report no change with the increased dose and torsemide was further increased to 100 mg daily on 3/13/2023. He was admitted to [address] [place] [address] 4/21-4/25/2023 for treatment of COPD and CHF exacerbation. He was treated with inhalers and steroids as well as IV furosemide. He was transitioned to torsemide 100 mg daily along with twice weekly metolazone 2.5 mg at discharge. 5/4/2023: Lona Knapp presents for close follow up accompanied by his wife. His weight continues to decrease. He does feel that he is tolerating the diuretics. He has stable shortness of breath and dyspnea on exertion. Mild lower leg swelling. No chest pain or lightheadedness. No palpitations. He is taking and tolerating all of his medications without issue.  He is trying to follow a 2 g sodium diet. He continues to drink alcohol almost daily, but tries to limit to 1 drink per day. Continues with use of supplemental O2 24/7. Medical Problems     Problem List     Pulmonary nodule    Atrial fibrillation     Frequent PVCs    Acute on chronic combined systolic and diastolic CHF    RBBB    Essential hypertension    Localized edema    Chronic obstructive pulmonary disease with acute exacerbation (HCC)    Acute on chronic respiratory failure with hypoxia     Moderate alcohol consumption    BPH (benign prostatic hyperplasia)    Severe protein-calorie malnutrition (HCC)    Stage 3a chronic kidney disease (HCC)    Malignant neoplasm of unspecified part of left bronchus or lung (HCC)        Past Medical History:   Diagnosis Date   • BPH (benign prostatic hyperplasia)    • Chronic obstructive pulmonary disease (COPD) ([address])    • Essential hypertension    • Hard of hearing     Pt does wear hearing aids   • Hypertension    • Lung cancer ([address])    • Shortness of breath     Pt states not changes and it occurs with moderate exertion   • Sleep disturbance     Pt states he is only sleeping about 3 hours a night. 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   • Tinnitus      Social History     Socioeconomic History   • Marital status: /Civil Union     Spouse name: Not on file   • Number of children: Not on file   • Years of education: Not on file   • Highest education level: Not on file   Occupational History   • Occupation: Retired   Tobacco Use   • Smoking status: Former     Types: Cigars, Cigarettes   • Smokeless tobacco: Never   Vaping Use   • Vaping Use: Never used   Substance and Sexual Activity   • Alcohol use:  Yes     Alcohol/week: 14.0 standard drinks of alcohol     Types: 14 Glasses of wine per week     Comment: moderate consumption   • Drug use: Not Currently   • Sexual activity: Yes   Other Topics Concern   • Not on file   Social History Narrative   • Not on file     Social Determinants of Health     Financial Resource Strain: Not on file   Food Insecurity: No Food Insecurity (4/24/2023)    Hunger Vital Sign    • Worried About Running Out of Food in the Last Year: Never true    • Ran Out of Food in the Last Year: Never true   Transportation Needs: No Transportation Needs (4/24/2023)    PRAPARE - Transportation    • Lack of Transportation (Medical): No    • Lack of Transportation (Non-Medical): No   Physical Activity: Not on file   Stress: Not on file   Social Connections: Not on file   Intimate Partner Violence: Not on file   Housing Stability: Low Risk  (4/24/2023)    Housing Stability Vital Sign    • Unable to Pay for Housing in the Last Year: No    • Number of Places Lived in the Last Year: 1    • Unstable Housing in the Last Year: No      History reviewed. No pertinent family history.   Past Surgical History:   Procedure Laterality Date   • IR BIOPSY LUNG  7/19/2022   • SHOULDER OPEN ROTATOR CUFF REPAIR     • SKIN GRAFT      spindle cell removal of scalp with skin graft from shoulder   • VEIN SURGERY         Current Outpatient Medications:   •  apixaban (ELIQUIS) 5 mg, Take 1 tablet (5 mg total) by mouth 2 (two) times a day, Disp: 180 tablet, Rfl: 3  •  ascorbic acid (VITAMIN C) 500 mg tablet, , Disp: , Rfl:   •  bisacodyl (DULCOLAX) 5 mg EC tablet, Take 5 mg by mouth daily  , Disp: , Rfl:   •  cetirizine (ZyrTEC) 10 mg tablet, Take 10 mg by mouth daily, Disp: , Rfl:   •  Cholecalciferol (Vitamin D3) 50 MCG (2000 UT) TABS, , Disp: , Rfl:   •  Diclofenac Sodium (VOLTAREN) 1 %, , Disp: , Rfl:   •  diltiazem (CARDIZEM CD) 300 mg 24 hr capsule, Take 1 capsule (300 mg total) by mouth daily, Disp: 90 capsule, Rfl: 3  •  docusate sodium (COLACE) 100 mg capsule, Take 100 mg by mouth , Disp: , Rfl:   •  finasteride (PROSCAR) 5 mg tablet, Take 5 mg by mouth daily , Disp: , Rfl:   •  fluticasone-salmeterol (ADVAIR, WIXELA) 250-50 mcg/dose inhaler, Inhale 1 puff 2 (two) times a day Rinse mouth after use., Disp: , Rfl:   •  ipratropium (ATROVENT) 0.02 % nebulizer solution, Take 2.5 mL (0.5 mg total) by nebulization in the morning, Disp: , Rfl:   •  magnesium oxide (MAG-OX) 400 mg tablet, Take 1 tablet by mouth daily, Disp: , Rfl:   •  montelukast (SINGULAIR) 10 mg tablet, Take 10 mg by mouth daily at bedtime, Disp: , Rfl:   •  Multiple Vitamin (MULTIVITAMINS PO), Take 1 tablet by mouth daily, Disp: , Rfl:   •  sildenafil (VIAGRA) 100 mg tablet, Take 1 tablet (100 mg total) by mouth daily as needed for erectile dysfunction, Disp: 4 tablet, Rfl: 11  •  Spiriva Respimat 2.5 MCG/ACT AERS inhaler, Inhale 2 puffs daily , Disp: , Rfl:   •  torsemide (DEMADEX) 100 mg tablet, Take 1 tablet (100 mg total) by mouth daily, Disp: 30 tablet, Rfl: 11  •  metolazone (ZAROXOLYN) 2.5 mg tablet, Take 1 tablet (2.5 mg total) by mouth once a week Take on Thursdays, Disp: 24 tablet, Rfl: 0  •  nitroglycerin (NITROSTAT) 0.4 mg SL tablet, , Disp: , Rfl:   •  potassium chloride (K-DUR,KLOR-CON) 20 mEq tablet, Take 1 tablet (20 mEq total) by mouth 2 (two) times a day Take extra 20 mEq of potassium on Thursdays for total of 60 mEq for the day, Disp: 192 tablet, Rfl: 3  Allergies   Allergen Reactions   • Penicillin G Other (See Comments)     PCN Shots Only!!       Labs:     Chemistry        Component Value Date/Time    K 3.4 (L) 07/28/2023 1445    CL 89 (L) 07/28/2023 1445    CO2 43 (H) 07/28/2023 1445    BUN 22 07/28/2023 1445    CREATININE 1.18 07/28/2023 1445        Component Value Date/Time    CALCIUM 9.6 07/28/2023 1445    ALKPHOS 94 07/28/2023 1445    AST 17 07/28/2023 1445    ALT 10 07/28/2023 1445        Cardiac testing:  Echocardiogram 4/24/2023:  EF 45%. RV upper normal with mildly reduced function. Bi-atrial dilation. Trace AI. Mild MR. Mild TR. PASP 34mmHg. Aortic root 4.1 cm. Ascending aorta 2.6 cm. ECG 4/21/2023: Atrial fibrillation with PVC. RBBB.     Echocardiogram 8/16/2021:  EF 45% (difficult to assess in setting of frequent ectopy and atrial arrhythmia). Mild to moderate LAE. Mildly dilated RA. MAC. Mild MR. Mild TR. Estimated PASP 40 mmHg consistent with mild pHTN.      ZIO 3/18-3/21/2021:  Strips above reviewed. Agree with findings as documented. 100% atrial fibrillation burden with HR range () with an Avg HR of 94 bpm.   Frequent PVC's (16.4%). Rare ventricular couplets and triplets. Ventricular Bigeminy and Trigeminy noted.      ECG 3/18/2021: Atrial fibrillation with rapid ventricular response.  bpm.  PVC's vs aberrantly conducted beats. RBBB.      Echocardiogram 2/25/2021:   EF 45-50%. Mildly dilated left and right atrium. Mild mitral regurgitation. Trace tricuspid regurgitation. Trace pericardial effusion. Holter Monitor 1/28/2021:   1. The patient had predominantly atrial fibrillation. 2. Heart rate varied from 78 bpm to 130 bpm.    3. The patient’s average heart rate was 102 bpm.    4. The patient had a holter monitor tracing done for 23 hours and 59 minutes. 5. The patient had frequent ventricular ectopic activity (PVC's vs Aberrant beats) with a total of 51,075 ectopic beats representing a 34.9% burden. 6. Episodes of ventricular bigeminy (30,849) and trigeminy (6156). 7. Ventricular runs with the longest lasting 5 beats.     8. The patient had no supraventricular ectopy.    9. The longest R/R interval was 1.7 seconds. 10. Right bundle branch block. 11. Diary attached. Patient denied any symptoms.      Lipid panel 11/17/2020: C 170. T 66. H 74. L 85.     ECG 1/19/2021:  Likely underlying atrial fibrillation with frequent PVCs/ aberrant beats in a bigeminal pattern. Right bundle-branch block. Review of Systems   Constitutional: Positive for malaise/fatigue. HENT: Negative. Cardiovascular: Positive for dyspnea on exertion and leg swelling.  Negative for chest pain, irregular heartbeat, near-syncope, orthopnea and palpitations. Respiratory: Positive for shortness of breath. Negative for cough and snoring. Endocrine: Negative. Skin: Negative. Musculoskeletal: Negative. Gastrointestinal: Negative. Genitourinary: Negative. Neurological: Negative. Psychiatric/Behavioral: Negative. Vitals:    05/04/23 1209   BP: 112/68   Pulse:    Resp:    Temp:    SpO2:      Vitals:    05/04/23 1134   Weight: 71.7 kg (158 lb)     Height: 5' 9" (175.3 cm)   Body mass index is 23.33 kg/m². Physical Exam  Vitals and nursing note reviewed. Constitutional:       General: He is not in acute distress. Appearance: He is well-developed. He is not diaphoretic. Comments: Appears chronically ill but stable   HENT:      Head: Normocephalic and atraumatic. Neck:      Vascular: No carotid bruit or JVD. Cardiovascular:      Rate and Rhythm: Normal rate and regular rhythm. Occasional Extrasystoles are present. Pulses: Intact distal pulses. Heart sounds: Normal heart sounds, S1 normal and S2 normal. No murmur heard. No friction rub. No gallop. Comments: Mild bilateral lower leg edema  Pulmonary:      Effort: Pulmonary effort is normal. No respiratory distress. Breath sounds: Decreased breath sounds present. Abdominal:      General: There is no distension. Palpations: Abdomen is soft. Tenderness: There is no abdominal tenderness. Skin:     General: Skin is warm and dry. Findings: No rash. Neurological:      Mental Status: He is alert and oriented to person, place, and time. Psychiatric:         Mood and Affect: Mood and affect normal.         Speech: Speech normal.         Behavior: Behavior normal. Behavior is cooperative.          Cognition and Memory: Cognition normal.

## 2023-08-07 NOTE — ASSESSMENT & PLAN NOTE
Malnutrition Findings:    Muscle wasting in upper torso         BMI Findings: Body mass index is 23.33 kg/m². Contributing to lower leg edema. Reviewed the importance of maintaining nutrition. Will monitor.

## 2023-08-07 NOTE — ASSESSMENT & PLAN NOTE
Blood pressure is well controlled on exam (actually on the low side). Beta-blockers contraindicated in setting of asthma /COPD. Remains on diltiazem  mg daily. Also taking torsemide 100 mg daily and metolazone 2.5 mg twice weekly. Will continue to monitor renal function with use of diuretics. Educated on 2 g sodium diet, limiting alcohol.

## 2023-08-07 NOTE — ASSESSMENT & PLAN NOTE
Moderate  EtOH use. He is aware that this is not helping his atrial fibrillation and/or ectopy. I have asked him to continue to try and cut back on intake.

## 2023-08-08 ENCOUNTER — TELEMEDICINE (OUTPATIENT)
Dept: HEMATOLOGY ONCOLOGY | Facility: CLINIC | Age: 83
End: 2023-08-08
Payer: MEDICARE

## 2023-08-08 DIAGNOSIS — R91.1 PULMONARY NODULE: ICD-10-CM

## 2023-08-08 DIAGNOSIS — C80.1 MALIGNANT SPINDLE CELL NEOPLASM (HCC): Primary | ICD-10-CM

## 2023-08-08 PROCEDURE — 99442 PR PHYS/QHP TELEPHONE EVALUATION 11-20 MIN: CPT | Performed by: INTERNAL MEDICINE

## 2023-08-08 NOTE — PROGRESS NOTES
[facility] [address] [facility] [address]  [phone]  [address] [place] [address] MDQFX,8/87/0926, [id number]  08/08/23    Discussion:   ***    I discussed the above with the patient. The patient *** voiced understanding and agreement.  ______________________________________________________________________    No chief complaint on file. HPI:  Oncology History    No history exists. Interval History:  ***  {performance status:00231}    Review of Systems   Constitutional: Negative for chills and fever. HENT: Negative for nosebleeds. Eyes: Negative for discharge. Respiratory: Negative for cough and shortness of breath. Cardiovascular: Negative for chest pain. Gastrointestinal: Negative for abdominal pain, constipation and diarrhea. Endocrine: Negative for polydipsia. Genitourinary: Negative for hematuria. Musculoskeletal: Negative for arthralgias. Skin: Negative for color change. Allergic/Immunologic: Negative for immunocompromised state. Neurological: Negative for dizziness and headaches. Hematological: Negative for adenopathy. Psychiatric/Behavioral: Negative for agitation. Past Medical History:   Diagnosis Date   • BPH (benign prostatic hyperplasia)    • Chronic obstructive pulmonary disease (COPD) ([address])    • Essential hypertension    • Hard of hearing     Pt does wear hearing aids   • Hypertension    • Lung cancer ([address])    • Shortness of breath     Pt states not changes and it occurs with moderate exertion   • Sleep disturbance     Pt states he is only sleeping about 3 hours a night.   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   • Tinnitus      Patient Active Problem List   Diagnosis   • Essential hypertension   • Atrial fibrillation    • RBBB   • Moderate alcohol consumption   • Localized edema   • Acute on chronic combined systolic and diastolic CHF   • Chronic obstructive pulmonary disease with acute exacerbation (HCC)   • Frequent PVCs   • Acute on chronic respiratory failure with hypoxia    • Pulmonary nodule   • BPH (benign prostatic hyperplasia)   • Severe protein-calorie malnutrition (HCC)   • Stage 3a chronic kidney disease (HCC)   • Malignant neoplasm of unspecified part of left bronchus or lung (HCC)       Current Outpatient Medications:   •  apixaban (ELIQUIS) 5 mg, Take 1 tablet (5 mg total) by mouth 2 (two) times a day, Disp: 180 tablet, Rfl: 3  •  ascorbic acid (VITAMIN C) 500 mg tablet, , Disp: , Rfl:   •  bisacodyl (DULCOLAX) 5 mg EC tablet, Take 5 mg by mouth daily  , Disp: , Rfl:   •  cetirizine (ZyrTEC) 10 mg tablet, Take 10 mg by mouth daily, Disp: , Rfl:   •  Cholecalciferol (Vitamin D3) 50 MCG (2000 UT) TABS, , Disp: , Rfl:   •  Diclofenac Sodium (VOLTAREN) 1 %, , Disp: , Rfl:   •  diltiazem (CARDIZEM CD) 300 mg 24 hr capsule, Take 1 capsule (300 mg total) by mouth daily, Disp: 90 capsule, Rfl: 3  •  docusate sodium (COLACE) 100 mg capsule, Take 100 mg by mouth , Disp: , Rfl:   •  finasteride (PROSCAR) 5 mg tablet, Take 5 mg by mouth daily , Disp: , Rfl:   •  fluticasone-salmeterol (ADVAIR, WIXELA) 250-50 mcg/dose inhaler, Inhale 1 puff 2 (two) times a day Rinse mouth after use., Disp: , Rfl:   •  ipratropium (ATROVENT) 0.02 % nebulizer solution, Take 2.5 mL (0.5 mg total) by nebulization in the morning, Disp: , Rfl:   •  magnesium oxide (MAG-OX) 400 mg tablet, Take 1 tablet by mouth daily, Disp: , Rfl:   •  metolazone (ZAROXOLYN) 2.5 mg tablet, Take 1 tablet (2.5 mg total) by mouth once a week Take on Thursdays, Disp: 24 tablet, Rfl: 0  •  montelukast (SINGULAIR) 10 mg tablet, Take 10 mg by mouth daily at bedtime, Disp: , Rfl:   •  Multiple Vitamin (MULTIVITAMINS PO), Take 1 tablet by mouth daily, Disp: , Rfl:   •  nitroglycerin (NITROSTAT) 0.4 mg SL tablet, , Disp: , Rfl:   •  potassium chloride (K-DUR,KLOR-CON) 20 mEq tablet, Take 1 tablet (20 mEq total) by mouth 2 (two) times a day Take extra 20 mEq of potassium on Thursdays for total of 60 mEq for the day, Disp: 192 tablet, Rfl: 3  •  sildenafil (VIAGRA) 100 mg tablet, Take 1 tablet (100 mg total) by mouth daily as needed for erectile dysfunction, Disp: 4 tablet, Rfl: 11  •  Spiriva Respimat 2.5 MCG/ACT AERS inhaler, Inhale 2 puffs daily , Disp: , Rfl:   •  torsemide (DEMADEX) 100 mg tablet, Take 1 tablet (100 mg total) by mouth daily, Disp: 30 tablet, Rfl: 11  Allergies   Allergen Reactions   • Penicillin G Other (See Comments)     PCN Shots Only!!     Past Surgical History:   Procedure Laterality Date   • IR BIOPSY LUNG  7/19/2022   • SHOULDER OPEN ROTATOR CUFF REPAIR     • SKIN GRAFT      spindle cell removal of scalp with skin graft from shoulder   • VEIN SURGERY       Social History     Objective: There were no vitals filed for this visit. Physical Exam  Constitutional:       Appearance: He is well-developed. HENT:      Head: Normocephalic and atraumatic. Eyes:      Pupils: Pupils are equal, round, and reactive to light. Cardiovascular:      Rate and Rhythm: Normal rate and regular rhythm. Heart sounds: No murmur heard. Pulmonary:      Breath sounds: Normal breath sounds. No wheezing or rales. Abdominal:      Palpations: Abdomen is soft. Tenderness: There is no abdominal tenderness. Musculoskeletal:         General: No tenderness. Normal range of motion. Cervical back: Neck supple. Lymphadenopathy:      Cervical: No cervical adenopathy. Skin:     Findings: No erythema or rash. Neurological:      Mental Status: He is alert and oriented to person, place, and time. Cranial Nerves: No cranial nerve deficit. Deep Tendon Reflexes: Reflexes are normal and symmetric. Psychiatric:         Behavior: Behavior normal.           Labs: I personally reviewed the labs and imaging pertinent to this patient care.

## 2023-08-08 NOTE — TELEPHONE ENCOUNTER
Please let Avery Martinez and his wife know I saw the lab work from the hematologist. His potassium is mildly low at 3.4. If he is taking potassium chloride 20 meQ twice daily I think we should increase to 40 mEq in am, 20 mEq at noon. I pended the script - please send to [address] [place] [address] in [facility] if that is where he wants it to go. I pended a script for a BMP in 2 weeks also. Also, I would like to get an updated Holter monitor prior to his visit with Dr. Brent Florence in November - The order is in his chart if you can please schedule.   Thank you!!
Wife is aware.
Wife is aware. States that he takes 20 meq once daily but takes one extra on Wednesdays because of his water pill. Should they go up to 20 meq twice daily now? They are going to schedule the holter.
Yes, if he has only been taking 20 mEq once daily, please have him increase to twice daily (for total of 40 mEq daily), continue taking an extra 20 mEq (for total of 60 mEq) on the days he takes the metolazone. BMP in 2 weeks - order in chart. Thank you!
21-Aug-2019 16:57

## 2023-08-08 NOTE — PROGRESS NOTES
Virtual Brief Visit    This Visit is being completed by telephone. The Patient is located at Home and in the following state in which I hold an active license [place]    The patient was identified by name and date of birth. Kourtney Jean Baptiste was informed that this is a telemedicine visit and that the visit is being conducted through the [employer]. He agrees to proceed. .  My office door was closed. No one else was in the room. He acknowledged consent and understanding of privacy and security of the video platform. The patient has agreed to participate and understands they can discontinue the visit at any time. Patient is aware this is a billable service. 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 He has been under observation, immunotherapy was deferred. Clinically he is stable. He reports no new symptoms. He does have some dyspnea on exertion, stable over many months. Recent CT of the chest ab pelvis shows some decrease in pulmonary nodules well other new pulmonary nodules are noted, the largest measuring about 1.7 cm with some central necrosis. No disease in the abdomen or bones. We reviewed the above findings and their significance. Inflammatory versus malignant processes. Overall, his performance status remains good, ECOG-1. I proposed follow-up in 2 rather than 4 months. If disease is more frankly progressive and convincing of malignancy, immunotherapy would be reasonable to consider. Recent CBC and CMP are near normal.  I reviewed the above with the patient and his wife. They voiced understanding and agreement. I reviewed his medical conditions, medications, laboratory studies.     Problem List Items Addressed This Visit        Respiratory    Pulmonary nodule - Primary    Relevant Orders    CT chest wo contrast       Recent Visits  Date Type Provider Dept   08/01/23 Telephone Benjamin Novak RN [facility]   Showing recent visits within past 7 days and meeting all other requirements  Today's Visits  Date Type Provider Dept   08/08/23 Telemedicine Humberto Hendrickson DO [facility]   Showing today's visits and meeting all other requirements  Future Appointments  No visits were found meeting these conditions. Showing future appointments within next 150 days and meeting all other requirements         Visit Time  Total Visit Duration: 15 min.

## 2023-08-09 ENCOUNTER — HOSPITAL ENCOUNTER (OUTPATIENT)
Dept: NON INVASIVE DIAGNOSTICS | Facility: HOSPITAL | Age: 83
Discharge: HOME | End: 2023-08-09
Payer: MEDICARE

## 2023-08-09 DIAGNOSIS — I49.3 FREQUENT PVCS: ICD-10-CM

## 2023-08-09 DIAGNOSIS — I48.91 ATRIAL FIBRILLATION, UNSPECIFIED TYPE (HCC): ICD-10-CM

## 2023-08-09 PROCEDURE — 93225 XTRNL ECG REC<48 HRS REC: CPT

## 2023-08-09 PROCEDURE — 93226 XTRNL ECG REC<48 HR SCAN A/R: CPT

## 2023-08-17 PROCEDURE — 93227 XTRNL ECG REC<48 HR R&I: CPT | Performed by: INTERNAL MEDICINE

## 2023-08-18 DIAGNOSIS — I49.3 FREQUENT PVCS: Primary | ICD-10-CM

## 2023-08-18 NOTE — TELEPHONE ENCOUNTER
Can you please notify Maribel Lou and his wife that I reviewed the results of his Holter monitor, which continues to show frequent PVC's. We have not use metoprolol because of concern for his COPD. I have reached out to his pulmonologist to see if he feels it would be ok to try metoprolol as I am concerned that the PVC's are contributing to reduced heart function. I will let them know when I hear back. Thank you!

## 2023-08-22 ENCOUNTER — HOSPITAL ENCOUNTER (OUTPATIENT)
Dept: CT IMAGING | Facility: HOSPITAL | Age: 83
Discharge: HOME | End: 2023-08-22
Payer: MEDICARE

## 2023-08-22 ENCOUNTER — APPOINTMENT (OUTPATIENT)
Dept: LAB | Facility: HOSPITAL | Age: 83
End: 2023-08-22
Payer: MEDICARE

## 2023-08-22 DIAGNOSIS — R91.1 PULMONARY NODULE: ICD-10-CM

## 2023-08-22 DIAGNOSIS — I50.43 ACUTE ON CHRONIC COMBINED SYSTOLIC (CONGESTIVE) AND DIASTOLIC (CONGESTIVE) HEART FAILURE (HCC): ICD-10-CM

## 2023-08-22 LAB
ANION GAP SERPL CALCULATED.3IONS-SCNC: 5 MMOL/L
BUN SERPL-MCNC: 18 MG/DL (ref 5–25)
CALCIUM SERPL-MCNC: 10.1 MG/DL (ref 8.4–10.2)
CHLORIDE SERPL-SCNC: 94 MMOL/L (ref 96–108)
CO2 SERPL-SCNC: 43 MMOL/L (ref 21–32)
CREAT SERPL-MCNC: 1.05 MG/DL (ref 0.6–1.3)
GFR SERPL CREATININE-BSD FRML MDRD: 65 ML/MIN/1.73SQ M
GLUCOSE SERPL-MCNC: 134 MG/DL (ref 65–140)
POTASSIUM SERPL-SCNC: 3.8 MMOL/L (ref 3.5–5.3)
SODIUM SERPL-SCNC: 142 MMOL/L (ref 135–147)

## 2023-08-22 PROCEDURE — 36415 COLL VENOUS BLD VENIPUNCTURE: CPT

## 2023-08-22 PROCEDURE — 71250 CT THORAX DX C-: CPT

## 2023-08-22 PROCEDURE — G1004 CDSM NDSC: HCPCS

## 2023-08-22 PROCEDURE — 80048 BASIC METABOLIC PNL TOTAL CA: CPT

## 2023-08-23 NOTE — TELEPHONE ENCOUNTER
I attempted to reach Seffner. I left a message to call back. I got his BMP results which show stable kidney function and potassium. I also heard back from Dr. Jackie Miller who is ok with us ordering the metoprolol  I pended metoprolol succinate 25 mg once daily. Please send do the local pharmacy of their choice. I want them to monitor for shortness of breath with this addition and notify me if any worsening in his breathing with the new medication. Thank you!

## 2023-08-24 RX ORDER — METOPROLOL SUCCINATE 25 MG/1
25 TABLET, EXTENDED RELEASE ORAL DAILY
Qty: 30 TABLET | Refills: 11 | Status: SHIPPED | OUTPATIENT
Start: 2023-08-24

## 2023-08-24 NOTE — TELEPHONE ENCOUNTER
Spoke with patient's spouse. She is in agreement. Metoprolol sent to [facility].  She will update me in 2 weeks on his tolerance and if doing well I will send to [place] for 90 day supply

## 2023-09-14 ENCOUNTER — TELEPHONE (OUTPATIENT)
Dept: CARDIOLOGY CLINIC | Facility: CLINIC | Age: 83
End: 2023-09-14

## 2023-09-14 DIAGNOSIS — I49.3 FREQUENT PVCS: ICD-10-CM

## 2023-09-14 RX ORDER — METOPROLOL SUCCINATE 25 MG/1
25 TABLET, EXTENDED RELEASE ORAL DAILY
Qty: 90 TABLET | Refills: 3 | Status: SHIPPED | OUTPATIENT
Start: 2023-09-14

## 2023-09-14 NOTE — TELEPHONE ENCOUNTER
I sent the metoprolol to [place] in [facility]. Please let us know if he does not get in time and needs a short supply to local.  Thank you!

## 2023-09-14 NOTE — TELEPHONE ENCOUNTER
Pt lm stating that he needs a refill on his metoprolol 25mg called into the [address] [place] [address] pharm. Pt only has 1 week's worth left.   Any questions please call pt [phone]

## 2023-09-18 ENCOUNTER — TELEPHONE (OUTPATIENT)
Dept: CARDIOLOGY CLINIC | Facility: CLINIC | Age: 83
End: 2023-09-18

## 2023-09-18 NOTE — TELEPHONE ENCOUNTER
Pt lm stating that the metoprolol is working and he needs a refill called into his [address] [place] [address]. Please call pt to let him know that it was done.  [phone]

## 2023-10-02 ENCOUNTER — TELEPHONE (OUTPATIENT)
Dept: HEMATOLOGY ONCOLOGY | Facility: CLINIC | Age: 83
End: 2023-10-02

## 2023-10-02 DIAGNOSIS — R91.1 PULMONARY NODULE: ICD-10-CM

## 2023-10-02 DIAGNOSIS — C80.1 MALIGNANT SPINDLE CELL NEOPLASM (HCC): Primary | ICD-10-CM

## 2023-10-02 NOTE — TELEPHONE ENCOUNTER
Patient Call    Who are you speaking with? wife   If it is not the patient, are they listed on an active communication consent form? wife   What is the reason for this call? Need orders for CAT scan   Does this require a call back? yes   If a call back is required, please list best call back number [phone]   If a call back is required, advise that a message will be forwarded to their care team and someone will return their call as soon as possible. Did you relay this information to the patient?  yes

## 2023-10-02 NOTE — TELEPHONE ENCOUNTER
Patient would prefer to have a Chest CT scan done prior to his next appointment on 10/6. Patient made aware that it was unlikely we would get the scan scheduled and read before 10/6. Patient ok with rescheduling his appt for after the CT scan.

## 2023-10-03 ENCOUNTER — TELEPHONE (OUTPATIENT)
Dept: HEMATOLOGY ONCOLOGY | Facility: CLINIC | Age: 83
End: 2023-10-03

## 2023-10-03 NOTE — TELEPHONE ENCOUNTER
Spoke with pt.'s wife regarding CT scan scheduled ,she said she needs to change it and will call to do so,appointment with Agustin Reno on 10/27/23 at 2:40 virtual will work.

## 2023-10-18 ENCOUNTER — HOSPITAL ENCOUNTER (OUTPATIENT)
Dept: CT IMAGING | Facility: HOSPITAL | Age: 83
Discharge: HOME | End: 2023-10-18
Payer: MEDICARE

## 2023-10-18 DIAGNOSIS — R91.1 PULMONARY NODULE: ICD-10-CM

## 2023-10-18 DIAGNOSIS — C80.1 MALIGNANT SPINDLE CELL NEOPLASM (HCC): ICD-10-CM

## 2023-10-18 PROCEDURE — G1004 CDSM NDSC: HCPCS

## 2023-10-18 PROCEDURE — 71250 CT THORAX DX C-: CPT

## 2023-10-25 ENCOUNTER — TELEPHONE (OUTPATIENT)
Dept: CARDIOLOGY CLINIC | Facility: CLINIC | Age: 83
End: 2023-10-25

## 2023-10-25 DIAGNOSIS — I50.43 ACUTE ON CHRONIC COMBINED SYSTOLIC (CONGESTIVE) AND DIASTOLIC (CONGESTIVE) HEART FAILURE (HCC): ICD-10-CM

## 2023-10-25 RX ORDER — METOLAZONE 2.5 MG/1
2.5 TABLET ORAL WEEKLY
Qty: 24 TABLET | Refills: 3 | Status: SHIPPED | OUTPATIENT
Start: 2023-10-25

## 2023-10-25 NOTE — TELEPHONE ENCOUNTER
Yes, he should continue. Script e-scribed to the [address] [place], [place] [address].   Thank you

## 2023-10-25 NOTE — TELEPHONE ENCOUNTER
Pt called and would like to know from Buck Ng if he needs to continue his Metolazone if so he needs a script faxed to the [facility].   Please call pt and let him know  [phone]

## 2023-10-30 ENCOUNTER — TELEPHONE (OUTPATIENT)
Dept: HEMATOLOGY ONCOLOGY | Facility: CLINIC | Age: 83
End: 2023-10-30

## 2023-10-30 NOTE — TELEPHONE ENCOUNTER
Called and spoke with patient and his wife regarding CT results. Overall CT scan appears stable. Previously identified nodules have decreased in size are now subcentimeter. There is a new 6 mm nodule in the right upper lobe with an unclear etiology. Patient was not able to make his virtual appointment last week. I will discuss with Dr. Maycol Marquis how to proceed and we will get patient rescheduled.   Patient and his wife verbalized understanding and are in agreement with plan

## 2023-10-30 NOTE — TELEPHONE ENCOUNTER
Patient Call    Who are you speaking with? Spouse    If it is not the patient, are they listed on an active communication consent form? Yes   What is the reason for this call? Patients wife calling in regards to missed call last week about results, states the calls kept coming in as restricted. Would like to get CT scan results. Does this require a call back? Yes   If a call back is required, please list best call back number [phone]   If a call back is required, advise that a message will be forwarded to their care team and someone will return their call as soon as possible. Did you relay this information to the patient?  Yes

## 2023-10-31 NOTE — ASSESSMENT & PLAN NOTE
Moderate  EtOH use  He is aware that this is not helping his atrial fibrillation and/or ectopy  I have asked him to continue to try and cut back on intake  done

## 2023-11-03 ENCOUNTER — APPOINTMENT (EMERGENCY)
Dept: RADIOLOGY | Facility: HOSPITAL | Age: 83
End: 2023-11-03
Payer: MEDICARE

## 2023-11-03 ENCOUNTER — HOSPITAL ENCOUNTER (EMERGENCY)
Facility: HOSPITAL | Age: 83
Discharge: HOME | End: 2023-11-03
Attending: EMERGENCY MEDICINE
Payer: MEDICARE

## 2023-11-03 VITALS
BODY MASS INDEX: 23.64 KG/M2 | RESPIRATION RATE: 16 BRPM | SYSTOLIC BLOOD PRESSURE: 117 MMHG | DIASTOLIC BLOOD PRESSURE: 80 MMHG | WEIGHT: 159.61 LBS | TEMPERATURE: 97.6 F | HEIGHT: 69 IN | HEART RATE: 50 BPM | OXYGEN SATURATION: 99 %

## 2023-11-03 DIAGNOSIS — L03.90 CELLULITIS: Primary | ICD-10-CM

## 2023-11-03 DIAGNOSIS — M25.572 ACUTE LEFT ANKLE PAIN: ICD-10-CM

## 2023-11-03 LAB
ALBUMIN SERPL BCP-MCNC: 3.9 G/DL (ref 3.5–5)
ALP SERPL-CCNC: 76 U/L (ref 34–104)
ALT SERPL W P-5'-P-CCNC: 5 U/L (ref 7–52)
ANION GAP SERPL CALCULATED.3IONS-SCNC: 7 MMOL/L
APTT PPP: 46 SECONDS (ref 23–37)
AST SERPL W P-5'-P-CCNC: 14 U/L (ref 13–39)
BASOPHILS # BLD MANUAL: 0 THOUSAND/UL (ref 0–0.1)
BASOPHILS NFR MAR MANUAL: 0 % (ref 0–1)
BILIRUB SERPL-MCNC: 0.77 MG/DL (ref 0.2–1)
BUN SERPL-MCNC: 35 MG/DL (ref 5–25)
CALCIUM SERPL-MCNC: 9.5 MG/DL (ref 8.4–10.2)
CHLORIDE SERPL-SCNC: 89 MMOL/L (ref 96–108)
CO2 SERPL-SCNC: 43 MMOL/L (ref 21–32)
CREAT SERPL-MCNC: 1.41 MG/DL (ref 0.6–1.3)
EOSINOPHIL # BLD MANUAL: 0 THOUSAND/UL (ref 0–0.4)
EOSINOPHIL NFR BLD MANUAL: 0 % (ref 0–6)
ERYTHROCYTE [DISTWIDTH] IN BLOOD BY AUTOMATED COUNT: 14.4 % (ref 11.6–15.1)
GFR SERPL CREATININE-BSD FRML MDRD: 46 ML/MIN/1.73SQ M
GLUCOSE SERPL-MCNC: 102 MG/DL (ref 65–140)
HCT VFR BLD AUTO: 41.9 % (ref 36.5–49.3)
HGB BLD-MCNC: 13.1 G/DL (ref 12–17)
INR PPP: 1.36 (ref 0.84–1.19)
LACTATE SERPL-SCNC: 1.5 MMOL/L (ref 0.5–2)
LYMPHOCYTES # BLD AUTO: 16 % (ref 14–44)
LYMPHOCYTES # BLD AUTO: 2.05 THOUSAND/UL (ref 0.6–4.47)
MCH RBC QN AUTO: 31.4 PG (ref 26.8–34.3)
MCHC RBC AUTO-ENTMCNC: 31.3 G/DL (ref 31.4–37.4)
MCV RBC AUTO: 101 FL (ref 82–98)
MONOCYTES # BLD AUTO: 0.65 THOUSAND/UL (ref 0–1.22)
MONOCYTES NFR BLD: 6 % (ref 4–12)
NEUTROPHILS # BLD MANUAL: 8.09 THOUSAND/UL (ref 1.85–7.62)
NEUTS BAND NFR BLD MANUAL: 1 % (ref 0–8)
NEUTS SEG NFR BLD AUTO: 74 % (ref 43–75)
PLATELET # BLD AUTO: 191 THOUSANDS/UL (ref 149–390)
PLATELET BLD QL SMEAR: ADEQUATE
PMV BLD AUTO: 9.7 FL (ref 8.9–12.7)
POTASSIUM SERPL-SCNC: 4 MMOL/L (ref 3.5–5.3)
PROCALCITONIN SERPL-MCNC: 0.12 NG/ML
PROT SERPL-MCNC: 6.7 G/DL (ref 6.4–8.4)
PROTHROMBIN TIME: 17.1 SECONDS (ref 11.6–14.5)
RBC # BLD AUTO: 4.17 MILLION/UL (ref 3.88–5.62)
SODIUM SERPL-SCNC: 139 MMOL/L (ref 135–147)
STOMATOCYTES BLD QL SMEAR: PRESENT
VARIANT LYMPHS # BLD AUTO: 3 %
WBC # BLD AUTO: 10.78 THOUSAND/UL (ref 4.31–10.16)

## 2023-11-03 PROCEDURE — 99284 EMERGENCY DEPT VISIT MOD MDM: CPT | Performed by: EMERGENCY MEDICINE

## 2023-11-03 PROCEDURE — 85027 COMPLETE CBC AUTOMATED: CPT | Performed by: EMERGENCY MEDICINE

## 2023-11-03 PROCEDURE — 99284 EMERGENCY DEPT VISIT MOD MDM: CPT

## 2023-11-03 PROCEDURE — 84145 PROCALCITONIN (PCT): CPT | Performed by: EMERGENCY MEDICINE

## 2023-11-03 PROCEDURE — 36415 COLL VENOUS BLD VENIPUNCTURE: CPT | Performed by: EMERGENCY MEDICINE

## 2023-11-03 PROCEDURE — 85610 PROTHROMBIN TIME: CPT | Performed by: EMERGENCY MEDICINE

## 2023-11-03 PROCEDURE — 73610 X-RAY EXAM OF ANKLE: CPT

## 2023-11-03 PROCEDURE — 85007 BL SMEAR W/DIFF WBC COUNT: CPT | Performed by: EMERGENCY MEDICINE

## 2023-11-03 PROCEDURE — 83605 ASSAY OF LACTIC ACID: CPT | Performed by: EMERGENCY MEDICINE

## 2023-11-03 PROCEDURE — 87040 BLOOD CULTURE FOR BACTERIA: CPT | Performed by: EMERGENCY MEDICINE

## 2023-11-03 PROCEDURE — 80053 COMPREHEN METABOLIC PANEL: CPT | Performed by: EMERGENCY MEDICINE

## 2023-11-03 PROCEDURE — 73590 X-RAY EXAM OF LOWER LEG: CPT

## 2023-11-03 PROCEDURE — 85730 THROMBOPLASTIN TIME PARTIAL: CPT | Performed by: EMERGENCY MEDICINE

## 2023-11-03 PROCEDURE — 73630 X-RAY EXAM OF FOOT: CPT

## 2023-11-03 RX ORDER — ACETAMINOPHEN 325 MG/1
975 TABLET ORAL ONCE
Status: COMPLETED | OUTPATIENT
Start: 2023-11-03 | End: 2023-11-03

## 2023-11-03 RX ORDER — CEPHALEXIN 250 MG/1
1000 CAPSULE ORAL ONCE
Status: COMPLETED | OUTPATIENT
Start: 2023-11-03 | End: 2023-11-03

## 2023-11-03 RX ORDER — CEFADROXIL 500 MG/1
500 CAPSULE ORAL EVERY 12 HOURS SCHEDULED
Qty: 14 CAPSULE | Refills: 0 | Status: SHIPPED | OUTPATIENT
Start: 2023-11-03 | End: 2023-11-03 | Stop reason: SDUPTHER

## 2023-11-03 RX ORDER — CEFADROXIL 500 MG/1
500 CAPSULE ORAL EVERY 12 HOURS SCHEDULED
Qty: 14 CAPSULE | Refills: 0 | Status: SHIPPED | OUTPATIENT
Start: 2023-11-03 | End: 2023-11-10

## 2023-11-03 RX ADMIN — ACETAMINOPHEN 975 MG: 325 TABLET, FILM COATED ORAL at 13:04

## 2023-11-03 RX ADMIN — CEPHALEXIN 1000 MG: 250 CAPSULE ORAL at 14:47

## 2023-11-03 NOTE — ED PROVIDER NOTES
History  Chief Complaint   Patient presents with    Leg Swelling     Pt has lle swelling and erythema     52-year-old male accompanied by spouse describes noticing left ankle pain and swelling last evening, when he was getting up to standing, unable to bear weight without discomfort. No fever or chills. No prior episodes. Unaware of injury. No other complaints. History provided by:  Patient  Ankle Pain  Location:  Ankle  Time since incident:  1 day  Ankle location:  L ankle  Pain details:     Quality:  Aching    Radiates to:  Does not radiate    Severity:  Moderate    Onset quality:  Gradual    Timing:  Constant    Progression:  Unchanged  Chronicity:  New  Prior injury to area:  No  Relieved by:  Acetaminophen  Worsened by:  Bearing weight  Ineffective treatments:  None tried  Associated symptoms: no fever    Risk factors: no concern for non-accidental trauma        Prior to Admission Medications   Prescriptions Last Dose Informant Patient Reported? Taking?    Cholecalciferol (Vitamin D3) 50 MCG (2000 UT) TABS  Self Yes No   Diclofenac Sodium (VOLTAREN) 1 %   Yes No   Multiple Vitamin (MULTIVITAMINS PO)   Yes No   Sig: Take 1 tablet by mouth daily   Spiriva Respimat 2.5 MCG/ACT AERS inhaler  Self Yes No   Sig: Inhale 2 puffs daily    apixaban (ELIQUIS) 5 mg  Self No No   Sig: Take 1 tablet (5 mg total) by mouth 2 (two) times a day   ascorbic acid (VITAMIN C) 500 mg tablet   Yes No   bisacodyl (DULCOLAX) 5 mg EC tablet  Self Yes No   Sig: Take 5 mg by mouth daily     cetirizine (ZyrTEC) 10 mg tablet  Self Yes No   Sig: Take 10 mg by mouth daily   diltiazem (CARDIZEM CD) 300 mg 24 hr capsule  Self No No   Sig: Take 1 capsule (300 mg total) by mouth daily   docusate sodium (COLACE) 100 mg capsule  Self Yes No   Sig: Take 100 mg by mouth    finasteride (PROSCAR) 5 mg tablet  Self Yes No   Sig: Take 5 mg by mouth daily    fluticasone-salmeterol (ADVAIR, WIXELA) 250-50 mcg/dose inhaler  Self Yes No   Sig: Inhale 1 puff 2 (two) times a day Rinse mouth after use. ipratropium (ATROVENT) 0.02 % nebulizer solution  Self Yes No   Sig: Take 2.5 mL (0.5 mg total) by nebulization in the morning   magnesium oxide (MAG-OX) 400 mg tablet   Yes No   Sig: Take 1 tablet by mouth daily   metolazone (ZAROXOLYN) 2.5 mg tablet   No No   Sig: Take 1 tablet (2.5 mg total) by mouth once a week Take on Thursdays   metoprolol succinate (TOPROL-XL) 25 mg 24 hr tablet   No No   Sig: Take 1 tablet (25 mg total) by mouth daily   montelukast (SINGULAIR) 10 mg tablet  Self Yes No   Sig: Take 10 mg by mouth daily at bedtime   nitroglycerin (NITROSTAT) 0.4 mg SL tablet  Self Yes No   Patient not taking: Reported on 5/4/2023   potassium chloride (K-DUR,KLOR-CON) 20 mEq tablet   No No   Sig: Take 1 tablet (20 mEq total) by mouth 2 (two) times a day Take extra 20 mEq of potassium on Thursdays for total of 60 mEq for the day   sildenafil (VIAGRA) 100 mg tablet  Self No No   Sig: Take 1 tablet (100 mg total) by mouth daily as needed for erectile dysfunction   torsemide (DEMADEX) 100 mg tablet   No No   Sig: Take 1 tablet (100 mg total) by mouth daily      Facility-Administered Medications: None       Past Medical History:   Diagnosis Date    BPH (benign prostatic hyperplasia)     Chronic obstructive pulmonary disease (COPD) (HCC)     Essential hypertension     Hard of hearing     Pt does wear hearing aids    Hypertension     Lung cancer ([address])     Shortness of breath     Pt states not changes and it occurs with moderate exertion    Sleep disturbance     Pt states he is only sleeping about 3 hours a night. Pt is seeing his PCP on 8/5/21 to discuss    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[address])     Pt has on the scalp    Tinnitus        Past Surgical History:   Procedure Laterality Date    IR BIOPSY LUNG  7/19/2022    SHOULDER OPEN ROTATOR CUFF REPAIR      SKIN GRAFT      spindle cell removal of scalp with skin graft from shoulder    VEIN SURGERY         History reviewed.  No pertinent family history. I have reviewed and agree with the history as documented. E-Cigarette/Vaping    E-Cigarette Use Never User      E-Cigarette/Vaping Substances     Social History     Tobacco Use    Smoking status: Former     Types: Cigars, Cigarettes    Smokeless tobacco: Never   Vaping Use    Vaping Use: Never used   Substance Use Topics    Alcohol use: Yes     Alcohol/week: 14.0 standard drinks of alcohol     Types: 14 Glasses of wine per week     Comment: moderate consumption    Drug use: Not Currently       Review of Systems   Constitutional:  Negative for fever. All other systems reviewed and are negative. Physical Exam  Physical Exam  Vitals and nursing note reviewed. Constitutional:       Comments: Pleasant, comfortable-appearing   HENT:      Head: Normocephalic and atraumatic. Mouth/Throat:      Mouth: Mucous membranes are moist.      Pharynx: Oropharynx is clear. Eyes:      Conjunctiva/sclera: Conjunctivae normal.      Pupils: Pupils are equal, round, and reactive to light. Cardiovascular:      Rate and Rhythm: Normal rate and regular rhythm. Heart sounds: Normal heart sounds. Pulmonary:      Effort: Pulmonary effort is normal.      Breath sounds: Normal breath sounds. Abdominal:      General: Bowel sounds are normal. There is no distension. Palpations: Abdomen is soft. Tenderness: There is no abdominal tenderness. Musculoskeletal:         General: Tenderness present. No deformity. Cervical back: Neck supple. Left lower leg: Edema present.       Comments: Distal leg, ankle and foot swelling with distal leg/ankle erythema, pedal edema, tender anterior talofibular area with decreased range of motion, markedly increased discomfort with internal rotation/flexion of ankle, dorsal pedal pulses intact, good capillary refill distally, no interdigital erythema or cellulitic changes, erythematous area generally nontender   Skin:     General: Skin is warm and dry.   Neurological:      General: No focal deficit present. Mental Status: He is alert and oriented to person, place, and time. Cranial Nerves: No cranial nerve deficit. Coordination: Coordination normal.   Psychiatric:         Behavior: Behavior normal.         Thought Content:  Thought content normal.         Judgment: Judgment normal.         Vital Signs  ED Triage Vitals [11/03/23 1235]   Temperature Pulse Respirations Blood Pressure SpO2   97.6 °F (36.4 °C) (!) 50 20 111/64 98 %      Temp Source Heart Rate Source Patient Position - Orthostatic VS BP Location FiO2 (%)   Temporal Monitor Sitting Left arm --      Pain Score       10 - Worst Possible Pain           Vitals:    11/03/23 1235   BP: 111/64   Pulse: (!) 50   Patient Position - Orthostatic VS: Sitting         Visual Acuity      ED Medications  Medications   cephalexin (KEFLEX) capsule 1,000 mg (has no administration in time range)   acetaminophen (TYLENOL) tablet 975 mg (975 mg Oral Given 11/3/23 1304)       Diagnostic Studies  Results Reviewed       Procedure Component Value Units Date/Time    Manual Differential(PHLEBS Do Not Order) [[id number]]  (Abnormal) Collected: 11/03/23 1304    Lab Status: Final result Specimen: Blood from Arm, Left Updated: 11/03/23 1415     Segmented % 74 %      Bands % 1 %      Lymphocytes % 16 %      Monocytes % 6 %      Eosinophils, % 0 %      Basophils % 0 %      Atypical Lymphocytes % 3 %      Absolute Neutrophils 8.09 Thousand/uL      Lymphocytes Absolute 2.05 Thousand/uL      Monocytes Absolute 0.65 Thousand/uL      Eosinophils Absolute 0.00 Thousand/uL      Basophils Absolute 0.00 Thousand/uL      Total Counted --     Platelet Estimate Adequate     Stomatocytes Present    Procalcitonin [[id number]]  (Normal) Collected: 11/03/23 1304    Lab Status: Final result Specimen: Blood from Arm, Left Updated: 11/03/23 1356     Procalcitonin 0.12 ng/ml     Comprehensive metabolic panel [[id number]]  (Abnormal) Collected: 11/03/23 1304    Lab Status: Final result Specimen: Blood from Arm, Left Updated: 11/03/23 1334     Sodium 139 mmol/L      Potassium 4.0 mmol/L      Chloride 89 mmol/L      CO2 43 mmol/L      ANION GAP 7 mmol/L      BUN 35 mg/dL      Creatinine 1.41 mg/dL      Glucose 102 mg/dL      Calcium 9.5 mg/dL      AST 14 U/L      ALT 5 U/L      Alkaline Phosphatase 76 U/L      Total Protein 6.7 g/dL      Albumin 3.9 g/dL      Total Bilirubin 0.77 mg/dL      eGFR 46 ml/min/1.73sq m     Narrative:      [facility] guidelines for Chronic Kidney Disease (CKD):     Stage 1 with normal or high GFR (GFR > 90 mL/min/1.73 square meters)    Stage 2 Mild CKD (GFR = 60-89 mL/min/1.73 square meters)    Stage 3A Moderate CKD (GFR = 45-59 mL/min/1.73 square meters)    Stage 3B Moderate CKD (GFR = 30-44 mL/min/1.73 square meters)    Stage 4 Severe CKD (GFR = 15-29 mL/min/1.73 square meters)    Stage 5 End Stage CKD (GFR <15 mL/min/1.73 square meters)  Note: GFR calculation is accurate only with a steady state creatinine    Protime-INR [[id number]]  (Abnormal) Collected: 11/03/23 1304    Lab Status: Final result Specimen: Blood from Arm, Left Updated: 11/03/23 1332     Protime 17.1 seconds      INR 1.36    APTT [[id number]]  (Abnormal) Collected: 11/03/23 1304    Lab Status: Final result Specimen: Blood from Arm, Left Updated: 11/03/23 1332     PTT 46 seconds     CBC and differential [[id number]]  (Abnormal) Collected: 11/03/23 1304    Lab Status: Final result Specimen: Blood from Arm, Left Updated: 11/03/23 1332     WBC 10.78 Thousand/uL      RBC 4.17 Million/uL      Hemoglobin 13.1 g/dL      Hematocrit 41.9 %       fL      MCH 31.4 pg      MCHC 31.3 g/dL      RDW 14.4 %      MPV 9.7 fL      Platelets 216 Thousands/uL     Lactic acid, plasma (w/reflex if result > 2.0) [[id number]]  (Normal) Collected: 11/03/23 1302    Lab Status: Final result Specimen: Blood from Arm, Left Updated: 11/03/23 4274 LACTIC ACID 1.5 mmol/L     Narrative:      Result may be elevated if tourniquet was used during collection. Blood culture #1 [[id number]] Collected: 11/03/23 1304    Lab Status: In process Specimen: Blood from Arm, Right Updated: 11/03/23 1309    Blood culture #2 [[id number]] Collected: 11/03/23 1304    Lab Status: In process Specimen: Blood from Arm, Left Updated: 11/03/23 1308                   XR tibia fibula 2 views LEFT   Final Result by Chrissie Hernandez MD (11/03 1317)      No acute osseous abnormalities in the left ankle, left tibia/fibula or left foot. Workstation performed: [id number]         XR ankle 3+ views LEFT   Final Result by Chrissie Hernandez MD (11/03 1317)      No acute osseous abnormalities in the left ankle, left tibia/fibula or left foot. Workstation performed: [id number]         XR foot 3+ views LEFT   Final Result by Chrissie Hernandez MD (11/03 1317)      No acute osseous abnormalities in the left ankle, left tibia/fibula or left foot. Workstation performed: [id number]                    Procedures  Procedures         ED Course  ED Course as of 11/03/23 1437   Fri Nov 03, 2023   1328 LACTIC ACID: 1.5   1328 WBC(!): 10.78   1431 Creatinine(!): 1.41  Mildly elevated   1431 Procalcitonin: 0.12   1431 LACTIC ACID: 1.5   1436 We discussed results, concerns of infection and close outpatient follow-up and agreeable, spouse present and supportive, will return if worse or any new symptoms                               SBIRT 20yo+      Flowsheet Row Most Recent Value   Initial Alcohol Screen: US AUDIT-C     1. How often do you have a drink containing alcohol? 0 Filed at: 11/03/2023 1234   2. How many drinks containing alcohol do you have on a typical day you are drinking? 0 Filed at: 11/03/2023 1234   3b. FEMALE Any Age, or MALE 65+: How often do you have 4 or more drinks on one occassion?  0 Filed at: 11/03/2023 1234   Audit-C Score 0 Filed at: 11/03/2023 1234   SENAIT: How many times in the past year have you. .. Used an illegal drug or used a prescription medication for non-medical reasons? Never Filed at: 11/03/2023 1234                      Medical Decision Making  Amount and/or Complexity of Data Reviewed  Independent Historian: spouse  Labs: ordered. Decision-making details documented in ED Course. Radiology: ordered. Decision-making details documented in ED Course. ECG/medicine tests: ordered and independent interpretation performed. Decision-making details documented in ED Course. Risk  OTC drugs. Prescription drug management. Disposition  Final diagnoses:   Cellulitis   Acute left ankle pain     Time reflects when diagnosis was documented in both MDM as applicable and the Disposition within this note       Time User Action Codes Description Comment    11/3/2023  2:34 PM Vira Frausto Add [L03.90] Cellulitis     11/3/2023  2:34 PM Vira Frausto Add [M25.572] Acute left ankle pain           ED Disposition       ED Disposition   Discharge    Condition   Stable    Date/Time   Fri Nov 3, 2023 1434    [address] discharge to home/self care. Follow-up Information       Follow up With Specialties Details Why Contact Info    Mera Man DO Internal Medicine Schedule an appointment as soon as possible for a visit in 2 days  [place] [address] [address]  [address] [facility]  [id number] [place] [address].  [phone]              Patient's Medications   Discharge Prescriptions    CEFADROXIL (DURICEF) 500 MG CAPSULE    Take 1 capsule (500 mg total) by mouth every 12 (twelve) hours for 7 days       Start Date: 11/3/2023 End Date: 11/10/2023       Order Dose: 500 mg       Quantity: 14 capsule    Refills: 0       No discharge procedures on file.     PDMP Review         Value Time User    PDMP Reviewed  Yes 8/9/2021  4:02 PM Deja Kearney MD            ED Provider  Electronically Signed by             Vira Frausto DO  11/03/23 3094

## 2023-11-05 LAB
BACTERIA BLD CULT: NORMAL
BACTERIA BLD CULT: NORMAL

## 2023-11-06 DIAGNOSIS — R91.1 PULMONARY NODULE: Primary | ICD-10-CM

## 2023-11-06 NOTE — TELEPHONE ENCOUNTER
Returned call to pt and left VM advising per [facility]: that she reviewed with Dr. Lane Rosa and will observe the new nodule. He said repeat scan in 4 months with a follow up visit. Teams number provided for call back with any questions.

## 2023-11-08 LAB
BACTERIA BLD CULT: NORMAL
BACTERIA BLD CULT: NORMAL

## 2023-12-21 NOTE — TELEPHONE ENCOUNTER
Patient transferred from SICU to [facility]. Upon arrival patient was oriented to room and call light. Dual skin assessment was done with Juju WEATHERS.   Pt called and said that he needs a #90 day script called into his [facility] Medication is Cardizem 180mg  Any questions please call [phone]

## 2023-12-26 ENCOUNTER — APPOINTMENT (OUTPATIENT)
Dept: LAB | Facility: HOSPITAL | Age: 83
End: 2023-12-26
Payer: MEDICARE

## 2023-12-26 ENCOUNTER — OFFICE VISIT (OUTPATIENT)
Dept: CARDIOLOGY CLINIC | Facility: CLINIC | Age: 83
End: 2023-12-26
Payer: MEDICARE

## 2023-12-26 VITALS
HEIGHT: 69 IN | WEIGHT: 152.4 LBS | DIASTOLIC BLOOD PRESSURE: 58 MMHG | BODY MASS INDEX: 22.57 KG/M2 | SYSTOLIC BLOOD PRESSURE: 110 MMHG | OXYGEN SATURATION: 84 % | TEMPERATURE: 97.3 F | HEART RATE: 51 BPM | RESPIRATION RATE: 20 BRPM

## 2023-12-26 DIAGNOSIS — R60.0 LOCALIZED EDEMA: ICD-10-CM

## 2023-12-26 DIAGNOSIS — I10 ESSENTIAL HYPERTENSION: ICD-10-CM

## 2023-12-26 DIAGNOSIS — I45.10 RBBB: ICD-10-CM

## 2023-12-26 DIAGNOSIS — N18.31 STAGE 3A CHRONIC KIDNEY DISEASE (HCC): ICD-10-CM

## 2023-12-26 DIAGNOSIS — I50.42 CHRONIC COMBINED SYSTOLIC AND DIASTOLIC HEART FAILURE (HCC): ICD-10-CM

## 2023-12-26 DIAGNOSIS — I49.3 FREQUENT PVCS: ICD-10-CM

## 2023-12-26 DIAGNOSIS — N18.30 STAGE 3 CHRONIC KIDNEY DISEASE, UNSPECIFIED WHETHER STAGE 3A OR 3B CKD (HCC): Primary | ICD-10-CM

## 2023-12-26 DIAGNOSIS — N18.30 STAGE 3 CHRONIC KIDNEY DISEASE, UNSPECIFIED WHETHER STAGE 3A OR 3B CKD (HCC): ICD-10-CM

## 2023-12-26 LAB
ANION GAP SERPL CALCULATED.3IONS-SCNC: 5 MMOL/L
BNP SERPL-MCNC: 291 PG/ML (ref 0–100)
BUN SERPL-MCNC: 29 MG/DL (ref 5–25)
CALCIUM SERPL-MCNC: 9.9 MG/DL (ref 8.4–10.2)
CHLORIDE SERPL-SCNC: 93 MMOL/L (ref 96–108)
CO2 SERPL-SCNC: 45 MMOL/L (ref 21–32)
CREAT SERPL-MCNC: 1.23 MG/DL (ref 0.6–1.3)
GFR SERPL CREATININE-BSD FRML MDRD: 54 ML/MIN/1.73SQ M
GLUCOSE P FAST SERPL-MCNC: 91 MG/DL (ref 65–99)
POTASSIUM SERPL-SCNC: 5 MMOL/L (ref 3.5–5.3)
SODIUM SERPL-SCNC: 143 MMOL/L (ref 135–147)

## 2023-12-26 PROCEDURE — 83880 ASSAY OF NATRIURETIC PEPTIDE: CPT

## 2023-12-26 PROCEDURE — 36415 COLL VENOUS BLD VENIPUNCTURE: CPT | Performed by: INTERNAL MEDICINE

## 2023-12-26 PROCEDURE — 80048 BASIC METABOLIC PNL TOTAL CA: CPT | Performed by: INTERNAL MEDICINE

## 2023-12-26 PROCEDURE — 99215 OFFICE O/P EST HI 40 MIN: CPT | Performed by: INTERNAL MEDICINE

## 2023-12-26 RX ORDER — LACTOSE-REDUCED FOOD
LIQUID (ML) ORAL
COMMUNITY
Start: 2023-10-04

## 2023-12-26 RX ORDER — AMMONIUM LACTATE 12 G/100G
LOTION TOPICAL
COMMUNITY
Start: 2023-12-04

## 2023-12-26 RX ORDER — PSEUDOEPHED/ACETAMINOPH/DIPHEN 30MG-500MG
TABLET ORAL
COMMUNITY
Start: 2023-10-26

## 2023-12-26 RX ORDER — TORSEMIDE 100 MG/1
100 TABLET ORAL SEE ADMIN INSTRUCTIONS
Start: 2023-12-26

## 2023-12-26 NOTE — PATIENT INSTRUCTIONS
CARDIOLOGY ASSESSMENT & PLAN     1. Stage 3 chronic kidney disease, unspecified whether stage 3a or 3b CKD (HCC)  Basic metabolic panel    B-Type Natriuretic Peptide(BNP)      2. RBBB        3. Frequent PVCs        4. Chronic combined systolic and diastolic heart failure (HCC)        5. Essential hypertension        6. Stage 3a chronic kidney disease (HCC)        7. Localized edema  torsemide (DEMADEX) 100 mg tablet        Chronic combined systolic and diastolic heart failure (HCC)  Wt Readings from Last 3 Encounters:   12/26/23 69.1 kg (152 lb 6.4 oz)   11/03/23 72.4 kg (159 lb 9.8 oz)   05/09/23 70.4 kg (155 lb 3.2 oz)     Mr. Liu Angel is overall stable from cardiac perspective.  On clinical exam he appears to be dry.  His weight is improved compared to before.  His last blood work in November indicated rising BUN and creatinine and declining GFR.    -- At this time I am advising him to lower the frequency of torsemide to 100 mg 5 times weekly.  He is advised to take every day except Monday and Friday when he is not supposed to take it.  He is advised to continue all other medications.  -- I am requesting a follow-up basic metabolic panel and a BNP level.  -- We will plan to see him back in 4 months time.  Advising him to weigh himself regularly and to decrease salt intake.  -- Dietary and medical compliance are reinforced.  -- He is advised  to report any concerning symptoms such as chest pain, shortness of breath, decline in exercise tolerance or presyncope/syncope.    The following routine measures are being advised to prevent exacerbation of congestive heart failure:     - Daily or atleast weekly checking of weight.    Notify your clinicians if greater than 2 lb is gained in 1 day or greater than 5 lb is gained in 1 wk.    - Checking for lower extremity swelling.    Examine legs for swelling (new or increase in existing swelling) and describe if swelling reaches ankle, shin, or knee.    - Monitoring for  decrease in exercise tolerance.    Check your self for unusual shortness of breath at rest, or with mild exertion, moderate exertion, or none at all.    - Monitoring for worsening shortness of breath on lying down.    Check for increase in number of pillows used at night and for increase in waking at night with shortness of breath.    - Salt/sodium restriction to less than 2 g a day.    Calculate total sodium intake in a day from nutrition labels and food charts. Understand hidden sources of salt intake.  Eliminate the salt shaker. (Remember, One teaspoon of table salt = 2,300 mg of sodium)   Avoid using garlic salt, onion salt, MSG, meat tenderizers, broth mixes, Chinese food, soy sauce, teriyaki sauce, barbeque sauce, sauerkraut, olives, pickles, pickle relish, muñiz bits, and croutons.   Use fresh ingredients and/or foods with no added salt.  Try orange, lemon, lime, pineapple juice, or vinegar as a base for meat marinades or to add tart flavor.  Avoid convenience foods such as canned soups, entrees, vegetables, pasta and rice mixes, frozen dinners, instant cereal and puddings, and gravy sauce mixes.   Select frozen meals that contain around 600 mg sodium or less.  Use fresh, frozen, no-added-salt canned vegetables, low-sodium soups, and low-sodium lunchmeats.  Look for seasoning or spice blends with no salt, or try fresh herbs, onions, or garlic.    You are advised to inform us for any concerns regarding above measures.

## 2023-12-26 NOTE — ASSESSMENT & PLAN NOTE
Wt Readings from Last 3 Encounters:   12/26/23 69.1 kg (152 lb 6.4 oz)   11/03/23 72.4 kg (159 lb 9.8 oz)   05/09/23 70.4 kg (155 lb 3.2 oz)     Mr. Liu Angel is overall stable from cardiac perspective.  On clinical exam he appears to be dry.  His weight is improved compared to before.  His last blood work in November indicated rising BUN and creatinine and declining GFR.    -- At this time I am advising him to lower the frequency of torsemide to 100 mg 5 times weekly.  He is advised to take every day except Monday and Friday when he is not supposed to take it.  He is advised to continue all other medications.  -- I am requesting a follow-up basic metabolic panel and a BNP level.  -- We will plan to see him back in 4 months time.  Advising him to weigh himself regularly and to decrease salt intake.  -- Dietary and medical compliance are reinforced.  -- He is advised  to report any concerning symptoms such as chest pain, shortness of breath, decline in exercise tolerance or presyncope/syncope.    The following routine measures are being advised to prevent exacerbation of congestive heart failure:     - Daily or atleast weekly checking of weight.    Notify your clinicians if greater than 2 lb is gained in 1 day or greater than 5 lb is gained in 1 wk.    - Checking for lower extremity swelling.    Examine legs for swelling (new or increase in existing swelling) and describe if swelling reaches ankle, shin, or knee.    - Monitoring for decrease in exercise tolerance.    Check your self for unusual shortness of breath at rest, or with mild exertion, moderate exertion, or none at all.    - Monitoring for worsening shortness of breath on lying down.    Check for increase in number of pillows used at night and for increase in waking at night with shortness of breath.    - Salt/sodium restriction to less than 2 g a day.    Calculate total sodium intake in a day from nutrition labels and food charts. Understand hidden  sources of salt intake.  Eliminate the salt shaker. (Remember, One teaspoon of table salt = 2,300 mg of sodium)   Avoid using garlic salt, onion salt, MSG, meat tenderizers, broth mixes, Chinese food, soy sauce, teriyaki sauce, barbeque sauce, sauerkraut, olives, pickles, pickle relish, muñiz bits, and croutons.   Use fresh ingredients and/or foods with no added salt.  Try orange, lemon, lime, pineapple juice, or vinegar as a base for meat marinades or to add tart flavor.  Avoid convenience foods such as canned soups, entrees, vegetables, pasta and rice mixes, frozen dinners, instant cereal and puddings, and gravy sauce mixes.   Select frozen meals that contain around 600 mg sodium or less.  Use fresh, frozen, no-added-salt canned vegetables, low-sodium soups, and low-sodium lunchmeats.  Look for seasoning or spice blends with no salt, or try fresh herbs, onions, or garlic.    You are advised to inform us for any concerns regarding above measures.

## 2023-12-26 NOTE — PROGRESS NOTES
[facility]  [address] [place] [address]  Phone#  [phone]. Fax#  [phone]  *-*-*-*-*-*-*-*-*-*-*-*-*-*-*-*-*-*-*-*-*-*-*-*-*-*-*-*-*-*-*-*-*-*-*-*-*-*-*-*-*-*-*-*-*-*-*-*-*-*-*-*-*-*  ENCOUNTER DATE: 12/26/23 4:35 PM  PATIENT NAME: Liu Angel   1940    [id number]  AGE:83 y.o.      SEX: male    ENCOUNTER PROVIDER:Brissa Carrasquillo MD     PRIMARY CARE PHYSICIAN: Lenore Correia DO    DIAGNOSES:  1.  Longstanding persistent atrial fibrillation  2.  Symptomatic PVCs  3.  Nonischemic cardiomyopathy  4.  Chronic combined systolic and diastolic heart failure  5.  Primary hypertension  6.  COPD with hypoxemic respiratory failure  7.  BPH  8. 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     HOLTER MONITOR 8/9/2023:  The patient had predominantly atrial fibrillation.   Heart rate varied from 44 bpm to 120 bpm.  The patient's average heart rate was 84 bpm.    The patient had a holter monitor tracing done for 23 hours and 59 minutes.    The patient had 38,192 or 31.9% (of the total heartbeats) ventricular ectopic activity.  Some of the premature ventricular contractions may actually be supraventricular with aberrancy.  The patient had 1 run of nonsustained ventricular tachycardia for 4 beats at a rate of 135 bpm.  The patient had 15 ventricular triplets. The patient had 279 ventricular couplets.   The patient had 3 ventricular R on T events   The patient had 0 supraventricular ectopy.    The longest R/R interval was 1.9 seconds.  No other major arrhythmia was noted.  No cardiac symptoms were listed in the patient's diary attached.    ECHOCARDIOGRAM 4/24/2023:  Left ventricle cavity size is upper limit of normal, left ventricular systolic function is mildly reduced, EF 45%, global hypokinesis, frequent PVC noted during imaging and this may affect EF assessment  RV at upper limit of normal, reduced RV function.  Biatrial  enlargement  Trace aortic valve regurgitation  Mild mitral valve thickening with calcification, mild mitral valve regurgitation.  Mild tricuspid and pulmonic valve regurgitation.  Dilated aortic root measuring 4.10 cm, normal ascending aorta diameter  No pulmonary hypertension  No pericardial effusion    ZIO XT extended Holter monitor 3/18-3/2//2021:  Strips above reviewed. Agree with findings as documented.  100% atrial fibrillation burden with HR range () with an Avg HR of 94 bpm.  Frequent PVC's (16.4%).  Rare ventricular couplets and triplets.  Ventricular Bigeminy and Trigeminy noted.    ECHOCARDIOGRAM 2/25/2021:  Left ventricular ejection fraction around 45 to 50%  Normal RV size and function  Mild biatrial enlargement  Normal aortic valve without stenosis or regurgitation  Mitral valve regurgitation, mild  Trace tricuspid and pulmonic valve regurgitation  Trace pericardial effusion    CURRENT ECG   No results found for this visit on 12/26/23.      CARDIOLOGY ASSESSMENT & PLAN     1. Stage 3 chronic kidney disease, unspecified whether stage 3a or 3b CKD (HCC)  Basic metabolic panel    B-Type Natriuretic Peptide(BNP)      2. RBBB        3. Frequent PVCs        4. Chronic combined systolic and diastolic heart failure (HCC)        5. Essential hypertension        6. Stage 3a chronic kidney disease (HCC)        7. Localized edema  torsemide (DEMADEX) 100 mg tablet        Chronic combined systolic and diastolic heart failure (HCC)  Wt Readings from Last 3 Encounters:   12/26/23 69.1 kg (152 lb 6.4 oz)   11/03/23 72.4 kg (159 lb 9.8 oz)   05/09/23 70.4 kg (155 lb 3.2 oz)     Mr. Liu Angel is overall stable from cardiac perspective.  On clinical exam he appears to be dry.  His weight is improved compared to before.  His last blood work in November indicated rising BUN and creatinine and declining GFR.    -- At this time I am advising him to lower the frequency of torsemide to 100 mg 5 times weekly.  He is  advised to take every day except Monday and Friday when he is not supposed to take it.  He is advised to continue all other medications.  -- I am requesting a follow-up basic metabolic panel and a BNP level.  -- We will plan to see him back in 4 months time.  Advising him to weigh himself regularly and to decrease salt intake.  -- Dietary and medical compliance are reinforced.  -- He is advised  to report any concerning symptoms such as chest pain, shortness of breath, decline in exercise tolerance or presyncope/syncope.    The following routine measures are being advised to prevent exacerbation of congestive heart failure:     - Daily or atleast weekly checking of weight.    Notify your clinicians if greater than 2 lb is gained in 1 day or greater than 5 lb is gained in 1 wk.    - Checking for lower extremity swelling.    Examine legs for swelling (new or increase in existing swelling) and describe if swelling reaches ankle, shin, or knee.    - Monitoring for decrease in exercise tolerance.    Check your self for unusual shortness of breath at rest, or with mild exertion, moderate exertion, or none at all.    - Monitoring for worsening shortness of breath on lying down.    Check for increase in number of pillows used at night and for increase in waking at night with shortness of breath.    - Salt/sodium restriction to less than 2 g a day.    Calculate total sodium intake in a day from nutrition labels and food charts. Understand hidden sources of salt intake.  Eliminate the salt shaker. (Remember, One teaspoon of table salt = 2,300 mg of sodium)   Avoid using garlic salt, onion salt, MSG, meat tenderizers, broth mixes, Chinese food, soy sauce, teriyaki sauce, barbeque sauce, sauerkraut, olives, pickles, pickle relish, muñiz bits, and croutons.   Use fresh ingredients and/or foods with no added salt.  Try orange, lemon, lime, pineapple juice, or vinegar as a base for meat marinades or to add tart flavor.  Avoid  convenience foods such as canned soups, entrees, vegetables, pasta and rice mixes, frozen dinners, instant cereal and puddings, and gravy sauce mixes.   Select frozen meals that contain around 600 mg sodium or less.  Use fresh, frozen, no-added-salt canned vegetables, low-sodium soups, and low-sodium lunchmeats.  Look for seasoning or spice blends with no salt, or try fresh herbs, onions, or garlic.    You are advised to inform us for any concerns regarding above measures.            INTERVAL HISTORY, HISTORY OF PRESENT ILLNESS & COMPREHENSIVE VISIT INFORMATION     Liu Angel is here for follow-up regarding his cardiac comorbidities which include: Frequent PVCs, pulm atrial fibrillation, chronic heart failure with mildly reduced left ventricular function and other comorbidities.  Seen patient April 20.  He is prior cardiac history summarized above.  He is here for follow-up accompanied with his wife Constance.  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  He follows with Dr. Jabier Melgar.  Chemotherapy has been deferred as the tumor seems to change and disappear and reappear on serial imaging.    From a symptom perspective he reports that he has chronic shortness of breath but this is overall stable.  He denies any worsening lower extremity edema.  He remains on continuous oxygen therapy and sleeps with BiPAP with oxygen support.  He denies any orthopnea PND or palpitations.  Denies any passing out or near passing out episodes.    Functional capacity status: Poor, limited due to severe COPD   (Excellent- >10 METs; Good: (7-10 METs); Moderate (4-7 METs); Poor (<= 4 METs)    Any chronic stressors: None   (feeling of poor health, financial problems, and social isolation etc).    Tobacco or alcohol dependence: He is a chronic smoker.  Says that he smokes less than half a pack of cigarettes a day and sometimes maybe 2 cigarettes a day.  Reports having a glass  "of wine with dinner.    Current cardiac medications: Apixaban 5 mg twice daily diltiazem CD3 100 mg daily metoprolol succinate 25 mg daily torsemide 100 mg daily    Last recent comprehensive blood work available:   Blood work from 11/3/2023:  Sodium 139 potassium 4.0 chloride 89 bicarb 43 BUN 35 creatinine 1.41 GFR 46  Normal LFTs  TSH 0.2 01 April 2023 Free T3 1.65 Free T4 1.46    REVIEW OF SYSTEMS   Positive for: As noted above in HPI  Negative for: All remaining as reviewed below and in HPI.    SYSTEM SYMPTOMS REVIEWED:  General--weight change, fever, night sweats  Respiratory--cough, wheezing, shortness of breath, sputum production  Cardiovascular--chest pain, syncope, dyspnea on exertion, edema, decline in exercise tolerance, claudication   Gastrointestinal--persistent vomiting, diarrhea, abdominal distention, blood in stool   Muscular or skeletal--joint pain or swelling   Neurologic--headaches, syncope, abnormal movement  Hematologic--history of easy bruising and bleeding   Endocrine--thyroid enlargement, heat or cold intolerance, polyuria   Psychiatric--anxiety, depression     *-*-*-*-*-*-*-*-*-*-*-*-*-*-*-*-*-*-*-*-*-*-*-*-*-*-*-*-*-*-*-*-*-*-*-*-*-*-*-*-*-*-*-*-*-*-*-*-*-*-*-*-*-*-  VITAL SIGNS     CURRENT VITAL SIGNS:   Vitals:    12/26/23 1151   BP: 110/58   Pulse: (!) 51   Resp: 20   Temp: (!) 97.3 °F (36.3 °C)   SpO2: (!) 84%   Weight: 69.1 kg (152 lb 6.4 oz)   Height: 5' 9\" (1.753 m)       BMI: Body mass index is 22.51 kg/m².    WEIGHTS:   Wt Readings from Last 25 Encounters:   12/26/23 69.1 kg (152 lb 6.4 oz)   11/03/23 72.4 kg (159 lb 9.8 oz)   05/09/23 70.4 kg (155 lb 3.2 oz)   05/04/23 71.7 kg (158 lb)   04/25/23 73.7 kg (162 lb 7.7 oz)   03/06/23 78 kg (172 lb)   01/06/23 78.7 kg (173 lb 6.4 oz)   10/28/22 81.2 kg (179 lb)   10/27/22 81.4 kg (179 lb 6.4 oz)   10/14/22 82.2 kg (181 lb 3.2 oz)   09/22/22 81.6 kg (180 lb)   09/21/22 81.9 kg (180 lb 9.6 oz)   09/14/22 81 kg (178 lb 9.2 oz)   06/29/22 " 83 kg (183 lb)   05/27/22 82.1 kg (181 lb)   02/03/22 83 kg (183 lb)   12/01/21 81.6 kg (180 lb)   11/02/21 80.3 kg (177 lb)   09/20/21 81.2 kg (179 lb)   09/07/21 82.1 kg (181 lb)   08/18/21 82.1 kg (181 lb)   08/09/21 82.1 kg (181 lb)   06/17/21 82.1 kg (181 lb)   03/18/21 82.8 kg (182 lb 9.6 oz)   02/25/21 85.5 kg (188 lb 9.6 oz)        *-*-*-*-*-*-*-*-*-*-*-*-*-*-*-*-*-*-*-*-*-*-*-*-*-*-*-*-*-*-*-*-*-*-*-*-*-*-*-*-*-*-*-*-*-*-*-*-*-*-*-*-*-*-  PHYSICAL EXAM     General Appearance:    Alert, cooperative, no distress, appears stated age   Head, Eyes, ENT:  Some signs of protein calorie malnutrition, mucous membranes dry   Neck:   Supple, no carotid bruit or JVD   Back:     Symmetric, kyphosis   Lungs:     Respirations unlabored. Clear to auscultation bilaterally,    Chest wall:    No tenderness or deformity   Heart:  Irregularly irregular rhythm, unable to appreciate murmur   Abdomen:     Soft, non-tender,    Extremities:   Extremities warm, no cyanosis or edema    Skin: Mild venostatic changes in lower extremities. Normal skin color, texture, and turgor. No rashes or lesions     *-*-*-*-*-*-*-*-*-*-*-*-*-*-*-*-*-*-*-*-*-*-*-*-*-*-*-*-*-*-*-*-*-*-*-*-*-*-*-*-*-*-*-*-*-*-*-*-*-*-*-*-*-*-  CURRENT MEDICATIONS LIST     Current Outpatient Medications:     Acetaminophen Extra Strength 500 MG TABS, , Disp: , Rfl:     ammonium lactate (LAC-HYDRIN) 12 % lotion, APPLY MEDIUM AMOUNT ON AFFECTED AREA ON SKIN TWICE A DAY - APPLY TO DRY SKIN FROM THE NECK DOWN 2 TIMES PER DAY, Disp: , Rfl:     apixaban (ELIQUIS) 5 mg, Take 1 tablet (5 mg total) by mouth 2 (two) times a day, Disp: 180 tablet, Rfl: 3    ascorbic acid (VITAMIN C) 500 mg tablet, , Disp: , Rfl:     bisacodyl (DULCOLAX) 5 mg EC tablet, Take 5 mg by mouth daily  , Disp: , Rfl:     cetirizine (ZyrTEC) 10 mg tablet, Take 10 mg by mouth daily, Disp: , Rfl:     Cholecalciferol (Vitamin D3) 50 MCG (2000 UT) TABS, , Disp: , Rfl:     Cyanocobalamin 1000 MCG CAPS, 1,000 mcg,  Disp: , Rfl:     Diclofenac Sodium (VOLTAREN) 1 %, , Disp: , Rfl:     diltiazem (CARDIZEM CD) 300 mg 24 hr capsule, Take 1 capsule (300 mg total) by mouth daily, Disp: 90 capsule, Rfl: 3    docusate sodium (COLACE) 100 mg capsule, Take 100 mg by mouth , Disp: , Rfl:     finasteride (PROSCAR) 5 mg tablet, Take 5 mg by mouth daily , Disp: , Rfl:     fluticasone-salmeterol (ADVAIR, WIXELA) 250-50 mcg/dose inhaler, Inhale 1 puff 2 (two) times a day Rinse mouth after use., Disp: , Rfl:     ipratropium (ATROVENT) 0.02 % nebulizer solution, Take 2.5 mL (0.5 mg total) by nebulization in the morning, Disp: , Rfl:     magnesium oxide (MAG-OX) 400 mg tablet, Take 1 tablet by mouth daily, Disp: , Rfl:     metolazone (ZAROXOLYN) 2.5 mg tablet, Take 1 tablet (2.5 mg total) by mouth once a week Take on Thursdays, Disp: 24 tablet, Rfl: 3    metoprolol succinate (TOPROL-XL) 25 mg 24 hr tablet, Take 1 tablet (25 mg total) by mouth daily, Disp: 90 tablet, Rfl: 3    montelukast (SINGULAIR) 10 mg tablet, Take 10 mg by mouth daily at bedtime, Disp: , Rfl:     Multiple Vitamin (MULTIVITAMINS PO), Take 1 tablet by mouth daily, Disp: , Rfl:     Nutritional Supplements (Ensure), TAKE 1 CONTAINER BY MOUTH ONCE EVERY DAY FOR NUTRITION, Disp: , Rfl:     potassium chloride (K-DUR,KLOR-CON) 20 mEq tablet, Take 1 tablet (20 mEq total) by mouth 2 (two) times a day Take extra 20 mEq of potassium on Thursdays for total of 60 mEq for the day, Disp: 192 tablet, Rfl: 3    SENNA CO, TAKE ONE OR TWO TABLETS BY MOUTH AT BEDTIME AS NEEDED FOR CONSTIPATION, Disp: , Rfl:     sildenafil (VIAGRA) 100 mg tablet, Take 1 tablet (100 mg total) by mouth daily as needed for erectile dysfunction, Disp: 4 tablet, Rfl: 11    Spiriva Respimat 2.5 MCG/ACT AERS inhaler, Inhale 2 puffs daily , Disp: , Rfl:     torsemide (DEMADEX) 100 mg tablet, Take 1 tablet (100 mg total) by mouth see administration instructions 5 times a week-- all days except Monday and Friday., Disp: ,  "Rfl:     ACETAMINOPHEN PO, Take 1 tablet by mouth every 6 (six) hours as needed (Patient not taking: Reported on 12/26/2023), Disp: , Rfl:     Ascorbic Acid 500 MG CAPS, 500 mg (Patient not taking: Reported on 12/26/2023), Disp: , Rfl:     nitroglycerin (NITROSTAT) 0.4 mg SL tablet, , Disp: , Rfl:        ALLERGIES     Allergies   Allergen Reactions    Penicillin G Other (See Comments)     PCN Shots Only!!       *-*-*-*-*-*-*-*-*-*-*-*-*-*-*-*-*-*-*-*-*-*-*-*-*-*-*-*-*-*-*-*-*-*-*-*-*-*-*-*-*-*-*-*-*-*-*-*-*-*-*-*-*-*-  LABORATORY DATA   No results found for: \"NA\"  Potassium   Date Value Ref Range Status   12/26/2023 5.0 3.5 - 5.3 mmol/L Final   11/03/2023 4.0 3.5 - 5.3 mmol/L Final   08/22/2023 3.8 3.5 - 5.3 mmol/L Final     Chloride   Date Value Ref Range Status   12/26/2023 93 (L) 96 - 108 mmol/L Final   11/03/2023 89 (L) 96 - 108 mmol/L Final   08/22/2023 94 (L) 96 - 108 mmol/L Final     CO2   Date Value Ref Range Status   12/26/2023 45 (H) 21 - 32 mmol/L Final   11/03/2023 43 (H) 21 - 32 mmol/L Final   08/22/2023 43 (H) 21 - 32 mmol/L Final     BUN   Date Value Ref Range Status   12/26/2023 29 (H) 5 - 25 mg/dL Final   11/03/2023 35 (H) 5 - 25 mg/dL Final   08/22/2023 18 5 - 25 mg/dL Final     Creatinine   Date Value Ref Range Status   12/26/2023 1.23 0.60 - 1.30 mg/dL Final     Comment:     Standardized to IDMS reference method   11/03/2023 1.41 (H) 0.60 - 1.30 mg/dL Final     Comment:     Standardized to IDMS reference method   08/22/2023 1.05 0.60 - 1.30 mg/dL Final     Comment:     Standardized to IDMS reference method     eGFR   Date Value Ref Range Status   12/26/2023 54 ml/min/1.73sq m Final   11/03/2023 46 ml/min/1.73sq m Final   08/22/2023 65 ml/min/1.73sq m Final     Calcium   Date Value Ref Range Status   12/26/2023 9.9 8.4 - 10.2 mg/dL Final   11/03/2023 9.5 8.4 - 10.2 mg/dL Final   08/22/2023 10.1 8.4 - 10.2 mg/dL Final     AST   Date Value Ref Range Status   11/03/2023 14 13 - 39 U/L Final " "  07/28/2023 17 13 - 39 U/L Final   04/21/2023 20 13 - 39 U/L Final     ALT   Date Value Ref Range Status   11/03/2023 5 (L) 7 - 52 U/L Final     Comment:     Specimen collection should occur prior to Sulfasalazine administration due to the potential for falsely depressed results.    07/28/2023 10 7 - 52 U/L Final     Comment:     Specimen collection should occur prior to Sulfasalazine administration due to the potential for falsely depressed results.    04/21/2023 13 7 - 52 U/L Final     Comment:     Specimen collection should occur prior to Sulfasalazine administration due to the potential for falsely depressed results.      Alkaline Phosphatase   Date Value Ref Range Status   11/03/2023 76 34 - 104 U/L Final   07/28/2023 94 34 - 104 U/L Final   04/21/2023 99 34 - 104 U/L Final     Magnesium   Date Value Ref Range Status   05/02/2023 2.0 1.9 - 2.7 mg/dL Final   04/24/2023 1.9 1.9 - 2.7 mg/dL Final   04/23/2023 2.0 1.9 - 2.7 mg/dL Final     WBC   Date Value Ref Range Status   11/03/2023 10.78 (H) 4.31 - 10.16 Thousand/uL Final   07/28/2023 7.97 4.31 - 10.16 Thousand/uL Final   04/23/2023 11.79 (H) 4.31 - 10.16 Thousand/uL Final     Hemoglobin   Date Value Ref Range Status   11/03/2023 13.1 12.0 - 17.0 g/dL Final   07/28/2023 14.4 12.0 - 17.0 g/dL Final   04/23/2023 12.2 12.0 - 17.0 g/dL Final     Platelets   Date Value Ref Range Status   11/03/2023 191 149 - 390 Thousands/uL Final   07/28/2023 254 149 - 390 Thousands/uL Final   04/23/2023 287 149 - 390 Thousands/uL Final     PTT   Date Value Ref Range Status   11/03/2023 46 (H) 23 - 37 seconds Final     Comment:     Therapeutic Heparin Range =  60-90 seconds   04/21/2023 52 (H) 23 - 37 seconds Final     Comment:     Therapeutic Heparin Range =  60-90 seconds     INR   Date Value Ref Range Status   11/03/2023 1.36 (H) 0.84 - 1.19 Final   04/21/2023 1.92 (H) 0.84 - 1.19 Final     No results found for: \"CKMB\", \"DIGOXIN\"  No results found for: \"TSH\"  No results found " "for: \"CHOL\"   No results found for: \"HGBA1C\"  Blood Culture   Date Value Ref Range Status   2023 No Growth After 5 Days.  Final   2023 No Growth After 5 Days.  Final   2023 No Growth After 5 Days.  Final   2023 No Growth After 5 Days.  Final   2021 No Growth After 5 Days.  Final   2021 No Growth After 5 Days.  Final     Gram Stain Result   Date Value Ref Range Status   2021 Rare Polys (A)  Final   2021 4+ Gram positive rods (A)  Final   2021 (A)  Final    1+ Gram positive cocci in pairs, chains and clusters     Wound Culture   Date Value Ref Range Status   2021 4+ Growth of  Final     Comment:     Mixed Skin Cinthya       *-*-*-*-*-*-*-*-*-*-*-*-*-*-*-*-*-*-*-*-*-*-*-*-*-*-*-*-*-*-*-*-*-*-*-*-*-*-*-*-*-*-*-*-*-*-*-*-*-*-*-*-*-*-  PREVIOUS CARDIOLOGY & RADIOLOGY TEST RESULTS   I have personally reviewed pertinent results of cardiovascular tests noted below.    Results for orders placed during the hospital encounter of 21    Echo complete with contrast if indicated    [place] [address]    Echocardiogram    Study date:  2021    Patient: GARRY FOX  MR number: [id number]  Account number: [id number]  : 10-Girish-1940  Age: 81 years  Gender: Male  Status: Outpatient  Location:  Height:  Weight:  BP:    Indications: Atrial Fibrillation    Diagnoses: I48.0 - Atrial fibrillation    Sonographer:  TYLER Morgan  Primary Physician:  Cruzito Whitaker MD  Referring Physician:  Zoraida Figueroa DO  Group:  [facility]  Interpreting Physician:  Christiano Ku MD    SUMMARY    LEFT VENTRICLE:  LV function is difficult to assess due to patients rhythm.  Systolic function was by visual assessment. Ejection fraction was estimated in the range of 45 % to 50 %.    RIGHT VENTRICLE:  The size was normal.  Systolic function was normal.    LEFT ATRIUM:  The atrium was mildly " dilated.    RIGHT ATRIUM:  The atrium was mildly dilated.    MITRAL VALVE:  There was mild regurgitation.    TRICUSPID VALVE:  There was trace regurgitation.    PULMONIC VALVE:  There was trace regurgitation.    IVC, HEPATIC VEINS:  The inferior vena cava was normal in size.    PERICARDIUM:  A trace pericardial effusion was identified.    LEFT VENTRICLE: LV function is difficult to assess due to patients rhythm. Size was normal. Systolic function was by visual assessment. Ejection fraction was estimated in the range of 45 % to 50 %.    RIGHT VENTRICLE: The size was normal. Systolic function was normal. Wall thickness was normal.    LEFT ATRIUM: The atrium was mildly dilated.    RIGHT ATRIUM: The atrium was mildly dilated.    MITRAL VALVE: Valve structure was normal. There was normal leaflet separation. DOPPLER: The transmitral velocity was within the normal range. There was no evidence for stenosis. There was mild regurgitation.    AORTIC VALVE: The valve was trileaflet. Leaflets exhibited normal thickness and normal cuspal separation. DOPPLER: Transaortic velocity was within the normal range. There was no evidence for stenosis. There was no regurgitation.    TRICUSPID VALVE: The valve structure was normal. There was normal leaflet separation. DOPPLER: The transtricuspid velocity was within the normal range. There was no evidence for stenosis. There was trace regurgitation.    PULMONIC VALVE: Leaflets exhibited normal thickness, no calcification, and normal cuspal separation. DOPPLER: The transpulmonic velocity was within the normal range. There was trace regurgitation.    PERICARDIUM: A trace pericardial effusion was identified.    AORTA: The root exhibited normal size.    SYSTEMIC VEINS: IVC: The inferior vena cava was normal in size.    SYSTEM MEASUREMENT TABLES    2D  %FS: 28.1 %  AV Diam: 3.45 cm  EDV(Teich): 189.24 ml  EF(Teich): 53.42 %  ESV(Teich): 88.15 ml  IVSd: 0.78 cm  LA Diam: 4.42 cm  LAAs A4C: 25.97  cm2  LAESV A-L A4C: 90.65 ml  LAESV MOD A4C: 86.78 ml  LALs A4C: 6.32 cm  LVEDV MOD A4C: 100.18 ml  LVEF MOD A4C: 37.52 %  LVESV MOD A4C: 62.6 ml  LVIDd: 6.13 cm  LVIDs: 4.41 cm  LVLd A4C: 8.3 cm  LVLs A4C: 8.02 cm  LVPWd: 1 cm  RAAs A4C: 27.21 cm2  RAESV A-L: 86.86 ml  RAESV MOD: 83.1 ml  RALs: 7.23 cm  RVIDd: 3.5 cm  RWT: 0.33  SV MOD A4C: 37.59 ml  SV(Teich): 101.09 ml    MM  TAPSE: 1.51 cm    PW  E' Lat: 0.14 m/s  E' Sept: 0.09 m/s    [facility] Accredited Echocardiography Laboratory    Prepared and electronically signed by    Christiano Ku MD  Signed 25-Feb-2021 16:03:43    No results found for this or any previous visit.    No results found for this or any previous visit.    No results found for this or any previous visit.    XR foot 3+ views LEFT  Narrative: LEFT ANKLE; LEFT TIBIA/FIBULA; LEFT FOOT    INDICATION:   Pain, swelling.    COMPARISON:  None    VIEWS:  XR ANKLE 3+ VW LEFT, XR TIBIA FIBULA 2 VW LEFT, XR FOOT 3+ VW LEFT  Images: 8    FINDINGS: (Left tibia/fibula, left ankle, left foot)    There is no acute fracture or dislocation.    No significant degenerative changes. Small plantar calcaneal spur.    No lytic or blastic osseous lesion.    Soft tissues are unremarkable.  Impression: No acute osseous abnormalities in the left ankle, left tibia/fibula or left foot.    Workstation performed: [id number]  XR ankle 3+ views LEFT  Narrative: LEFT ANKLE; LEFT TIBIA/FIBULA; LEFT FOOT    INDICATION:   Pain, swelling.    COMPARISON:  None    VIEWS:  XR ANKLE 3+ VW LEFT, XR TIBIA FIBULA 2 VW LEFT, XR FOOT 3+ VW LEFT  Images: 8    FINDINGS: (Left tibia/fibula, left ankle, left foot)    There is no acute fracture or dislocation.    No significant degenerative changes. Small plantar calcaneal spur.    No lytic or blastic osseous lesion.    Soft tissues are unremarkable.  Impression: No acute osseous abnormalities in the left ankle, left tibia/fibula or left foot.    Workstation performed:  [id number]  XR tibia fibula 2 views LEFT  Narrative: LEFT ANKLE; LEFT TIBIA/FIBULA; LEFT FOOT    INDICATION:   Pain, swelling.    COMPARISON:  None    VIEWS:  XR ANKLE 3+ VW LEFT, XR TIBIA FIBULA 2 VW LEFT, XR FOOT 3+ VW LEFT  Images: 8    FINDINGS: (Left tibia/fibula, left ankle, left foot)    There is no acute fracture or dislocation.    No significant degenerative changes. Small plantar calcaneal spur.    No lytic or blastic osseous lesion.    Soft tissues are unremarkable.  Impression: No acute osseous abnormalities in the left ankle, left tibia/fibula or left foot.    Workstation performed: [id number]        *-*-*-*-*-*-*-*-*-*-*-*-*-*-*-*-*-*-*-*-*-*-*-*-*-*-*-*-*-*-*-*-*-*-*-*-*-*-*-*-*-*-*-*-*-*-*-*-*-*-*-*-*-*-  SIGNATURES:   @SIG@   Brissa Carrasquillo MD; [facility]    *-*-*-*-*-*-*-*-*-*-*-*-*-*-*-*-*-*-*-*-*-*-*-*-*-*-*-*-*-*-*-*-*-*-*-*-*-*-*-*-*-*-*-*-*-*-*-*-*-*-*-*-*-*-  PAST MEDICAL HISTORY:  Past Medical History:   Diagnosis Date    BPH (benign prostatic hyperplasia)     Chronic obstructive pulmonary disease (COPD) (HCC)     Essential hypertension     Hard of hearing     Pt does wear hearing aids    Hypertension     Lung cancer (HCC)     Shortness of breath     Pt states not changes and it occurs with moderate exertion    Sleep disturbance     Pt states he is only sleeping about 3 hours a night.  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 No pertinent family history. SOCIAL HISTORY:  Social History     Tobacco Use   Smoking Status Former    Types: Cigars, Cigarettes   Smokeless Tobacco Never      Social History     Substance and Sexual Activity   Alcohol Use Yes     Alcohol/week: 14.0 standard drinks of alcohol    Types: 14 Glasses of wine per week    Comment: moderate consumption     Social History     Substance and Sexual Activity   Drug Use Not Currently    [unfilled]     *-*-*-*-*-*-*-*-*-*-*-*-*-*-*-*-*-*-*-*-*-*-*-*-*-*-*-*-*-*-*-*-*-*-*-*-*-*-*-*-*-*-*-*-*-*-*-*-*-*-*-*-*-*  ALLERGIES:  Allergies   Allergen Reactions    Penicillin G Other (See Comments)     PCN Shots Only!!    CURRENT SCHEDULED MEDICATIONS:    Current Outpatient Medications:     Acetaminophen Extra Strength 500 MG TABS, , Disp: , Rfl:     ammonium lactate (LAC-HYDRIN) 12 % lotion, APPLY MEDIUM AMOUNT ON AFFECTED AREA ON SKIN TWICE A DAY - APPLY TO DRY SKIN FROM THE NECK DOWN 2 TIMES PER DAY, Disp: , Rfl:     apixaban (ELIQUIS) 5 mg, Take 1 tablet (5 mg total) by mouth 2 (two) times a day, Disp: 180 tablet, Rfl: 3    ascorbic acid (VITAMIN C) 500 mg tablet, , Disp: , Rfl:     bisacodyl (DULCOLAX) 5 mg EC tablet, Take 5 mg by mouth daily  , Disp: , Rfl:     cetirizine (ZyrTEC) 10 mg tablet, Take 10 mg by mouth daily, Disp: , Rfl:     Cholecalciferol (Vitamin D3) 50 MCG (2000 UT) TABS, , Disp: , Rfl:     Cyanocobalamin 1000 MCG CAPS, 1,000 mcg, Disp: , Rfl:     Diclofenac Sodium (VOLTAREN) 1 %, , Disp: , Rfl:     diltiazem (CARDIZEM CD) 300 mg 24 hr capsule, Take 1 capsule (300 mg total) by mouth daily, Disp: 90 capsule, Rfl: 3    docusate sodium (COLACE) 100 mg capsule, Take 100 mg by mouth , Disp: , Rfl:     finasteride (PROSCAR) 5 mg tablet, Take 5 mg by mouth daily , Disp: , Rfl:     fluticasone-salmeterol (ADVAIR, WIXELA) 250-50 mcg/dose inhaler, Inhale 1 puff 2 (two) times a day Rinse mouth after use., Disp: , Rfl:     ipratropium (ATROVENT) 0.02 % nebulizer solution, Take 2.5 mL (0.5 mg total) by nebulization in the morning, Disp: , Rfl:     magnesium oxide (MAG-OX) 400 mg tablet, Take 1 tablet by mouth daily, Disp: , Rfl:     metolazone (ZAROXOLYN) 2.5 mg tablet, Take 1 tablet (2.5 mg total) by mouth  once a week Take on Thursdays, Disp: 24 tablet, Rfl: 3    metoprolol succinate (TOPROL-XL) 25 mg 24 hr tablet, Take 1 tablet (25 mg total) by mouth daily, Disp: 90 tablet, Rfl: 3    montelukast (SINGULAIR) 10 mg tablet, Take 10 mg by mouth daily at bedtime, Disp: , Rfl:     Multiple Vitamin (MULTIVITAMINS PO), Take 1 tablet by mouth daily, Disp: , Rfl:     Nutritional Supplements (Ensure), TAKE 1 CONTAINER BY MOUTH ONCE EVERY DAY FOR NUTRITION, Disp: , Rfl:     potassium chloride (K-DUR,KLOR-CON) 20 mEq tablet, Take 1 tablet (20 mEq total) by mouth 2 (two) times a day Take extra 20 mEq of potassium on Thursdays for total of 60 mEq for the day, Disp: 192 tablet, Rfl: 3    SENNA CO, TAKE ONE OR TWO TABLETS BY MOUTH AT BEDTIME AS NEEDED FOR CONSTIPATION, Disp: , Rfl:     sildenafil (VIAGRA) 100 mg tablet, Take 1 tablet (100 mg total) by mouth daily as needed for erectile dysfunction, Disp: 4 tablet, Rfl: 11    Spiriva Respimat 2.5 MCG/ACT AERS inhaler, Inhale 2 puffs daily , Disp: , Rfl:     torsemide (DEMADEX) 100 mg tablet, Take 1 tablet (100 mg total) by mouth see administration instructions 5 times a week-- all days except Monday and Friday., Disp: , Rfl:     ACETAMINOPHEN PO, Take 1 tablet by mouth every 6 (six) hours as needed (Patient not taking: Reported on 12/26/2023), Disp: , Rfl:     Ascorbic Acid 500 MG CAPS, 500 mg (Patient not taking: Reported on 12/26/2023), Disp: , Rfl:     nitroglycerin (NITROSTAT) 0.4 mg SL tablet, , Disp: , Rfl:      *-*-*-*-*-*-*-*-*-*-*-*-*-*-*-*-*-*-*-*-*-*-*-*-*-*-*-*-*-*-*-*-*-*-*-*-*-*-*-*-*-*-*-*-*-*-*-*-*-*-*-*-*-*

## 2024-01-01 ENCOUNTER — APPOINTMENT (INPATIENT)
Dept: RADIOLOGY | Facility: HOSPITAL | Age: 84
DRG: 871 | End: 2024-01-01
Payer: MEDICARE

## 2024-01-01 ENCOUNTER — APPOINTMENT (EMERGENCY)
Dept: CT IMAGING | Facility: HOSPITAL | Age: 84
DRG: 871 | End: 2024-01-01
Payer: MEDICARE

## 2024-01-01 ENCOUNTER — APPOINTMENT (EMERGENCY)
Dept: RADIOLOGY | Facility: HOSPITAL | Age: 84
DRG: 871 | End: 2024-01-01
Payer: MEDICARE

## 2024-01-01 ENCOUNTER — APPOINTMENT (INPATIENT)
Dept: CT IMAGING | Facility: HOSPITAL | Age: 84
DRG: 871 | End: 2024-01-01
Payer: MEDICARE

## 2024-01-01 ENCOUNTER — APPOINTMENT (INPATIENT)
Dept: NON INVASIVE DIAGNOSTICS | Facility: HOSPITAL | Age: 84
DRG: 871 | End: 2024-01-01
Payer: MEDICARE

## 2024-01-01 ENCOUNTER — HOSPITAL ENCOUNTER (INPATIENT)
Facility: HOSPITAL | Age: 84
LOS: 8 days | DRG: 871 | End: 2024-05-05
Attending: EMERGENCY MEDICINE | Admitting: HOSPITALIST
Payer: MEDICARE

## 2024-01-01 ENCOUNTER — APPOINTMENT (INPATIENT)
Dept: ULTRASOUND IMAGING | Facility: HOSPITAL | Age: 84
DRG: 871 | End: 2024-01-01
Payer: MEDICARE

## 2024-01-01 VITALS
TEMPERATURE: 98.1 F | BODY MASS INDEX: 22.56 KG/M2 | OXYGEN SATURATION: 80 % | WEIGHT: 152.34 LBS | HEIGHT: 69 IN | DIASTOLIC BLOOD PRESSURE: 60 MMHG | SYSTOLIC BLOOD PRESSURE: 133 MMHG | RESPIRATION RATE: 23 BRPM | HEART RATE: 81 BPM

## 2024-01-01 DIAGNOSIS — J44.1 ACUTE EXACERBATION OF CHRONIC OBSTRUCTIVE PULMONARY DISEASE (COPD) (HCC): ICD-10-CM

## 2024-01-01 DIAGNOSIS — G93.41 ACUTE METABOLIC ENCEPHALOPATHY: ICD-10-CM

## 2024-01-01 DIAGNOSIS — E87.5 HYPERKALEMIA: ICD-10-CM

## 2024-01-01 DIAGNOSIS — J18.9 PNEUMONIA: ICD-10-CM

## 2024-01-01 DIAGNOSIS — R09.02 HYPOXIA: Primary | ICD-10-CM

## 2024-01-01 DIAGNOSIS — I48.20 CHRONIC ATRIAL FIBRILLATION (HCC): ICD-10-CM

## 2024-01-01 DIAGNOSIS — N18.9 CHRONIC RENAL INSUFFICIENCY: ICD-10-CM

## 2024-01-01 LAB
2HR DELTA HS TROPONIN: 1 NG/L
4HR DELTA HS TROPONIN: 0 NG/L
ALBUMIN SERPL BCP-MCNC: 4.3 G/DL (ref 3.5–5)
ALP SERPL-CCNC: 70 U/L (ref 34–104)
ALT SERPL W P-5'-P-CCNC: 7 U/L (ref 7–52)
ANION GAP SERPL CALCULATED.3IONS-SCNC: 1 MMOL/L (ref 4–13)
ANION GAP SERPL CALCULATED.3IONS-SCNC: 5 MMOL/L (ref 4–13)
AORTIC ROOT: 3.5 CM
APICAL FOUR CHAMBER EJECTION FRACTION: 50 %
ARTERIAL PATENCY WRIST A: YES
AST SERPL W P-5'-P-CCNC: 20 U/L (ref 13–39)
BACTERIA BLD CULT: NORMAL
BACTERIA BLD CULT: NORMAL
BASE EXCESS BLDA CALC-SCNC: 17.4 MMOL/L
BASE EXCESS BLDA CALC-SCNC: 17.8 MMOL/L
BASE EXCESS BLDA CALC-SCNC: 18.6 MMOL/L
BASE EXCESS BLDA CALC-SCNC: 19.4 MMOL/L
BASO STIPL BLD QL SMEAR: PRESENT
BASOPHILS # BLD AUTO: 0.01 THOUSANDS/ÂΜL (ref 0–0.1)
BASOPHILS # BLD AUTO: 0.02 THOUSANDS/ÂΜL (ref 0–0.1)
BASOPHILS # BLD MANUAL: 0 THOUSAND/UL (ref 0–0.1)
BASOPHILS NFR BLD AUTO: 0 % (ref 0–1)
BASOPHILS NFR BLD AUTO: 0 % (ref 0–1)
BASOPHILS NFR MAR MANUAL: 0 % (ref 0–1)
BILIRUB SERPL-MCNC: 0.58 MG/DL (ref 0.2–1)
BNP SERPL-MCNC: 519 PG/ML (ref 0–100)
BSA FOR ECHO PROCEDURE: 1.84 M2
BUN SERPL-MCNC: 42 MG/DL (ref 5–25)
BUN SERPL-MCNC: 44 MG/DL (ref 5–25)
BUN SERPL-MCNC: 46 MG/DL (ref 5–25)
BUN SERPL-MCNC: 47 MG/DL (ref 5–25)
BUN SERPL-MCNC: 49 MG/DL (ref 5–25)
BUN SERPL-MCNC: 53 MG/DL (ref 5–25)
BUN SERPL-MCNC: 63 MG/DL (ref 5–25)
BUN SERPL-MCNC: 63 MG/DL (ref 5–25)
CALCIUM SERPL-MCNC: 10.1 MG/DL (ref 8.4–10.2)
CALCIUM SERPL-MCNC: 9.1 MG/DL (ref 8.4–10.2)
CALCIUM SERPL-MCNC: 9.5 MG/DL (ref 8.4–10.2)
CALCIUM SERPL-MCNC: 9.6 MG/DL (ref 8.4–10.2)
CALCIUM SERPL-MCNC: 9.6 MG/DL (ref 8.4–10.2)
CALCIUM SERPL-MCNC: 9.7 MG/DL (ref 8.4–10.2)
CALCIUM SERPL-MCNC: 9.8 MG/DL (ref 8.4–10.2)
CALCIUM SERPL-MCNC: 9.9 MG/DL (ref 8.4–10.2)
CARDIAC TROPONIN I PNL SERPL HS: 22 NG/L
CARDIAC TROPONIN I PNL SERPL HS: 22 NG/L
CARDIAC TROPONIN I PNL SERPL HS: 23 NG/L
CHLORIDE SERPL-SCNC: 88 MMOL/L (ref 96–108)
CHLORIDE SERPL-SCNC: 89 MMOL/L (ref 96–108)
CHLORIDE SERPL-SCNC: 93 MMOL/L (ref 96–108)
CHLORIDE SERPL-SCNC: 96 MMOL/L (ref 96–108)
CHLORIDE SERPL-SCNC: 96 MMOL/L (ref 96–108)
CHLORIDE SERPL-SCNC: 97 MMOL/L (ref 96–108)
CO2 SERPL-SCNC: 44 MMOL/L (ref 21–32)
CO2 SERPL-SCNC: 45 MMOL/L (ref 21–32)
CO2 SERPL-SCNC: >45 MMOL/L (ref 21–32)
CREAT SERPL-MCNC: 0.95 MG/DL (ref 0.6–1.3)
CREAT SERPL-MCNC: 0.98 MG/DL (ref 0.6–1.3)
CREAT SERPL-MCNC: 1.03 MG/DL (ref 0.6–1.3)
CREAT SERPL-MCNC: 1.23 MG/DL (ref 0.6–1.3)
CREAT SERPL-MCNC: 1.25 MG/DL (ref 0.6–1.3)
CREAT SERPL-MCNC: 1.41 MG/DL (ref 0.6–1.3)
CREAT SERPL-MCNC: 1.45 MG/DL (ref 0.6–1.3)
CREAT SERPL-MCNC: 1.69 MG/DL (ref 0.6–1.3)
CREAT UR-MCNC: 55.9 MG/DL
D DIMER PPP FEU-MCNC: 2.27 UG/ML FEU
EOSINOPHIL # BLD AUTO: 0 THOUSAND/ÂΜL (ref 0–0.61)
EOSINOPHIL # BLD AUTO: 0 THOUSAND/ÂΜL (ref 0–0.61)
EOSINOPHIL # BLD MANUAL: 0 THOUSAND/UL (ref 0–0.4)
EOSINOPHIL NFR BLD AUTO: 0 % (ref 0–6)
EOSINOPHIL NFR BLD AUTO: 0 % (ref 0–6)
EOSINOPHIL NFR BLD MANUAL: 0 % (ref 0–6)
ERYTHROCYTE [DISTWIDTH] IN BLOOD BY AUTOMATED COUNT: 13.9 % (ref 11.6–15.1)
ERYTHROCYTE [DISTWIDTH] IN BLOOD BY AUTOMATED COUNT: 14.3 % (ref 11.6–15.1)
ERYTHROCYTE [DISTWIDTH] IN BLOOD BY AUTOMATED COUNT: 14.6 % (ref 11.6–15.1)
FLUAV RNA RESP QL NAA+PROBE: NEGATIVE
FLUBV RNA RESP QL NAA+PROBE: NEGATIVE
FRACTIONAL SHORTENING: 27 (ref 28–44)
GFR SERPL CREATININE-BSD FRML MDRD: 36 ML/MIN/1.73SQ M
GFR SERPL CREATININE-BSD FRML MDRD: 44 ML/MIN/1.73SQ M
GFR SERPL CREATININE-BSD FRML MDRD: 45 ML/MIN/1.73SQ M
GFR SERPL CREATININE-BSD FRML MDRD: 52 ML/MIN/1.73SQ M
GFR SERPL CREATININE-BSD FRML MDRD: 53 ML/MIN/1.73SQ M
GFR SERPL CREATININE-BSD FRML MDRD: 66 ML/MIN/1.73SQ M
GFR SERPL CREATININE-BSD FRML MDRD: 71 ML/MIN/1.73SQ M
GFR SERPL CREATININE-BSD FRML MDRD: 73 ML/MIN/1.73SQ M
GLUCOSE SERPL-MCNC: 139 MG/DL (ref 65–140)
GLUCOSE SERPL-MCNC: 140 MG/DL (ref 65–140)
GLUCOSE SERPL-MCNC: 148 MG/DL (ref 65–140)
GLUCOSE SERPL-MCNC: 152 MG/DL (ref 65–140)
GLUCOSE SERPL-MCNC: 152 MG/DL (ref 65–140)
GLUCOSE SERPL-MCNC: 153 MG/DL (ref 65–140)
GLUCOSE SERPL-MCNC: 187 MG/DL (ref 65–140)
GLUCOSE SERPL-MCNC: 264 MG/DL (ref 65–140)
HCO3 BLDA-SCNC: 47.8 MMOL/L (ref 22–28)
HCO3 BLDA-SCNC: 48 MMOL/L (ref 22–28)
HCO3 BLDA-SCNC: 49 MMOL/L (ref 22–28)
HCO3 BLDA-SCNC: 51.3 MMOL/L (ref 22–28)
HCT VFR BLD AUTO: 38.8 % (ref 36.5–49.3)
HCT VFR BLD AUTO: 38.9 % (ref 36.5–49.3)
HCT VFR BLD AUTO: 39.8 % (ref 36.5–49.3)
HCT VFR BLD AUTO: 44.5 % (ref 36.5–49.3)
HCT VFR BLD AUTO: 44.9 % (ref 36.5–49.3)
HGB BLD-MCNC: 11.9 G/DL (ref 12–17)
HGB BLD-MCNC: 11.9 G/DL (ref 12–17)
HGB BLD-MCNC: 12.1 G/DL (ref 12–17)
HGB BLD-MCNC: 13.6 G/DL (ref 12–17)
HGB BLD-MCNC: 13.8 G/DL (ref 12–17)
HYPERCHROMIA BLD QL SMEAR: PRESENT
IMM GRANULOCYTES # BLD AUTO: 0.05 THOUSAND/UL (ref 0–0.2)
IMM GRANULOCYTES # BLD AUTO: 0.07 THOUSAND/UL (ref 0–0.2)
IMM GRANULOCYTES NFR BLD AUTO: 1 % (ref 0–2)
IMM GRANULOCYTES NFR BLD AUTO: 1 % (ref 0–2)
INTERVENTRICULAR SEPTUM IN DIASTOLE (PARASTERNAL SHORT AXIS VIEW): 1 CM
INTERVENTRICULAR SEPTUM: 1 CM (ref 0.6–1.1)
IPAP: 12
LAAS-AP2: 22.6 CM2
LAAS-AP4: 19.3 CM2
LACTATE SERPL-SCNC: 2.1 MMOL/L (ref 0.5–2)
LACTATE SERPL-SCNC: 2.9 MMOL/L (ref 0.5–2)
LEFT ATRIUM SIZE: 5.4 CM
LEFT ATRIUM VOLUME (MOD BIPLANE): 62 ML
LEFT ATRIUM VOLUME INDEX (MOD BIPLANE): 33.7 ML/M2
LEFT INTERNAL DIMENSION IN SYSTOLE: 3.8 CM (ref 2.1–4)
LEFT VENTRICLE DIASTOLIC VOLUME (MOD BIPLANE): 80 ML
LEFT VENTRICLE DIASTOLIC VOLUME INDEX (MOD BIPLANE): 43.5 ML/M2
LEFT VENTRICLE SYSTOLIC VOLUME (MOD BIPLANE): 37 ML
LEFT VENTRICLE SYSTOLIC VOLUME INDEX (MOD BIPLANE): 20.1 ML/M2
LEFT VENTRICULAR INTERNAL DIMENSION IN DIASTOLE: 5.2 CM (ref 3.5–6)
LEFT VENTRICULAR POSTERIOR WALL IN END DIASTOLE: 1 CM
LEFT VENTRICULAR STROKE VOLUME: 71 ML
LV EF: 54 %
LVSV (TEICH): 71 ML
LYMPHOCYTES # BLD AUTO: 0.25 THOUSANDS/ÂΜL (ref 0.6–4.47)
LYMPHOCYTES # BLD AUTO: 0.38 THOUSAND/UL (ref 0.6–4.47)
LYMPHOCYTES # BLD AUTO: 0.75 THOUSANDS/ÂΜL (ref 0.6–4.47)
LYMPHOCYTES # BLD AUTO: 4 % (ref 14–44)
LYMPHOCYTES NFR BLD AUTO: 3 % (ref 14–44)
LYMPHOCYTES NFR BLD AUTO: 6 % (ref 14–44)
MAGNESIUM SERPL-MCNC: 2 MG/DL (ref 1.9–2.7)
MCH RBC QN AUTO: 31.9 PG (ref 26.8–34.3)
MCH RBC QN AUTO: 32.3 PG (ref 26.8–34.3)
MCH RBC QN AUTO: 32.4 PG (ref 26.8–34.3)
MCH RBC QN AUTO: 32.5 PG (ref 26.8–34.3)
MCH RBC QN AUTO: 32.9 PG (ref 26.8–34.3)
MCHC RBC AUTO-ENTMCNC: 30.4 G/DL (ref 31.4–37.4)
MCHC RBC AUTO-ENTMCNC: 30.6 G/DL (ref 31.4–37.4)
MCHC RBC AUTO-ENTMCNC: 30.6 G/DL (ref 31.4–37.4)
MCHC RBC AUTO-ENTMCNC: 30.7 G/DL (ref 31.4–37.4)
MCHC RBC AUTO-ENTMCNC: 30.7 G/DL (ref 31.4–37.4)
MCV RBC AUTO: 105 FL (ref 82–98)
MCV RBC AUTO: 105 FL (ref 82–98)
MCV RBC AUTO: 106 FL (ref 82–98)
MCV RBC AUTO: 107 FL (ref 82–98)
MCV RBC AUTO: 108 FL (ref 82–98)
MONOCYTES # BLD AUTO: 0.48 THOUSAND/UL (ref 0–1.22)
MONOCYTES # BLD AUTO: 0.58 THOUSAND/ÂΜL (ref 0.17–1.22)
MONOCYTES # BLD AUTO: 1.35 THOUSAND/ÂΜL (ref 0.17–1.22)
MONOCYTES NFR BLD AUTO: 11 % (ref 4–12)
MONOCYTES NFR BLD AUTO: 7 % (ref 4–12)
MONOCYTES NFR BLD: 5 % (ref 4–12)
NASAL CANNULA: 10
NASAL CANNULA: 12
NEUTROPHILS # BLD AUTO: 10.17 THOUSANDS/ÂΜL (ref 1.85–7.62)
NEUTROPHILS # BLD AUTO: 7.65 THOUSANDS/ÂΜL (ref 1.85–7.62)
NEUTROPHILS # BLD MANUAL: 8.72 THOUSAND/UL (ref 1.85–7.62)
NEUTS BAND NFR BLD MANUAL: 3 % (ref 0–8)
NEUTS SEG NFR BLD AUTO: 82 % (ref 43–75)
NEUTS SEG NFR BLD AUTO: 88 % (ref 43–75)
NEUTS SEG NFR BLD AUTO: 89 % (ref 43–75)
NON VENT- BIPAP: ABNORMAL
NRBC BLD AUTO-RTO: 0 /100 WBCS
NRBC BLD AUTO-RTO: 0 /100 WBCS
O2 CT BLDA-SCNC: 15.8 ML/DL (ref 16–23)
O2 CT BLDA-SCNC: 16.7 ML/DL (ref 16–23)
O2 CT BLDA-SCNC: 17.9 ML/DL (ref 16–23)
O2 CT BLDA-SCNC: 18.6 ML/DL (ref 16–23)
OXYHGB MFR BLDA: 84.1 % (ref 94–97)
OXYHGB MFR BLDA: 86.4 % (ref 94–97)
OXYHGB MFR BLDA: 96 % (ref 94–97)
OXYHGB MFR BLDA: 96.9 % (ref 94–97)
PCO2 BLDA: 106 MM HG (ref 36–44)
PCO2 BLDA: 78.5 MM HG (ref 36–44)
PCO2 BLDA: 90.3 MM HG (ref 36–44)
PCO2 BLDA: 99.8 MM HG (ref 36–44)
PEEP MAX SETTING VENT: 6 CM[H2O]
PH BLDA: 7.3 [PH] (ref 7.35–7.45)
PH BLDA: 7.31 [PH] (ref 7.35–7.45)
PH BLDA: 7.34 [PH] (ref 7.35–7.45)
PH BLDA: 7.4 [PH] (ref 7.35–7.45)
PLATELET # BLD AUTO: 167 THOUSANDS/UL (ref 149–390)
PLATELET # BLD AUTO: 182 THOUSANDS/UL (ref 149–390)
PLATELET # BLD AUTO: 185 THOUSANDS/UL (ref 149–390)
PLATELET # BLD AUTO: 200 THOUSANDS/UL (ref 149–390)
PLATELET # BLD AUTO: 200 THOUSANDS/UL (ref 149–390)
PLATELET BLD QL SMEAR: ADEQUATE
PMV BLD AUTO: 10 FL (ref 8.9–12.7)
PMV BLD AUTO: 10.1 FL (ref 8.9–12.7)
PMV BLD AUTO: 10.3 FL (ref 8.9–12.7)
PMV BLD AUTO: 10.6 FL (ref 8.9–12.7)
PMV BLD AUTO: 9.9 FL (ref 8.9–12.7)
PO2 BLDA: 100.1 MM HG (ref 75–129)
PO2 BLDA: 52 MM HG (ref 75–129)
PO2 BLDA: 55 MM HG (ref 75–129)
PO2 BLDA: 93.8 MM HG (ref 75–129)
POTASSIUM SERPL-SCNC: 3.6 MMOL/L (ref 3.5–5.3)
POTASSIUM SERPL-SCNC: 3.7 MMOL/L (ref 3.5–5.3)
POTASSIUM SERPL-SCNC: 3.7 MMOL/L (ref 3.5–5.3)
POTASSIUM SERPL-SCNC: 4 MMOL/L (ref 3.5–5.3)
POTASSIUM SERPL-SCNC: 4.4 MMOL/L (ref 3.5–5.3)
POTASSIUM SERPL-SCNC: 4.5 MMOL/L (ref 3.5–5.3)
POTASSIUM SERPL-SCNC: 4.6 MMOL/L (ref 3.5–5.3)
POTASSIUM SERPL-SCNC: 4.8 MMOL/L (ref 3.5–5.3)
POTASSIUM SERPL-SCNC: 5.9 MMOL/L (ref 3.5–5.3)
PROCALCITONIN SERPL-MCNC: 0.08 NG/ML
PROCALCITONIN SERPL-MCNC: 0.11 NG/ML
PROCALCITONIN SERPL-MCNC: 0.15 NG/ML
PROT SERPL-MCNC: 7.7 G/DL (ref 6.4–8.4)
QRS AXIS: 167 DEGREES
QRSD INTERVAL: 150 MS
QT INTERVAL: 382 MS
QTC INTERVAL: 454 MS
RA PRESSURE ESTIMATED: 3 MMHG
RBC # BLD AUTO: 3.62 MILLION/UL (ref 3.88–5.62)
RBC # BLD AUTO: 3.68 MILLION/UL (ref 3.88–5.62)
RBC # BLD AUTO: 3.73 MILLION/UL (ref 3.88–5.62)
RBC # BLD AUTO: 4.24 MILLION/UL (ref 3.88–5.62)
RBC # BLD AUTO: 4.26 MILLION/UL (ref 3.88–5.62)
RBC MORPH BLD: PRESENT
RIGHT ATRIUM AREA SYSTOLE A4C: 26.3 CM2
RIGHT VENTRICLE ID DIMENSION: 5.2 CM
RSV RNA RESP QL NAA+PROBE: NEGATIVE
RV PSP: 32 MMHG
SARS-COV-2 RNA RESP QL NAA+PROBE: NEGATIVE
SL CV LEFT ATRIUM LENGTH A2C: 5.6 CM
SL CV LV EF: 54
SL CV PED ECHO LEFT VENTRICLE DIASTOLIC VOLUME (MOD BIPLANE) 2D: 132 ML
SL CV PED ECHO LEFT VENTRICLE SYSTOLIC VOLUME (MOD BIPLANE) 2D: 60 ML
SODIUM 24H UR-SCNC: 34 MOL/L
SODIUM SERPL-SCNC: 138 MMOL/L (ref 135–147)
SODIUM SERPL-SCNC: 138 MMOL/L (ref 135–147)
SODIUM SERPL-SCNC: 142 MMOL/L (ref 135–147)
SODIUM SERPL-SCNC: 143 MMOL/L (ref 135–147)
SODIUM SERPL-SCNC: 144 MMOL/L (ref 135–147)
SODIUM SERPL-SCNC: 145 MMOL/L (ref 135–147)
SODIUM SERPL-SCNC: 145 MMOL/L (ref 135–147)
SODIUM SERPL-SCNC: 146 MMOL/L (ref 135–147)
SPECIMEN SOURCE: ABNORMAL
STOMATOCYTES BLD QL SMEAR: PRESENT
T WAVE AXIS: 14 DEGREES
TR MAX PG: 29 MMHG
TR PEAK VELOCITY: 2.7 M/S
TRICUSPID ANNULAR PLANE SYSTOLIC EXCURSION: 1.5 CM
TRICUSPID VALVE PEAK REGURGITATION VELOCITY: 2.67 M/S
UUN 24H UR-MCNC: 414 MG/DL
VENT BIPAP FIO2: 40 %
VENTRICULAR RATE: 85 BPM
WBC # BLD AUTO: 11.11 THOUSAND/UL (ref 4.31–10.16)
WBC # BLD AUTO: 11.49 THOUSAND/UL (ref 4.31–10.16)
WBC # BLD AUTO: 12.36 THOUSAND/UL (ref 4.31–10.16)
WBC # BLD AUTO: 8.54 THOUSAND/UL (ref 4.31–10.16)
WBC # BLD AUTO: 9.58 THOUSAND/UL (ref 4.31–10.16)

## 2024-01-01 PROCEDURE — 94660 CPAP INITIATION&MGMT: CPT

## 2024-01-01 PROCEDURE — 85025 COMPLETE CBC W/AUTO DIFF WBC: CPT | Performed by: FAMILY MEDICINE

## 2024-01-01 PROCEDURE — 96365 THER/PROPH/DIAG IV INF INIT: CPT

## 2024-01-01 PROCEDURE — 94760 N-INVAS EAR/PLS OXIMETRY 1: CPT

## 2024-01-01 PROCEDURE — 94640 AIRWAY INHALATION TREATMENT: CPT

## 2024-01-01 PROCEDURE — 82570 ASSAY OF URINE CREATININE: CPT | Performed by: HOSPITALIST

## 2024-01-01 PROCEDURE — 94668 MNPJ CHEST WALL SBSQ: CPT

## 2024-01-01 PROCEDURE — 84132 ASSAY OF SERUM POTASSIUM: CPT | Performed by: EMERGENCY MEDICINE

## 2024-01-01 PROCEDURE — 85007 BL SMEAR W/DIFF WBC COUNT: CPT | Performed by: HOSPITALIST

## 2024-01-01 PROCEDURE — 80048 BASIC METABOLIC PNL TOTAL CA: CPT | Performed by: HOSPITALIST

## 2024-01-01 PROCEDURE — 36600 WITHDRAWAL OF ARTERIAL BLOOD: CPT

## 2024-01-01 PROCEDURE — 94003 VENT MGMT INPAT SUBQ DAY: CPT

## 2024-01-01 PROCEDURE — 99232 SBSQ HOSP IP/OBS MODERATE 35: CPT | Performed by: FAMILY MEDICINE

## 2024-01-01 PROCEDURE — 85027 COMPLETE CBC AUTOMATED: CPT | Performed by: HOSPITALIST

## 2024-01-01 PROCEDURE — 80048 BASIC METABOLIC PNL TOTAL CA: CPT | Performed by: FAMILY MEDICINE

## 2024-01-01 PROCEDURE — 71045 X-RAY EXAM CHEST 1 VIEW: CPT

## 2024-01-01 PROCEDURE — 85379 FIBRIN DEGRADATION QUANT: CPT | Performed by: EMERGENCY MEDICINE

## 2024-01-01 PROCEDURE — 93306 TTE W/DOPPLER COMPLETE: CPT

## 2024-01-01 PROCEDURE — 94002 VENT MGMT INPAT INIT DAY: CPT

## 2024-01-01 PROCEDURE — 99232 SBSQ HOSP IP/OBS MODERATE 35: CPT | Performed by: INTERNAL MEDICINE

## 2024-01-01 PROCEDURE — 85025 COMPLETE CBC W/AUTO DIFF WBC: CPT | Performed by: EMERGENCY MEDICINE

## 2024-01-01 PROCEDURE — 83735 ASSAY OF MAGNESIUM: CPT | Performed by: FAMILY MEDICINE

## 2024-01-01 PROCEDURE — 99233 SBSQ HOSP IP/OBS HIGH 50: CPT | Performed by: FAMILY MEDICINE

## 2024-01-01 PROCEDURE — 80053 COMPREHEN METABOLIC PANEL: CPT | Performed by: EMERGENCY MEDICINE

## 2024-01-01 PROCEDURE — 76775 US EXAM ABDO BACK WALL LIM: CPT

## 2024-01-01 PROCEDURE — 36415 COLL VENOUS BLD VENIPUNCTURE: CPT | Performed by: EMERGENCY MEDICINE

## 2024-01-01 PROCEDURE — 74230 X-RAY XM SWLNG FUNCJ C+: CPT

## 2024-01-01 PROCEDURE — 94664 DEMO&/EVAL PT USE INHALER: CPT

## 2024-01-01 PROCEDURE — 82805 BLOOD GASES W/O2 SATURATION: CPT | Performed by: FAMILY MEDICINE

## 2024-01-01 PROCEDURE — 99223 1ST HOSP IP/OBS HIGH 75: CPT | Performed by: HOSPITALIST

## 2024-01-01 PROCEDURE — 92610 EVALUATE SWALLOWING FUNCTION: CPT

## 2024-01-01 PROCEDURE — 96375 TX/PRO/DX INJ NEW DRUG ADDON: CPT

## 2024-01-01 PROCEDURE — 99239 HOSP IP/OBS DSCHRG MGMT >30: CPT | Performed by: FAMILY MEDICINE

## 2024-01-01 PROCEDURE — 84145 PROCALCITONIN (PCT): CPT | Performed by: HOSPITALIST

## 2024-01-01 PROCEDURE — 83605 ASSAY OF LACTIC ACID: CPT | Performed by: EMERGENCY MEDICINE

## 2024-01-01 PROCEDURE — 0241U HB NFCT DS VIR RESP RNA 4 TRGT: CPT | Performed by: EMERGENCY MEDICINE

## 2024-01-01 PROCEDURE — 99223 1ST HOSP IP/OBS HIGH 75: CPT | Performed by: INTERNAL MEDICINE

## 2024-01-01 PROCEDURE — 83880 ASSAY OF NATRIURETIC PEPTIDE: CPT | Performed by: FAMILY MEDICINE

## 2024-01-01 PROCEDURE — 92611 MOTION FLUOROSCOPY/SWALLOW: CPT

## 2024-01-01 PROCEDURE — 71250 CT THORAX DX C-: CPT

## 2024-01-01 PROCEDURE — 84300 ASSAY OF URINE SODIUM: CPT | Performed by: HOSPITALIST

## 2024-01-01 PROCEDURE — 99285 EMERGENCY DEPT VISIT HI MDM: CPT | Performed by: EMERGENCY MEDICINE

## 2024-01-01 PROCEDURE — 87040 BLOOD CULTURE FOR BACTERIA: CPT | Performed by: EMERGENCY MEDICINE

## 2024-01-01 PROCEDURE — 99233 SBSQ HOSP IP/OBS HIGH 50: CPT | Performed by: HOSPITALIST

## 2024-01-01 PROCEDURE — 93005 ELECTROCARDIOGRAM TRACING: CPT

## 2024-01-01 PROCEDURE — 99497 ADVNCD CARE PLAN 30 MIN: CPT | Performed by: FAMILY MEDICINE

## 2024-01-01 PROCEDURE — 99285 EMERGENCY DEPT VISIT HI MDM: CPT

## 2024-01-01 PROCEDURE — 84540 ASSAY OF URINE/UREA-N: CPT | Performed by: HOSPITALIST

## 2024-01-01 PROCEDURE — 84484 ASSAY OF TROPONIN QUANT: CPT | Performed by: EMERGENCY MEDICINE

## 2024-01-01 PROCEDURE — 71275 CT ANGIOGRAPHY CHEST: CPT

## 2024-01-01 RX ORDER — LORATADINE 10 MG/1
10 TABLET ORAL DAILY
Status: DISCONTINUED | OUTPATIENT
Start: 2024-01-01 | End: 2024-01-01

## 2024-01-01 RX ORDER — ACETAZOLAMIDE 500 MG/5ML
250 INJECTION, POWDER, LYOPHILIZED, FOR SOLUTION INTRAVENOUS EVERY 12 HOURS SCHEDULED
Status: COMPLETED | OUTPATIENT
Start: 2024-01-01 | End: 2024-01-01

## 2024-01-01 RX ORDER — HALOPERIDOL 5 MG/ML
0.5 INJECTION INTRAMUSCULAR EVERY 2 HOUR PRN
Status: DISCONTINUED | OUTPATIENT
Start: 2024-01-01 | End: 2024-01-01 | Stop reason: HOSPADM

## 2024-01-01 RX ORDER — LORAZEPAM 2 MG/ML
1 INJECTION INTRAMUSCULAR EVERY 4 HOURS PRN
Status: COMPLETED | OUTPATIENT
Start: 2024-01-01 | End: 2024-01-01

## 2024-01-01 RX ORDER — ACETAZOLAMIDE 500 MG/5ML
500 INJECTION, POWDER, LYOPHILIZED, FOR SOLUTION INTRAVENOUS ONCE
Status: COMPLETED | OUTPATIENT
Start: 2024-01-01 | End: 2024-01-01

## 2024-01-01 RX ORDER — DOCUSATE SODIUM 100 MG/1
100 CAPSULE, LIQUID FILLED ORAL 2 TIMES DAILY
Status: DISCONTINUED | OUTPATIENT
Start: 2024-01-01 | End: 2024-01-01

## 2024-01-01 RX ORDER — ACETAZOLAMIDE 500 MG/5ML
500 INJECTION, POWDER, LYOPHILIZED, FOR SOLUTION INTRAVENOUS ONCE
Qty: 500 MG | Refills: 0 | Status: COMPLETED | OUTPATIENT
Start: 2024-01-01 | End: 2024-01-01

## 2024-01-01 RX ORDER — GUAIFENESIN 600 MG/1
600 TABLET, EXTENDED RELEASE ORAL EVERY 12 HOURS SCHEDULED
Status: DISCONTINUED | OUTPATIENT
Start: 2024-01-01 | End: 2024-01-01

## 2024-01-01 RX ORDER — WATER 10 ML/10ML
INJECTION INTRAMUSCULAR; INTRAVENOUS; SUBCUTANEOUS
Status: COMPLETED
Start: 2024-01-01 | End: 2024-01-01

## 2024-01-01 RX ORDER — ENOXAPARIN SODIUM 100 MG/ML
40 INJECTION SUBCUTANEOUS DAILY
Status: DISCONTINUED | OUTPATIENT
Start: 2024-01-01 | End: 2024-01-01

## 2024-01-01 RX ORDER — CEFTRIAXONE 1 G/50ML
1000 INJECTION, SOLUTION INTRAVENOUS EVERY 24 HOURS
Status: DISCONTINUED | OUTPATIENT
Start: 2024-01-01 | End: 2024-01-01

## 2024-01-01 RX ORDER — SODIUM CHLORIDE FOR INHALATION 0.9 %
3 VIAL, NEBULIZER (ML) INHALATION
Status: DISCONTINUED | OUTPATIENT
Start: 2024-01-01 | End: 2024-01-01

## 2024-01-01 RX ORDER — NICOTINE 21 MG/24HR
21 PATCH, TRANSDERMAL 24 HOURS TRANSDERMAL ONCE
Status: COMPLETED | OUTPATIENT
Start: 2024-01-01 | End: 2024-01-01

## 2024-01-01 RX ORDER — LANOLIN ALCOHOL/MO/W.PET/CERES
6 CREAM (GRAM) TOPICAL
Status: COMPLETED | OUTPATIENT
Start: 2024-01-01 | End: 2024-01-01

## 2024-01-01 RX ORDER — BISACODYL 5 MG/1
5 TABLET, DELAYED RELEASE ORAL DAILY
Status: DISCONTINUED | OUTPATIENT
Start: 2024-01-01 | End: 2024-01-01

## 2024-01-01 RX ORDER — AMMONIUM LACTATE 12 G/100G
LOTION TOPICAL 2 TIMES DAILY PRN
Status: DISCONTINUED | OUTPATIENT
Start: 2024-01-01 | End: 2024-01-01

## 2024-01-01 RX ORDER — TORSEMIDE 20 MG/1
40 TABLET ORAL DAILY
Status: DISCONTINUED | OUTPATIENT
Start: 2024-01-01 | End: 2024-01-01

## 2024-01-01 RX ORDER — GLYCOPYRROLATE 0.2 MG/ML
0.1 INJECTION INTRAMUSCULAR; INTRAVENOUS EVERY 4 HOURS PRN
Status: DISCONTINUED | OUTPATIENT
Start: 2024-01-01 | End: 2024-01-01 | Stop reason: HOSPADM

## 2024-01-01 RX ORDER — LEVALBUTEROL INHALATION SOLUTION 1.25 MG/3ML
1.25 SOLUTION RESPIRATORY (INHALATION)
Status: DISCONTINUED | OUTPATIENT
Start: 2024-01-01 | End: 2024-01-01

## 2024-01-01 RX ORDER — METHYLPREDNISOLONE SODIUM SUCCINATE 125 MG/2ML
125 INJECTION, POWDER, LYOPHILIZED, FOR SOLUTION INTRAMUSCULAR; INTRAVENOUS ONCE
Status: COMPLETED | OUTPATIENT
Start: 2024-01-01 | End: 2024-01-01

## 2024-01-01 RX ORDER — FINASTERIDE 5 MG/1
5 TABLET, FILM COATED ORAL DAILY
Status: DISCONTINUED | OUTPATIENT
Start: 2024-01-01 | End: 2024-01-01

## 2024-01-01 RX ORDER — CEFTRIAXONE 1 G/50ML
1000 INJECTION, SOLUTION INTRAVENOUS ONCE
Status: COMPLETED | OUTPATIENT
Start: 2024-01-01 | End: 2024-01-01

## 2024-01-01 RX ORDER — METHYLPREDNISOLONE SODIUM SUCCINATE 40 MG/ML
40 INJECTION, POWDER, LYOPHILIZED, FOR SOLUTION INTRAMUSCULAR; INTRAVENOUS EVERY 12 HOURS SCHEDULED
Status: DISCONTINUED | OUTPATIENT
Start: 2024-01-01 | End: 2024-01-01

## 2024-01-01 RX ORDER — SODIUM CHLORIDE 9 MG/ML
75 INJECTION, SOLUTION INTRAVENOUS CONTINUOUS
Status: DISCONTINUED | OUTPATIENT
Start: 2024-01-01 | End: 2024-01-01

## 2024-01-01 RX ORDER — LORAZEPAM 2 MG/ML
0.5 INJECTION INTRAMUSCULAR ONCE
Status: COMPLETED | OUTPATIENT
Start: 2024-01-01 | End: 2024-01-01

## 2024-01-01 RX ORDER — METHYLPREDNISOLONE SODIUM SUCCINATE 40 MG/ML
40 INJECTION, POWDER, LYOPHILIZED, FOR SOLUTION INTRAMUSCULAR; INTRAVENOUS EVERY 8 HOURS
Status: DISCONTINUED | OUTPATIENT
Start: 2024-01-01 | End: 2024-01-01

## 2024-01-01 RX ORDER — FORMOTEROL FUMARATE DIHYDRATE 20 UG/2ML
20 SOLUTION RESPIRATORY (INHALATION)
Status: DISCONTINUED | OUTPATIENT
Start: 2024-01-01 | End: 2024-01-01

## 2024-01-01 RX ORDER — CALCIUM GLUCONATE 20 MG/ML
1 INJECTION, SOLUTION INTRAVENOUS ONCE
Status: COMPLETED | OUTPATIENT
Start: 2024-01-01 | End: 2024-01-01

## 2024-01-01 RX ORDER — BUDESONIDE 0.5 MG/2ML
0.5 INHALANT ORAL
Status: DISCONTINUED | OUTPATIENT
Start: 2024-01-01 | End: 2024-01-01

## 2024-01-01 RX ORDER — ACETAMINOPHEN 325 MG/1
650 TABLET ORAL EVERY 6 HOURS PRN
Status: DISCONTINUED | OUTPATIENT
Start: 2024-01-01 | End: 2024-01-01 | Stop reason: HOSPADM

## 2024-01-01 RX ORDER — LANOLIN ALCOHOL/MO/W.PET/CERES
400 CREAM (GRAM) TOPICAL DAILY
Status: DISCONTINUED | OUTPATIENT
Start: 2024-01-01 | End: 2024-01-01

## 2024-01-01 RX ORDER — HALOPERIDOL 5 MG/ML
1 INJECTION INTRAMUSCULAR ONCE
Status: DISCONTINUED | OUTPATIENT
Start: 2024-01-01 | End: 2024-01-01

## 2024-01-01 RX ORDER — SODIUM CHLORIDE FOR INHALATION 3 %
4 VIAL, NEBULIZER (ML) INHALATION
Status: DISCONTINUED | OUTPATIENT
Start: 2024-01-01 | End: 2024-01-01

## 2024-01-01 RX ORDER — MONTELUKAST SODIUM 10 MG/1
10 TABLET ORAL
Status: DISCONTINUED | OUTPATIENT
Start: 2024-01-01 | End: 2024-01-01

## 2024-01-01 RX ORDER — METOPROLOL SUCCINATE 25 MG/1
25 TABLET, EXTENDED RELEASE ORAL DAILY
Status: DISCONTINUED | OUTPATIENT
Start: 2024-01-01 | End: 2024-01-01

## 2024-01-01 RX ORDER — HYDROXYZINE HYDROCHLORIDE 25 MG/1
25 TABLET, FILM COATED ORAL
Status: DISCONTINUED | OUTPATIENT
Start: 2024-01-01 | End: 2024-01-01

## 2024-01-01 RX ORDER — SODIUM CHLORIDE FOR INHALATION 0.9 %
12 VIAL, NEBULIZER (ML) INHALATION ONCE
Status: COMPLETED | OUTPATIENT
Start: 2024-01-01 | End: 2024-01-01

## 2024-01-01 RX ADMIN — CEFTRIAXONE 1000 MG: 1 INJECTION, SOLUTION INTRAVENOUS at 12:28

## 2024-01-01 RX ADMIN — FINASTERIDE 5 MG: 5 TABLET, FILM COATED ORAL at 08:31

## 2024-01-01 RX ADMIN — IPRATROPIUM BROMIDE 1 MG: 0.5 SOLUTION RESPIRATORY (INHALATION) at 12:27

## 2024-01-01 RX ADMIN — Medication 400 MG: at 08:04

## 2024-01-01 RX ADMIN — GUAIFENESIN 600 MG: 600 TABLET, EXTENDED RELEASE ORAL at 21:40

## 2024-01-01 RX ADMIN — IPRATROPIUM BROMIDE 0.5 MG: 0.5 SOLUTION RESPIRATORY (INHALATION) at 13:20

## 2024-01-01 RX ADMIN — GUAIFENESIN 600 MG: 600 TABLET, EXTENDED RELEASE ORAL at 09:05

## 2024-01-01 RX ADMIN — LORAZEPAM 1 MG: 2 INJECTION INTRAMUSCULAR; INTRAVENOUS at 22:36

## 2024-01-01 RX ADMIN — APIXABAN 5 MG: 5 TABLET, FILM COATED ORAL at 17:18

## 2024-01-01 RX ADMIN — DOCUSATE SODIUM 100 MG: 100 CAPSULE, LIQUID FILLED ORAL at 17:49

## 2024-01-01 RX ADMIN — IOHEXOL 100 ML: 350 INJECTION, SOLUTION INTRAVENOUS at 13:37

## 2024-01-01 RX ADMIN — WATER 5 ML: 1 INJECTION INTRAMUSCULAR; INTRAVENOUS; SUBCUTANEOUS at 13:15

## 2024-01-01 RX ADMIN — FINASTERIDE 5 MG: 5 TABLET, FILM COATED ORAL at 16:17

## 2024-01-01 RX ADMIN — BUDESONIDE 0.5 MG: 0.5 INHALANT ORAL at 19:51

## 2024-01-01 RX ADMIN — HYDROXYZINE HYDROCHLORIDE 25 MG: 25 TABLET ORAL at 22:23

## 2024-01-01 RX ADMIN — METHYLPREDNISOLONE SODIUM SUCCINATE 40 MG: 40 INJECTION, POWDER, FOR SOLUTION INTRAMUSCULAR; INTRAVENOUS at 21:04

## 2024-01-01 RX ADMIN — LEVALBUTEROL HYDROCHLORIDE 1.25 MG: 1.25 SOLUTION RESPIRATORY (INHALATION) at 20:26

## 2024-01-01 RX ADMIN — MONTELUKAST 10 MG: 10 TABLET, FILM COATED ORAL at 21:36

## 2024-01-01 RX ADMIN — FORMOTEROL FUMARATE DIHYDRATE 20 MCG: 20 SOLUTION RESPIRATORY (INHALATION) at 20:26

## 2024-01-01 RX ADMIN — LEVALBUTEROL HYDROCHLORIDE 1.25 MG: 1.25 SOLUTION RESPIRATORY (INHALATION) at 19:51

## 2024-01-01 RX ADMIN — METHYLPREDNISOLONE SODIUM SUCCINATE 40 MG: 40 INJECTION, POWDER, FOR SOLUTION INTRAMUSCULAR; INTRAVENOUS at 20:18

## 2024-01-01 RX ADMIN — LEVALBUTEROL HYDROCHLORIDE 1.25 MG: 1.25 SOLUTION RESPIRATORY (INHALATION) at 07:51

## 2024-01-01 RX ADMIN — METOPROLOL SUCCINATE 25 MG: 25 TABLET, EXTENDED RELEASE ORAL at 08:17

## 2024-01-01 RX ADMIN — LEVALBUTEROL HYDROCHLORIDE 1.25 MG: 1.25 SOLUTION RESPIRATORY (INHALATION) at 08:10

## 2024-01-01 RX ADMIN — VITAM B12 100 MCG: 100 TAB at 09:14

## 2024-01-01 RX ADMIN — SODIUM ZIRCONIUM CYCLOSILICATE 10 G: 10 POWDER, FOR SUSPENSION ORAL at 12:29

## 2024-01-01 RX ADMIN — LORAZEPAM 1 MG: 2 INJECTION INTRAMUSCULAR; INTRAVENOUS at 18:27

## 2024-01-01 RX ADMIN — DILTIAZEM HYDROCHLORIDE 300 MG: 180 CAPSULE, COATED, EXTENDED RELEASE ORAL at 08:31

## 2024-01-01 RX ADMIN — FORMOTEROL FUMARATE DIHYDRATE 20 MCG: 20 SOLUTION RESPIRATORY (INHALATION) at 07:17

## 2024-01-01 RX ADMIN — LEVALBUTEROL HYDROCHLORIDE 1.25 MG: 1.25 SOLUTION RESPIRATORY (INHALATION) at 20:35

## 2024-01-01 RX ADMIN — SODIUM CHLORIDE SOLN NEBU 3% 4 ML: 3 NEBU SOLN at 13:28

## 2024-01-01 RX ADMIN — LEVALBUTEROL HYDROCHLORIDE 1.25 MG: 1.25 SOLUTION RESPIRATORY (INHALATION) at 07:17

## 2024-01-01 RX ADMIN — METOPROLOL SUCCINATE 25 MG: 25 TABLET, EXTENDED RELEASE ORAL at 09:14

## 2024-01-01 RX ADMIN — AZITHROMYCIN MONOHYDRATE 500 MG: 500 INJECTION, POWDER, LYOPHILIZED, FOR SOLUTION INTRAVENOUS at 17:20

## 2024-01-01 RX ADMIN — BISACODYL 5 MG: 5 TABLET, COATED ORAL at 09:05

## 2024-01-01 RX ADMIN — APIXABAN 5 MG: 5 TABLET, FILM COATED ORAL at 08:31

## 2024-01-01 RX ADMIN — HYDROXYZINE HYDROCHLORIDE 25 MG: 25 TABLET ORAL at 22:15

## 2024-01-01 RX ADMIN — BUDESONIDE 0.5 MG: 0.5 INHALANT ORAL at 20:26

## 2024-01-01 RX ADMIN — METHYLPREDNISOLONE SODIUM SUCCINATE 40 MG: 40 INJECTION, POWDER, FOR SOLUTION INTRAMUSCULAR; INTRAVENOUS at 14:46

## 2024-01-01 RX ADMIN — SODIUM CHLORIDE SOLN NEBU 3% 4 ML: 3 NEBU SOLN at 07:54

## 2024-01-01 RX ADMIN — LEVALBUTEROL HYDROCHLORIDE 1.25 MG: 1.25 SOLUTION RESPIRATORY (INHALATION) at 13:07

## 2024-01-01 RX ADMIN — FORMOTEROL FUMARATE DIHYDRATE 20 MCG: 20 SOLUTION RESPIRATORY (INHALATION) at 20:11

## 2024-01-01 RX ADMIN — MONTELUKAST 10 MG: 10 TABLET, FILM COATED ORAL at 20:09

## 2024-01-01 RX ADMIN — FORMOTEROL FUMARATE DIHYDRATE 20 MCG: 20 SOLUTION RESPIRATORY (INHALATION) at 19:26

## 2024-01-01 RX ADMIN — METHYLPREDNISOLONE SODIUM SUCCINATE 40 MG: 40 INJECTION, POWDER, FOR SOLUTION INTRAMUSCULAR; INTRAVENOUS at 05:08

## 2024-01-01 RX ADMIN — METHYLPREDNISOLONE SODIUM SUCCINATE 40 MG: 40 INJECTION, POWDER, FOR SOLUTION INTRAMUSCULAR; INTRAVENOUS at 13:13

## 2024-01-01 RX ADMIN — BUDESONIDE 0.5 MG: 0.5 INHALANT ORAL at 20:39

## 2024-01-01 RX ADMIN — BUDESONIDE 0.5 MG: 0.5 INHALANT ORAL at 20:10

## 2024-01-01 RX ADMIN — SODIUM ZIRCONIUM CYCLOSILICATE 10 G: 10 POWDER, FOR SUSPENSION ORAL at 09:07

## 2024-01-01 RX ADMIN — APIXABAN 5 MG: 5 TABLET, FILM COATED ORAL at 17:49

## 2024-01-01 RX ADMIN — MONTELUKAST 10 MG: 10 TABLET, FILM COATED ORAL at 21:04

## 2024-01-01 RX ADMIN — DOCUSATE SODIUM 100 MG: 100 CAPSULE, LIQUID FILLED ORAL at 17:09

## 2024-01-01 RX ADMIN — ACETAZOLAMIDE 500 MG: 500 INJECTION, POWDER, LYOPHILIZED, FOR SOLUTION INTRAVENOUS at 10:50

## 2024-01-01 RX ADMIN — VITAM B12 100 MCG: 100 TAB at 08:17

## 2024-01-01 RX ADMIN — DILTIAZEM HYDROCHLORIDE 300 MG: 180 CAPSULE, COATED, EXTENDED RELEASE ORAL at 16:17

## 2024-01-01 RX ADMIN — AZITHROMYCIN MONOHYDRATE 500 MG: 500 INJECTION, POWDER, LYOPHILIZED, FOR SOLUTION INTRAVENOUS at 17:35

## 2024-01-01 RX ADMIN — FINASTERIDE 5 MG: 5 TABLET, FILM COATED ORAL at 09:15

## 2024-01-01 RX ADMIN — LEVALBUTEROL HYDROCHLORIDE 1.25 MG: 1.25 SOLUTION RESPIRATORY (INHALATION) at 13:58

## 2024-01-01 RX ADMIN — BISACODYL 5 MG: 5 TABLET, COATED ORAL at 09:15

## 2024-01-01 RX ADMIN — LORATADINE 10 MG: 10 TABLET ORAL at 08:04

## 2024-01-01 RX ADMIN — APIXABAN 5 MG: 5 TABLET, FILM COATED ORAL at 17:20

## 2024-01-01 RX ADMIN — LEVALBUTEROL HYDROCHLORIDE 1.25 MG: 1.25 SOLUTION RESPIRATORY (INHALATION) at 20:39

## 2024-01-01 RX ADMIN — SODIUM CHLORIDE 75 ML/HR: 0.9 INJECTION, SOLUTION INTRAVENOUS at 22:31

## 2024-01-01 RX ADMIN — APIXABAN 5 MG: 5 TABLET, FILM COATED ORAL at 20:09

## 2024-01-01 RX ADMIN — GLYCOPYRROLATE 0.1 MG: 0.2 INJECTION, SOLUTION INTRAMUSCULAR; INTRAVENOUS at 11:23

## 2024-01-01 RX ADMIN — FORMOTEROL FUMARATE DIHYDRATE 20 MCG: 20 SOLUTION RESPIRATORY (INHALATION) at 07:09

## 2024-01-01 RX ADMIN — LEVALBUTEROL HYDROCHLORIDE 1.25 MG: 1.25 SOLUTION RESPIRATORY (INHALATION) at 20:11

## 2024-01-01 RX ADMIN — SODIUM CHLORIDE 75 ML/HR: 0.9 INJECTION, SOLUTION INTRAVENOUS at 09:56

## 2024-01-01 RX ADMIN — NICOTINE 21 MG: 21 PATCH, EXTENDED RELEASE TRANSDERMAL at 19:55

## 2024-01-01 RX ADMIN — MONTELUKAST 10 MG: 10 TABLET, FILM COATED ORAL at 21:40

## 2024-01-01 RX ADMIN — DOCUSATE SODIUM 100 MG: 100 CAPSULE, LIQUID FILLED ORAL at 09:05

## 2024-01-01 RX ADMIN — BUDESONIDE 0.5 MG: 0.5 INHALANT ORAL at 08:51

## 2024-01-01 RX ADMIN — SODIUM CHLORIDE SOLN NEBU 3% 4 ML: 3 NEBU SOLN at 20:26

## 2024-01-01 RX ADMIN — LEVALBUTEROL HYDROCHLORIDE 1.25 MG: 1.25 SOLUTION RESPIRATORY (INHALATION) at 13:28

## 2024-01-01 RX ADMIN — MORPHINE SULFATE 2 MG: 2 INJECTION, SOLUTION INTRAMUSCULAR; INTRAVENOUS at 22:36

## 2024-01-01 RX ADMIN — SODIUM CHLORIDE SOLN NEBU 3% 4 ML: 3 NEBU SOLN at 07:17

## 2024-01-01 RX ADMIN — LORATADINE 10 MG: 10 TABLET ORAL at 09:05

## 2024-01-01 RX ADMIN — GUAIFENESIN 600 MG: 600 TABLET, EXTENDED RELEASE ORAL at 22:23

## 2024-01-01 RX ADMIN — LEVALBUTEROL HYDROCHLORIDE 1.25 MG: 1.25 SOLUTION RESPIRATORY (INHALATION) at 08:23

## 2024-01-01 RX ADMIN — DOCUSATE SODIUM 100 MG: 100 CAPSULE, LIQUID FILLED ORAL at 08:04

## 2024-01-01 RX ADMIN — BISACODYL 5 MG: 5 TABLET, COATED ORAL at 08:04

## 2024-01-01 RX ADMIN — FORMOTEROL FUMARATE DIHYDRATE 20 MCG: 20 SOLUTION RESPIRATORY (INHALATION) at 19:51

## 2024-01-01 RX ADMIN — BUDESONIDE 0.5 MG: 0.5 INHALANT ORAL at 08:23

## 2024-01-01 RX ADMIN — APIXABAN 5 MG: 5 TABLET, FILM COATED ORAL at 09:05

## 2024-01-01 RX ADMIN — ACETAZOLAMIDE 500 MG: 500 INJECTION, POWDER, LYOPHILIZED, FOR SOLUTION INTRAVENOUS at 13:13

## 2024-01-01 RX ADMIN — ALBUTEROL SULFATE 10 MG: 2.5 SOLUTION RESPIRATORY (INHALATION) at 12:27

## 2024-01-01 RX ADMIN — BUDESONIDE 0.5 MG: 0.5 INHALANT ORAL at 07:52

## 2024-01-01 RX ADMIN — LEVALBUTEROL HYDROCHLORIDE 1.25 MG: 1.25 SOLUTION RESPIRATORY (INHALATION) at 13:47

## 2024-01-01 RX ADMIN — BUDESONIDE 0.5 MG: 0.5 INHALANT ORAL at 20:11

## 2024-01-01 RX ADMIN — VITAM B12 100 MCG: 100 TAB at 08:31

## 2024-01-01 RX ADMIN — METHYLPREDNISOLONE SODIUM SUCCINATE 40 MG: 40 INJECTION, POWDER, FOR SOLUTION INTRAMUSCULAR; INTRAVENOUS at 13:53

## 2024-01-01 RX ADMIN — MORPHINE SULFATE 2 MG: 2 INJECTION, SOLUTION INTRAMUSCULAR; INTRAVENOUS at 04:55

## 2024-01-01 RX ADMIN — GUAIFENESIN 600 MG: 600 TABLET, EXTENDED RELEASE ORAL at 08:31

## 2024-01-01 RX ADMIN — GUAIFENESIN 600 MG: 600 TABLET, EXTENDED RELEASE ORAL at 09:15

## 2024-01-01 RX ADMIN — ACETAZOLAMIDE 250 MG: 500 INJECTION, POWDER, LYOPHILIZED, FOR SOLUTION INTRAVENOUS at 11:03

## 2024-01-01 RX ADMIN — FORMOTEROL FUMARATE DIHYDRATE 20 MCG: 20 SOLUTION RESPIRATORY (INHALATION) at 08:18

## 2024-01-01 RX ADMIN — SODIUM CHLORIDE SOLN NEBU 3% 4 ML: 3 NEBU SOLN at 08:52

## 2024-01-01 RX ADMIN — METHYLPREDNISOLONE SODIUM SUCCINATE 40 MG: 40 INJECTION, POWDER, FOR SOLUTION INTRAMUSCULAR; INTRAVENOUS at 08:07

## 2024-01-01 RX ADMIN — SODIUM CHLORIDE SOLN NEBU 3% 4 ML: 3 NEBU SOLN at 20:11

## 2024-01-01 RX ADMIN — METHYLPREDNISOLONE SODIUM SUCCINATE 40 MG: 40 INJECTION, POWDER, FOR SOLUTION INTRAMUSCULAR; INTRAVENOUS at 21:14

## 2024-01-01 RX ADMIN — METHYLPREDNISOLONE SODIUM SUCCINATE 40 MG: 40 INJECTION, POWDER, FOR SOLUTION INTRAMUSCULAR; INTRAVENOUS at 05:35

## 2024-01-01 RX ADMIN — FORMOTEROL FUMARATE DIHYDRATE 20 MCG: 20 SOLUTION RESPIRATORY (INHALATION) at 08:02

## 2024-01-01 RX ADMIN — SODIUM CHLORIDE SOLN NEBU 3% 4 ML: 3 NEBU SOLN at 20:39

## 2024-01-01 RX ADMIN — IPRATROPIUM BROMIDE 0.5 MG: 0.5 SOLUTION RESPIRATORY (INHALATION) at 19:24

## 2024-01-01 RX ADMIN — FORMOTEROL FUMARATE DIHYDRATE 20 MCG: 20 SOLUTION RESPIRATORY (INHALATION) at 08:52

## 2024-01-01 RX ADMIN — MONTELUKAST 10 MG: 10 TABLET, FILM COATED ORAL at 21:06

## 2024-01-01 RX ADMIN — APIXABAN 5 MG: 5 TABLET, FILM COATED ORAL at 17:09

## 2024-01-01 RX ADMIN — METHYLPREDNISOLONE SODIUM SUCCINATE 40 MG: 40 INJECTION, POWDER, FOR SOLUTION INTRAMUSCULAR; INTRAVENOUS at 21:40

## 2024-01-01 RX ADMIN — FORMOTEROL FUMARATE DIHYDRATE 20 MCG: 20 SOLUTION RESPIRATORY (INHALATION) at 20:10

## 2024-01-01 RX ADMIN — ISODIUM CHLORIDE 12 ML: 0.03 SOLUTION RESPIRATORY (INHALATION) at 12:27

## 2024-01-01 RX ADMIN — LORAZEPAM 1 MG: 2 INJECTION INTRAMUSCULAR; INTRAVENOUS at 05:10

## 2024-01-01 RX ADMIN — DOCUSATE SODIUM 100 MG: 100 CAPSULE, LIQUID FILLED ORAL at 08:18

## 2024-01-01 RX ADMIN — LEVALBUTEROL HYDROCHLORIDE 1.25 MG: 1.25 SOLUTION RESPIRATORY (INHALATION) at 13:22

## 2024-01-01 RX ADMIN — LEVALBUTEROL HYDROCHLORIDE 1.25 MG: 1.25 SOLUTION RESPIRATORY (INHALATION) at 07:52

## 2024-01-01 RX ADMIN — DOCUSATE SODIUM 100 MG: 100 CAPSULE, LIQUID FILLED ORAL at 17:35

## 2024-01-01 RX ADMIN — AZITHROMYCIN MONOHYDRATE 500 MG: 500 INJECTION, POWDER, LYOPHILIZED, FOR SOLUTION INTRAVENOUS at 17:09

## 2024-01-01 RX ADMIN — SODIUM CHLORIDE SOLN NEBU 3% 4 ML: 3 NEBU SOLN at 13:07

## 2024-01-01 RX ADMIN — GUAIFENESIN 600 MG: 600 TABLET, EXTENDED RELEASE ORAL at 21:04

## 2024-01-01 RX ADMIN — DOCUSATE SODIUM 100 MG: 100 CAPSULE, LIQUID FILLED ORAL at 09:15

## 2024-01-01 RX ADMIN — SODIUM CHLORIDE SOLN NEBU 3% 4 ML: 3 NEBU SOLN at 13:22

## 2024-01-01 RX ADMIN — APIXABAN 5 MG: 5 TABLET, FILM COATED ORAL at 08:17

## 2024-01-01 RX ADMIN — DILTIAZEM HYDROCHLORIDE 300 MG: 180 CAPSULE, COATED, EXTENDED RELEASE ORAL at 09:14

## 2024-01-01 RX ADMIN — Medication 400 MG: at 09:05

## 2024-01-01 RX ADMIN — LEVALBUTEROL HYDROCHLORIDE 1.25 MG: 1.25 SOLUTION RESPIRATORY (INHALATION) at 20:10

## 2024-01-01 RX ADMIN — METHYLPREDNISOLONE SODIUM SUCCINATE 40 MG: 40 INJECTION, POWDER, FOR SOLUTION INTRAMUSCULAR; INTRAVENOUS at 14:04

## 2024-01-01 RX ADMIN — Medication 6 MG: at 21:06

## 2024-01-01 RX ADMIN — GUAIFENESIN 600 MG: 600 TABLET, EXTENDED RELEASE ORAL at 08:04

## 2024-01-01 RX ADMIN — LORAZEPAM 0.5 MG: 2 INJECTION INTRAMUSCULAR; INTRAVENOUS at 23:57

## 2024-01-01 RX ADMIN — DOCUSATE SODIUM 100 MG: 100 CAPSULE, LIQUID FILLED ORAL at 17:20

## 2024-01-01 RX ADMIN — SODIUM ZIRCONIUM CYCLOSILICATE 10 G: 10 POWDER, FOR SUSPENSION ORAL at 16:11

## 2024-01-01 RX ADMIN — APIXABAN 5 MG: 5 TABLET, FILM COATED ORAL at 17:35

## 2024-01-01 RX ADMIN — BUDESONIDE 0.5 MG: 0.5 INHALANT ORAL at 07:51

## 2024-01-01 RX ADMIN — METHYLPREDNISOLONE SODIUM SUCCINATE 40 MG: 40 INJECTION, POWDER, FOR SOLUTION INTRAMUSCULAR; INTRAVENOUS at 05:55

## 2024-01-01 RX ADMIN — METHYLPREDNISOLONE SODIUM SUCCINATE 40 MG: 40 INJECTION, POWDER, FOR SOLUTION INTRAMUSCULAR; INTRAVENOUS at 14:43

## 2024-01-01 RX ADMIN — SODIUM CHLORIDE, SODIUM LACTATE, POTASSIUM CHLORIDE, AND CALCIUM CHLORIDE 250 ML: .6; .31; .03; .02 INJECTION, SOLUTION INTRAVENOUS at 16:45

## 2024-01-01 RX ADMIN — IPRATROPIUM BROMIDE 0.5 MG: 0.5 SOLUTION RESPIRATORY (INHALATION) at 08:10

## 2024-01-01 RX ADMIN — LEVALBUTEROL HYDROCHLORIDE 1.25 MG: 1.25 SOLUTION RESPIRATORY (INHALATION) at 19:24

## 2024-01-01 RX ADMIN — LEVALBUTEROL HYDROCHLORIDE 1.25 MG: 1.25 SOLUTION RESPIRATORY (INHALATION) at 13:19

## 2024-01-01 RX ADMIN — BUDESONIDE 0.5 MG: 0.5 INHALANT ORAL at 07:17

## 2024-01-01 RX ADMIN — DOCUSATE SODIUM 100 MG: 100 CAPSULE, LIQUID FILLED ORAL at 08:31

## 2024-01-01 RX ADMIN — FINASTERIDE 5 MG: 5 TABLET, FILM COATED ORAL at 08:18

## 2024-01-01 RX ADMIN — LEVALBUTEROL HYDROCHLORIDE 1.25 MG: 1.25 SOLUTION RESPIRATORY (INHALATION) at 07:09

## 2024-01-01 RX ADMIN — BUDESONIDE 0.5 MG: 0.5 INHALANT ORAL at 20:35

## 2024-01-01 RX ADMIN — MORPHINE SULFATE 2 MG: 2 INJECTION, SOLUTION INTRAMUSCULAR; INTRAVENOUS at 11:23

## 2024-01-01 RX ADMIN — APIXABAN 5 MG: 5 TABLET, FILM COATED ORAL at 08:04

## 2024-01-01 RX ADMIN — METOPROLOL SUCCINATE 25 MG: 25 TABLET, EXTENDED RELEASE ORAL at 08:30

## 2024-01-01 RX ADMIN — METHYLPREDNISOLONE SODIUM SUCCINATE 40 MG: 40 INJECTION, POWDER, FOR SOLUTION INTRAMUSCULAR; INTRAVENOUS at 21:06

## 2024-01-01 RX ADMIN — FORMOTEROL FUMARATE DIHYDRATE 20 MCG: 20 SOLUTION RESPIRATORY (INHALATION) at 20:39

## 2024-01-01 RX ADMIN — FORMOTEROL FUMARATE DIHYDRATE 20 MCG: 20 SOLUTION RESPIRATORY (INHALATION) at 08:23

## 2024-01-01 RX ADMIN — LORATADINE 10 MG: 10 TABLET ORAL at 08:18

## 2024-01-01 RX ADMIN — GUAIFENESIN 600 MG: 600 TABLET, EXTENDED RELEASE ORAL at 21:17

## 2024-01-01 RX ADMIN — DILTIAZEM HYDROCHLORIDE 300 MG: 180 CAPSULE, COATED, EXTENDED RELEASE ORAL at 08:04

## 2024-01-01 RX ADMIN — DOCUSATE SODIUM 100 MG: 100 CAPSULE, LIQUID FILLED ORAL at 17:18

## 2024-01-01 RX ADMIN — Medication 400 MG: at 08:31

## 2024-01-01 RX ADMIN — METHYLPREDNISOLONE SODIUM SUCCINATE 40 MG: 40 INJECTION, POWDER, FOR SOLUTION INTRAMUSCULAR; INTRAVENOUS at 05:17

## 2024-01-01 RX ADMIN — DILTIAZEM HYDROCHLORIDE 300 MG: 180 CAPSULE, COATED, EXTENDED RELEASE ORAL at 08:17

## 2024-01-01 RX ADMIN — FORMOTEROL FUMARATE DIHYDRATE 20 MCG: 20 SOLUTION RESPIRATORY (INHALATION) at 20:35

## 2024-01-01 RX ADMIN — VITAM B12 100 MCG: 100 TAB at 08:04

## 2024-01-01 RX ADMIN — WATER 10 ML: 1 INJECTION INTRAMUSCULAR; INTRAVENOUS; SUBCUTANEOUS at 20:23

## 2024-01-01 RX ADMIN — METOPROLOL SUCCINATE 25 MG: 25 TABLET, EXTENDED RELEASE ORAL at 16:17

## 2024-01-01 RX ADMIN — LORATADINE 10 MG: 10 TABLET ORAL at 09:14

## 2024-01-01 RX ADMIN — METOPROLOL SUCCINATE 25 MG: 25 TABLET, EXTENDED RELEASE ORAL at 09:05

## 2024-01-01 RX ADMIN — METHYLPREDNISOLONE SODIUM SUCCINATE 125 MG: 125 INJECTION, POWDER, FOR SOLUTION INTRAMUSCULAR; INTRAVENOUS at 12:18

## 2024-01-01 RX ADMIN — ACETAZOLAMIDE 500 MG: 500 INJECTION, POWDER, LYOPHILIZED, FOR SOLUTION INTRAVENOUS at 18:15

## 2024-01-01 RX ADMIN — LEVALBUTEROL HYDROCHLORIDE 1.25 MG: 1.25 SOLUTION RESPIRATORY (INHALATION) at 08:52

## 2024-01-01 RX ADMIN — FORMOTEROL FUMARATE DIHYDRATE 20 MCG: 20 SOLUTION RESPIRATORY (INHALATION) at 07:51

## 2024-01-01 RX ADMIN — GUAIFENESIN 600 MG: 600 TABLET, EXTENDED RELEASE ORAL at 21:36

## 2024-01-01 RX ADMIN — BUDESONIDE 0.5 MG: 0.5 INHALANT ORAL at 07:09

## 2024-01-01 RX ADMIN — VITAM B12 100 MCG: 100 TAB at 09:05

## 2024-01-01 RX ADMIN — METHYLPREDNISOLONE SODIUM SUCCINATE 40 MG: 40 INJECTION, POWDER, FOR SOLUTION INTRAMUSCULAR; INTRAVENOUS at 22:23

## 2024-01-01 RX ADMIN — FINASTERIDE 5 MG: 5 TABLET, FILM COATED ORAL at 08:05

## 2024-01-01 RX ADMIN — HYDROXYZINE HYDROCHLORIDE 25 MG: 25 TABLET ORAL at 21:04

## 2024-01-01 RX ADMIN — BUDESONIDE 0.5 MG: 0.5 INHALANT ORAL at 08:10

## 2024-01-01 RX ADMIN — GUAIFENESIN 600 MG: 600 TABLET, EXTENDED RELEASE ORAL at 08:17

## 2024-01-01 RX ADMIN — BISACODYL 5 MG: 5 TABLET, COATED ORAL at 08:30

## 2024-01-01 RX ADMIN — APIXABAN 5 MG: 5 TABLET, FILM COATED ORAL at 09:15

## 2024-01-01 RX ADMIN — CALCIUM GLUCONATE 1 G: 20 INJECTION, SOLUTION INTRAVENOUS at 15:21

## 2024-01-01 RX ADMIN — BUDESONIDE 0.5 MG: 0.5 INHALANT ORAL at 19:25

## 2024-01-01 RX ADMIN — ACETAZOLAMIDE 250 MG: 500 INJECTION, POWDER, LYOPHILIZED, FOR SOLUTION INTRAVENOUS at 20:21

## 2024-01-01 RX ADMIN — METOPROLOL SUCCINATE 25 MG: 25 TABLET, EXTENDED RELEASE ORAL at 08:04

## 2024-01-01 RX ADMIN — MONTELUKAST 10 MG: 10 TABLET, FILM COATED ORAL at 22:23

## 2024-01-01 RX ADMIN — Medication 400 MG: at 08:17

## 2024-01-01 RX ADMIN — FINASTERIDE 5 MG: 5 TABLET, FILM COATED ORAL at 09:05

## 2024-01-01 RX ADMIN — Medication 400 MG: at 09:15

## 2024-01-01 RX ADMIN — DILTIAZEM HYDROCHLORIDE 300 MG: 180 CAPSULE, COATED, EXTENDED RELEASE ORAL at 09:03

## 2024-01-01 RX ADMIN — METHYLPREDNISOLONE SODIUM SUCCINATE 40 MG: 40 INJECTION, POWDER, FOR SOLUTION INTRAMUSCULAR; INTRAVENOUS at 05:38

## 2024-01-01 RX ADMIN — LORATADINE 10 MG: 10 TABLET ORAL at 08:30

## 2024-01-01 RX ADMIN — METHYLPREDNISOLONE SODIUM SUCCINATE 40 MG: 40 INJECTION, POWDER, FOR SOLUTION INTRAMUSCULAR; INTRAVENOUS at 06:03

## 2024-01-01 RX ADMIN — CEFTRIAXONE 1000 MG: 1 INJECTION, SOLUTION INTRAVENOUS at 12:57

## 2024-01-01 RX ADMIN — APIXABAN 5 MG: 5 TABLET, FILM COATED ORAL at 17:00

## 2024-01-01 RX ADMIN — TORSEMIDE 40 MG: 20 TABLET ORAL at 11:29

## 2024-02-06 ENCOUNTER — HOSPITAL ENCOUNTER (OUTPATIENT)
Dept: CT IMAGING | Facility: HOSPITAL | Age: 84
Discharge: HOME | End: 2024-02-06
Payer: MEDICARE

## 2024-02-06 DIAGNOSIS — R91.1 PULMONARY NODULE: ICD-10-CM

## 2024-02-06 PROCEDURE — G1004 CDSM NDSC: HCPCS

## 2024-02-06 PROCEDURE — 71250 CT THORAX DX C-: CPT

## 2024-02-13 ENCOUNTER — TELEPHONE (OUTPATIENT)
Dept: HEMATOLOGY ONCOLOGY | Facility: CLINIC | Age: 84
End: 2024-02-13

## 2024-02-13 DIAGNOSIS — C80.1 MALIGNANT SPINDLE CELL NEOPLASM (HCC): Primary | ICD-10-CM

## 2024-02-13 NOTE — TELEPHONE ENCOUNTER
Reading room called to have patient's scan read prior to the appointment with Dr. Davies.  Spoke to Dalton

## 2024-02-13 NOTE — TELEPHONE ENCOUNTER
Message left on voicemail as a reminder to complete labs ordered before upcoming appointment with Dr. Davies.     Patient was also called, message left on voicemail for him to get an updated cbc.  Lab placed.

## 2024-02-14 ENCOUNTER — OFFICE VISIT (OUTPATIENT)
Dept: PULMONOLOGY | Facility: CLINIC | Age: 84
End: 2024-02-14

## 2024-02-14 VITALS
SYSTOLIC BLOOD PRESSURE: 110 MMHG | TEMPERATURE: 95.2 F | DIASTOLIC BLOOD PRESSURE: 60 MMHG | BODY MASS INDEX: 22.51 KG/M2 | HEIGHT: 69 IN | OXYGEN SATURATION: 91 % | HEART RATE: 63 BPM

## 2024-02-14 DIAGNOSIS — G47.33 OSA (OBSTRUCTIVE SLEEP APNEA): ICD-10-CM

## 2024-02-14 DIAGNOSIS — J44.1 CHRONIC OBSTRUCTIVE PULMONARY DISEASE WITH ACUTE EXACERBATION (HCC): Primary | ICD-10-CM

## 2024-02-14 DIAGNOSIS — C34.92 MALIGNANT NEOPLASM OF UNSPECIFIED PART OF LEFT BRONCHUS OR LUNG (HCC): ICD-10-CM

## 2024-02-14 DIAGNOSIS — R91.1 PULMONARY NODULE: ICD-10-CM

## 2024-02-14 DIAGNOSIS — J96.11 CHRONIC RESPIRATORY FAILURE WITH HYPOXIA (HCC): ICD-10-CM

## 2024-02-14 RX ORDER — DILTIAZEM HYDROCHLORIDE 300 MG/1
CAPSULE, EXTENDED RELEASE ORAL
COMMUNITY
Start: 2023-11-15

## 2024-02-14 NOTE — PROGRESS NOTES
Pulmonary Follow Up Note   Liu Angel 84 y.o. male MRN: [id number]  2/14/2024    Assessment:  COPD  Gold stage IIId, last FEV1 of 34% in 2021  Last exacerbation/CHF more than COPD and 4/2023  Really limited exercise capacity due to deconditioning/weakness  On triple inhalers, no frequent use of PRN bronchodilators    Plan:  Continue Wixela/Spiriva Respimat  Up-to-date on immunization  Continue PRN albuterol HFA/nebs    Chronic hypoxic/hypercapnic respiratory failure  MILADIS  Admits to not using the BiPAP for about 2 weeks, had skin sore around his face  Now resolved, ready to start using it again  No supplemental oxygen needed at rest, continue 2-3 LPM with exertion  [facility]  Diuretic therapy as per cardiology    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    History of Present Illness     Follow up for: COPD/spindle cell metastasis/lung/chronic respiratory failure     Background:  84 y.o. male with a h/o COPD, HTN, AFib, systolic/diastolic heart failure, moderate alcohol use, former tobacco abuse about 40 pack year quit in 1998.     Hospitalized in 08/2021 for acute on chronic hypoxic/hypercapnic respiratory failure, treated for CHF and COPD exacerbation.  Noted hypercapnia on ABG before discharge, started on BiPAP since then.      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, underwent excision by surgery.  A PET-CT showed postsurgical changes at the scalp without signs of metastases.  Reported RUL mild avidity suspecting inflammatory etiology as well as around the mediastinal lymph node.  Was previously found to have 5 mm MIKKI nodule, recommended follow-up in 12 months.      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     11/02/2021 visit-adjusted inhaler therapy, continued Spiriva/Advair discontinued budesonide nebs.  PFT ordered.  6 minute walk test required 2 LPM with exertion.     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  Ordered split/titration CPAP study.  Thoracic surgery evaluated, not a candidate for lobectomy, possible candidate for wedge resection, pending further evaluation by Medical Oncology     9/2022-continued Advair/Spiriva     4/2023-hospitalized for acute hypoxic respiratory failure treated for CHF exacerbation and COPD    5/2023 visit-stable/on triple inhalers, offered pulmonary rehab/not interested.    Interval History  Since last seen, no major events.  Continues to be active, able to walk independently for short distances however needs a wheelchair to walk long distances.  Mainly due to fatigue/deconditioning, no significant respiratory symptoms.  No ED visit, hospitalization, or treatment with oral steroids.  Still on Wixela/Spiriva Respimat, does not use PRN albuterol or nebulizer at all.      Review of Systems  As per hpi, all other systems reviewed and were negative    Studies:  Imaging and other studies: I have personally reviewed pertinent films in PACS     CT PE 08/14/2021-moderate centrilobular/paraseptal emphysematous changes.  5 millimeter left apical pulmonary nodule.  Minimal bibasilar atelectasis.  Enlarged pulmonary artery at 4.3.  Enlarged ascending aorta at 4 centimeter.  Calcified hilar/mediastinal lymphadenopathy     [facility] 10/15/2021-diffuse areas of multifocal pulmonary uptake suspect related to areas of parenchymal scarring.  Focal area uptake at RUL suspect inflammatory etiology.  Mediastinal lymph nodes measuring 1.5  "centimeter.  Mild accentuated uptake at the left sided prevascular lymph node lateral to the aortic arch.    CT chest 2024-bilateral emphysema.  Stable cavitated nodules: MIKKI, RUL 8 mm, MIKKI decreased in size from 6 to 4 mm.  New irregular 1.2 RML nodule/suspected infectious/inflammatory.  Chronic appearing tree-in-bud opacities/scattered granulomas.  Mild tracheal secretions.  Follow-up 3 months recommended.     Pulmonary function testin minute walk test 2021-baseline SpO2 91% on room air, heart rate 50.  SpO2 dropped to 85% after 1 minute of exertion, required 2 LPM for SpO2 at 92%, able to walk 200 m in 6 minutes with 2 LPM, maximal heart rate at 60     PFT 11/10/2021-ratio 41 percent, FEV1 0.94 liters/34 percent, FVC 2.3 liters/61 percent, TLC 99 percent,  percent, DLCO 30 percent.        TTE 2021-EF 45 percent.  Difficult access in the setting of frequent ectopies.  RV was upper limit normal, normal systolic function.  Mild to moderately dilated LA.  Mildly dilated RA.  Estimated PA SP 40 millimeter mon[place]     Overnight pulse oximetry 2022-total study time 5 hours 31 minutes.  Time spent below 89 percent SpO2 4 hours and 42 minutes this was associated with increased heart rate       Past medical, surgical, social and family histories reviewed.      Medications/Allergies: Reviewed      Vitals: Blood pressure 110/60, pulse 63, temperature (!) 95.2 °F (35.1 °C), height 5' 9\" (1.753 m), SpO2 91%. Body mass index is 22.51 kg/m². Oxygen Therapy  SpO2: 91 %      Physical Exam  Body mass index is 22.51 kg/m².   Gen: not in acute distress,   Neck/Eyes: supple, PERRL  Ear: normal appearance, mild but not significant hearing impairment  Nose:  normal nasal mucosa, no drainage  Chest: normal respiratory efforts, clear breath sounds bilaterally  CV: Distant heart sounds, no murmurs appreciated, 1+ pitting lower extremities edema  Abdomen: soft, non tender  Extremities:  No observed deformity " "  Skin: Chronic venous changes of the legs  Neuro: AAO X3, no focal motor deficit        Labs:  Lab Results   Component Value Date    WBC 10.78 (H) 11/03/2023    HGB 13.1 11/03/2023    HCT 41.9 11/03/2023     (H) 11/03/2023     11/03/2023     Lab Results   Component Value Date    CALCIUM 9.9 12/26/2023    K 5.0 12/26/2023    CO2 45 (H) 12/26/2023    CL 93 (L) 12/26/2023    BUN 29 (H) 12/26/2023    CREATININE 1.23 12/26/2023     No results found for: \"IGE\"  Lab Results   Component Value Date    ALT 5 (L) 11/03/2023    AST 14 11/03/2023    ALKPHOS 76 11/03/2023           Portions of the record may have been created with voice recognition software.  Occasional wrong word or \"sound a like\" substitutions may have occurred due to the inherent limitations of voice recognition software.  Read the chart carefully and recognize, using context, where substitutions have occurred    Anna Goodson M.D.  [facility]  "

## 2024-02-16 ENCOUNTER — APPOINTMENT (OUTPATIENT)
Dept: LAB | Facility: HOSPITAL | Age: 84
End: 2024-02-16
Payer: MEDICARE

## 2024-02-16 DIAGNOSIS — C80.1 MALIGNANT SPINDLE CELL NEOPLASM (HCC): ICD-10-CM

## 2024-02-16 LAB
BASOPHILS # BLD AUTO: 0.04 THOUSANDS/ÂΜL (ref 0–0.1)
BASOPHILS NFR BLD AUTO: 1 % (ref 0–1)
EOSINOPHIL # BLD AUTO: 0.09 THOUSAND/ÂΜL (ref 0–0.61)
EOSINOPHIL NFR BLD AUTO: 1 % (ref 0–6)
ERYTHROCYTE [DISTWIDTH] IN BLOOD BY AUTOMATED COUNT: 14.3 % (ref 11.6–15.1)
HCT VFR BLD AUTO: 40.5 % (ref 36.5–49.3)
HGB BLD-MCNC: 12.8 G/DL (ref 12–17)
IMM GRANULOCYTES # BLD AUTO: 0.02 THOUSAND/UL (ref 0–0.2)
IMM GRANULOCYTES NFR BLD AUTO: 0 % (ref 0–2)
LYMPHOCYTES # BLD AUTO: 1.27 THOUSANDS/ÂΜL (ref 0.6–4.47)
LYMPHOCYTES NFR BLD AUTO: 20 % (ref 14–44)
MCH RBC QN AUTO: 32.1 PG (ref 26.8–34.3)
MCHC RBC AUTO-ENTMCNC: 31.6 G/DL (ref 31.4–37.4)
MCV RBC AUTO: 102 FL (ref 82–98)
MONOCYTES # BLD AUTO: 0.73 THOUSAND/ÂΜL (ref 0.17–1.22)
MONOCYTES NFR BLD AUTO: 12 % (ref 4–12)
NEUTROPHILS # BLD AUTO: 4.14 THOUSANDS/ÂΜL (ref 1.85–7.62)
NEUTS SEG NFR BLD AUTO: 66 % (ref 43–75)
NRBC BLD AUTO-RTO: 0 /100 WBCS
PLATELET # BLD AUTO: 201 THOUSANDS/UL (ref 149–390)
PMV BLD AUTO: 10.1 FL (ref 8.9–12.7)
RBC # BLD AUTO: 3.99 MILLION/UL (ref 3.88–5.62)
WBC # BLD AUTO: 6.29 THOUSAND/UL (ref 4.31–10.16)

## 2024-02-16 PROCEDURE — 36415 COLL VENOUS BLD VENIPUNCTURE: CPT

## 2024-02-16 PROCEDURE — 85025 COMPLETE CBC W/AUTO DIFF WBC: CPT

## 2024-02-19 ENCOUNTER — TELEPHONE (OUTPATIENT)
Dept: HEMATOLOGY ONCOLOGY | Facility: CLINIC | Age: 84
End: 2024-02-19

## 2024-02-19 NOTE — TELEPHONE ENCOUNTER
Spoke with patient's wife to let her know I can reschedule patient for 2/22 with Dr. Davies. Pt's wife verbalized understanding and requested virtual appt. Pt's wife confirmed date and time

## 2024-02-19 NOTE — TELEPHONE ENCOUNTER
Spoke with Constance. They thought Liu's appointment was virtual this morning. Constance would like someone to call them and discuss Liu's results and to see if they need to reschecdule in office or virtual.

## 2024-02-20 NOTE — TELEPHONE ENCOUNTER
Michelle,    This patient was a no-show yesterday.  I dont believe Dr. Davies called him to discuss the results, so he may need a call or just a reschedule depending on what Dr. Davies would like to do.

## 2024-02-22 ENCOUNTER — TELEMEDICINE (OUTPATIENT)
Dept: HEMATOLOGY ONCOLOGY | Facility: CLINIC | Age: 84
End: 2024-02-22
Payer: MEDICARE

## 2024-02-22 DIAGNOSIS — C80.1 MALIGNANT SPINDLE CELL NEOPLASM (HCC): Primary | ICD-10-CM

## 2024-02-22 DIAGNOSIS — R91.1 PULMONARY NODULE: ICD-10-CM

## 2024-02-22 PROCEDURE — G2211 COMPLEX E/M VISIT ADD ON: HCPCS | Performed by: INTERNAL MEDICINE

## 2024-02-22 PROCEDURE — 99214 OFFICE O/P EST MOD 30 MIN: CPT | Performed by: INTERNAL MEDICINE

## 2024-02-22 NOTE — PROGRESS NOTES
Virtual Brief Visit    This Visit is being completed by telephone. The Patient is located at Home and in the following state in which I hold an active license [place]    The patient was identified by name and date of birth. Liu Angel was informed that this is a telemedicine visit and that the visit is being conducted through the Epic Embedded platform. He agrees to proceed..  My office door was closed. No one else was in the room.  He acknowledged consent and understanding of privacy and security of the video platform. The patient has agreed to participate and understands they can discontinue the visit at any time.    Patient is aware this is a billable service.       Assessment/Plan: In summary, this is an 84-year-old male with history of spindle cell neoplasm of the scalp as well as pulmonary lesion biopsied ,consistent with spindle cell neoplasm.  PD-L1 90%.  He has been under observation, immunotherapy deferred.  Clinically, he is doing well.  He generally functions without restriction.  Appetite had declined prior to new year.  Seems to be getting better now.  Etiology unclear.  No other specific symptoms reported.  Recent CT chest shows essentially stable pulmonary nodules.  New 1.2 cm lesion noted in the right middle lobe, suspicious for infectious or inflammatory.  Patient is asymptomatic in the chest.  Recent CBC is normal.  I suggested repeat imaging and blood work prior to his next visit in 3 months.  Patient voiced understanding and agreement.  I reviewed his medical conditions, medications, laboratory studies.    Problem List Items Addressed This Visit    None      Recent Visits  Date Type Provider Dept   02/19/24 Telephone Delia Meza RN [facility]   02/19/24 Telephone Jabier Davies DO [facility]   Showing recent visits within past 7 days and meeting all other requirements  Today's Visits  Date Type Provider Dept   02/22/24 Telemedicine Jabier Davies DO  [facility]   Showing today's visits and meeting all other requirements  Future Appointments  No visits were found meeting these conditions.  Showing future appointments within next 150 days and meeting all other requirements         Visit Time  Total Visit Duration: 15 min.

## 2024-04-03 ENCOUNTER — TELEPHONE (OUTPATIENT)
Dept: CARDIOLOGY CLINIC | Facility: CLINIC | Age: 84
End: 2024-04-03

## 2024-04-03 NOTE — TELEPHONE ENCOUNTER
Patients spouse called and stated that patient will need paperwork filled out for a pre op clearance before his surgery for skin cancer removal on his nose.    Spouse would like a call when office receives paperwork

## 2024-04-09 NOTE — TELEPHONE ENCOUNTER
Spoke with Pt spouse, the paper work was sent to the Pt PCP. He has an appointment with them on Monday. If Pt needs anything from us they will call us back.

## 2024-04-27 PROBLEM — A41.9 SEPSIS WITHOUT ACUTE ORGAN DYSFUNCTION (HCC): Status: ACTIVE | Noted: 2024-01-01

## 2024-04-27 PROBLEM — J18.9 PNEUMONIA OF RIGHT LOWER LOBE DUE TO INFECTIOUS ORGANISM: Status: ACTIVE | Noted: 2024-01-01

## 2024-04-27 NOTE — H&P
[facility]  H&P  Name: Liu Angel 84 y.o. male I MRN: [id number]  Unit/Bed#: -01 I Date of Admission: 4/27/2024   Date of Service: 4/27/2024 I Hospital Day: 0      Assessment/Plan   * Acute on chronic respiratory failure with hypoxia   Assessment & Plan  Patient presents to the hospital after his wife found him to have significantly worsened SpO2 on home pulse oximetry.  In the emergency department he did have pulse oximetry in the 50s.  At home his baseline is 2 L continuous, 2-3 with exertion.  Patient required nonrebreather in the emergency department, and has been weaned down, the time of admission was on 5 to 6 L of nasal cannula.  He also is supposed to use BiPAP at home, and has not been recently.  -Respiratory failure due to underlying pneumonia and COPD exacerbation  -See management under the separate conditions  -Wean oxygen as patient tolerates to maintain oxygen saturation below 89%  -Respiratory protocol, airway clearance    Pneumonia of right lower lobe due to infectious organism  Assessment & Plan  CT chest showing possible consolidation of the right middle and lower lobes, infectious versus inflammatory  Presents with respiratory failure  Given location, some concern of aspiration though he reports no signs/symptoms of recent aspiration - monitor clinically for signs  Ceftriaxone/Azithromycin  Respiratory protocol  Airway clearance    Of note he had an elevated D-dimer, CTA chest was obtained, negative for PE.  Suspect D-dimer elevated in the setting of acute on chronic respiratory disease    Chronic obstructive pulmonary disease with acute exacerbation (HCC)  Assessment & Plan  Patient presents with worsening shortness of breath, increased chest congestion  IV steroids  ATC nebs  Hold home inhalers in favor of Pulmicort and Perforomist  Respiratory protocol  Mucinex twice daily  Airway clearance    Sepsis without acute organ dysfunction (HCC)  Assessment &  Plan  Tachycardic, tachypneic, leukocytosis, elevated lactic acidosis with suspected source of pneumonia  CT chest showing possible consolidation of the right middle and lower lobes, infectious versus inflammatory  Meets criteria for sepsis  Blood cultures  Sputum cultures  Lactic acid elevated, rechecked and improved  Check procalcitonin and trend  30cc/kg not given to avoid fluid overload in the setting of chronic congestive heart failure, 250ml bolus was given  See under Pneumonia      Malignant spindle cell neoplasm (HCC)  Assessment & Plan  History of malignant neoplasm, CT chest x-ray shows improvement in a prior nodule.  Currently being observed as an outpatient.    BPH (benign prostatic hyperplasia)  Assessment & Plan  Continue home Proscar    Chronic combined systolic and diastolic heart failure (HCC)  Assessment & Plan  Wt Readings from Last 3 Encounters:   04/27/24 66.6 kg (146 lb 13.2 oz)   12/26/23 69.1 kg (152 lb 6.4 oz)   11/03/23 72.4 kg (159 lb 9.8 oz)     Appears euvolemic  Takes torsemide 100 mg daily and metolazone once a week  Will hold in setting of sepsis, and mildly worsening creatinine  Resume diuretics as appropriate  Caution with IV fluids          Atrial fibrillation   Assessment & Plan  Continue home diltiazem and metoprolol  Continue home Eliquis           VTE Pharmacologic Prophylaxis:   Moderate Risk (Score 3-4) - Pharmacological DVT Prophylaxis Ordered: apixaban (Eliquis).  Code Status: Level 3 - DNAR and DNI   Discussion with family: Updated  (wife) at bedside.    Anticipated Length of Stay: Patient will be admitted on an inpatient basis with an anticipated length of stay of greater than 2 midnights secondary to resp failure.    Total Time Spent on Date of Encounter in care of patient: 50 mins. This time was spent on one or more of the following: performing physical exam; counseling and coordination of care; obtaining or reviewing history; documenting in the medical  record; reviewing/ordering tests, medications or procedures; communicating with other healthcare professionals and discussing with patient's family/caregivers.    Chief Complaint: low oxygen levels    History of Present Illness:  Liu Angel is a 84 y.o. 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  Patient reports he actually feels fine, he denies feeling sick recently, denies any recent fatigue.  His wife checked his pulse ox at home and noted his oxygen levels to be low in the 70s, so was brought to the emergency department after some debate, in the emergency department he was found to have SpO2 in the 50s and was placed on a nonrebreather, treated with steroids and nebulizers and did improve but continue to require increased levels of oxygen.  The patient states he has maybe felt a little more congested, but otherwise denies any fevers, chills, recent aspiration events, or worsening dyspnea.  His wife does report that he seems like he is been having increased dyspnea over the last few days.  Patient mated to medicine for further management    Review of Systems:  Review of Systems   Constitutional:  Negative for chills and fever.   HENT:  Negative for ear pain and sore throat.    Eyes:  Negative for pain and visual disturbance.   Respiratory:  Positive for shortness of breath. Negative for cough.    Cardiovascular:  Negative for chest pain and palpitations.   Gastrointestinal:  Negative for abdominal pain and vomiting.   Genitourinary:  Negative for dysuria and hematuria.   Musculoskeletal:  Negative for arthralgias and back pain.   Skin:  Negative for color change and rash.   Neurological:  Negative for seizures and syncope.   All other systems reviewed and are negative.      Past Medical  and Surgical History:   Past Medical History:   Diagnosis Date    BPH (benign prostatic hyperplasia)     Chronic obstructive pulmonary disease (COPD) (HCC)     COPD (chronic obstructive pulmonary disease) (HCC)     Emphysema lung (HCC)     Essential hypertension     Hard of hearing     Pt does wear hearing aids    Hypertension     Lung cancer (HCC)     Shortness of breath     Pt states not changes and it occurs with moderate exertion    Sleep disturbance     Pt states he is only sleeping about 3 hours a night.  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    Tinnitus        Past Surgical History:   Procedure Laterality Date    IR BIOPSY LUNG  7/19/2022    SHOULDER OPEN ROTATOR CUFF REPAIR      SKIN GRAFT      spindle cell removal of scalp with skin graft from shoulder    VEIN SURGERY         Meds/Allergies:  Prior to Admission medications    Medication Sig Start Date End Date Taking? Authorizing Provider   Acetaminophen Extra Strength 500 MG TABS  10/26/23   Historical Provider, MD   ACETAMINOPHEN PO Take 1 tablet by mouth every 6 (six) hours as needed  Patient not taking: Reported on 12/26/2023 10/25/23 10/25/24  Historical Provider, MD   ammonium lactate (LAC-HYDRIN) 12 % lotion APPLY MEDIUM AMOUNT ON AFFECTED AREA ON SKIN TWICE A DAY - APPLY TO DRY SKIN FROM THE NECK DOWN 2 TIMES PER DAY 12/4/23   Historical Provider, MD   apixaban (ELIQUIS) 5 mg Take 1 tablet (5 mg total) by mouth 2 (two) times a day 9/17/21   KAN Akhtar   ascorbic acid (VITAMIN C) 500 mg tablet  2/6/23   Historical Provider, MD   Ascorbic Acid 500 MG CAPS 500 mg  Patient not taking: Reported on 12/26/2023 6/27/23   Historical Provider, MD   bisacodyl (DULCOLAX) 5 mg EC tablet Take 5 mg by mouth daily      Historical Provider, MD   cetirizine (ZyrTEC) 10 mg tablet Take 10 mg by mouth daily    Historical Provider, MD   Cholecalciferol (Vitamin D3) 50 MCG (2000 UT) TABS  12/30/21    Historical Provider, MD   Cyanocobalamin 1000 MCG CAPS 1,000 mcg 6/27/23   Historical Provider, MD   Diclofenac Sodium (VOLTAREN) 1 %  12/27/22   Historical Provider, MD   diltiazem (CARDIZEM CD) 300 mg 24 hr capsule Take 1 capsule (300 mg total) by mouth daily 9/17/21   KAN Akhtar   diltiazem (TIAZAC) 300 MG 24 hr capsule  11/15/23   Historical Provider, MD   docusate sodium (COLACE) 100 mg capsule Take 100 mg by mouth  2/4/21   Historical Provider, MD   finasteride (PROSCAR) 5 mg tablet Take 5 mg by mouth daily  2/16/21   Historical Provider, MD   fluticasone-salmeterol (ADVAIR, WIXELA) 250-50 mcg/dose inhaler Inhale 1 puff 2 (two) times a day Rinse mouth after use.    Historical Provider, MD   ipratropium (ATROVENT) 0.02 % nebulizer solution Take 2.5 mL (0.5 mg total) by nebulization in the morning 9/21/22   Anna Pickens MD   magnesium oxide (MAG-OX) 400 mg tablet Take 1 tablet by mouth daily 12/27/22   Historical Provider, MD   metolazone (ZAROXOLYN) 2.5 mg tablet Take 1 tablet (2.5 mg total) by mouth once a week Take on Thursdays 10/25/23   KAN Akhtar   metoprolol succinate (TOPROL-XL) 25 mg 24 hr tablet Take 1 tablet (25 mg total) by mouth daily 9/14/23   KAN Akhtar   montelukast (SINGULAIR) 10 mg tablet Take 10 mg by mouth daily at bedtime    Historical Provider, MD   Multiple Vitamin (MULTIVITAMINS PO) Take 1 tablet by mouth daily 12/27/22   Historical Provider, MD   nitroglycerin (NITROSTAT) 0.4 mg SL tablet  9/30/21   Historical Provider, MD   Nutritional Supplements (Ensure) TAKE 1 CONTAINER BY MOUTH ONCE EVERY DAY FOR NUTRITION 10/4/23   Historical Provider, MD   potassium chloride (K-DUR,KLOR-CON) 20 mEq tablet Take 1 tablet (20 mEq total) by mouth 2 (two) times a day Take extra 20 mEq of potassium on Thursdays for total of 60 mEq for the day 6/12/23   KAN Akhtar CO TAKE ONE OR TWO TABLETS BY MOUTH AT BEDTIME AS NEEDED FOR CONSTIPATION  "12/14/23 6/27/24  Historical Provider, MD   sildenafil (VIAGRA) 100 mg tablet Take 1 tablet (100 mg total) by mouth daily as needed for erectile dysfunction 1/10/22   KAN Akhtar   Spiriva Respimat 2.5 MCG/ACT AERS inhaler Inhale 2 puffs daily  1/21/21   Historical Provider, MD   torsemide (DEMADEX) 100 mg tablet Take 1 tablet (100 mg total) by mouth see administration instructions 5 times a week-- all days except Monday and Friday. 12/26/23   Brissa Carrasquillo MD     Nurse reviewed.     Allergies:   Allergies   Allergen Reactions    Penicillin G Other (See Comments)     PCN Shots Only!!       Social History:  Marital Status: /Civil Union   Occupation: n/a  Patient Pre-hospital Living Situation: Home  Patient Pre-hospital Level of Mobility: walks  Patient Pre-hospital Diet Restrictions: cardiac  Substance Use History:   Social History     Substance and Sexual Activity   Alcohol Use Yes    Alcohol/week: 14.0 standard drinks of alcohol    Types: 14 Glasses of wine per week    Comment: moderate consumption     Social History     Tobacco Use   Smoking Status Former    Types: Cigars, Cigarettes   Smokeless Tobacco Never     Social History     Substance and Sexual Activity   Drug Use Not Currently       Family History:  History reviewed. No pertinent family history.    Physical Exam:     Vitals:   Blood Pressure: 110/61 (04/27/24 1555)  Pulse: 73 (04/27/24 1555)  Temperature: 98.7 °F (37.1 °C) (04/27/24 1610)  Temp Source: Temporal (04/27/24 1610)  Respirations: (!) 24 (04/27/24 1610)  Height: 5' 9\" (175.3 cm) (04/27/24 1555)  Weight - Scale: 66.6 kg (146 lb 13.2 oz) (04/27/24 1555)  SpO2: 90 % (04/27/24 1610)    Physical Exam  Vitals and nursing note reviewed.   Constitutional:       General: He is not in acute distress.     Appearance: He is well-developed. He is ill-appearing.   HENT:      Head: Normocephalic and atraumatic.   Eyes:      Conjunctiva/sclera: Conjunctivae normal.   Cardiovascular:      Rate " and Rhythm: Normal rate. Rhythm irregular.   Pulmonary:      Effort: Pulmonary effort is normal. No respiratory distress.      Comments: Decreased breath sounds throughout with scattered rhonchorous sounds, worse on the right lower middle lobes.  Appears tachypneic, does not appear increased work of breathing  Abdominal:      Palpations: Abdomen is soft.      Tenderness: There is no abdominal tenderness.   Musculoskeletal:         General: No swelling.      Cervical back: Neck supple.   Skin:     General: Skin is warm and dry.      Capillary Refill: Capillary refill takes less than 2 seconds.      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      Mental Status: He is alert and oriented to person, place, and time. Mental status is at baseline.   Psychiatric:         Mood and Affect: Mood normal.          Additional Data:     Lab Results:  Results from last 7 days   Lab Units 04/27/24  1213   WBC Thousand/uL 12.36*   HEMOGLOBIN g/dL 13.8   HEMATOCRIT % 44.9   PLATELETS Thousands/uL 200   SEGS PCT % 82*   LYMPHO PCT % 6*   MONO PCT % 11   EOS PCT % 0     Results from last 7 days   Lab Units 04/27/24  1521 04/27/24  1213   SODIUM mmol/L  --  138   POTASSIUM mmol/L 4.8 5.9*   CHLORIDE mmol/L  --  89*   CO2 mmol/L  --  44*   BUN mg/dL  --  42*   CREATININE mg/dL  --  1.41*   ANION GAP mmol/L  --  5   CALCIUM mg/dL  --  10.1   ALBUMIN g/dL  --  4.3   TOTAL BILIRUBIN mg/dL  --  0.58   ALK PHOS U/L  --  70   ALT U/L  --  7   AST U/L  --  20   GLUCOSE RANDOM mg/dL  --  140                 Results from last 7 days   Lab Units 04/27/24  1433 04/27/24  1248   LACTIC ACID mmol/L 2.1* 2.9*       Lines/Drains:  Invasive Devices       Peripheral Intravenous Line  Duration             Peripheral IV 04/27/24 Left Antecubital <1 day                        Imaging: Reviewed radiology reports from this admission including: chest CT  scan  CTA ED chest PE Study   Final Result by Selena Saucedo MD (04/27 1412)      No pulmonary embolus.      Mild peripheral consolidation in the right middle lobe and right lower lobe, superimposed upon chronic fibrosis, infectious or inflammatory.      New trace pleural effusions.      Severe chronic bronchiolitis.      Multiple stable subcentimeter nodules since February 2024 with decrease in a right upper lobe nodule.      Pulmonary artery enlargement which can be seen with pulmonary hypertension.            Workstation performed: [id number]         XR chest 1 view portable   ED Interpretation by Daniel Smart DO (04/27 1256)   Diffuse changes with some evidence of improvement from April 2023      Final Result by Selena Saucedo MD (04/27 1303)      Chronic increase in interstitial markings corresponding with bronchiolitis on CT.            Workstation performed: [id number]             ** Please Note: This note has been constructed using a voice recognition system. **

## 2024-04-27 NOTE — ED PROVIDER NOTES
"History  Chief Complaint   Patient presents with    Abnormal Lab     Patient had surgery on this nose Thursday to remove tumor, today his wife noted his oxygen levels were in the 70-80% range. He is usually on 2 lpm at home and 4 if he is up walking.      Patient is an 84-year-old male to the emergency room with his wife reporting that the patient had a pulse oximetry of 50% while on his baseline oxygen this morning.  She reports that she was checking his pulse ox since yesterday secondary to him not seeming quite himself.  Patient is status post basal cell carcinoma resection to the left side of his face and nose.  This occurred on Thursday.  Patient continues to smoke and has a history of COPD.  He is on chronic oxygen at home.    Also reports that patient has a history of lung cancer which she reports is not currently undergoing therapy and being \"followed.\"      History provided by:  Patient and spouse (Majority of the history is provided by spouse.)      Prior to Admission Medications   Prescriptions Last Dose Informant Patient Reported? Taking?   ACETAMINOPHEN PO   Yes No   Sig: Take 1 tablet by mouth every 6 (six) hours as needed   Patient not taking: Reported on 2023   Acetaminophen Extra Strength 500 MG TABS   Yes No   Ascorbic Acid 500 MG CAPS   Yes No   Si mg   Patient not taking: Reported on 2023   Cholecalciferol (Vitamin D3) 50 MCG (2000 UT) TABS  Self Yes No   Cyanocobalamin 1000 MCG CAPS   Yes No   Si,000 mcg   Diclofenac Sodium (VOLTAREN) 1 %   Yes No   Multiple Vitamin (MULTIVITAMINS PO)   Yes No   Sig: Take 1 tablet by mouth daily   Nutritional Supplements (Ensure)   Yes No   Sig: TAKE 1 CONTAINER BY MOUTH ONCE EVERY DAY FOR NUTRITION   SENNA CO   Yes No   Sig: TAKE ONE OR TWO TABLETS BY MOUTH AT BEDTIME AS NEEDED FOR CONSTIPATION   Spiriva Respimat 2.5 MCG/ACT AERS inhaler  Self Yes No   Sig: Inhale 2 puffs daily    ammonium lactate (LAC-HYDRIN) 12 % lotion   Yes No   Sig: " APPLY MEDIUM AMOUNT ON AFFECTED AREA ON SKIN TWICE A DAY - APPLY TO DRY SKIN FROM THE NECK DOWN 2 TIMES PER DAY   apixaban (ELIQUIS) 5 mg  Self No No   Sig: Take 1 tablet (5 mg total) by mouth 2 (two) times a day   ascorbic acid (VITAMIN C) 500 mg tablet   Yes No   bisacodyl (DULCOLAX) 5 mg EC tablet  Self Yes No   Sig: Take 5 mg by mouth daily     cetirizine (ZyrTEC) 10 mg tablet  Self Yes No   Sig: Take 10 mg by mouth daily   diltiazem (CARDIZEM CD) 300 mg 24 hr capsule  Self No No   Sig: Take 1 capsule (300 mg total) by mouth daily   diltiazem (TIAZAC) 300 MG 24 hr capsule   Yes No   docusate sodium (COLACE) 100 mg capsule  Self Yes No   Sig: Take 100 mg by mouth    finasteride (PROSCAR) 5 mg tablet  Self Yes No   Sig: Take 5 mg by mouth daily    fluticasone-salmeterol (ADVAIR, WIXELA) 250-50 mcg/dose inhaler  Self Yes No   Sig: Inhale 1 puff 2 (two) times a day Rinse mouth after use.   ipratropium (ATROVENT) 0.02 % nebulizer solution  Self Yes No   Sig: Take 2.5 mL (0.5 mg total) by nebulization in the morning   magnesium oxide (MAG-OX) 400 mg tablet   Yes No   Sig: Take 1 tablet by mouth daily   metolazone (ZAROXOLYN) 2.5 mg tablet   No No   Sig: Take 1 tablet (2.5 mg total) by mouth once a week Take on Thursdays   metoprolol succinate (TOPROL-XL) 25 mg 24 hr tablet   No No   Sig: Take 1 tablet (25 mg total) by mouth daily   montelukast (SINGULAIR) 10 mg tablet  Self Yes No   Sig: Take 10 mg by mouth daily at bedtime   nitroglycerin (NITROSTAT) 0.4 mg SL tablet  Self Yes No   Patient not taking: Reported on 5/4/2023   potassium chloride (K-DUR,KLOR-CON) 20 mEq tablet   No No   Sig: Take 1 tablet (20 mEq total) by mouth 2 (two) times a day Take extra 20 mEq of potassium on Thursdays for total of 60 mEq for the day   sildenafil (VIAGRA) 100 mg tablet  Self No No   Sig: Take 1 tablet (100 mg total) by mouth daily as needed for erectile dysfunction   torsemide (DEMADEX) 100 mg tablet   No No   Sig: Take 1 tablet  (100 mg total) by mouth see administration instructions 5 times a week-- all days except Monday and Friday.      Facility-Administered Medications: None       Past Medical History:   Diagnosis Date    BPH (benign prostatic hyperplasia)     Chronic obstructive pulmonary disease (COPD) (HCC)     COPD (chronic obstructive pulmonary disease) (HCC)     Emphysema lung (HCC)     Essential hypertension     Hard of hearing     Pt does wear hearing aids    Hypertension     Lung cancer (HCC)     Shortness of breath     Pt states not changes and it occurs with moderate exertion    Sleep disturbance     Pt states he is only sleeping about 3 hours a night.  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 No pertinent family history.  I have reviewed and agree with the history as documented.    E-Cigarette/Vaping    E-Cigarette Use Never User      E-Cigarette/Vaping Substances     Social History     Tobacco Use    Smoking status: Former     Types: Cigars, Cigarettes    Smokeless tobacco: Never   Vaping Use    Vaping status: Never Used   Substance Use Topics    Alcohol use: Yes     Alcohol/week: 14.0 standard drinks of alcohol     Types: 14 Glasses of wine per week     Comment: moderate consumption    Drug use: Not Currently       Review of Systems   Constitutional:  Positive for activity change. Negative for appetite change, fatigue and fever.   HENT:  Negative for congestion and sore throat.    Respiratory:  Positive for shortness of breath and wheezing. Negative for chest tightness.    Cardiovascular:  Negative for chest pain, palpitations and leg swelling.   Gastrointestinal:  Negative for diarrhea, nausea and vomiting.    Musculoskeletal:  Negative for arthralgias.   Skin:  Positive for wound (Surgical wound of the face).       Physical Exam  Physical Exam  Vitals and nursing note reviewed.   Constitutional:       General: He is not in acute distress.     Appearance: He is not toxic-appearing.   HENT:      Head: Normocephalic.      Jaw: There is normal jaw occlusion.        Right Ear: External ear normal.      Left Ear: External ear normal.      Nose:      Comments: Bandage.  Postop     Mouth/Throat:      Mouth: Mucous membranes are dry.   Cardiovascular:      Rate and Rhythm: Normal rate.   Pulmonary:      Effort: No tachypnea.      Breath sounds: Decreased breath sounds and wheezing present. No rhonchi or rales.   Abdominal:      General: Abdomen is flat. There is no distension.      Tenderness: There is no abdominal tenderness. There is no guarding.   Neurological:      General: No focal deficit present.      Mental Status: He is alert.   Psychiatric:         Thought Content: Thought content normal.         Judgment: Judgment normal.         Vital Signs  ED Triage Vitals   Temperature Pulse Respirations Blood Pressure SpO2   04/27/24 1150 04/27/24 1150 04/27/24 1150 04/27/24 1150 04/27/24 1156   97.7 °F (36.5 °C) 55 18 117/71 (!) 70 %      Temp Source Heart Rate Source Patient Position - Orthostatic VS BP Location FiO2 (%)   04/27/24 1150 04/27/24 1150 04/27/24 1150 04/27/24 1150 --   Temporal Monitor Sitting Right arm       Pain Score       04/27/24 1150       No Pain           Vitals:    04/27/24 1315 04/27/24 1345 04/27/24 1400 04/27/24 1415   BP: 118/56  118/56 127/59   Pulse: 88 81 (!) 109 79   Patient Position - Orthostatic VS:             Visual Acuity      ED Medications  Medications   calcium gluconate 1 g in sodium chloride 0.9% 50 mL (premix) (has no administration in time range)   Sodium Zirconium Cyclosilicate (Lokelma) 10 g (has no administration in time range)   albuterol inhalation solution 10 mg (10 mg  Nebulization Given 4/27/24 1227)   ipratropium (ATROVENT) 0.02 % inhalation solution 1 mg (1 mg Nebulization Given 4/27/24 1227)   sodium chloride 0.9 % inhalation solution 12 mL (12 mL Nebulization Given 4/27/24 1227)   methylPREDNISolone sodium succinate (Solu-MEDROL) injection 125 mg (125 mg Intravenous Given 4/27/24 1218)   cefTRIAXone (ROCEPHIN) IVPB (premix in dextrose) 1,000 mg 50 mL (0 mg Intravenous Stopped 4/27/24 1327)   iohexol (OMNIPAQUE) 350 MG/ML injection (MULTI-DOSE) 100 mL (100 mL Intravenous Given 4/27/24 1337)       Diagnostic Studies  Results Reviewed       Procedure Component Value Units Date/Time    Potassium [[id number]]     Lab Status: No result Specimen: Blood     Lactic acid 2 Hours [[id number]] Collected: 04/27/24 1433    Lab Status: In process Specimen: Blood from Arm, Left Updated: 04/27/24 1435    HS Troponin I 2hr [[id number]] Collected: 04/27/24 1433    Lab Status: In process Specimen: Blood from Arm, Left Updated: 04/27/24 1435    HS Troponin I 4hr [[id number]]     Lab Status: No result Specimen: Blood     Lactic acid, plasma (w/reflex if result > 2.0) [[id number]]  (Abnormal) Collected: 04/27/24 1248    Lab Status: Final result Specimen: Blood from Arm, Left Updated: 04/27/24 1313     LACTIC ACID 2.9 mmol/L     Narrative:      Result may be elevated if tourniquet was used during collection.    FLU/RSV/COVID - if FLU/RSV clinically relevant [[id number]]  (Normal) Collected: 04/27/24 1213    Lab Status: Final result Specimen: Nares from Nasopharyngeal Swab Updated: 04/27/24 1259     SARS-CoV-2 Negative     INFLUENZA A PCR Negative     INFLUENZA B PCR Negative     RSV PCR Negative    Narrative:      FOR PEDIATRIC PATIENTS - copy/paste COVID Guidelines URL to browser: [email or web address]    SARS-CoV-2 assay is a Nucleic Acid Amplification assay intended for the  qualitative detection of nucleic acid from SARS-CoV-2 in  nasopharyngeal  swabs. Results are for the presumptive identification of SARS-CoV-2 RNA.    Positive results are indicative of infection with SARS-CoV-2, the virus  causing COVID-19, but do not rule out bacterial infection or co-infection  with other viruses. Laboratories within the [place] and its  territories are required to report all positive results to the appropriate  public health authorities. Negative results do not preclude SARS-CoV-2  infection and should not be used as the sole basis for treatment or other  patient management decisions. Negative results must be combined with  clinical observations, patient history, and epidemiological information.  This test has not been FDA cleared or approved.    This test has been authorized by FDA under an Emergency Use Authorization  (EUA). This test is only authorized for the duration of time the  declaration that circumstances exist justifying the authorization of the  emergency use of an in vitro diagnostic tests for detection of SARS-CoV-2  virus and/or diagnosis of COVID-19 infection under section 564(b)(1) of  the Act, 21 U.S.C. 360bbb-3(b)(1), unless the authorization is terminated  or revoked sooner. The test has been validated but independent review by FDA  and CLIA is pending.    Test performed using [employer] GeneXpert: This RT-PCR assay targets N2,  a region unique to SARS-CoV-2. A conserved region in the E-gene was chosen  for pan-Sarbecovirus detection which includes SARS-CoV-2.    According to CMS-2020-01-R, this platform meets the definition of high-throughput technology.    Comprehensive metabolic panel [[id number]]  (Abnormal) Collected: 04/27/24 1213    Lab Status: Final result Specimen: Blood from Arm, Left Updated: 04/27/24 1256     Sodium 138 mmol/L      Potassium 5.9 mmol/L      Chloride 89 mmol/L      CO2 44 mmol/L      ANION GAP 5 mmol/L      BUN 42 mg/dL      Creatinine 1.41 mg/dL      Glucose 140 mg/dL      Calcium 10.1 mg/dL      AST 20  U/L      ALT 7 U/L      Alkaline Phosphatase 70 U/L      Total Protein 7.7 g/dL      Albumin 4.3 g/dL      Total Bilirubin 0.58 mg/dL      eGFR 45 ml/min/1.73sq m     Narrative:      National Kidney Disease Foundation guidelines for Chronic Kidney Disease (CKD):     Stage 1 with normal or high GFR (GFR > 90 mL/min/1.73 square meters)    Stage 2 Mild CKD (GFR = 60-89 mL/min/1.73 square meters)    Stage 3A Moderate CKD (GFR = 45-59 mL/min/1.73 square meters)    Stage 3B Moderate CKD (GFR = 30-44 mL/min/1.73 square meters)    Stage 4 Severe CKD (GFR = 15-29 mL/min/1.73 square meters)    Stage 5 End Stage CKD (GFR <15 mL/min/1.73 square meters)  Note: GFR calculation is accurate only with a steady state creatinine    HS Troponin 0hr (reflex protocol) [[id number]]  (Normal) Collected: 04/27/24 1213    Lab Status: Final result Specimen: Blood from Arm, Left Updated: 04/27/24 1247     hs TnI 0hr 22 ng/L     D-Dimer [[id number]]  (Abnormal) Collected: 04/27/24 1213    Lab Status: Final result Specimen: Blood from Arm, Left Updated: 04/27/24 1237     D-Dimer, Quant 2.27 ug/ml FEU     Narrative:      In the evaluation for possible pulmonary embolism, in the appropriate (Well's Score of 4 or less) patient, the age adjusted d-dimer cutoff for this patient can be calculated as:    Age x 0.01 (in ug/mL) for Age-adjusted D-dimer exclusion threshold for a patient over 50 years.    Blood culture #2 [[id number]] Collected: 04/27/24 1225    Lab Status: In process Specimen: Blood from Arm, Right Updated: 04/27/24 1227    CBC and differential [[id number]]  (Abnormal) Collected: 04/27/24 1213    Lab Status: Final result Specimen: Blood from Arm, Left Updated: 04/27/24 1222     WBC 12.36 Thousand/uL      RBC 4.24 Million/uL      Hemoglobin 13.8 g/dL      Hematocrit 44.9 %       fL      MCH 32.5 pg      MCHC 30.7 g/dL      RDW 14.3 %      MPV 10.0 fL      Platelets 200 Thousands/uL      nRBC 0 /100 WBCs      Segmented % 82 %       Immature Grans % 1 %      Lymphocytes % 6 %      Monocytes % 11 %      Eosinophils Relative 0 %      Basophils Relative 0 %      Absolute Neutrophils 10.17 Thousands/µL      Absolute Immature Grans 0.07 Thousand/uL      Absolute Lymphocytes 0.75 Thousands/µL      Absolute Monocytes 1.35 Thousand/µL      Eosinophils Absolute 0.00 Thousand/µL      Basophils Absolute 0.02 Thousands/µL     Blood culture #1 [[id number]] Collected: 04/27/24 1213    Lab Status: In process Specimen: Blood from Arm, Left Updated: 04/27/24 1219                   CTA ED chest PE Study   Final Result by Selena Saucedo MD (04/27 1412)      No pulmonary embolus.      Mild peripheral consolidation in the right middle lobe and right lower lobe, superimposed upon chronic fibrosis, infectious or inflammatory.      New trace pleural effusions.      Severe chronic bronchiolitis.      Multiple stable subcentimeter nodules since February 2024 with decrease in a right upper lobe nodule.      Pulmonary artery enlargement which can be seen with pulmonary hypertension.            Workstation performed: [id number]         XR chest 1 view portable   ED Interpretation by Daniel Smart DO (04/27 1256)   Diffuse changes with some evidence of improvement from April 2023      Final Result by Selena Saucedo MD (04/27 1303)      Chronic increase in interstitial markings corresponding with bronchiolitis on CT.            Workstation performed: [id number]                    Procedures  Procedures         ED Course  ED Course as of 04/27/24 1459   Sat Apr 27, 2024   1241 WBC(!): 12.36   1241 D-Dimer, Quant(!): 2.27   1242 Moderate risk factor by Wells criteria.  Will obtain CTA   1428 Mild increased consolidation in peripherally in the right lobe.  Infectious versus inflammatory.    Given the pulse ox upon presentation and the likely exacerbation of COPD with the possibility of a superimposed infection in a patient with known pulmonary carcinoma, will  refer for admission.   1431 Patient has chronic kidney disease.  This is not a new finding.  This is not indicative of patient being septic.   1432 Several laboratory abnormalities are secondary to the patient's COPD.   1432 Potassium(!): 5.9  Patient had been treated with albuterol.  Will repeat.   1451 The left ventricular ejection fraction is 45% by visual estimation.  Despite the presence of hypotension and/or significantly elevated lactate of = 4 and/or presence of septic shock, resuscitation with 30 ml/kg was withheld at this time because of concern for volume overload.. Patient will instead be, or has been, resuscitated with as needed crystalloid fluid. Order for these fluids have been placed and additional resuscitation will be provided based on the clinical condition of the patient.           1451 Patient has history of paroxysmal atrial fibrillation.                               SBIRT 20yo+      Flowsheet Row Most Recent Value   Initial Alcohol Screen: US AUDIT-C     1. How often do you have a drink containing alcohol? 0 Filed at: 04/27/2024 1152   2. How many drinks containing alcohol do you have on a typical day you are drinking?  0 Filed at: 04/27/2024 1152   3b. FEMALE Any Age, or MALE 65+: How often do you have 4 or more drinks on one occassion? 0 Filed at: 04/27/2024 1152   Audit-C Score 0 Filed at: 04/27/2024 1152   SENAIT: How many times in the past year have you...    Used an illegal drug or used a prescription medication for non-medical reasons? Never Filed at: 04/27/2024 1152            Wells' Criteria for PE      Flowsheet Row Most Recent Value   Wells' Criteria for PE    Clinical signs and symptoms of DVT 0 Filed at: 04/27/2024 1242   PE is primary diagnosis or equally likely 3 Filed at: 04/27/2024 1242   HR >100 0 Filed at: 04/27/2024 1242   Immobilization at least 3 days or Surgery in the previous 4 weeks 1.5 Filed at: 04/27/2024 1242   Previous, objectively diagnosed PE or DVT 0 Filed at:  04/27/2024 1242   Hemoptysis 0 Filed at: 04/27/2024 1242   Malignancy with treatment within 6 months or palliative 1 Filed at: 04/27/2024 1242   Wells' Criteria Total 5.5 Filed at: 04/27/2024 1242                  Medical Decision Making  Patient presented to the emergency department and a MSE was performed. The patient was evaluated for complaint related to acute shortness of breath.  Patient is potentially at risk for, but not limited to, acute coronary syndrome, arrhythmia, myocardial infarction, pulmonary embolism, pneumothorax, infectious process of the lungs such as pneumonia, reactive airway disease, asthma, COPD exacerbation, cardiomyopathy, congestive heart failure or viral syndrome.  Several of these diagnoses have been evaluated and ruled out by history and physical.  As needed, patient will be further evaluated with laboratory and imaging studies.  Higher level diagnostics, such as CT imaging or ultrasound, may also be required.  Please see work-up portion of the note for further evaluation of patient's risk.  Socioeconomic factors were also considered as part of the decision-making process.  Unless otherwise stated in the chart or patient is admitted as elsewhere documented, any previously prescribed medications will be maintained.      Problems Addressed:  Acute exacerbation of chronic obstructive pulmonary disease (COPD) (HCC): chronic illness or injury with exacerbation, progression, or side effects of treatment  Chronic atrial fibrillation (HCC): chronic illness or injury  Chronic renal insufficiency: chronic illness or injury with exacerbation, progression, or side effects of treatment  Hyperkalemia: acute illness or injury  Hypoxia: acute illness or injury  Pneumonia: acute illness or injury    Amount and/or Complexity of Data Reviewed  Labs: ordered. Decision-making details documented in ED Course.  Radiology: ordered and independent interpretation performed.    Risk  Prescription drug  management.  Decision regarding hospitalization.  Risk Details: Patient presented to the ED and was found to be critically ill as demonstrated by the clinical history and primary physical evaluation. Pt had demonstrative findings and / or derangements of vital signs indicative for severe illness or injury. I personally performed bedside history and evaluation. Interventions to address these clinical needs were ordered/performed. These included, but not necessarily limited to, the ordering and subsequent review of lab studies, imaging and EKG.  Please see chart with regards to specific resuscitative interventions and diagnostics.     Due to a probability of clinically significant, life or limb threatening condition, the patient required my highest level of care, intervention and attention. I personally spent the documented time directly managing the patient. The critical care time included obtaining a history, examining the patient, ordering and review of studies, arranging urgent treatment with development of a management plan, evaluation of patient's response to treatment, reassessment, and, if warranted, discussions with other providers or consultants. Documentation to the medical record for continuity of care was also required.     Patients records pertinent to the emergent presenting condition were reviewed as available.  Family was updated as available and appropriate.  This critical care time was performed to assess and manage the high probability of imminent, life-threatening deterioration that could result in multi-organ failure if not addressed. It was exclusive of separately billable procedures and treating other patients and teaching time. Please see MDM section and the rest of the note for further information on patient assessment, reassessment, interventions and treatment.    Total time was 32 mins exclusive of separate billable procedures.    Patient presented to the emergency department and a MSE was  performed. The patient was evaluated and diagnosed with acute exacerbation of COPD with complications of pneumonia and hypoxia.This is an acute exacerbation of a chronic disease process that will require additional planned work-up and treatment in a hospitalized setting. As may have been required as part of this evaluation, clinical laboratory test, radiology imaging and medical testing (I.e. EKG) were ordered as necessitated by the patient's presentation. I independently reviewed these studies, imaging and testing. This patient's case is considered to be a considerable risk secondary to the above listed disease process and poses a threat to the patient's well-being and baseline function. Further in-patient diagnostic testing and management, which may include the administration of parenteral medications, is required.                          Disposition  Final diagnoses:   Hypoxia - Pulse ox 70% upon admission   Acute exacerbation of chronic obstructive pulmonary disease (COPD) (HCC)   Pneumonia   Chronic renal insufficiency   Hyperkalemia   Chronic atrial fibrillation (HCC)     Time reflects when diagnosis was documented in both MDM as applicable and the Disposition within this note       Time User Action Codes Description Comment    4/27/2024  2:29 PM Daniel Smart Add [R09.02] Hypoxia     4/27/2024  2:29 PM Daniel Smart Modify [R09.02] Hypoxia Pulse ox 70% upon admission    4/27/2024  2:29 PM Daniel Smart Add [J44.1] Acute exacerbation of chronic obstructive pulmonary disease (COPD) (HCC)     4/27/2024  2:29 PM Daniel Smart Add [J18.9] Pneumonia     4/27/2024  2:33 PM Daniel Smart Add [N18.9] Chronic renal insufficiency     4/27/2024  2:33 PM Daniel Smart Add [E87.5] Hyperkalemia     4/27/2024  2:53 PM Daniel Smart Add [I48.20] Chronic atrial fibrillation (HCC)           ED Disposition       ED Disposition   Admit    Condition   Stable    Date/Time   Sat Apr 27, 2024 3208    Comment                   Follow-up Information    None         Patient's Medications   Discharge Prescriptions    No medications on file       No discharge procedures on file.    PDMP Review         Value Time User    PDMP Reviewed  Yes 8/9/2021  4:02 PM Malcolm Martinez MD            ED Provider  Electronically Signed by             Daniel Smart DO  04/27/24 8994

## 2024-04-27 NOTE — RESPIRATORY THERAPY NOTE
RT Protocol Note  Liu Angel 84 y.o. male MRN: [id number]  Unit/Bed#: -01 Encounter: [id number]    Assessment    Principal Problem:    Acute on chronic respiratory failure with hypoxia   Active Problems:    Atrial fibrillation     Chronic combined systolic and diastolic heart failure (HCC)    Chronic obstructive pulmonary disease with acute exacerbation (HCC)    BPH (benign prostatic hyperplasia)    Malignant spindle cell neoplasm (HCC)    Sepsis without acute organ dysfunction (HCC)    Pneumonia of right lower lobe due to infectious organism      Home Pulmonary Medications:    Home Devices/Therapy: BiPAP/CPAP    Past Medical History:   Diagnosis Date    BPH (benign prostatic hyperplasia)     Chronic obstructive pulmonary disease (COPD) (HCC)     COPD (chronic obstructive pulmonary disease) (HCC)     Emphysema lung (HCC)     Essential hypertension     Hard of hearing     Pt does wear hearing aids    Hypertension     Lung cancer (HCC)     Shortness of breath     Pt states not changes and it occurs with moderate exertion    Sleep disturbance     Pt states he is only sleeping about 3 hours a night.  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    Tinnitus      Social History     Socioeconomic History    Marital status: /Civil Union     Spouse name: None    Number of children: None    Years of education: None    Highest education level: None   Occupational History    Occupation: Retired   Tobacco Use    Smoking status: Former     Types: Cigars, Cigarettes    Smokeless tobacco: Never   Vaping Use    Vaping status: Never Used   Substance and Sexual Activity    Alcohol use: Yes     Alcohol/week: 14.0 standard drinks of alcohol     Types: 14 Glasses of wine per week     Comment: moderate consumption    Drug use: Not Currently    Sexual activity: Yes   Other Topics Concern    None   Social History Narrative    None     Social Determinants of Health  "    Financial Resource Strain: Not on file   Food Insecurity: No Food Insecurity (4/24/2023)    Hunger Vital Sign     Worried About Running Out of Food in the Last Year: Never true     Ran Out of Food in the Last Year: Never true   Transportation Needs: No Transportation Needs (4/24/2023)    PRAPARE - Transportation     Lack of Transportation (Medical): No     Lack of Transportation (Non-Medical): No   Physical Activity: Not on file   Stress: Not on file   Social Connections: Not on file   Intimate Partner Violence: Not on file   Housing Stability: Low Risk  (4/24/2023)    Housing Stability Vital Sign     Unable to Pay for Housing in the Last Year: No     Number of Places Lived in the Last Year: 1     Unstable Housing in the Last Year: No       Subjective         Objective    Physical Exam:   Assessment Type: Assess only  General Appearance: Alert, Awake  Respiratory Pattern: Dyspnea with exertion, Dyspnea at rest  Chest Assessment: Chest expansion symmetrical  Bilateral Breath Sounds: Expiratory wheezes, Inspiratory wheezes  O2 Device: 5L    Vitals:  Blood pressure 110/61, pulse 73, temperature 98.7 °F (37.1 °C), temperature source Temporal, resp. rate (!) 24, height 5' 9\" (1.753 m), weight 66.6 kg (146 lb 13.2 oz), SpO2 (!) 77%.          Imaging and other studies: I have personally reviewed pertinent reports.      O2 Device: 5L     Plan    Respiratory Plan: Mild Distress pathway  Airway Clearance Plan: Flutter     Resp Comments: (P) Pt w/ ho COPD and lung cancer arrived in the ED hypoxic in the 50s at home  . Pt had undergone basal cell carcinoma resection  on his face and nose thursday . Pt is on chronic O2 at 2L and continues to smoke. BS are insp. and exp wheezes bilaterally , Has been non compliant w/ cpap for the past month . Due to facial surg. is not able to wear cpap here . Dr Vee made aware . Is unsure of home meds but states he takes inhalers . Has prod cough that is greenish will start flutter.  Pt " was on NRB in the ed and was briefly stable on 5L until desaturating while having dinner . Currently on 15L MF, will escalate to HFNC if needed

## 2024-04-27 NOTE — ED NOTES
O2 sat 70% on 2LNC. Increase to 87% on 6LNC. Patient placed on 15L NRB at this time per verbal order from Dr. Smart.      Kya Kaplan RN  04/27/24 0540

## 2024-04-27 NOTE — ASSESSMENT & PLAN NOTE
History of malignant neoplasm, CT chest x-ray shows improvement in a prior nodule.  Currently being observed as an outpatient.

## 2024-04-27 NOTE — ASSESSMENT & PLAN NOTE
CT chest showing possible consolidation of the right middle and lower lobes, infectious versus inflammatory  Presents with respiratory failure  Given location, some concern of aspiration though he reports no signs/symptoms of recent aspiration - monitor clinically for signs  Ceftriaxone/Azithromycin  Respiratory protocol  Airway clearance    Of note he had an elevated D-dimer, CTA chest was obtained, negative for PE.  Suspect D-dimer elevated in the setting of acute on chronic respiratory disease

## 2024-04-27 NOTE — ASSESSMENT & PLAN NOTE
Tachycardic, tachypneic, leukocytosis, elevated lactic acidosis with suspected source of pneumonia  CT chest showing possible consolidation of the right middle and lower lobes, infectious versus inflammatory  Meets criteria for sepsis  Blood cultures  Sputum cultures  Lactic acid elevated, rechecked and improved  Check procalcitonin and trend  30cc/kg not given to avoid fluid overload in the setting of chronic congestive heart failure, 250ml bolus was given  See under Pneumonia

## 2024-04-27 NOTE — PLAN OF CARE
Problem: Prexisting or High Potential for Compromised Skin Integrity  Goal: Skin integrity is maintained or improved  Description: INTERVENTIONS:  - Identify patients at risk for skin breakdown  - Assess and monitor skin integrity  - Assess and monitor nutrition and hydration status  - Monitor labs   - Assess for incontinence   - Turn and reposition patient  - Assist with mobility/ambulation  - Relieve pressure over bony prominences  - Avoid friction and shearing  - Provide appropriate hygiene as needed including keeping skin clean and dry  - Evaluate need for skin moisturizer/barrier cream  - Collaborate with interdisciplinary team   - Patient/family teaching  - Consider wound care consult   Outcome: Progressing     Problem: PAIN - ADULT  Goal: Verbalizes/displays adequate comfort level or baseline comfort level  Description: Interventions:  - Encourage patient to monitor pain and request assistance  - Assess pain using appropriate pain scale  - Administer analgesics based on type and severity of pain and evaluate response  - Implement non-pharmacological measures as appropriate and evaluate response  - Consider cultural and social influences on pain and pain management  - Notify physician/advanced practitioner if interventions unsuccessful or patient reports new pain  Outcome: Progressing     Problem: INFECTION - ADULT  Goal: Absence or prevention of progression during hospitalization  Description: INTERVENTIONS:  - Assess and monitor for signs and symptoms of infection  - Monitor lab/diagnostic results  - Monitor all insertion sites, i.e. indwelling lines, tubes, and drains  - Monitor endotracheal if appropriate and nasal secretions for changes in amount and color  - [place] appropriate cooling/warming therapies per order  - Administer medications as ordered  - Instruct and encourage patient and family to use good hand hygiene technique  - Identify and instruct in appropriate isolation precautions for  identified infection/condition  Outcome: Progressing  Goal: Absence of fever/infection during neutropenic period  Description: INTERVENTIONS:  - Monitor WBC    Outcome: Progressing     Problem: SAFETY ADULT  Goal: Patient will remain free of falls  Description: INTERVENTIONS:  - Educate patient/family on patient safety including physical limitations  - Instruct patient to call for assistance with activity   - Consult OT/PT to assist with strengthening/mobility   - Keep Call bell within reach  - Keep bed low and locked with side rails adjusted as appropriate  - Keep care items and personal belongings within reach  - Initiate and maintain comfort rounds  - Make Fall Risk Sign visible to staff  - Apply yellow socks and bracelet for high fall risk patients  - Consider moving patient to room near nurses station  Outcome: Progressing  Goal: Maintain or return to baseline ADL function  Description: INTERVENTIONS:  -  Assess patient's ability to carry out ADLs; assess patient's baseline for ADL function and identify physical deficits which impact ability to perform ADLs (bathing, care of mouth/teeth, toileting, grooming, dressing, etc.)  - Assess/evaluate cause of self-care deficits   - Assess range of motion  - Assess patient's mobility; develop plan if impaired  - Assess patient's need for assistive devices and provide as appropriate  - Encourage maximum independence but intervene and supervise when necessary  - Involve family in performance of ADLs  - Assess for home care needs following discharge   - Consider OT consult to assist with ADL evaluation and planning for discharge  - Provide patient education as appropriate  Outcome: Progressing  Goal: Maintains/Returns to pre admission functional level  Description: INTERVENTIONS:  - Perform AM-PAC 6 Click Basic Mobility/ Daily Activity assessment daily.  - Set and communicate daily mobility goal to care team and patient/family/caregiver.   - Collaborate with rehabilitation  services on mobility goals if consulted  - Reposition patient every 2 hours.  - Record patient progress and toleration of activity level   Outcome: Progressing     Problem: DISCHARGE PLANNING  Goal: Discharge to home or other facility with appropriate resources  Description: INTERVENTIONS:  - Identify barriers to discharge w/patient and caregiver  - Arrange for needed discharge resources and transportation as appropriate  - Identify discharge learning needs (meds, wound care, etc.)  - Arrange for interpretive services to assist at discharge as needed  - Refer to Case Management Department for coordinating discharge planning if the patient needs post-hospital services based on physician/advanced practitioner order or complex needs related to functional status, cognitive ability, or social support system  Outcome: Progressing     Problem: Knowledge Deficit  Goal: Patient/family/caregiver demonstrates understanding of disease process, treatment plan, medications, and discharge instructions  Description: Complete learning assessment and assess knowledge base.  Interventions:  - Provide teaching at level of understanding  - Provide teaching via preferred learning methods  Outcome: Progressing

## 2024-04-27 NOTE — ASSESSMENT & PLAN NOTE
Patient presents to the hospital after his wife found him to have significantly worsened SpO2 on home pulse oximetry.  In the emergency department he did have pulse oximetry in the 50s.  At home his baseline is 2 L continuous, 2-3 with exertion.  Patient required nonrebreather in the emergency department, and has been weaned down, the time of admission was on 5 to 6 L of nasal cannula.  He also is supposed to use BiPAP at home, and has not been recently.  -Respiratory failure due to underlying pneumonia and COPD exacerbation  -See management under the separate conditions  -Wean oxygen as patient tolerates to maintain oxygen saturation below 89%  -Respiratory protocol, airway clearance

## 2024-04-27 NOTE — ED NOTES
Report called to ICU. Pt to be transported to ICU, no s/s of distress, VS stable, A&Ox4, ID band in place     Chuyita Harden RN  04/27/24 8040

## 2024-04-27 NOTE — SEPSIS NOTE
"  Sepsis Note   Liu Angel 84 y.o. male MRN: [id number]  Unit/Bed#: -01 Encounter: [id number]       Initial Sepsis Screening       Row Name 04/27/24 1646                Is the patient's history suggestive of a new or worsening infection? Yes (Proceed)  -DC        Suspected source of infection pneumonia  -DC        Indicate SIRS criteria Tachycardia > 90 bpm;Tachypnea > 20 resp per min;Leukocytosis (WBC > 25702 IJL) OR Leukopenia (WBC <4000 IJL) OR Bandemia (WBC >10% bands)  -DC        Are two or more of the above signs & symptoms of infection both present and new to the patient? Yes (Proceed)  -DC        Assess for evidence of organ dysfunction: Are any of the below criteria present within 6 hours of suspected infection and SIRS criteria that are NOT considered to be chronic conditions? Lactate > 2.0  -DC        Date of presentation of severe sepsis 04/27/24  -DC        Time of presentation of severe sepsis 1648  -DC        Date of presentation of septic shock --        Time of presentation of septic shock --        Fluid Resuscitation: --        Sepsis Note: Click \"NEXT\" below (NOT \"close\") to generate sepsis note based on above information. YES (proceed by clicking \"NEXT\")  -DC                  User Key  (r) = Recorded By, (t) = Taken By, (c) = Cosigned By      Initials Name Provider Type    KENNY De Oliveira Counts, DO Physician                        Body mass index is 21.68 kg/m².  Wt Readings from Last 1 Encounters:   04/27/24 66.6 kg (146 lb 13.2 oz)     IBW (Ideal Body Weight): 70.7 kg    Ideal body weight: 70.7 kg (155 lb 13.8 oz)    "

## 2024-04-28 NOTE — RESPIRATORY THERAPY NOTE
RT Protocol Note  Liu Angel 84 y.o. male MRN: [id number]  Unit/Bed#: -01 Encounter: [id number]    Assessment    Principal Problem:    Acute on chronic respiratory failure with hypoxia   Active Problems:    Atrial fibrillation     Chronic combined systolic and diastolic heart failure (HCC)    Chronic obstructive pulmonary disease with acute exacerbation (HCC)    BPH (benign prostatic hyperplasia)    Malignant spindle cell neoplasm (HCC)    Sepsis without acute organ dysfunction (HCC)    Pneumonia of right lower lobe due to infectious organism      Home Pulmonary Medications:    Home Devices/Therapy: BiPAP/CPAP    Past Medical History:   Diagnosis Date    BPH (benign prostatic hyperplasia)     Chronic obstructive pulmonary disease (COPD) (HCC)     COPD (chronic obstructive pulmonary disease) (HCC)     Emphysema lung (HCC)     Essential hypertension     Hard of hearing     Pt does wear hearing aids    Hypertension     Lung cancer (HCC)     Shortness of breath     Pt states not changes and it occurs with moderate exertion    Sleep disturbance     Pt states he is only sleeping about 3 hours a night.  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    Tinnitus      Social History     Socioeconomic History    Marital status: /Civil Union     Spouse name: None    Number of children: None    Years of education: None    Highest education level: None   Occupational History    Occupation: Retired   Tobacco Use    Smoking status: Former     Types: Cigars, Cigarettes    Smokeless tobacco: Never   Vaping Use    Vaping status: Never Used   Substance and Sexual Activity    Alcohol use: Yes     Alcohol/week: 14.0 standard drinks of alcohol     Types: 14 Glasses of wine per week     Comment: moderate consumption    Drug use: Not Currently    Sexual activity: Yes   Other Topics Concern    None   Social History Narrative    None     Social Determinants of Health  "    Financial Resource Strain: Not on file   Food Insecurity: No Food Insecurity (4/24/2023)    Hunger Vital Sign     Worried About Running Out of Food in the Last Year: Never true     Ran Out of Food in the Last Year: Never true   Transportation Needs: No Transportation Needs (4/24/2023)    PRAPARE - Transportation     Lack of Transportation (Medical): No     Lack of Transportation (Non-Medical): No   Physical Activity: Not on file   Stress: Not on file   Social Connections: Not on file   Intimate Partner Violence: Not on file   Housing Stability: Low Risk  (4/24/2023)    Housing Stability Vital Sign     Unable to Pay for Housing in the Last Year: No     Number of Places Lived in the Last Year: 1     Unstable Housing in the Last Year: No       Subjective         Objective    Physical Exam:        Vitals:  Blood pressure 147/77, pulse 83, temperature 97.8 °F (36.6 °C), temperature source Temporal, resp. rate (!) 35, height 5' 9\" (1.753 m), weight 66.6 kg (146 lb 13.2 oz), SpO2 94%.          Imaging and other studies: I have personally reviewed pertinent reports.      O2 Device: 5L     Plan    Respiratory Plan: Mild Distress pathway  Airway Clearance Plan: Flutter     Resp Comments: (P) Pt was briefly on MF after transfer to chair. SPO2 has since imroved and flow has been titrated to 6L . Pt does require increased O2 w/ any exertion   "

## 2024-04-28 NOTE — ASSESSMENT & PLAN NOTE
Wt Readings from Last 3 Encounters:   04/27/24 66.6 kg (146 lb 13.2 oz)   12/26/23 69.1 kg (152 lb 6.4 oz)   11/03/23 72.4 kg (159 lb 9.8 oz)     Appears euvolemic  Takes torsemide 100 mg daily and metolazone once a week  Will hold in setting of sepsis, and worsening creatinine  Resume diuretics as appropriate  Caution with IV fluids

## 2024-04-28 NOTE — ASSESSMENT & PLAN NOTE
Tachycardic, tachypneic, leukocytosis, elevated lactic acidosis with suspected source of pneumonia  CT chest showing possible consolidation of the right middle and lower lobes, infectious versus inflammatory  Blood cultures pending  Procalcitonin negative x 2  Less suspicious for pneumonia/sepsis

## 2024-04-28 NOTE — PROGRESS NOTES
[facility]  Progress Note  Name: Liu Angel I  MRN: [id number]  Unit/Bed#: -01 I Date of Admission: 4/27/2024   Date of Service: 4/28/2024 I Hospital Day: 1    Assessment/Plan   * Acute on chronic respiratory failure with hypoxia   Assessment & Plan  Patient presents to the hospital after his wife found him to have significantly worsened SpO2 on home pulse oximetry.  In the emergency department he did have pulse oximetry in the 50s.  At home his baseline is 2 L continuous, 2-3 with exertion.  Patient required nonrebreather in the emergency department, and has been weaned down, the time of admission was on 5 to 6 L of nasal cannula.  He also is supposed to use BiPAP at home, and has not been recently.  -Respiratory failure primarily due to COPD exacerbation, low suspicion for pneumonia as below  -See management under the separate conditions  -Wean oxygen as patient tolerates to maintain oxygen saturation below 89%  -Respiratory protocol, airway clearance  -Uses BiPAP at night, unable to use due to his facial wound, but his wife believes he may have a nasal pillow mask at home, she will look for and try to bring in    Pneumonia of right lower lobe due to infectious organism  Assessment & Plan  CT chest showing possible consolidation of the right middle and lower lobes, infectious versus inflammatory  Presents with respiratory failure  Given location, some concern of aspiration though he reports no signs/symptoms of recent aspiration - monitor clinically for signs  Respiratory protocol  Airway clearance  Procalcitonin negative x 2, less suspicious for pneumonia  Will continue to cover empirically ceftriaxone/azithromycin given he is at high risk of decline due to significant underlying disease    Chronic obstructive pulmonary disease with acute exacerbation (HCC)  Assessment & Plan  Patient presents with worsening shortness of breath, increased chest congestion  IV steroids  ATC  nebs  Hold home inhalers in favor of Pulmicort and Perforomist  Respiratory protocol  Mucinex twice daily  Airway clearance    Sepsis without acute organ dysfunction (HCC)  Assessment & Plan  Tachycardic, tachypneic, leukocytosis, elevated lactic acidosis with suspected source of pneumonia  CT chest showing possible consolidation of the right middle and lower lobes, infectious versus inflammatory  Blood cultures pending  Procalcitonin negative x 2  Less suspicious for pneumonia/sepsis      Malignant spindle cell neoplasm (HCC)  Assessment & Plan  History of malignant neoplasm, CT chest x-ray shows improvement in a prior nodule.  Currently being observed as an outpatient.    BPH (benign prostatic hyperplasia)  Assessment & Plan  Continue home Proscar    Chronic combined systolic and diastolic heart failure (HCC)  Assessment & Plan  Wt Readings from Last 3 Encounters:   04/27/24 66.6 kg (146 lb 13.2 oz)   12/26/23 69.1 kg (152 lb 6.4 oz)   11/03/23 72.4 kg (159 lb 9.8 oz)     Appears euvolemic  Takes torsemide 100 mg daily and metolazone once a week  Will hold in setting of sepsis, and worsening creatinine  Resume diuretics as appropriate  Caution with IV fluids          Atrial fibrillation   Assessment & Plan  Continue home diltiazem and metoprolol  Continue home Eliquis               VTE Pharmacologic Prophylaxis:   Moderate Risk (Score 3-4) - Pharmacological DVT Prophylaxis Ordered: apixaban (Eliquis).    Mobility:   Basic Mobility Inpatient Raw Score: 18  JH-HLM Goal: 6: Walk 10 steps or more  JH-HLM Achieved: 6: Walk 10 steps or more  JH-HLM Goal achieved. Continue to encourage appropriate mobility.    Patient Centered Rounds: I performed bedside rounds with nursing staff today.   Discussions with Specialists or Other Care Team Provider: none    Education and Discussions with Family / Patient: Updated  (wife) at bedside.    Total Time Spent on Date of Encounter in care of patient: 38 mins. This time  was spent on one or more of the following: performing physical exam; counseling and coordination of care; obtaining or reviewing history; documenting in the medical record; reviewing/ordering tests, medications or procedures; communicating with other healthcare professionals and discussing with patient's family/caregivers.    Current Length of Stay: 1 day(s)  Current Patient Status: Inpatient   Certification Statement: The patient will continue to require additional inpatient hospital stay due to resp failure, copd  Discharge Plan: Anticipate discharge in 48-72 hrs to home.    Code Status: Level 3 - DNAR and DNI    Subjective:   Patient continues to state he feels fine, does admit to feeling worse and short of breath with movement/exertion    Objective:     Vitals:   Temp (24hrs), Av.7 °F (36.5 °C), Min:97.3 °F (36.3 °C), Max:98.7 °F (37.1 °C)    Temp:  [97.3 °F (36.3 °C)-98.7 °F (37.1 °C)] 97.8 °F (36.6 °C)  HR:  [] 83  Resp:  [18-37] 35  BP: (104-147)/(57-84) 147/77  SpO2:  [76 %-100 %] 94 %  Body mass index is 21.68 kg/m².     Input and Output Summary (last 24 hours):     Intake/Output Summary (Last 24 hours) at 2024 1415  Last data filed at 2024 1102  Gross per 24 hour   Intake 772.5 ml   Output 2020 ml   Net -1247.5 ml       Physical Exam:   Physical Exam  Vitals and nursing note reviewed.   Constitutional:       General: He is not in acute distress.     Appearance: He is well-developed. He is ill-appearing.   HENT:      Head: Normocephalic and atraumatic.   Eyes:      Conjunctiva/sclera: Conjunctivae normal.   Cardiovascular:      Rate and Rhythm: Normal rate. Rhythm irregular.   Pulmonary:      Effort: Pulmonary effort is normal. No respiratory distress.      Comments: Moderately decreased breath sounds throughout, less rhonchorous sounds with some end expiratory wheezing bilaterally  Abdominal:      Palpations: Abdomen is soft.      Tenderness: There is no abdominal tenderness.    Musculoskeletal:         General: No swelling.      Cervical back: Neck supple.   Skin:     General: Skin is warm and dry.      Capillary Refill: Capillary refill takes less than 2 seconds.      Comments: Overlying bandage of the nasal septum where a recent basal cell carcinoma was removed, clean dry and intact surgical scar   Neurological:      General: No focal deficit present.      Mental Status: He is alert and oriented to person, place, and time. Mental status is at baseline.   Psychiatric:         Mood and Affect: Mood normal.          Additional Data:     Labs:  Results from last 7 days   Lab Units 04/28/24  0534 04/27/24  1213   WBC Thousand/uL 9.58 12.36*   HEMOGLOBIN g/dL 11.9* 13.8   HEMATOCRIT % 38.8 44.9   PLATELETS Thousands/uL 167 200   BANDS PCT % 3  --    SEGS PCT %  --  82*   LYMPHO PCT % 4* 6*   MONO PCT % 5 11   EOS PCT % 0 0     Results from last 7 days   Lab Units 04/28/24  0534 04/27/24  1521 04/27/24  1213   SODIUM mmol/L 138  --  138   POTASSIUM mmol/L 4.4   < > 5.9*   CHLORIDE mmol/L 88*  --  89*   CO2 mmol/L >45*  --  44*   BUN mg/dL 49*  --  42*   CREATININE mg/dL 1.69*  --  1.41*   ANION GAP mmol/L  --   --  5   CALCIUM mg/dL 9.6  --  10.1   ALBUMIN g/dL  --   --  4.3   TOTAL BILIRUBIN mg/dL  --   --  0.58   ALK PHOS U/L  --   --  70   ALT U/L  --   --  7   AST U/L  --   --  20   GLUCOSE RANDOM mg/dL 139  --  140    < > = values in this interval not displayed.                 Results from last 7 days   Lab Units 04/28/24  0534 04/27/24  1521 04/27/24  1433 04/27/24  1248   LACTIC ACID mmol/L  --   --  2.1* 2.9*   PROCALCITONIN ng/ml 0.15 0.11  --   --        Lines/Drains:  Invasive Devices       Peripheral Intravenous Line  Duration             Peripheral IV 04/27/24 Left Forearm <1 day    Peripheral IV 04/28/24 Distal;Left;Upper;Ventral (anterior) Antecubital <1 day                          Imaging: No pertinent imaging reviewed.    Recent Cultures (last 7 days):         Last 24 Hours  Medication List:   Current Facility-Administered Medications   Medication Dose Route Frequency Provider Last Rate    acetaminophen  650 mg Oral Q6H PRN Faisal Alvino Counts, DO      ammonium lactate   Topical BID PRN Faisal Alvino Counts, DO      apixaban  5 mg Oral BID Faisal Alvino Counts, DO      azithromycin  500 mg Intravenous Q24H Faisal Alvino Counts,  mg (04/27/24 1720)    bisacodyl  5 mg Oral Daily Faisal Alvino Counts, DO      budesonide  0.5 mg Nebulization Q12H Faisla Alvino Counts, DO      cefTRIAXone  1,000 mg Intravenous Q24H Faisal Alvino Counts, DO 1,000 mg (04/28/24 1228)    cyanocobalamin  100 mcg Oral Daily Faisal Alvino Counts, DO      diltiazem  300 mg Oral Daily Faisal Alvino Counts, DO      docusate sodium  100 mg Oral BID Faisal Alvino Counts, DO      finasteride  5 mg Oral Daily Faisal Alvino Counts, DO      formoterol  20 mcg Nebulization Q12H Faisal Alvino Counts, DO      guaiFENesin  600 mg Oral Q12H KM Faisal Alvino Counts, DO      levalbuterol  1.25 mg Nebulization TID Faisal Alvino Counts, DO      loratadine  10 mg Oral Daily Faisal Alvino Counts, DO      magnesium Oxide  400 mg Oral Daily Faisal Alvino Counts, DO      methylPREDNISolone sodium succinate  40 mg Intravenous Q8H Faisal Alvino Counts, DO      metoprolol succinate  25 mg Oral Daily Faisal Alvino Counts, DO      montelukast  10 mg Oral HS Faisal Alvino Counts, DO      sodium chloride  75 mL/hr Intravenous Continuous Faisal Alvino Counts, DO 75 mL/hr (04/28/24 0956)    Sodium Zirconium Cyclosilicate  10 g Oral Daily Faisal Alvino Counts, DO          Today, Patient Was Seen By: Faisal Alvino Counts, DO    **Please Note: This note may have been constructed using a voice recognition system.**

## 2024-04-28 NOTE — RESPIRATORY THERAPY NOTE
RT Protocol Note  Liu Angel 84 y.o. male MRN: [id number]  Unit/Bed#: -01 Encounter: [id number]    Assessment    Principal Problem:    Acute on chronic respiratory failure with hypoxia   Active Problems:    Atrial fibrillation     Chronic combined systolic and diastolic heart failure (HCC)    Chronic obstructive pulmonary disease with acute exacerbation (HCC)    BPH (benign prostatic hyperplasia)    Malignant spindle cell neoplasm (HCC)    Sepsis without acute organ dysfunction (HCC)    Pneumonia of right lower lobe due to infectious organism      Home Pulmonary Medications:      Home Devices/Therapy: BiPAP/CPAP    Past Medical History:   Diagnosis Date    BPH (benign prostatic hyperplasia)     Chronic obstructive pulmonary disease (COPD) (HCC)     COPD (chronic obstructive pulmonary disease) (HCC)     Emphysema lung (HCC)     Essential hypertension     Hard of hearing     Pt does wear hearing aids    Hypertension     Lung cancer (HCC)     Shortness of breath     Pt states not changes and it occurs with moderate exertion    Sleep disturbance     Pt states he is only sleeping about 3 hours a night.  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    Tinnitus      Social History     Socioeconomic History    Marital status: /Civil Union     Spouse name: None    Number of children: None    Years of education: None    Highest education level: None   Occupational History    Occupation: Retired   Tobacco Use    Smoking status: Former     Types: Cigars, Cigarettes    Smokeless tobacco: Never   Vaping Use    Vaping status: Never Used   Substance and Sexual Activity    Alcohol use: Yes     Alcohol/week: 14.0 standard drinks of alcohol     Types: 14 Glasses of wine per week     Comment: moderate consumption    Drug use: Not Currently    Sexual activity: Yes   Other Topics Concern    None   Social History Narrative    None     Social Determinants of Health  "    Financial Resource Strain: Not on file   Food Insecurity: No Food Insecurity (4/24/2023)    Hunger Vital Sign     Worried About Running Out of Food in the Last Year: Never true     Ran Out of Food in the Last Year: Never true   Transportation Needs: No Transportation Needs (4/24/2023)    PRAPARE - Transportation     Lack of Transportation (Medical): No     Lack of Transportation (Non-Medical): No   Physical Activity: Not on file   Stress: Not on file   Social Connections: Not on file   Intimate Partner Violence: Not on file   Housing Stability: Low Risk  (4/24/2023)    Housing Stability Vital Sign     Unable to Pay for Housing in the Last Year: No     Number of Places Lived in the Last Year: 1     Unstable Housing in the Last Year: No       Subjective         Objective    Physical Exam:   Assessment Type: During-treatment  General Appearance: Awake  Respiratory Pattern: Dyspnea with exertion  Chest Assessment: Chest expansion symmetrical  Bilateral Breath Sounds: Expiratory wheezes, Inspiratory wheezes  O2 Device: 5L    Vitals:  Blood pressure 128/72, pulse 95, temperature 97.5 °F (36.4 °C), temperature source Temporal, resp. rate (!) 30, height 5' 9\" (1.753 m), weight 66.6 kg (146 lb 13.2 oz), SpO2 97%.          Imaging and other studies: I have personally reviewed pertinent reports.      O2 Device: 5L     Plan    Respiratory Plan: Mild Distress pathway  Airway Clearance Plan: Flutter     Resp Comments: Pt stable during the night, fio2 ajdusted up and down with patients pattern of mouth breathing.   "

## 2024-04-28 NOTE — ASSESSMENT & PLAN NOTE
Patient presents to the hospital after his wife found him to have significantly worsened SpO2 on home pulse oximetry.  In the emergency department he did have pulse oximetry in the 50s.  At home his baseline is 2 L continuous, 2-3 with exertion.  Patient required nonrebreather in the emergency department, and has been weaned down, the time of admission was on 5 to 6 L of nasal cannula.  He also is supposed to use BiPAP at home, and has not been recently.  -Respiratory failure primarily due to COPD exacerbation, low suspicion for pneumonia as below  -See management under the separate conditions  -Wean oxygen as patient tolerates to maintain oxygen saturation below 89%  -Respiratory protocol, airway clearance  -Uses BiPAP at night, unable to use due to his facial wound, but his wife believes he may have a nasal pillow mask at home, she will look for and try to bring in

## 2024-04-28 NOTE — ASSESSMENT & PLAN NOTE
Patient presents with worsening shortness of breath, increased chest congestion  IV steroids  ATC nebs  Hold home inhalers in favor of Pulmicort and Perforomist  Respiratory protocol  Mucinex twice daily  Airway clearance

## 2024-04-28 NOTE — PLAN OF CARE
Problem: Prexisting or High Potential for Compromised Skin Integrity  Goal: Skin integrity is maintained or improved  Description: INTERVENTIONS:  - Identify patients at risk for skin breakdown  - Assess and monitor skin integrity  - Assess and monitor nutrition and hydration status  - Monitor labs   - Assess for incontinence   - Turn and reposition patient  - Assist with mobility/ambulation  - Relieve pressure over bony prominences  - Avoid friction and shearing  - Provide appropriate hygiene as needed including keeping skin clean and dry  - Evaluate need for skin moisturizer/barrier cream  - Collaborate with interdisciplinary team   - Patient/family teaching  - Consider wound care consult   Outcome: Progressing     Problem: PAIN - ADULT  Goal: Verbalizes/displays adequate comfort level or baseline comfort level  Description: Interventions:  - Encourage patient to monitor pain and request assistance  - Assess pain using appropriate pain scale  - Administer analgesics based on type and severity of pain and evaluate response  - Implement non-pharmacological measures as appropriate and evaluate response  - Consider cultural and social influences on pain and pain management  - Notify physician/advanced practitioner if interventions unsuccessful or patient reports new pain  Outcome: Progressing     Problem: INFECTION - ADULT  Goal: Absence or prevention of progression during hospitalization  Description: INTERVENTIONS:  - Assess and monitor for signs and symptoms of infection  - Monitor lab/diagnostic results  - Monitor all insertion sites, i.e. indwelling lines, tubes, and drains  - Monitor endotracheal if appropriate and nasal secretions for changes in amount and color  - [place] appropriate cooling/warming therapies per order  - Administer medications as ordered  - Instruct and encourage patient and family to use good hand hygiene technique  - Identify and instruct in appropriate isolation precautions for  identified infection/condition  Outcome: Progressing     Problem: INFECTION - ADULT  Goal: Absence of fever/infection during neutropenic period  Description: INTERVENTIONS:  - Monitor WBC    Outcome: Progressing     Problem: SAFETY ADULT  Goal: Patient will remain free of falls  Description: INTERVENTIONS:  - Educate patient/family on patient safety including physical limitations  - Instruct patient to call for assistance with activity   - Consult OT/PT to assist with strengthening/mobility   - Keep Call bell within reach  - Keep bed low and locked with side rails adjusted as appropriate  - Keep care items and personal belongings within reach  - Initiate and maintain comfort rounds  - Make Fall Risk Sign visible to staff  - Offer Toileting every 2 Hours, in advance of need  - Initiate/Maintain bed alarm  - Obtain necessary fall risk management equipment: bed alarm   Problem: DISCHARGE PLANNING  Goal: Discharge to home or other facility with appropriate resources  Description: INTERVENTIONS:  - Identify barriers to discharge w/patient and caregiver  - Arrange for needed discharge resources and transportation as appropriate  - Identify discharge learning needs (meds, wound care, etc.)  - Arrange for interpretive services to assist at discharge as needed  - Refer to Case Management Department for coordinating discharge planning if the patient needs post-hospital services based on physician/advanced practitioner order or complex needs related to functional status, cognitive ability, or social support system  Outcome: Progressing     - Apply yellow socks and bracelet for high fall risk patients  - Consider moving patient to room near nurses station  Outcome: Progressing

## 2024-04-28 NOTE — ASSESSMENT & PLAN NOTE
CT chest showing possible consolidation of the right middle and lower lobes, infectious versus inflammatory  Presents with respiratory failure  Given location, some concern of aspiration though he reports no signs/symptoms of recent aspiration - monitor clinically for signs  Respiratory protocol  Airway clearance  Procalcitonin negative x 2, less suspicious for pneumonia  Will continue to cover empirically ceftriaxone/azithromycin given he is at high risk of decline due to significant underlying disease

## 2024-04-29 PROBLEM — N17.9 AKI (ACUTE KIDNEY INJURY) (HCC): Status: ACTIVE | Noted: 2024-01-01

## 2024-04-29 NOTE — PROGRESS NOTES
[facility]  Progress Note  Name: Liu Angel I  MRN: [id number]  Unit/Bed#: -01 I Date of Admission: 4/27/2024   Date of Service: 4/29/2024 I Hospital Day: 2    Assessment/Plan   * Acute on chronic respiratory failure with hypoxia   Assessment & Plan  Patient presents to the hospital after his wife found him to have significantly worsened SpO2 on home pulse oximetry.  In the emergency department he did have pulse oximetry in the 50s.  At home his baseline is 2 L continuous, 2-3 with exertion.  Patient required nonrebreather in the emergency department, and has been weaned down, the time of admission was on 5 to 6 L of nasal cannula.  He also is supposed to use BiPAP at home, and has not been recently.  -Respiratory failure primarily due to COPD exacerbation, low suspicion for pneumonia as below  -See management under the separate conditions  -Wean oxygen as patient tolerates to maintain oxygen saturation below 89% - currently on 6L, did attempt to wean to 4L and he desaturated  -Respiratory protocol, airway clearance  -Uses BiPAP at night, unable to use due to his facial wound, but his wife believes he may have a nasal pillow mask at home, she will look for and try to bring in to see if we can utilize here. If not, will discuss with CM if able to obtain through [employer]    Pneumonia of right lower lobe due to infectious organism  Assessment & Plan  CT chest showing possible consolidation of the right middle and lower lobes, infectious versus inflammatory  Presents with respiratory failure  Given location, some concern of aspiration though he reports no signs/symptoms of recent aspiration - monitor clinically for signs  Respiratory protocol  Airway clearance  Procalcitonin negative x 3, less suspicious for pneumonia  Stop ceftriaxone, continue azithromycin for antiinflammatory in COPD        CDI: Possible aspiration pneumonia in the setting of acute respiratory failure a/e/b  consolidation in the right middle lobe and right lower lobe, WBC 12.36,  and RR 38 treated with IV Rocephin, IV Zithro, sputum cultures, blood cultures, aspiration precautions, lab monitoring and VS monitoring     Chronic obstructive pulmonary disease with acute exacerbation (HCC)  Assessment & Plan  Patient presents with worsening shortness of breath, increased chest congestion  IV steroids, continue TID  ATC nebs  Hold home inhalers in favor of Pulmicort and Perforomist  Respiratory protocol  Mucinex twice daily  Airway clearance    SUSI (acute kidney injury) (HCC)  Assessment & Plan  Likely due to SIRS/pre-renal  Did improve with IVF  Peaked 1.69  Improving  Held diuretics, now resuming at lower than home dose  Trend Cr avoid nephrotoxic meds    Sepsis without acute organ dysfunction (HCC)  Assessment & Plan  Tachycardic, tachypneic, leukocytosis, elevated lactic acidosis with suspected source of pneumonia  CT chest showing possible consolidation of the right middle and lower lobes, infectious versus inflammatory  Blood cultures pending  Procalcitonin negative x 3  Less suspicious for pneumonia/sepsis      Malignant spindle cell neoplasm (HCC)  Assessment & Plan  History of malignant neoplasm, CT chest x-ray shows improvement in a prior nodule.  Currently being observed as an outpatient.    BPH (benign prostatic hyperplasia)  Assessment & Plan  Continue home Proscar    Chronic combined systolic and diastolic heart failure (HCC)  Assessment & Plan  Wt Readings from Last 3 Encounters:   04/27/24 66.6 kg (146 lb 13.2 oz)   12/26/23 69.1 kg (152 lb 6.4 oz)   11/03/23 72.4 kg (159 lb 9.8 oz)     Appears euvolemic  Takes torsemide 100 mg daily and metolazone once a week  Initially held in setting sepsis/SIRs and SUSI  Resume at lower dose, 40mg Torsemide daily  Caution with IV fluids          Atrial fibrillation   Assessment & Plan  Continue home diltiazem and metoprolol  Continue home Eliquis               VTE  Pharmacologic Prophylaxis:   Moderate Risk (Score 3-4) - Pharmacological DVT Prophylaxis Ordered: apixaban (Eliquis).    Mobility:   Basic Mobility Inpatient Raw Score: 18  JH-HLM Goal: 6: Walk 10 steps or more  JH-HLM Achieved: 5: Stand (1 or more minutes)  JH-HLM Goal NOT achieved. Continue with multidisciplinary rounding and encourage appropriate mobility to improve upon JH-HLM goals.    Patient Centered Rounds: I performed bedside rounds with nursing staff today.   Discussions with Specialists or Other Care Team Provider: none    Education and Discussions with Family / Patient: Updated  (wife) via phone.    Total Time Spent on Date of Encounter in care of patient: 34 mins. This time was spent on one or more of the following: performing physical exam; counseling and coordination of care; obtaining or reviewing history; documenting in the medical record; reviewing/ordering tests, medications or procedures; communicating with other healthcare professionals and discussing with patient's family/caregivers.    Current Length of Stay: 2 day(s)  Current Patient Status: Inpatient   Certification Statement: The patient will continue to require additional inpatient hospital stay due to resp fail  Discharge Plan: Anticipate discharge in 48-72 hrs to discharge location to be determined pending rehab evaluations.    Code Status: Level 3 - DNAR and DNI    Subjective:   Patient continues to state he feels fine and denies SoB, asking when he can go home    Objective:     Vitals:   Temp (24hrs), Av.8 °F (36.6 °C), Min:97.6 °F (36.4 °C), Max:98.1 °F (36.7 °C)    Temp:  [97.6 °F (36.4 °C)-98.1 °F (36.7 °C)] 97.8 °F (36.6 °C)  HR:  [] 89  Resp:  [25-35] 32  BP: (104-147)/(57-77) 117/63  SpO2:  [82 %-100 %] 90 %  Body mass index is 21.68 kg/m².     Input and Output Summary (last 24 hours):     Intake/Output Summary (Last 24 hours) at 2024 1025  Last data filed at 2024 0801  Gross per 24 hour   Intake  1956.25 ml   Output 880 ml   Net 1076.25 ml       Physical Exam:   Physical Exam  Vitals and nursing note reviewed.   Constitutional:       General: He is not in acute distress.     Appearance: He is well-developed. He is ill-appearing.   HENT:      Head: Normocephalic and atraumatic.   Eyes:      Conjunctiva/sclera: Conjunctivae normal.   Cardiovascular:      Rate and Rhythm: Normal rate. Rhythm irregular.   Pulmonary:      Effort: Pulmonary effort is normal. No respiratory distress.      Comments: Moderately decreased breath sounds throughout, scattered rhonchorous sounds, appreciate faint expiratory wheezing  Abdominal:      Palpations: Abdomen is soft.      Tenderness: There is no abdominal tenderness.   Musculoskeletal:         General: No swelling.      Cervical back: Neck supple.   Skin:     General: Skin is warm and dry.      Capillary Refill: Capillary refill takes less than 2 seconds.      Comments: Overlying bandage of the nasal septum where a recent basal cell carcinoma was removed, clean dry and intact surgical scar   Neurological:      General: No focal deficit present.      Mental Status: He is alert and oriented to person, place, and time. Mental status is at baseline.   Psychiatric:         Mood and Affect: Mood normal.          Additional Data:     Labs:  Results from last 7 days   Lab Units 04/29/24  0611 04/28/24  0534 04/27/24  1213   WBC Thousand/uL 11.49* 9.58 12.36*   HEMOGLOBIN g/dL 11.9* 11.9* 13.8   HEMATOCRIT % 38.9 38.8 44.9   PLATELETS Thousands/uL 182 167 200   BANDS PCT %  --  3  --    SEGS PCT %  --   --  82*   LYMPHO PCT %  --  4* 6*   MONO PCT %  --  5 11   EOS PCT %  --  0 0     Results from last 7 days   Lab Units 04/29/24  0611 04/27/24  1521 04/27/24  1213   SODIUM mmol/L 142   < > 138   POTASSIUM mmol/L 4.5   < > 5.9*   CHLORIDE mmol/L 96   < > 89*   CO2 mmol/L 45*   < > 44*   BUN mg/dL 53*   < > 42*   CREATININE mg/dL 1.25   < > 1.41*   ANION GAP mmol/L 1*  --  5   CALCIUM  mg/dL 9.1   < > 10.1   ALBUMIN g/dL  --   --  4.3   TOTAL BILIRUBIN mg/dL  --   --  0.58   ALK PHOS U/L  --   --  70   ALT U/L  --   --  7   AST U/L  --   --  20   GLUCOSE RANDOM mg/dL 152*   < > 140    < > = values in this interval not displayed.                 Results from last 7 days   Lab Units 04/29/24  0611 04/28/24  0534 04/27/24  1521 04/27/24  1433 04/27/24  1248   LACTIC ACID mmol/L  --   --   --  2.1* 2.9*   PROCALCITONIN ng/ml 0.08 0.15 0.11  --   --        Lines/Drains:  Invasive Devices       Peripheral Intravenous Line  Duration             Peripheral IV 04/28/24 Distal;Left;Upper;Ventral (anterior) Antecubital 1 day    Peripheral IV 04/29/24 Right Antecubital <1 day                          Imaging: No pertinent imaging reviewed.    Recent Cultures (last 7 days):   Results from last 7 days   Lab Units 04/27/24  1225 04/27/24  1213   BLOOD CULTURE  Received in Microbiology Lab. Culture in Progress. Received in Microbiology Lab. Culture in Progress.       Last 24 Hours Medication List:   Current Facility-Administered Medications   Medication Dose Route Frequency Provider Last Rate    acetaminophen  650 mg Oral Q6H PRN Faisal Alvino Counts, DO      ammonium lactate   Topical BID PRN Faisal Alvino Counts, DO      apixaban  5 mg Oral BID Faisal Alvino Counts, DO      azithromycin  500 mg Intravenous Q24H Faisal Alvino Counts, DO Stopped (04/28/24 1809)    bisacodyl  5 mg Oral Daily Faisal Alvino Counts, DO      budesonide  0.5 mg Nebulization Q12H Faisal Alvino Counts, DO      cyanocobalamin  100 mcg Oral Daily Faisal Alvino Counts, DO      diltiazem  300 mg Oral Daily Faisal Alvino Counts, DO      docusate sodium  100 mg Oral BID Faisal Alvino Counts, DO      finasteride  5 mg Oral Daily Faisal Alvino Counts, DO      formoterol  20 mcg Nebulization Q12H Faisal Alvino Counts, DO      guaiFENesin  600 mg Oral Q12H KM Faisal Alvino Counts, DO      levalbuterol  1.25 mg Nebulization TID Faisal Alvino Counts, DO       loratadine  10 mg Oral Daily Faisal Alvino Counts, DO      magnesium Oxide  400 mg Oral Daily Faisal Alvino Counts, DO      methylPREDNISolone sodium succinate  40 mg Intravenous Q8H Faisal Alvino Counts, DO      metoprolol succinate  25 mg Oral Daily Faisal Alvino Counts, DO      montelukast  10 mg Oral HS Faisal Alvino Counts, DO      Sodium Zirconium Cyclosilicate  10 g Oral Daily Faisal Alvino Counts, DO      torsemide  40 mg Oral Daily Faisal Alvino Counts, DO          Today, Patient Was Seen By: Faisal De Oliveira Counts, DO    **Please Note: This note may have been constructed using a voice recognition system.**

## 2024-04-29 NOTE — PLAN OF CARE
Problem: Prexisting or High Potential for Compromised Skin Integrity  Goal: Skin integrity is maintained or improved  Description: INTERVENTIONS:  - Identify patients at risk for skin breakdown  - Assess and monitor skin integrity  - Assess and monitor nutrition and hydration status  - Monitor labs   - Assess for incontinence   - Turn and reposition patient  - Assist with mobility/ambulation  - Relieve pressure over bony prominences  - Avoid friction and shearing  - Provide appropriate hygiene as needed including keeping skin clean and dry  - Evaluate need for skin moisturizer/barrier cream  - Collaborate with interdisciplinary team   - Patient/family teaching  - Consider wound care consult   Outcome: Progressing     Problem: PAIN - ADULT  Goal: Verbalizes/displays adequate comfort level or baseline comfort level  Description: Interventions:  - Encourage patient to monitor pain and request assistance  - Assess pain using appropriate pain scale  - Administer analgesics based on type and severity of pain and evaluate response  - Implement non-pharmacological measures as appropriate and evaluate response  - Consider cultural and social influences on pain and pain management  - Notify physician/advanced practitioner if interventions unsuccessful or patient reports new pain  Outcome: Progressing     Problem: INFECTION - ADULT  Goal: Absence or prevention of progression during hospitalization  Description: INTERVENTIONS:  - Assess and monitor for signs and symptoms of infection  - Monitor lab/diagnostic results  - Monitor all insertion sites, i.e. indwelling lines, tubes, and drains  - Monitor endotracheal if appropriate and nasal secretions for changes in amount and color  - [place] appropriate cooling/warming therapies per order  - Administer medications as ordered  - Instruct and encourage patient and family to use good hand hygiene technique  - Identify and instruct in appropriate isolation precautions for  identified infection/condition  Outcome: Progressing     Problem: INFECTION - ADULT  Goal: Absence of fever/infection during neutropenic period  Description: INTERVENTIONS:  - Monitor WBC    Outcome: Progressing

## 2024-04-29 NOTE — PROGRESS NOTES
"Pt's family at bedside reports pt eats 2-3 meals per day depending upon when he wakes up may just have a later brunch instead of breakfast and lunch. May have toast for breakfast, leftovers for lunch, dinner would be home prepared meal. Did not report snacking in between meals. Family reports pt's oral intake has decreased over the years with aging though no recent acute changes in po intake. Takes ensure clear \"once in a while\" at home, does not like milky based supplements. Chart review of weight hx: 4/25/23 162lb, 5/9/23 155lb, 1/13/24 159lb, 4/27/24 146lb, 8.2% weight loss x >3 mo noted borderline significant. Family reports no recent weight changes known.     Pt does not meet criteria for malnutrition at this time. Does have increased nutrient needs. Will order ensure clear daily. Continue regular diet as ordered. Noted CHF euvolemic.   "

## 2024-04-29 NOTE — ASSESSMENT & PLAN NOTE
Likely due to SIRS/pre-renal  Did improve with IVF  Peaked 1.69  Improving  Held diuretics, now resuming at lower than home dose  Trend Cr avoid nephrotoxic meds

## 2024-04-29 NOTE — ASSESSMENT & PLAN NOTE
Patient presents with worsening shortness of breath, increased chest congestion  IV steroids, continue TID  ATC nebs  Hold home inhalers in favor of Pulmicort and Perforomist  Respiratory protocol  Mucinex twice daily  Airway clearance

## 2024-04-29 NOTE — ASSESSMENT & PLAN NOTE
Patient presents to the hospital after his wife found him to have significantly worsened SpO2 on home pulse oximetry.  In the emergency department he did have pulse oximetry in the 50s.  At home his baseline is 2 L continuous, 2-3 with exertion.  Patient required nonrebreather in the emergency department, and has been weaned down, the time of admission was on 5 to 6 L of nasal cannula.  He also is supposed to use BiPAP at home, and has not been recently.  -Respiratory failure primarily due to COPD exacerbation, low suspicion for pneumonia as below  -See management under the separate conditions  -Wean oxygen as patient tolerates to maintain oxygen saturation below 89% - currently on 6L, did attempt to wean to 4L and he desaturated  -Respiratory protocol, airway clearance  -Uses BiPAP at night, unable to use due to his facial wound, but his wife believes he may have a nasal pillow mask at home, she will look for and try to bring in to see if we can utilize here. If not, will discuss with CM if able to obtain through [facility] company

## 2024-04-29 NOTE — ASSESSMENT & PLAN NOTE
Wt Readings from Last 3 Encounters:   04/27/24 66.6 kg (146 lb 13.2 oz)   12/26/23 69.1 kg (152 lb 6.4 oz)   11/03/23 72.4 kg (159 lb 9.8 oz)     Appears euvolemic  Takes torsemide 100 mg daily and metolazone once a week  Initially held in setting sepsis/SIRs and SUSI  Resume at lower dose, 40mg Torsemide daily  Caution with IV fluids

## 2024-04-29 NOTE — ASSESSMENT & PLAN NOTE
CT chest showing possible consolidation of the right middle and lower lobes, infectious versus inflammatory    Given location, some concern of aspiration though he reports no signs/symptoms of recent aspiration - monitor clinically for signs  Respiratory protocol  Airway clearance  Speech evaluated - ? Mucus   All abx dced as procal neg x3- suspect aspiration pneumonitis         CDI: Possible aspiration pneumonia in the setting of acute respiratory failure a/e/b consolidation in the right middle lobe and right lower lobe, WBC 12.36,  and RR 38 treated with IV Rocephin, IV Zithro, sputum cultures, blood cultures, aspiration precautions, lab monitoring and VS monitoring

## 2024-04-29 NOTE — ASSESSMENT & PLAN NOTE
Tachycardic, tachypneic, leukocytosis, elevated lactic acidosis with suspected source of pneumonia  CT chest showing possible consolidation of the right middle and lower lobes, infectious versus inflammatory  Blood cultures pending  Procalcitonin negative x 3  Less suspicious for pneumonia/sepsis

## 2024-04-30 NOTE — ASSESSMENT & PLAN NOTE
Patient presents with worsening shortness of breath, increased chest congestion  Seems to have a questionable pulmonary fibrosis on the CT.  Still wheezing continue Solu-Medrol 40 mg IV every 8 hours Pulmicort Perforomist completed 3 days of Zithromax suspect more of an aspiration pneumonitis with mucus as procalcitonin x 2 were negative.  Will ask pulmonary to evaluate he is on Mucinex as well as he is unable to come down from mid flow and now on 10 L

## 2024-04-30 NOTE — CASE MANAGEMENT
Case Management Discharge Planning Note    Patient name Liu Angel  Location /-01 MRN [id number]  : 1940 Date 2024       Current Admission Date: 2024  Current Admission Diagnosis:Acute on chronic respiratory failure with hypoxia    Patient Active Problem List    Diagnosis Date Noted    SUSI (acute kidney injury) (HCC) 2024    Sepsis without acute organ dysfunction (HCC) 2024    Pneumonia of right lower lobe due to infectious organism 2024    Malignant spindle cell neoplasm (HCC) 2023    Stage 3a chronic kidney disease (HCC) 2023    Severe protein-calorie malnutrition (HCC) 2023    BPH (benign prostatic hyperplasia) 2023    Acute on chronic respiratory failure with hypoxia  2021    Pulmonary nodule 2021    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  [address]  [facility] [address]  Phone: [phone] Fax: [phone]    [facility] - [place] - [address]  [address]  [facility] [address]  Phone: [phone] Fax: [phone]    [facility] - [place], [place] - [address]  [address] [facility] [address]  Phone: [phone] Fax: [phone]    Primary Care Provider: Lenore Correia DO    Primary Insurance: MEDICARE  Secondary Insurance:     DISCHARGE DETAILS:        GLADIS SPRINGER for  the Sleep Department at the [facility] to inquire if pt could have nasal pillows/mask, as he had a recent resection of the left side of his face and nose RT  basal cell carcinoma now and his current Bipap mask is ill fitting.  CM requesting a return call                  [id number] Cm spoke with Ovi at the [facility], who sts he will send nasal pillows and a total face mast, size large (same mask pt is wearing during this hospitalization), to patients home address.

## 2024-04-30 NOTE — CONSULTS
Consultation - Pulmonary Medicine   Liu Angel 84 y.o. male MRN: [id number]  Unit/Bed#: -01 Encounter: [id number]      Assessment/Plan:    Acute hypoxic and hypercapnic respiratory failure  Severe COPD with acute exacerbation  Likely aspiration pneumonitis  History of spindle cell carcinoma  MILADIS on BiPAP    Pulmonary recommendations:    Seen on 6 L mid flow during my evaluation.  Titrate oxygen to maintain SpO2 greater than or equal to 88%.  Baseline oxygen requirement is 2 to 3 L nasal cannula with exertion.  Based off of ABG would recommend BiPAP nightly and as needed including now.  Use full facemask to avoid pressure on his nasal bridge.  Continue on all nebulized regimen o Pulmicort/Perforomist every 12 hours and Xopenex 3 times daily.  Also add 3% tonic saline nebs 3 times daily given increased mucus production.  Continue Solu-Medrol 40 mg IV every 8 hours today.  Agree with monitoring off antibiotics given repeatedly negative procalcitonin.  Consider diuresis-defer to primary team/cardiology.  At discharge, all wounds recommend an all nebulized regimen as above instead of Wixela/Spiriva Respimat.  Would also recommend palliative care referral as outpatient if patient is agreeable given patient's multiple comorbidities.    History of Present Illness   Physician Requesting Consult: Fabi Michel MD  Reason for Consult / Principal Problem: COPD exacerbation  Hx and PE limited by: n/a  Chief Complaint: shortness of breath   HPI: Liu Angel is a 84 y.o.  male who presented to [facility] with complaints of shortness of breath and hypoxia.  Patient has past medical is positive for chronic hypoxic hypercapnic respiratory failure, severe COPD, CHF, pulmonary hypertension, history of spindle cell carcinoma, MIL[facility]IS [facility]  Patient reports having Mohs surgery to remove skin cancer on his nose the day before he presented to the ED for shortness of breath and hypoxia.   Patient reported feeling in his normal state of health prior to the surgery but after returning home felt that he could not catch his breath.  Denies aspiration event to his knowledge.  He denies sick contacts.  He had reports of SpO2 in the 70s prompting ED evaluation where he was placed on mid flow.  He was admitted for COPD exacerbation and started on nebulizers and steroids.  Pulmonary was consulted to help manage this.  Presently, patient states that he feels better compared to when he first got here but not yet back to baseline.    Inpatient consult to Pulmonology  Consult performed by: Isaiah Cherry PA-C  Consult ordered by: Fabi Michel MD          Review of Systems   All other systems reviewed and are negative.      Historical Information   Past Medical History:   Diagnosis Date    BPH (benign prostatic hyperplasia)     Chronic obstructive pulmonary disease (COPD) (HCC)     COPD (chronic obstructive pulmonary disease) (HCC)     Emphysema lung (HCC)     Essential hypertension     Hard of hearing     Pt does wear hearing aids    Hypertension     Lung cancer (HCC)     Shortness of breath     Pt states not changes and it occurs with moderate exertion    Sleep disturbance     Pt states he is only sleeping about 3 hours a night.  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    Tinnitus      Past Surgical History:   Procedure Laterality Date    IR BIOPSY LUNG  7/19/2022    SHOULDER OPEN ROTATOR CUFF REPAIR      SKIN GRAFT      spindle cell removal of scalp with skin graft from shoulder    VEIN SURGERY       Social History   Social History     Substance and Sexual Activity   Alcohol Use Yes    Alcohol/week: 14.0 standard drinks of alcohol    Types: 14 Glasses of wine per week    Comment: moderate consumption     Social History     Substance and Sexual Activity   Drug Use Not Currently     Social History     Tobacco Use   Smoking Status Former    Types:  Cigars, Cigarettes   Smokeless Tobacco Never     E-Cigarette/Vaping    E-Cigarette Use Never User      E-Cigarette/Vaping Substances     Occupational History: [employer], had exposures to agent orange    Family History: History reviewed. No pertinent family history.    Meds/Allergies   all current active meds have been reviewed, pertinent pulmonary meds have been reviewed, and current meds:   Current Facility-Administered Medications   Medication Dose Route Frequency    acetaminophen (TYLENOL) tablet 650 mg  650 mg Oral Q6H PRN    ammonium lactate (LAC-HYDRIN) 12 % lotion   Topical BID PRN    apixaban (ELIQUIS) tablet 5 mg  5 mg Oral BID    bisacodyl (DULCOLAX) EC tablet 5 mg  5 mg Oral Daily    budesonide (PULMICORT) inhalation solution 0.5 mg  0.5 mg Nebulization Q12H    cyanocobalamin (VITAMIN B-12) tablet 100 mcg  100 mcg Oral Daily    diltiazem (CARDIZEM CD) 24 hr capsule 300 mg  300 mg Oral Daily    docusate sodium (COLACE) capsule 100 mg  100 mg Oral BID    finasteride (PROSCAR) tablet 5 mg  5 mg Oral Daily    formoterol (PERFOROMIST) nebulizer solution 20 mcg  20 mcg Nebulization Q12H    guaiFENesin (MUCINEX) 12 hr tablet 600 mg  600 mg Oral Q12H KM    hydrOXYzine HCL (ATARAX) tablet 25 mg  25 mg Oral HS    levalbuterol (XOPENEX) inhalation solution 1.25 mg  1.25 mg Nebulization TID    loratadine (CLARITIN) tablet 10 mg  10 mg Oral Daily    magnesium Oxide (MAG-OX) tablet 400 mg  400 mg Oral Daily    methylPREDNISolone sodium succinate (Solu-MEDROL) injection 40 mg  40 mg Intravenous Q8H    metoprolol succinate (TOPROL-XL) 24 hr tablet 25 mg  25 mg Oral Daily    montelukast (SINGULAIR) tablet 10 mg  10 mg Oral HS    nicotine (NICODERM CQ) 21 mg/24 hr TD 24 hr patch 21 mg  21 mg Transdermal Once    sodium chloride 3 % inhalation solution 4 mL  4 mL Nebulization TID       Allergies   Allergen Reactions    Penicillin G Other (See Comments)     PCN Shots Only!!       Objective   Vitals: Blood pressure 110/61, pulse  "78, temperature (!) 97.3 °F (36.3 °C), temperature source Temporal, resp. rate (!) 26, height 5' 9\" (1.753 m), weight 66.6 kg (146 lb 13.2 oz), SpO2 93%.6L NC,Body mass index is 21.68 kg/m².    Intake/Output Summary (Last 24 hours) at 4/30/2024 1503  Last data filed at 4/30/2024 1230  Gross per 24 hour   Intake 970 ml   Output 1400 ml   Net -430 ml     Invasive Devices       Peripheral Intravenous Line  Duration             Peripheral IV 04/28/24 Distal;Left;Upper;Ventral (anterior) Antecubital 2 days    Peripheral IV 04/29/24 Right Antecubital 1 day                    Physical Exam  Vitals and nursing note reviewed.   Constitutional:       General: He is not in acute distress.     Appearance: Normal appearance.   HENT:      Head: Normocephalic.        Mouth/Throat:      Mouth: Mucous membranes are moist.      Pharynx: Oropharynx is clear.   Cardiovascular:      Rate and Rhythm: Normal rate and regular rhythm.      Heart sounds: No murmur heard.  Pulmonary:      Effort: Pulmonary effort is normal.      Breath sounds: Wheezing present.   Musculoskeletal:      Cervical back: Normal range of motion.   Skin:     General: Skin is warm and dry.   Neurological:      General: No focal deficit present.      Mental Status: He is alert. Mental status is at baseline.   Psychiatric:         Mood and Affect: Mood normal.         Behavior: Behavior normal.         Lab Results: I have personally reviewed pertinent lab results., ABG:   Lab Results   Component Value Date    PHART 7.303 (L) 04/30/2024    LUX5CZU 106.0 (HH) 04/30/2024    PO2ART 52.0 (L) 04/30/2024    HIE3ETG 51.3 (H) 04/30/2024    BEART 19.4 04/30/2024    SOURCE Radial, Left 04/30/2024   , BNP:   Lab Results   Component Value Date     (H) 04/30/2024   , CBC:   Lab Results   Component Value Date    WBC 11.11 (H) 04/30/2024    HGB 12.1 04/30/2024    HCT 39.8 04/30/2024     (H) 04/30/2024     04/30/2024    RBC 3.73 (L) 04/30/2024    MCH 32.4 " "04/30/2024    MCHC 30.4 (L) 04/30/2024    RDW 14.6 04/30/2024    MPV 10.6 04/30/2024   , CMP:   Lab Results   Component Value Date    SODIUM 145 04/30/2024    K 3.7 04/30/2024    CL 93 (L) 04/30/2024    CO2 >45 (HH) 04/30/2024    BUN 63 (H) 04/30/2024    CREATININE 1.45 (H) 04/30/2024    CALCIUM 9.7 04/30/2024    EGFR 44 04/30/2024   , PT/INR: No results found for: \"PT\", \"INR\", Troponin: No results found for: \"TROPONINI\"      Procalcitonin: WNLx2    Flu/COVID/RSV PCR: WNL    Culture Data: BC x2 NGTD @ 48 hours    BNP: 519    Imaging Studies: I have personally reviewed pertinent reports.   and I have personally reviewed pertinent films in PACS     CTA chest PE study 4/27/2024  No PE.  Mild peripheral consolidation in the right middle and lower lobes superimposed on for reticulations.  Trace bilateral pleural effusions.  Severe chronic bronchiolitis.  Multiple stable subcentimeter nodules since February 2024 with decrease in the right upper lobe nodule.  Pulmonary artery enlargement.    EKG, Pathology, and Other Studies: I have personally reviewed pertinent reports.       Echo 4/24/2023  EF 45%.  Mild tricuspid regurgitation.  Right ventricular systolic pressure mildly elevated with pressure 34 mmHg.  Mild pulmonic regurgitation.    Pulmonary Results (PFTs, PSG): I have personally reviewed pertinent reports.       Pulmonary function test 11/10/2021  FEV1/FVC ratio 41%  FEV1 34% predicted  FVC 61% predicted  No change with bronchodilator  TLC 99% predicted  % predicted  DLCO corrected patient's hemoglobin 30%  Interpretation: Severe obstructive airflow defect.  No bronchodilator responsiveness.  Lung volumes indicate air trapping based off of increased RV.  Severe diffusion impairment.  Low volume loop consistent with obstruction.    Code Status: Level 3 - DNAR and DNI    Portions of the record may have been created with voice recognition software.  Occasional wrong word or \"sound a like\" substitutions may have " occurred due to the inherent limitations of voice recognition software.  Read the chart carefully and recognize, using context, where substitutions have occurred.

## 2024-04-30 NOTE — PLAN OF CARE
Problem: Prexisting or High Potential for Compromised Skin Integrity  Goal: Skin integrity is maintained or improved  Description: INTERVENTIONS:  - Identify patients at risk for skin breakdown  - Assess and monitor skin integrity  - Assess and monitor nutrition and hydration status  - Monitor labs   - Assess for incontinence   - Turn and reposition patient  - Assist with mobility/ambulation  - Relieve pressure over bony prominences  - Avoid friction and shearing  - Provide appropriate hygiene as needed including keeping skin clean and dry  - Evaluate need for skin moisturizer/barrier cream  - Collaborate with interdisciplinary team   - Patient/family teaching  - Consider wound care consult   Outcome: Progressing     Problem: PAIN - ADULT  Goal: Verbalizes/displays adequate comfort level or baseline comfort level  Description: Interventions:  - Encourage patient to monitor pain and request assistance  - Assess pain using appropriate pain scale  - Administer analgesics based on type and severity of pain and evaluate response  - Implement non-pharmacological measures as appropriate and evaluate response  - Consider cultural and social influences on pain and pain management  - Notify physician/advanced practitioner if interventions unsuccessful or patient reports new pain  Outcome: Progressing     Problem: INFECTION - ADULT  Goal: Absence or prevention of progression during hospitalization  Description: INTERVENTIONS:  - Assess and monitor for signs and symptoms of infection  - Monitor lab/diagnostic results  - Monitor all insertion sites, i.e. indwelling lines, tubes, and drains  - Monitor endotracheal if appropriate and nasal secretions for changes in amount and color  - [place] appropriate cooling/warming therapies per order  - Administer medications as ordered  - Instruct and encourage patient and family to use good hand hygiene technique  - Identify and instruct in appropriate isolation precautions for  identified infection/condition  Outcome: Progressing  Goal: Absence of fever/infection during neutropenic period  Description: INTERVENTIONS:  - Monitor WBC    Outcome: Progressing     Problem: SAFETY ADULT  Goal: Patient will remain free of falls  Description: INTERVENTIONS:  - Educate patient/family on patient safety including physical limitations  - Instruct patient to call for assistance with activity   - Consult OT/PT to assist with strengthening/mobility   - Keep Call bell within reach  - Keep bed low and locked with side rails adjusted as appropriate  - Keep care items and personal belongings within reach  - Initiate and maintain comfort rounds  - Make Fall Risk Sign visible to staff  - Offer Toileting every 2 Hours, in advance of need  - Initiate/Maintain bed alarm  - Apply yellow socks and bracelet for high fall risk patients  - Consider moving patient to room near nurses station  Outcome: Progressing  Goal: Maintain or return to baseline ADL function  Description: INTERVENTIONS:  -  Assess patient's ability to carry out ADLs; assess patient's baseline for ADL function and identify physical deficits which impact ability to perform ADLs (bathing, care of mouth/teeth, toileting, grooming, dressing, etc.)  - Assess/evaluate cause of self-care deficits   - Assess range of motion  - Assess patient's mobility; develop plan if impaired  - Assess patient's need for assistive devices and provide as appropriate  - Encourage maximum independence but intervene and supervise when necessary  - Involve family in performance of ADLs  - Assess for home care needs following discharge   - Consider OT consult to assist with ADL evaluation and planning for discharge  - Provide patient education as appropriate  Outcome: Progressing  Goal: Maintains/Returns to pre admission functional level  Description: INTERVENTIONS:  - Perform AM-PAC 6 Click Basic Mobility/ Daily Activity assessment daily.  - Set and communicate daily mobility  goal to care team and patient/family/caregiver.   - Collaborate with rehabilitation services on mobility goals if consulted  - Perform Range of Motion 2 times a day.  - Reposition patient every 2 hours.  - Dangle patient 2 times a day  - Stand patient 2 times a day  - Ambulate patient 2 times a day  - Out of bed to chair 2 times a day   - Out of bed for meals 2 times a day  - Out of bed for toileting  - Record patient progress and toleration of activity level   Outcome: Progressing     Problem: DISCHARGE PLANNING  Goal: Discharge to home or other facility with appropriate resources  Description: INTERVENTIONS:  - Identify barriers to discharge w/patient and caregiver  - Arrange for needed discharge resources and transportation as appropriate  - Identify discharge learning needs (meds, wound care, etc.)  - Arrange for interpretive services to assist at discharge as needed  - Refer to Case Management Department for coordinating discharge planning if the patient needs post-hospital services based on physician/advanced practitioner order or complex needs related to functional status, cognitive ability, or social support system  Outcome: Progressing     Problem: Knowledge Deficit  Goal: Patient/family/caregiver demonstrates understanding of disease process, treatment plan, medications, and discharge instructions  Description: Complete learning assessment and assess knowledge base.  Interventions:  - Provide teaching at level of understanding  - Provide teaching via preferred learning methods  Outcome: Progressing     Problem: Nutrition/Hydration-ADULT  Goal: Nutrient/Hydration intake appropriate for improving, restoring or maintaining nutritional needs  Description: Monitor and assess patient's nutrition/hydration status for malnutrition. Collaborate with interdisciplinary team and initiate plan and interventions as ordered.  Monitor patient's weight and dietary intake as ordered or per policy. Utilize nutrition  screening tool and intervene as necessary. Determine patient's food preferences and provide high-protein, high-caloric foods as appropriate.     INTERVENTIONS:  - Monitor oral intake, urinary output, labs, and treatment plans  - Assess nutrition and hydration status and recommend course of action  - Evaluate amount of meals eaten  - Assist patient with eating if necessary   - Allow adequate time for meals  - Recommend/ encourage appropriate diets, oral nutritional supplements, and vitamin/mineral supplements  - Order, calculate, and assess calorie counts as needed  - Recommend, monitor, and adjust tube feedings and TPN/PPN based on assessed needs  - Assess need for intravenous fluids  - Provide specific nutrition/hydration education as appropriate  - Include patient/family/caregiver in decisions related to nutrition  Outcome: Progressing

## 2024-04-30 NOTE — ASSESSMENT & PLAN NOTE
Sirs -Tachycardic, tachypneic, leukocytosis, 2/2 to aspiration pneumonitis and procal negative do not suspect pna antibiotics dced

## 2024-04-30 NOTE — ASSESSMENT & PLAN NOTE
On chronic his usual baseline is 1-1.2 he was admitted with started on IV fluids initially improved his torsemide has been restarted yesterday and currently his creatinine again has worsened he also had IV contrast on April 27  We will hold torsemide at this time does not seem to be in volume overload state.  Will check an ultrasound of the kidney and bladder as a chest x-ray and CT did not reveal pulmonary edema legs mild swelling seems more third spacing -   Recheck in am and recheck cxr if worsens will ask nephrology

## 2024-04-30 NOTE — RESPIRATORY THERAPY NOTE
04/30/24 1258   Respiratory Assessment   Resp Comments pt was trialed on bipap with total face mask. RN removed dressing on sutured area of left nares. sutures intact. pt tolerating total face mask with bipap settings 12/6 40% . spo2 mid to high 90s. pt was requiring increased midflow up to 15 L and ABG was obtained.

## 2024-04-30 NOTE — PROGRESS NOTES
Left facial/nasal dressing removed. Sutures intact. Skin clean, dry, intact with no drainage or redness. A&D ointment applied, wound left open to air.

## 2024-04-30 NOTE — ASSESSMENT & PLAN NOTE
Chronically he is on 2 L at rest 2-3 on exertion.  Came in on a nonrebreather he was unable to utilize BiPAP recently secondary to having skin basal cell surgery removal.  He is currently still 6 to 10 L of mid flow suspect secondary to COPD exacerbation does not seem to be significantly volume overloaded there is no pulmonary edema on the chest x-ray with a CTA CTA did not show any pulmonary emboli.  He is on steroids and suspect he could have aspiration pneumonitis from mucus as speech ruled out any aspiration with food procalcitonin's were negative Rocephin was discontinued and he has completed Zithromax for the COPD exacerbation  He is on Perforomist and Pulmicort  Will check a proBNP will ask pulmonary to evaluate get an ABG  Will see if will be able to utilize BiPAP at night and during the naps with a fullface mask we does have any nasal   Will recheck a chest x-ray

## 2024-04-30 NOTE — PROGRESS NOTES
[facility]  Progress Note  Name: Liu Angel I  MRN: [id number]  Unit/Bed#: -01 I Date of Admission: 4/27/2024   Date of Service: 4/30/2024 I Hospital Day: 3    Assessment/Plan   * Acute on chronic respiratory failure with hypoxia   Assessment & Plan  Chronically he is on 2 L at rest 2-3 on exertion.  Came in on a nonrebreather he was unable to utilize BiPAP recently secondary to having skin basal cell surgery removal.  He is currently still 6 to 10 L of mid flow suspect secondary to COPD exacerbation does not seem to be significantly volume overloaded there is no pulmonary edema on the chest x-ray with a CTA CTA did not show any pulmonary emboli.  He is on steroids and suspect he could have aspiration pneumonitis from mucus as speech ruled out any aspiration with food procalcitonin's were negative Rocephin was discontinued and he has completed Zithromax for the COPD exacerbation  He is on Perforomist and Pulmicort  Will check a proBNP will ask pulmonary to evaluate get an ABG  Will see if will be able to utilize BiPAP at night and during the naps with a fullface mask we does have any nasal   Will recheck a chest x-ray    SUSI (acute kidney injury) (HCC)  Assessment & Plan  On chronic his usual baseline is 1-1.2 he was admitted with started on IV fluids initially improved his torsemide has been restarted yesterday and currently his creatinine again has worsened he also had IV contrast on April 27  We will hold torsemide at this time does not seem to be in volume overload state.  Will check an ultrasound of the kidney and bladder as a chest x-ray and CT did not reveal pulmonary edema legs mild swelling seems more third spacing -   Recheck in am and recheck cxr if worsens will ask nephrology     Pneumonia of right lower lobe due to infectious organism  Assessment & Plan  CT chest showing possible consolidation of the right middle and lower lobes, infectious versus  inflammatory    Given location, some concern of aspiration though he reports no signs/symptoms of recent aspiration - monitor clinically for signs  Respiratory protocol  Airway clearance  Speech evaluated - ? Mucus   All abx dced as procal neg x3- suspect aspiration pneumonitis         CDI: Possible aspiration pneumonia in the setting of acute respiratory failure a/e/b consolidation in the right middle lobe and right lower lobe, WBC 12.36,  and RR 38 treated with IV Rocephin, IV Zithro, sputum cultures, blood cultures, aspiration precautions, lab monitoring and VS monitoring     Sepsis without acute organ dysfunction (HCC)  Assessment & Plan  Sirs -Tachycardic, tachypneic, leukocytosis, 2/2 to aspiration pneumonitis and procal negative do not suspect pna antibiotics dced       Malignant spindle cell neoplasm (HCC)  Assessment & Plan  History of malignant neoplasm, CT chest x-ray shows improvement in a prior nodule.  Currently being observed as an outpatient.    BPH (benign prostatic hyperplasia)  Assessment & Plan  Continue home Proscar    Chronic obstructive pulmonary disease with acute exacerbation (HCC)  Assessment & Plan  Patient presents with worsening shortness of breath, increased chest congestion  Seems to have a questionable pulmonary fibrosis on the CT.  Still wheezing continue Solu-Medrol 40 mg IV every 8 hours Pulmicort Perforomist completed 3 days of Zithromax suspect more of an aspiration pneumonitis with mucus as procalcitonin x 2 were negative.  Will ask pulmonary to evaluate he is on Mucinex as well as he is unable to come down from mid flow and now on 10 L    Chronic combined systolic and diastolic heart failure (HCC)  Assessment & Plan  Wt Readings from Last 3 Encounters:   04/27/24 66.6 kg (146 lb 13.2 oz)   12/26/23 69.1 kg (152 lb 6.4 oz)   11/03/23 72.4 kg (159 lb 9.8 oz)     Appears euvolemic  Takes torsemide 100 mg daily and metolazone once a week  Initially held in setting sepsis/SIRs  and SUSI,Resume at lower dose, 40mg Torsemide daily  stop again as worsened kidney function   Last TTE - ef 45% , rv decreased          Atrial fibrillation   Assessment & Plan  Rate controlled Continue home diltiazem and metoprolol  Continue home Eliquis               VTE Pharmacologic Prophylaxis:   Moderate Risk (Score 3-4) - Pharmacological DVT Prophylaxis Ordered: apixaban (Eliquis).    Mobility:   Basic Mobility Inpatient Raw Score: 17  JH-HLM Goal: 5: Stand one or more mins  JH-HLM Achieved: 6: Walk 10 steps or more  JH-HLM Goal achieved. Continue to encourage appropriate mobility.    Patient Centered Rounds: I performed bedside rounds with nursing staff today.   Discussions with Specialists or Other Care Team Provider: will discuss with pulm    Education and Discussions with Family / Patient: Updated  (wife) at bedside.    Total Time Spent on Date of Encounter in care of patient: >35 mins. This time was spent on one or more of the following: performing physical exam; counseling and coordination of care; obtaining or reviewing history; documenting in the medical record; reviewing/ordering tests, medications or procedures; communicating with other healthcare professionals and discussing with patient's family/caregivers.    Current Length of Stay: 3 day(s)  Current Patient Status: Inpatient   Certification Statement: The patient will continue to require additional inpatient hospital stay due to copd exacerbation   Discharge Plan: Anticipate discharge in 48-72 hrs to home.    Code Status: Level 3 - DNAR and DNI    Subjective:   Seen and examined no complaints but nursing report at night gets confused    Objective:     Vitals:   Temp (24hrs), Av.7 °F (36.5 °C), Min:97.3 °F (36.3 °C), Max:98 °F (36.7 °C)    Temp:  [97.3 °F (36.3 °C)-98 °F (36.7 °C)] 97.3 °F (36.3 °C)  HR:  [] 75  Resp:  [22-44] 29  BP: (103-132)/(56-73) 120/59  SpO2:  [74 %-100 %] 91 %  Body mass index is 21.68 kg/m².      Input and Output Summary (last 24 hours):     Intake/Output Summary (Last 24 hours) at 4/30/2024 1119  Last data filed at 4/30/2024 0800  Gross per 24 hour   Intake 730 ml   Output 1650 ml   Net -920 ml       Physical Exam:   Physical Exam  Vitals and nursing note reviewed.   Constitutional:       General: He is not in acute distress.     Appearance: He is well-developed.   HENT:      Head: Normocephalic and atraumatic.   Eyes:      Conjunctiva/sclera: Conjunctivae normal.   Cardiovascular:      Rate and Rhythm: Normal rate and regular rhythm.      Heart sounds: No murmur heard.  Pulmonary:      Effort: Pulmonary effort is normal. No respiratory distress.      Breath sounds: Wheezing and rales present.   Abdominal:      Palpations: Abdomen is soft.      Tenderness: There is no abdominal tenderness.   Musculoskeletal:         General: Swelling (trace lower extremities) present.      Cervical back: Neck supple.   Skin:     General: Skin is warm and dry.      Capillary Refill: Capillary refill takes less than 2 seconds.   Neurological:      Mental Status: He is alert.   Psychiatric:         Mood and Affect: Mood normal.          Additional Data:     Labs:  Results from last 7 days   Lab Units 04/30/24  0535 04/29/24  0611 04/28/24  0534 04/27/24  1213   WBC Thousand/uL 11.11*   < > 9.58 12.36*   HEMOGLOBIN g/dL 12.1   < > 11.9* 13.8   HEMATOCRIT % 39.8   < > 38.8 44.9   PLATELETS Thousands/uL 185   < > 167 200   BANDS PCT %  --   --  3  --    SEGS PCT %  --   --   --  82*   LYMPHO PCT %  --   --  4* 6*   MONO PCT %  --   --  5 11   EOS PCT %  --   --  0 0    < > = values in this interval not displayed.     Results from last 7 days   Lab Units 04/30/24  0617 04/29/24  0611 04/27/24  1521 04/27/24  1213   SODIUM mmol/L 145 142   < > 138   POTASSIUM mmol/L 3.7 4.5   < > 5.9*   CHLORIDE mmol/L 93* 96   < > 89*   CO2 mmol/L >45* 45*   < > 44*   BUN mg/dL 63* 53*   < > 42*   CREATININE mg/dL 1.45* 1.25   < > 1.41*    ANION GAP mmol/L  --  1*  --  5   CALCIUM mg/dL 9.7 9.1   < > 10.1   ALBUMIN g/dL  --   --   --  4.3   TOTAL BILIRUBIN mg/dL  --   --   --  0.58   ALK PHOS U/L  --   --   --  70   ALT U/L  --   --   --  7   AST U/L  --   --   --  20   GLUCOSE RANDOM mg/dL 153* 152*   < > 140    < > = values in this interval not displayed.                 Results from last 7 days   Lab Units 04/29/24  0611 04/28/24  0534 04/27/24  1521 04/27/24  1433 04/27/24  1248   LACTIC ACID mmol/L  --   --   --  2.1* 2.9*   PROCALCITONIN ng/ml 0.08 0.15 0.11  --   --        Lines/Drains:  Invasive Devices       Peripheral Intravenous Line  Duration             Peripheral IV 04/28/24 Distal;Left;Upper;Ventral (anterior) Antecubital 2 days    Peripheral IV 04/29/24 Right Antecubital 1 day                          Imaging: Reviewed radiology reports from this admission including: chest CT scan    Recent Cultures (last 7 days):   Results from last 7 days   Lab Units 04/27/24  1225 04/27/24  1213   BLOOD CULTURE  No Growth at 24 hrs. No Growth at 24 hrs.       Last 24 Hours Medication List:   Current Facility-Administered Medications   Medication Dose Route Frequency Provider Last Rate    acetaminophen  650 mg Oral Q6H PRN Faisal Alvino Counts, DO      ammonium lactate   Topical BID PRN Faisal Alvino Counts, DO      apixaban  5 mg Oral BID Faisal Alvino Counts, DO      bisacodyl  5 mg Oral Daily Faisal Alvino Counts, DO      budesonide  0.5 mg Nebulization Q12H Faisal Alvino Counts, DO      cyanocobalamin  100 mcg Oral Daily Faisal Alvino Counts, DO      diltiazem  300 mg Oral Daily Faisal Alvino Counts, DO      docusate sodium  100 mg Oral BID Faisal Alvino Counts, DO      finasteride  5 mg Oral Daily Faisal Alvino Counts, DO      formoterol  20 mcg Nebulization Q12H Faisal Alvino Counts, DO      guaiFENesin  600 mg Oral Q12H KM Faisal Alvino Counts, DO      levalbuterol  1.25 mg Nebulization TID Faisal Alvino Counts, DO      loratadine  10 mg Oral Daily  Faisal Alvino Counts, DO      magnesium Oxide  400 mg Oral Daily Faisal Alvino Counts, DO      methylPREDNISolone sodium succinate  40 mg Intravenous Q8H Faisal Alvino Counts, DO      metoprolol succinate  25 mg Oral Daily Faisal Alvino Counts, DO      montelukast  10 mg Oral HS Fasial Alvino Counts, DO      nicotine  21 mg Transdermal Once Sachin Bullock MD          Today, Patient Was Seen By: Fabi Michel MD    **Please Note: This note may have been constructed using a voice recognition system.**

## 2024-04-30 NOTE — SPEECH THERAPY NOTE
Speech Language/Pathology  Speech-Language Pathology Bedside Swallow Evaluation      Patient Name: Liu Angel    Today's Date: 4/30/2024    Summary   Consult received for bedside swallow assessment. Pt admitted w/ acute on chronic respiratory failure w/ hypoxia, RLL PNA, COPD exacerbation and sepsis. PMHx includes BPH, CHF, malignant spindle cell neoplasm, AFIB.   Spoke w/ RN Solomon who reported pt sounded gurgly this morning, did fine w/ breakfast and meds. Suspected mucus plugging requiring increased O2 as pt was in the 70s. On 15L midflow at start of session, SPO2 99%.  Turned down to 10L then 6L, overall tolerated well.  Pt and wife deny dysphagia at this time. Consumes regular textures at home, wears upper and lower dentures.  Seen w/ regular texture snack and thins by straw. Adequate mastication and clearance, no overt coughing during intake.  Decrease in SPO2 following intake to 90% when on 6L. Cued to cough, sounded extremely congested.  Discussed possible recommendation for VBS tomorrow to further explore cause of RLL PNA, pt and wife in agreement if SLP recommends after tx session tomorrow morning.    Risk/s for Aspiration: Moderate- ?aspiration of mucus     Recommended Diet: regular diet and thin liquids   Recommended Form of Meds: whole with liquid   Aspiration precautions and swallowing strategies: upright posture  Other Recommendations: Continue frequent oral care        Current Medical Status    Liu Angel is a 84 y.o. 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  Patient reports he actually feels fine, he denies feeling sick recently, denies any recent fatigue.  His wife checked his pulse ox at home and noted his oxygen levels to be low in the 70s, so was brought to the  emergency department after some debate, in the emergency department he was found to have SpO2 in the 50s and was placed on a nonrebreather, treated with steroids and nebulizers and did improve but continue to require increased levels of oxygen.  The patient states he has maybe felt a little more congested, but otherwise denies any fevers, chills, recent aspiration events, or worsening dyspnea.  His wife does report that he seems like he is been having increased dyspnea over the last few days.  Patient mated to medicine for further management     Current Precautions:  Fall      Allergies:  No known food allergies    Past medical history:  Please see H&P for details    Special Studies:  CXR  Chronic increase in interstitial markings corresponding with bronchiolitis on CT.    CT Chest:  No pulmonary embolus.  Mild peripheral consolidation in the right middle lobe and right lower lobe, superimposed upon chronic fibrosis, infectious or inflammatory.  New trace pleural effusions.  Severe chronic bronchiolitis.  Multiple stable subcentimeter nodules since February 2024 with decrease in a right upper lobe nodule.  Pulmonary artery enlargement which can be seen with pulmonary hypertension.    Social/Education/Vocational Hx:  Pt lives with family    Swallow Information   Current Risks for Dysphagia & Aspiration:  change in respiratory status  Current Symptoms/Concerns: change in respiratory status  Current Diet: regular diet and thin liquids   Baseline Diet: regular diet and thin liquids      Baseline Assessment   Behavior/Cognition: alert  Speech/Language Status: able to participate in conversation  Patient Positioning: upright in bed  Pain Status/Interventions/Response to Interventions:   No report of or nonverbal indications of pain.       Swallow Mechanism Exam  Facial: symmetrical  Labial: WFL  Lingual: WFL  Velum: symmetrical  Mandible: adequate ROM  Dentition: full dentures  Vocal quality:clear/adequate   Volitional  Cough: strong/productive   Respiratory Status: on 6L midflow      Consistencies Assessed and Performance   Consistencies Administered: thin liquids and hard solids      Oral Stage: WFL  Mastication was adequate with the materials administered today.  Bolus formation and transfer were functional with no significant oral residue noted.  No overt s/s reduced oral control.    Pharyngeal Stage:  suspect grossly WFL  Swallow Mechanics:  Swallowing initiation appeared prompt.  Laryngeal rise was palpated and judged to be within functional limits.  No coughing, throat clearing, change in vocal quality. Decreased respiratory status following intake, ?mucus    Esophageal Concerns: none reported    Summary and Recommendations (see above)    Results Reviewed with: patient, RN, and MD     Treatment Recommended: F/U x1, possible VBS     Dysphagia LTG  -Patient will demonstrate safe and effective oral intake (without overt s/s significant oral/pharyngeal dysphagia including s/s penetration or aspiration) for the highest appropriate diet level.     Short Term Goals:    -Pt will tolerate regular and thin liquid with no significant s/s oral or pharyngeal dysphagia across 1-3 diagnostic session/s.    -Patient will comply with a Video/Modified Barium Swallow study for more complete assessment of swallowing anatomy/physiology/aspiration risk and to assess efficacy of treatment techniques so as to best guide treatment plan    Re: Compensatory Strategies    -Patient will demonstrate independent use of recommended safe swallowing strategies during a clinician assessed meal across 1-3 diagnostic sessions.      Speech Therapy Prognosis   Prognosis: fair    Prognosis Considerations: respiratory status    Ludivina Shetty MS CCC-SLP  4/30/2024

## 2024-04-30 NOTE — ASSESSMENT & PLAN NOTE
Wt Readings from Last 3 Encounters:   04/27/24 66.6 kg (146 lb 13.2 oz)   12/26/23 69.1 kg (152 lb 6.4 oz)   11/03/23 72.4 kg (159 lb 9.8 oz)     Appears euvolemic  Takes torsemide 100 mg daily and metolazone once a week  Initially held in setting sepsis/SIRs and SUSI,Resume at lower dose, 40mg Torsemide daily 4/29 stop again as worsened kidney function   Last TTE - ef 45% , rv decreased

## 2024-05-01 NOTE — RESPIRATORY THERAPY NOTE
Pt initially placed on bipap 12/6 @ 40% for HS (via Full face mask).   ABG at 0432hrs - 7.31/100/94/49/+18/96%. Settings modified to 16/8 @ 40%.   Pt noted to have repeatedly pulled mask off throughout night.

## 2024-05-01 NOTE — PLAN OF CARE
Problem: Prexisting or High Potential for Compromised Skin Integrity  Goal: Skin integrity is maintained or improved  Description: INTERVENTIONS:  - Identify patients at risk for skin breakdown  - Assess and monitor skin integrity  - Assess and monitor nutrition and hydration status  - Monitor labs   - Assess for incontinence   - Turn and reposition patient  - Assist with mobility/ambulation  - Relieve pressure over bony prominences  - Avoid friction and shearing  - Provide appropriate hygiene as needed including keeping skin clean and dry  - Evaluate need for skin moisturizer/barrier cream  - Collaborate with interdisciplinary team   - Patient/family teaching  - Consider wound care consult   Outcome: Progressing     Problem: PAIN - ADULT  Goal: Verbalizes/displays adequate comfort level or baseline comfort level  Description: Interventions:  - Encourage patient to monitor pain and request assistance  - Assess pain using appropriate pain scale  - Administer analgesics based on type and severity of pain and evaluate response  - Implement non-pharmacological measures as appropriate and evaluate response  - Consider cultural and social influences on pain and pain management  - Notify physician/advanced practitioner if interventions unsuccessful or patient reports new pain  Outcome: Progressing     Problem: INFECTION - ADULT  Goal: Absence or prevention of progression during hospitalization  Description: INTERVENTIONS:  - Assess and monitor for signs and symptoms of infection  - Monitor lab/diagnostic results  - Monitor all insertion sites, i.e. indwelling lines, tubes, and drains  - Monitor endotracheal if appropriate and nasal secretions for changes in amount and color  - [place] appropriate cooling/warming therapies per order  - Administer medications as ordered  - Instruct and encourage patient and family to use good hand hygiene technique  - Identify and instruct in appropriate isolation precautions for  identified infection/condition  Outcome: Progressing  Goal: Absence of fever/infection during neutropenic period  Description: INTERVENTIONS:  - Monitor WBC    Outcome: Progressing     Problem: SAFETY ADULT  Goal: Patient will remain free of falls  Description: INTERVENTIONS:  - Educate patient/family on patient safety including physical limitations  - Instruct patient to call for assistance with activity   - Consult OT/PT to assist with strengthening/mobility   - Keep Call bell within reach  - Keep bed low and locked with side rails adjusted as appropriate  - Keep care items and personal belongings within reach  - Initiate and maintain comfort rounds  - Make Fall Risk Sign visible to staff  - Offer Toileting every 2 Hours, in advance of need  - Initiate/Maintain bed alarm  - Apply yellow socks and bracelet for high fall risk patients  - Consider moving patient to room near nurses station  Outcome: Progressing  Goal: Maintain or return to baseline ADL function  Description: INTERVENTIONS:  -  Assess patient's ability to carry out ADLs; assess patient's baseline for ADL function and identify physical deficits which impact ability to perform ADLs (bathing, care of mouth/teeth, toileting, grooming, dressing, etc.)  - Assess/evaluate cause of self-care deficits   - Assess range of motion  - Assess patient's mobility; develop plan if impaired  - Assess patient's need for assistive devices and provide as appropriate  - Encourage maximum independence but intervene and supervise when necessary  - Involve family in performance of ADLs  - Assess for home care needs following discharge   - Consider OT consult to assist with ADL evaluation and planning for discharge  - Provide patient education as appropriate  Outcome: Progressing  Goal: Maintains/Returns to pre admission functional level  Description: INTERVENTIONS:  - Perform AM-PAC 6 Click Basic Mobility/ Daily Activity assessment daily.  - Set and communicate daily mobility  goal to care team and patient/family/caregiver.   - Collaborate with rehabilitation services on mobility goals if consulted  - Perform Range of Motion 2 times a day.  - Reposition patient every 2 hours.  - Dangle patient 2 times a day  - Stand patient 2 times a day  - Ambulate patient 2 times a day  - Out of bed to chair 2 times a day   - Out of bed for meals 2 times a day  - Out of bed for toileting  - Record patient progress and toleration of activity level   Outcome: Progressing     Problem: DISCHARGE PLANNING  Goal: Discharge to home or other facility with appropriate resources  Description: INTERVENTIONS:  - Identify barriers to discharge w/patient and caregiver  - Arrange for needed discharge resources and transportation as appropriate  - Identify discharge learning needs (meds, wound care, etc.)  - Arrange for interpretive services to assist at discharge as needed  - Refer to Case Management Department for coordinating discharge planning if the patient needs post-hospital services based on physician/advanced practitioner order or complex needs related to functional status, cognitive ability, or social support system  Outcome: Progressing     Problem: Knowledge Deficit  Goal: Patient/family/caregiver demonstrates understanding of disease process, treatment plan, medications, and discharge instructions  Description: Complete learning assessment and assess knowledge base.  Interventions:  - Provide teaching at level of understanding  - Provide teaching via preferred learning methods  Outcome: Progressing     Problem: Nutrition/Hydration-ADULT  Goal: Nutrient/Hydration intake appropriate for improving, restoring or maintaining nutritional needs  Description: Monitor and assess patient's nutrition/hydration status for malnutrition. Collaborate with interdisciplinary team and initiate plan and interventions as ordered.  Monitor patient's weight and dietary intake as ordered or per policy. Utilize nutrition  screening tool and intervene as necessary. Determine patient's food preferences and provide high-protein, high-caloric foods as appropriate.     INTERVENTIONS:  - Monitor oral intake, urinary output, labs, and treatment plans  - Assess nutrition and hydration status and recommend course of action  - Evaluate amount of meals eaten  - Assist patient with eating if necessary   - Allow adequate time for meals  - Recommend/ encourage appropriate diets, oral nutritional supplements, and vitamin/mineral supplements  - Order, calculate, and assess calorie counts as needed  - Recommend, monitor, and adjust tube feedings and TPN/PPN based on assessed needs  - Assess need for intravenous fluids  - Provide specific nutrition/hydration education as appropriate  - Include patient/family/caregiver in decisions related to nutrition  Outcome: Progressing

## 2024-05-01 NOTE — ASSESSMENT & PLAN NOTE
On chronic his usual baseline is 1-1.2 he was admitted with started on IV fluids initially improved his torsemide has been restarted yesterday and currently his creatinine again has worsened he also had IV contrast on April 27  Us of kidney without abnormality   Holding torsemide cr down - but will monitor with one dose of diamox

## 2024-05-01 NOTE — ASSESSMENT & PLAN NOTE
Chronically he is on 2 L at rest 2-3 on exertion.  Came in on a nonrebreather he was unable to utilize BiPAP recently secondary to having skin basal cell surgery removal.  Currently on 15 liters if midflow 97 % as discussed with the nursing when he was on 10 L of mid low he was 85% she went up to 15 L and he was 87 but currently he came up.  He was on BiPAP for couple of hours currently his ABG has improved and compensated and looks like CO2 is at baseline  He is on steroids and suspect he could have aspiration pneumonitis from mucus as speech ruled out any aspiration with food procalcitonin's were negative Rocephin was discontinued and he has completed Zithromax for the COPD exacerbation  He is on Perforomist and Pulmicort  Questionable of aspect of fluid overload as does have cephalization and pulmonary htn , elevated probnp cr back down - will trial diamox 500 mg iv x1   Overall prognosis is guarded if does not improve discussed yesterday with pulmonary as well we will have to rediscuss.  Use BiPAP at night and as needed  Also questionable mucous plugging will ask for vest usage  Cta negative for pe

## 2024-05-01 NOTE — ASSESSMENT & PLAN NOTE
Suspect in light of hypercapnia which after BiPAP has improved looks at his baseline is 69 to 70s pH is normal now and he is lethargic with his wake up and able to tell me his name and where he is will monitor closely discussed with nursing that to utilize BiPAP at night and as needed

## 2024-05-01 NOTE — ASSESSMENT & PLAN NOTE
Wt Readings from Last 3 Encounters:   04/27/24 66.6 kg (146 lb 13.2 oz)   12/26/23 69.1 kg (152 lb 6.4 oz)   11/03/23 72.4 kg (159 lb 9.8 oz)     Appears euvolemic  Takes torsemide 100 mg daily and metolazone once a week  Last TTE - ef 45% , rv decreased  He does have cephalization slight elevation of proBNP does have some wheezing questionable aspect of some fluid overload will trial Diamox 500 mg IV x 1 and see response

## 2024-05-01 NOTE — ASSESSMENT & PLAN NOTE
Patient: Norma Shore Date: 2023   : 1969 Attending: Siegrist, Jeremy F, MD   53 year old female        Chief complaint: ESRD on HD as OP (TTS), Pneumonia, Covid-19 infection    Subjective/Interim Events: HD is in progress. BP stable. Using SPA and extra midodrine for BP support. Remains on O2, 3 L/NC. Remains on heparin gtt. Denies SOB at rest.  Edema much better. Tolerating regular diet. C/o pain right wrist. Xray done. Had 3 L paracentesis yesterday.     Reviewed: Vital signs, labs, imaging studies, medications, notes    Vital Last Value 24 Hour Range   Temperature 98.1 °F (36.7 °C) (23 1240) Temp  Min: 97 °F (36.1 °C)  Max: 98.1 °F (36.7 °C)   Pulse (!) 112 (23 1245) Pulse  Min: 109  Max: 119   Respiratory 18 (23 1250) Resp  Min: 16  Max: 20   Non-Invasive  Blood Pressure (!) 75/43 (23 1245) BP  Min: 71/46  Max: 96/52   Arterial   Blood Pressure (!) 69/36 (23 0900) No data recorded   PulseOximetry 97 % (23 1240) SpO2  Min: 88 %  Max: 99 %     Vital Today Admitted   Weight 60.9 kg (134 lb 4.2 oz) (23 0600) Weight: 67 kg (147 lb 11.3 oz) (23 1428)   Height N/A Height: 5' 3\" (160 cm) (23 1732)   BMI N/A BMI (Calculated): 24.6 (23 1732)     Weight over the past 48 Hours:  Patient Vitals for the past 48 hrs:   Weight   23 1430 60.6 kg (133 lb 9.6 oz)   23 1850 60 kg (132 lb 4.4 oz)   23 0553 61 kg (134 lb 7.7 oz)   23 0600 60.9 kg (134 lb 4.2 oz)       Intake/Output:    Last Stool Occurrence: 1 (23)    I/O this shift:  In: -   Out:  [Other:]    I/O last 3 completed shifts:  In: 240 [P.O.:240]  Out: 3000 [Other:3000]      Intake/Output Summary (Last 24 hours) at 2023 1320  Last data filed at 2023 1240  Gross per 24 hour   Intake --   Output 5000 ml   Net -5000 ml     Medications Prior to Admission   Medication Sig Dispense Refill   • ipratropium-albuterol (DUONEB) 0.5-2.5 (3) MG/3ML  History of malignant neoplasm, CT chest x-ray shows improvement in a prior nodule.  Currently being observed as an outpatient.   nebulizer solution Take 3 mLs by nebulization every 6 hours as needed for Wheezing or Shortness of Breath. 360 mL 3   • nystatin (MYCOSTATIN) 339972 UNIT/ML suspension Swish and swallow 5 mLs 4 times daily. 200 mL 1   • warfarin (COUMADIN) 3 MG tablet Take 3-6 mg by mouth as directed. Take as directed by [facility] clinic. 7/19-hold. 7/20-3mg. 7/21 & 7/22-6mg. 7/23-3mg. 64 tablet 2   • albuterol 108 (90 Base) MCG/ACT inhaler Inhale 2 puffs into the lungs every 4 hours as needed for Shortness of Breath or Wheezing. 8.5 g 0   • predniSONE (DELTASONE) 5 MG tablet TAKE 1 TABLET BY MOUTH EVERY DAY 90 tablet 1   • gabapentin (NEURONTIN) 100 MG capsule TAKE 1 CAPSULE BY MOUTH AT 9 AM AND TAKE 2 CAPSULES AT 9  capsule 1   • amoxicillin-clavulanate (AUGMENTIN) 500-125 MG per tablet TAKE 1 TABLET BY MOUTH EVERY DAY 30 tablet 3   • Methoxy PEG-Epoetin Beta (MIRCERA IJ) Inject 75 mcg into the vein every 14 days. At dialysis. Tuesday.     • amoxicillin (AMOXIL) 500 MG capsule TAKE FOUR CAPSULES BY MOUTH ONE HOUR BEFORE DENTAL APPOINTMENT as needed 12 capsule 3   • traMADol (ULTRAM) 50 MG tablet Take 1 tablet by mouth every 8 hours as needed for Pain. 30 tablet 1   • mupirocin (BACTROBAN) 2 % ointment Apply 1 application. topically in the morning and 1 application. in the evening. 22 g 1   • midodrine (PROAMATINE) 5 MG tablet TAKE 1 TABLET BY MOUTH THREE TIMES A DAY (Patient taking differently: Take 5 mg by mouth 3 times daily. On Non-dialysis days) 270 tablet 1   • levothyroxine 100 MCG tablet TAKE 1 TABLET BY MOUTH EVERY DAY 90 tablet 1   • pantoprazole (PROTONIX) 40 MG tablet TAKE 1 TABLET BY MOUTH TWICE A  tablet 1   • montelukast (SINGULAIR) 10 MG tablet TAKE 1 TABLET BY MOUTH EVERY DAY IN THE MORNING 90 tablet 3   • TACROlimus (PROGRAF) 1 MG capsule Take 4 capsules by mouth every morning AND 3 capsules every evening. 640 capsule 3   • triamcinolone (ARISTOSPAN) 0.1 % lotion Apply to affected areas on scalp and  ears twice daily as needed 60 mL 3   • tixagevimab & cilgavimab (EVUSHELD) 150 & 150 MG/1.5ML Solution      • pravastatin (PRAVACHOL) 40 MG tablet TAKE 1 TABLET BY MOUTH EVERY DAY AT NIGHT 90 tablet 3   • senna (SENOKOT) 8.6 MG tablet Take 1 tablet by mouth 2 times daily as needed for Constipation. 30 tablet 1   • sevelamer carbonate (RENVELA) 800 MG tablet Take 2,400 mg by mouth 3 times daily (with meals).      • CALCITRIOL PO Take 1 mcg by mouth 3 days a week. At dialysis. Tuesday, Thursday, and Saturday.     • EPINEPHrine (EPIPEN JR) 0.15 MG/0.3ML auto-injector Inject 0.3 mLs into the muscle 1 time as needed for Anaphylaxis. 1 each 5   • Multiple Vitamins-Minerals (RENAPLEX-D) Tab Take 1 tablet by mouth daily.   3   • midodrine (PROAMATINE) 5 MG tablet Take 15 mg by mouth as directed. Indications: Only on Dialysis days Take three tablets (=15mg) before dialysis on Tuesday, Thursday, and Saturday. Take another 15mg during dialysis if needed.     • loperamide (IMODIUM) 2 MG capsule TAKE 1 CAPSULE THREE TIMES A DAY AS NEEDED FOR DIARRHEA 30 capsule 0   • TYLENOL EXTRA STRENGTH 500 MG PO TABS Take 1,000 mg by mouth every 6 hours as needed for Pain.  0 0       ALLERGIES:   Allergen Reactions   • Aspirin ANAPHYLAXIS     Per pt   • Ondansetron Other (See Comments)     Muscle spasms , lost feeling in legs.   • Triazolam Other (See Comments)     agitation   • Vancomycin ANAPHYLAXIS     Kidney and heart failure   • Benzodiazepines Other (See Comments)     extrapyramidal reactions;no feeling in legs,feels doom. Hyper.  Tolerates Versed without reaction.   • Cephalosporins PRURITUS   • Chlorpromazine Other (See Comments)     agitation   • Haldol Other (See Comments)     agitation   • Hydroxyzine Hcl      spasticity   • Ketorolac Tromethamine Other (See Comments)     agitation   • Metoclopramide Hcl      Hallucination.    • Prochlorperazine Nausea & Vomiting     Muscle spasms, lost feeling in legs   • Tromethamine    • Uloric  [Febuxostat]      Kidney failure       Medications/Infusions: Scheduled:   Current Facility-Administered Medications   Medication Dose Route Frequency Provider Last Rate Last Admin   • warfarin (COUMADIN) tablet 7.5 mg  7.5 mg Oral Once Siegrist, Jeremy F, MD       • WARFARIN - PHYSICIAN MONITORED 1 each  1 each Does not apply Q Evening Siegrist, Jeremy F, MD   1 each at 08/24/23 1724   • sevelamer carbonate (RENVELA) tablet 800 mg  800 mg Oral TID WC Lisa Galo NP   800 mg at 08/25/23 1251   • valGANciclovir (VALCYTE) tablet 450 mg  450 mg Oral Once per day on Mon Wed Fri Shanna Valles PA-C   450 mg at 08/23/23 1903   • midodrine (PROAMATINE) tablet 15 mg  15 mg Oral TID AC Ricardo Cristina MD   15 mg at 08/25/23 0936   • TACROlimus (PROGRAF) capsule 4.5 mg  4.5 mg Oral Nightly Tx Gonzales Jones MD PHD   4.5 mg at 08/24/23 2023    And   • TACROlimus (PROGRAF) capsule 5 mg  5 mg Oral Daily Tx Gonzales Jones MD PHD   5 mg at 08/25/23 1252   • levothyroxine (SYNTHROID, LEVOTHROID) tablet 100 mcg  100 mcg Oral Daily Mona Dong MD   100 mcg at 08/25/23 0545   • montelukast (SINGULAIR) tablet 10 mg  10 mg Oral QAM Mona Dong MD   10 mg at 08/25/23 1251   • pravastatin (PRAVACHOL) tablet 40 mg  40 mg Oral Nightly Tx Mona Dong MD   40 mg at 08/24/23 2022   • predniSONE (DELTASONE) tablet 5 mg  5 mg Oral Daily with breakfast Mona Dong MD   5 mg at 08/25/23 1252   • polyethylene glycol (MIRALAX) packet 17 g  17 g Oral Daily Mona Dong MD       • calcitRIOL (ROCALTROL) capsule 0.25 mcg  0.25 mcg Oral Once per day on Tue Thu Sat Emery Guerrero MD   0.25 mcg at 08/24/23 0929   • diphenhydrAMINE (BENADRYL) capsule 25 mg  25 mg Oral Nightly Mona Dong MD   25 mg at 08/24/23 2022   • gabapentin (NEURONTIN) capsule 100 mg  100 mg Oral Daily Emery Guerrero MD   100 mg at 08/25/23 1252   • gabapentin (NEURONTIN) capsule 200 mg  200 mg Oral Nightly Cesar  Emery GARCIA MD   200 mg at 08/24/23 2022   • pantoprazole (PROTONIX) EC tablet 40 mg  40 mg Oral 2 times per day Emery Guerrero MD   40 mg at 08/25/23 1252   • senna (SENOKOT) 8.6 mg  1 tablet Oral Nightly Mona Dong MD   8.6 mg at 08/24/23 2022   • ipratropium-albuterol (DUONEB) 0.5-2.5 (3) MG/3ML nebulizer solution 3 mL  3 mL Nebulization TID Resp Amish Denny MD   3 mL at 08/25/23 0709   • sodium chloride (PF) 0.9 % injection 10 mL  10 mL Injection 2 times per day Andres Nixon MD   10 mL at 08/25/23 1253   • fluticasone-vilanterol (BREO ELLIPTA) 100-25 MCG/ACT inhaler 1 puff  1 puff Inhalation Daily Resp Maria C Sanchez MD   1 puff at 08/25/23 1253   • epoetin austin-epbx (RETACRIT) 45527 UNIT/ML injection 10,000 Units  10,000 Units Subcutaneous Once per day on Tue Thu Sat Amado Rivas MD   10,000 Units at 08/24/23 1723       Continuous Infusions:  Current Facility-Administered Medications   Medication Dose Route Frequency Provider Last Rate Last Admin   • heparin (porcine) 25,000 units/250 mL in dextrose 5 % infusion  1-30 Units/kg/hr (Dosing Weight) Intravenous Continuous Siegrist, Jeremy F, MD 15.8 mL/hr at 08/25/23 0550 25 Units/kg/hr at 08/25/23 0550   • sodium chloride 0.9% infusion   Intravenous Continuous PRN Georgina Billy NP   Completed at 08/01/23 2000   • sodium chloride 0.9% infusion   Intravenous Continuous PRN Georgina Billy NP   Stopped at 08/20/23 0451       Physical Exam  General appearance: Alert, NAD.   Head: Normocephalic, wo abnormality, Pallor+. Anicteric.   Neck:  Left lJ tunneled HD catheter in situ. No redness or drainage at exit site. Good blood flow for HD  Lungs: No IWOB at rest. On 3 L/NC Lungs CTA anteriorly and laterally  Heart: Regular rate and rhythm, S1, S2 normal  Abdomen: Soft, NT, +BS  Extremities: No edema  Neurologic/PSYCH: AAO x 3.  normal affect, calm/cooperative    Laboratory Results:    Recent Labs   Lab 08/25/23  1730  08/24/23  0432 08/23/23  0628 08/22/23  0450   SODIUM 132*  --  133* 137   POTASSIUM 4.5  --  4.0 3.6   CHLORIDE 97  --  99 102   CO2 27  --  26 33*   ANIONGAP 13  --  12 6*   BUN 25*  --  23* 9   CREATININE 2.59*  --  2.77* 1.43*   CALCIUM 9.7  --  9.6 9.7   GLUCOSE 100*  --  100* 104*   WBC 3.4* 3.4* 3.5* 3.4*   HCT 26.6* 26.9* 26.0* 25.9*     Recent Labs   Lab 08/25/23  0433 08/24/23  0859 08/24/23  0432 08/23/23  2303 08/23/23  1414 08/23/23  0628 08/22/23  0450 08/21/23  0719 08/21/23  0503 08/20/23  0317 08/19/23  1514   HGB 7.9*  --  7.9*  --   --  7.7*   < >  --  8.2*   < >  --    PLT 90*  --  85*  --   --  76*   < >  --  68*   < >  --    INR 1.6  --  1.4  --   --  1.2   < >  --  1.1   < >  --    PTT 65*  --  55* 54*   < > 72*   < >  --  59*   < >  --    MG  --   --   --   --   --   --   --   --  2.1  --   --    PHOS 5.3*  --  3.3  --   --  4.9*   < >  --  3.9   < >  --    ALKPT  --   --   --   --   --   --   --   --  235*  --  222*   BILIRUBIN  --   --   --   --   --   --   --   --  0.3  --  0.3   AST  --   --   --   --   --   --   --   --  24  --  30   GPT  --   --   --   --   --   --   --   --  29  --  30   ALBUMIN  --   --   --   --   --   --   --   --  2.7*  --  2.8*   TACRO  --  6.9  --   --   --   --   --  7.4  --   --   --     < > = values in this interval not displayed.       Urine Panel  Lab Results   Component Value Date    UKET NEGATIVE 05/08/2018    USPG 1.014 05/08/2018    UPROT 30 (A) 05/08/2018    UWBC LARGE (A) 05/08/2018    URBC MODERATE (A) 05/08/2018    UBILI NEGATIVE 05/08/2018    UPH 5.5 05/08/2018    UROB 0.2 05/08/2018    UBACTR NONE SEEN 05/08/2018       Diagnoses; assessment and plan:   1. End-stage renal disease, on HD Q TThS at [facility] [place].  L internal jugular permcath for access.  Previously on CVVH. Then on nightly SLED, 10 hrs at night and SCUF 8 hrs day time. No dialysis was done 8/20. IHD to started 8/21/23  Planned HD schedule for now is Q MWFS  In progress, UF x 1.5  hours, 1 L fluid removed followed by HD x 3 hrs. Attempting an additional 1-2 fluid removal.  No heparin, 2.0 potassium, 2.5 calcium dialysate.  SPA and extra midodrine for BP support.   2. Anemia of chronic disease. On TARIK.  Transfused 1 unit PRBC 8/9/23.  3. Hypotension, acute on chronic. Pressors have been weaned off. BP remains low ,asymptomatic.Will change midodrine to 30 mg during dialysis and 15 mg pm on dialysis days.  4. Secondary hyperparathyroidism. Continue calcitriol  5. SARS COV 2 infection related pneumonia, respiratory failure. Improved.   6. History of orthotopic heart transplant 2005. Immunosuppression per Heart Failure Team. Her MMF has been placed on hold.  7. Liver cirrhosis  8. Severe tricuspid valve regurgitation. Was scheduled for replacement 7/19/23. Not a candidate for valve surgery any longer.  9. Acute hypoxic respiratory failure with worsening bilateral pulmonary infiltrates. Concern for worsening pneumonia/opportunistic infection/fluid or sequele of covid pneumoinia.  BAL cultures negative to date. Antibiotics completed. On Cytovene due to increased CMV titer.   O2 requirements have decreased significantly.  10. Hyperphosphatemia. Increase Renvela to 1600 mg TID  11. Hypervolemia : ECHO with EF 65% and normal RV fx, svr TR. Hypervolemia has improved with various RRT modalities    Discussed about OP HD. BP may be the limiting factor for fluid removal as albumin administration is not available in OP HD center.  Discussed about home dialysis which may be beneficial in her situation. Pt thinks this would be an additional burden for her  in providing care for her.  Also discussed about [facility] facilities where on site dialysis is available.    Lisa Galo NP

## 2024-05-01 NOTE — SPEECH THERAPY NOTE
Speech Language/Pathology  Attempted to see patient for dysphagia therapy. Pt currently on Bipap, not appropriate for PO intake. SLP will continue to follow    Ludivina Shetty MS CCC-SLP  5/1/2024

## 2024-05-01 NOTE — PROGRESS NOTES
[facility]  Progress Note  Name: Liu Angel I  MRN: [id number]  Unit/Bed#: -01 I Date of Admission: 4/27/2024   Date of Service: 5/1/2024 I Hospital Day: 4    Assessment/Plan   * Acute on chronic respiratory failure with hypoxia   Assessment & Plan  Chronically he is on 2 L at rest 2-3 on exertion.  Came in on a nonrebreather he was unable to utilize BiPAP recently secondary to having skin basal cell surgery removal.  Currently on 15 liters if midflow 97 % as discussed with the nursing when he was on 10 L of mid low he was 85% she went up to 15 L and he was 87 but currently he came up.  He was on BiPAP for couple of hours currently his ABG has improved and compensated and looks like CO2 is at baseline  He is on steroids and suspect he could have aspiration pneumonitis from mucus as speech ruled out any aspiration with food procalcitonin's were negative Rocephin was discontinued and he has completed Zithromax for the COPD exacerbation  He is on Perforomist and Pulmicort  Questionable of aspect of fluid overload as does have cephalization and pulmonary htn , elevated probnp cr back down - will trial diamox 500 mg iv x1   Overall prognosis is guarded if does not improve discussed yesterday with pulmonary as well we will have to rediscuss.  Use BiPAP at night and as needed  Also questionable mucous plugging will ask for vest usage  Cta negative for pe    SUSI (acute kidney injury) (HCC)  Assessment & Plan  On chronic his usual baseline is 1-1.2 he was admitted with started on IV fluids initially improved his torsemide has been restarted yesterday and currently his creatinine again has worsened he also had IV contrast on April 27  Us of kidney without abnormality   Holding torsemide cr down - but will monitor with one dose of diamox    Pneumonia of right lower lobe due to infectious organism  Assessment & Plan  CT chest showing possible consolidation of the right middle and lower  lobes, infectious versus inflammatory    Given location, some concern of aspiration though he reports no signs/symptoms of recent aspiration - monitor clinically for signs  Respiratory protocol  Airway clearance  Speech evaluated - ? Mucus   All abx dced as procal neg x3- suspect aspiration pneumonitis         CDI: Possible aspiration pneumonia in the setting of acute respiratory failure a/e/b consolidation in the right middle lobe and right lower lobe, WBC 12.36,  and RR 38 treated with IV Rocephin, IV Zithro, sputum cultures, blood cultures, aspiration precautions, lab monitoring and VS monitoring     Sepsis without acute organ dysfunction (HCC)  Assessment & Plan  Sirs -Tachycardic, tachypneic, leukocytosis, 2/2 to aspiration pneumonitis and procal negative do not suspect pna antibiotics dced       Malignant spindle cell neoplasm (HCC)  Assessment & Plan  History of malignant neoplasm, CT chest x-ray shows improvement in a prior nodule.  Currently being observed as an outpatient.    BPH (benign prostatic hyperplasia)  Assessment & Plan  Continue home Proscar    Acute metabolic encephalopathy  Assessment & Plan  Suspect in light of hypercapnia which after BiPAP has improved looks at his baseline is 69 to 70s pH is normal now and he is lethargic with his wake up and able to tell me his name and where he is will monitor closely discussed with nursing that to utilize BiPAP at night and as needed    Chronic obstructive pulmonary disease with acute exacerbation (HCC)  Assessment & Plan  Patient presents with worsening shortness of breath, increased chest congestion  Seems to have a questionable pulmonary fibrosis on the CT.  Still wheezing continue Solu-Medrol 40 mg IV every 8 hours Pulmicort Perforomist completed 3 days of Zithromax suspect more of an aspiration pneumonitis with mucus as procalcitonin x 2 were negative.  I discussed with pulmonary yesterday awaiting their reevaluation as the patient is on 15 L  admit for still wheezing.  Will trial diuretics    Chronic combined systolic and diastolic heart failure (HCC)  Assessment & Plan  Wt Readings from Last 3 Encounters:   04/27/24 66.6 kg (146 lb 13.2 oz)   12/26/23 69.1 kg (152 lb 6.4 oz)   11/03/23 72.4 kg (159 lb 9.8 oz)     Appears euvolemic  Takes torsemide 100 mg daily and metolazone once a week  Last TTE - ef 45% , rv decreased  He does have cephalization slight elevation of proBNP does have some wheezing questionable aspect of some fluid overload will trial Diamox 500 mg IV x 1 and see response        Atrial fibrillation   Assessment & Plan  Rate controlled Continue home diltiazem and metoprolol  Continue home Eliquis                 VTE Pharmacologic Prophylaxis:   Moderate Risk (Score 3-4) - Pharmacological DVT Prophylaxis Ordered: apixaban (Eliquis).    Mobility:   Basic Mobility Inpatient Raw Score: 17  JH-HLM Goal: 5: Stand one or more mins  JH-HLM Achieved: 2: Bed activities/Dependent transfer  JH-HLM Goal NOT achieved. Continue with multidisciplinary rounding and encourage appropriate mobility to improve upon JH-HLM goals.    Patient Centered Rounds: I performed bedside rounds with nursing staff today.   Discussions with Specialists or Other Care Team Provider: non    Education and Discussions with Family / Patient: Attempted to update  (wife) via phone. Left voicemail.     Total Time Spent on Date of Encounter in care of patient: >45 mins. This time was spent on one or more of the following: performing physical exam; counseling and coordination of care; obtaining or reviewing history; documenting in the medical record; reviewing/ordering tests, medications or procedures; communicating with other healthcare professionals and discussing with patient's family/caregivers.    Current Length of Stay: 4 day(s)  Current Patient Status: Inpatient   Certification Statement: The patient will continue to require additional inpatient hospital stay due  to acute hypoxic respiratory failure COPD exacerbation  Discharge Plan: Anticipate discharge in >72 hrs to discharge location to be determined pending rehab evaluations.    Code Status: Level 3 - DNAR and DNI    Subjective:   Patient seen and examined he is denying any shortness of breath little lethargic    Objective:     Vitals:   Temp (24hrs), Av.5 °F (36.4 °C), Min:96.4 °F (35.8 °C), Max:98 °F (36.7 °C)    Temp:  [96.4 °F (35.8 °C)-98 °F (36.7 °C)] 96.4 °F (35.8 °C)  HR:  [] 98  Resp:  [18-35] 27  BP: (108-152)/(63-89) 152/73  SpO2:  [84 %-99 %] 97 %  Body mass index is 21.68 kg/m².     Input and Output Summary (last 24 hours):     Intake/Output Summary (Last 24 hours) at 2024 1308  Last data filed at 2024 1001  Gross per 24 hour   Intake 240 ml   Output 550 ml   Net -310 ml       Physical Exam:   Physical Exam  Vitals and nursing note reviewed.   Constitutional:       General: He is not in acute distress.     Appearance: He is well-developed. He is ill-appearing.   HENT:      Head: Normocephalic and atraumatic.   Eyes:      Conjunctiva/sclera: Conjunctivae normal.   Cardiovascular:      Rate and Rhythm: Normal rate and regular rhythm.      Heart sounds: No murmur heard.  Pulmonary:      Effort: Pulmonary effort is normal. No respiratory distress.      Breath sounds: Wheezing and rales present.   Abdominal:      Palpations: Abdomen is soft.      Tenderness: There is no abdominal tenderness.   Musculoskeletal:         General: No swelling.      Cervical back: Neck supple.   Skin:     General: Skin is warm and dry.      Capillary Refill: Capillary refill takes less than 2 seconds.   Neurological:      Mental Status: He is alert.      Comments: Somnolent but does wake up and answer his name and where he is and goes back to sleep   Psychiatric:         Mood and Affect: Mood normal.          Additional Data:     Labs:  Results from last 7 days   Lab Units 24  0535 24  0611 24  0534  04/27/24  1213   WBC Thousand/uL 11.11*   < > 9.58 12.36*   HEMOGLOBIN g/dL 12.1   < > 11.9* 13.8   HEMATOCRIT % 39.8   < > 38.8 44.9   PLATELETS Thousands/uL 185   < > 167 200   BANDS PCT %  --   --  3  --    SEGS PCT %  --   --   --  82*   LYMPHO PCT %  --   --  4* 6*   MONO PCT %  --   --  5 11   EOS PCT %  --   --  0 0    < > = values in this interval not displayed.     Results from last 7 days   Lab Units 05/01/24  0435 04/30/24  0617 04/29/24  0611 04/27/24  1521 04/27/24  1213   SODIUM mmol/L 145   < > 142   < > 138   POTASSIUM mmol/L 4.0   < > 4.5   < > 5.9*   CHLORIDE mmol/L 93*   < > 96   < > 89*   CO2 mmol/L >45*   < > 45*   < > 44*   BUN mg/dL 63*   < > 53*   < > 42*   CREATININE mg/dL 1.23   < > 1.25   < > 1.41*   ANION GAP mmol/L  --   --  1*  --  5   CALCIUM mg/dL 9.5   < > 9.1   < > 10.1   ALBUMIN g/dL  --   --   --   --  4.3   TOTAL BILIRUBIN mg/dL  --   --   --   --  0.58   ALK PHOS U/L  --   --   --   --  70   ALT U/L  --   --   --   --  7   AST U/L  --   --   --   --  20   GLUCOSE RANDOM mg/dL 152*   < > 152*   < > 140    < > = values in this interval not displayed.                 Results from last 7 days   Lab Units 04/29/24  0611 04/28/24  0534 04/27/24  1521 04/27/24  1433 04/27/24  1248   LACTIC ACID mmol/L  --   --   --  2.1* 2.9*   PROCALCITONIN ng/ml 0.08 0.15 0.11  --   --        Lines/Drains:  Invasive Devices       Peripheral Intravenous Line  Duration             Peripheral IV 04/28/24 Distal;Left;Upper;Ventral (anterior) Antecubital 3 days    Peripheral IV 04/29/24 Right Antecubital 2 days                          Imaging: Reviewed radiology reports from this admission including: chest xray    Recent Cultures (last 7 days):   Results from last 7 days   Lab Units 04/27/24  1225 04/27/24  1213   BLOOD CULTURE  No Growth at 48 hrs. No Growth at 48 hrs.       Last 24 Hours Medication List:   Current Facility-Administered Medications   Medication Dose Route Frequency Provider Last Rate     acetaminophen  650 mg Oral Q6H PRN Faisal Alvino Counts, DO      acetaZOLAMIDE  500 mg Intravenous Once Fabi Michel MD      ammonium lactate   Topical BID PRN Faisal Alvino Counts, DO      apixaban  5 mg Oral BID Faisal Alvino Counts, DO      bisacodyl  5 mg Oral Daily Faisal Alvino Counts, DO      budesonide  0.5 mg Nebulization Q12H Faisal Alvino Counts, DO      cyanocobalamin  100 mcg Oral Daily Faisal Alvino Counts, DO      diltiazem  300 mg Oral Daily Faisal Alvino Counts, DO      docusate sodium  100 mg Oral BID Faisal Alvino Counts, DO      finasteride  5 mg Oral Daily Faisal Alvino Counts, DO      formoterol  20 mcg Nebulization Q12H Faisal Alvino Counts, DO      guaiFENesin  600 mg Oral Q12H KM Faisal Alvino Counts, DO      haloperidol lactate  1 mg Intramuscular Once Sachin Bullock MD      hydrOXYzine HCL  25 mg Oral HS Fabi Michel MD      levalbuterol  1.25 mg Nebulization TID Faisal Alvino Counts, DO      loratadine  10 mg Oral Daily Faisal Alvino Counts, DO      magnesium Oxide  400 mg Oral Daily Faisal Alvino Counts, DO      methylPREDNISolone sodium succinate  40 mg Intravenous Q8H Faisal Alvino Counts, DO      metoprolol succinate  25 mg Oral Daily Faisal Alvino Counts, DO      montelukast  10 mg Oral HS Faisal Alvino Counts, DO      sodium chloride  4 mL Nebulization TID Isaiah Cherry PA-C          Today, Patient Was Seen By: Fabi Michel MD    **Please Note: This note may have been constructed using a voice recognition system.**

## 2024-05-01 NOTE — ASSESSMENT & PLAN NOTE
Patient presents with worsening shortness of breath, increased chest congestion  Seems to have a questionable pulmonary fibrosis on the CT.  Still wheezing continue Solu-Medrol 40 mg IV every 8 hours Pulmicort Perforomist completed 3 days of Zithromax suspect more of an aspiration pneumonitis with mucus as procalcitonin x 2 were negative.  I discussed with pulmonary yesterday awaiting their reevaluation as the patient is on 15 L admit for still wheezing.  Will trial diuretics

## 2024-05-02 NOTE — ACP (ADVANCE CARE PLANNING)
Advanced Care Planning Progress Note    Serious Illness Conversation    1. What is your understanding now of where you are with your illness?  Prognostic Understanding: appropriate understanding of prognosis  I had a discussion with the wife and the wife understands his prognosis     2. How much information about what is likely to be ahead with your illness would you like to have?     3. What did you (clinician) communicate to the patient?  Prognostic Communication: Uncertain - It can be difficult to predict what will happen with your illness. I hope you will continue to live well for a long time but I’m worried that you could get sick quickly, and I think it is important to prepare for that possibility.     4. If your health situation worsens, what are your most important goals?  Goals: be physically comfortable     5. What are the biggest fears and worries about the future and your health?  Fears/Worries: pain     6. What abilities are so critical to your life that you cannot imagine living without them?  Unacceptable Function: not being myself     7. What gives you strength as you think about the future with your illness?  N/a     8. If you become sicker, how much are you willing to go through for the possibility of gaining more time?  Be in the hospital: No Have a feeding tube: No   Be in the ICU: No Live in a nursing home: No   Be on a ventilator: No Be uncomfortable: No   Be on dialysis: No Undergo aggressive test and/or procedures: No   9. How much does your proxy and family know about your priorities and wishes?  Discussion Discussion: extensive discussion with family about goals and wishes          How does this plan sound to you? I will do everything I can to help you through this.  Patient verbalized understanding of the plan     I have spent >45minutes speaking with my patient on advanced care planning today or during this visit     Advanced directives  Five Wishes: Patient does not have Five Wishes-  would not like information         Fabi Michel MD

## 2024-05-02 NOTE — ASSESSMENT & PLAN NOTE
Chronically he is on 2 L at rest 2-3 on exertion.  Came in on a nonrebreather he was unable to utilize BiPAP recently secondary to having skin basal cell surgery removal.  He is on steroids and suspect he could have aspiration pneumonitis from mucus as speech ruled out any aspiration with food procalcitonin's were negative Rocephin was discontinued and he has completed Zithromax for the COPD exacerbation  He is on Perforomist and Pulmicort  Questionable of aspect of fluid overload as does have cephalization and pulmonary htn , elevated probnp cr back down -received Diamox 500 IV x 1 5/1 still on 15 L of mid pleural BiPAP will give another dose 6 repeated a CT mild effusions  Also questionable mucous plugging will ask for vest usage  Cta negative for pe  Patient continues to decline he is persistently on 15 L otherwise if goes down has central cyanosis and does not keep his saturations he also is on BiPAP utilizing for prolonged time he is still sounds tight and wheezy overall he does not look overly overloaded I did repeat a CT chest there is chronic bronchiolitis no new pneumonia changes he did send gurgly was unable to swallow pills so there is a questionable aspect of mucus aspiration  Hopefully if he can be off BiPAP he could do a video swallow with speech otherwise will keep n.p.o. I also had an extensive discussion with the wife in terms of goals of care as looks like there is evidence of fibrosis on the CT as well he has advanced COPD and looks like he is not improving despite medical therapy currently we will continue steroids and all COPD medications nebulizers will have vest therapy done as well will give another dose dose of Diamox and see if it helps otherwise I did discuss if he were to decompensate to day she is in agreement to transition to comfort care if he stays steady without any improvement in another day or so then at that time decision would be to transition to comfort care he is a DNR/DNI I  Family Medicine Prenatal Visit  Diego Gomes  [id number]  2021  2:58 PM  Dr Melly Hilliard, DO    S: 24 y o   24w4d here for PN visit  She denies contractions  She denies leakage of fluid and vaginal bleeding  She reports good fetal movement  Her pregnancy is uncomplicated  She reports feeling anxious all the time  Feels heart racing for a couple of minutes at a time  Improved with deep breathing  Started 1-2 weeks ago after family issues with FOB's mother who she does not have a good relationship with  Was previously on Prozac for anxiety/depression but discontinued when she learned she was pregnant  She is aware she can resume treatment with SSRI in pregnancy if needed  O:  Vitals:    21 1436   BP: 114/68     Physical Exam  Fundal height: 25  FHT: 150    A/P:  24w4d GESTATION  - Labor precautions reviewed  - Fetal kick counts reviewed  - RTC in 4 weeks  - GC collected this visit  - Next visit will get 28w labs and Tdap  - Aware she can take SSRI in pregnancy if needed for anxiety/depression, discussed most studied medication if Zoloft          Future Appointments   Date Time Provider Jose Antonio Wong   2021  2:30 PM Kayleigh Hunter MD [facility]   2021 10:00 AM Roosevelt Gomez MD [facility]         16-18 weeks: sequential screening, level II ultrasound order, flu vaccine  26-28 weeks: 28 week labs (CBC, RPR, 1hr GTT), Rh status/rhogam, FKC, flu vaccine  28-32 weeks: tdap, flu vaccine  32 weeks: tdap, flu vaccine, check in card, rediscuss contraception, birth plan  36 weeks: collect GBS/PCN allergy?, flu vaccine    Melly Hilliard DO  2021  2:58 PM confirmed twice

## 2024-05-02 NOTE — ASSESSMENT & PLAN NOTE
CT chest showing possible consolidation of the right middle and lower lobes, infectious versus inflammatory    Given location, some concern of aspiration though he reports no signs/symptoms of recent aspiration - monitor clinically for signs  Respiratory protocol  Airway clearance  Speech evaluated - ? Mucus   All abx dced as procal neg x3- suspect aspiration pneumonitis evaluating the CT chest repeat today looks like on the right side what was there is clearing there is no new pneumonia seen and there is chronic bronchiolitis        (CDI: Possible aspiration pneumonia in the setting of acute respiratory failure a/e/b consolidation in the right middle lobe and right lower lobe, WBC 12.36,  and RR 38 treated with IV Rocephin, IV Zithro, sputum cultures, blood cultures, aspiration precautions, lab monitoring and VS monitoring )

## 2024-05-02 NOTE — ASSESSMENT & PLAN NOTE
Wt Readings from Last 3 Encounters:   04/27/24 66.6 kg (146 lb 13.2 oz)   12/26/23 69.1 kg (152 lb 6.4 oz)   11/03/23 72.4 kg (159 lb 9.8 oz)     Appears euvolemic  Takes torsemide 100 mg daily and metolazone once a week  Last TTE - ef 45% , rv decreased  He does have cephalization slight elevation of proBNP does have some wheezing questionable aspect of some fluid overload will trial Diamox 500 mg IV x 2 second doses there is some mild effusion seen on the CT although overall he looks dry no improvement when given a dose yesterday and see response

## 2024-05-02 NOTE — ASSESSMENT & PLAN NOTE
Rate controlled Continue home diltiazem and metoprolol if patient is unable to take p.o. as he is going to be persistently on BiPAP then we will have to switch all medications to IV  Continue home Eliquis

## 2024-05-02 NOTE — PROGRESS NOTES
[facility]  Progress Note  Name: Liu Angel I  MRN: [id number]  Unit/Bed#: -01 I Date of Admission: 4/27/2024   Date of Service: 5/2/2024 I Hospital Day: 5    Assessment/Plan   * Acute on chronic respiratory failure with hypoxia   Assessment & Plan  Chronically he is on 2 L at rest 2-3 on exertion.  Came in on a nonrebreather he was unable to utilize BiPAP recently secondary to having skin basal cell surgery removal.  He is on steroids and suspect he could have aspiration pneumonitis from mucus as speech ruled out any aspiration with food procalcitonin's were negative Rocephin was discontinued and he has completed Zithromax for the COPD exacerbation  He is on Perforomist and Pulmicort  Questionable of aspect of fluid overload as does have cephalization and pulmonary htn , elevated probnp cr back down -received Diamox 500 IV x 1 5/1 still on 15 L of mid pleural BiPAP will give another dose 6 repeated a CT mild effusions  Also questionable mucous plugging will ask for vest usage  Cta negative for pe  Patient continues to decline he is persistently on 15 L otherwise if goes down has central cyanosis and does not keep his saturations he also is on BiPAP utilizing for prolonged time he is still sounds tight and wheezy overall he does not look overly overloaded I did repeat a CT chest there is chronic bronchiolitis no new pneumonia changes he did send gurgly was unable to swallow pills so there is a questionable aspect of mucus aspiration  Hopefully if he can be off BiPAP he could do a video swallow with speech otherwise will keep n.p.o. I also had an extensive discussion with the wife in terms of goals of care as looks like there is evidence of fibrosis on the CT as well he has advanced COPD and looks like he is not improving despite medical therapy currently we will continue steroids and all COPD medications nebulizers will have vest therapy done as well will give another dose dose  of Diamox and see if it helps otherwise I did discuss if he were to decompensate to day she is in agreement to transition to comfort care if he stays steady without any improvement in another day or so then at that time decision would be to transition to comfort care he is a DNR/DNI I confirmed twice    SUSI (acute kidney injury) (HCC)  Assessment & Plan  On chronic his usual baseline is 1-1.2   resolved    Pneumonia of right lower lobe due to infectious organism  Assessment & Plan  CT chest showing possible consolidation of the right middle and lower lobes, infectious versus inflammatory    Given location, some concern of aspiration though he reports no signs/symptoms of recent aspiration - monitor clinically for signs  Respiratory protocol  Airway clearance  Speech evaluated - ? Mucus   All abx dced as procal neg x3- suspect aspiration pneumonitis evaluating the CT chest repeat today looks like on the right side what was there is clearing there is no new pneumonia seen and there is chronic bronchiolitis        (CDI: Possible aspiration pneumonia in the setting of acute respiratory failure a/e/b consolidation in the right middle lobe and right lower lobe, WBC 12.36,  and RR 38 treated with IV Rocephin, IV Zithro, sputum cultures, blood cultures, aspiration precautions, lab monitoring and VS monitoring )    Sepsis without acute organ dysfunction (HCC)  Assessment & Plan  Sirs -Tachycardic, tachypneic, leukocytosis, 2/2 to aspiration pneumonitis and procal negative do not suspect pna antibiotics dced       Malignant spindle cell neoplasm (HCC)  Assessment & Plan  History of malignant neoplasm, CT chest x-ray shows improvement in a prior nodule.  Currently being observed as an outpatient.    BPH (benign prostatic hyperplasia)  Assessment & Plan  Continue home Proscar    Acute metabolic encephalopathy  Assessment & Plan  5/1-Suspect in light of hypercapnia which after BiPAP has improved looks at his baseline is 69  to 70s pH is normal now and he is lethargic with his wake up and able to tell me his name and where he is will monitor closely discussed with nursing that to utilize BiPAP at night and as needed  5/2-today he is more awake and alert    Chronic obstructive pulmonary disease with acute exacerbation (HCC)  Assessment & Plan  Patient presents with worsening shortness of breath, increased chest congestion  Seems to have a questionable pulmonary fibrosis on the CT.  Still wheezing continue Solu-Medrol 40 mg IV every 8 hours Pulmicort Perforomist completed 3 days of Zithromax suspect more of an aspiration pneumonitis with mucus as procalcitonin x 2 were negative.  I discussed with pulmonary yesterday awaiting their reevaluation as the patient is on 15 L admit for still wheezing.  Will trial diuretics yet again awaiting pulmonology reevaluation    Chronic combined systolic and diastolic heart failure (HCC)  Assessment & Plan  Wt Readings from Last 3 Encounters:   04/27/24 66.6 kg (146 lb 13.2 oz)   12/26/23 69.1 kg (152 lb 6.4 oz)   11/03/23 72.4 kg (159 lb 9.8 oz)     Appears euvolemic  Takes torsemide 100 mg daily and metolazone once a week  Last TTE - ef 45% , rv decreased  He does have cephalization slight elevation of proBNP does have some wheezing questionable aspect of some fluid overload will trial Diamox 500 mg IV x 2 second doses there is some mild effusion seen on the CT although overall he looks dry no improvement when given a dose yesterday and see response        Atrial fibrillation   Assessment & Plan  Rate controlled Continue home diltiazem and metoprolol if patient is unable to take p.o. as he is going to be persistently on BiPAP then we will have to switch all medications to IV  Continue home Eliquis                 VTE Pharmacologic Prophylaxis:   Moderate Risk (Score 3-4) - Pharmacological DVT Prophylaxis Ordered: apixaban (Eliquis).    Mobility:   Basic Mobility Inpatient Raw Score: 17  -[facility] Goal: 5:  Stand one or more mins  JH-HLM Achieved: 2: Bed activities/Dependent transfer  JH-HLM Goal NOT achieved. Continue with multidisciplinary rounding and encourage appropriate mobility to improve upon JH-HLM goals.    Patient Centered Rounds: I performed bedside rounds with nursing staff today.   Discussions with Specialists or Other Care Team Provider: none    Education and Discussions with Family / Patient: Updated  (wife) at bedside.    Total Time Spent on Date of Encounter in care of patient: >45 mins. This time was spent on one or more of the following: performing physical exam; counseling and coordination of care; obtaining or reviewing history; documenting in the medical record; reviewing/ordering tests, medications or procedures; communicating with other healthcare professionals and discussing with patient's family/caregivers.    Current Length of Stay: 5 day(s)  Current Patient Status: Inpatient   Certification Statement: The patient will continue to require additional inpatient hospital stay due to worsening hypoxia  Discharge Plan: Anticipate discharge in >72 hrs to discharge location to be determined pending rehab evaluations.    Code Status: Level 3 - DNAR and DNI    Subjective:   Seen and examined states breathing ok, more alert but having difficulty swallowingf and keeping his oxygen sats    Objective:     Vitals:   Temp (24hrs), Av.8 °F (36.6 °C), Min:97.5 °F (36.4 °C), Max:98 °F (36.7 °C)    Temp:  [97.5 °F (36.4 °C)-98 °F (36.7 °C)] 98 °F (36.7 °C)  HR:  [] 83  Resp:  [16-43] 25  BP: (125-140)/(70-89) 132/75  SpO2:  [77 %-100 %] 100 %  Body mass index is 21.68 kg/m².     Input and Output Summary (last 24 hours):     Intake/Output Summary (Last 24 hours) at 2024 1114  Last data filed at 2024 0501  Gross per 24 hour   Intake 240 ml   Output 1525 ml   Net -1285 ml       Physical Exam:   Physical Exam  Vitals and nursing note reviewed.   Constitutional:       General: He is  not in acute distress.     Appearance: He is well-developed.   HENT:      Head: Normocephalic and atraumatic.   Eyes:      Conjunctiva/sclera: Conjunctivae normal.   Cardiovascular:      Rate and Rhythm: Normal rate and regular rhythm.      Heart sounds: No murmur heard.  Pulmonary:      Effort: Pulmonary effort is normal. No respiratory distress.      Breath sounds: Wheezing present. No rales.      Comments: And poor airway decreased  Abdominal:      General: There is no distension.      Palpations: Abdomen is soft.      Tenderness: There is no abdominal tenderness.   Musculoskeletal:         General: No swelling.      Cervical back: Neck supple.   Skin:     General: Skin is warm and dry.      Capillary Refill: Capillary refill takes less than 2 seconds.   Neurological:      Mental Status: He is alert.      Comments: Aax2 name and place   Psychiatric:         Mood and Affect: Mood normal.          Additional Data:     Labs:  Results from last 7 days   Lab Units 05/02/24  0523 04/29/24  0611 04/28/24  0534   WBC Thousand/uL 8.54   < > 9.58   HEMOGLOBIN g/dL 13.6   < > 11.9*   HEMATOCRIT % 44.5   < > 38.8   PLATELETS Thousands/uL 200   < > 167   BANDS PCT %  --   --  3   SEGS PCT % 89*  --   --    LYMPHO PCT % 3*  --  4*   MONO PCT % 7  --  5   EOS PCT % 0  --  0    < > = values in this interval not displayed.     Results from last 7 days   Lab Units 05/02/24  0523 04/30/24  0617 04/29/24  0611 04/27/24  1521 04/27/24  1213   SODIUM mmol/L 143   < > 142   < > 138   POTASSIUM mmol/L 4.6   < > 4.5   < > 5.9*   CHLORIDE mmol/L 93*   < > 96   < > 89*   CO2 mmol/L >45*   < > 45*   < > 44*   BUN mg/dL 47*   < > 53*   < > 42*   CREATININE mg/dL 0.95   < > 1.25   < > 1.41*   ANION GAP mmol/L  --   --  1*  --  5   CALCIUM mg/dL 9.9   < > 9.1   < > 10.1   ALBUMIN g/dL  --   --   --   --  4.3   TOTAL BILIRUBIN mg/dL  --   --   --   --  0.58   ALK PHOS U/L  --   --   --   --  70   ALT U/L  --   --   --   --  7   AST U/L  --   --    --   --  20   GLUCOSE RANDOM mg/dL 187*   < > 152*   < > 140    < > = values in this interval not displayed.                 Results from last 7 days   Lab Units 04/29/24  0611 04/28/24  0534 04/27/24  1521 04/27/24  1433 04/27/24  1248   LACTIC ACID mmol/L  --   --   --  2.1* 2.9*   PROCALCITONIN ng/ml 0.08 0.15 0.11  --   --        Lines/Drains:  Invasive Devices       Peripheral Intravenous Line  Duration             Peripheral IV 04/29/24 Right Antecubital 3 days    Peripheral IV 05/02/24 Left;Proximal;Ventral (anterior) Forearm <1 day                          Imaging: Reviewed radiology reports from this admission including: chest xray    Recent Cultures (last 7 days):   Results from last 7 days   Lab Units 04/27/24  1225 04/27/24  1213   BLOOD CULTURE  No Growth at 72 hrs. No Growth at 72 hrs.       Last 24 Hours Medication List:   Current Facility-Administered Medications   Medication Dose Route Frequency Provider Last Rate    acetaminophen  650 mg Oral Q6H PRN Faisal Alvino Counts, DO      ammonium lactate   Topical BID PRN Faisal Alvino Counts, DO      apixaban  5 mg Oral BID Faisal Alvino Counts, DO      bisacodyl  5 mg Oral Daily Faisal Alvino Counts, DO      budesonide  0.5 mg Nebulization Q12H Faisal Alvino Counts, DO      cyanocobalamin  100 mcg Oral Daily Faisal Alvino Counts, DO      diltiazem  300 mg Oral Daily Faisal Alvino Counts, DO      docusate sodium  100 mg Oral BID Faisal Alvino Counts, DO      finasteride  5 mg Oral Daily Faisal Alvino Counts, DO      formoterol  20 mcg Nebulization Q12H Faisal Alvino Counts, DO      guaiFENesin  600 mg Oral Q12H KM Faisal Alvino Counts, DO      hydrOXYzine HCL  25 mg Oral HS Fabi Michel MD      levalbuterol  1.25 mg Nebulization TID Faisal Alvino Counts, DO      loratadine  10 mg Oral Daily Faisal Alvino Counts, DO      magnesium Oxide  400 mg Oral Daily Faisal Alvino Counts, DO      methylPREDNISolone sodium succinate  40 mg Intravenous Q8H Faisal Alvino Counts,  DO      metoprolol succinate  25 mg Oral Daily Faisal De Oliveira Counts, DO      montelukast  10 mg Oral HS Faisal Alvino Counts, DO      sodium chloride  4 mL Nebulization TID Isaiah Cherry PA-C          Today, Patient Was Seen By: aFbi Michel MD    **Please Note: This note may have been constructed using a voice recognition system.**

## 2024-05-02 NOTE — PLAN OF CARE
Problem: Prexisting or High Potential for Compromised Skin Integrity  Goal: Skin integrity is maintained or improved  Description: INTERVENTIONS:  - Identify patients at risk for skin breakdown  - Assess and monitor skin integrity  - Assess and monitor nutrition and hydration status  - Monitor labs   - Assess for incontinence   - Turn and reposition patient  - Assist with mobility/ambulation  - Relieve pressure over bony prominences  - Avoid friction and shearing  - Provide appropriate hygiene as needed including keeping skin clean and dry  - Evaluate need for skin moisturizer/barrier cream  - Collaborate with interdisciplinary team   - Patient/family teaching  - Consider wound care consult   Outcome: Progressing     Problem: PAIN - ADULT  Goal: Verbalizes/displays adequate comfort level or baseline comfort level  Description: Interventions:  - Encourage patient to monitor pain and request assistance  - Assess pain using appropriate pain scale  - Administer analgesics based on type and severity of pain and evaluate response  - Implement non-pharmacological measures as appropriate and evaluate response  - Consider cultural and social influences on pain and pain management  - Notify physician/advanced practitioner if interventions unsuccessful or patient reports new pain  Outcome: Progressing     Problem: INFECTION - ADULT  Goal: Absence or prevention of progression during hospitalization  Description: INTERVENTIONS:  - Assess and monitor for signs and symptoms of infection  - Monitor lab/diagnostic results  - Monitor all insertion sites, i.e. indwelling lines, tubes, and drains  - Monitor endotracheal if appropriate and nasal secretions for changes in amount and color  - Barto appropriate cooling/warming therapies per order  - Administer medications as ordered  - Instruct and encourage patient and family to use good hand hygiene technique  - Identify and instruct in appropriate isolation precautions for   note:  Kasia from [facility] called and said that pt came to their facility with an  narc prescription.     Escalated to Dr. Bush, Linda Lagos and Dr. Goodson. Dr. Goodson was kind enough to rewrite the prescription. Manager Heather Pringle approved for Annika LANDEROS CM to deliver the prescription. Notified Kasia.   identified infection/condition  Outcome: Progressing  Goal: Absence of fever/infection during neutropenic period  Description: INTERVENTIONS:  - Monitor WBC    Outcome: Progressing     Problem: SAFETY ADULT  Goal: Patient will remain free of falls  Description: INTERVENTIONS:  - Educate patient/family on patient safety including physical limitations  - Instruct patient to call for assistance with activity   - Consult OT/PT to assist with strengthening/mobility   - Keep Call bell within reach  - Keep bed low and locked with side rails adjusted as appropriate  - Keep care items and personal belongings within reach  - Initiate and maintain comfort rounds  - Make Fall Risk Sign visible to staff  - Offer Toileting every 2 Hours, in advance of need  - Initiate/Maintain bed alarm  - Apply yellow socks and bracelet for high fall risk patients  - Consider moving patient to room near nurses station  Outcome: Progressing  Goal: Maintain or return to baseline ADL function  Description: INTERVENTIONS:  -  Assess patient's ability to carry out ADLs; assess patient's baseline for ADL function and identify physical deficits which impact ability to perform ADLs (bathing, care of mouth/teeth, toileting, grooming, dressing, etc.)  - Assess/evaluate cause of self-care deficits   - Assess range of motion  - Assess patient's mobility; develop plan if impaired  - Assess patient's need for assistive devices and provide as appropriate  - Encourage maximum independence but intervene and supervise when necessary  - Involve family in performance of ADLs  - Assess for home care needs following discharge   - Consider OT consult to assist with ADL evaluation and planning for discharge  - Provide patient education as appropriate  Outcome: Progressing  Goal: Maintains/Returns to pre admission functional level  Description: INTERVENTIONS:  - Perform AM-PAC 6 Click Basic Mobility/ Daily Activity assessment daily.  - Set and communicate daily mobility  goal to care team and patient/family/caregiver.   - Collaborate with rehabilitation services on mobility goals if consulted  - Perform Range of Motion 2 times a day.  - Reposition patient every 2 hours.  - Dangle patient 2 times a day  - Stand patient 2 times a day  - Ambulate patient 2 times a day  - Out of bed to chair 2 times a day   - Out of bed for meals 2 times a day  - Out of bed for toileting  - Record patient progress and toleration of activity level   Outcome: Progressing     Problem: DISCHARGE PLANNING  Goal: Discharge to home or other facility with appropriate resources  Description: INTERVENTIONS:  - Identify barriers to discharge w/patient and caregiver  - Arrange for needed discharge resources and transportation as appropriate  - Identify discharge learning needs (meds, wound care, etc.)  - Arrange for interpretive services to assist at discharge as needed  - Refer to Case Management Department for coordinating discharge planning if the patient needs post-hospital services based on physician/advanced practitioner order or complex needs related to functional status, cognitive ability, or social support system  Outcome: Progressing     Problem: Knowledge Deficit  Goal: Patient/family/caregiver demonstrates understanding of disease process, treatment plan, medications, and discharge instructions  Description: Complete learning assessment and assess knowledge base.  Interventions:  - Provide teaching at level of understanding  - Provide teaching via preferred learning methods  Outcome: Progressing     Problem: Nutrition/Hydration-ADULT  Goal: Nutrient/Hydration intake appropriate for improving, restoring or maintaining nutritional needs  Description: Monitor and assess patient's nutrition/hydration status for malnutrition. Collaborate with interdisciplinary team and initiate plan and interventions as ordered.  Monitor patient's weight and dietary intake as ordered or per policy. Utilize nutrition  screening tool and intervene as necessary. Determine patient's food preferences and provide high-protein, high-caloric foods as appropriate.     INTERVENTIONS:  - Monitor oral intake, urinary output, labs, and treatment plans  - Assess nutrition and hydration status and recommend course of action  - Evaluate amount of meals eaten  - Assist patient with eating if necessary   - Allow adequate time for meals  - Recommend/ encourage appropriate diets, oral nutritional supplements, and vitamin/mineral supplements  - Order, calculate, and assess calorie counts as needed  - Recommend, monitor, and adjust tube feedings and TPN/PPN based on assessed needs  - Assess need for intravenous fluids  - Provide specific nutrition/hydration education as appropriate  - Include patient/family/caregiver in decisions related to nutrition  Outcome: Progressing

## 2024-05-02 NOTE — ASSESSMENT & PLAN NOTE
5/1-Suspect in light of hypercapnia which after BiPAP has improved looks at his baseline is 69 to 70s pH is normal now and he is lethargic with his wake up and able to tell me his name and where he is will monitor closely discussed with nursing that to utilize BiPAP at night and as needed  5/2-today he is more awake and alert

## 2024-05-02 NOTE — PROGRESS NOTES
Pastoral Care Progress Note    2024  Patient: Liu Angel : 1940  Admission Date & Time: 2024 1143  MRN: [id number] CSN: [id number]    CH in consult with RN initiated support visit to pt and family. Pt received CH upright in bed, wife and friends present.  CH introduced self and role, encouraged family to request her if she can  be of service.  Family did not identify any unmanageable emotional or spiritual needs at this time.              24 1300   Clinical Encounter Type   Visited With Patient and family together  (wife, friends)   Routine Visit Introduction   Referral From Nurse

## 2024-05-02 NOTE — SPEECH THERAPY NOTE
Speech Language/Pathology  Spoke w/ RN Kvng. Will plan to complete VBS this afternoon if pt appropriate and can remain off Bipap to complete study.    Ludivina Shetty, MS CCC-SLP  5/2/2024

## 2024-05-02 NOTE — PROCEDURES
Video Swallow Study  Speech Pathology Videofluoroscopic Swallow Study (VFSS/VBSS/MBSS)      Patient Name: Liu Angel    Today's Date: 5/2/2024        General Information;  Pt is a 84 y.o. male with a PMH remarkable for HTN, BPH, lung CA, skin CA, acute on chronic respiraotry failure, RLL PNA, COPD, AFIB.    Current concerns for dysphagia include current RLL PNA, worsening respiratory status requiring intermittent BiPap. Pt currently on 15L midflow.  A VFSS was recommended to assess oropharyngeal stage swallowing skills at this time. Pt was viewed in lateral position and assessed with thin and nectar thick liquid (by teaspoon, single and successive cup sips), puree, soft moist food (sandwich and solid food (cracker)  and a13mm pill with thin liquid.      Oral stage:  Pt presented with mild-moderate oral stage dysphagia.    Lip closure:  no escape  Mastication:  slow prolonged with complete re-collection,Bolus Transport/Lingual Motion:  slowed tongue motion,   Oral residue:  no residue  Tongue Control:  posterior escape of  greater than half of the bolus  Swallow Initiation: bolus head in pyriforms    Pharyngeal stage:  Pt presented with mild-moderate pharyngeal dysphagia.     Soft palate elevation:  no bolus between soft palate and pharyngeal wall   Swallow Initiation:  see above  Laryngeal elevation:   partial    Anterior hyoid excursion:   partial    Epiglottic movement:  complete   Laryngeal vestibule closure:   complete   Tongue base retraction:  wide column of contrast/air between TB and PW  Pharyngeal Stripping: complete  PES opening:  complete   Pharyngeal Residue:  None noted    Management of food/liquid/barium tablet follows:   Silent aspiration posteriorly from pyriforms of thins via spoon and straw before  the swallow secondary to poor control/posterior spillage. Aspiration was trace to small amt.    Penetration/Aspiration:  Thin: PAS - 8  Nectar: PAS- 1  Puree: PAS-  1  Solid: PAS- 1  Response to Aspiration: No response           8-Point Penetration-Aspiration Scale   1 Material does not enter the airway   2 Material enters the airway, remains above the vocal folds, and is ejected  from the  airway    3 Material enters the airway, remains above the vocal folds, and is not ejected from the airway   4 Material enters the airway, contacts the vocal folds, and is ejected from the airway   5 Material enters the airway, contacts the vocal folds, and is not ejected from the airway    6 Material enters the airway, passes below the vocal folds and is ejected into the larynx or out of the airway    7 Material enters the airway, passes below the vocal folds, and is not ejected from the trachea despite effort    8 Material enters the airway, passes below the vocal folds, and no effort is made to eject         Strategies and Efficacy: None trialed as aspiration was not noted until review of study    Aspiration Response and Efficacy:  Aspiration was silent    Esophageal stage:  Brief view of esophagus was completed.  Re: esophageal clearance, complete clearance noted       Assessment Summary:    Pt presents with mild-moderate oropharyngeal dysphagia characterized by impaired BOT strength resulting in poor control/posterior spillage to pyriforms prior to initiation of swallow, impaired laryngeal elevation/excursion..  Silent aspiration posteriorly from pyriforms of thins via spoon and straw before  the swallow secondary to poor control/posterior spillage. Aspiration was trace to small amt.  No aspiration/penetration otherwise. No clinically significant residue.  Pt able to swallow whole pill w/ liquids.  Suspect pt would benefit from softer foods d/t energy conservation, will continue regular textures to all for pt preference.      Note: Images are available for review in PACS as desired.      Recommendations:   Recommended Diet:  regular diet and nectar thick liquids   Recommended Form of  Medications: whole with liquid   Aspiration precautions and compensatory swallowing strategies: upright posture and only feed when fully alert  Consider referral to:  N/A  SLP Dysphagia therapy recommended: Yes for diet advancement as respiratory status improves    Results Reviewed with: patient, RN, and MD   Pt/Family Education: initiated.  Pt and caregivers would benefit from/require continued education.    Ludivina Shetty MS CCC-SLP  5/2/2024

## 2024-05-02 NOTE — ASSESSMENT & PLAN NOTE
Patient presents with worsening shortness of breath, increased chest congestion  Seems to have a questionable pulmonary fibrosis on the CT.  Still wheezing continue Solu-Medrol 40 mg IV every 8 hours Pulmicort Perforomist completed 3 days of Zithromax suspect more of an aspiration pneumonitis with mucus as procalcitonin x 2 were negative.  I discussed with pulmonary yesterday awaiting their reevaluation as the patient is on 15 L admit for still wheezing.  Will trial diuretics yet again awaiting pulmonology reevaluation

## 2024-05-02 NOTE — RESPIRATORY THERAPY NOTE
RT Protocol Note  Liu Angel 84 y.o. male MRN: [id number]  Unit/Bed#: -01 Encounter: [id number]    Assessment    Principal Problem:    Acute on chronic respiratory failure with hypoxia   Active Problems:    Atrial fibrillation     Chronic combined systolic and diastolic heart failure (HCC)    Chronic obstructive pulmonary disease with acute exacerbation (HCC)    Acute metabolic encephalopathy    BPH (benign prostatic hyperplasia)    Malignant spindle cell neoplasm (HCC)    Sepsis without acute organ dysfunction (HCC)    Pneumonia of right lower lobe due to infectious organism    SUSI (acute kidney injury) (HCC)      Home Pulmonary Medications:    Home Devices/Therapy: BiPAP/CPAP    Past Medical History:   Diagnosis Date    BPH (benign prostatic hyperplasia)     Chronic obstructive pulmonary disease (COPD) (HCC)     COPD (chronic obstructive pulmonary disease) (HCC)     Emphysema lung (HCC)     Essential hypertension     Hard of hearing     Pt does wear hearing aids    Hypertension     Lung cancer (HCC)     Shortness of breath     Pt states not changes and it occurs with moderate exertion    Sleep disturbance     Pt states he is only sleeping about 3 hours a night.  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    Tinnitus      Social History     Socioeconomic History    Marital status: /Civil Union     Spouse name: None    Number of children: None    Years of education: None    Highest education level: None   Occupational History    Occupation: Retired   Tobacco Use    Smoking status: Former     Types: Cigars, Cigarettes    Smokeless tobacco: Never   Vaping Use    Vaping status: Never Used   Substance and Sexual Activity    Alcohol use: Yes     Alcohol/week: 14.0 standard drinks of alcohol     Types: 14 Glasses of wine per week     Comment: moderate consumption    Drug use: Not Currently    Sexual activity: Yes   Other Topics Concern    None  "  Social History Narrative    None     Social Determinants of Health     Financial Resource Strain: Not on file   Food Insecurity: No Food Insecurity (4/29/2024)    Hunger Vital Sign     Worried About Running Out of Food in the Last Year: Never true     Ran Out of Food in the Last Year: Never true   Transportation Needs: No Transportation Needs (4/29/2024)    PRAPARE - Transportation     Lack of Transportation (Medical): No     Lack of Transportation (Non-Medical): No   Physical Activity: Not on file   Stress: Not on file   Social Connections: Not on file   Intimate Partner Violence: Not on file   Housing Stability: Low Risk  (4/29/2024)    Housing Stability Vital Sign     Unable to Pay for Housing in the Last Year: No     Number of Places Lived in the Last Year: 1     Unstable Housing in the Last Year: No       Subjective         Objective    Physical Exam:        Vitals:  Blood pressure 136/75, pulse 76, temperature 97.6 °F (36.4 °C), temperature source Axillary, resp. rate (!) 23, height 5' 9\" (1.753 m), weight 66.6 kg (146 lb 13.2 oz), SpO2 98%.    Results from last 7 days   Lab Units 05/01/24  1143   PH ART  7.402   PCO2 ART mm Hg 78.5*   PO2 ART mm Hg 100.1   HCO3 ART mmol/L 47.8*   BASE EXC ART mmol/L 18.6   O2 CONTENT ART mL/dL 18.6   O2 HGB, ARTERIAL % 96.9   ABG SOURCE  Radial, Right   CHRISTIAN TEST  Yes       Imaging and other studies: I have personally reviewed pertinent reports.      O2 Device: bipap 40%     Plan    Respiratory Plan: Mild Distress pathway  Airway Clearance Plan: Flutter     Resp Comments: (P) Received tx via bipap  was rashi for swallow study on NC and did fine   "

## 2024-05-03 NOTE — QUICK NOTE
I have met with the wife at bedside as patient was on 15 L and is satting into the 60s and 70s have to be put back on a BiPAP and currently remains on the BiPAP as without the BiPAP he is not able to maintain saturation.  As discussed with pulmonary as well there is no other treatment recommendation patient is at the end stage COPD and severely hypoxic.  Had a meeting with the wife at bedside yet again I discussed with them that unfortunately there is nothing more that can be offered and he is at the end-stage of his disease and all modalities have been tried without the BiPAP he is unable to keep saturations and the BiPAP due to that is unable to be removed he is lethargic tired I asked again and we discussed with the third time she stated there was going to be no intubation .  I discussed with the wife due to failure to improve for the past 5 days he has been in the hospital and starting to decline more that at this time he has no ability to hold oxygen saturation without a BiPAP machine is getting more lethargic there is no more medical therapy that can be offered and he failed all the current medical therapy that has been given my recommendation is to transition to comfort care as nothing else can be offered to prolong his life.  The wife will discuss with the patient's kids and follow-up with me on their final decision.

## 2024-05-03 NOTE — RESPIRATORY THERAPY NOTE
Respiratory Note     05/03/24 1115   Respiratory Assessment   Resp Comments Pt tired, SpO2 69% on MFNC 15LPM. Pt placed back on BiPAP 16/8/40%. Pt SpO2 now 95% and pt sleeping comfortably.   O2 Device BiPAP 40%   Non-Invasive Information   O2 Interface Device Face mask   Non-Invasive Ventilation Mode BiPAP   SpO2 (!) 71 %  (Simultaneous filing. User may not have seen previous data.)   $ Pulse Oximetry Spot Check Charge Completed   Non-Invasive Settings   IPAP (cm) 16 cm   EPAP (cm) 8 cm   Rate (Set) 12   FiO2 (%) 40   Pressure Support (cm H2O) 8   Rise Time 3   Inspiratory Time (Set) 1   Non-Invasive Readings   Skin Intervention Skin intact   Total Rate 21   MV (Mech) 11.8   Peak Pressure (Obs) 17   Spontaneous Vt (mL) 557   Leak (lpm) 17   Non-Invasive Alarms   Insp Pressure High (cm H20) 18   Insp Pressure Low (cm H20) 5   Low Insp Pressure Time (sec) 20 sec   MV Low (L/min) 3   Vt High (mL) 900   Vt Low (mL) 200   High Resp Rate (BPM) 45 BPM   Low Resp Rate (BPM) 8 BPM   Apnea Interval (sec) 20

## 2024-05-03 NOTE — PLAN OF CARE
Problem: Prexisting or High Potential for Compromised Skin Integrity  Goal: Skin integrity is maintained or improved  Description: INTERVENTIONS:  - Identify patients at risk for skin breakdown  - Assess and monitor skin integrity  - Assess and monitor nutrition and hydration status  - Monitor labs   - Assess for incontinence   - Turn and reposition patient  - Assist with mobility/ambulation  - Relieve pressure over bony prominences  - Avoid friction and shearing  - Provide appropriate hygiene as needed including keeping skin clean and dry  - Evaluate need for skin moisturizer/barrier cream  - Collaborate with interdisciplinary team   - Patient/family teaching  - Consider wound care consult   Outcome: Progressing     Problem: PAIN - ADULT  Goal: Verbalizes/displays adequate comfort level or baseline comfort level  Description: Interventions:  - Encourage patient to monitor pain and request assistance  - Assess pain using appropriate pain scale  - Administer analgesics based on type and severity of pain and evaluate response  - Implement non-pharmacological measures as appropriate and evaluate response  - Consider cultural and social influences on pain and pain management  - Notify physician/advanced practitioner if interventions unsuccessful or patient reports new pain  Outcome: Progressing     Problem: INFECTION - ADULT  Goal: Absence or prevention of progression during hospitalization  Description: INTERVENTIONS:  - Assess and monitor for signs and symptoms of infection  - Monitor lab/diagnostic results  - Monitor all insertion sites, i.e. indwelling lines, tubes, and drains  - Monitor endotracheal if appropriate and nasal secretions for changes in amount and color  - [place] appropriate cooling/warming therapies per order  - Administer medications as ordered  - Instruct and encourage patient and family to use good hand hygiene technique  - Identify and instruct in appropriate isolation precautions for  identified infection/condition  Outcome: Progressing  Goal: Absence of fever/infection during neutropenic period  Description: INTERVENTIONS:  - Monitor WBC    Outcome: Progressing     Problem: SAFETY ADULT  Goal: Patient will remain free of falls  Description: INTERVENTIONS:  - Educate patient/family on patient safety including physical limitations  - Instruct patient to call for assistance with activity   - Consult OT/PT to assist with strengthening/mobility   - Keep Call bell within reach  - Keep bed low and locked with side rails adjusted as appropriate  - Keep care items and personal belongings within reach  - Initiate and maintain comfort rounds  - Make Fall Risk Sign visible to staff  - Offer Toileting every 2 Hours, in advance of need  - Initiate/Maintain bed/chair alarm as needed  \- Apply yellow socks and bracelet for high fall risk patients  - Consider moving patient to room near nurses station  Outcome: Progressing  Goal: Maintain or return to baseline ADL function  Description: INTERVENTIONS:  -  Assess patient's ability to carry out ADLs; assess patient's baseline for ADL function and identify physical deficits which impact ability to perform ADLs (bathing, care of mouth/teeth, toileting, grooming, dressing, etc.)  - Assess/evaluate cause of self-care deficits   - Assess range of motion  - Assess patient's mobility; develop plan if impaired  - Assess patient's need for assistive devices and provide as appropriate  - Encourage maximum independence but intervene and supervise when necessary  - Involve family in performance of ADLs  - Assess for home care needs following discharge   - Consider OT consult to assist with ADL evaluation and planning for discharge  - Provide patient education as appropriate  Outcome: Progressing  Goal: Maintains/Returns to pre admission functional level  Description: INTERVENTIONS:  - Perform AM-PAC 6 Click Basic Mobility/ Daily Activity assessment daily.  - Set and  communicate daily mobility goal to care team and patient/family/caregiver.   - Collaborate with rehabilitation services on mobility goals if consulted  - Perform Range of Motion 2 times a day.  - Reposition patient every 2 hours.  - Out of bed to chair 2 times a day   - Out of bed for toileting as tolerated  - Record patient progress and toleration of activity level   Outcome: Progressing     Problem: DISCHARGE PLANNING  Goal: Discharge to home or other facility with appropriate resources  Description: INTERVENTIONS:  - Identify barriers to discharge w/patient and caregiver  - Arrange for needed discharge resources and transportation as appropriate  - Identify discharge learning needs (meds, wound care, etc.)  - Arrange for interpretive services to assist at discharge as needed  - Refer to Case Management Department for coordinating discharge planning if the patient needs post-hospital services based on physician/advanced practitioner order or complex needs related to functional status, cognitive ability, or social support system  Outcome: Progressing     Problem: Knowledge Deficit  Goal: Patient/family/caregiver demonstrates understanding of disease process, treatment plan, medications, and discharge instructions  Description: Complete learning assessment and assess knowledge base.  Interventions:  - Provide teaching at level of understanding  - Provide teaching via preferred learning methods  Outcome: Progressing     Problem: Nutrition/Hydration-ADULT  Goal: Nutrient/Hydration intake appropriate for improving, restoring or maintaining nutritional needs  Description: Monitor and assess patient's nutrition/hydration status for malnutrition. Collaborate with interdisciplinary team and initiate plan and interventions as ordered.  Monitor patient's weight and dietary intake as ordered or per policy. Utilize nutrition screening tool and intervene as necessary. Determine patient's food preferences and provide  high-protein, high-caloric foods as appropriate.     INTERVENTIONS:  - Monitor oral intake, urinary output, labs, and treatment plans  - Assess nutrition and hydration status and recommend course of action  - Evaluate amount of meals eaten  - Assist patient with eating if necessary   - Allow adequate time for meals  - Recommend/ encourage appropriate diets, oral nutritional supplements, and vitamin/mineral supplements  - Order, calculate, and assess calorie counts as needed  - Recommend, monitor, and adjust tube feedings and TPN/PPN based on assessed needs  - Assess need for intravenous fluids  - Provide specific nutrition/hydration education as appropriate  - Include patient/family/caregiver in decisions related to nutrition  Outcome: Progressing

## 2024-05-03 NOTE — ASSESSMENT & PLAN NOTE
Patient presents with worsening shortness of breath, increased chest congestion  Seems to have a questionable pulmonary fibrosis on the CT.  Still wheezing continue Solu-Medrol 40 mg IV every 8 hours Pulmicort Perforomist completed 3 days of Zithromax suspect more of an aspiration pneumonitis with mucus as procalcitonin x 2 were negative.   Not wheezing today discussed with pulmonary we will back down to Solu-Medrol 40 mg IV twice daily.

## 2024-05-03 NOTE — PROGRESS NOTES
[facility]  Progress Note  Name: Liu Angel I  MRN: [id number]  Unit/Bed#: -01 I Date of Admission: 4/27/2024   Date of Service: 5/3/2024 I Hospital Day: 6    Assessment/Plan   * Acute on chronic respiratory failure with hypoxia   Assessment & Plan  Chronically he is on 2 L at rest 2-3 on exertion.  Came in on a nonrebreather he was unable to utilize BiPAP recently secondary to having skin basal cell surgery removal.  He is on steroids and suspect he could have aspiration pneumonitis from mucus as speech ruled out any aspiration with food procalcitonin's were negative Rocephin was discontinued and he has completed Zithromax for the COPD exacerbation  He is on Perforomist and Pulmicort  Questionable of aspect of fluid overload as does have cephalization and pulmonary htn , elevated probnp cr back down -received Diamox 500 but the past 2 days repeated a CT mild effusions  Also questionable mucous plugging will ask for vest usage  Cta negative for pe  Patient continues to decline he is persistently on 15 L otherwise if goes down has central cyanosis and does not keep his saturations he also is on BiPAP utilizing for prolonged time he is still sounds tight and wheezy overall he does not look overly overloaded I did repeat a CT chest there is chronic bronchiolitis no new pneumonia changes he did send gurgly was unable to swallow pills so there is a questionable aspect of mucus aspiration, he had a video swallow done with speech 5/2 she was aspiration on thin adjusted to nectar thick  Hopefully if he can be off BiPAP he could do a video swallow with speech otherwise will keep n.p.o. I also had an extensive discussion with the wife in terms of goals of care as looks like there is evidence of fibrosis on the CT as well he has advanced COPD and looks like he is not improving despite medical therapy currently we will continue steroids and all COPD medications nebulizers will have vest  therapy done as well will give another dose dose of Diamox and see if it helps otherwise I did discuss if he were to decompensate to day she is in agreement to transition to comfort care if he stays steady without any improvement in another day or so then at that time decision would be to transition to comfort care he is a DNR/DNI I confirmed twice  5/3-patient has utilized BiPAP we did put him on 12 to 15 L of mid flow he has been staying at 90 to 95% discussed with the wife today that hopefully we can wean him down.  He is not wheezing today we will back down on his steroids but will continue Diamox 250 mg IV twice daily for 2 doses today repeated 2D echo to assess for pulmonary hypertension as stated prognosis is poor no other options discussed with pulmonary today as well    SUSI (acute kidney injury) (HCC)  Assessment & Plan  On chronic his usual baseline is 1-1.2   resolved    Pneumonia of right lower lobe due to infectious organism  Assessment & Plan  CT chest showing possible consolidation of the right middle and lower lobes, infectious versus inflammatory    Given location, some concern of aspiration though he reports no signs/symptoms of recent aspiration - monitor clinically for signs  Respiratory protocol  Airway clearance  Speech evaluated - ? Mucus   All abx dced as procal neg x3- suspect aspiration pneumonitis evaluating the CT chest repeat today looks like on the right side what was there is clearing there is no new pneumonia seen and there is chronic bronchiolitis  Had video swallow with speech done yesterday shows aspiration and thinks he was switched to nectar thick        (CDI: Possible aspiration pneumonia in the setting of acute respiratory failure a/e/b consolidation in the right middle lobe and right lower lobe, WBC 12.36,  and RR 38 treated with IV Rocephin, IV Zithro, sputum cultures, blood cultures, aspiration precautions, lab monitoring and VS monitoring )    Sepsis without acute  organ dysfunction (HCC)  Assessment & Plan  Sirs -Tachycardic, tachypneic, leukocytosis, 2/2 to aspiration pneumonitis and procal negative do not suspect pna antibiotics dced       Malignant spindle cell neoplasm (HCC)  Assessment & Plan  History of malignant neoplasm, CT chest x-ray shows improvement in a prior nodule.  Currently being observed as an outpatient.    BPH (benign prostatic hyperplasia)  Assessment & Plan  Continue home Proscar    Acute metabolic encephalopathy  Assessment & Plan  5/1-Suspect in light of hypercapnia which after BiPAP has improved looks at his baseline is 69 to 70s pH is normal now and he is lethargic with his wake up and able to tell me his name and where he is will monitor closely discussed with nursing that to utilize BiPAP at night and as needed  More awake and alert    Chronic obstructive pulmonary disease with acute exacerbation (HCC)  Assessment & Plan  Patient presents with worsening shortness of breath, increased chest congestion  Seems to have a questionable pulmonary fibrosis on the CT.  Still wheezing continue Solu-Medrol 40 mg IV every 8 hours Pulmicort Perforomist completed 3 days of Zithromax suspect more of an aspiration pneumonitis with mucus as procalcitonin x 2 were negative.   Not wheezing today discussed with pulmonary we will back down to Solu-Medrol 40 mg IV twice daily.    Chronic combined systolic and diastolic heart failure (HCC)  Assessment & Plan  Wt Readings from Last 3 Encounters:   05/03/24 69.4 kg (153 lb)   12/26/23 69.1 kg (152 lb 6.4 oz)   11/03/23 72.4 kg (159 lb 9.8 oz)     Appears euvolemic  Takes torsemide 100 mg daily and metolazone once a week  Last TTE - ef 45% , rv decreased  Does have pulmonary hypertension some small effusions causing him to be still hypoxic will do Diamox 250 mg IV twice daily today as his sodium is creeping up from previous dose of 500.  Will repeat a 2D echo today to assess EF and also pulmonary hypertension  aspect        Atrial fibrillation   Assessment & Plan  Rate controlled Continue home diltiazem and metoprolol if patient is unable to take p.o. as he is going to be persistently on BiPAP then we will have to switch all medications to IV  Continue home Eliquis                 VTE Pharmacologic Prophylaxis:   Moderate Risk (Score 3-4) - Pharmacological DVT Prophylaxis Ordered: apixaban (Eliquis).    Mobility:   Basic Mobility Inpatient Raw Score: 17  JH-HLM Goal: 5: Stand one or more mins  JH-HLM Achieved: 4: Move to chair/commode  JH-HLM Goal NOT achieved. Continue with multidisciplinary rounding and encourage appropriate mobility to improve upon JH-HLM goals.    Patient Centered Rounds: I performed bedside rounds with nursing staff today.   Discussions with Specialists or Other Care Team Provider: Discussed with pulmonary today    Education and Discussions with Family / Patient: Updated  (wife) at bedside.    Total Time Spent on Date of Encounter in care of patient: >.35 mins. This time was spent on one or more of the following: performing physical exam; counseling and coordination of care; obtaining or reviewing history; documenting in the medical record; reviewing/ordering tests, medications or procedures; communicating with other healthcare professionals and discussing with patient's family/caregivers.    Current Length of Stay: 6 day(s)  Current Patient Status: Inpatient   Certification Statement: The patient will continue to require additional inpatient hospital stay due to hypoxic respiratory failure  Discharge Plan: Anticipate discharge in >72 hrs to discharge location to be determined pending rehab evaluations.    Code Status: Level 3 - DNAR and DNI    Subjective:   Patient seen and examined he is lethargic when asked how so shortness of breath he just nods his head okay.  Slept through the night with a BiPAP.    Objective:     Vitals:   Temp (24hrs), Av.6 °F (36.4 °C), Min:97 °F (36.1 °C),  Max:98 °F (36.7 °C)    Temp:  [97 °F (36.1 °C)-98 °F (36.7 °C)] 97 °F (36.1 °C)  HR:  [] 93  Resp:  [18-29] 23  BP: (119-159)/(63-85) 159/70  SpO2:  [76 %-100 %] 94 %  Body mass index is 22.59 kg/m².     Input and Output Summary (last 24 hours):     Intake/Output Summary (Last 24 hours) at 5/3/2024 1048  Last data filed at 5/3/2024 1000  Gross per 24 hour   Intake 500 ml   Output 1250 ml   Net -750 ml       Physical Exam:   Physical Exam  Vitals and nursing note reviewed.   Constitutional:       General: He is not in acute distress.     Appearance: He is well-developed. He is ill-appearing.   HENT:      Head: Normocephalic and atraumatic.   Eyes:      Conjunctiva/sclera: Conjunctivae normal.   Cardiovascular:      Rate and Rhythm: Normal rate and regular rhythm.      Heart sounds: No murmur heard.  Pulmonary:      Effort: Pulmonary effort is normal. No respiratory distress.      Breath sounds: Rales (Bibasilar) present. No wheezing.   Abdominal:      General: There is no distension.      Palpations: Abdomen is soft.      Tenderness: There is no abdominal tenderness.   Musculoskeletal:         General: No swelling.      Cervical back: Neck supple.   Skin:     General: Skin is warm and dry.      Capillary Refill: Capillary refill takes less than 2 seconds.   Neurological:      Mental Status: He is alert.      Comments: Waking up today in the morning but alert and oriented   Psychiatric:         Mood and Affect: Mood normal.          Additional Data:     Labs:  Results from last 7 days   Lab Units 05/02/24  0523 04/29/24  0611 04/28/24  0534   WBC Thousand/uL 8.54   < > 9.58   HEMOGLOBIN g/dL 13.6   < > 11.9*   HEMATOCRIT % 44.5   < > 38.8   PLATELETS Thousands/uL 200   < > 167   BANDS PCT %  --   --  3   SEGS PCT % 89*  --   --    LYMPHO PCT % 3*  --  4*   MONO PCT % 7  --  5   EOS PCT % 0  --  0    < > = values in this interval not displayed.     Results from last 7 days   Lab Units 05/03/24  0555  04/30/24  0617 04/29/24  0611 04/27/24  1521 04/27/24  1213   SODIUM mmol/L 146   < > 142   < > 138   POTASSIUM mmol/L 3.6   < > 4.5   < > 5.9*   CHLORIDE mmol/L 96   < > 96   < > 89*   CO2 mmol/L >45*   < > 45*   < > 44*   BUN mg/dL 46*   < > 53*   < > 42*   CREATININE mg/dL 1.03   < > 1.25   < > 1.41*   ANION GAP mmol/L  --   --  1*  --  5   CALCIUM mg/dL 9.8   < > 9.1   < > 10.1   ALBUMIN g/dL  --   --   --   --  4.3   TOTAL BILIRUBIN mg/dL  --   --   --   --  0.58   ALK PHOS U/L  --   --   --   --  70   ALT U/L  --   --   --   --  7   AST U/L  --   --   --   --  20   GLUCOSE RANDOM mg/dL 148*   < > 152*   < > 140    < > = values in this interval not displayed.                 Results from last 7 days   Lab Units 04/29/24  0611 04/28/24  0534 04/27/24  1521 04/27/24  1433 04/27/24  1248   LACTIC ACID mmol/L  --   --   --  2.1* 2.9*   PROCALCITONIN ng/ml 0.08 0.15 0.11  --   --        Lines/Drains:  Invasive Devices       Peripheral Intravenous Line  Duration             Peripheral IV 04/29/24 Right Antecubital 4 days                          Imaging: Reviewed radiology reports from this admission including: chest CT scan    Recent Cultures (last 7 days):   Results from last 7 days   Lab Units 04/27/24  1225 04/27/24  1213   BLOOD CULTURE  No Growth After 4 Days. No Growth After 4 Days.       Last 24 Hours Medication List:   Current Facility-Administered Medications   Medication Dose Route Frequency Provider Last Rate    acetaminophen  650 mg Oral Q6H PRN Faisal Alvino Counts, DO      acetaZOLAMIDE  250 mg Intravenous Q12H [facility] Fabi Michel MD      ammonium lactate   Topical BID PRN Faisal Alvino Counts, DO      apixaban  5 mg Oral BID Faisal Alvino Counts, DO      barium sulfate  1 tablet Oral Once in imaging Fabi Michel MD      bisacodyl  5 mg Oral Daily Faisal Alvino Counts, DO      budesonide  0.5 mg Nebulization Q12H Faisal Alvino Counts, DO      cyanocobalamin  100 mcg Oral Daily Faisal Alvino Counts, DO       diltiazem  300 mg Oral Daily Faisal Alvino Counts, DO      docusate sodium  100 mg Oral BID Faisal Alvino Counts, DO      finasteride  5 mg Oral Daily Faisal Alvino Counts, DO      formoterol  20 mcg Nebulization Q12H Faisal Alvino Counts, DO      guaiFENesin  600 mg Oral Q12H [facility] Faisal Alvino Counts, DO      hydrOXYzine HCL  25 mg Oral HS Fabi Michel MD      ipratropium  0.5 mg Nebulization TID Isaiah Cherry PA-C      levalbuterol  1.25 mg Nebulization TID Faisal Alvino Counts, DO      loratadine  10 mg Oral Daily Faisal Alvino Counts, DO      magnesium Oxide  400 mg Oral Daily Faisal Alvino Counts, DO      methylPREDNISolone sodium succinate  40 mg Intravenous Q12H [facility] Fabi Michel MD      metoprolol succinate  25 mg Oral Daily Faisal Alvino Counts, DO      montelukast  10 mg Oral HS Faisal Alvino Counts, DO          Today, Patient Was Seen By: Fabi Michel MD    **Please Note: This note may have been constructed using a voice recognition system.**

## 2024-05-03 NOTE — ASSESSMENT & PLAN NOTE
Chronically he is on 2 L at rest 2-3 on exertion.  Came in on a nonrebreather he was unable to utilize BiPAP recently secondary to having skin basal cell surgery removal.  He is on steroids and suspect he could have aspiration pneumonitis from mucus as speech ruled out any aspiration with food procalcitonin's were negative Rocephin was discontinued and he has completed Zithromax for the COPD exacerbation  He is on Perforomist and Pulmicort  Questionable of aspect of fluid overload as does have cephalization and pulmonary htn , elevated probnp cr back down -received Diamox 500 but the past 2 days repeated a CT mild effusions  Also questionable mucous plugging will ask for vest usage  Cta negative for pe  Patient continues to decline he is persistently on 15 L otherwise if goes down has central cyanosis and does not keep his saturations he also is on BiPAP utilizing for prolonged time he is still sounds tight and wheezy overall he does not look overly overloaded I did repeat a CT chest there is chronic bronchiolitis no new pneumonia changes he did send gurgly was unable to swallow pills so there is a questionable aspect of mucus aspiration, he had a video swallow done with speech 5/2 she was aspiration on thin adjusted to nectar thick  Hopefully if he can be off BiPAP he could do a video swallow with speech otherwise will keep n.p.o. I also had an extensive discussion with the wife in terms of goals of care as looks like there is evidence of fibrosis on the CT as well he has advanced COPD and looks like he is not improving despite medical therapy currently we will continue steroids and all COPD medications nebulizers will have vest therapy done as well will give another dose dose of Diamox and see if it helps otherwise I did discuss if he were to decompensate to day she is in agreement to transition to comfort care if he stays steady without any improvement in another day or so then at that time decision would be to  transition to comfort care he is a DNR/DNI I confirmed twice  5/3-patient has utilized BiPAP we did put him on 12 to 15 L of mid flow he has been staying at 90 to 95% discussed with the wife today that hopefully we can wean him down.  He is not wheezing today we will back down on his steroids but will continue Diamox 250 mg IV twice daily for 2 doses today repeated 2D echo to assess for pulmonary hypertension as stated prognosis is poor no other options discussed with pulmonary today as well

## 2024-05-03 NOTE — ASSESSMENT & PLAN NOTE
CT chest showing possible consolidation of the right middle and lower lobes, infectious versus inflammatory    Given location, some concern of aspiration though he reports no signs/symptoms of recent aspiration - monitor clinically for signs  Respiratory protocol  Airway clearance  Speech evaluated - ? Mucus   All abx dced as procal neg x3- suspect aspiration pneumonitis evaluating the CT chest repeat today looks like on the right side what was there is clearing there is no new pneumonia seen and there is chronic bronchiolitis  Had video swallow with speech done yesterday shows aspiration and thinks he was switched to nectar thick        (CDI: Possible aspiration pneumonia in the setting of acute respiratory failure a/e/b consolidation in the right middle lobe and right lower lobe, WBC 12.36,  and RR 38 treated with IV Rocephin, IV Zithro, sputum cultures, blood cultures, aspiration precautions, lab monitoring and VS monitoring )

## 2024-05-03 NOTE — ASSESSMENT & PLAN NOTE
5/1-Suspect in light of hypercapnia which after BiPAP has improved looks at his baseline is 69 to 70s pH is normal now and he is lethargic with his wake up and able to tell me his name and where he is will monitor closely discussed with nursing that to utilize BiPAP at night and as needed  More awake and alert

## 2024-05-03 NOTE — RESPIRATORY THERAPY NOTE
RT Protocol Note  Liu Angel 84 y.o. male MRN: [id number]  Unit/Bed#: -01 Encounter: [id number]    Assessment    Principal Problem:    Acute on chronic respiratory failure with hypoxia   Active Problems:    Atrial fibrillation     Chronic combined systolic and diastolic heart failure (HCC)    Chronic obstructive pulmonary disease with acute exacerbation (HCC)    Acute metabolic encephalopathy    BPH (benign prostatic hyperplasia)    Malignant spindle cell neoplasm (HCC)    Sepsis without acute organ dysfunction (HCC)    Pneumonia of right lower lobe due to infectious organism    SUSI (acute kidney injury) (HCC)      Home Pulmonary Medications:  \  Home Devices/Therapy: BiPAP/CPAP    Past Medical History:   Diagnosis Date    BPH (benign prostatic hyperplasia)     Chronic obstructive pulmonary disease (COPD) (HCC)     COPD (chronic obstructive pulmonary disease) (HCC)     Emphysema lung (HCC)     Essential hypertension     Hard of hearing     Pt does wear hearing aids    Hypertension     Lung cancer (HCC)     Shortness of breath     Pt states not changes and it occurs with moderate exertion    Sleep disturbance     Pt states he is only sleeping about 3 hours a night.  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    Tinnitus      Social History     Socioeconomic History    Marital status: /Civil Union     Spouse name: None    Number of children: None    Years of education: None    Highest education level: None   Occupational History    Occupation: Retired   Tobacco Use    Smoking status: Former     Types: Cigars, Cigarettes    Smokeless tobacco: Never   Vaping Use    Vaping status: Never Used   Substance and Sexual Activity    Alcohol use: Yes     Alcohol/week: 14.0 standard drinks of alcohol     Types: 14 Glasses of wine per week     Comment: moderate consumption    Drug use: Not Currently    Sexual activity: Yes   Other Topics Concern    None  "  Social History Narrative    None     Social Determinants of Health     Financial Resource Strain: Not on file   Food Insecurity: No Food Insecurity (4/29/2024)    Hunger Vital Sign     Worried About Running Out of Food in the Last Year: Never true     Ran Out of Food in the Last Year: Never true   Transportation Needs: No Transportation Needs (4/29/2024)    PRAPARE - Transportation     Lack of Transportation (Medical): No     Lack of Transportation (Non-Medical): No   Physical Activity: Not on file   Stress: Not on file   Social Connections: Not on file   Intimate Partner Violence: Not on file   Housing Stability: Low Risk  (4/29/2024)    Housing Stability Vital Sign     Unable to Pay for Housing in the Last Year: No     Number of Places Lived in the Last Year: 1     Unstable Housing in the Last Year: No       Subjective         Objective    Physical Exam:        Vitals:  Blood pressure 159/70, pulse 93, temperature (!) 97 °F (36.1 °C), temperature source Temporal, resp. rate (!) 23, height 5' 9\" (1.753 m), weight 69.4 kg (153 lb), SpO2 94%.    Results from last 7 days   Lab Units 05/01/24  1143   PH ART  7.402   PCO2 ART mm Hg 78.5*   PO2 ART mm Hg 100.1   HCO3 ART mmol/L 47.8*   BASE EXC ART mmol/L 18.6   O2 CONTENT ART mL/dL 18.6   O2 HGB, ARTERIAL % 96.9   ABG SOURCE  Radial, Right   CHRISTIAN TEST  Yes       Imaging and other studies: I have personally reviewed pertinent reports.      O2 Device: bipap 40%     Plan    Respiratory Plan: Mild Distress pathway  Airway Clearance Plan: Flutter     Resp Comments: Pt continues on 15LPM midflow, doing well with udn tx.   "

## 2024-05-03 NOTE — ASSESSMENT & PLAN NOTE
Wt Readings from Last 3 Encounters:   05/03/24 69.4 kg (153 lb)   12/26/23 69.1 kg (152 lb 6.4 oz)   11/03/23 72.4 kg (159 lb 9.8 oz)     Appears euvolemic  Takes torsemide 100 mg daily and metolazone once a week  Last TTE - ef 45% , rv decreased  Does have pulmonary hypertension some small effusions causing him to be still hypoxic will do Diamox 250 mg IV twice daily today as his sodium is creeping up from previous dose of 500.  Will repeat a 2D echo today to assess EF and also pulmonary hypertension aspect

## 2024-05-03 NOTE — PROGRESS NOTES
"Progress Note - Pulmonary   Liu Angel 84 y.o. male MRN: [id number]  Unit/Bed#: -01 Encounter: [id number]    Assessment/Plan:    Acute on chronic hypoxic and hypercapnic respiratory failure  Severe COPD with acute exacerbation  Aspiration pneumonitis  MILADIS on BiPAP  History of spindle cell carcinoma    Pulmonary recommendations:    Seen on 15 L mid flow during my evaluation without desaturations.  Titrate oxygen to maintain SpO2 greater than or equal to 85%.  Baseline oxygen requirement is 2 to 3 L nasal cannula with exertion.  May require higher oxygen requirement for discharge possibly even including Oxymizer.  Continue BiPAP nightly and as needed with full facemask.  Continue all nebulized regimen with Pulmicort/Perforomist every 12 hours.  Continue Xopenex 3 times daily.  Discontinue 3% hypertonic saline nebs and replace with Atrovent 3 times daily.  Agree with decrease to Solu-Medrol 40 mg IV every 12 hours  Diamox per primary team.  Echo pending.  Recommend palliative care consult. Of note, patient and primary team have been having GOC discussion, potentially considering comfort measures if he declines or fails to improve.   At discharge, would recommend an all nebulized regimen as above instead of Wixela/Spiriva Respimat.    Chief Complaint:    \"I feel okay.\"    Subjective:    Patient was seen and examined today. Slept on bipap without issues reported. Patient states that he feels his breathing is improved compared to him coming to the hospital but he does appear to be breathing uncomfortably in my presence. Patient declines significant cough but when he does it is productive of yellow phlegm. He continues to wheeze. No fevers or chill. No chest pain.     Objective:    Vitals: Blood pressure 148/75, pulse 102, temperature (!) 97.3 °F (36.3 °C), temperature source Temporal, resp. rate (!) 26, height 5' 9\" (1.753 m), weight 69.4 kg (153 lb), SpO2 100%.15L midflow,Body mass index is 22.59 " "kg/m².      Intake/Output Summary (Last 24 hours) at 5/3/2024 1337  Last data filed at 5/3/2024 1000  Gross per 24 hour   Intake 400 ml   Output 950 ml   Net -550 ml       Invasive Devices       Peripheral Intravenous Line  Duration             Peripheral IV 04/29/24 Right Antecubital 4 days                    Physical Exam:     Physical Exam  Vitals and nursing note reviewed.   Constitutional:       General: He is not in acute distress.     Appearance: Normal appearance. He is ill-appearing.   HENT:      Head: Normocephalic and atraumatic.      Mouth/Throat:      Mouth: Mucous membranes are moist.      Pharynx: Oropharynx is clear.   Cardiovascular:      Rate and Rhythm: Normal rate and regular rhythm.      Heart sounds: No murmur heard.  Pulmonary:      Effort: Accessory muscle usage present.      Breath sounds: Wheezing present.   Skin:     General: Skin is warm and dry.   Neurological:      General: No focal deficit present.      Mental Status: He is alert. Mental status is at baseline.   Psychiatric:         Mood and Affect: Mood normal.         Behavior: Behavior normal.         Labs: I have personally reviewed pertinent lab results., ABG: No results found for: \"PHART\", \"MSQ6VSW\", \"PO2ART\", \"TAY5WRF\", \"B3BXJDXG\", \"BEART\", \"SOURCE\", BNP: No results found for: \"BNP\", CBC: No results found for: \"WBC\", \"HGB\", \"HCT\", \"MCV\", \"PLT\", \"ADJUSTEDWBC\", \"RBC\", \"MCH\", \"MCHC\", \"RDW\", \"MPV\", \"NRBC\", CMP:   Lab Results   Component Value Date    SODIUM 146 05/03/2024    K 3.6 05/03/2024    CL 96 05/03/2024    CO2 >45 (H) 05/03/2024    BUN 46 (H) 05/03/2024    CREATININE 1.03 05/03/2024    CALCIUM 9.8 05/03/2024    EGFR 66 05/03/2024   , PT/INR: No results found for: \"PT\", \"INR\", Troponin: No results found for: \"TROPONINI\"    Imaging and other studies: I have personally reviewed pertinent reports.   and I have personally reviewed pertinent films in PACS   "

## 2024-05-04 NOTE — PLAN OF CARE
Problem: Prexisting or High Potential for Compromised Skin Integrity  Goal: Skin integrity is maintained or improved  Description: INTERVENTIONS:  - Identify patients at risk for skin breakdown  - Assess and monitor skin integrity  - Assess and monitor nutrition and hydration status  - Monitor labs   - Assess for incontinence   - Turn and reposition patient  - Assist with mobility/ambulation  - Relieve pressure over bony prominences  - Avoid friction and shearing  - Provide appropriate hygiene as needed including keeping skin clean and dry  - Evaluate need for skin moisturizer/barrier cream  - Collaborate with interdisciplinary team   - Patient/family teaching  - Consider wound care consult   5/4/2024 1831 by Pati Johnson RN  Outcome: Not Progressing     Problem: PAIN - ADULT  Goal: Verbalizes/displays adequate comfort level or baseline comfort level  Description: Interventions:  - Encourage patient to monitor pain and request assistance  - Assess pain using appropriate pain scale  - Administer analgesics based on type and severity of pain and evaluate response  - Implement non-pharmacological measures as appropriate and evaluate response  - Consider cultural and social influences on pain and pain management  - Notify physician/advanced practitioner if interventions unsuccessful or patient reports new pain  5/4/2024 1831 by Pati Johnson RN  Outcome: Not Progressing     Problem: INFECTION - ADULT  Goal: Absence or prevention of progression during hospitalization  Description: INTERVENTIONS:  - Assess and monitor for signs and symptoms of infection  - Monitor lab/diagnostic results  - Monitor all insertion sites, i.e. indwelling lines, tubes, and drains  - Monitor endotracheal if appropriate and nasal secretions for changes in amount and color  - [place] appropriate cooling/warming therapies per order  - Administer medications as ordered  - Instruct and encourage patient and family to use good hand hygiene  technique  - Identify and instruct in appropriate isolation precautions for identified infection/condition  5/4/2024 1831 by Pati Johnson RN  Outcome: Not Progressing     Problem: INFECTION - ADULT  Goal: Absence of fever/infection during neutropenic period  Description: INTERVENTIONS:  - Monitor WBC    5/4/2024 1831 by Pati Johnson RN  Outcome: Not Progressing     Problem: SAFETY ADULT  Goal: Patient will remain free of falls  Description: INTERVENTIONS:  - Educate patient/family on patient safety including physical limitations  - Instruct patient to call for assistance with activity   - Consult OT/PT to assist with strengthening/mobility   - Keep Call bell within reach  - Keep bed low and locked with side rails adjusted as appropriate  - Keep care items and personal belongings within reach  - Initiate and maintain comfort rounds  - Make Fall Risk Sign visible to staff  - Offer Toileting every 2 Hours, in advance of need  - Initiate/Maintain bed/chair alarm as needed  \- Apply yellow socks and bracelet for high fall risk patients  - Consider moving patient to room near nurses station  5/4/2024 1831 by Pati Johnson RN  Outcome: Not Progressing     Problem: SAFETY ADULT  Goal: Maintain or return to baseline ADL function  Description: INTERVENTIONS:  -  Assess patient's ability to carry out ADLs; assess patient's baseline for ADL function and identify physical deficits which impact ability to perform ADLs (bathing, care of mouth/teeth, toileting, grooming, dressing, etc.)  - Assess/evaluate cause of self-care deficits   - Assess range of motion  - Assess patient's mobility; develop plan if impaired  - Assess patient's need for assistive devices and provide as appropriate  - Encourage maximum independence but intervene and supervise when necessary  - Involve family in performance of ADLs  - Assess for home care needs following discharge   - Consider OT consult to assist with ADL evaluation and planning for discharge  -  Provide patient education as appropriate  5/4/2024 1831 by Pati Johnson RN  Outcome: Not Progressing     Problem: SAFETY ADULT  Goal: Maintains/Returns to pre admission functional level  Description: INTERVENTIONS:  - Perform AM-PAC 6 Click Basic Mobility/ Daily Activity assessment daily.  - Set and communicate daily mobility goal to care team and patient/family/caregiver.   - Collaborate with rehabilitation services on mobility goals if consulted  - Perform Range of Motion 2 times a day.  - Reposition patient every 2 hours.  - Out of bed to chair 2 times a day   - Out of bed for toileting as tolerated  - Record patient progress and toleration of activity level   5/4/2024 1831 by Pati Johnson RN  Outcome: Not Progressing     Problem: DISCHARGE PLANNING  Goal: Discharge to home or other facility with appropriate resources  Description: INTERVENTIONS:  - Identify barriers to discharge w/patient and caregiver  - Arrange for needed discharge resources and transportation as appropriate  - Identify discharge learning needs (meds, wound care, etc.)  - Arrange for interpretive services to assist at discharge as needed  - Refer to Case Management Department for coordinating discharge planning if the patient needs post-hospital services based on physician/advanced practitioner order or complex needs related to functional status, cognitive ability, or social support system  5/4/2024 1831 by Pati Johnson RN  Outcome: Not Progressing     Problem: Knowledge Deficit  Goal: Patient/family/caregiver demonstrates understanding of disease process, treatment plan, medications, and discharge instructions  Description: Complete learning assessment and assess knowledge base.  Interventions:  - Provide teaching at level of understanding  - Provide teaching via preferred learning methods  5/4/2024 1831 by Pati Johnson RN  Outcome: Not Progressing     Problem: Nutrition/Hydration-ADULT  Goal: Nutrient/Hydration intake appropriate for improving,  restoring or maintaining nutritional needs  Description: Monitor and assess patient's nutrition/hydration status for malnutrition. Collaborate with interdisciplinary team and initiate plan and interventions as ordered.  Monitor patient's weight and dietary intake as ordered or per policy. Utilize nutrition screening tool and intervene as necessary. Determine patient's food preferences and provide high-protein, high-caloric foods as appropriate.     INTERVENTIONS:  - Monitor oral intake, urinary output, labs, and treatment plans  - Assess nutrition and hydration status and recommend course of action  - Evaluate amount of meals eaten  - Assist patient with eating if necessary   - Allow adequate time for meals  - Recommend/ encourage appropriate diets, oral nutritional supplements, and vitamin/mineral supplements  - Order, calculate, and assess calorie counts as needed  - Recommend, monitor, and adjust tube feedings and TPN/PPN based on assessed needs  - Assess need for intravenous fluids  - Provide specific nutrition/hydration education as appropriate  - Include patient/family/caregiver in decisions related to nutrition  5/4/2024 1831 by Pati Johnson RN  Outcome: Not Progressing

## 2024-05-04 NOTE — RESPIRATORY THERAPY NOTE
Respiratory Note     05/04/24 1000   Respiratory Assessment   Resp Comments Pt now level 4-comfort care. Pt will be transitioned off of MFNC when pt and family ready.   O2 Device MFNC 12LPM   Additional Assessments   SpO2 92 %

## 2024-05-04 NOTE — ASSESSMENT & PLAN NOTE
Wt Readings from Last 3 Encounters:   05/04/24 69.1 kg (152 lb 5.4 oz)   12/26/23 69.1 kg (152 lb 6.4 oz)   11/03/23 72.4 kg (159 lb 9.8 oz)     Appears euvolemic  Takes torsemide 100 mg daily and metolazone once a week  Last TTE - ef 45% , rv decreased  Does have pulmonary hypertension actually repeat echo showed an improvement of his EF and the pressures relatively same.  Has had multiple doses of Diamox without any significant improvement.

## 2024-05-04 NOTE — ASSESSMENT & PLAN NOTE
Patient presents with worsening shortness of breath, increased chest congestion  Seems to have a questionable pulmonary fibrosis on the CT. has been on steroid and maximum therapy for COPD failed to improve transitioning to comfort care

## 2024-05-04 NOTE — ASSESSMENT & PLAN NOTE
Sirs -Tachycardic, tachypneic, leukocytosis, 2/2 to aspiration pneumonitis and procal negative do not suspect pna antibiotics dced   Patient today transitioning to comfort care level for DNR/DNI

## 2024-05-04 NOTE — NURSING NOTE
Pt family came out to report that the pt is more alert, requesting to trial pt off bipap mask to facetime chat with family.  Pt awake and alert and thirsty.  Pt removed from bipap and placed on 6L MF spO2 90-99%.  Nectar thick apple juice provided to patient. Tolerated well.

## 2024-05-04 NOTE — ASSESSMENT & PLAN NOTE
Chronically he is on 2 L at rest 2-3 on exertion.  Came in on a nonrebreather he was unable to utilize BiPAP recently secondary to having skin basal cell surgery removal.  He is on steroids and suspect he could have aspiration pneumonitis from mucus as speech ruled out any aspiration with food procalcitonin's were negative Rocephin was discontinued and he has completed Zithromax for the COPD exacerbation  He is on Perforomist and Pulmicort  Questionable of aspect of fluid overload as does have cephalization and pulmonary htn , elevated probnp cr back down -received Diamox 500 but the past 2 days repeated a CT mild effusions  Also questionable mucous plugging will ask for vest usage  Cta negative for pe  Patient continues to decline he is persistently on 15 L otherwise if goes down has central cyanosis and does not keep his saturations he also is on BiPAP utilizing for prolonged time he is still sounds tight and wheezy overall he does not look overly overloaded I did repeat a CT chest there is chronic bronchiolitis no new pneumonia changes he did send gurgly was unable to swallow pills so there is a questionable aspect of mucus aspiration, he had a video swallow done with speech 5/2 she was aspiration on thin adjusted to nectar thick  Hopefully if he can be off BiPAP he could do a video swallow with speech otherwise will keep n.p.o. I also had an extensive discussion with the wife in terms of goals of care as looks like there is evidence of fibrosis on the CT as well he has advanced COPD and looks like he is not improving despite medical therapy currently we will continue steroids and all COPD medications nebulizers will have vest therapy done as well will give another dose dose of Diamox and see if it helps otherwise I did discuss if he were to decompensate to day she is in agreement to transition to comfort care if he stays steady without any improvement in another day or so then at that time decision would be to  transition to comfort care he is a DNR/DNI I confirmed twice  5/3-patient has utilized BiPAP we did put him on 12 to 15 L of mid flow he has been staying at 90 to 95% discussed with the wife today that hopefully we can wean him down.  He is not wheezing today we will back down on his steroids but will continue Diamox 250 mg IV twice daily for 2 doses today repeated 2D echo to assess for pulmonary hypertension as stated prognosis is poor no other options discussed with pulmonary today as well  5/4-patient seems the hospital stay has been going on worsening path has not been able to come off into liters of mid flow and has utilized yesterday almost the whole day the BiPAP if he takes of the BiPAP he becomes lethargic -he has shown no improvement his prognosis is very poor end-stage COPD again discussed with wife at bedside today in terms of which like him to live a life like this on BiPAP machine 24/7 and she stated no as he would not want that either because we have been going on BiPAP to 15 L of mid flow 4 days without ability to improve with maximized medical therapy.  Therefore she has made a decision to transition patient to comfort care comfort care orders have been placed we discussed that everything else will be stopped and the focus will be to support his symptoms morphine and Ativan if needed and once they are ready we will transition him from mid flow to nasal cannula.

## 2024-05-04 NOTE — RESPIRATORY THERAPY NOTE
Respiratory Note     05/04/24 1200   Respiratory Assessment   Resp Comments Pt placed on 4LPM NC for comfort measures.   O2 Device 4LPM NC   Oxygen Therapy/Pulse Ox   O2 Device Nasal cannula   Nasal Cannula O2 Flow Rate (L/min) 4 L/min   Calculated FIO2 (%) - Nasal Cannula 36   SpO2 (!) 80 %  (transitioned to comfort)   SpO2 Activity At Rest   $ Pulse Oximetry Spot Check Charge Completed

## 2024-05-04 NOTE — PROGRESS NOTES
[facility]  Progress Note  Name: Liu Angel I  MRN: [id number]  Unit/Bed#: -01 I Date of Admission: 4/27/2024   Date of Service: 5/4/2024 I Hospital Day: 7    Assessment/Plan   * Acute on chronic respiratory failure with hypoxia   Assessment & Plan  Chronically he is on 2 L at rest 2-3 on exertion.  Came in on a nonrebreather he was unable to utilize BiPAP recently secondary to having skin basal cell surgery removal.  He is on steroids and suspect he could have aspiration pneumonitis from mucus as speech ruled out any aspiration with food procalcitonin's were negative Rocephin was discontinued and he has completed Zithromax for the COPD exacerbation  He is on Perforomist and Pulmicort  Questionable of aspect of fluid overload as does have cephalization and pulmonary htn , elevated probnp cr back down -received Diamox 500 but the past 2 days repeated a CT mild effusions  Also questionable mucous plugging will ask for vest usage  Cta negative for pe  Patient continues to decline he is persistently on 15 L otherwise if goes down has central cyanosis and does not keep his saturations he also is on BiPAP utilizing for prolonged time he is still sounds tight and wheezy overall he does not look overly overloaded I did repeat a CT chest there is chronic bronchiolitis no new pneumonia changes he did send gurgly was unable to swallow pills so there is a questionable aspect of mucus aspiration, he had a video swallow done with speech 5/2 she was aspiration on thin adjusted to nectar thick  Hopefully if he can be off BiPAP he could do a video swallow with speech otherwise will keep n.p.o. I also had an extensive discussion with the wife in terms of goals of care as looks like there is evidence of fibrosis on the CT as well he has advanced COPD and looks like he is not improving despite medical therapy currently we will continue steroids and all COPD medications nebulizers will have vest  therapy done as well will give another dose dose of Diamox and see if it helps otherwise I did discuss if he were to decompensate to day she is in agreement to transition to comfort care if he stays steady without any improvement in another day or so then at that time decision would be to transition to comfort care he is a DNR/DNI I confirmed twice  5/3-patient has utilized BiPAP we did put him on 12 to 15 L of mid flow he has been staying at 90 to 95% discussed with the wife today that hopefully we can wean him down.  He is not wheezing today we will back down on his steroids but will continue Diamox 250 mg IV twice daily for 2 doses today repeated 2D echo to assess for pulmonary hypertension as stated prognosis is poor no other options discussed with pulmonary today as well  5/4-patient seems the hospital stay has been going on worsening path has not been able to come off into liters of mid flow and has utilized yesterday almost the whole day the BiPAP if he takes of the BiPAP he becomes lethargic -he has shown no improvement his prognosis is very poor end-stage COPD again discussed with wife at bedside today in terms of which like him to live a life like this on BiPAP machine 24/7 and she stated no as he would not want that either because we have been going on BiPAP to 15 L of mid flow 4 days without ability to improve with maximized medical therapy.  Therefore she has made a decision to transition patient to comfort care comfort care orders have been placed we discussed that everything else will be stopped and the focus will be to support his symptoms morphine and Ativan if needed and once they are ready we will transition him from mid flow to nasal cannula.     Pneumonia of right lower lobe due to infectious organism  Assessment & Plan  CT chest showing possible consolidation of the right middle and lower lobes, infectious versus inflammatory    Given location, some concern of aspiration though he reports no  signs/symptoms of recent aspiration - monitor clinically for signs  Respiratory protocol  Airway clearance  Speech evaluated - ? Mucus   All abx dced as procal neg x3- suspect aspiration pneumonitis evaluating the CT chest repeat today looks like on the right side what was there is clearing there is no new pneumonia seen and there is chronic bronchiolitis  Had video swallow with speech done yesterday shows aspiration and thinks he was switched to nectar thick        (CDI: Possible aspiration pneumonia in the setting of acute respiratory failure a/e/b consolidation in the right middle lobe and right lower lobe, WBC 12.36,  and RR 38 treated with IV Rocephin, IV Zithro, sputum cultures, blood cultures, aspiration precautions, lab monitoring and VS monitoring )    Patient transitioning to comfort care    Sepsis without acute organ dysfunction (HCC)  Assessment & Plan  Sirs -Tachycardic, tachypneic, leukocytosis, 2/2 to aspiration pneumonitis and procal negative do not suspect pna antibiotics dced   Patient today transitioning to comfort care level for DNR/DNI      Acute metabolic encephalopathy  Assessment & Plan  Patient again lethargic today as he did not utilize the BiPAP he has been going between the BiPAP and 15 L of mid flow to keep his oxygen saturations.  After further discussions to day with the wife decision was made to transition to comfort care.  Without the utilization of BiPAP anymore    Chronic obstructive pulmonary disease with acute exacerbation (HCC)  Assessment & Plan  Patient presents with worsening shortness of breath, increased chest congestion  Seems to have a questionable pulmonary fibrosis on the CT. has been on steroid and maximum therapy for COPD failed to improve transitioning to comfort care    Chronic combined systolic and diastolic heart failure (HCC)  Assessment & Plan  Wt Readings from Last 3 Encounters:   05/04/24 69.1 kg (152 lb 5.4 oz)   12/26/23 69.1 kg (152 lb 6.4 oz)    23 72.4 kg (159 lb 9.8 oz)     Appears euvolemic  Takes torsemide 100 mg daily and metolazone once a week  Last TTE - ef 45% , rv decreased  Does have pulmonary hypertension actually repeat echo showed an improvement of his EF and the pressures relatively same.  Has had multiple doses of Diamox without any significant improvement.                     VTE Pharmacologic Prophylaxis:   Moderate Risk (Score 3-4) - Pharmacological DVT Prophylaxis Contraindicated. Sequential Compression Devices Ordered.    Mobility:   Basic Mobility Inpatient Raw Score: 8  [facility] Goal: 3: Sit at edge of bed  [facility] Achieved: 2: Bed activities/Dependent transfer  [facility] Goal achieved. Continue to encourage appropriate mobility.    Patient Centered Rounds: I performed bedside rounds with nursing staff today.   Discussions with Specialists or Other Care Team Provider: pulm yesterday    Education and Discussions with Family / Patient: Updated  (wife) at bedside.    Total Time Spent on Date of Encounter in care of patient: >45 mins. This time was spent on one or more of the following: performing physical exam; counseling and coordination of care; obtaining or reviewing history; documenting in the medical record; reviewing/ordering tests, medications or procedures; communicating with other healthcare professionals and discussing with patient's family/caregivers.    Current Length of Stay: 7 day(s)  Current Patient Status: Inpatient   Certification Statement: The patient will continue to require additional inpatient hospital stay due to comfort care  Discharge Plan: Anticipate discharge in 24-48 hrs to discharge location to be determined pending rehab evaluations.    Code Status: Level 4 - Comfort Care    Subjective:   Seen and examined today lethargic and heavy breathing mouth open- did not sleep with bipap    Objective:     Vitals:   Temp (24hrs), Av.5 °F (36.4 °C), Min:97.1 °F (36.2 °C), Max:98.1 °F (36.7  °C)    Temp:  [97.1 °F (36.2 °C)-98.1 °F (36.7 °C)] 98.1 °F (36.7 °C)  HR:  [] 80  Resp:  [19-26] 22  BP: (119-148)/(58-86) 133/60  SpO2:  [91 %-100 %] 92 %  Body mass index is 22.5 kg/m².     Input and Output Summary (last 24 hours):     Intake/Output Summary (Last 24 hours) at 5/4/2024 1134  Last data filed at 5/4/2024 0300  Gross per 24 hour   Intake 600 ml   Output 520 ml   Net 80 ml       Physical Exam:   Physical Exam  Vitals and nursing note reviewed.   Constitutional:       General: He is not in acute distress.     Appearance: He is well-developed. He is ill-appearing.   HENT:      Head: Normocephalic and atraumatic.   Eyes:      Conjunctiva/sclera: Conjunctivae normal.   Cardiovascular:      Rate and Rhythm: Normal rate and regular rhythm.      Heart sounds: No murmur heard.  Pulmonary:      Effort: Respiratory distress present.      Breath sounds: Wheezing present.   Abdominal:      General: There is no distension.      Palpations: Abdomen is soft.      Tenderness: There is no abdominal tenderness.   Musculoskeletal:         General: No swelling.      Cervical back: Neck supple.   Skin:     General: Skin is warm and dry.      Capillary Refill: Capillary refill takes less than 2 seconds.   Neurological:      Comments: Lethargic only opens eyes and closes them does not even speak    Psychiatric:         Mood and Affect: Mood normal.          Additional Data:     Labs:  Results from last 7 days   Lab Units 05/02/24  0523 04/29/24  0611 04/28/24  0534   WBC Thousand/uL 8.54   < > 9.58   HEMOGLOBIN g/dL 13.6   < > 11.9*   HEMATOCRIT % 44.5   < > 38.8   PLATELETS Thousands/uL 200   < > 167   BANDS PCT %  --   --  3   SEGS PCT % 89*  --   --    LYMPHO PCT % 3*  --  4*   MONO PCT % 7  --  5   EOS PCT % 0  --  0    < > = values in this interval not displayed.     Results from last 7 days   Lab Units 05/04/24  0629 04/30/24  0617 04/29/24  0611 04/27/24  1521 04/27/24  1213   SODIUM mmol/L 144   < > 142   < >  138   POTASSIUM mmol/L 3.7   < > 4.5   < > 5.9*   CHLORIDE mmol/L 97   < > 96   < > 89*   CO2 mmol/L >45*   < > 45*   < > 44*   BUN mg/dL 44*   < > 53*   < > 42*   CREATININE mg/dL 0.98   < > 1.25   < > 1.41*   ANION GAP mmol/L  --   --  1*  --  5   CALCIUM mg/dL 9.6   < > 9.1   < > 10.1   ALBUMIN g/dL  --   --   --   --  4.3   TOTAL BILIRUBIN mg/dL  --   --   --   --  0.58   ALK PHOS U/L  --   --   --   --  70   ALT U/L  --   --   --   --  7   AST U/L  --   --   --   --  20   GLUCOSE RANDOM mg/dL 264*   < > 152*   < > 140    < > = values in this interval not displayed.                 Results from last 7 days   Lab Units 04/29/24  0611 04/28/24  0534 04/27/24  1521 04/27/24  1433 04/27/24  1248   LACTIC ACID mmol/L  --   --   --  2.1* 2.9*   PROCALCITONIN ng/ml 0.08 0.15 0.11  --   --        Lines/Drains:  Invasive Devices       Peripheral Intravenous Line  Duration             Peripheral IV 04/29/24 Right Antecubital 5 days                          Imaging: Reviewed radiology reports from this admission including: chest CT scan    Recent Cultures (last 7 days):   Results from last 7 days   Lab Units 04/27/24  1225 04/27/24  1213   BLOOD CULTURE  No Growth After 5 Days. No Growth After 5 Days.       Last 24 Hours Medication List:   Current Facility-Administered Medications   Medication Dose Route Frequency Provider Last Rate    acetaminophen  650 mg Oral Q6H PRN Faisal Vee,       glycopyrrolate  0.1 mg Intravenous Q4H PRN Fabi Michel MD      haloperidol lactate  0.5 mg Intravenous Q2H PRN Fabi Michel MD      LORazepam  1 mg Intravenous Q4H PRN Fabi Michel MD      morphine injection  2 mg Intravenous Q1H PRN Fabi Michel MD          Today, Patient Was Seen By: Fabi Michel MD    **Please Note: This note may have been constructed using a voice recognition system.**

## 2024-05-04 NOTE — RESPIRATORY THERAPY NOTE
RT Protocol Note  Liu Angel 84 y.o. male MRN: [id number]  Unit/Bed#: -01 Encounter: [id number]    Assessment    Principal Problem:    Acute on chronic respiratory failure with hypoxia   Active Problems:    Atrial fibrillation     Chronic combined systolic and diastolic heart failure (HCC)    Chronic obstructive pulmonary disease with acute exacerbation (HCC)    Acute metabolic encephalopathy    BPH (benign prostatic hyperplasia)    Malignant spindle cell neoplasm (HCC)    Sepsis without acute organ dysfunction (HCC)    Pneumonia of right lower lobe due to infectious organism    SUSI (acute kidney injury) (HCC)      Home Pulmonary Medications:    Home Devices/Therapy: BiPAP/CPAP    Past Medical History:   Diagnosis Date    BPH (benign prostatic hyperplasia)     Chronic obstructive pulmonary disease (COPD) (HCC)     COPD (chronic obstructive pulmonary disease) (HCC)     Emphysema lung (HCC)     Essential hypertension     Hard of hearing     Pt does wear hearing aids    Hypertension     Lung cancer (HCC)     Shortness of breath     Pt states not changes and it occurs with moderate exertion    Sleep disturbance     Pt states he is only sleeping about 3 hours a night.  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    Tinnitus      Social History     Socioeconomic History    Marital status: /Civil Union     Spouse name: None    Number of children: None    Years of education: None    Highest education level: None   Occupational History    Occupation: Retired   Tobacco Use    Smoking status: Former     Types: Cigars, Cigarettes    Smokeless tobacco: Never   Vaping Use    Vaping status: Never Used   Substance and Sexual Activity    Alcohol use: Yes     Alcohol/week: 14.0 standard drinks of alcohol     Types: 14 Glasses of wine per week     Comment: moderate consumption    Drug use: Not Currently    Sexual activity: Yes   Other Topics Concern    None  "  Social History Narrative    None     Social Determinants of Health     Financial Resource Strain: Not on file   Food Insecurity: No Food Insecurity (4/29/2024)    Hunger Vital Sign     Worried About Running Out of Food in the Last Year: Never true     Ran Out of Food in the Last Year: Never true   Transportation Needs: No Transportation Needs (4/29/2024)    PRAPARE - Transportation     Lack of Transportation (Medical): No     Lack of Transportation (Non-Medical): No   Physical Activity: Not on file   Stress: Not on file   Social Connections: Not on file   Intimate Partner Violence: Not on file   Housing Stability: Low Risk  (4/29/2024)    Housing Stability Vital Sign     Unable to Pay for Housing in the Last Year: No     Number of Places Lived in the Last Year: 1     Unstable Housing in the Last Year: No       Subjective         Objective    Physical Exam:        Vitals:  Blood pressure 133/60, pulse 80, temperature 98.1 °F (36.7 °C), temperature source Temporal, resp. rate 22, height 5' 9\" (1.753 m), weight 69.1 kg (152 lb 5.4 oz), SpO2 100%.    Results from last 7 days   Lab Units 05/01/24  1143   PH ART  7.402   PCO2 ART mm Hg 78.5*   PO2 ART mm Hg 100.1   HCO3 ART mmol/L 47.8*   BASE EXC ART mmol/L 18.6   O2 CONTENT ART mL/dL 18.6   O2 HGB, ARTERIAL % 96.9   ABG SOURCE  Radial, Right   CHRISTIAN TEST  Yes       Imaging and other studies: I have personally reviewed pertinent reports.      O2 Device: BiPAP 40%     Plan    Respiratory Plan: Mild Distress pathway  Airway Clearance Plan: Flutter     Resp Comments: Pt stable on 10LPM tracey MARQUEZ, doing well with UDN tx.   "

## 2024-05-04 NOTE — ASSESSMENT & PLAN NOTE
Patient again lethargic today as he did not utilize the BiPAP he has been going between the BiPAP and 15 L of mid flow to keep his oxygen saturations.  After further discussions to day with the wife decision was made to transition to comfort care.  Without the utilization of BiPAP anymore

## 2024-05-04 NOTE — ASSESSMENT & PLAN NOTE
CT chest showing possible consolidation of the right middle and lower lobes, infectious versus inflammatory    Given location, some concern of aspiration though he reports no signs/symptoms of recent aspiration - monitor clinically for signs  Respiratory protocol  Airway clearance  Speech evaluated - ? Mucus   All abx dced as procal neg x3- suspect aspiration pneumonitis evaluating the CT chest repeat today looks like on the right side what was there is clearing there is no new pneumonia seen and there is chronic bronchiolitis  Had video swallow with speech done yesterday shows aspiration and thinks he was switched to nectar thick        (CDI: Possible aspiration pneumonia in the setting of acute respiratory failure a/e/b consolidation in the right middle lobe and right lower lobe, WBC 12.36,  and RR 38 treated with IV Rocephin, IV Zithro, sputum cultures, blood cultures, aspiration precautions, lab monitoring and VS monitoring )    Patient transitioning to comfort care

## 2024-05-05 NOTE — DEATH NOTE
INPATIENT DEATH NOTE  Liu Angel 84 y.o. male MRN: [id number]  Unit/Bed#: -01 Encounter: [id number]         Patient's Information  Pronounced by: Dr. Michel  Did the patient's death occur in the ED?: No  Did the patient's death occur in the OR?: No  Did the patient's death occur less than 10 days post-op?: No  Did the patient's death occur within 24 hours of admission?: No  Was code status DNR at the time of death?: Yes    PHYSICAL EXAM:  Unresponsive to noxious stimuli, Spontaneous respirations absent, Breath sounds absent, Carotid pulse absent, Heart sounds absent, Pupillary light reflex absent, and Corneal blink reflex absent    Medical Examiner notification criteria:  NONE APPLICABLE   Medical Examiner's office notified?:  No, does not meet ME notification criteria   Medical Examiner accepted case?:  No  Name of Medical Examiner:     Family Notification  Was the family notified?: Yes  Date Notified: 24  Time Notified: 821  Notified by: Angélica Teague RN  Name of Family Notified of Death: Constance Davisrosalia   Relationship to Patient: Spouse  Family Notification Route: At bedside  Was the family told to contact a  home?: Yes    Autopsy Options:  Decision for post-mortem examination not yet made by next of kin.    Primary Service Attending Physician notified?:  yes - Attending:  Fabi Michel MD    Physician/Resident responsible for completing Discharge Summary:  Fabi Michel

## 2024-05-05 NOTE — PLAN OF CARE
Problem: Prexisting or High Potential for Compromised Skin Integrity  Goal: Skin integrity is maintained or improved  Description: INTERVENTIONS:  - Identify patients at risk for skin breakdown  - Assess and monitor skin integrity  - Assess and monitor nutrition and hydration status  - Monitor labs   - Assess for incontinence   - Turn and reposition patient  - Assist with mobility/ambulation  - Relieve pressure over bony prominences  - Avoid friction and shearing  - Provide appropriate hygiene as needed including keeping skin clean and dry  - Evaluate need for skin moisturizer/barrier cream  - Collaborate with interdisciplinary team   - Patient/family teaching  - Consider wound care consult   Outcome: Not Progressing     Problem: PAIN - ADULT  Goal: Verbalizes/displays adequate comfort level or baseline comfort level  Description: Interventions:  - Encourage patient to monitor pain and request assistance  - Assess pain using appropriate pain scale  - Administer analgesics based on type and severity of pain and evaluate response  - Implement non-pharmacological measures as appropriate and evaluate response  - Consider cultural and social influences on pain and pain management  - Notify physician/advanced practitioner if interventions unsuccessful or patient reports new pain  Outcome: Not Progressing     Problem: INFECTION - ADULT  Goal: Absence or prevention of progression during hospitalization  Description: INTERVENTIONS:  - Assess and monitor for signs and symptoms of infection  - Monitor lab/diagnostic results  - Monitor all insertion sites, i.e. indwelling lines, tubes, and drains  - Monitor endotracheal if appropriate and nasal secretions for changes in amount and color  - [place] appropriate cooling/warming therapies per order  - Administer medications as ordered  - Instruct and encourage patient and family to use good hand hygiene technique  - Identify and instruct in appropriate isolation precautions for  identified infection/condition  Outcome: Not Progressing  Goal: Absence of fever/infection during neutropenic period  Description: INTERVENTIONS:  - Monitor WBC    Outcome: Not Progressing     Problem: SAFETY ADULT  Goal: Patient will remain free of falls  Description: INTERVENTIONS:  - Educate patient/family on patient safety including physical limitations  - Instruct patient to call for assistance with activity   - Consult OT/PT to assist with strengthening/mobility   - Keep Call bell within reach  - Keep bed low and locked with side rails adjusted as appropriate  - Keep care items and personal belongings within reach  - Initiate and maintain comfort rounds  - Make Fall Risk Sign visible to staff  - Offer Toileting every 2 Hours, in advance of need  - Initiate/Maintain bed/chair alarm as needed  \- Apply yellow socks and bracelet for high fall risk patients  - Consider moving patient to room near nurses station  Outcome: Not Progressing  Goal: Maintain or return to baseline ADL function  Description: INTERVENTIONS:  -  Assess patient's ability to carry out ADLs; assess patient's baseline for ADL function and identify physical deficits which impact ability to perform ADLs (bathing, care of mouth/teeth, toileting, grooming, dressing, etc.)  - Assess/evaluate cause of self-care deficits   - Assess range of motion  - Assess patient's mobility; develop plan if impaired  - Assess patient's need for assistive devices and provide as appropriate  - Encourage maximum independence but intervene and supervise when necessary  - Involve family in performance of ADLs  - Assess for home care needs following discharge   - Consider OT consult to assist with ADL evaluation and planning for discharge  - Provide patient education as appropriate  Outcome: Not Progressing  Goal: Maintains/Returns to pre admission functional level  Description: INTERVENTIONS:  - Perform AM-PAC 6 Click Basic Mobility/ Daily Activity assessment daily.  -  Set and communicate daily mobility goal to care team and patient/family/caregiver.   - Collaborate with rehabilitation services on mobility goals if consulted  - Perform Range of Motion 2 times a day.  - Reposition patient every 2 hours.  - Out of bed to chair 2 times a day   - Out of bed for toileting as tolerated  - Record patient progress and toleration of activity level   Outcome: Not Progressing

## 2024-05-05 NOTE — ASSESSMENT & PLAN NOTE
Patient again lethargic today as he did not utilize the BiPAP he has been going between the BiPAP and 15 L of mid flow to keep his oxygen saturations.  After further discussions to day with the wife decision was made to transition to comfort care.  Without the utilization of BiPAP anymore  Patient  today at 8:21 am

## 2024-05-05 NOTE — ASSESSMENT & PLAN NOTE
Patient presents with worsening shortness of breath, increased chest congestion  Seems to have a questionable pulmonary fibrosis on the CT. has been on steroid and maximum therapy for COPD failed to improve transitioning to comfort care  5/5expired and pronounced at 8:21 am

## 2024-05-05 NOTE — ASSESSMENT & PLAN NOTE
CT chest showing possible consolidation of the right middle and lower lobes, infectious versus inflammatory    Given location, some concern of aspiration though he reports no signs/symptoms of recent aspiration - monitor clinically for signs  Respiratory protocol  Airway clearance  Speech evaluated - ? Mucus   All abx dced as procal neg x3- suspect aspiration pneumonitis evaluating the CT chest repeat today looks like on the right side what was there is clearing there is no new pneumonia seen and there is chronic bronchiolitis  Had video swallow with speech done yesterday shows aspiration and thinks he was switched to nectar thick        (CDI: Possible aspiration pneumonia in the setting of acute respiratory failure a/e/b consolidation in the right middle lobe and right lower lobe, WBC 12.36,  and RR 38 treated with IV Rocephin, IV Zithro, sputum cultures, blood cultures, aspiration precautions, lab monitoring and VS monitoring )    Patient transitioning to comfort care    at 8:21

## 2024-05-05 NOTE — DISCHARGE SUMMARY
[facility]  Discharge- Liu Angel 1940, 84 y.o. male MRN: [id number]  Unit/Bed#: -[facility] Encounter: [id number]  Primary Care Provider: Lenore Correia DO   Date and time admitted to hospital: 4/27/2024 11:43 AM    * Acute on chronic respiratory failure with hypoxia   Assessment & Plan  Chronically he is on 2 L at rest 2-3 on exertion.  Came in on a nonrebreather he was unable to utilize BiPAP recently secondary to having skin basal cell surgery removal.  He is on steroids and suspect he could have aspiration pneumonitis from mucus as speech ruled out any aspiration with food procalcitonin's were negative Rocephin was discontinued and he has completed Zithromax for the COPD exacerbation  He is on Perforomist and Pulmicort  Questionable of aspect of fluid overload as does have cephalization and pulmonary htn , elevated probnp cr back down -received Diamox 500 but the past 2 days repeated a CT mild effusions  Also questionable mucous plugging will ask for vest usage  Cta negative for pe  Patient continues to decline he is persistently on 15 L otherwise if goes down has central cyanosis and does not keep his saturations he also is on BiPAP utilizing for prolonged time he is still sounds tight and wheezy overall he does not look overly overloaded I did repeat a CT chest there is chronic bronchiolitis no new pneumonia changes he did send gurgly was unable to swallow pills so there is a questionable aspect of mucus aspiration, he had a video swallow done with speech 5/2 she was aspiration on thin adjusted to nectar thick  Hopefully if he can be off BiPAP he could do a video swallow with speech otherwise will keep n.p.o. I also had an extensive discussion with the wife in terms of goals of care as looks like there is evidence of fibrosis on the CT as well he has advanced COPD and looks like he is not improving despite medical therapy currently we will continue steroids and all COPD  medications nebulizers will have vest therapy done as well will give another dose dose of Diamox and see if it helps otherwise I did discuss if he were to decompensate to day she is in agreement to transition to comfort care if he stays steady without any improvement in another day or so then at that time decision would be to transition to comfort care he is a DNR/DNI I confirmed twice  5/3-patient has utilized BiPAP we did put him on 12 to 15 L of mid flow he has been staying at 90 to 95% discussed with the wife today that hopefully we can wean him down.  He is not wheezing today we will back down on his steroids but will continue Diamox 250 mg IV twice daily for 2 doses today repeated 2D echo to assess for pulmonary hypertension as stated prognosis is poor no other options discussed with pulmonary today as well  -patient seems the hospital stay has been going on worsening path has not been able to come off into liters of mid flow and has utilized yesterday almost the whole day the BiPAP if he takes of the BiPAP he becomes lethargic -he has shown no improvement his prognosis is very poor end-stage COPD again discussed with wife at bedside today in terms of which like him to live a life like this on BiPAP machine  and she stated no as he would not want that either because we have been going on BiPAP to 15 L of mid flow 4 days without ability to improve with maximized medical therapy.  Therefore she has made a decision to transition patient to comfort care comfort care orders have been placed we discussed that everything else will be stopped and the focus will be to support his symptoms morphine and Ativan if needed and once they are ready we will transition him from mid flow to nasal cannula.   -patient  today 8:21    Pneumonia of right lower lobe due to infectious organism  Assessment & Plan  CT chest showing possible consolidation of the right middle and lower lobes, infectious versus  inflammatory    Given location, some concern of aspiration though he reports no signs/symptoms of recent aspiration - monitor clinically for signs  Respiratory protocol  Airway clearance  Speech evaluated - ? Mucus   All abx dced as procal neg x3- suspect aspiration pneumonitis evaluating the CT chest repeat today looks like on the right side what was there is clearing there is no new pneumonia seen and there is chronic bronchiolitis  Had video swallow with speech done yesterday shows aspiration and thinks he was switched to nectar thick        (CDI: Possible aspiration pneumonia in the setting of acute respiratory failure a/e/b consolidation in the right middle lobe and right lower lobe, WBC 12.36,  and RR 38 treated with IV Rocephin, IV Zithro, sputum cultures, blood cultures, aspiration precautions, lab monitoring and VS monitoring )    Patient transitioning to comfort care    at 8:21    Sepsis without acute organ dysfunction (HCC)  Assessment & Plan  Sirs -Tachycardic, tachypneic, leukocytosis, 2/2 to aspiration pneumonitis and procal negative do not suspect pna antibiotics dced   Patient today transitioning to comfort care level for DNR/DNI   and pronounced at 8:21 am      Acute metabolic encephalopathy  Assessment & Plan  Patient again lethargic today as he did not utilize the BiPAP he has been going between the BiPAP and 15 L of mid flow to keep his oxygen saturations.  After further discussions to day with the wife decision was made to transition to comfort care.  Without the utilization of BiPAP anymore  Patient  today at 8:21 am    Chronic obstructive pulmonary disease with acute exacerbation (HCC)  Assessment & Plan  Patient presents with worsening shortness of breath, increased chest congestion  Seems to have a questionable pulmonary fibrosis on the CT. has been on steroid and maximum therapy for COPD failed to improve transitioning to comfort care  xpired and pronounced  at 8:21 am          Medical Problems       Resolved Problems  Date Reviewed: 5/5/2024   None       Discharging Physician / Practitioner: Fabi Michel MD  PCP: Lenore Correia DO  Admission Date:   Admission Orders (From admission, onward)       Ordered        04/27/24 1459  INPATIENT ADMISSION  Once                          Discharge Date: 05/05/24    Consultations During Hospital Stay:  Pulmonary  Speech therapy    Procedures Performed:   none    Significant Findings / Test Results:   4/27-chest x-ray chronic increased interstitial markings covered corresponding with the bronchiolitis  CTA chest-No pulmonary embolus.     Mild peripheral consolidation in the right middle lobe and right lower lobe, superimposed upon chronic fibrosis, infectious or inflammatory.     New trace pleural effusions.     Severe chronic bronchiolitis.     Multiple stable subcentimeter nodules since February 2024 with decrease in a right upper lobe nodule.     Pulmonary artery enlargement which can be seen with pulmonary hypertension.  Ultrasound of the kidney and bladder normal  CT chest without contrast- 1.  No new findings. Unchanged findings of chronic bronchitis/bronchiolitis. Mild consolidation superimposed upon chronic fibrosis at the right lung bases has improved.  2.  Slight increase in small right with unchanged trace left pleural effusions.  Barium swallow which showed aspiration on thins  Tte- Left Ventricle: Left ventricular cavity size is normal. Wall thickness is normal. The left ventricular ejection fraction is 54% by biplane measurement. Systolic function is normal. Although no diagnostic regional wall motion abnormality was identified, this possibility cannot be completely excluded on the basis of this study.    Right Ventricle: Right ventricular cavity size is moderately dilated. Systolic function is mildly reduced.    Left Atrium: The atrium is mildly dilated (16-34 mL/m2).    Right Atrium: The atrium is moderately  dilated.    Mitral Valve: There is mild regurgitation.    Tricuspid Valve: There is moderate regurgitation. The estimated right ventricular systolic pressure is 32.00 mmHg.    IVC/SVC: The right atrial pressure is estimated at 3.0 mmHg. The inferior vena cava is normal in size. Respirophasic changes were normal.  Incidental Findings:   none    Test Results Pending at Discharge (will require follow up):   none     Outpatient Tests Requested:  none    Complications:  none    Reason for Admission: Acute on chronic hypoxic respiratory failure    Hospital Course:   Liu Angel is a 84 y.o. male patient who originally presented to the hospital on 2024 due to acute on chronic respiratory failure with hypoxia he also has not utilized his BiPAP secondary to recently having his basal surgery for cancer.  Anyhow admitted to level to stepdown found to have sleep BD exacerbation some aspect of pneumonia although I doubt be pneumonitis aspiration wise as procalcitonin was negative and antibiotics discontinued he was on steroids with consultation to pulmonary.  Patient has remained throughout the hospital stay either on the BiPAP or on 15 L any lower he will proceed with desaturation without BiPAP he would be encephalopathic and lethargic with hypercapnia.  Patient failed to improve despite optimal medical therapy for COPD exacerbation and a trial of Diamox he had showed no improvement.  Goals of care discussions were held with the wife and we proceeded to change to comfort care see above conversations.  Patient  on and pronounced at 8:21 am        Please see above list of diagnoses and related plan for additional information.     Condition at Discharge:     Discharge Day Visit / Exam:   Subjective:    See death note examination    Discussion with Family: Updated  (wife) at bedside.    Discharge instructions/Information to patient and family:   See after visit summary for information  provided to patient and family.      Provisions for Follow-Up Care:  See after visit summary for information related to follow-up care and any pertinent home health orders.      Mobility at time of Discharge:   Basic Mobility Inpatient Raw Score: 9  -[facility] Goal: 3: Sit at edge of bed  -HLM Achieved: 2: Bed activities/Dependent transfer       Disposition:       Planned Readmission: no     Discharge Statement:  I spent >35 minutes discharging the patient. This time was spent on the day of discharge. I had direct contact with the patient on the day of discharge. Greater than 50% of the total time was spent examining patient, answering all patient questions, arranging and discussing plan of care with patient as well as directly providing post-discharge instructions.  Additional time then spent on discharge activities.    Discharge Medications:  See after visit summary for reconciled discharge medications provided to patient and/or family.      **Please Note: This note may have been constructed using a voice recognition system**

## 2024-05-05 NOTE — ASSESSMENT & PLAN NOTE
Sirs -Tachycardic, tachypneic, leukocytosis, 2/2 to aspiration pneumonitis and procal negative do not suspect pna antibiotics dced   Patient today transitioning to comfort care level for DNR/DNI   and pronounced at 8:21 am

## 2024-05-05 NOTE — ASSESSMENT & PLAN NOTE
Chronically he is on 2 L at rest 2-3 on exertion.  Came in on a nonrebreather he was unable to utilize BiPAP recently secondary to having skin basal cell surgery removal.  He is on steroids and suspect he could have aspiration pneumonitis from mucus as speech ruled out any aspiration with food procalcitonin's were negative Rocephin was discontinued and he has completed Zithromax for the COPD exacerbation  He is on Perforomist and Pulmicort  Questionable of aspect of fluid overload as does have cephalization and pulmonary htn , elevated probnp cr back down -received Diamox 500 but the past 2 days repeated a CT mild effusions  Also questionable mucous plugging will ask for vest usage  Cta negative for pe  Patient continues to decline he is persistently on 15 L otherwise if goes down has central cyanosis and does not keep his saturations he also is on BiPAP utilizing for prolonged time he is still sounds tight and wheezy overall he does not look overly overloaded I did repeat a CT chest there is chronic bronchiolitis no new pneumonia changes he did send gurgly was unable to swallow pills so there is a questionable aspect of mucus aspiration, he had a video swallow done with speech 5/2 she was aspiration on thin adjusted to nectar thick  Hopefully if he can be off BiPAP he could do a video swallow with speech otherwise will keep n.p.o. I also had an extensive discussion with the wife in terms of goals of care as looks like there is evidence of fibrosis on the CT as well he has advanced COPD and looks like he is not improving despite medical therapy currently we will continue steroids and all COPD medications nebulizers will have vest therapy done as well will give another dose dose of Diamox and see if it helps otherwise I did discuss if he were to decompensate to day she is in agreement to transition to comfort care if he stays steady without any improvement in another day or so then at that time decision would be to  transition to comfort care he is a DNR/DNI I confirmed twice  5/3-patient has utilized BiPAP we did put him on 12 to 15 L of mid flow he has been staying at 90 to 95% discussed with the wife today that hopefully we can wean him down.  He is not wheezing today we will back down on his steroids but will continue Diamox 250 mg IV twice daily for 2 doses today repeated 2D echo to assess for pulmonary hypertension as stated prognosis is poor no other options discussed with pulmonary today as well  -patient seems the hospital stay has been going on worsening path has not been able to come off into liters of mid flow and has utilized yesterday almost the whole day the BiPAP if he takes of the BiPAP he becomes lethargic -he has shown no improvement his prognosis is very poor end-stage COPD again discussed with wife at bedside today in terms of which like him to live a life like this on BiPAP machine  and she stated no as he would not want that either because we have been going on BiPAP to 15 L of mid flow 4 days without ability to improve with maximized medical therapy.  Therefore she has made a decision to transition patient to comfort care comfort care orders have been placed we discussed that everything else will be stopped and the focus will be to support his symptoms morphine and Ativan if needed and once they are ready we will transition him from mid flow to nasal cannula.   -patient  today 8:21

## 2024-06-28 NOTE — ASSESSMENT & PLAN NOTE
CT chest showing possible consolidation of the right middle and lower lobes, infectious versus inflammatory  Presents with respiratory failure  Given location, some concern of aspiration though he reports no signs/symptoms of recent aspiration - monitor clinically for signs  Respiratory protocol  Airway clearance  Procalcitonin negative x 3, less suspicious for pneumonia  Stop ceftriaxone, continue azithromycin for antiinflammatory in COPD   [facility]    PATIENT VISIT INFORMATION    Visit Type: Follow up visit    Referring Provider: unknown  Reason for referral: B12 and LIGIA    CANCER/HEMATOLGOY HISTORY    History of anemia of chronic disease, LIGIA, and B12 deficiency. Renal dysfunction, also possible malabsorption given gastric resection for bleeding ulcers in 1965 , treated with IV iron (Venofer 300 mg x 3 doses 8/2017-9/2017, Feraheme 510 mg x 2 doses 5/2019, Venofer x5 doses at 200 mg each in December 2021 due to insurance preference, Venofer again 3/2022 &9/2022 & 11/2022, Venofer x3 doses June 2023, August 2023) as well as B12 injections subcutaneous monthly starting 8/2017. B12 injections monthly most recently 8/7/23. B6 deficiency treated with oral B6, previously seen by Dr. Mcelroy and Dr. Cabral, Dr. Meza.     4/19/2024-patient is on hospice currently.  He presents for his B12 injection, notably B6 is low at 8.6 and folic low supplement sent to pharmacy.  Anemia is improved significantly.    6/28/2024-patient on hospice for CHF/COPD.  Discussed with [facility] Procrit and B12 injection and received their approval.  Had a fall in April 2024 was unable to for his previous B12 had surgical repair on May 3, 2024.     HISTORY OF PRESENT ILLNESS     ID Statement: Andrez Damon is a 80 year old male  Chief Complaint: Anemia of chronic disease and B12 deficiency   Interval History:   Patient presents for follow up with wife and Grandson Maik.  He presents in the infusion center receiving B12 injection and states he is on Hospice for end-stage congestive heart failure/COPD. Edema much improved. SOB improving. Fall April 31, 2024 fracture two right ribs, right hip, disc ruptured. Surgical repair 5/3/2024.  He is winded as he exerts himself, which is his baseline.  Patient has notably poor dentition.  He does note that he has had a history of iron deficiency, anemia and B12 deficiency.   He has been receiving infusions for iron as needed and B12 injections monthly.       He is on O2 as needed and at HS.  He has chronic back pain that is not changed.  Frequent bruising due to being on Eliquis. Appetite improving. Unsure if weight loss. Clothes fit the same.      He denies fevers, weight loss, no unusual headaches, sore throat, acute vision changes, chest pain, nausea or vomiting, abnormal bowel movements,  blood in stool, hematuria, pain or neurologic symptoms except as above, rashes or lumps, no bleeding (prior mild self-limited epistaxis resolved) , thyroid disorder.   PAST/CURRENT HISTORY     MEDICAL/SURGICAL HISTORY  -Anemia and B12 def.  -UTIs  -CHF  -A-fib on Eliquis   -pneumonia  -RA  -CAD  -COPD on 02  -DMT2  -HTN  -CKD  -Dyslipidemia  -Depression  -Colitis   -BPH  -RLS  -Hyponatremia  -Hypomagnesemia  -Hypokalemia  -Musculoskeletal issues  -Pacemaker 2012  -TIA  -PVD  -Osteopetrosis  -Neuropathy  -Vitamin D def  -Aortic aneurysm  -Anal fissure   -sinus surgery  -hemorrhoidectomy  -gastric resection for bleeding  -Vasectomy  -Cataracts   -Tonsillectomy   -Hemant hip replacement   -Cardiac Stents   -Back surgeries   -PE 8/2021    SOCIAL HISTORY  -Lives with wife Marissa and seb  -Work place: retired   -Tobacco/smokeless use: current smoker 2-3 cigarettes per day  -Alcohol: Denies   -Illicit drug or marijuana use: Denies    -Sikh or Spiritual beliefs: None reported   -Social Determinates of Health Concerns: none reported  -He confirmed  that he is DNR [employer] arrest 8/25/22.     FAMILY HISTORY  -Mom and 2 sisters breast cancer   -Sister with breast and bone cancer   -Sister thyroid cancer   -Brother prostate cancer   -No other known history of hematologic, bleeding, clotting, autoimmune, genetic, or malignant disorders in the family.     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    Active Problems, Allergy List, Medication List, and PMH/PSH/FH/Social Hx have been reviewed and reconciled in chart. Updates made when necessary.     REVIEW OF SYSTEMS   A review of systems has been completed and are negative for complaints except what is stated in the assessment, HPI, IH, ROS, and/or past medical history.    ALLERGIES AND MEDICATIONS     Allergies and Intolerances:   Allergies   Allergen Reactions    Meloxicam Shortness of breath    Celecoxib Unknown    Keflex [Cephalexin] Hives      Medication Profile:   Current Outpatient Medications   Medication Instructions    acetaminophen (TYLENOL) 650 mg, oral, Every 6 hours PRN    amiodarone (PACERONE) 200 mg, oral, Daily    aspirin 81 mg EC tablet Take 1 tablet (81 mg) by mouth 2 times a day for 21 days, THEN 1 tablet (81 mg) once daily.    B complex-vitamin C-folic acid (Nephrocaps) 1 mg capsule 1 capsule, oral, Daily    bumetanide (BUMEX) 2 mg, oral, Daily    cholecalciferol (VITAMIN D-3) 125 mcg, oral, Daily    escitalopram (LEXAPRO) 10 mg, oral, Daily    finasteride (PROSCAR) 5 mg, oral, Daily    fluticasone-umeclidin-vilanter (Trelegy Ellipta) 200-62.5-25 mcg blister with device 1 puff, inhalation, Daily with breakfast, Inhale 1 puff daily, rinse mouth after use    HYDROmorphone (DILAUDID) 1 mg, oral, Every 2 hour PRN    levalbuterol (Xopenex) 45 mcg/actuation inhaler 1 puff, inhalation, Every 4 hours PRN, INHALE 1 TO 2 PUFFS EVERY 4 TO 6 HOURS AS NEEDED AND AS DIRECTED.    methadone (DOLOPHINE) 2.5 mg, oral, Nightly    midodrine (Proamatine) 2.5 mg tablet     ondansetron (Zofran) 8 mg tablet     oxygen (O2) 2 L/min, inhalation, Continuous    potassium chloride CR (K-Tab) 20 mEq ER tablet 1 tablet, oral, Daily    rOPINIRole (REQUIP) 1 mg, oral, Nightly    sennosides-docusate sodium (Anne-Colace) 8.6-50 mg tablet 1 tablet, oral, Nightly    tamsulosin (FLOMAX) 0.8 mg, oral, Nightly      Available Vaccination Record:   Immunization History  "  Administered Date(s) Administered    Flu vaccine (IIV4), preservative free *Check age/dose* 10/20/2016    Flu vaccine, quadrivalent, high-dose, preservative free, age 65y+ (FLUZONE) 10/18/2023    Influenza, High Dose Seasonal, Preservative Free 08/21/2018, 11/01/2022    Influenza, Unspecified 11/01/2022    Influenza, seasonal, injectable 10/19/2015    Influenza, trivalent, adjuvanted 09/01/2019    Pneumococcal conjugate vaccine, 13-valent (PREVNAR 13) 08/21/2018, 09/03/2018    Pneumococcal polysaccharide vaccine, 23-valent, age 2 years and older (PNEUMOVAX 23) 10/23/2013      PHYSICAL EXAM     Vital Signs/Measurements:       5/10/2024     4:00 AM 5/10/2024     8:23 AM 5/10/2024    12:19 PM 5/10/2024     2:48 PM 5/20/2024     1:57 PM 5/22/2024     2:10 PM 6/28/2024    10:11 AM   Vitals   Systolic 111 118 105 109 104  84   Diastolic 59 58 62 63 62  52   Heart Rate 58 57 64 58 77  65   Temp 36.6 °C (97.9 °F) 36.6 °C (97.9 °F) 36.2 °C (97.2 °F) 36.2 °C (97.2 °F) 36.1 °C (97 °F)  36.9 °C (98.4 °F)   Resp 18 17 17 17   16   Height (in)     1.778 m (5' 10\") 1.778 m (5' 10\")    BMI     19.87 kg/m2 19.87 kg/m2    BSA (m2)     1.76 m2 1.76 m2    Visit Report     Report Report Report        Performance:   ECOG Performance Status: 2     Grade ECOG performance status   0 Fully active, able to carry on all pre-disease performance without restriction   1 Restricted in physically strenuous activity but ambulatory and able to carry out work of a light or sedentary nature, e.g., light housework, office work   2 Ambulatory and capable of all selfcare but unable to carry out any work activities; Up and about more than 50% of waking hours   3 Capable of only limited selfcare, confined to bed or chair more than 50% of waking hours   4 Completely disabled; Cannot carry out any selfcare; Totally confined to bed or chair   5 Dead     Physical Exam:  General: Patient is awake/alert/oriented x3, no distress, alert and cooperative, presents " in wheel chair, frail appearing.   Skin: Expected color for ethnicity, fair turgor, dry, ecchymosis on arms   Hair:    Nails:    HEENT:   Head: Normocephalic, atraumatic   Eyes: Visual acuity intact, conjunctiva clear, sclera white, EOMI, no exudates or hemorrhages   Ears: nl appearance, hearing intact    Nose: no external lesions, mucosa non-inflamed  Mouth: Mucous membranes moist, very poor dentition    Head/Neck: No apparent injury, thyroid without mass or tenderness noted, trachea midline, no bruits appreciated.   Respiratory/Thorax:Lung clear, diminished in bases.    Cardiovascular: Regular rate and rhythm   Gastrointestinal: Bowel sounds normal, non-distended, soft, no tenderness   Genitourinary: deferred   Musculoskeletal:  normal range of motion, no pain on palpation of spine, not ambulating at visit.   Extremities: No amputations or cyanosis. No edema/ Peripheral pulses weak.   Neurological: Sensation present to touch, intact senses, motor response and reflexes normal   Breast: Deferred    Lymphatic: No significant lymphadenopathy   Psychological: Intact recent and remote memory, judgement, and insight. Appropriate mood, affect, and behavior          RESULTS/DATA     Labs:   Lab Results   Component Value Date    WBC 7.0 05/10/2024    NEUTROABS 8.18 (H) 05/03/2024    IGABSOL 0.03 05/03/2024    LYMPHSABS 0.32 (L) 05/03/2024    MONOSABS 0.19 05/03/2024    EOSABS 0.02 05/03/2024    BASOSABS 0.03 05/03/2024    RBC 3.11 (L) 05/10/2024    MCV 97 05/10/2024    MCHC 29.6 (L) 05/10/2024    HGB 8.9 (L) 05/10/2024    HCT 30.1 (L) 05/10/2024     05/10/2024     Lab Results   Component Value Date    RETICCTPCT 0.8 04/15/2024      Lab Results   Component Value Date    CREATININE 2.02 (H) 05/10/2024    BUN 45 (H) 05/10/2024    EGFR 33 (L) 05/10/2024     05/10/2024    K 3.9 05/10/2024     05/10/2024    CO2 31 05/10/2024      Lab Results   Component Value Date    ALT 8 (L) 05/04/2024    AST 18 05/04/2024     ALKPHOS 58 05/04/2024    BILITOT 0.5 05/04/2024      Lab Results   Component Value Date    TSH 1.27 09/18/2023     Lab Results   Component Value Date    TSH 1.27 09/18/2023     Lab Results   Component Value Date    IRON 76 04/15/2024    TIBC 339 04/15/2024    FERRITIN 214 04/15/2024      Lab Results   Component Value Date    IPKFTBLA12 592 04/15/2024      Lab Results   Component Value Date    FOLATE 7.9 04/15/2024     Lab Results   Component Value Date    SEDRATE 3 08/11/2021      Lab Results   Component Value Date    CRP 0.12 08/11/2021      Lab Results   Component Value Date     01/26/2018     Radiology/Studies:   CT abdomen pelvis w IV contrast  12/12/2023  IMPRESSION:  Bilateral pleural effusions. No acute abnormality of the abdomen and  pelvis.  XR chest 1 view  12/31/2023  IMPRESSION:  Generalized interstitial pattern throughout both lungs is similar to  the most recent prior study. The finding may be that of chronic  interstitial fibrosis.  A superimposed infiltrate or mild edema would  be difficult to exclude given the widespread bilateral abnormalities.  ASSESSMENT/PLAN     Assessment and Plan:   #1. History of anemia of chronic disease, LIGIA, and B12 deficiency  Renal dysfunction, possible malabsorption given gastric resection for bleeding ulcers, treated with IV iron (Venofer 2017, Feraheme 5/2019, Venofer 12/2021, 8/2023), B12 injections subcutaneous monthly starting 8/2017, B6 deficiency treated with oral B6 starting 6/2021. In 2011 EGD and colonoscopy with no bleeding. December 2021, referred Dr. Rasmussen regarding iron deficiency, no endoscopy, a PPI as needed, unable to stop anticoagulation due to high risk of a cardiac event.      Patient in end-stage congestive heart failure.  On [facility] for COPD and CHF, spoke with Hospice and they ave no conflicts with procrit or B12 injections.  Anemia corrected since iron infusions previously.  Now hgb trending down. B12 monthly.  B6 and folate acid low  sent a B-complex to pharmacy at last visit. Waiting labs. If Iron is stable and cardiology approves, procrit injections will be ordered every 21 days. Blood pressure stable. Anemia of CKD.       I have reviewed the patient's medical record including provider notes, laboratory and testing results, imaging, and procedures available within the system and outside the system.     Follow up:    RTC:  -3 months with labs  -Monthly B12    Medications:  -B12 injections ordered monthly as needed  -B6 complex with folic acid sent to pharmacy  -Procrit monthly injections pending labs today    Imaging/Testing:  -NA    Referral:  -NA    Other Pertinent Appointments:  -ortho 7/3/2024  -PCP 8/20/2024  -cardiology 1/20/2024  -Urology 4/23/2025    Patient Discussion Summary:  Discussed plan with patient and his wife. In agreement and state understanding. He will call as needed.      Thank you for allowing me to participate in your care. It was a pleasure meeting you.    Sincerely,  Rosa Luong, APRN-CNP       This document may have been written by voice recognition software Please reach out for clarity as needed.   Time based billing: See within this encounter

## 2025-07-02 NOTE — TELEPHONE ENCOUNTER
Await  lab, ldl  goal 100-130  Orders:    Comprehensive metabolic panel    Lipid panel     lmom to ask pt about his med